# Patient Record
Sex: FEMALE | Race: BLACK OR AFRICAN AMERICAN | NOT HISPANIC OR LATINO | Employment: OTHER | URBAN - METROPOLITAN AREA
[De-identification: names, ages, dates, MRNs, and addresses within clinical notes are randomized per-mention and may not be internally consistent; named-entity substitution may affect disease eponyms.]

---

## 2017-07-11 LAB — HEP C VIRUS AB: 0.1

## 2018-02-13 ENCOUNTER — HOSPITAL ENCOUNTER (EMERGENCY)
Facility: OTHER | Age: 68
Discharge: HOME | End: 2018-02-13
Attending: EMERGENCY MEDICINE
Payer: MEDICARE

## 2018-02-13 VITALS
RESPIRATION RATE: 20 BRPM | HEIGHT: 64 IN | TEMPERATURE: 99 F | DIASTOLIC BLOOD PRESSURE: 76 MMHG | SYSTOLIC BLOOD PRESSURE: 123 MMHG | HEART RATE: 76 BPM | OXYGEN SATURATION: 99 % | BODY MASS INDEX: 38.41 KG/M2 | WEIGHT: 225 LBS

## 2018-02-13 DIAGNOSIS — M54.42 ACUTE LEFT-SIDED LOW BACK PAIN WITH LEFT-SIDED SCIATICA: Primary | ICD-10-CM

## 2018-02-13 PROCEDURE — 63600175 PHARM REV CODE 636 W HCPCS: Performed by: EMERGENCY MEDICINE

## 2018-02-13 PROCEDURE — 25000003 PHARM REV CODE 250: Performed by: EMERGENCY MEDICINE

## 2018-02-13 PROCEDURE — 96372 THER/PROPH/DIAG INJ SC/IM: CPT

## 2018-02-13 PROCEDURE — 99283 EMERGENCY DEPT VISIT LOW MDM: CPT | Mod: 25

## 2018-02-13 RX ORDER — DIAZEPAM 5 MG/1
5 TABLET ORAL EVERY 8 HOURS PRN
Qty: 15 TABLET | Refills: 0 | Status: SHIPPED | OUTPATIENT
Start: 2018-02-13 | End: 2018-09-16

## 2018-02-13 RX ORDER — METHYLPREDNISOLONE 4 MG/1
TABLET ORAL
Qty: 1 PACKAGE | Refills: 0 | Status: SHIPPED | OUTPATIENT
Start: 2018-02-13 | End: 2018-03-06

## 2018-02-13 RX ORDER — DIAZEPAM 2 MG/1
2 TABLET ORAL
Status: DISCONTINUED | OUTPATIENT
Start: 2018-02-13 | End: 2018-02-13

## 2018-02-13 RX ORDER — KETOROLAC TROMETHAMINE 30 MG/ML
30 INJECTION, SOLUTION INTRAMUSCULAR; INTRAVENOUS
Status: COMPLETED | OUTPATIENT
Start: 2018-02-13 | End: 2018-02-13

## 2018-02-13 RX ORDER — DIAZEPAM 10 MG/2ML
2.5 INJECTION INTRAMUSCULAR
Status: DISCONTINUED | OUTPATIENT
Start: 2018-02-13 | End: 2018-02-13

## 2018-02-13 RX ORDER — DIAZEPAM 5 MG/1
2.5 TABLET ORAL
Status: COMPLETED | OUTPATIENT
Start: 2018-02-13 | End: 2018-02-13

## 2018-02-13 RX ORDER — DEXAMETHASONE SODIUM PHOSPHATE 4 MG/ML
8 INJECTION, SOLUTION INTRA-ARTICULAR; INTRALESIONAL; INTRAMUSCULAR; INTRAVENOUS; SOFT TISSUE
Status: COMPLETED | OUTPATIENT
Start: 2018-02-13 | End: 2018-02-13

## 2018-02-13 RX ADMIN — DIAZEPAM 2.5 MG: 5 TABLET ORAL at 02:02

## 2018-02-13 RX ADMIN — DEXAMETHASONE SODIUM PHOSPHATE 8 MG: 4 INJECTION, SOLUTION INTRAMUSCULAR; INTRAVENOUS at 02:02

## 2018-02-13 RX ADMIN — KETOROLAC TROMETHAMINE 30 MG: 30 INJECTION, SOLUTION INTRAMUSCULAR at 02:02

## 2018-02-13 NOTE — ED PROVIDER NOTES
Encounter Date: 2/13/2018       History     Chief Complaint   Patient presents with    Hip Pain     reports hx of sciatica, pain radiating from buttocks (L) SIDE down leg.Denies swelling     Ms Holt reports flare of sciatica similar to prior flares, but states this is worse. Unknown trigger. Still able to perform normal adls but hurts constantly. No relief w home meds.       The history is provided by the patient.   Back Pain    This is a recurrent problem. The current episode started yesterday. The problem occurs constantly. The problem has been unchanged. The pain is associated with no known injury. The pain is present in the lumbar spine. The quality of the pain is described as stabbing, shooting and aching. The pain radiates to the left leg. The symptoms are aggravated by bending, twisting and certain positions. The pain is the same all the time. Pertinent negatives include no chest pain, no fever, no numbness, no abdominal pain, no bowel incontinence, no bladder incontinence, no dysuria, no pelvic pain and no leg pain.     Review of patient's allergies indicates:   Allergen Reactions    Codeine      Past Medical History:   Diagnosis Date    Arthralgia     Degenerative disc disease at L5-S1 level     High cholesterol     Hypertension     Sciatica     Spinal stenosis      Past Surgical History:   Procedure Laterality Date    TUBAL LIGATION       History reviewed. No pertinent family history.  Social History   Substance Use Topics    Smoking status: Current Every Day Smoker     Types: Cigarettes    Smokeless tobacco: Never Used    Alcohol use Yes     Review of Systems   Constitutional: Negative for fever.   Cardiovascular: Negative for chest pain.   Gastrointestinal: Negative for abdominal pain and bowel incontinence.   Genitourinary: Negative for bladder incontinence, dysuria and pelvic pain.   Musculoskeletal: Positive for back pain.   Neurological: Negative for numbness.   All other systems  reviewed and are negative.      Physical Exam     Initial Vitals [02/13/18 1215]   BP Pulse Resp Temp SpO2   120/74 77 20 98.5 °F (36.9 °C) 98 %      MAP       89.33         Physical Exam    Nursing note and vitals reviewed.  Constitutional: She appears well-developed and well-nourished. She is not diaphoretic. She appears distressed.   Uncomfortable     HENT:   Head: Normocephalic and atraumatic.   Eyes: Conjunctivae and EOM are normal.   Neck: Normal range of motion. Neck supple.   Cardiovascular: Normal rate, regular rhythm and normal heart sounds.   No murmur heard.  Pulmonary/Chest: Breath sounds normal. No respiratory distress. She has no wheezes.   Abdominal: Soft. She exhibits no distension. There is no tenderness. There is no rebound.   Musculoskeletal:   Diffuse pain in L lower back w positive straight leg raise. Able to move normally, subjective pain down lateral aspect of LLE   Neurological: She is alert and oriented to person, place, and time. No cranial nerve deficit or sensory deficit.   Skin: Skin is warm and dry. Capillary refill takes less than 2 seconds. No rash noted. No erythema.   Psychiatric: She has a normal mood and affect. Her behavior is normal. Thought content normal.         ED Course   Procedures  Labs Reviewed - No data to display          Medical Decision Making:   ED Management:  Ms Holt feels markedly better. Is stable for d/c and adamantly wants to go home.  We discussed home care and worrisome signs that should prompt need to return to the ER should they occur.  There is no indication for further emergent intervention or evaluation at this time.                   ED Course      Clinical Impression:   The encounter diagnosis was Acute left-sided low back pain with left-sided sciatica.                           Kyrie Thomas MD  02/21/18 0556       Kyrie Thomas MD  02/21/18 0556

## 2018-09-16 ENCOUNTER — HOSPITAL ENCOUNTER (EMERGENCY)
Facility: HOSPITAL | Age: 68
Discharge: HOME | End: 2018-09-16
Attending: EMERGENCY MEDICINE
Payer: MEDICARE

## 2018-09-16 VITALS
SYSTOLIC BLOOD PRESSURE: 161 MMHG | DIASTOLIC BLOOD PRESSURE: 98 MMHG | BODY MASS INDEX: 38.41 KG/M2 | HEIGHT: 64 IN | TEMPERATURE: 99 F | RESPIRATION RATE: 16 BRPM | WEIGHT: 225 LBS | HEART RATE: 88 BPM | OXYGEN SATURATION: 96 %

## 2018-09-16 DIAGNOSIS — G89.29 CHRONIC BILATERAL LOW BACK PAIN WITH BILATERAL SCIATICA: Primary | ICD-10-CM

## 2018-09-16 DIAGNOSIS — M54.41 CHRONIC BILATERAL LOW BACK PAIN WITH BILATERAL SCIATICA: Primary | ICD-10-CM

## 2018-09-16 DIAGNOSIS — M54.42 CHRONIC BILATERAL LOW BACK PAIN WITH BILATERAL SCIATICA: Primary | ICD-10-CM

## 2018-09-16 PROCEDURE — 99283 EMERGENCY DEPT VISIT LOW MDM: CPT | Mod: 25

## 2018-09-16 PROCEDURE — 96372 THER/PROPH/DIAG INJ SC/IM: CPT

## 2018-09-16 PROCEDURE — 63600175 PHARM REV CODE 636 W HCPCS: Performed by: NURSE PRACTITIONER

## 2018-09-16 PROCEDURE — 99283 EMERGENCY DEPT VISIT LOW MDM: CPT | Mod: ,,, | Performed by: NURSE PRACTITIONER

## 2018-09-16 RX ORDER — MELOXICAM 7.5 MG/1
7.5 TABLET ORAL DAILY
COMMUNITY
End: 2019-11-05

## 2018-09-16 RX ORDER — LOSARTAN POTASSIUM 100 MG/1
100 TABLET ORAL DAILY
COMMUNITY
End: 2020-03-13 | Stop reason: SDUPTHER

## 2018-09-16 RX ORDER — ORPHENADRINE CITRATE 30 MG/ML
60 INJECTION INTRAMUSCULAR; INTRAVENOUS
Status: COMPLETED | OUTPATIENT
Start: 2018-09-16 | End: 2018-09-16

## 2018-09-16 RX ORDER — HYDROCHLOROTHIAZIDE 25 MG/1
25 TABLET ORAL DAILY
COMMUNITY
End: 2020-03-13 | Stop reason: SDUPTHER

## 2018-09-16 RX ORDER — PRAVASTATIN SODIUM 40 MG/1
40 TABLET ORAL DAILY
COMMUNITY
End: 2021-03-03 | Stop reason: SDUPTHER

## 2018-09-16 RX ORDER — IBUPROFEN 800 MG/1
800 TABLET ORAL EVERY 6 HOURS PRN
COMMUNITY
End: 2019-11-05

## 2018-09-16 RX ORDER — GABAPENTIN 800 MG/1
800 TABLET ORAL 3 TIMES DAILY
COMMUNITY
End: 2020-07-20

## 2018-09-16 RX ORDER — POTASSIUM CHLORIDE 750 MG/1
10 CAPSULE, EXTENDED RELEASE ORAL ONCE
COMMUNITY
End: 2020-09-03

## 2018-09-16 RX ORDER — AMLODIPINE BESYLATE 10 MG/1
10 TABLET ORAL DAILY
COMMUNITY
End: 2020-03-13 | Stop reason: SDUPTHER

## 2018-09-16 RX ORDER — TIZANIDINE HYDROCHLORIDE 4 MG/1
4 CAPSULE, GELATIN COATED ORAL 4 TIMES DAILY PRN
COMMUNITY
End: 2020-03-13 | Stop reason: SDUPTHER

## 2018-09-16 RX ORDER — OMEPRAZOLE 40 MG/1
40 CAPSULE, DELAYED RELEASE ORAL DAILY
COMMUNITY
End: 2020-03-13 | Stop reason: SDUPTHER

## 2018-09-16 RX ADMIN — ORPHENADRINE CITRATE 60 MG: 30 INJECTION INTRAMUSCULAR; INTRAVENOUS at 03:09

## 2018-09-16 RX ADMIN — METHYLPREDNISOLONE SODIUM SUCCINATE 40 MG: 40 INJECTION, POWDER, FOR SOLUTION INTRAMUSCULAR; INTRAVENOUS at 03:09

## 2018-09-16 NOTE — ED TRIAGE NOTES
Presents to ER with complaint of back pain for 3 months.  States that the pain is radiating down his right hip and leg.  Patient's name and date of birth checked and is correct.    LOC: The patient is awake, alert, and oriented to place, time, situation. Affect is appropriate.  Speech is appropriate and clear.      APPEARANCE: Patient resting comfortably, reporting palpation, light headedness,  in no acute distress.  Patient is clean and well groomed.     SKIN: The skin is warm and dry; color consistent with ethnicity.  Patient has normal skin turgor and moist mucus membranes.  Skin intact; no breakdown or bruising noted.      MUSCULOSKELETAL: Patient moving upper without difficulty; limping RLE.  Denies weakness.      RESPIRATORY: Airway is open and patent. Respirations spontaneous, even, easy, and non-labored.  Patient has a normal effort and rate.  No accessory muscle use noted. Denies cough.  BS clear.     CARDIAC:  No peripheral edema noted. No complaints of chest pain.       ABDOMEN: Soft and non tender to palpation.  No distention noted.      NEUROLOGIC: Eyes open spontaneously.  Behavior appropriate to situation.  Follows commands; facial expression symmetrical.  Purposeful motor response noted; normal sensation in all extremities.

## 2018-09-16 NOTE — ED PROVIDER NOTES
Encounter Date: 9/16/2018       History     Chief Complaint   Patient presents with    Back Pain     Chronic back pain.      Patient is a 67-year-old female with medical history of hypertension, sciatica, spinal stenosis presenting to the ED for low back pain and bilateral lower extremity pain. Patient states pain has been present for the past 6 months.  Patient states she has been unable to follow up with her the surgeon to schedule surgery.  The patient denies any recent falls or trauma.  Patient denies any numbness, tingling or weakness.           Review of patient's allergies indicates:   Allergen Reactions    Codeine      Past Medical History:   Diagnosis Date    Arthralgia     Degenerative disc disease at L5-S1 level     High cholesterol     Hypertension     Sciatica     Spinal stenosis      Past Surgical History:   Procedure Laterality Date    TUBAL LIGATION       History reviewed. No pertinent family history.  Social History     Tobacco Use    Smoking status: Current Every Day Smoker     Types: Cigarettes    Smokeless tobacco: Never Used   Substance Use Topics    Alcohol use: Yes    Drug use: No     Review of Systems   Genitourinary: Negative for decreased urine volume, difficulty urinating, dysuria and urgency.   Musculoskeletal: Positive for arthralgias, back pain and myalgias.   Neurological: Negative for dizziness, syncope, weakness and numbness.       Physical Exam     Initial Vitals [09/16/18 1439]   BP Pulse Resp Temp SpO2   (!) 161/98 88 16 98.5 °F (36.9 °C) 96 %      MAP       --         Physical Exam    Constitutional: Vital signs are normal. She appears well-developed and well-nourished. She is cooperative. She does not have a sickly appearance. No distress.   Neck: Trachea normal, normal range of motion, full passive range of motion without pain and phonation normal. Neck supple. No JVD present.   Cardiovascular:   Pulses:       Radial pulses are 2+ on the right side, and 2+ on the  left side.        Popliteal pulses are 2+ on the right side, and 2+ on the left side.        Dorsalis pedis pulses are 2+ on the right side, and 2+ on the left side.   Musculoskeletal:        Lumbar back: She exhibits pain and spasm. She exhibits normal range of motion, no tenderness, no bony tenderness, no swelling, no deformity, no laceration and normal pulse.   Neurological: No cranial nerve deficit or sensory deficit. GCS eye subscore is 4. GCS verbal subscore is 5. GCS motor subscore is 6.   Skin: Skin is warm and dry. Capillary refill takes less than 2 seconds. No abrasion, no laceration and no rash noted. No cyanosis. Nails show no clubbing.         ED Course   Procedures  Labs Reviewed - No data to display       Imaging Results    None                APC / Resident Notes:   Emergent evaluation of a 66 yo female patient presenting to the ER with chief complaint of low back pain. Patient states pain radiates down bilateral legs into her thighs.  Patient states pain has been present for the past 6 months.  Patient does see a surgeon in home a that would like to do surgery.  Patient states she has not been able to follow up with him since that time.  Patient denies any recent falls or trauma. On exam, pt the A&O x3. Strength 5/5 in bilateral lower extremities.  Patient denies any numbness or weakness. Popliteal and DP pulses +2.  The patient denies any bowel or bladder incontinence.  I do not feel labs or imaging are pertinent to the care of this patient.  I will give 1 shot of for flex as well as steroid injection. Differential diagnoses include but are not limited to muscle spasm/strain, slipped disc w/ radicular pain including sciatica, slipped disc w/ cauda equina syndrome,vertebral fracture, vertebral tumor, epidural abscess / discitis, or pyelonephritis.  I discussed the care of this patient with my Supervising Physician.      I do not believe a fracture to be the source of this patient's pain as there was  no preceding trauma. No X-ray needed due to no preceding fall or trauma.  I do not believe the pain is caused by an epidural abscess as the patient denies hx of IV drug use and does not have fevers/chills and no recent procedures.  I do not believe this patient's pain is from an infiltrative vertebral tumor as the patient does not have weight loss or night sweats and no known history of cancer.  I do not believe this patient's pain represents a rupture AAA.  There is no palpable, pulsatile mass on exam and patient does no have any ABD pain.  The believe this is patient's chronic low back pain and sciatica.  Patient advised to follow up with surgeon.  Patient states she is awaiting an appointment with [facility] on September 25th.  Advised patient to follow up as needed.  Patient is hemodynamically stable, vital signs are normal. Discharge instructions given.  Return to ED precautions discussed. Follow up as directed. Pt verbalized understanding of this plan. Pt is stable for discharge.                    Clinical Impression:   The encounter diagnosis was Chronic bilateral low back pain with bilateral sciatica.      Disposition:   Disposition: Discharged  Condition: Stable                        Xenia West NP  09/18/18 1143

## 2018-12-13 ENCOUNTER — OCCUPATIONAL HEALTH (OUTPATIENT)
Dept: URGENT CARE | Facility: CLINIC | Age: 68
End: 2018-12-13

## 2018-12-13 DIAGNOSIS — Z02.1 PRE-EMPLOYMENT EXAMINATION: Primary | ICD-10-CM

## 2018-12-13 PROCEDURE — 99499 UNLISTED E&M SERVICE: CPT | Mod: S$GLB,,, | Performed by: NURSE PRACTITIONER

## 2019-10-08 LAB — HM MAMMOGRAM: NEGATIVE

## 2019-11-03 ENCOUNTER — HOSPITAL ENCOUNTER (EMERGENCY)
Facility: HOSPITAL | Age: 69
Discharge: HOME | End: 2019-11-03
Attending: EMERGENCY MEDICINE
Payer: MEDICARE

## 2019-11-03 VITALS
RESPIRATION RATE: 18 BRPM | TEMPERATURE: 98 F | OXYGEN SATURATION: 95 % | SYSTOLIC BLOOD PRESSURE: 121 MMHG | WEIGHT: 232 LBS | DIASTOLIC BLOOD PRESSURE: 65 MMHG | HEART RATE: 91 BPM | HEIGHT: 64 IN | BODY MASS INDEX: 39.61 KG/M2

## 2019-11-03 DIAGNOSIS — R31.9 HEMATURIA, UNSPECIFIED TYPE: Primary | ICD-10-CM

## 2019-11-03 DIAGNOSIS — M54.9 BACK PAIN, UNSPECIFIED BACK LOCATION, UNSPECIFIED BACK PAIN LATERALITY, UNSPECIFIED CHRONICITY: ICD-10-CM

## 2019-11-03 LAB
ALBUMIN SERPL BCP-MCNC: 3.2 G/DL (ref 3.5–5.2)
ALP SERPL-CCNC: 108 U/L (ref 55–135)
ALT SERPL W/O P-5'-P-CCNC: 18 U/L (ref 10–44)
ANION GAP SERPL CALC-SCNC: 8 MMOL/L (ref 8–16)
AST SERPL-CCNC: 15 U/L (ref 10–40)
BACTERIA #/AREA URNS HPF: ABNORMAL /HPF
BASOPHILS # BLD AUTO: 0.02 K/UL (ref 0–0.2)
BASOPHILS NFR BLD: 0.3 % (ref 0–1.9)
BILIRUB SERPL-MCNC: 0.4 MG/DL (ref 0.1–1)
BILIRUB UR QL STRIP: NEGATIVE
BUN SERPL-MCNC: 21 MG/DL (ref 8–23)
CALCIUM SERPL-MCNC: 9.5 MG/DL (ref 8.7–10.5)
CHLORIDE SERPL-SCNC: 106 MMOL/L (ref 95–110)
CLARITY UR: ABNORMAL
CO2 SERPL-SCNC: 26 MMOL/L (ref 23–29)
COLOR UR: ABNORMAL
CREAT SERPL-MCNC: 1.3 MG/DL (ref 0.5–1.4)
DIFFERENTIAL METHOD: ABNORMAL
EOSINOPHIL # BLD AUTO: 0.1 K/UL (ref 0–0.5)
EOSINOPHIL NFR BLD: 1.9 % (ref 0–8)
ERYTHROCYTE [DISTWIDTH] IN BLOOD BY AUTOMATED COUNT: 11.7 % (ref 11.5–14.5)
EST. GFR  (AFRICAN AMERICAN): 49 ML/MIN/1.73 M^2
EST. GFR  (NON AFRICAN AMERICAN): 42 ML/MIN/1.73 M^2
GLUCOSE SERPL-MCNC: 88 MG/DL (ref 70–110)
GLUCOSE UR QL STRIP: NEGATIVE
GRAN CASTS #/AREA URNS LPF: 15 /LPF
HCT VFR BLD AUTO: 39.7 % (ref 37–48.5)
HGB BLD-MCNC: 11.8 G/DL (ref 12–16)
HGB UR QL STRIP: ABNORMAL
HYALINE CASTS #/AREA URNS LPF: 0 /LPF
IMM GRANULOCYTES # BLD AUTO: 0.04 K/UL (ref 0–0.04)
IMM GRANULOCYTES NFR BLD AUTO: 0.5 % (ref 0–0.5)
INR PPP: 1 (ref 0.8–1.2)
KETONES UR QL STRIP: NEGATIVE
LEUKOCYTE ESTERASE UR QL STRIP: NEGATIVE
LYMPHOCYTES # BLD AUTO: 2.5 K/UL (ref 1–4.8)
LYMPHOCYTES NFR BLD: 32.9 % (ref 18–48)
MCH RBC QN AUTO: 29.3 PG (ref 27–31)
MCHC RBC AUTO-ENTMCNC: 29.7 G/DL (ref 32–36)
MCV RBC AUTO: 99 FL (ref 82–98)
MICROSCOPIC COMMENT: ABNORMAL
MONOCYTES # BLD AUTO: 0.7 K/UL (ref 0.3–1)
MONOCYTES NFR BLD: 9.5 % (ref 4–15)
NEUTROPHILS # BLD AUTO: 4.1 K/UL (ref 1.8–7.7)
NEUTROPHILS NFR BLD: 54.9 % (ref 38–73)
NITRITE UR QL STRIP: NEGATIVE
NRBC BLD-RTO: 0 /100 WBC
PH UR STRIP: 5 [PH] (ref 5–8)
PLATELET # BLD AUTO: 247 K/UL (ref 150–350)
PMV BLD AUTO: 9.7 FL (ref 9.2–12.9)
POTASSIUM SERPL-SCNC: 3.5 MMOL/L (ref 3.5–5.1)
PROT SERPL-MCNC: 7.5 G/DL (ref 6–8.4)
PROT UR QL STRIP: ABNORMAL
PROTHROMBIN TIME: 10 SEC (ref 9–12.5)
RBC # BLD AUTO: 4.03 M/UL (ref 4–5.4)
RBC #/AREA URNS HPF: >100 /HPF (ref 0–4)
SODIUM SERPL-SCNC: 140 MMOL/L (ref 136–145)
SP GR UR STRIP: 1.03 (ref 1–1.03)
SQUAMOUS #/AREA URNS HPF: 3 /HPF
URN SPEC COLLECT METH UR: ABNORMAL
UROBILINOGEN UR STRIP-ACNC: NEGATIVE EU/DL
WBC # BLD AUTO: 7.51 K/UL (ref 3.9–12.7)
WBC #/AREA URNS HPF: 0 /HPF (ref 0–5)

## 2019-11-03 PROCEDURE — 99284 EMERGENCY DEPT VISIT MOD MDM: CPT | Mod: 25

## 2019-11-03 PROCEDURE — 81000 URINALYSIS NONAUTO W/SCOPE: CPT

## 2019-11-03 PROCEDURE — 87086 URINE CULTURE/COLONY COUNT: CPT

## 2019-11-03 PROCEDURE — 85610 PROTHROMBIN TIME: CPT

## 2019-11-03 PROCEDURE — 63600175 PHARM REV CODE 636 W HCPCS: Performed by: EMERGENCY MEDICINE

## 2019-11-03 PROCEDURE — 96374 THER/PROPH/DIAG INJ IV PUSH: CPT

## 2019-11-03 PROCEDURE — 80053 COMPREHEN METABOLIC PANEL: CPT

## 2019-11-03 PROCEDURE — 85025 COMPLETE CBC W/AUTO DIFF WBC: CPT

## 2019-11-03 PROCEDURE — 25000003 PHARM REV CODE 250: Performed by: EMERGENCY MEDICINE

## 2019-11-03 RX ORDER — CEPHALEXIN 500 MG/1
500 CAPSULE ORAL EVERY 12 HOURS
Qty: 14 CAPSULE | Refills: 0 | Status: SHIPPED | OUTPATIENT
Start: 2019-11-03 | End: 2019-11-10

## 2019-11-03 RX ORDER — TRAMADOL HYDROCHLORIDE 50 MG/1
50 TABLET ORAL EVERY 6 HOURS PRN
Qty: 12 TABLET | Refills: 0 | Status: SHIPPED | OUTPATIENT
Start: 2019-11-03 | End: 2019-11-06

## 2019-11-03 RX ORDER — MORPHINE SULFATE 10 MG/ML
5 INJECTION INTRAMUSCULAR; INTRAVENOUS; SUBCUTANEOUS
Status: COMPLETED | OUTPATIENT
Start: 2019-11-03 | End: 2019-11-03

## 2019-11-03 RX ORDER — CEPHALEXIN 250 MG/1
500 CAPSULE ORAL
Status: COMPLETED | OUTPATIENT
Start: 2019-11-03 | End: 2019-11-03

## 2019-11-03 RX ADMIN — CEPHALEXIN 500 MG: 250 CAPSULE ORAL at 03:11

## 2019-11-03 RX ADMIN — MORPHINE SULFATE 5 MG: 10 INJECTION INTRAVENOUS at 01:11

## 2019-11-03 NOTE — ED TRIAGE NOTES
Pt arrives to ED with c/o of hematuria that began this morning. Pt denies dysuria. Pt also complains of left sided abdominal tenderness.

## 2019-11-03 NOTE — ED PROVIDER NOTES
Encounter Date: 11/3/2019    SCRIBE #1 NOTE: I, Emilia Jones, am scribing for, and in the presence of,  Jairon Lockhart MD. I have scribed the entire note.       History     Chief Complaint   Patient presents with    Hematuria     Pt reports having increasing amounts of blood in her urine this morning while at [employer], denies pain with urination but states she is having some minor abdominal cramping. repports having this happen one time before but it has been years.      CC: Hematuria    HPI:  This is a 69 y.o. female with a PMHx of Degenerative disc disease at L5-S1 level, Spinal stenosis, Hypertension, High cholesterol, Arthralgia, and Sciatica who presents to the Emergency Department with a cc of hematuria beginning this morning. Patient reports associated left lumbar pain, and LLQ abdominal pain. The patient rates her abdominal pain at 8/10. Denies dysuria, fever, nausea, or frequency. There are no alleviating factors to her symptoms. She has a surgical history of tubal ligation, and back surgery.     The history is provided by the patient.     Review of patient's allergies indicates:   Allergen Reactions    Codeine      Past Medical History:   Diagnosis Date    Arthralgia     Degenerative disc disease at L5-S1 level     High cholesterol     Hypertension     Sciatica     Spinal stenosis      Past Surgical History:   Procedure Laterality Date    BACK SURGERY      lumbar laminectomy    TUBAL LIGATION       History reviewed. No pertinent family history.  Social History     Tobacco Use    Smoking status: Current Some Day Smoker     Types: Cigarettes    Smokeless tobacco: Never Used    Tobacco comment: 2 cigarettes/week   Substance Use Topics    Alcohol use: Yes     Comment: ocassionally    Drug use: No     Review of Systems   Constitutional: Negative for fever.   HENT: Negative for sore throat.    Respiratory: Negative for shortness of breath.    Cardiovascular: Negative for chest pain.    Gastrointestinal: Positive for abdominal pain (LLQ). Negative for nausea.   Genitourinary: Positive for hematuria. Negative for dysuria.   Musculoskeletal: Positive for back pain (left-sided lumbar).   Skin: Negative for rash.   Neurological: Negative for weakness.   Hematological: Does not bruise/bleed easily.       Physical Exam     Initial Vitals [11/03/19 1205]   BP Pulse Resp Temp SpO2   (!) 136/93 89 18 98.9 °F (37.2 °C) 98 %      MAP       --         Physical Exam    Nursing note and vitals reviewed.  Constitutional: She appears well-developed and well-nourished. She is not diaphoretic. No distress.   HENT:   Head: Normocephalic and atraumatic.   Right Ear: External ear normal.   Left Ear: External ear normal.   Nose: Nose normal.   Eyes: Conjunctivae and EOM are normal. Pupils are equal, round, and reactive to light. Right eye exhibits no discharge. Left eye exhibits no discharge. No scleral icterus.   Neck: Normal range of motion.   Cardiovascular: Normal rate.   Peripheral perfusion appropriate   Pulmonary/Chest: Breath sounds normal. No stridor. No respiratory distress.   Abdominal: There is tenderness in the left lower quadrant. There is CVA tenderness (mild). There is no rebound and no guarding.   Genitourinary: Vagina normal.   Genitourinary Comments: No vaginal bleeding   Musculoskeletal: Normal range of motion.        Lumbar back: She exhibits tenderness (left-sided).   Gait normal   Neurological: She is alert. She has normal strength. No cranial nerve deficit.   Skin: Skin is warm and dry. No rash noted. No pallor.   Psychiatric: She has a normal mood and affect.         ED Course   Procedures  Labs Reviewed   URINALYSIS, REFLEX TO URINE CULTURE - Abnormal; Notable for the following components:       Result Value    Appearance, UA Hazy (*)     Protein, UA 2+ (*)     Occult Blood UA 3+ (*)     All other components within normal limits    Narrative:     Preferred Collection Type->Urine, Clean Catch    CBC W/ AUTO DIFFERENTIAL - Abnormal; Notable for the following components:    Hemoglobin 11.8 (*)     Mean Corpuscular Volume 99 (*)     Mean Corpuscular Hemoglobin Conc 29.7 (*)     All other components within normal limits   COMPREHENSIVE METABOLIC PANEL - Abnormal; Notable for the following components:    Albumin 3.2 (*)     eGFR if  49 (*)     eGFR if non  42 (*)     All other components within normal limits   URINALYSIS MICROSCOPIC - Abnormal; Notable for the following components:    RBC, UA >100 (*)     Granular Casts, UA 15 (*)     All other components within normal limits    Narrative:     Preferred Collection Type->Urine, Clean Catch   CULTURE, URINE   PROTIME-INR          Imaging Results          CT Renal Stone Study ABD Pelvis WO (Final result)  Result time 11/03/19 14:57:10    Final result by Epifanio Aceves MD (11/03/19 14:57:10)                 Impression:      No evidence for renal calculi or obstruction.      Electronically signed by: Epifanio Aceves MD  Date:    11/03/2019  Time:    14:57             Narrative:    EXAMINATION:  CT RENAL STONE STUDY ABD PELVIS WO    CLINICAL HISTORY:  Flank pain, stone disease suspected;Hematuria;    TECHNIQUE:  Low dose axial images, sagittal and coronal reformations were obtained from the lung bases to the pubic symphysis.  Contrast was not administered.    COMPARISON:  None    FINDINGS:  Heart: Normal in size. No pericardial effusion.    Lung Bases: Well aerated, without consolidation or pleural fluid.    Liver: Normal in size and attenuation, with no focal hepatic lesions.    Gallbladder: No calcified gallstones.    Bile Ducts: No evidence of dilated ducts.    Pancreas: No mass or peripancreatic fat stranding.    Spleen: Unremarkable.    Adrenals: Unremarkable.    Kidneys/ Ureters: Unremarkable.    Bladder: No evidence of wall thickening.    Reproductive organs: Unremarkable.    GI Tract/Mesentery: No evidence of bowel  obstruction or inflammation.  No evidence for diverticulitis or appendicitis. 12    Peritoneal Space: No ascites. No free air.    Retroperitoneum: No significant adenopathy.    Abdominal wall: Unremarkable.    Vasculature: No significant atherosclerosis or aneurysm.    Bones: No acute fracture.  Postoperative changes L4-L5 with vacuum phenomenon.  Fluid collection posterior subcutaneous soft tissues at surgical site may be related to seroma/chronic hematoma.  Laminectomy identified with posterior decompression L4-L5.                                 Medical Decision Making:   Initial Assessment:   This is a 69 y.o. female with a PMHx of Degenerative disc disease at L5-S1 level, Spinal stenosis, Hypertension, High cholesterol, Arthralgia, and Sciatica who presents to the Emergency Department with a cc of hematuria beginning this morning. Exam revealed mild left lower quadrant, left lumbar muscle and mild costovertebral angle tenderness. Plan to obtain CT, CBC auto differential, comprehensive metabolic panel, protime-INR, UA, and saline lock IV. Will treat with morphine injection 5 mg, and reassess.   Clinical Tests:   Lab Tests: Ordered and Reviewed  Radiological Study: Ordered and Reviewed            Scribe Attestation:   Scribe #1: I performed the above scribed service and the documentation accurately describes the services I performed. I attest to the accuracy of the note.    Scribe attestation: I, Jairon Jimenez, personally performed the services described in this documentation. All medical record entries made by the scribe were at my direction and in my presence. I have reviewed the chart and agree that the record reflects my personal performance and is accurate and complete               Clinical Impression:       ICD-10-CM ICD-9-CM   1. Hematuria, unspecified type R31.9 599.70   2. Back pain, unspecified back location, unspecified back pain laterality, unspecified chronicity M54.9 724.5                                 Jairon Lockhart MD  11/03/19 9530

## 2019-11-05 ENCOUNTER — OFFICE VISIT (OUTPATIENT)
Dept: UROLOGY | Facility: CLINIC | Age: 69
End: 2019-11-05
Payer: MEDICARE

## 2019-11-05 VITALS
HEIGHT: 64 IN | HEART RATE: 60 BPM | DIASTOLIC BLOOD PRESSURE: 80 MMHG | BODY MASS INDEX: 39.61 KG/M2 | RESPIRATION RATE: 16 BRPM | WEIGHT: 232 LBS | SYSTOLIC BLOOD PRESSURE: 122 MMHG

## 2019-11-05 DIAGNOSIS — N28.1 RENAL CYST: ICD-10-CM

## 2019-11-05 DIAGNOSIS — R31.0 HEMATURIA, GROSS: Primary | ICD-10-CM

## 2019-11-05 LAB — BACTERIA UR CULT: NORMAL

## 2019-11-05 PROCEDURE — 99999 PR PBB SHADOW E&M-EST. PATIENT-LVL III: CPT | Mod: PBBFAC,,, | Performed by: UROLOGY

## 2019-11-05 PROCEDURE — 99999 PR PBB SHADOW E&M-EST. PATIENT-LVL III: ICD-10-PCS | Mod: PBBFAC,,, | Performed by: UROLOGY

## 2019-11-05 PROCEDURE — 1101F PT FALLS ASSESS-DOCD LE1/YR: CPT | Mod: CPTII,S$GLB,, | Performed by: UROLOGY

## 2019-11-05 PROCEDURE — 99204 OFFICE O/P NEW MOD 45 MIN: CPT | Mod: S$GLB,,, | Performed by: UROLOGY

## 2019-11-05 PROCEDURE — 99204 PR OFFICE/OUTPT VISIT, NEW, LEVL IV, 45-59 MIN: ICD-10-PCS | Mod: S$GLB,,, | Performed by: UROLOGY

## 2019-11-05 PROCEDURE — 1101F PR PT FALLS ASSESS DOC 0-1 FALLS W/OUT INJ PAST YR: ICD-10-PCS | Mod: CPTII,S$GLB,, | Performed by: UROLOGY

## 2019-11-05 RX ORDER — AMITRIPTYLINE HYDROCHLORIDE 50 MG/1
TABLET, FILM COATED ORAL
COMMUNITY
End: 2020-03-13 | Stop reason: SDUPTHER

## 2019-11-05 NOTE — PROGRESS NOTES
"  Subjective:       Amanda Holt is a 69 y.o. female who is a new patient who was referred by ER for evaluation of hematuria.      She was seen in ER 2 days ago with gross hematuria x 1 day. Denies dysuria but reports some abdominal cramping. Constipation and straining for few days prior to that. No prior episodes of hematuria. Current light smoker. No h/o nephrolithiasis, trauma. Chronic L back pain that radiates to leg. No issues with UTIs. No family history of  malignancy.     UCx - negative  CT RSS - negative for stones, limited in hematuria evaluation      The following portions of the patient's history were reviewed and updated as appropriate: allergies, current medications, past family history, past medical history, past social history, past surgical history and problem list.    Review of Systems  Constitutional: no fever or chills  ENT: no nasal congestion or sore throat  Respiratory: no cough or shortness of breath  Cardiovascular: no chest pain or palpitations  Gastrointestinal: no nausea or vomiting, tolerating diet  Genitourinary: as per HPI  Hematologic/Lymphatic: no easy bruising or lymphadenopathy  Musculoskeletal: no arthralgias or myalgias  Skin: no rashes or lesions  Neurological: no seizures or tremors  Behavioral/Psych: no auditory or visual hallucinations        Objective:    Vitals: Ht 5' 4" (1.626 m)   Wt 105.2 kg (232 lb)   BMI 39.82 kg/m²     Physical Exam   General: well developed, well nourished in no acute distress  Head: normocephalic, atraumatic  Neck: supple, trachea midline, no obvious enlargement of thyroid  HEENT: EOMI, mucus membranes moist, sclera anicteric, no hearing impairment  Lungs: symmetric expansion, non-labored breathing  Cardiovascular: regular rate and rhythm, normal pulses  Abdomen: soft, non tender, non distended, no palpable masses, no hepatosplenomegaly, no hernias, no CVA tenderness  Musculoskeletal: no peripheral edema, normal ROM in bilateral upper and " lower extremities  Lymphatics: no cervical or inguinal lymphadenopathy  Skin: no rashes or lesions  Neuro: alert and oriented x 3, no gross deficits  Psych: normal judgment and insight, normal mood/affect and non-anxious  Genitourinary:   patient declined exam      Lab Review   Urine analysis today in clinic shows positive for red blood cells 5-10    Lab Results   Component Value Date    WBC 7.51 11/03/2019    HGB 11.8 (L) 11/03/2019    HCT 39.7 11/03/2019    MCV 99 (H) 11/03/2019     11/03/2019     Lab Results   Component Value Date    CREATININE 1.3 11/03/2019    BUN 21 11/03/2019        Imaging  Images and reports were personally reviewed by me and discussed with patient  CT RSS reviewed       Assessment/Plan:      1. Hematuria, gross    - Discussed etiology and workup of hematuria   - UCx - recently negative   - Cytology - not indicated   - CT urogram. CT RSS done, not adequate for hematuria workup.    - Office cystoscopy   - +smoker     2. Renal cyst   - Diagnosed from outside hospital   - CT RSS did not show this   - CT urogram   - Doubt etiology of pain in L flank      Follow up in 3 weeks for CT/cysto

## 2019-11-15 ENCOUNTER — TELEPHONE (OUTPATIENT)
Dept: UROLOGY | Facility: CLINIC | Age: 69
End: 2019-11-15

## 2019-11-15 ENCOUNTER — CLINICAL SUPPORT (OUTPATIENT)
Dept: AUDIOLOGY | Facility: CLINIC | Age: 69
End: 2019-11-15
Payer: MEDICARE

## 2019-11-15 DIAGNOSIS — H90.3 BILATERAL SENSORINEURAL HEARING LOSS: Primary | ICD-10-CM

## 2019-11-15 PROCEDURE — 92567 PR TYMPA2METRY: ICD-10-PCS | Mod: S$GLB,,, | Performed by: PHYSICIAN ASSISTANT

## 2019-11-15 PROCEDURE — 92557 COMPREHENSIVE HEARING TEST: CPT | Mod: S$GLB,,, | Performed by: PHYSICIAN ASSISTANT

## 2019-11-15 PROCEDURE — 92557 PR COMPREHENSIVE HEARING TEST: ICD-10-PCS | Mod: S$GLB,,, | Performed by: PHYSICIAN ASSISTANT

## 2019-11-15 PROCEDURE — 92567 TYMPANOMETRY: CPT | Mod: S$GLB,,, | Performed by: PHYSICIAN ASSISTANT

## 2019-11-15 NOTE — PROGRESS NOTES
Amanda Holt was seen today in the clinic for an audiologic evaluation.  Patients main complaint was decreased hearing and intermittent bilateral tinnitus.  Ms. Holt reported having difficulty hearing and aural fullness specifically on the left-side. Patient reported that her left ear will pop after performing a valsalva.    Tympanometry revealed Type A in the right ear and Type A in the left ear.  Audiogram results revealed a moderate sensorineural hearing loss (250-8000 Hz) in the right ear and a mild to moderate sensorineural hearing loss (250-8000 Hz) in the left ear.  Speech reception thresholds were noted at 35 dB in the right ear and 35 dB in the left ear.  Speech discrimination scores were 100% in the right ear and 100% in the left ear.    Recommendations:  1. Otologic evaluation  2. Annual audiogram  3. Noise protection when in noise  4. Hearing aid consultation

## 2019-11-15 NOTE — TELEPHONE ENCOUNTER
----- Message from Gema Mcfarland sent at 11/15/2019  9:05 AM CST -----  Contact: Self   Type: Patient Call Back    What is the request in detail: Pt returning call     Can the clinic reply by [facility]? No    Would the patient rather a call back or a response via [facility]? Call back     Best call back number:[phone]   Dry/Warm

## 2019-11-19 ENCOUNTER — HOSPITAL ENCOUNTER (OUTPATIENT)
Dept: RADIOLOGY | Facility: HOSPITAL | Age: 69
Discharge: HOME | End: 2019-11-19
Attending: UROLOGY
Payer: MEDICARE

## 2019-11-19 DIAGNOSIS — R31.0 HEMATURIA, GROSS: ICD-10-CM

## 2019-11-19 PROCEDURE — 74178 CT ABD&PLV WO CNTR FLWD CNTR: CPT | Mod: TC

## 2019-11-19 PROCEDURE — 74178 CT UROGRAM ABD PELVIS W WO: ICD-10-PCS | Mod: 26,,, | Performed by: RADIOLOGY

## 2019-11-19 PROCEDURE — 25500020 PHARM REV CODE 255: Performed by: UROLOGY

## 2019-11-19 PROCEDURE — 74178 CT ABD&PLV WO CNTR FLWD CNTR: CPT | Mod: 26,,, | Performed by: RADIOLOGY

## 2019-11-19 RX ADMIN — IOHEXOL 125 ML: 350 INJECTION, SOLUTION INTRAVENOUS at 12:11

## 2019-11-22 DIAGNOSIS — M81.0 SENILE OSTEOPOROSIS: Primary | ICD-10-CM

## 2019-11-25 ENCOUNTER — HOSPITAL ENCOUNTER (OUTPATIENT)
Dept: RADIOLOGY | Facility: CLINIC | Age: 69
Discharge: HOME | End: 2019-11-25
Attending: FAMILY MEDICINE
Payer: MEDICARE

## 2019-11-25 DIAGNOSIS — M81.0 SENILE OSTEOPOROSIS: ICD-10-CM

## 2019-11-25 PROCEDURE — 77080 DXA BONE DENSITY AXIAL: CPT | Mod: 26,,, | Performed by: INTERNAL MEDICINE

## 2019-11-25 PROCEDURE — 77080 DEXA BONE DENSITY SPINE HIP: ICD-10-PCS | Mod: 26,,, | Performed by: INTERNAL MEDICINE

## 2019-11-25 PROCEDURE — 77080 DXA BONE DENSITY AXIAL: CPT | Mod: TC,PO

## 2019-12-05 ENCOUNTER — PROCEDURE VISIT (OUTPATIENT)
Dept: UROLOGY | Facility: CLINIC | Age: 69
End: 2019-12-05
Payer: MEDICARE

## 2019-12-05 VITALS — BODY MASS INDEX: 39.91 KG/M2 | HEIGHT: 64 IN | WEIGHT: 233.81 LBS

## 2019-12-05 DIAGNOSIS — R31.0 HEMATURIA, GROSS: ICD-10-CM

## 2019-12-05 PROCEDURE — 52000 CYSTOSCOPY: ICD-10-PCS | Mod: S$GLB,,, | Performed by: UROLOGY

## 2019-12-05 PROCEDURE — 52000 CYSTOURETHROSCOPY: CPT | Mod: S$GLB,,, | Performed by: UROLOGY

## 2019-12-05 NOTE — PROCEDURES
"Cystoscopy  Date/Time: 12/5/2019 9:40 AM  Performed by: Na Garcia MD  Authorized by: Na Garcia MD     Consent Done?:  Yes (Written)  Time out: Immediately prior to procedure a "time out" was called to verify the correct patient, procedure, equipment, support staff and site/side marked as required.    Indications: hematuria    Position:  Dorsal lithotomy  Anesthesia:  Lidocaine jelly  Patient sedated?: No    Preparation: Patient was prepped and draped in usual sterile fashion      Scope type:  Flexible cystoscope  Stent inserted: No    Stent removed: No    External exam normal: Yes    Digital exam performed: No    Urethra normal: Yes  Bladder neck normal: Bladder neck normal   Bladder normal: Yes      Patient tolerance:  Patient tolerated the procedure well with no immediate complications     CT uro - normal . Abnormal appearance of liver - recommend f/u with PCP re: this. Report printed.   Cysto - normal      "

## 2020-01-08 ENCOUNTER — OFFICE VISIT (OUTPATIENT)
Dept: OPTOMETRY | Facility: CLINIC | Age: 70
End: 2020-01-08
Payer: MEDICARE

## 2020-01-08 DIAGNOSIS — H18.823 CONTACT LENS OVERWEAR OF BOTH EYES: ICD-10-CM

## 2020-01-08 DIAGNOSIS — H25.13 NUCLEAR SCLEROSIS OF BOTH EYES: Primary | ICD-10-CM

## 2020-01-08 DIAGNOSIS — Z46.0 ENCOUNTER FOR FITTING OR ADJUSTMENT OF SPECTACLES OR CONTACT LENSES: Primary | ICD-10-CM

## 2020-01-08 DIAGNOSIS — H52.7 REFRACTIVE ERROR: ICD-10-CM

## 2020-01-08 PROCEDURE — 99499 UNLISTED E&M SERVICE: CPT | Mod: S$GLB,,, | Performed by: OPTOMETRIST

## 2020-01-08 PROCEDURE — 92004 COMPRE OPH EXAM NEW PT 1/>: CPT | Mod: S$GLB,,, | Performed by: OPTOMETRIST

## 2020-01-08 PROCEDURE — 92310 PR CONTACT LENS FITTING (NO CHANGE): ICD-10-PCS | Mod: CSM,,, | Performed by: OPTOMETRIST

## 2020-01-08 PROCEDURE — 92015 DETERMINE REFRACTIVE STATE: CPT | Mod: S$GLB,,, | Performed by: OPTOMETRIST

## 2020-01-08 PROCEDURE — 99499 NO LOS: ICD-10-PCS | Mod: S$GLB,,, | Performed by: OPTOMETRIST

## 2020-01-08 PROCEDURE — 99999 PR PBB SHADOW E&M-EST. PATIENT-LVL II: CPT | Mod: PBBFAC,,, | Performed by: OPTOMETRIST

## 2020-01-08 PROCEDURE — 92015 PR REFRACTION: ICD-10-PCS | Mod: S$GLB,,, | Performed by: OPTOMETRIST

## 2020-01-08 PROCEDURE — 92004 PR EYE EXAM, NEW PATIENT,COMPREHESV: ICD-10-PCS | Mod: S$GLB,,, | Performed by: OPTOMETRIST

## 2020-01-08 PROCEDURE — 92310 CONTACT LENS FITTING OU: CPT | Mod: CSM,,, | Performed by: OPTOMETRIST

## 2020-01-08 PROCEDURE — 99999 PR PBB SHADOW E&M-EST. PATIENT-LVL II: ICD-10-PCS | Mod: PBBFAC,,, | Performed by: OPTOMETRIST

## 2020-01-08 NOTE — PROGRESS NOTES
Subjective:       Patient ID: Amanda Holt is a 69 y.o. female      Chief Complaint   Patient presents with    Cataract    Concerns About Ocular Health    Contact Lens Follow Up     History of Present Illness  Dls:  ? Yrs     68 y/o female presents today for cataract check.  Pt c/o problems seeing at night.   Pt wears scls and otc readers.     No tearing  + itching  No burning  No pain  No ha's   floaters  No flashes    Eye meds  otc visine ou prn     Air Optix colors. Current pair is one year. Pt sleeps in lenses.     Assessment/Plan:     1. Nuclear sclerosis of both eyes  Educated pt on presence of cataracts and effects on vision. No surgery at this time. Recheck in one year.    2. Contact lens overwear of both eyes  Pt wants to stay in colored lenses. Pt aware no cyl correction and vision will not be as sharp as glasses. DVO CL with readers pRN. Contact lens Rx released to pt. Daily wear only advised, replacement monthly with education to risks of extended wear, including blindness and increased risk of eye infection. Educated pt to not sleep in CLs. Discussed lens care, compliance and solutions. RTC yearly contact lens follow up.    Contact Lens Final Rx     Final Contact Lens Rx       Brand Base Curve Diameter Sphere Cylinder    Right Air Optix Colours 8.6 14.2 -3.00 Sphere    Left Air Optix Colours 8.6 14.2 -3.00 Sphere    Expiration Date:  1/8/2021    Replacement:  Monthly    Wearing Schedule:  Daily wear                3. Refractive error  Educated patient on refractive error and discussed lens options. Dispensed updated spectacle Rx. Educated about adaptation period to new specs.    Eyeglass Final Rx     Eyeglass Final Rx       Sphere Cylinder Axis Add    Right -3.00 -4.00 010 +2.50    Left -2.75 -2.50 180 +2.50    Expiration Date:  1/8/2021                    Follow up in about 1 year (around 1/8/2021) for Annual eye exam, Contact Lens.

## 2020-01-16 ENCOUNTER — TELEPHONE (OUTPATIENT)
Dept: UROLOGY | Facility: CLINIC | Age: 70
End: 2020-01-16

## 2020-01-16 NOTE — TELEPHONE ENCOUNTER
Pt asked if we could print avs,ct scan an bone dexa test an she will  at the office. This was done pt states will  next week.-pablo

## 2020-01-16 NOTE — TELEPHONE ENCOUNTER
----- Message from Eileen Velez sent at 1/16/2020  3:10 PM CST -----  Contact: Self  Type: Patient Call Back    Who called: Self    What is the request in detail: please contact the patient to discuss procedures she had last year.    Can the clinic reply by [facility]? No     Would the patient rather a call back or a response via [facility]? Call     Best call back number: [phone]

## 2020-01-23 ENCOUNTER — TELEPHONE (OUTPATIENT)
Dept: UROLOGY | Facility: CLINIC | Age: 70
End: 2020-01-23

## 2020-01-23 NOTE — TELEPHONE ENCOUNTER
----- Message from Cassius Mccollum sent at 1/23/2020  2:15 PM CST -----  Contact: Amanda [phone]  Type: RX Refill Request    Who Called: Amanda    Refill or New Rx:refill    RX Name and Strength:tramadol    Is this a 30 day or 90 day RX:30 day    Preferred Pharmacy with phone number:[facility] - [place] ([place] - [address] [facility]    Would the patient rather a call back or a response via [facility]? Call back    Best Call Back Number:[phone]

## 2020-03-09 ENCOUNTER — TELEPHONE (OUTPATIENT)
Dept: UROLOGY | Facility: CLINIC | Age: 70
End: 2020-03-09

## 2020-03-09 ENCOUNTER — TELEPHONE (OUTPATIENT)
Dept: NEUROSURGERY | Facility: CLINIC | Age: 70
End: 2020-03-09

## 2020-03-09 ENCOUNTER — TELEPHONE (OUTPATIENT)
Dept: NEPHROLOGY | Facility: CLINIC | Age: 70
End: 2020-03-09

## 2020-03-09 NOTE — TELEPHONE ENCOUNTER
I did an office cystoscopy on her in 12/19. There is no urologic need for Ultram rx at this time. I would defer this to her PCP, who normally gives her the prescription.

## 2020-03-09 NOTE — TELEPHONE ENCOUNTER
----- Message from Tram Rolon sent at 3/9/2020 11:00 AM CDT -----  Contact: CLARICE KLEIN  Name of Who is Calling: CLARICE KLEIN      What is the request in detail: Would like to speak to staff in regards to needing her results from avs ct scan and bone dexa test. States she would told in January that she can pick them up from there . Please advise.       Can the clinic reply by [facility]: Yes      What Number to [facility]: [phone]; [phone]

## 2020-03-09 NOTE — TELEPHONE ENCOUNTER
Advised pt all her paperwork she requested has been printed out an at the . She is also asking if  would refill her ultram. Please advise-pablo

## 2020-03-09 NOTE — TELEPHONE ENCOUNTER
----- Message from Hannah Art sent at 3/9/2020 12:41 PM CDT -----  Contact: pt  Type:  Sooner Apoointment Request    Caller is requesting a sooner appointment.  Caller declined first available appointment listed below.  Caller will not accept being placed on the waitlist and is requesting a message be sent to doctor.  Name of Caller Pt   When is the first available appointment?  Symptoms  Had back surgery 10/2018 just started having left side pain moved from [place] to [address] .   Would the patient rather a call back or a response via [facility]?   Best Call Back Number [phone] or [phone]  Additional Information:

## 2020-03-09 NOTE — TELEPHONE ENCOUNTER
L/m for pt about scheduling appt and to return call @ [phone].    ----- Message from Hannah Art sent at 3/9/2020 12:48 PM CDT -----  Contact: pt  Type:  Sooner Apoointment Request    Caller is requesting a sooner appointment.  Caller declined first available appointment listed below.  Caller will not accept being placed on the waitlist and is requesting a message be sent to doctor.  Name of Caller PT  When is the first available appointment?  Symptoms   Stage 3 kidney desease  Would the patient rather a call back or a response via [facility]?  Best Call Back Number:[phone] or [phone]  Additional Information:

## 2020-03-09 NOTE — TELEPHONE ENCOUNTER
Spoke to pt advised ultram would have to be prescribe from pcp as this is who has been filling it. Pt states she understands an will contact pcp.-pablo

## 2020-03-09 NOTE — TELEPHONE ENCOUNTER
"Patient has been called and informed that she needs a new MRI before she can be scheduled with a surgeon here in the office. Patient states" she will call her PCP for the date and call our office back."  "

## 2020-03-13 ENCOUNTER — OFFICE VISIT (OUTPATIENT)
Dept: FAMILY MEDICINE | Facility: CLINIC | Age: 70
End: 2020-03-13
Payer: MEDICARE

## 2020-03-13 ENCOUNTER — LAB VISIT (OUTPATIENT)
Dept: LAB | Facility: HOSPITAL | Age: 70
End: 2020-03-13
Attending: FAMILY MEDICINE
Payer: MEDICARE

## 2020-03-13 ENCOUNTER — TELEPHONE (OUTPATIENT)
Dept: FAMILY MEDICINE | Facility: CLINIC | Age: 70
End: 2020-03-13

## 2020-03-13 VITALS
HEIGHT: 64 IN | TEMPERATURE: 98 F | SYSTOLIC BLOOD PRESSURE: 122 MMHG | WEIGHT: 236.69 LBS | DIASTOLIC BLOOD PRESSURE: 82 MMHG | BODY MASS INDEX: 40.41 KG/M2

## 2020-03-13 DIAGNOSIS — I10 HYPERTENSION, UNSPECIFIED TYPE: ICD-10-CM

## 2020-03-13 DIAGNOSIS — K21.9 GASTROESOPHAGEAL REFLUX DISEASE, ESOPHAGITIS PRESENCE NOT SPECIFIED: ICD-10-CM

## 2020-03-13 DIAGNOSIS — Z13.6 ENCOUNTER FOR LIPID SCREENING FOR CARDIOVASCULAR DISEASE: ICD-10-CM

## 2020-03-13 DIAGNOSIS — F41.9 ANXIETY: ICD-10-CM

## 2020-03-13 DIAGNOSIS — R73.9 ELEVATED BLOOD SUGAR: ICD-10-CM

## 2020-03-13 DIAGNOSIS — K59.00 CONSTIPATION, UNSPECIFIED CONSTIPATION TYPE: ICD-10-CM

## 2020-03-13 DIAGNOSIS — N28.1 BILATERAL RENAL CYSTS: ICD-10-CM

## 2020-03-13 DIAGNOSIS — G47.33 OBSTRUCTIVE SLEEP APNEA SYNDROME: ICD-10-CM

## 2020-03-13 DIAGNOSIS — Z12.11 SCREENING FOR COLON CANCER: ICD-10-CM

## 2020-03-13 DIAGNOSIS — Z13.220 ENCOUNTER FOR LIPID SCREENING FOR CARDIOVASCULAR DISEASE: ICD-10-CM

## 2020-03-13 DIAGNOSIS — N18.30 CKD (CHRONIC KIDNEY DISEASE) STAGE 3, GFR 30-59 ML/MIN: ICD-10-CM

## 2020-03-13 DIAGNOSIS — M48.00 SPINAL STENOSIS, UNSPECIFIED SPINAL REGION: Primary | ICD-10-CM

## 2020-03-13 DIAGNOSIS — M51.36 DDD (DEGENERATIVE DISC DISEASE), LUMBAR: ICD-10-CM

## 2020-03-13 PROBLEM — H91.93 BILATERAL HEARING LOSS: Status: ACTIVE | Noted: 2017-07-17

## 2020-03-13 PROBLEM — M54.50 CHRONIC LOW BACK PAIN: Status: ACTIVE | Noted: 2018-02-23

## 2020-03-13 PROBLEM — G89.29 CHRONIC LOW BACK PAIN: Status: ACTIVE | Noted: 2018-02-23

## 2020-03-13 LAB
ALBUMIN SERPL BCP-MCNC: 3.5 G/DL (ref 3.5–5.2)
ALP SERPL-CCNC: 94 U/L (ref 55–135)
ALT SERPL W/O P-5'-P-CCNC: 13 U/L (ref 10–44)
ANION GAP SERPL CALC-SCNC: 9 MMOL/L (ref 8–16)
AST SERPL-CCNC: 15 U/L (ref 10–40)
BASOPHILS # BLD AUTO: 0.02 K/UL (ref 0–0.2)
BASOPHILS NFR BLD: 0.3 % (ref 0–1.9)
BILIRUB SERPL-MCNC: 0.5 MG/DL (ref 0.1–1)
BUN SERPL-MCNC: 26 MG/DL (ref 8–23)
CALCIUM SERPL-MCNC: 10.1 MG/DL (ref 8.7–10.5)
CHLORIDE SERPL-SCNC: 108 MMOL/L (ref 95–110)
CHOLEST SERPL-MCNC: 188 MG/DL (ref 120–199)
CHOLEST/HDLC SERPL: 3.8 {RATIO} (ref 2–5)
CO2 SERPL-SCNC: 25 MMOL/L (ref 23–29)
CREAT SERPL-MCNC: 1.4 MG/DL (ref 0.5–1.4)
DIFFERENTIAL METHOD: ABNORMAL
EOSINOPHIL # BLD AUTO: 0.1 K/UL (ref 0–0.5)
EOSINOPHIL NFR BLD: 1.5 % (ref 0–8)
ERYTHROCYTE [DISTWIDTH] IN BLOOD BY AUTOMATED COUNT: 12.5 % (ref 11.5–14.5)
EST. GFR  (AFRICAN AMERICAN): 44.5 ML/MIN/1.73 M^2
EST. GFR  (NON AFRICAN AMERICAN): 38.6 ML/MIN/1.73 M^2
ESTIMATED AVG GLUCOSE: 108 MG/DL (ref 68–131)
GLUCOSE SERPL-MCNC: 106 MG/DL (ref 70–110)
HBA1C MFR BLD HPLC: 5.4 % (ref 4–5.6)
HCT VFR BLD AUTO: 43.3 % (ref 37–48.5)
HDLC SERPL-MCNC: 50 MG/DL (ref 40–75)
HDLC SERPL: 26.6 % (ref 20–50)
HGB BLD-MCNC: 12.5 G/DL (ref 12–16)
IMM GRANULOCYTES # BLD AUTO: 0.01 K/UL (ref 0–0.04)
IMM GRANULOCYTES NFR BLD AUTO: 0.1 % (ref 0–0.5)
LDLC SERPL CALC-MCNC: 107 MG/DL (ref 63–159)
LYMPHOCYTES # BLD AUTO: 2.5 K/UL (ref 1–4.8)
LYMPHOCYTES NFR BLD: 36.1 % (ref 18–48)
MCH RBC QN AUTO: 29 PG (ref 27–31)
MCHC RBC AUTO-ENTMCNC: 28.9 G/DL (ref 32–36)
MCV RBC AUTO: 101 FL (ref 82–98)
MONOCYTES # BLD AUTO: 0.6 K/UL (ref 0.3–1)
MONOCYTES NFR BLD: 9.4 % (ref 4–15)
NEUTROPHILS # BLD AUTO: 3.6 K/UL (ref 1.8–7.7)
NEUTROPHILS NFR BLD: 52.6 % (ref 38–73)
NONHDLC SERPL-MCNC: 138 MG/DL
NRBC BLD-RTO: 0 /100 WBC
PLATELET # BLD AUTO: 305 K/UL (ref 150–350)
PMV BLD AUTO: 9.2 FL (ref 9.2–12.9)
POTASSIUM SERPL-SCNC: 5.2 MMOL/L (ref 3.5–5.1)
PROT SERPL-MCNC: 8.3 G/DL (ref 6–8.4)
RBC # BLD AUTO: 4.31 M/UL (ref 4–5.4)
SODIUM SERPL-SCNC: 142 MMOL/L (ref 136–145)
TRIGL SERPL-MCNC: 155 MG/DL (ref 30–150)
WBC # BLD AUTO: 6.81 K/UL (ref 3.9–12.7)

## 2020-03-13 PROCEDURE — 83036 HEMOGLOBIN GLYCOSYLATED A1C: CPT

## 2020-03-13 PROCEDURE — 3288F PR FALLS RISK ASSESSMENT DOCUMENTED: ICD-10-PCS | Mod: CPTII,S$GLB,, | Performed by: FAMILY MEDICINE

## 2020-03-13 PROCEDURE — 1125F AMNT PAIN NOTED PAIN PRSNT: CPT | Mod: S$GLB,,, | Performed by: FAMILY MEDICINE

## 2020-03-13 PROCEDURE — 3074F PR MOST RECENT SYSTOLIC BLOOD PRESSURE < 130 MM HG: ICD-10-PCS | Mod: CPTII,S$GLB,, | Performed by: FAMILY MEDICINE

## 2020-03-13 PROCEDURE — 85025 COMPLETE CBC W/AUTO DIFF WBC: CPT

## 2020-03-13 PROCEDURE — 99999 PR PBB SHADOW E&M-EST. PATIENT-LVL V: CPT | Mod: PBBFAC,,, | Performed by: FAMILY MEDICINE

## 2020-03-13 PROCEDURE — 1100F PR PT FALLS ASSESS DOC 2+ FALLS/FALL W/INJURY/YR: ICD-10-PCS | Mod: CPTII,S$GLB,, | Performed by: FAMILY MEDICINE

## 2020-03-13 PROCEDURE — 1159F MED LIST DOCD IN RCRD: CPT | Mod: S$GLB,,, | Performed by: FAMILY MEDICINE

## 2020-03-13 PROCEDURE — 99204 OFFICE O/P NEW MOD 45 MIN: CPT | Mod: S$GLB,,, | Performed by: FAMILY MEDICINE

## 2020-03-13 PROCEDURE — 1100F PTFALLS ASSESS-DOCD GE2>/YR: CPT | Mod: CPTII,S$GLB,, | Performed by: FAMILY MEDICINE

## 2020-03-13 PROCEDURE — 3074F SYST BP LT 130 MM HG: CPT | Mod: CPTII,S$GLB,, | Performed by: FAMILY MEDICINE

## 2020-03-13 PROCEDURE — 80053 COMPREHEN METABOLIC PANEL: CPT

## 2020-03-13 PROCEDURE — 3288F FALL RISK ASSESSMENT DOCD: CPT | Mod: CPTII,S$GLB,, | Performed by: FAMILY MEDICINE

## 2020-03-13 PROCEDURE — 1159F PR MEDICATION LIST DOCUMENTED IN MEDICAL RECORD: ICD-10-PCS | Mod: S$GLB,,, | Performed by: FAMILY MEDICINE

## 2020-03-13 PROCEDURE — 99999 PR PBB SHADOW E&M-EST. PATIENT-LVL V: ICD-10-PCS | Mod: PBBFAC,,, | Performed by: FAMILY MEDICINE

## 2020-03-13 PROCEDURE — 99499 RISK ADDL DX/OHS AUDIT: ICD-10-PCS | Mod: S$GLB,,, | Performed by: FAMILY MEDICINE

## 2020-03-13 PROCEDURE — 3079F DIAST BP 80-89 MM HG: CPT | Mod: CPTII,S$GLB,, | Performed by: FAMILY MEDICINE

## 2020-03-13 PROCEDURE — 3079F PR MOST RECENT DIASTOLIC BLOOD PRESSURE 80-89 MM HG: ICD-10-PCS | Mod: CPTII,S$GLB,, | Performed by: FAMILY MEDICINE

## 2020-03-13 PROCEDURE — 80061 LIPID PANEL: CPT

## 2020-03-13 PROCEDURE — 99204 PR OFFICE/OUTPT VISIT, NEW, LEVL IV, 45-59 MIN: ICD-10-PCS | Mod: S$GLB,,, | Performed by: FAMILY MEDICINE

## 2020-03-13 PROCEDURE — 99499 UNLISTED E&M SERVICE: CPT | Mod: S$GLB,,, | Performed by: FAMILY MEDICINE

## 2020-03-13 PROCEDURE — 1125F PR PAIN SEVERITY QUANTIFIED, PAIN PRESENT: ICD-10-PCS | Mod: S$GLB,,, | Performed by: FAMILY MEDICINE

## 2020-03-13 PROCEDURE — 36415 COLL VENOUS BLD VENIPUNCTURE: CPT | Mod: PO

## 2020-03-13 RX ORDER — TRAMADOL HYDROCHLORIDE 50 MG/1
50 TABLET ORAL EVERY 12 HOURS PRN
Qty: 60 TABLET | Refills: 2 | Status: SHIPPED | OUTPATIENT
Start: 2020-03-13 | End: 2020-04-12

## 2020-03-13 RX ORDER — DICLOFENAC SODIUM 10 MG/G
GEL TOPICAL
COMMUNITY
End: 2020-03-13 | Stop reason: SDUPTHER

## 2020-03-13 RX ORDER — ASPIRIN 81 MG/1
TABLET ORAL
COMMUNITY

## 2020-03-13 RX ORDER — POTASSIUM CHLORIDE 750 MG/1
10 CAPSULE, EXTENDED RELEASE ORAL ONCE
Qty: 1 CAPSULE | Refills: 0 | Status: CANCELLED | OUTPATIENT
Start: 2020-03-13 | End: 2020-03-13

## 2020-03-13 RX ORDER — HYDROCHLOROTHIAZIDE 25 MG/1
25 TABLET ORAL DAILY
Qty: 90 TABLET | Refills: 1 | Status: SHIPPED | OUTPATIENT
Start: 2020-03-13 | End: 2020-12-03

## 2020-03-13 RX ORDER — OMEPRAZOLE 40 MG/1
40 CAPSULE, DELAYED RELEASE ORAL DAILY
Qty: 90 CAPSULE | Refills: 1 | Status: SHIPPED | OUTPATIENT
Start: 2020-03-13 | End: 2021-05-18

## 2020-03-13 RX ORDER — TIZANIDINE HYDROCHLORIDE 4 MG/1
4 CAPSULE, GELATIN COATED ORAL 4 TIMES DAILY PRN
Qty: 90 CAPSULE | Refills: 1 | Status: SHIPPED | OUTPATIENT
Start: 2020-03-13 | End: 2020-06-15

## 2020-03-13 RX ORDER — TRAZODONE HYDROCHLORIDE 100 MG/1
100 TABLET ORAL NIGHTLY PRN
Qty: 90 TABLET | Refills: 1 | Status: SHIPPED | OUTPATIENT
Start: 2020-03-13 | End: 2020-09-03 | Stop reason: SDUPTHER

## 2020-03-13 RX ORDER — AMLODIPINE BESYLATE 10 MG/1
10 TABLET ORAL DAILY
Qty: 90 TABLET | Refills: 1 | Status: SHIPPED | OUTPATIENT
Start: 2020-03-13 | End: 2020-09-03 | Stop reason: SDUPTHER

## 2020-03-13 RX ORDER — TRAZODONE HYDROCHLORIDE 100 MG/1
TABLET ORAL
COMMUNITY
End: 2020-03-13 | Stop reason: SDUPTHER

## 2020-03-13 RX ORDER — DICLOFENAC SODIUM 10 MG/G
GEL TOPICAL DAILY
Qty: 100 G | Refills: 5 | Status: SHIPPED | OUTPATIENT
Start: 2020-03-13 | End: 2020-03-13 | Stop reason: SDUPTHER

## 2020-03-13 RX ORDER — TRAMADOL HYDROCHLORIDE 50 MG/1
50 TABLET ORAL EVERY 12 HOURS PRN
Qty: 60 TABLET | Refills: 2 | Status: SHIPPED | OUTPATIENT
Start: 2020-03-13 | End: 2020-03-13 | Stop reason: SDUPTHER

## 2020-03-13 RX ORDER — AMITRIPTYLINE HYDROCHLORIDE 50 MG/1
TABLET, FILM COATED ORAL
Qty: 90 TABLET | Refills: 1 | Status: SHIPPED | OUTPATIENT
Start: 2020-03-13 | End: 2021-02-14

## 2020-03-13 RX ORDER — PRAVASTATIN SODIUM 40 MG/1
40 TABLET ORAL DAILY
Status: CANCELLED | OUTPATIENT
Start: 2020-03-13

## 2020-03-13 RX ORDER — TRAMADOL HYDROCHLORIDE 50 MG/1
25 TABLET ORAL
COMMUNITY
Start: 2020-01-31 | End: 2020-03-13 | Stop reason: SDUPTHER

## 2020-03-13 RX ORDER — HYDROXYZINE HYDROCHLORIDE 25 MG/1
25 TABLET, FILM COATED ORAL 3 TIMES DAILY PRN
Qty: 45 TABLET | Refills: 1 | Status: SHIPPED | OUTPATIENT
Start: 2020-03-13 | End: 2020-08-17

## 2020-03-13 RX ORDER — DICLOFENAC SODIUM 10 MG/G
GEL TOPICAL DAILY
Qty: 100 G | Refills: 5 | Status: SHIPPED | OUTPATIENT
Start: 2020-03-13 | End: 2020-03-19 | Stop reason: SDUPTHER

## 2020-03-13 RX ORDER — HYDROXYZINE HYDROCHLORIDE 25 MG/1
TABLET, FILM COATED ORAL
COMMUNITY
End: 2020-03-13 | Stop reason: SDUPTHER

## 2020-03-13 RX ORDER — AMITRIPTYLINE HYDROCHLORIDE 50 MG/1
TABLET, FILM COATED ORAL
Status: CANCELLED | OUTPATIENT
Start: 2020-03-13

## 2020-03-13 RX ORDER — LOSARTAN POTASSIUM 100 MG/1
100 TABLET ORAL DAILY
Qty: 90 TABLET | Refills: 1 | Status: SHIPPED | OUTPATIENT
Start: 2020-03-13 | End: 2021-04-28

## 2020-03-13 NOTE — TELEPHONE ENCOUNTER
----- Message from Cassius Mccollum sent at 3/13/2020 11:51 AM CDT -----  Contact: Joanna from [facility] [phone]  Type:  Pharmacy Calling to Clarify an RX    Name of Caller: Joanna    Pharmacy Name: [facility]     Prescription Name: traMADoL (ULTRAM) 50 mg tablet    What do they need to clarify? Joanna from [facility] called to get more information on the patient's tramadol. She would like to know if the patient is receiving tramadol for acute or chronic pain and if she is taking something else for the pain as well    Can you be contacted via [facility]?no    Best Call Back Number: [phone]

## 2020-03-13 NOTE — PROGRESS NOTES
Patient, Amanda Holt (MRN #[id number]), presented with a recorded BMI of 40.62 kg/m^2 consistent with the definition of morbid obesity (ICD-10 E66.01). The patient's morbid obesity was monitored, evaluated, addressed and/or treated. This addendum to the medical record is made on 03/13/2020.

## 2020-03-13 NOTE — TELEPHONE ENCOUNTER
This nurse attempted to return call to [facility] pharmacy times 3. Phone continuously rings with no answer.

## 2020-03-13 NOTE — PROGRESS NOTES
Chief Complaint   Patient presents with    Establish Care       HPI  Amanda Holt is a 69 y.o. female with multiple medical diagnoses as listed in the medical history and problem list that presents for establishment of care . She has concerns about CKD stage 3 and would like to establish with kidney specialist, she is seeing neurosurgery for her lower back; she would also like to see cardiology    Lower back-  She has chronic low back pain and in her left side  Her previous PCP ordered an MRI to dx if her pain is due to her previous surgery in 2018  Has referral to neurosurgery  Has seen pain management before but this did improve symptoms    She was in the process of being referred to nephrology for renal cysts and CKD III  She is treated for anxiety as well  She would like to see cardiology as she has seen them for pre op clearance and was treated for HTN      Patient Active Problem List   Diagnosis    Contact lens overwear of both eyes    Refractive error    Nuclear sclerosis of both eyes    Spinal stenosis    Hypertension    Obstructive sleep apnea syndrome    Gastroesophageal reflux disease without esophagitis    CKD (chronic kidney disease) stage 3, GFR 30-59 ml/min    Chronic low back pain    Bilateral hearing loss    DDD (degenerative disc disease), lumbar    CTS (carpal tunnel syndrome)    Anxiety         ROS  Review of Systems   Constitutional: Positive for activity change. Negative for chills, diaphoresis, fatigue and fever.   HENT: Positive for hearing loss. Negative for rhinorrhea, sinus pressure, sore throat, tinnitus and trouble swallowing.    Eyes: Negative for photophobia, discharge and visual disturbance.   Respiratory: Negative for cough, chest tightness, shortness of breath and wheezing.    Cardiovascular: Negative for chest pain.   Gastrointestinal: Positive for constipation. Negative for abdominal pain, blood in stool, diarrhea and nausea.   Endocrine: Positive for polydipsia  "and polyuria.   Genitourinary: Positive for hematuria. Negative for difficulty urinating, dysuria, flank pain, frequency, menstrual problem and vaginal discharge.        This is being evaluated by urology   Musculoskeletal: Positive for joint swelling and neck pain. Negative for arthralgias.   Skin: Negative for rash.   Neurological: Positive for headaches. Negative for speech difficulty, weakness and light-headedness.   Psychiatric/Behavioral: Positive for dysphoric mood. Negative for behavioral problems.       Physical Exam  Vitals:    03/13/20 1003   BP: 122/82   BP Location: Right arm   Patient Position: Sitting   BP Method: Large (Manual)   Pulse: Comment: long nails   Temp: 98.1 °F (36.7 °C)   TempSrc: Oral   SpO2: Comment: long nails   Weight: 107.3 kg (236 lb 10.6 oz)   Height: 5' 4" (1.626 m)    Body mass index is 40.62 kg/m².  Weight: 107.3 kg (236 lb 10.6 oz)   Height: 5' 4" (162.6 cm)     Physical Exam   Constitutional: She is oriented to person, place, and time. She appears well-developed and well-nourished. No distress.   HENT:   Head: Normocephalic and atraumatic.   Eyes: EOM are normal.   Neck: Neck supple.   Cardiovascular: Normal rate and regular rhythm. Exam reveals no gallop and no friction rub.   No murmur heard.  Pulmonary/Chest: Effort normal and breath sounds normal. No respiratory distress. She has no wheezes. She has no rales.   Lymphadenopathy:     She has no cervical adenopathy.   Neurological: She is alert and oriented to person, place, and time.   Skin: Skin is warm and dry. No rash noted.   Psychiatric: She has a normal mood and affect. Her behavior is normal.   Nursing note and vitals reviewed.      ALLERGIES AND MEDICATIONS: updated and reviewed.  Review of patient's allergies indicates:   Allergen Reactions    Codeine      Medication List with Changes/Refills   Current Medications    ASPIRIN (ASPIRIN LOW DOSE) 81 MG EC TABLET        GABAPENTIN (NEURONTIN) 800 MG TABLET    Take 800 " mg by mouth 3 (three) times daily.    INV HYDROCHLOROTHIAZIDE 25MG TABLET        POTASSIUM CHLORIDE (MICRO-K) 10 MEQ CPSR    Take 10 mEq by mouth once.    PRAVASTATIN (PRAVACHOL) 40 MG TABLET    Take 40 mg by mouth once daily.   Changed and/or Refilled Medications    Modified Medication Previous Medication    AMITRIPTYLINE (ELAVIL) 50 MG TABLET amitriptyline (ELAVIL) 50 MG tablet       amitriptyline 50 mg tablet  TAKE 1 TABLET BY MOUTH AT BEDTIME    amitriptyline 50 mg tablet   TAKE 1 TABLET BY MOUTH AT BEDTIME    AMLODIPINE (NORVASC) 10 MG TABLET amLODIPine (NORVASC) 10 MG tablet       Take 1 tablet (10 mg total) by mouth once daily.    Take 10 mg by mouth once daily.    DICLOFENAC SODIUM (VOLTAREN) 1 % GEL diclofenac sodium (VOLTAREN) 1 % Gel       Apply topically once daily.        HYDROCHLOROTHIAZIDE (HYDRODIURIL) 25 MG TABLET hydroCHLOROthiazide (HYDRODIURIL) 25 MG tablet       Take 1 tablet (25 mg total) by mouth once daily.    Take 25 mg by mouth once daily.    HYDROXYZINE HCL (ATARAX) 25 MG TABLET hydroxyzine HCL (ATARAX) 25 MG tablet       Take 1 tablet (25 mg total) by mouth 3 (three) times daily as needed for Itching.        LOSARTAN (COZAAR) 100 MG TABLET losartan (COZAAR) 100 MG tablet       Take 1 tablet (100 mg total) by mouth once daily.    Take 100 mg by mouth once daily.    OMEPRAZOLE (PRILOSEC) 40 MG CAPSULE omeprazole (PRILOSEC) 40 MG capsule       Take 1 capsule (40 mg total) by mouth once daily.    Take 40 mg by mouth once daily.    TIZANIDINE (ZANAFLEX) 4 MG CAP tiZANidine (ZANAFLEX) 4 mg Cap       Take 4 mg by mouth 4 (four) times daily as needed.    Take 4 mg by mouth 4 (four) times daily as needed.    TRAMADOL (ULTRAM) 50 MG TABLET traMADoL (ULTRAM) 50 mg tablet       Take 1 tablet (50 mg total) by mouth every 12 (twelve) hours as needed for Pain.    Take 25 mg by mouth.    TRAZODONE (DESYREL) 100 MG TABLET traZODone (DESYREL) 100 MG tablet       Take 1 tablet (100 mg total) by mouth  nightly as needed for Insomnia.           Assessment & Plan  1. Spinal stenosis, unspecified spinal region    2. Hypertension, unspecified type    3. Screening for colon cancer    4. DDD (degenerative disc disease), lumbar    5. Bilateral renal cysts    6. CKD (chronic kidney disease) stage 3, GFR 30-59 ml/min    7. Obstructive sleep apnea syndrome    8. Encounter for lipid screening for cardiovascular disease    9. Constipation, unspecified constipation type    10. Elevated blood sugar    11. Anxiety    12. Gastroesophageal reflux disease, esophagitis presence not specified        Problem List Items Addressed This Visit        Neuro    DDD (degenerative disc disease), lumbar  --she has referral to neurosurgery, would like aquatic therapy  -consider pain management if surgery is not indicated    Relevant Orders    Ambulatory referral/consult to Physical Therapy    Spinal stenosis - Primary  -she has referral to neurosurgery, would like aquatic therapy  -consider pain management if surgery is not indicated    Relevant Medications    losartan (COZAAR) 100 MG tablet    tiZANidine (ZANAFLEX) 4 mg Cap    traMADoL (ULTRAM) 50 mg tablet    diclofenac sodium (VOLTAREN) 1 % Gel    amitriptyline (ELAVIL) 50 MG tablet       Psychiatric    Anxiety  -stable on current regimen    Relevant Medications    traZODone (DESYREL) 100 MG tablet    hydroxyzine HCL (ATARAX) 25 MG tablet       Cardiac/Vascular    Hypertension  -controlled    Relevant Medications    amLODIPine (NORVASC) 10 MG tablet    hydroCHLOROthiazide (HYDRODIURIL) 25 MG tablet    losartan (COZAAR) 100 MG tablet    Other Relevant Orders    Ambulatory referral/consult to Cardiology    CBC auto differential    Comprehensive metabolic panel       Renal/    CKD (chronic kidney disease) stage 3, GFR 30-59 ml/min  -refer for evaluation per pt request    Relevant Orders    Ambulatory referral/consult to Nephrology       Other    Obstructive sleep apnea syndrome  -on CPAP       Other Visit Diagnoses     Screening for colon cancer      -as ordered       Relevant Orders    Case request GI: COLONOSCOPY (Completed)    Bilateral renal cysts      -will refer to nephrology    Encounter for lipid screening for cardiovascular disease      -as ordered       Relevant Orders    Lipid panel    Constipation, unspecified constipation type      -discussed increased water intake and using miralax    Elevated blood sugar      -had elevated BG on last labwork    Relevant Orders    Hemoglobin A1c    Gastroesophageal reflux disease, esophagitis presence not specified      -continue current regimen    Relevant Medications    omeprazole (PRILOSEC) 40 MG capsule          Follow up in about 3 months (around 6/13/2020) for Follow up.    Other Orders Placed This Visit:  Orders Placed This Encounter   Procedures    CBC auto differential    Comprehensive metabolic panel    Lipid panel    Hemoglobin A1c    Ambulatory referral/consult to Cardiology    Ambulatory referral/consult to Nephrology    Ambulatory referral/consult to Physical Therapy

## 2020-03-16 ENCOUNTER — TELEPHONE (OUTPATIENT)
Dept: FAMILY MEDICINE | Facility: CLINIC | Age: 70
End: 2020-03-16

## 2020-03-16 NOTE — TELEPHONE ENCOUNTER
----- Message from Cassius Mccollum sent at 3/13/2020 11:51 AM CDT -----  Contact: Joanna from [facility] [phone]  Type:  Pharmacy Calling to Clarify an RX    Name of Caller: Joanna    Pharmacy Name: [facility]     Prescription Name: traMADoL (ULTRAM) 50 mg tablet    What do they need to clarify? Joanna from [facility] called to get more information on the patient's tramadol. She would like to know if the patient is receiving tramadol for acute or chronic pain and if she is taking something else for the pain as well    Can you be contacted via [facility]?no    Best Call Back Number: [phone]

## 2020-03-16 NOTE — TELEPHONE ENCOUNTER
Spoke with pt informed her Rx's were sent to pharmacy on 3/13. Pt states she will contact pharmacy to check if Rx is ready for .

## 2020-03-16 NOTE — TELEPHONE ENCOUNTER
----- Message from Gema Mcfarland sent at 3/16/2020  3:11 PM CDT -----  Contact: Self   Type: Patient Call Back     What is the request in detail: Pt calling to check status on meds.     Can the clinic reply by [facility]? No     Would the patient rather a call back or a response via [facility]? Call back     Best call back number: [phone]

## 2020-03-16 NOTE — TELEPHONE ENCOUNTER
See previous message , spoke with Halle the pharmacist at [facility]. Medication was clarified.

## 2020-03-16 NOTE — TELEPHONE ENCOUNTER
----- Message from Kristal Simon sent at 3/16/2020  8:26 AM CDT -----  Contact: Halle coronado/Dennis [phone]  Type:  Pharmacy Calling to Clarify an RX    Name of Caller: Halle    Pharmacy Name:  [facility]    Prescription Name:  traMADoL (ULTRAM) 50 mg tablet    What do they need to clarify? Follow up on a message that was put in on Fri., 03-13-20    Best Call Back Number: [phone]

## 2020-03-17 NOTE — PROGRESS NOTES
1. Your diabetes test is negative for diabetes or prediabetes.  2. Your metabolic panel shows elevated potassium, are you taking a potassium pill still, if so you can hold for three days then resume.  3. Your kidney function is mildly decreased. Make sure you are drinking plenty of water  4. Your cholesterol and blood count are in normal range    Simona Torres MD

## 2020-03-19 DIAGNOSIS — M48.00 SPINAL STENOSIS, UNSPECIFIED SPINAL REGION: ICD-10-CM

## 2020-03-19 NOTE — TELEPHONE ENCOUNTER
----- Message from Gema Mcfarland sent at 3/19/2020  8:33 AM CDT -----  Contact: [facility]   Type: Patient Call Back     What is the request in detail: Jessica calling to speak to a nurse regarding med below.        diclofenac sodium (VOLTAREN) 1 % Gel      Can the clinic reply by [facility]? No     Would the patient rather a call back or a response via [facility]? Call back     Best call back number:      [facility] [address] [place] [address] ([place] [address] [facility]  [address] [facility] ([place] [address]  Phone: [phone] Fax: [phone]

## 2020-03-20 DIAGNOSIS — Z11.59 NEED FOR HEPATITIS C SCREENING TEST: ICD-10-CM

## 2020-03-20 DIAGNOSIS — Z12.11 COLON CANCER SCREENING: ICD-10-CM

## 2020-03-20 RX ORDER — DICLOFENAC SODIUM 10 MG/G
GEL TOPICAL DAILY
Qty: 100 G | Refills: 5 | Status: SHIPPED | OUTPATIENT
Start: 2020-03-20 | End: 2021-03-03 | Stop reason: SDUPTHER

## 2020-03-20 NOTE — TELEPHONE ENCOUNTER
Spoke with pharmacist, states they need verification on howmuch the pt should apply, 2 or 4 grams for insurance purpose      Please advise

## 2020-03-24 ENCOUNTER — TELEPHONE (OUTPATIENT)
Dept: REHABILITATION | Facility: HOSPITAL | Age: 70
End: 2020-03-24

## 2020-03-24 NOTE — TELEPHONE ENCOUNTER
Patient: Amanda Holt  Date: 3/24/2020  Diagnosis: No diagnosis found.  MRN: [id number]    Spoke with patient due to therapy following updates regarding COVID-19 closely and taking every precaution to ensure the safety of our patients, staff and community.  In an abundance of caution and in an effort to help reduce risk and limit community spread, we have decided to temporarily postpone appointments for patients who may be at increased risk to attend in-person therapy or where therapy is not critically needed at this time. Plan to re-schedule initial eval. Pt will call back in 3-4 weeks. Patient verbalized understanding to all.    Neil Swanson, PT, DPT   3/24/2020

## 2020-05-22 ENCOUNTER — PATIENT MESSAGE (OUTPATIENT)
Dept: NEUROSURGERY | Facility: CLINIC | Age: 70
End: 2020-05-22

## 2020-05-26 ENCOUNTER — OFFICE VISIT (OUTPATIENT)
Dept: NEUROSURGERY | Facility: CLINIC | Age: 70
End: 2020-05-26
Payer: MEDICARE

## 2020-05-26 ENCOUNTER — OFFICE VISIT (OUTPATIENT)
Dept: CARDIOLOGY | Facility: CLINIC | Age: 70
End: 2020-05-26
Payer: MEDICARE

## 2020-05-26 DIAGNOSIS — I10 ESSENTIAL HYPERTENSION: Primary | ICD-10-CM

## 2020-05-26 DIAGNOSIS — N18.30 CKD (CHRONIC KIDNEY DISEASE) STAGE 3, GFR 30-59 ML/MIN: ICD-10-CM

## 2020-05-26 DIAGNOSIS — M54.9 BACK PAIN, UNSPECIFIED BACK LOCATION, UNSPECIFIED BACK PAIN LATERALITY, UNSPECIFIED CHRONICITY: Primary | ICD-10-CM

## 2020-05-26 PROCEDURE — 99214 PR OFFICE/OUTPT VISIT, EST, LEVL IV, 30-39 MIN: ICD-10-PCS | Mod: 95,,, | Performed by: NEUROLOGICAL SURGERY

## 2020-05-26 PROCEDURE — 1100F PTFALLS ASSESS-DOCD GE2>/YR: CPT | Mod: CPTII,95,, | Performed by: INTERNAL MEDICINE

## 2020-05-26 PROCEDURE — 99201 PR OFFICE/OUTPT VISIT,NEW,LEVL I: CPT | Mod: 95,,, | Performed by: INTERNAL MEDICINE

## 2020-05-26 PROCEDURE — 3288F FALL RISK ASSESSMENT DOCD: CPT | Mod: CPTII,95,, | Performed by: NEUROLOGICAL SURGERY

## 2020-05-26 PROCEDURE — 3288F PR FALLS RISK ASSESSMENT DOCUMENTED: ICD-10-PCS | Mod: CPTII,95,, | Performed by: INTERNAL MEDICINE

## 2020-05-26 PROCEDURE — 1159F PR MEDICATION LIST DOCUMENTED IN MEDICAL RECORD: ICD-10-PCS | Mod: 95,,, | Performed by: NEUROLOGICAL SURGERY

## 2020-05-26 PROCEDURE — 1100F PTFALLS ASSESS-DOCD GE2>/YR: CPT | Mod: CPTII,95,, | Performed by: NEUROLOGICAL SURGERY

## 2020-05-26 PROCEDURE — 1159F MED LIST DOCD IN RCRD: CPT | Mod: 95,,, | Performed by: INTERNAL MEDICINE

## 2020-05-26 PROCEDURE — 1159F PR MEDICATION LIST DOCUMENTED IN MEDICAL RECORD: ICD-10-PCS | Mod: 95,,, | Performed by: INTERNAL MEDICINE

## 2020-05-26 PROCEDURE — 3288F PR FALLS RISK ASSESSMENT DOCUMENTED: ICD-10-PCS | Mod: CPTII,95,, | Performed by: NEUROLOGICAL SURGERY

## 2020-05-26 PROCEDURE — 1100F PR PT FALLS ASSESS DOC 2+ FALLS/FALL W/INJURY/YR: ICD-10-PCS | Mod: CPTII,95,, | Performed by: NEUROLOGICAL SURGERY

## 2020-05-26 PROCEDURE — 1100F PR PT FALLS ASSESS DOC 2+ FALLS/FALL W/INJURY/YR: ICD-10-PCS | Mod: CPTII,95,, | Performed by: INTERNAL MEDICINE

## 2020-05-26 PROCEDURE — 3288F FALL RISK ASSESSMENT DOCD: CPT | Mod: CPTII,95,, | Performed by: INTERNAL MEDICINE

## 2020-05-26 PROCEDURE — 99214 OFFICE O/P EST MOD 30 MIN: CPT | Mod: 95,,, | Performed by: NEUROLOGICAL SURGERY

## 2020-05-26 PROCEDURE — 1159F MED LIST DOCD IN RCRD: CPT | Mod: 95,,, | Performed by: NEUROLOGICAL SURGERY

## 2020-05-26 PROCEDURE — 99201 PR OFFICE/OUTPT VISIT,NEW,LEVL I: ICD-10-PCS | Mod: 95,,, | Performed by: INTERNAL MEDICINE

## 2020-05-26 NOTE — PROGRESS NOTES
CARDIOLOGY CONSULTATION    REASON FOR CONSULT:   Amanda Holt is a 69 y.o. female who presents for establishment of care.      HISTORY OF PRESENT ILLNESS:     The patient location is: home  The chief complaint leading to consultation is: HTN    Visit type: audiovisual    Face to Face time with patient: 10 minutes  20  minutes of total time spent on the encounter, which includes face to face time and non-face to face time preparing to see the patient (eg, review of tests), Obtaining and/or reviewing separately obtained history, Documenting clinical information in the electronic or other health record, Independently interpreting results (not separately reported) and communicating results to the patient/family/caregiver, or Care coordination (not separately reported).         Each patient to whom he or she provides medical services by telemedicine is:  (1) informed of the relationship between the physician and patient and the respective role of any other health care provider with respect to management of the patient; and (2) notified that he or she may decline to receive medical services by telemedicine and may withdraw from such care at any time.    Notes:     Amanda Holt presents for establishment of care. Previously seen by Dr. Fink. No hx of CAD. Had a stress test in 2018. Had hypertensive response to exercise. Started on medications. BP at home according to patient wnl. Denies chest pain or sob.     CARDIOVASCULAR HISTORY:     None    PAST MEDICAL HISTORY:     Past Medical History:   Diagnosis Date    Arthralgia     Degenerative disc disease at L5-S1 level     High cholesterol     Hypertension     Nuclear sclerosis of both eyes 1/8/2020    Sciatica     Spinal stenosis        PAST SURGICAL HISTORY:     Past Surgical History:   Procedure Laterality Date    BACK SURGERY      lumbar laminectomy    TUBAL LIGATION         ALLERGIES AND MEDICATION:     Review of patient's allergies indicates:    Allergen Reactions    Codeine         Medication List           Accurate as of May 26, 2020  9:43 AM. If you have any questions, ask your nurse or doctor.               CONTINUE taking these medications    amitriptyline 50 MG tablet  Commonly known as:  ELAVIL  amitriptyline 50 mg tablet  TAKE 1 TABLET BY MOUTH AT BEDTIME     amLODIPine 10 MG tablet  Commonly known as:  NORVASC  Take 1 tablet (10 mg total) by mouth once daily.     ASPIRIN LOW DOSE 81 MG EC tablet  Generic drug:  aspirin     diclofenac sodium 1 % Gel  Commonly known as:  VOLTAREN  Apply topically once daily. Apply 2g     gabapentin 800 MG tablet  Commonly known as:  NEURONTIN     hydroCHLOROthiazide 25 MG tablet  Commonly known as:  HYDRODIURIL  Take 1 tablet (25 mg total) by mouth once daily.     hydrOXYzine HCL 25 MG tablet  Commonly known as:  ATARAX  Take 1 tablet (25 mg total) by mouth 3 (three) times daily as needed for Itching.     INV HYDROCHLOROTHIAZIDE 25MG TABLET     losartan 100 MG tablet  Commonly known as:  COZAAR  Take 1 tablet (100 mg total) by mouth once daily.     omeprazole 40 MG capsule  Commonly known as:  PriLOSEC  Take 1 capsule (40 mg total) by mouth once daily.     potassium chloride 10 MEQ Cpsr  Commonly known as:  MICRO-K     pravastatin 40 MG tablet  Commonly known as:  PRAVACHOL     tiZANidine 4 mg Cap  Commonly known as:  ZANAFLEX  Take 4 mg by mouth 4 (four) times daily as needed.     traZODone 100 MG tablet  Commonly known as:  DESYREL  Take 1 tablet (100 mg total) by mouth nightly as needed for Insomnia.            SOCIAL HISTORY:     Social History     Socioeconomic History    Marital status:      Spouse name: Not on file    Number of children: Not on file    Years of education: Not on file    Highest education level: Not on file   Occupational History    Not on file   Social Needs    Financial resource strain: Hard    Food insecurity:     Worry: Sometimes true     Inability: Sometimes true     Transportation needs:     Medical: Yes     Non-medical: Yes   Tobacco Use    Smoking status: Current Some Day Smoker     Types: Cigarettes    Smokeless tobacco: Never Used    Tobacco comment: 2 cigarettes/week   Substance and Sexual Activity    Alcohol use: Yes     Frequency: 2-4 times a month     Drinks per session: 1 or 2     Binge frequency: Never     Comment: ocassionally    Drug use: No    Sexual activity: Not Currently   Lifestyle    Physical activity:     Days per week: 3 days     Minutes per session: 30 min    Stress: Very much   Relationships    Social connections:     Talks on phone: More than three times a week     Gets together: More than three times a week     Attends [employer] service: Not on file     Active member of club or organization: No     Attends meetings of clubs or organizations: Never     Relationship status:    Other Topics Concern    Not on file   Social History Narrative    Not on file       FAMILY HISTORY:     Family History   Problem Relation Age of Onset    No Known Problems Mother     Cataracts Father     Glaucoma Father     Glaucoma Sister     No Known Problems Brother     No Known Problems Maternal Aunt     No Known Problems Maternal Uncle     No Known Problems Paternal Aunt     No Known Problems Paternal Uncle     No Known Problems Maternal Grandmother     No Known Problems Maternal Grandfather     No Known Problems Paternal Grandmother     No Known Problems Paternal Grandfather     Amblyopia Neg Hx     Blindness Neg Hx     Cancer Neg Hx     Hypertension Neg Hx     Macular degeneration Neg Hx     Retinal detachment Neg Hx     Strabismus Neg Hx     Stroke Neg Hx     Thyroid disease Neg Hx        REVIEW OF SYSTEMS:   Review of Systems   Respiratory: Negative for cough, sputum production, shortness of breath and wheezing.    Cardiovascular: Negative for chest pain, orthopnea, claudication, leg swelling and PND.   Neurological: Negative for  dizziness, sensory change, speech change, focal weakness, seizures, loss of consciousness, weakness and headaches.   All other systems reviewed and are negative.      PHYSICAL EXAM:   There were no vitals filed for this visit. There is no height or weight on file to calculate BMI.            Physical Exam    DATA:     Laboratory:  CBC:  Recent Labs   Lab 11/03/19  1319 03/13/20  1125   WBC 7.51 6.81   Hemoglobin 11.8 L 12.5   Hematocrit 39.7 43.3   Platelets 247 305       CHEMISTRIES:  Recent Labs   Lab 11/03/19  1319 03/13/20  1125   Glucose 88 106   Sodium 140 142   Potassium 3.5 5.2 H   BUN, Bld 21 26 H   Creatinine 1.3 1.4   eGFR if  49 A 44.5 A   eGFR if non  42 A 38.6 A   Calcium 9.5 10.1       CARDIAC BIOMARKERS:        COAGS:  Recent Labs   Lab 11/03/19  1319   INR 1.0       LIPIDS/LFTS:  Recent Labs   Lab 11/03/19  1319 03/13/20  1125   Cholesterol  --  188   Triglycerides  --  155 H   HDL  --  50   LDL Cholesterol  --  107.0   Non-HDL Cholesterol  --  138   AST 15 15   ALT 18 13       Cardiovascular Testing:  N/A    ASSESSMENT:     1. HTN  2. CKD  3. Obesity    PLAN:     1. BP log  2. 2d echo  3. Continue current regimen  4. RTC one month.      Epifanio Calhoun MD, MPH, FACC, [facility]

## 2020-05-26 NOTE — PROGRESS NOTES
Neurosurgery  History & Physical    SUBJECTIVE:     Chief Complaint:  Back pain    History of Present Illness:  61-year-old female with a history hypertension, coronary artery disease, hypokalemia and back pain for several years.  She is status post previous L4-5 laminectomy and fusion in 2018.  She notes now she has back pain with pain going down the left lateral aspect of the leg.  She notes the pain is 10/10 in severity, and both her back and her legs.  She notes she has had this pain since April of 2019.  She had her previous L4-5 laminectomy and fusion in October of 2018.  She notes that she had remained pain free after having back pain bilateral leg pain going down the posterior aspect of both legs prior to surgery.  And had improved initially after surgery.  She notes the pain is primarily in her left leg goes on lateral aspect of her leg.  She notes that she is able to walk about 10 or 15 ft and the pain is much more pronounced.  She notes that her pain is also worsened when sitting.  She notes that lying on her right side somewhat improves the pain.  She has previously had 1% lidocaine cream which reported her with some relief.  She denies any jhony weakness in the bilateral lower extremities.  She denies any weakness in the upper extremities.  She denies any bowel or bladder incontinence.  She denies any chest pain, shortness of breath, nausea, vomiting, or emesis.  She denies any perirectal anesthesia.  She denies any genital anesthesia.  History was garnered over a audio visual virtual visit.    Review of patient's allergies indicates:   Allergen Reactions    Codeine        Current Outpatient Medications   Medication Sig Dispense Refill    amitriptyline (ELAVIL) 50 MG tablet amitriptyline 50 mg tablet  TAKE 1 TABLET BY MOUTH AT BEDTIME 90 tablet 1    amLODIPine (NORVASC) 10 MG tablet Take 1 tablet (10 mg total) by mouth once daily. 90 tablet 1    aspirin (ASPIRIN LOW DOSE) 81 MG EC tablet        diclofenac sodium (VOLTAREN) 1 % Gel Apply topically once daily. Apply 2g 100 g 5    gabapentin (NEURONTIN) 800 MG tablet Take 800 mg by mouth 3 (three) times daily.      hydroCHLOROthiazide (HYDRODIURIL) 25 MG tablet Take 1 tablet (25 mg total) by mouth once daily. 90 tablet 1    hydroxyzine HCL (ATARAX) 25 MG tablet Take 1 tablet (25 mg total) by mouth 3 (three) times daily as needed for Itching. 45 tablet 1    INV HYDROCHLOROTHIAZIDE 25MG TABLET       losartan (COZAAR) 100 MG tablet Take 1 tablet (100 mg total) by mouth once daily. 90 tablet 1    omeprazole (PRILOSEC) 40 MG capsule Take 1 capsule (40 mg total) by mouth once daily. 90 capsule 1    potassium chloride (MICRO-K) 10 MEQ CpSR Take 10 mEq by mouth once.      pravastatin (PRAVACHOL) 40 MG tablet Take 40 mg by mouth once daily.      tiZANidine (ZANAFLEX) 4 mg Cap Take 4 mg by mouth 4 (four) times daily as needed. 90 capsule 1    traZODone (DESYREL) 100 MG tablet Take 1 tablet (100 mg total) by mouth nightly as needed for Insomnia. 90 tablet 1     No current facility-administered medications for this visit.        Past Medical History:   Diagnosis Date    Arthralgia     Degenerative disc disease at L5-S1 level     High cholesterol     Hypertension     Nuclear sclerosis of both eyes 1/8/2020    Sciatica     Spinal stenosis      Past Surgical History:   Procedure Laterality Date    BACK SURGERY      lumbar laminectomy    TUBAL LIGATION       Family History     Problem Relation (Age of Onset)    Cataracts Father    Glaucoma Father, Sister    No Known Problems Mother, Brother, Maternal Aunt, Maternal Uncle, Paternal Aunt, Paternal Uncle, Maternal Grandmother, Maternal Grandfather, Paternal Grandmother, Paternal Grandfather        Social History     Socioeconomic History    Marital status:      Spouse name: Not on file    Number of children: Not on file    Years of education: Not on file    Highest education level: Not on file    Occupational History    Not on file   Social Needs    Financial resource strain: Hard    Food insecurity:     Worry: Sometimes true     Inability: Sometimes true    Transportation needs:     Medical: Yes     Non-medical: Yes   Tobacco Use    Smoking status: Current Some Day Smoker     Types: Cigarettes    Smokeless tobacco: Never Used    Tobacco comment: 2 cigarettes/week   Substance and Sexual Activity    Alcohol use: Yes     Frequency: 2-4 times a month     Drinks per session: 1 or 2     Binge frequency: Never     Comment: ocassionally    Drug use: No    Sexual activity: Not Currently   Lifestyle    Physical activity:     Days per week: 3 days     Minutes per session: 30 min    Stress: Very much   Relationships    Social connections:     Talks on phone: More than three times a week     Gets together: More than three times a week     Attends [employer] service: Not on file     Active member of club or organization: No     Attends meetings of clubs or organizations: Never     Relationship status:    Other Topics Concern    Not on file   Social History Narrative    Not on file       Review of Systems    OBJECTIVE:     Vital Signs    unable to calculate vital signs on virtual visit  There is no height or weight on file to calculate BMI.      Neurosurgery Physical Exam  One virtual audio visual visit  Patient's head appears normocephalic atraumatic  Neck appears to be supple with full range of motion  Patient is able to move all visualized extremities    Patient speech is fluent goal directed without any noted dysarthria or paraphasic errors  On limited exam of cranial nerves  Extraocular muscles are intact  Face is symmetric  Hearing is intact to voice bilaterally  Tongue is midline  Up unable to adequately visualize palate  Unable to adequately assess muscle strength          Diagnostic Results:  There was no diagnostic studies to adequately review patient did had a previous CT of the abdomen  which was reviewed by me.    Inadequately incompletely visualized as a previous L4 lamina hemilaminectomy on the right with ipsilateral pedicle screws at L4 and L5 on the left.    ASSESSMENT/PLAN:     61-year-old female with a history of L4 hemilaminectomy, with ipsilateral left-sided L4 and L5 pedicle screws.  1.  Unable to adequately assess physical exam on virtual visit but patient complains of no focal neurologic deficits.  2.  No new imaging to visualize at this particular time.  3.  Recommend CT of the lumbar spine to assess for any associated fusion with previous hardware.  Recommend MRI of the lumbar spine to assess for neurologic compromise.  Recommend lateral flexion-extension of the lumbar spine to assess for any segmental instability.  Standing scoliosis films for global spinal alignment. Recommend EMG nerve conduction study the bilateral lower extremities to assess for any acute radiculopathy.  Also will recommend patient for physical therapy, as well as recommend consult for pain clinic.    Thank you for allowing me to participate in the care of this patient.  Please feel free to call with any questions, comments, concerns.    Azra Kurtz MD,MSc  Neurosurgery     The patient location is: Home / vehicle   The chief complaint leading to consultation is: back pain    Visit type: audiovisual  Total time spent with patient: 40 min     Each patient to whom he or she provides medical services by telemedicine is:  (1) informed of the relationship between the physician and patient and the respective role of any other health care provider with respect to management of the patient; and (2) notified that he or she may decline to receive medical services by telemedicine and may withdraw from such care at any time        Note dictated with voice recognition software, please excuse any grammatical errors.

## 2020-05-28 ENCOUNTER — TELEPHONE (OUTPATIENT)
Dept: NEUROSURGERY | Facility: CLINIC | Age: 70
End: 2020-05-28

## 2020-05-28 DIAGNOSIS — M54.16 ACUTE LUMBAR RADICULOPATHY: Primary | ICD-10-CM

## 2020-05-28 DIAGNOSIS — M54.16 ACUTE LUMBAR RADICULOPATHY: ICD-10-CM

## 2020-05-28 DIAGNOSIS — M54.9 BACK PAIN, UNSPECIFIED BACK LOCATION, UNSPECIFIED BACK PAIN LATERALITY, UNSPECIFIED CHRONICITY: Primary | ICD-10-CM

## 2020-05-28 NOTE — TELEPHONE ENCOUNTER
Spoke with patient she states she received a call from PT to schedule from a referral per Dr. Kurtz. Patient states she inform caller that she would like to receive her Physical Therapy in [place] and would to do Aqua Therapy instead of regular PT. Inform patient that I will send a message to Dr. Kurtz asking if orders can be changed and give patient a call. Patient verbalized understanding.

## 2020-05-28 NOTE — TELEPHONE ENCOUNTER
----- Message from Justino Dawson sent at 5/28/2020  2:55 PM CDT -----  Contact: SELF  Pt is requesting physical and aqua therapy at the [place] location Pt ask for a call    Contact info  [phone]

## 2020-06-01 ENCOUNTER — PATIENT OUTREACH (OUTPATIENT)
Dept: ADMINISTRATIVE | Facility: HOSPITAL | Age: 70
End: 2020-06-01

## 2020-06-01 DIAGNOSIS — Z12.11 COLON CANCER SCREENING: Primary | ICD-10-CM

## 2020-06-03 ENCOUNTER — TELEPHONE (OUTPATIENT)
Dept: FAMILY MEDICINE | Facility: CLINIC | Age: 70
End: 2020-06-03

## 2020-06-03 ENCOUNTER — TELEPHONE (OUTPATIENT)
Dept: LAB | Facility: HOSPITAL | Age: 70
End: 2020-06-03

## 2020-06-03 NOTE — TELEPHONE ENCOUNTER
Spoke with pt states she will keep appointment on the 15th and states if the rash gets worse she will go to urgent care

## 2020-06-03 NOTE — TELEPHONE ENCOUNTER
Spoke with pt states in the crease of her arms, she noticed bumps with puss heads on them right arm, pt states it kind of looks like a circular pattern. Pt states itching with the rash, and wants to know what should she do before her appointment. Pt states she has an appointment on 6/15

## 2020-06-03 NOTE — TELEPHONE ENCOUNTER
----- Message from Zulema Carreno sent at 6/2/2020  1:59 PM CDT -----  Type: Patient Call Back    Who called: Self     What is the request in detail:patient states she is having a problem and would like to speak with the doctor before her appt. She also states she is breaking out with a rash and not sure what it is. PleaseCall     Can the clinic reply by [facility]? No     Would the patient rather a call back or a response via [facility]?  Call     Best call back number: [phone]

## 2020-06-03 NOTE — TELEPHONE ENCOUNTER
----- Message from Kristen Aly sent at 6/3/2020  7:43 AM CDT -----  Contact: Self/  [phone]  Type: Patient Call Back    Who called:  Patient    What is the request in detail:   Patient returned staffs call and would like a call back.  Thank you    Would the patient rather a call back or a response via [facility]?   Call back    Best call back number:   [phone]

## 2020-06-03 NOTE — TELEPHONE ENCOUNTER
OK to move appt up if available or she can see another provider or urgent care    Simona Torres MD

## 2020-06-11 ENCOUNTER — OFFICE VISIT (OUTPATIENT)
Dept: UROLOGY | Facility: CLINIC | Age: 70
End: 2020-06-11
Payer: MEDICARE

## 2020-06-11 VITALS
WEIGHT: 245.69 LBS | HEIGHT: 64 IN | BODY MASS INDEX: 41.94 KG/M2 | SYSTOLIC BLOOD PRESSURE: 100 MMHG | DIASTOLIC BLOOD PRESSURE: 60 MMHG

## 2020-06-11 DIAGNOSIS — R31.0 GROSS HEMATURIA: Primary | ICD-10-CM

## 2020-06-11 PROCEDURE — 1100F PTFALLS ASSESS-DOCD GE2>/YR: CPT | Mod: CPTII,S$GLB,, | Performed by: NURSE PRACTITIONER

## 2020-06-11 PROCEDURE — 1125F PR PAIN SEVERITY QUANTIFIED, PAIN PRESENT: ICD-10-PCS | Mod: S$GLB,,, | Performed by: NURSE PRACTITIONER

## 2020-06-11 PROCEDURE — 1159F PR MEDICATION LIST DOCUMENTED IN MEDICAL RECORD: ICD-10-PCS | Mod: S$GLB,,, | Performed by: NURSE PRACTITIONER

## 2020-06-11 PROCEDURE — 99213 OFFICE O/P EST LOW 20 MIN: CPT | Mod: 25,S$GLB,, | Performed by: NURSE PRACTITIONER

## 2020-06-11 PROCEDURE — 1100F PR PT FALLS ASSESS DOC 2+ FALLS/FALL W/INJURY/YR: ICD-10-PCS | Mod: CPTII,S$GLB,, | Performed by: NURSE PRACTITIONER

## 2020-06-11 PROCEDURE — 3078F PR MOST RECENT DIASTOLIC BLOOD PRESSURE < 80 MM HG: ICD-10-PCS | Mod: CPTII,S$GLB,, | Performed by: NURSE PRACTITIONER

## 2020-06-11 PROCEDURE — 3074F PR MOST RECENT SYSTOLIC BLOOD PRESSURE < 130 MM HG: ICD-10-PCS | Mod: CPTII,S$GLB,, | Performed by: NURSE PRACTITIONER

## 2020-06-11 PROCEDURE — 99213 PR OFFICE/OUTPT VISIT, EST, LEVL III, 20-29 MIN: ICD-10-PCS | Mod: 25,S$GLB,, | Performed by: NURSE PRACTITIONER

## 2020-06-11 PROCEDURE — 81001 PR  URINALYSIS, AUTO, W/SCOPE: ICD-10-PCS | Mod: S$GLB,,, | Performed by: NURSE PRACTITIONER

## 2020-06-11 PROCEDURE — 3288F FALL RISK ASSESSMENT DOCD: CPT | Mod: CPTII,S$GLB,, | Performed by: NURSE PRACTITIONER

## 2020-06-11 PROCEDURE — 99999 PR PBB SHADOW E&M-EST. PATIENT-LVL IV: CPT | Mod: PBBFAC,,, | Performed by: NURSE PRACTITIONER

## 2020-06-11 PROCEDURE — 3074F SYST BP LT 130 MM HG: CPT | Mod: CPTII,S$GLB,, | Performed by: NURSE PRACTITIONER

## 2020-06-11 PROCEDURE — 3288F PR FALLS RISK ASSESSMENT DOCUMENTED: ICD-10-PCS | Mod: CPTII,S$GLB,, | Performed by: NURSE PRACTITIONER

## 2020-06-11 PROCEDURE — 1125F AMNT PAIN NOTED PAIN PRSNT: CPT | Mod: S$GLB,,, | Performed by: NURSE PRACTITIONER

## 2020-06-11 PROCEDURE — 3078F DIAST BP <80 MM HG: CPT | Mod: CPTII,S$GLB,, | Performed by: NURSE PRACTITIONER

## 2020-06-11 PROCEDURE — 1159F MED LIST DOCD IN RCRD: CPT | Mod: S$GLB,,, | Performed by: NURSE PRACTITIONER

## 2020-06-11 PROCEDURE — 81001 URINALYSIS AUTO W/SCOPE: CPT | Mod: S$GLB,,, | Performed by: NURSE PRACTITIONER

## 2020-06-11 PROCEDURE — 99999 PR PBB SHADOW E&M-EST. PATIENT-LVL IV: ICD-10-PCS | Mod: PBBFAC,,, | Performed by: NURSE PRACTITIONER

## 2020-06-11 NOTE — PROGRESS NOTES
Subjective:       Patient ID: Amanda Holt is a 69 y.o. female who is an established patient of Dr Garcia though new to me  was last seen in this office 12/5/19    Chief Complaint:   Chief Complaint   Patient presents with    Follow-up     Pt. is here for a follow up.. pt. states that everything is ok with her .. she hasn't had any problems like when she first came to see Dr. Garcia.. no new issues      She was initially seen in 11/19 for gross hematuria. No prior episodes of hematuria. Current light smoker. No h/o nephrolithiasis, trauma. Chronic L back pain that radiates to leg. No issues with UTIs. No family history of  malignancy.     UCx 11/19 - negative  CT RSS 11/19 - negative for stones, limited in hematuria evaluation    S/p hematuria workup (CT uro/cystoscopy) by Dr Garcia which was negative. She is here today for follow up. There have been no further occurrences of hematuria. Denies dysuria, flank pain or fever. Overall she feels well. No new complaints    ACTIVE MEDICAL ISSUES:  Patient Active Problem List   Diagnosis    Contact lens overwear of both eyes    Refractive error    Nuclear sclerosis of both eyes    Spinal stenosis    Hypertension    Obstructive sleep apnea syndrome    Gastroesophageal reflux disease without esophagitis    CKD (chronic kidney disease) stage 3, GFR 30-59 ml/min    Chronic low back pain    Bilateral hearing loss    DDD (degenerative disc disease), lumbar    CTS (carpal tunnel syndrome)    Anxiety       ALLERGIES AND MEDICATIONS: updated and reviewed.  Review of patient's allergies indicates:   Allergen Reactions    Codeine      Current Outpatient Medications   Medication Sig    amitriptyline (ELAVIL) 50 MG tablet amitriptyline 50 mg tablet  TAKE 1 TABLET BY MOUTH AT BEDTIME    amLODIPine (NORVASC) 10 MG tablet Take 1 tablet (10 mg total) by mouth once daily.    aspirin (ASPIRIN LOW DOSE) 81 MG EC tablet     diclofenac sodium (VOLTAREN) 1 % Gel Apply  "topically once daily. Apply 2g    gabapentin (NEURONTIN) 800 MG tablet Take 800 mg by mouth 3 (three) times daily.    hydroCHLOROthiazide (HYDRODIURIL) 25 MG tablet Take 1 tablet (25 mg total) by mouth once daily.    hydroxyzine HCL (ATARAX) 25 MG tablet Take 1 tablet (25 mg total) by mouth 3 (three) times daily as needed for Itching.    INV HYDROCHLOROTHIAZIDE 25MG TABLET     losartan (COZAAR) 100 MG tablet Take 1 tablet (100 mg total) by mouth once daily.    omeprazole (PRILOSEC) 40 MG capsule Take 1 capsule (40 mg total) by mouth once daily.    potassium chloride (MICRO-K) 10 MEQ CpSR Take 10 mEq by mouth once.    pravastatin (PRAVACHOL) 40 MG tablet Take 40 mg by mouth once daily.    tiZANidine (ZANAFLEX) 4 mg Cap Take 4 mg by mouth 4 (four) times daily as needed.    traZODone (DESYREL) 100 MG tablet Take 1 tablet (100 mg total) by mouth nightly as needed for Insomnia.     No current facility-administered medications for this visit.        Review of Systems   Constitutional: Negative for activity change, chills, fatigue, fever and unexpected weight change.   Eyes: Negative for discharge, redness and visual disturbance.   Respiratory: Negative for cough, shortness of breath and wheezing.    Cardiovascular: Negative for chest pain and leg swelling.   Gastrointestinal: Negative for abdominal distention, abdominal pain, constipation, diarrhea, nausea and vomiting.   Genitourinary: Negative for difficulty urinating, dysuria, flank pain, frequency, hematuria, pelvic pain and urgency.   Musculoskeletal: Negative for arthralgias, joint swelling and myalgias.   Skin: Negative for color change and rash.   Neurological: Negative for dizziness and light-headedness.   Psychiatric/Behavioral: Negative for behavioral problems and confusion. The patient is not nervous/anxious.        Objective:      Vitals:    06/11/20 1001   BP: 100/60   Weight: 111.4 kg (245 lb 11.2 oz)   Height: 5' 4" (1.626 m)     Physical Exam "   Constitutional: She is oriented to person, place, and time. She appears well-developed.   HENT:   Head: Normocephalic and atraumatic.   Nose: Nose normal.   Eyes: Conjunctivae are normal. Right eye exhibits no discharge. Left eye exhibits no discharge.   Neck: Normal range of motion. Neck supple. No tracheal deviation present. No thyromegaly present.   Cardiovascular: Normal rate and regular rhythm.    Pulmonary/Chest: Effort normal. No respiratory distress. She has no wheezes.   Abdominal: Soft. She exhibits no distension. There is no hepatosplenomegaly. There is no tenderness. There is no CVA tenderness. No hernia.   Genitourinary:   Genitourinary Comments: Patient declined exam   Musculoskeletal: Normal range of motion. She exhibits no edema.   Neurological: She is alert and oriented to person, place, and time.   Skin: Skin is warm and dry. No rash noted. No erythema.     Psychiatric: She has a normal mood and affect. Her behavior is normal. Judgment normal.       Urine dipstick shows 5-10 RBCs.      Assessment:       1. Gross hematuria          Plan:       1. Gross hematuria   - UCx--negative   - Cytology - not indicated   - CT urogram 11/19--normal    - Office cystoscopy 12/19--normal  - POCT urinalysis, dipstick or tablet reag            Follow up if symptoms worsen or fail to improve.

## 2020-06-15 ENCOUNTER — OFFICE VISIT (OUTPATIENT)
Dept: FAMILY MEDICINE | Facility: CLINIC | Age: 70
End: 2020-06-15
Payer: MEDICARE

## 2020-06-15 VITALS
HEART RATE: 94 BPM | DIASTOLIC BLOOD PRESSURE: 72 MMHG | OXYGEN SATURATION: 96 % | TEMPERATURE: 98 F | BODY MASS INDEX: 42.04 KG/M2 | HEIGHT: 64 IN | WEIGHT: 246.25 LBS | SYSTOLIC BLOOD PRESSURE: 112 MMHG

## 2020-06-15 DIAGNOSIS — K76.9 LIVER DISEASE, UNSPECIFIED: ICD-10-CM

## 2020-06-15 DIAGNOSIS — Z00.00 ROUTINE GENERAL MEDICAL EXAMINATION AT A HEALTH CARE FACILITY: Primary | ICD-10-CM

## 2020-06-15 DIAGNOSIS — M48.00 SPINAL STENOSIS, UNSPECIFIED SPINAL REGION: ICD-10-CM

## 2020-06-15 DIAGNOSIS — N18.30 CKD (CHRONIC KIDNEY DISEASE) STAGE 3, GFR 30-59 ML/MIN: ICD-10-CM

## 2020-06-15 DIAGNOSIS — R25.2 LEG CRAMPS: ICD-10-CM

## 2020-06-15 DIAGNOSIS — R31.21 ASYMPTOMATIC MICROSCOPIC HEMATURIA: ICD-10-CM

## 2020-06-15 DIAGNOSIS — E87.5 SERUM POTASSIUM ELEVATED: ICD-10-CM

## 2020-06-15 PROCEDURE — 99397 PR PREVENTIVE VISIT,EST,65 & OVER: ICD-10-PCS | Mod: S$GLB,,, | Performed by: FAMILY MEDICINE

## 2020-06-15 PROCEDURE — 99499 UNLISTED E&M SERVICE: CPT | Mod: S$GLB,,, | Performed by: FAMILY MEDICINE

## 2020-06-15 PROCEDURE — 99499 RISK ADDL DX/OHS AUDIT: ICD-10-PCS | Mod: S$GLB,,, | Performed by: FAMILY MEDICINE

## 2020-06-15 PROCEDURE — 3078F PR MOST RECENT DIASTOLIC BLOOD PRESSURE < 80 MM HG: ICD-10-PCS | Mod: CPTII,S$GLB,, | Performed by: FAMILY MEDICINE

## 2020-06-15 PROCEDURE — 99999 PR PBB SHADOW E&M-EST. PATIENT-LVL IV: ICD-10-PCS | Mod: PBBFAC,,, | Performed by: FAMILY MEDICINE

## 2020-06-15 PROCEDURE — 99397 PER PM REEVAL EST PAT 65+ YR: CPT | Mod: S$GLB,,, | Performed by: FAMILY MEDICINE

## 2020-06-15 PROCEDURE — 3074F PR MOST RECENT SYSTOLIC BLOOD PRESSURE < 130 MM HG: ICD-10-PCS | Mod: CPTII,S$GLB,, | Performed by: FAMILY MEDICINE

## 2020-06-15 PROCEDURE — 99999 PR PBB SHADOW E&M-EST. PATIENT-LVL IV: CPT | Mod: PBBFAC,,, | Performed by: FAMILY MEDICINE

## 2020-06-15 PROCEDURE — 3074F SYST BP LT 130 MM HG: CPT | Mod: CPTII,S$GLB,, | Performed by: FAMILY MEDICINE

## 2020-06-15 PROCEDURE — 3078F DIAST BP <80 MM HG: CPT | Mod: CPTII,S$GLB,, | Performed by: FAMILY MEDICINE

## 2020-06-15 RX ORDER — TRAMADOL HYDROCHLORIDE 50 MG/1
TABLET, COATED ORAL
COMMUNITY
Start: 2020-05-18 | End: 2020-06-15 | Stop reason: SDUPTHER

## 2020-06-15 RX ORDER — TIZANIDINE HYDROCHLORIDE 4 MG/1
4 CAPSULE, GELATIN COATED ORAL 4 TIMES DAILY PRN
Qty: 90 CAPSULE | Refills: 1 | Status: CANCELLED | OUTPATIENT
Start: 2020-06-15

## 2020-06-15 RX ORDER — TRAMADOL HYDROCHLORIDE 50 MG/1
50 TABLET, COATED ORAL EVERY 4 HOURS PRN
Qty: 60 TABLET | Refills: 2 | Status: SHIPPED | OUTPATIENT
Start: 2020-06-15 | End: 2020-06-17

## 2020-06-15 RX ORDER — TIZANIDINE 4 MG/1
4 TABLET ORAL EVERY 6 HOURS PRN
Qty: 90 TABLET | Refills: 1 | Status: SHIPPED | OUTPATIENT
Start: 2020-06-15 | End: 2022-02-16 | Stop reason: SDUPTHER

## 2020-06-15 NOTE — PROGRESS NOTES
"Chief Complaint   Patient presents with    Annual Exam       HPI  Amanda Holt is a 69 y.o. female with multiple medical diagnoses as listed in the medical history and problem list that presents for annual exam .    She is also following up for chronic pain, leg cramps and hx of hematuria    Chronic pain  She is being followed by Dr. Kurtz with Neurosurgery, and is pending a workup as she has had recurrence of pain s/p laminectomy and fusion  Currently taking tramadol and zanaflex for this    Leg cramps  She has had these intermittently though recent K levels were high    Hematuria  Has seen urology for intermittent hematuria with normal workup so far  No recent gross hematuria    HPI    Patient Active Problem List   Diagnosis    Contact lens overwear of both eyes    Refractive error    Nuclear sclerosis of both eyes    Spinal stenosis    Hypertension    Obstructive sleep apnea syndrome    Gastroesophageal reflux disease without esophagitis    CKD (chronic kidney disease) stage 3, GFR 30-59 ml/min    Chronic low back pain    Bilateral hearing loss    DDD (degenerative disc disease), lumbar    CTS (carpal tunnel syndrome)    Anxiety    Asymptomatic microscopic hematuria         ROS  Review of Systems   Constitutional: Negative for fever.   Cardiovascular: Negative for chest pain.   Gastrointestinal: Negative for abdominal pain.   Genitourinary: Positive for pelvic pain. Negative for dysuria and hematuria.        Starts in her lower back and radiates   Musculoskeletal: Positive for back pain.   Neurological: Positive for numbness. Negative for weakness and headaches.       Physical Exam  Vitals:    06/15/20 0956   BP: 112/72   BP Location: Right arm   Patient Position: Sitting   BP Method: Large (Manual)   Pulse: 94   Temp: 97.9 °F (36.6 °C)   TempSrc: Temporal   SpO2: 96%   Weight: 111.7 kg (246 lb 4.1 oz)   Height: 5' 4" (1.626 m)    Body mass index is 42.27 kg/m².  Weight: 111.7 kg (246 lb 4.1 " "oz)   Height: 5' 4" (162.6 cm)     Physical Exam    ALLERGIES AND MEDICATIONS: updated and reviewed.  Review of patient's allergies indicates:   Allergen Reactions    Codeine      Medication List with Changes/Refills   New Medications    TIZANIDINE (ZANAFLEX) 4 MG TABLET    Take 1 tablet (4 mg total) by mouth every 6 (six) hours as needed.   Current Medications    AMITRIPTYLINE (ELAVIL) 50 MG TABLET    amitriptyline 50 mg tablet  TAKE 1 TABLET BY MOUTH AT BEDTIME    AMLODIPINE (NORVASC) 10 MG TABLET    Take 1 tablet (10 mg total) by mouth once daily.    ASPIRIN (ASPIRIN LOW DOSE) 81 MG EC TABLET        DICLOFENAC SODIUM (VOLTAREN) 1 % GEL    Apply topically once daily. Apply 2g    GABAPENTIN (NEURONTIN) 800 MG TABLET    Take 800 mg by mouth 3 (three) times daily.    HYDROCHLOROTHIAZIDE (HYDRODIURIL) 25 MG TABLET    Take 1 tablet (25 mg total) by mouth once daily.    HYDROXYZINE HCL (ATARAX) 25 MG TABLET    Take 1 tablet (25 mg total) by mouth 3 (three) times daily as needed for Itching.    INV HYDROCHLOROTHIAZIDE 25MG TABLET        LOSARTAN (COZAAR) 100 MG TABLET    Take 1 tablet (100 mg total) by mouth once daily.    OMEPRAZOLE (PRILOSEC) 40 MG CAPSULE    Take 1 capsule (40 mg total) by mouth once daily.    POTASSIUM CHLORIDE (MICRO-K) 10 MEQ CPSR    Take 10 mEq by mouth once.    PRAVASTATIN (PRAVACHOL) 40 MG TABLET    Take 40 mg by mouth once daily.    TRAZODONE (DESYREL) 100 MG TABLET    Take 1 tablet (100 mg total) by mouth nightly as needed for Insomnia.   Changed and/or Refilled Medications    Modified Medication Previous Medication    ULTRAM 50 MG TABLET ULTRAM 50 mg tablet       Take 1 tablet (50 mg total) by mouth every 4 (four) hours as needed for Pain.       Discontinued Medications    TIZANIDINE (ZANAFLEX) 4 MG CAP    Take 4 mg by mouth 4 (four) times daily as needed.       Assessment & Plan  1. Routine general medical examination at a health care facility    2. Spinal stenosis, unspecified spinal " region    3. CKD (chronic kidney disease) stage 3, GFR 30-59 ml/min    4. Serum potassium elevated    5. Leg cramps    6. Asymptomatic microscopic hematuria    7. Liver disease, unspecified        Problem List Items Addressed This Visit        Neuro    Spinal stenosis  -continue follow up with neurosurgery    Relevant Medications    ULTRAM 50 mg tablet    tiZANidine (ZANAFLEX) 4 MG tablet       Renal/    Asymptomatic microscopic hematuria  -needs to quit smoking, negative workup per urology    CKD (chronic kidney disease) stage 3, GFR 30-59 ml/min  -continue to monitor      Other Visit Diagnoses     Routine general medical examination at a health care facility    -  Primary  --discussed healthy lifestyle modification with exercise and healthy diet, reviewed age appropriate screening and healthy maintenance      Serum potassium elevated      -she has intermittent leg cramps and is on hctz  -may change to aldactone if K is still elevated    Relevant Orders    Comprehensive metabolic panel    Leg cramps      -likely from hctz    Liver disease, unspecified      -has abnormal liver lobe on CT urogram    Relevant Orders    MRI Abdomen W WO Contrast          Follow up in about 3 months (around 9/15/2020) for management of chronic conditions.    Other Orders Placed This Visit:  Orders Placed This Encounter   Procedures    MRI Abdomen W WO Contrast    Comprehensive metabolic panel

## 2020-06-17 DIAGNOSIS — M48.00 SPINAL STENOSIS, UNSPECIFIED SPINAL REGION: ICD-10-CM

## 2020-06-17 RX ORDER — TRAMADOL HYDROCHLORIDE 50 MG/1
50 TABLET ORAL EVERY 12 HOURS PRN
Qty: 60 TABLET | Refills: 0 | Status: SHIPPED | OUTPATIENT
Start: 2020-06-17 | End: 2020-06-18 | Stop reason: SDUPTHER

## 2020-06-17 NOTE — TELEPHONE ENCOUNTER
Pt. Needs the medication called in as Tramadol, Ultram brand not covered. Pt. Was seen on 6/15/2020.

## 2020-06-17 NOTE — TELEPHONE ENCOUNTER
----- Message from Zulema Carreno sent at 6/17/2020  8:16 AM CDT -----  Type: Patient Call Back    Who called: Self     What is the request in detail:patient states her medication was changed from Tramadol to Ultram her insurance will not cover the Ultram she would like to know if the doctor will call in the Tramadol. Please call    Can the clinic reply by [facility]? No     Would the patient rather a call back or a response via [facility]?  Call     Best call back number: [phone]       [facility] - [place] ([place], [place] - [address] [place] [address] [phone] (Phone)  [phone] (Fax)

## 2020-06-18 ENCOUNTER — TELEPHONE (OUTPATIENT)
Dept: FAMILY MEDICINE | Facility: CLINIC | Age: 70
End: 2020-06-18

## 2020-06-18 DIAGNOSIS — F41.9 ANXIETY: ICD-10-CM

## 2020-06-18 DIAGNOSIS — M48.00 SPINAL STENOSIS, UNSPECIFIED SPINAL REGION: ICD-10-CM

## 2020-06-18 RX ORDER — TRAMADOL HYDROCHLORIDE 50 MG/1
50 TABLET ORAL EVERY 12 HOURS PRN
Qty: 60 TABLET | Refills: 0 | Status: SHIPPED | OUTPATIENT
Start: 2020-06-18 | End: 2020-06-18 | Stop reason: SDUPTHER

## 2020-06-18 RX ORDER — TRAMADOL HYDROCHLORIDE 50 MG/1
50 TABLET ORAL EVERY 12 HOURS PRN
Qty: 60 TABLET | Refills: 0 | Status: CANCELLED | OUTPATIENT
Start: 2020-06-18

## 2020-06-18 RX ORDER — TRAMADOL HYDROCHLORIDE 50 MG/1
50 TABLET ORAL EVERY 12 HOURS PRN
Qty: 60 TABLET | Refills: 0 | Status: SHIPPED | OUTPATIENT
Start: 2020-06-18 | End: 2020-07-20

## 2020-06-18 NOTE — TELEPHONE ENCOUNTER
----- Message from Kristal Simon sent at 6/18/2020  2:08 PM CDT -----  Contact: Patient [phone]  Type: RX Refill Request    Who Called:  Patient    Have you contacted your pharmacy: Yes. Was told to have the dr's office phone the Rx in, not fax or escribe. And it just need to be called in as Tramadol. Do not say Ultram because insurance won't cover it.     Refill or New Rx: Refill    RX Name and Strength: traMADoL (ULTRAM) 50 mg tablet    Is this a 30 day or 90 day RX: 90 day    Preferred Pharmacy with phone number: .  [facility] [address] [place] [address] ([place] [place] [facility] [address] [facility]  [address] [facility] ([place] [address]  Phone: [phone] Fax: [phone]    Local or Mail Order:Local    Would the patient rather a call back or a response via [facility]? Call back    Best Call Back Number: [phone]

## 2020-06-18 NOTE — TELEPHONE ENCOUNTER
----- Message from Cierra Malagon sent at 6/18/2020  3:23 PM CDT -----  Contact: CLARICE KLEIN [[id number]]  Name of Who is Calling: [facility]       What is the request in detail: [facility] would like prescription to be resent states her insurance only cover generic traMADoL (ULTRAM) 50 mg tablet. Please call       Can the clinic reply by [facility]: no      What Number to Call Back if not in [facility]: [phone]

## 2020-06-18 NOTE — TELEPHONE ENCOUNTER
----- Message from Chon Cotto MD sent at 6/18/2020  3:07 PM CDT -----  Contact: Patient [phone]  E prescribing error - cannot e prescribe the tramadol b/c of computer issue apparently - please call in the tramadol to pharmacy.  ----- Message -----  From: Latanya Mcnulty LPN  Sent: 6/18/2020   2:29 PM CDT  To: Chno Cotto MD      ----- Message -----  From: Kristal Simon  Sent: 6/18/2020   2:08 PM CDT  To: Yadiel Givens Staff    Type: RX Refill Request    Who Called:  Patient    Have you contacted your pharmacy: Yes. Was told to have the dr's office phone the Rx in, not fax or escribe. And it just need to be called in as Tramadol. Do not say Ultram because insurance won't cover it.     Refill or New Rx: Refill    RX Name and Strength: traMADoL (ULTRAM) 50 mg tablet    Is this a 30 day or 90 day RX: 90 day    Preferred Pharmacy with phone number: .  [facility] ([place], [place] - [address] [facility]  [address] [facility] ([place] [address]  Phone: [phone] Fax: [phone]    Local or Mail Order:Local    Would the patient rather a call back or a response via [facility]? Call back    Best Call Back Number: [phone]

## 2020-06-18 NOTE — TELEPHONE ENCOUNTER
OK to call in to pharmacy, if unable to b/c controlled substance patient can pick this script up    Simona Torres MD

## 2020-06-23 DIAGNOSIS — Z12.11 COLON CANCER SCREENING: Primary | ICD-10-CM

## 2020-06-23 DIAGNOSIS — Z12.11 COLON CANCER SCREENING: ICD-10-CM

## 2020-06-30 ENCOUNTER — HOSPITAL ENCOUNTER (EMERGENCY)
Facility: HOSPITAL | Age: 70
Discharge: HOME | End: 2020-06-30
Attending: EMERGENCY MEDICINE
Payer: MEDICARE

## 2020-06-30 VITALS
DIASTOLIC BLOOD PRESSURE: 79 MMHG | WEIGHT: 242 LBS | BODY MASS INDEX: 41.32 KG/M2 | SYSTOLIC BLOOD PRESSURE: 164 MMHG | TEMPERATURE: 98 F | HEART RATE: 78 BPM | RESPIRATION RATE: 16 BRPM | HEIGHT: 64 IN | OXYGEN SATURATION: 98 %

## 2020-06-30 DIAGNOSIS — B35.4 TINEA CORPORIS: ICD-10-CM

## 2020-06-30 DIAGNOSIS — R21 RASH: Primary | ICD-10-CM

## 2020-06-30 PROCEDURE — 99284 EMERGENCY DEPT VISIT MOD MDM: CPT | Mod: ER

## 2020-06-30 RX ORDER — KETOCONAZOLE 20 MG/G
CREAM TOPICAL DAILY
Qty: 15 G | Refills: 0 | Status: SHIPPED | OUTPATIENT
Start: 2020-06-30 | End: 2020-08-17 | Stop reason: SDUPTHER

## 2020-06-30 RX ORDER — DIPHENHYDRAMINE HCL 25 MG
25 CAPSULE ORAL EVERY 6 HOURS PRN
Qty: 20 CAPSULE | Refills: 0 | Status: SHIPPED | OUTPATIENT
Start: 2020-06-30 | End: 2021-07-21

## 2020-06-30 NOTE — PROVIDER PROGRESS NOTES - EMERGENCY DEPT.
Emergency Department TeleTRIAGE Encounter Note      CHIEF COMPLAINT    Chief Complaint   Patient presents with    Rash     pt c/o itchy rash to bilateral arms (antecubital area) x 3-4 days.         VITAL SIGNS   Initial Vitals [06/30/20 1621]   BP Pulse Resp Temp SpO2   (!) 169/76 103 20 98.9 °F (37.2 °C) 98 %      MAP       --            ALLERGIES    Review of patient's allergies indicates:   Allergen Reactions    Codeine        PROVIDER TRIAGE NOTE  Patient with past medical history hypertension presents to the ED for evaluation of rash.  Patient states she has had an erythematous scaly rash to bilateral antecubital fossa.  Patient states it is pruritic.  She had a similar episode about 1 month ago when she used hydrocortisone an antifungal cream and it resolved.  She denies using any new products.  She denies rash anywhere else on her body.  She denies fever or drainage from the sites.      ORDERS  Labs Reviewed - No data to display    ED Orders (720h ago, onward)    None            Virtual Visit Note: The provider triage portion of this emergency department evaluation and documentation was performed via [employer], a HIPAA-compliant telemedicine application, in concert with a tele-presenter in the room. A face to face patient evaluation with one of my colleagues will occur once the patient is placed in an emergency department room.      DISCLAIMER: This note was prepared with [employer] voice recognition transcription software. Garbled syntax, mangled pronouns, and other bizarre constructions may be attributed to that software system.

## 2020-06-30 NOTE — Clinical Note
Amanda Holt was seen and treated in our emergency department on 6/30/2020.  She may return to work on 07/02/2020.       If you have any questions or concerns, please don't hesitate to call.      Rivka Madsen, DO

## 2020-06-30 NOTE — ED PROVIDER NOTES
Encounter Date: 6/30/2020    SCRIBE #1 NOTE: I, Tresa Ramirez , am scribing for, and in the presence of,  Dr. Madsen . I have scribed the following portions of the note - Other sections scribed: HPI, ROS, PE .       History     Chief Complaint   Patient presents with    Rash     pt c/o itchy rash to bilateral arms (antecubital area) x 3-4 days.       Amanda Holt is a 69 y.o. female who presents to the ED complaining of a rash to her inner elbows bilateral. Pt states the rash had small heads that reminded her of ringworms. She used Lotrimin cream on the rash and it got better but has been getting worse recently. She has never had this problem before. She believes it is from moving her furniture out of storage. There are no other complaints at this time.     The history is provided by the patient.     Review of patient's allergies indicates:   Allergen Reactions    Codeine      Past Medical History:   Diagnosis Date    Arthralgia     Degenerative disc disease at L5-S1 level     High cholesterol     Hypertension     Nuclear sclerosis of both eyes 1/8/2020    Sciatica     Spinal stenosis      Past Surgical History:   Procedure Laterality Date    BACK SURGERY      lumbar laminectomy    TUBAL LIGATION       Family History   Problem Relation Age of Onset    No Known Problems Mother     Cataracts Father     Glaucoma Father     Glaucoma Sister     No Known Problems Brother     No Known Problems Maternal Aunt     No Known Problems Maternal Uncle     No Known Problems Paternal Aunt     No Known Problems Paternal Uncle     No Known Problems Maternal Grandmother     No Known Problems Maternal Grandfather     No Known Problems Paternal Grandmother     No Known Problems Paternal Grandfather     Amblyopia Neg Hx     Blindness Neg Hx     Cancer Neg Hx     Hypertension Neg Hx     Macular degeneration Neg Hx     Retinal detachment Neg Hx     Strabismus Neg Hx     Stroke Neg Hx     Thyroid disease Neg  Hx      Social History     Tobacco Use    Smoking status: Current Some Day Smoker     Types: Cigarettes    Smokeless tobacco: Never Used    Tobacco comment: 2 cigarettes/week   Substance Use Topics    Alcohol use: Yes     Frequency: 2-4 times a month     Drinks per session: 1 or 2     Binge frequency: Never     Comment: ocassionally    Drug use: No     Review of Systems   Constitutional: Negative for chills and fever.   HENT: Negative for congestion and rhinorrhea.    Respiratory: Negative for cough and shortness of breath.    Cardiovascular: Negative for chest pain.   Gastrointestinal: Negative for abdominal pain, diarrhea, nausea and vomiting.   Genitourinary: Negative for dysuria.   Skin: Positive for rash.   Neurological: Negative for headaches.   All other systems reviewed and are negative.      Physical Exam     Initial Vitals [06/30/20 1621]   BP Pulse Resp Temp SpO2   (!) 169/76 103 20 98.9 °F (37.2 °C) 98 %      MAP       --         Patient gave consent to have physical exam performed.    Physical Exam    Nursing note and vitals reviewed.  Constitutional: She appears well-developed and well-nourished.   HENT:   Head: Normocephalic and atraumatic.   Right Ear: External ear normal.   Left Ear: External ear normal.   Nose: Nose normal.   Mouth/Throat: Oropharynx is clear and moist.   Eyes: Conjunctivae and EOM are normal. Pupils are equal, round, and reactive to light.   Neck: Normal range of motion and phonation normal. Neck supple.   Cardiovascular: Normal rate, regular rhythm, normal heart sounds and intact distal pulses. Exam reveals no gallop and no friction rub.    No murmur heard.  Pulmonary/Chest: Effort normal and breath sounds normal. No stridor. No respiratory distress. She has no wheezes. She has no rhonchi. She has no rales. She exhibits no tenderness.   Abdominal: Soft. Bowel sounds are normal. She exhibits no distension. There is no abdominal tenderness. There is no rigidity, no rebound and  no guarding.   Musculoskeletal: Normal range of motion. No tenderness or edema.   Neurological: She is alert and oriented to person, place, and time. She has normal strength. No cranial nerve deficit or sensory deficit. GCS score is 15. GCS eye subscore is 4. GCS verbal subscore is 5. GCS motor subscore is 6.   Skin: Skin is warm and dry. Capillary refill takes less than 2 seconds. Lesion noted. No rash noted.   Circular lesions to the right AC with central clearing. Hyperpigmentation to the left AC with no specific lesion appreciated.    Psychiatric: She has a normal mood and affect. Her behavior is normal.         ED Course   Procedures  Labs Reviewed - No data to display       Imaging Results    None     Chief complaint: Rash  Differential diagnosis: Rash, Atopic Dermatitis, Tinea Corporis, Tinea Cruris.     Treatment in the ED: PE  Discussed treatment, out patient follow up with derm/pcp and prescriptionsDischarge home with Nizoral, Benadryl.   Fill and take prescriptions as directed.  Return to the ED if symptoms worsen or do not resolve.   Answered questions and discussed discharge plan.    Patient feels better and is ready for discharge.  Follow up with PCP/specialist in 1 day.                  Scribe Attestation:   Scribe #1: I performed the above scribed service and the documentation accurately describes the services I performed. I attest to the accuracy of the note.     I, Dr. Rivka Madsen, personally performed the services described in this documentation. This document was produced by a scribe under my direction and in my presence. All medical record entries made by the scribe were at my direction and in my presence.  I have reviewed the chart and agree that the record reflects my personal performance and is accurate and complete. Rivka Madsen, .     07/01/2020 12:08 PM                        Clinical Impression:     1. Rash    2. Tinea corporis                ED Disposition Condition     Discharge Stable        ED Prescriptions     Medication Sig Dispense Start Date End Date Auth. Provider    ketoconazole (NIZORAL) 2 % cream Apply topically once daily. Apply to rash for 14 days 15 g 6/30/2020 7/14/2020 Rivka Madsen DO    diphenhydrAMINE (BENADRYL) 25 mg capsule Take 1 capsule (25 mg total) by mouth every 6 (six) hours as needed for Itching. 20 capsule 6/30/2020  Rivka Madsen DO        Follow-up Information     Follow up With Specialties Details Why Contact Info    Simona Torres MD Internal Medicine Schedule an appointment as soon as possible for a visit in 1 day  [address] [facility] [address]  [phone]      Edith Graves MD Dermatology Schedule an appointment as soon as possible for a visit in 1 day  [address] [facility] [address]  [phone]      [facility] Emergency Medicine Go to  If symptoms worsen [address] [facility] [address] [phone]                                     Rivka Madsen DO  07/01/20 6065

## 2020-07-20 ENCOUNTER — OFFICE VISIT (OUTPATIENT)
Dept: PAIN MEDICINE | Facility: CLINIC | Age: 70
End: 2020-07-20
Payer: MEDICARE

## 2020-07-20 VITALS
OXYGEN SATURATION: 98 % | DIASTOLIC BLOOD PRESSURE: 81 MMHG | TEMPERATURE: 99 F | WEIGHT: 249.31 LBS | HEIGHT: 64 IN | SYSTOLIC BLOOD PRESSURE: 164 MMHG | HEART RATE: 99 BPM | BODY MASS INDEX: 42.56 KG/M2

## 2020-07-20 DIAGNOSIS — M51.36 DDD (DEGENERATIVE DISC DISEASE), LUMBAR: Primary | ICD-10-CM

## 2020-07-20 DIAGNOSIS — M54.16 LUMBAR RADICULOPATHY: ICD-10-CM

## 2020-07-20 DIAGNOSIS — M47.816 LUMBAR SPONDYLOSIS: ICD-10-CM

## 2020-07-20 DIAGNOSIS — M54.9 BACK PAIN, UNSPECIFIED BACK LOCATION, UNSPECIFIED BACK PAIN LATERALITY, UNSPECIFIED CHRONICITY: ICD-10-CM

## 2020-07-20 PROCEDURE — 1125F PR PAIN SEVERITY QUANTIFIED, PAIN PRESENT: ICD-10-PCS | Mod: S$GLB,,, | Performed by: PAIN MEDICINE

## 2020-07-20 PROCEDURE — 3079F DIAST BP 80-89 MM HG: CPT | Mod: CPTII,S$GLB,, | Performed by: PAIN MEDICINE

## 2020-07-20 PROCEDURE — 1125F AMNT PAIN NOTED PAIN PRSNT: CPT | Mod: S$GLB,,, | Performed by: PAIN MEDICINE

## 2020-07-20 PROCEDURE — 3008F BODY MASS INDEX DOCD: CPT | Mod: CPTII,S$GLB,, | Performed by: PAIN MEDICINE

## 2020-07-20 PROCEDURE — 99204 PR OFFICE/OUTPT VISIT, NEW, LEVL IV, 45-59 MIN: ICD-10-PCS | Mod: S$GLB,,, | Performed by: PAIN MEDICINE

## 2020-07-20 PROCEDURE — 3008F PR BODY MASS INDEX (BMI) DOCUMENTED: ICD-10-PCS | Mod: CPTII,S$GLB,, | Performed by: PAIN MEDICINE

## 2020-07-20 PROCEDURE — 99204 OFFICE O/P NEW MOD 45 MIN: CPT | Mod: S$GLB,,, | Performed by: PAIN MEDICINE

## 2020-07-20 PROCEDURE — 99999 PR PBB SHADOW E&M-EST. PATIENT-LVL V: CPT | Mod: PBBFAC,,, | Performed by: PAIN MEDICINE

## 2020-07-20 PROCEDURE — 3079F PR MOST RECENT DIASTOLIC BLOOD PRESSURE 80-89 MM HG: ICD-10-PCS | Mod: CPTII,S$GLB,, | Performed by: PAIN MEDICINE

## 2020-07-20 PROCEDURE — 1159F PR MEDICATION LIST DOCUMENTED IN MEDICAL RECORD: ICD-10-PCS | Mod: S$GLB,,, | Performed by: PAIN MEDICINE

## 2020-07-20 PROCEDURE — 3077F PR MOST RECENT SYSTOLIC BLOOD PRESSURE >= 140 MM HG: ICD-10-PCS | Mod: CPTII,S$GLB,, | Performed by: PAIN MEDICINE

## 2020-07-20 PROCEDURE — 99999 PR PBB SHADOW E&M-EST. PATIENT-LVL V: ICD-10-PCS | Mod: PBBFAC,,, | Performed by: PAIN MEDICINE

## 2020-07-20 PROCEDURE — 3077F SYST BP >= 140 MM HG: CPT | Mod: CPTII,S$GLB,, | Performed by: PAIN MEDICINE

## 2020-07-20 PROCEDURE — 1101F PT FALLS ASSESS-DOCD LE1/YR: CPT | Mod: CPTII,S$GLB,, | Performed by: PAIN MEDICINE

## 2020-07-20 PROCEDURE — 1159F MED LIST DOCD IN RCRD: CPT | Mod: S$GLB,,, | Performed by: PAIN MEDICINE

## 2020-07-20 PROCEDURE — 1101F PR PT FALLS ASSESS DOC 0-1 FALLS W/OUT INJ PAST YR: ICD-10-PCS | Mod: CPTII,S$GLB,, | Performed by: PAIN MEDICINE

## 2020-07-20 NOTE — PROGRESS NOTES
Subjective:     Patient ID: Amanda Holt is a 69 y.o. female    Chief Complaint: Low-back Pain (pt takes medication and has had PT . Had injections ), Hip Pain (left), Leg Pain (left ), and Foot Pain (left )      Referred by: Azra Kurtz MD      HPI:    Initial Encounter (7/20/20):  Amanda Holt is a 69 y.o. female who presents today with chronic left-sided low back and lower extremity pain.  Status post L4-5 laminectomy and fusion in 2018.  She states that she was doing well for a period of time following her surgery, but she began have this left-sided pain a little over 1 year ago.  The pain is located the left lumbosacral region radiates all the way to the left lateral thigh, lateral leg to the ankle.  She reports associated numbness and tingling this area.  She denies any focal weakness or bowel bladder dysfunction.  Pain is constant worse with activity.  Patient has been evaluated by Neurosurgery and imaging studies were ordered.  These have not been done but are scheduled for next month.  This pain is described in detail below.    Physical Therapy:  Yes.    Non-pharmacologic Treatment:  Rest helps         · TENS?  No    Pain Medications:         · Currently taking:  Tizanidine, tramadol, Voltaren gel, Amitriptyline    · Has tried in the past:  Gabapentin, NSAIDs, Tylenol    · Has not tried:  Opioids, SNRIs, topical creams    Blood thinners:  Aspirin 81 mg a day    Interventional Therapies:  Previous lumbar facet injections    Relevant Surgeries: L4-5 laminectomy and fusion in 2018    Affecting sleep?  Yes    Affecting daily activities? yes    Depressive symptoms? no          · SI/HI? No    Work status: Disabled    Pain Scores:    Best:       1/10  Worst:     10/10  Usually:   10/10  Today:    10/10    Review of Systems   Constitutional: Negative for activity change, appetite change, chills, fatigue, fever and unexpected weight change.   HENT: Negative for hearing loss.    Eyes: Negative for  visual disturbance.   Respiratory: Negative for chest tightness and shortness of breath.    Cardiovascular: Negative for chest pain.   Gastrointestinal: Negative for abdominal pain, constipation, diarrhea, nausea and vomiting.   Genitourinary: Negative for difficulty urinating.   Musculoskeletal: Positive for back pain, gait problem and myalgias. Negative for neck pain.   Skin: Negative for rash.   Neurological: Positive for numbness. Negative for dizziness, weakness, light-headedness and headaches.   Psychiatric/Behavioral: Positive for sleep disturbance. Negative for hallucinations and suicidal ideas. The patient is not nervous/anxious.        Past Medical History:   Diagnosis Date    Arthralgia     Degenerative disc disease at L5-S1 level     High cholesterol     Hypertension     Nuclear sclerosis of both eyes 1/8/2020    Sciatica     Spinal stenosis        Past Surgical History:   Procedure Laterality Date    BACK SURGERY      lumbar laminectomy    TUBAL LIGATION         Social History     Socioeconomic History    Marital status:      Spouse name: Not on file    Number of children: Not on file    Years of education: Not on file    Highest education level: Not on file   Occupational History    Not on file   Social Needs    Financial resource strain: Hard    Food insecurity     Worry: Sometimes true     Inability: Sometimes true    Transportation needs     Medical: Yes     Non-medical: Yes   Tobacco Use    Smoking status: Current Some Day Smoker     Types: Cigarettes    Smokeless tobacco: Never Used    Tobacco comment: 2 cigarettes/week   Substance and Sexual Activity    Alcohol use: Yes     Frequency: 2-4 times a month     Drinks per session: 1 or 2     Binge frequency: Never     Comment: ocassionally    Drug use: No    Sexual activity: Not Currently   Lifestyle    Physical activity     Days per week: 3 days     Minutes per session: 30 min    Stress: Very much   Relationships     Social connections     Talks on phone: More than three times a week     Gets together: More than three times a week     Attends [employer] service: Not on file     Active member of club or organization: No     Attends meetings of clubs or organizations: Never     Relationship status:    Other Topics Concern    Not on file   Social History Narrative    Not on file       Review of patient's allergies indicates:   Allergen Reactions    Codeine        Current Outpatient Medications on File Prior to Visit   Medication Sig Dispense Refill    amitriptyline (ELAVIL) 50 MG tablet amitriptyline 50 mg tablet  TAKE 1 TABLET BY MOUTH AT BEDTIME 90 tablet 1    aspirin (ASPIRIN LOW DOSE) 81 MG EC tablet       diphenhydrAMINE (BENADRYL) 25 mg capsule Take 1 capsule (25 mg total) by mouth every 6 (six) hours as needed for Itching. 20 capsule 0    hydroxyzine HCL (ATARAX) 25 MG tablet Take 1 tablet (25 mg total) by mouth 3 (three) times daily as needed for Itching. 45 tablet 1    INV HYDROCHLOROTHIAZIDE 25MG TABLET       losartan (COZAAR) 100 MG tablet Take 1 tablet (100 mg total) by mouth once daily. 90 tablet 1    [START ON 7/22/2020] polyethylene glycol (COLYTE) 240-22.72-6.72 -5.84 gram SolR Take 4,000 mLs (4 L total) by mouth once. for 1 dose 4000 mL 0    potassium chloride (MICRO-K) 10 MEQ CpSR Take 10 mEq by mouth once.      pravastatin (PRAVACHOL) 40 MG tablet Take 40 mg by mouth once daily.      traMADoL (ULTRAM) 50 mg tablet TAKE 1 TABLET BY MOUTH EVERY 12 HOURS AS NEEDED FOR PAIN 60 tablet 0    amLODIPine (NORVASC) 10 MG tablet Take 1 tablet (10 mg total) by mouth once daily. 90 tablet 1    diclofenac sodium (VOLTAREN) 1 % Gel Apply topically once daily. Apply 2g 100 g 5    gabapentin (NEURONTIN) 800 MG tablet Take 800 mg by mouth 3 (three) times daily.      hydroCHLOROthiazide (HYDRODIURIL) 25 MG tablet Take 1 tablet (25 mg total) by mouth once daily. 90 tablet 1    ketoconazole (NIZORAL) 2 %  "cream Apply topically once daily. Apply to rash for 14 days 15 g 0    omeprazole (PRILOSEC) 40 MG capsule Take 1 capsule (40 mg total) by mouth once daily. 90 capsule 1    traZODone (DESYREL) 100 MG tablet Take 1 tablet (100 mg total) by mouth nightly as needed for Insomnia. 90 tablet 1    [DISCONTINUED] traMADoL (ULTRAM) 50 mg tablet Take 1 tablet (50 mg total) by mouth every 12 (twelve) hours as needed for Pain. 60 tablet 0     No current facility-administered medications on file prior to visit.        Objective:      BP (!) 173/101 (BP Location: Left arm, Patient Position: Sitting, BP Method: Large (Automatic))   Pulse 99   Temp 98.6 °F (37 °C) (Oral)   Ht 5' 4" (1.626 m)   Wt 113.1 kg (249 lb 5.4 oz)   SpO2 98%   BMI 42.80 kg/m²     Exam:  GEN:  Well developed, well nourished.  No acute distress.  Normal pain behavior.  HEENT:  No trauma.  Mucous membranes moist.  Nares patent bilaterally.  PSYCH: Normal affect. Thought content appropriate.  CHEST:  Breathing symmetric.  No audible wheezing.  ABD: Soft, non-distended.  SKIN:  Warm, pink, dry.  No rash on exposed areas.    EXT:  No cyanosis, clubbing, or edema.  No color change or changes in nail or hair growth.  NEURO/MUSCULOSKELETAL:  Fully alert, oriented, and appropriate. Speech normal andriy. No cranial nerve deficits.   Gait:  Antalgic.  No trendelenburg sign bilaterally.   Motor Strength:  5/5 motor strength throughout lower extremities.   Sensory:  No sensory deficit in the lower extremities.   Reflexes:  Unable to elicit bilateral Achilles DTRs, 1 + and symmetric patellar DTRs.  Downgoing Babinski's bilaterally.  No clonus or spasticity.  L-Spine:  Full ROM with pain on extension.  Positive pain with axial/facet loading bilaterally.  Positive SLR on the left.    Positive TTP over left lumbar paraspinals          Imaging:  No pertinent imaging at this time    Assessment:       Encounter Diagnosis   Name Primary?    Back pain, unspecified back " location, unspecified back pain laterality, unspecified chronicity          Plan:       Amanda was seen today for low-back pain, hip pain, leg pain and foot pain.    Diagnoses and all orders for this visit:    Back pain, unspecified back location, unspecified back pain laterality, unspecified chronicity  -     Ambulatory referral/consult to Pain Clinic        Amanda Holt is a 69 y.o. female with chronic left-sided low back and lower extremity pain consistent with left lumbar radiculopathy.  Patient does have a history of previous lumbar fusion.  Also some indications of lumbar facet pain..    1.  X-rays, CT scan, lumbar MRI scheduled.  2.  Return to clinic after imaging studies complete to review results.  May consider lumbar epidural steroid injection.  If any hardware failure noted patient should follow up Neurosurgery as planned.

## 2020-07-20 NOTE — LETTER
July 20, 2020      Azra Kurtz MD  [address]  [facility] [address]           [facility]  [address]  [place] [address]  Phone: [phone]  Fax: [phone]          Patient: Amanda Holt   MR Number: [id number]   YOB: 1950   Date of Visit: 7/20/2020       Dear Dr. Azra Kurtz:    Thank you for referring Amanda Holt to me for evaluation. Attached you will find relevant portions of my assessment and plan of care.    If you have questions, please do not hesitate to call me. I look forward to following Amanda Holt along with you.    Sincerely,    Jun Leavitt Jr., MD    Enclosure  CC:  No Recipients    If you would like to receive this communication electronically, please contact [email or web address] or [phone] to request more information on [employer] Link access.    For providers and/or their staff who would like to refer a patient to [facility], please contact us through our one-stop-shop provider referral line, [facility], at [phone].    If you feel you have received this communication in error or would no longer like to receive these types of communications, please e-mail [email or web address]

## 2020-07-23 ENCOUNTER — HOSPITAL ENCOUNTER (OUTPATIENT)
Facility: HOSPITAL | Age: 70
Discharge: HOME | End: 2020-07-23
Attending: INTERNAL MEDICINE | Admitting: INTERNAL MEDICINE
Payer: MEDICARE

## 2020-07-23 ENCOUNTER — ANESTHESIA (OUTPATIENT)
Dept: ENDOSCOPY | Facility: HOSPITAL | Age: 70
End: 2020-07-23
Payer: MEDICARE

## 2020-07-23 ENCOUNTER — PATIENT MESSAGE (OUTPATIENT)
Dept: FAMILY MEDICINE | Facility: CLINIC | Age: 70
End: 2020-07-23

## 2020-07-23 ENCOUNTER — ANESTHESIA EVENT (OUTPATIENT)
Dept: ENDOSCOPY | Facility: HOSPITAL | Age: 70
End: 2020-07-23
Payer: MEDICARE

## 2020-07-23 VITALS
SYSTOLIC BLOOD PRESSURE: 122 MMHG | TEMPERATURE: 98 F | DIASTOLIC BLOOD PRESSURE: 60 MMHG | HEART RATE: 97 BPM | RESPIRATION RATE: 18 BRPM | OXYGEN SATURATION: 98 %

## 2020-07-23 DIAGNOSIS — Z12.11 COLON CANCER SCREENING: ICD-10-CM

## 2020-07-23 LAB — SARS-COV-2 RDRP RESP QL NAA+PROBE: NEGATIVE

## 2020-07-23 PROCEDURE — 37000008 HC ANESTHESIA 1ST 15 MINUTES: Performed by: INTERNAL MEDICINE

## 2020-07-23 PROCEDURE — D9220A PRA ANESTHESIA: ICD-10-PCS | Mod: PT,ANES,, | Performed by: ANESTHESIOLOGY

## 2020-07-23 PROCEDURE — 25000003 PHARM REV CODE 250: Performed by: INTERNAL MEDICINE

## 2020-07-23 PROCEDURE — D9220A PRA ANESTHESIA: Mod: PT,ANES,, | Performed by: ANESTHESIOLOGY

## 2020-07-23 PROCEDURE — U0002 COVID-19 LAB TEST NON-CDC: HCPCS

## 2020-07-23 PROCEDURE — 27201089 HC SNARE, DISP (ANY): Performed by: INTERNAL MEDICINE

## 2020-07-23 PROCEDURE — D9220A PRA ANESTHESIA: ICD-10-PCS | Mod: PT,CRNA,, | Performed by: NURSE ANESTHETIST, CERTIFIED REGISTERED

## 2020-07-23 PROCEDURE — 37000009 HC ANESTHESIA EA ADD 15 MINS: Performed by: INTERNAL MEDICINE

## 2020-07-23 PROCEDURE — 88305 TISSUE EXAM BY PATHOLOGIST: CPT | Mod: 59 | Performed by: PATHOLOGY

## 2020-07-23 PROCEDURE — 45385 COLONOSCOPY W/LESION REMOVAL: CPT | Mod: PT,,, | Performed by: INTERNAL MEDICINE

## 2020-07-23 PROCEDURE — 45385 PR COLONOSCOPY,REMV LESN,SNARE: ICD-10-PCS | Mod: PT,,, | Performed by: INTERNAL MEDICINE

## 2020-07-23 PROCEDURE — 88305 TISSUE EXAM BY PATHOLOGIST: CPT | Mod: 26,,, | Performed by: PATHOLOGY

## 2020-07-23 PROCEDURE — 88305 TISSUE EXAM BY PATHOLOGIST: ICD-10-PCS | Mod: 26,,, | Performed by: PATHOLOGY

## 2020-07-23 PROCEDURE — D9220A PRA ANESTHESIA: Mod: PT,CRNA,, | Performed by: NURSE ANESTHETIST, CERTIFIED REGISTERED

## 2020-07-23 PROCEDURE — 45385 COLONOSCOPY W/LESION REMOVAL: CPT | Performed by: INTERNAL MEDICINE

## 2020-07-23 PROCEDURE — 63600175 PHARM REV CODE 636 W HCPCS: Performed by: NURSE ANESTHETIST, CERTIFIED REGISTERED

## 2020-07-23 RX ORDER — PROPOFOL 10 MG/ML
VIAL (ML) INTRAVENOUS
Status: DISCONTINUED | OUTPATIENT
Start: 2020-07-23 | End: 2020-07-23

## 2020-07-23 RX ORDER — SODIUM CHLORIDE 9 MG/ML
INJECTION, SOLUTION INTRAVENOUS ONCE
Status: COMPLETED | OUTPATIENT
Start: 2020-07-23 | End: 2020-07-23

## 2020-07-23 RX ORDER — LIDOCAINE HYDROCHLORIDE 20 MG/ML
INJECTION, SOLUTION EPIDURAL; INFILTRATION; INTRACAUDAL; PERINEURAL
Status: DISCONTINUED
Start: 2020-07-23 | End: 2020-07-23 | Stop reason: HOSPADM

## 2020-07-23 RX ORDER — PROPOFOL 10 MG/ML
INJECTION, EMULSION INTRAVENOUS
Status: DISCONTINUED
Start: 2020-07-23 | End: 2020-07-23 | Stop reason: HOSPADM

## 2020-07-23 RX ORDER — LIDOCAINE HYDROCHLORIDE 20 MG/ML
INJECTION INTRAVENOUS
Status: DISCONTINUED | OUTPATIENT
Start: 2020-07-23 | End: 2020-07-23

## 2020-07-23 RX ADMIN — PROPOFOL 50 MG: 10 INJECTION, EMULSION INTRAVENOUS at 11:07

## 2020-07-23 RX ADMIN — PROPOFOL 100 MG: 10 INJECTION, EMULSION INTRAVENOUS at 11:07

## 2020-07-23 RX ADMIN — Medication 75 MG: at 11:07

## 2020-07-23 RX ADMIN — PROPOFOL 30 MG: 10 INJECTION, EMULSION INTRAVENOUS at 11:07

## 2020-07-23 RX ADMIN — SODIUM CHLORIDE: 0.9 INJECTION, SOLUTION INTRAVENOUS at 10:07

## 2020-07-23 NOTE — TRANSFER OF CARE
Anesthesia Transfer of Care Note    Patient: Amanda Holt    Procedure(s) Performed: Procedure(s) (LRB):  COLONOSCOPY (N/A)    Patient location: GI    Anesthesia Type: general    Transport from OR: Transported from OR on room air with adequate spontaneous ventilation    Post pain: adequate analgesia    Post assessment: no apparent anesthetic complications and tolerated procedure well    Post vital signs: stable    Level of consciousness: sedated and responds to stimulation    Nausea/Vomiting: no nausea/vomiting    Complications: none    Transfer of care protocol was followed      Last vitals:   Visit Vitals  BP (!) 96/48 (BP Location: Right arm, Patient Position: Lying)   Pulse 91   Temp 36.4 °C (97.5 °F) (Oral)   Resp 14   SpO2 (!) 94%   Breastfeeding No

## 2020-07-23 NOTE — [employer] PATIENT INSTRUCTIONS
Discharge Summary/Instructions after an Endoscopic Procedure  Patient Name: Amanda Holt  Patient MRN: [id number]  Patient YOB: 1950  Thursday, July 23, 2020  Donal Moore MD  RESTRICTIONS:  During your procedure today, you received medications for sedation.  These   medications may affect your judgment, balance and coordination.  Therefore,   for 24 hours, you have the following restrictions:   - DO NOT drive a car, operate machinery, make legal/financial decisions,   sign important papers or drink alcohol.    ACTIVITY:  Today: no heavy lifting, straining or running due to procedural   sedation/anesthesia.  The following day: return to full activity including work.  DIET:  Eat and drink normally unless instructed otherwise.     TREATMENT FOR COMMON SIDE EFFECTS:  - Mild abdominal pain, nausea, belching, bloating or excessive gas:  rest,   eat lightly and use a heating pad.  - Sore Throat: treat with throat lozenges and/or gargle with warm salt   water.  - Because air was used during the procedure, expelling large amounts of air   from your rectum or belching is normal.  - If a bowel prep was taken, you may not have a bowel movement for 1-3 days.    This is normal.  SYMPTOMS TO WATCH FOR AND REPORT TO YOUR PHYSICIAN:  1. Abdominal pain or bloating, other than gas cramps.  2. Chest pain.  3. Back pain.  4. Signs of infection such as: chills or fever occurring within 24 hours   after the procedure.  5. Rectal bleeding, which would show as bright red, maroon, or black stools.   (A tablespoon of blood from the rectum is not serious, especially if   hemorrhoids are present.)  6. Vomiting.  7. Weakness or dizziness.  GO DIRECTLY TO THE NEAREST EMERGENCY ROOM IF YOU HAVE ANY OF THE FOLLOWING:      Difficulty breathing              Chills and/or fever over 101 F   Persistent vomiting and/or vomiting blood   Severe abdominal pain   Severe chest pain   Black, tarry stools   Bleeding- more than one  tablespoon   Any other symptom or condition that you feel may need urgent attention  Your doctor recommends these additional instructions:  If any biopsies were taken, your doctors clinic will contact you in 1 to 2   weeks with any results.  - Discharge patient to home (with escort).   - Resume previous diet indefinitely.   - Await pathology results.   - Repeat colonoscopy in 3 years for surveillance.   - Return to primary care physician as previously scheduled.  For questions, problems or results please call your physician - Donal Moore MD at Work:  [phone].  [facility] can be reached at [phone]     IF A COMPLICATION OR EMERGENCY SITUATION ARISES AND YOU ARE UNABLE TO REACH   YOUR PHYSICIAN - GO DIRECTLY TO THE EMERGENCY ROOM.  Donal Moore MD  7/23/2020 11:28:40 AM  This report has been verified and signed electronically.  [employer]

## 2020-07-23 NOTE — DISCHARGE INSTRUCTIONS
Diverticulosis    Diverticulosis means that small pouches have formed in the wall of your large intestine (colon). Most often, this problem causes no symptoms and is common as people age. But the pouches in the colon are at risk of becoming infected. When this happens, the condition is called diverticulitis. Although most people with diverticulosis never develop diverticulitis, it is still not uncommon. Rectal bleeding can also occur and in less common situations, a type of colon inflammation called colitis.  While most people do not have symptoms, some people with diverticulosis may have:  · Abdominal cramps and pain  · Bloating  · Constipation  · Change in bowel habits  Causes  The exact cause of diverticulosis (and diverticulitis) has not been proved, but a few things are associated with the condition:  · Low-fiber diet  · Constipation  · Lack of exercise  Your healthcare provider will talk with you about how to manage your condition. Diet changes may be all that are needed to help control diverticulosis and prevent progression to diverticulitis. If you develop diverticulitis, you will likely need other treatments.  Home care  You may be told to take fiber supplements daily. Fiber adds bulk to the stool so that it passes through the colon more easily. Stool softeners may be recommended. You may also be given medications for pain relief. Be sure to take all medications as directed.  In the past, people were told to avoid corn, nuts, and seeds. This is no longer necessary.  Follow these guidelines when caring for yourself at home:  · Eat unprocessed foods that are high in fiber. Whole grains, fruits, and vegetables are good choices.  · Drink 6 to 8 glasses of water every day unless your healthcare provider has you limit how much fluid you should have.  · Watch for changes in your bowel movements. Tell your provider if you notice any changes.  · Begin an exercise program. Ask your provider how to get started.  Generally, walking is the best.  · Get plenty of rest and sleep.  Follow-up care  Follow up with your healthcare provider, or as advised. Regular visits may be needed to check on your health. Sometimes special procedures such as colonoscopy, are needed after an episode of diverticulitis or blooding. Be sure to keep all your appointments.  If a stool sample was taken, or cultures were done, you should be told if they are positive, or if your treatment needs to be changed. You can call as directed for the results.  If X-rays were done, a radiologist will look at them. You will be told if there is a change in your treatment.  If antibiotics were prescribed, be sure to finish them all.  When to seek medical advice  Call your healthcare provider right away if any of these occur:  · Fever of 100.4°F (38°C) or higher, or as directed by your healthcare provider  · Severe cramps in the lower left side of the abdomen or pain that is getting worse  · Tenderness in the lower left side of the abdomen or worsening pain throughout the abdomen  · Diarrhea or constipation that doesn't get better within 24 hours  · Nausea and vomiting  · Bleeding from the rectum  Call 911  Call emergency services if any of the following occur:  · Trouble breathing  · Confusion  · Very drowsy or trouble awakening  · Fainting or loss of consciousness  · Rapid heart rate  · Chest pain  Date Last Reviewed: 12/30/2015 © 2000-2017 [employer]. [address] [place] [address]. All rights reserved. This information is not intended as a substitute for professional medical care. Always follow your healthcare professional's instructions.        High-Fiber Diet  Fiber is in fruits, vegetables, cereals, and grains. Fiber passes through your body undigested. A high-fiber diet helps food move through your intestinal tract. The added bulk is helpful in preventing constipation. In people with diverticulosis, fiber helps clean out the pouches  along the colon wall. It also prevents new pouches from forming. A high-fiber diet reduces the risk of colon cancer. It also lowers blood cholesterol and prevents high blood sugar in people with diabetes.    The fiber-rich foods listed below should be part of your diet. If you are not used to high-fiber foods, start with 1 or 2 foods from this list. Every 3 to 4 days add a new one to your diet. Do this until you are eating 4 high-fiber foods per day. This should give you 20 to 35 grams of fiber a day. It is also important to drink a lot of water when you are on this diet. You should have 6 to 8 glasses of water a day. Water makes the fiber swell and increases the benefit.  Foods high in dietary fiber  The following foods are high in dietary fiber:  · Breads. Breads made with 100% whole-wheat flour; garcia, wheat, or rye crackers; whole-grain tortillas, bran muffins.  · Cereals. Whole-grain and bran cereals with bran (shredded wheat, wheat flakes, raisin bran, corn bran); oatmeal, rolled oats, granola, and brown rice.  · Fruits. Fresh fruits and their edible skins (pears, prunes, raisins, berries, apples, and apricots); bananas, citrus fruit, mangoes, pineapple; and prune juice.  · Nuts. Any nuts and seeds.  · Vegetables. Best served raw or lightly cooked. All types, especially: green peas, celery, eggplant, potatoes, spinach, broccoli, [place] sprouts, winter squash, carrots, cauliflower, soybeans, lentils, and fresh and dried beans of all kinds.  · Other. Popcorn, any spices.  Date Last Reviewed: 8/1/2016  © 3477-2307 [employer]. [address] [place] [address], [place] [address]. All rights reserved. This information is not intended as a substitute for professional medical care. Always follow your healthcare professional's instructions.

## 2020-07-23 NOTE — H&P
Pre-Procedure H&P:  Reason for Procedure: screen    HPI:  Pt is a 69 y.o. female screen    Past Medical History:   Diagnosis Date    Arthralgia     Degenerative disc disease at L5-S1 level     High cholesterol     Hypertension     Nuclear sclerosis of both eyes 1/8/2020    Sciatica     Spinal stenosis        Past Surgical History:   Procedure Laterality Date    BACK SURGERY      lumbar laminectomy    TUBAL LIGATION         Family History   Problem Relation Age of Onset    No Known Problems Mother     Cataracts Father     Glaucoma Father     Glaucoma Sister     No Known Problems Brother     No Known Problems Maternal Aunt     No Known Problems Maternal Uncle     No Known Problems Paternal Aunt     No Known Problems Paternal Uncle     No Known Problems Maternal Grandmother     No Known Problems Maternal Grandfather     No Known Problems Paternal Grandmother     No Known Problems Paternal Grandfather     Amblyopia Neg Hx     Blindness Neg Hx     Cancer Neg Hx     Hypertension Neg Hx     Macular degeneration Neg Hx     Retinal detachment Neg Hx     Strabismus Neg Hx     Stroke Neg Hx     Thyroid disease Neg Hx        Social History     Socioeconomic History    Marital status:      Spouse name: Not on file    Number of children: Not on file    Years of education: Not on file    Highest education level: Not on file   Occupational History    Not on file   Social Needs    Financial resource strain: Hard    Food insecurity     Worry: Sometimes true     Inability: Sometimes true    Transportation needs     Medical: Yes     Non-medical: Yes   Tobacco Use    Smoking status: Current Some Day Smoker     Types: Cigarettes    Smokeless tobacco: Never Used    Tobacco comment: 2 cigarettes/week   Substance and Sexual Activity    Alcohol use: Yes     Frequency: 2-4 times a month     Drinks per session: 1 or 2     Binge frequency: Never     Comment: ocassionally    Drug use: No     Sexual activity: Not Currently   Lifestyle    Physical activity     Days per week: 3 days     Minutes per session: 30 min    Stress: Very much   Relationships    Social connections     Talks on phone: More than three times a week     Gets together: More than three times a week     Attends Roman [employer] service: Not on file     Active member of club or organization: No     Attends meetings of clubs or organizations: Never     Relationship status:    Other Topics Concern    Not on file   Social History Narrative    Not on file       Endoscopic History:      Review of patient's allergies indicates:   Allergen Reactions    Codeine        No current facility-administered medications on file prior to encounter.      Current Outpatient Medications on File Prior to Encounter   Medication Sig Dispense Refill    hydroxyzine HCL (ATARAX) 25 MG tablet Take 1 tablet (25 mg total) by mouth 3 (three) times daily as needed for Itching. 45 tablet 1    INV HYDROCHLOROTHIAZIDE 25MG TABLET       losartan (COZAAR) 100 MG tablet Take 1 tablet (100 mg total) by mouth once daily. 90 tablet 1    pravastatin (PRAVACHOL) 40 MG tablet Take 40 mg by mouth once daily.      amitriptyline (ELAVIL) 50 MG tablet amitriptyline 50 mg tablet  TAKE 1 TABLET BY MOUTH AT BEDTIME 90 tablet 1    amLODIPine (NORVASC) 10 MG tablet Take 1 tablet (10 mg total) by mouth once daily. 90 tablet 1    aspirin (ASPIRIN LOW DOSE) 81 MG EC tablet       diclofenac sodium (VOLTAREN) 1 % Gel Apply topically once daily. Apply 2g 100 g 5    hydroCHLOROthiazide (HYDRODIURIL) 25 MG tablet Take 1 tablet (25 mg total) by mouth once daily. 90 tablet 1    omeprazole (PRILOSEC) 40 MG capsule Take 1 capsule (40 mg total) by mouth once daily. 90 capsule 1    potassium chloride (MICRO-K) 10 MEQ CpSR Take 10 mEq by mouth once.      traZODone (DESYREL) 100 MG tablet Take 1 tablet (100 mg total) by mouth nightly as needed for Insomnia. 90 tablet 1       Current  Facility-Administered Medications:     0.9%  NaCl infusion, , Intravenous, Once, Donal Moore MD    ROS: Negative x 10    Patient Vitals for the past 24 hrs:   BP Temp Temp src Pulse Resp SpO2   07/23/20 1035 (!) 144/81 98.5 °F (36.9 °C) Oral 104 20 98 %       Gen: Well developed, well nourished, no apparent distress  HEENT: Anicteric, PERRLA  CV: S1, S2, no murmers/rubs, non-displaced PMI  Lungs: CTA-B, normal excursion  Abd: Soft, NT, ND, normal BS's, no HSM  Ext: No c/c/e, 1+ DP pulses to BLE's  Neuro: CN II-XII grossly intact, no asterixis.  Skin: No rashes/lesions.  Psych: AA&O x 4    Assessment:  Pt. Is a 69 y.o. female with:  1. screen    Recommendations:  1. Colonoscopy  2. F/U with PCP      I would like to take this opportunity to thank you for this consult.  If you have any questions or concerns, please do not hesitate to contact me.

## 2020-07-24 LAB
FINAL PATHOLOGIC DIAGNOSIS: NORMAL
GROSS: NORMAL

## 2020-07-26 NOTE — ANESTHESIA PREPROCEDURE EVALUATION
07/26/2020  Amanda Holt is a 69 y.o., female.    Anesthesia Evaluation     I have reviewed the Nursing Notes.       Review of Systems  Anesthesia Hx:  No problems with previous Anesthesia   Social:  Smoker    Cardiovascular:   Denies Pacemaker. Hypertension  Denies Valvular problems/Murmurs.  Denies MI.  Denies CAD.    Denies CABG/stent.  Denies Dysrhythmias.   Denies Angina.             denies PVD hyperlipidemia    Pulmonary:   Denies Pneumonia Denies COPD.  Denies Asthma.  Denies Shortness of breath.  Denies Recent URI. Sleep Apnea    Renal/:   Chronic Renal Disease, CRI    Hepatic/GI:   Bowel Prep. Denies PUD. GERD Denies Liver Disease. Denies Hepatitis.    Musculoskeletal:   Arthritis     Neurological:   Denies CVA. Denies Seizures.    Endocrine:  Endocrine Normal        Physical Exam  General:  Morbid Obesity    Airway/Jaw/Neck:  AIRWAY FINDINGS: Normal      Chest/Lungs:  Chest/Lungs Clear    Heart/Vascular:  Heart Findings: Normal       Mental Status:  Mental Status Findings: Normal        Anesthesia Plan  Type of Anesthesia, risks & benefits discussed:  Anesthesia Type:  general  Patient's Preference:   Intra-op Monitoring Plan: standard ASA monitors  Intra-op Monitoring Plan Comments:   Post Op Pain Control Plan:   Post Op Pain Control Plan Comments:   Induction:   IV  Beta Blocker:  Patient is not currently on a Beta-Blocker (No further documentation required).       Informed Consent: Patient understands risks and agrees with Anesthesia plan.  Questions answered. Anesthesia consent signed with patient.  ASA Score: 2     Day of Surgery Review of History & Physical:    H&P update referred to the provider.         Ready For Surgery From Anesthesia Perspective.

## 2020-08-10 ENCOUNTER — HOSPITAL ENCOUNTER (OUTPATIENT)
Dept: RADIOLOGY | Facility: HOSPITAL | Age: 70
Discharge: HOME | End: 2020-08-10
Attending: NEUROLOGICAL SURGERY
Payer: MEDICARE

## 2020-08-10 DIAGNOSIS — M54.9 BACK PAIN, UNSPECIFIED BACK LOCATION, UNSPECIFIED BACK PAIN LATERALITY, UNSPECIFIED CHRONICITY: ICD-10-CM

## 2020-08-10 PROCEDURE — 72082 X-RAY EXAM ENTIRE SPI 2/3 VW: CPT | Mod: 26,,, | Performed by: RADIOLOGY

## 2020-08-10 PROCEDURE — 72148 MRI LUMBAR SPINE WITHOUT CONTRAST: ICD-10-PCS | Mod: 26,,, | Performed by: RADIOLOGY

## 2020-08-10 PROCEDURE — 72082 X-RAY EXAM ENTIRE SPI 2/3 VW: CPT | Mod: TC,FY

## 2020-08-10 PROCEDURE — 72120 XR LUMBAR SPINE FLEXION AND EXTENSION ONLY: ICD-10-PCS | Mod: 26,,, | Performed by: RADIOLOGY

## 2020-08-10 PROCEDURE — 72120 X-RAY BEND ONLY L-S SPINE: CPT | Mod: 26,,, | Performed by: RADIOLOGY

## 2020-08-10 PROCEDURE — 72120 X-RAY BEND ONLY L-S SPINE: CPT | Mod: TC,FY,59

## 2020-08-10 PROCEDURE — 72148 MRI LUMBAR SPINE W/O DYE: CPT | Mod: 26,,, | Performed by: RADIOLOGY

## 2020-08-10 PROCEDURE — 72082 XR SPINE SURVEY AP AND LATERAL: ICD-10-PCS | Mod: 26,,, | Performed by: RADIOLOGY

## 2020-08-10 PROCEDURE — 72148 MRI LUMBAR SPINE W/O DYE: CPT | Mod: TC

## 2020-08-17 RX ORDER — KETOCONAZOLE 20 MG/G
CREAM TOPICAL DAILY
Qty: 15 G | Refills: 0 | Status: SHIPPED | OUTPATIENT
Start: 2020-08-17 | End: 2020-09-03 | Stop reason: SDUPTHER

## 2020-08-17 NOTE — TELEPHONE ENCOUNTER
Can we find out what medication she is requesting so we can see if I can prescribe it?    Simona Torres MD

## 2020-08-17 NOTE — TELEPHONE ENCOUNTER
----- Message from Darci Iyer sent at 8/17/2020 12:16 PM CDT -----   Name of Who is Calling:     What is the request in detail:  patient request call back in reference to want to know if can call  medication into pharmacy for dry patches in crease of arms until patient can see dermatologist on 10/01/20 Please contact to further discuss and advise      Can the clinic reply by [facility]: no    What Number to Call Back if not in [facility]:  [phone]

## 2020-08-21 ENCOUNTER — HOSPITAL ENCOUNTER (OUTPATIENT)
Dept: RADIOLOGY | Facility: HOSPITAL | Age: 70
Discharge: HOME | End: 2020-08-21
Attending: NEUROLOGICAL SURGERY
Payer: MEDICARE

## 2020-08-21 DIAGNOSIS — M54.9 BACK PAIN, UNSPECIFIED BACK LOCATION, UNSPECIFIED BACK PAIN LATERALITY, UNSPECIFIED CHRONICITY: ICD-10-CM

## 2020-08-21 PROCEDURE — 72131 CT LUMBAR SPINE W/O DYE: CPT | Mod: TC

## 2020-08-21 PROCEDURE — 72131 CT LUMBAR SPINE WITHOUT CONTRAST: ICD-10-PCS | Mod: 26,,, | Performed by: RADIOLOGY

## 2020-08-21 PROCEDURE — 72131 CT LUMBAR SPINE W/O DYE: CPT | Mod: 26,,, | Performed by: RADIOLOGY

## 2020-08-24 ENCOUNTER — OFFICE VISIT (OUTPATIENT)
Dept: PAIN MEDICINE | Facility: CLINIC | Age: 70
End: 2020-08-24
Payer: MEDICARE

## 2020-08-24 ENCOUNTER — PATIENT OUTREACH (OUTPATIENT)
Dept: ADMINISTRATIVE | Facility: OTHER | Age: 70
End: 2020-08-24

## 2020-08-24 ENCOUNTER — TELEPHONE (OUTPATIENT)
Dept: PAIN MEDICINE | Facility: CLINIC | Age: 70
End: 2020-08-24

## 2020-08-24 VITALS
SYSTOLIC BLOOD PRESSURE: 127 MMHG | DIASTOLIC BLOOD PRESSURE: 80 MMHG | WEIGHT: 249.13 LBS | TEMPERATURE: 99 F | HEIGHT: 64 IN | OXYGEN SATURATION: 98 % | HEART RATE: 88 BPM | BODY MASS INDEX: 42.53 KG/M2

## 2020-08-24 DIAGNOSIS — M51.36 DDD (DEGENERATIVE DISC DISEASE), LUMBAR: ICD-10-CM

## 2020-08-24 DIAGNOSIS — M54.16 LUMBAR RADICULOPATHY: Primary | ICD-10-CM

## 2020-08-24 DIAGNOSIS — M48.00 SPINAL STENOSIS, UNSPECIFIED SPINAL REGION: ICD-10-CM

## 2020-08-24 PROCEDURE — 99499 RISK ADDL DX/OHS AUDIT: ICD-10-PCS | Mod: S$GLB,,, | Performed by: REGISTERED NURSE

## 2020-08-24 PROCEDURE — 99499 UNLISTED E&M SERVICE: CPT | Mod: S$GLB,,, | Performed by: REGISTERED NURSE

## 2020-08-24 PROCEDURE — 3008F BODY MASS INDEX DOCD: CPT | Mod: CPTII,S$GLB,, | Performed by: REGISTERED NURSE

## 2020-08-24 PROCEDURE — 1125F AMNT PAIN NOTED PAIN PRSNT: CPT | Mod: S$GLB,,, | Performed by: REGISTERED NURSE

## 2020-08-24 PROCEDURE — 99999 PR PBB SHADOW E&M-EST. PATIENT-LVL IV: ICD-10-PCS | Mod: PBBFAC,,, | Performed by: REGISTERED NURSE

## 2020-08-24 PROCEDURE — 99999 PR PBB SHADOW E&M-EST. PATIENT-LVL IV: CPT | Mod: PBBFAC,,, | Performed by: REGISTERED NURSE

## 2020-08-24 PROCEDURE — 1101F PR PT FALLS ASSESS DOC 0-1 FALLS W/OUT INJ PAST YR: ICD-10-PCS | Mod: CPTII,S$GLB,, | Performed by: REGISTERED NURSE

## 2020-08-24 PROCEDURE — 99213 OFFICE O/P EST LOW 20 MIN: CPT | Mod: S$GLB,,, | Performed by: REGISTERED NURSE

## 2020-08-24 PROCEDURE — 1101F PT FALLS ASSESS-DOCD LE1/YR: CPT | Mod: CPTII,S$GLB,, | Performed by: REGISTERED NURSE

## 2020-08-24 PROCEDURE — 3074F SYST BP LT 130 MM HG: CPT | Mod: CPTII,S$GLB,, | Performed by: REGISTERED NURSE

## 2020-08-24 PROCEDURE — 3079F DIAST BP 80-89 MM HG: CPT | Mod: CPTII,S$GLB,, | Performed by: REGISTERED NURSE

## 2020-08-24 PROCEDURE — 3008F PR BODY MASS INDEX (BMI) DOCUMENTED: ICD-10-PCS | Mod: CPTII,S$GLB,, | Performed by: REGISTERED NURSE

## 2020-08-24 PROCEDURE — 1159F PR MEDICATION LIST DOCUMENTED IN MEDICAL RECORD: ICD-10-PCS | Mod: S$GLB,,, | Performed by: REGISTERED NURSE

## 2020-08-24 PROCEDURE — 3074F PR MOST RECENT SYSTOLIC BLOOD PRESSURE < 130 MM HG: ICD-10-PCS | Mod: CPTII,S$GLB,, | Performed by: REGISTERED NURSE

## 2020-08-24 PROCEDURE — 1125F PR PAIN SEVERITY QUANTIFIED, PAIN PRESENT: ICD-10-PCS | Mod: S$GLB,,, | Performed by: REGISTERED NURSE

## 2020-08-24 PROCEDURE — 99213 PR OFFICE/OUTPT VISIT, EST, LEVL III, 20-29 MIN: ICD-10-PCS | Mod: S$GLB,,, | Performed by: REGISTERED NURSE

## 2020-08-24 PROCEDURE — 3079F PR MOST RECENT DIASTOLIC BLOOD PRESSURE 80-89 MM HG: ICD-10-PCS | Mod: CPTII,S$GLB,, | Performed by: REGISTERED NURSE

## 2020-08-24 PROCEDURE — 1159F MED LIST DOCD IN RCRD: CPT | Mod: S$GLB,,, | Performed by: REGISTERED NURSE

## 2020-08-24 RX ORDER — TIZANIDINE HYDROCHLORIDE 2 MG/1
CAPSULE, GELATIN COATED ORAL
COMMUNITY
End: 2020-12-03

## 2020-08-24 RX ORDER — TIZANIDINE 4 MG/1
4 TABLET ORAL EVERY 6 HOURS PRN
COMMUNITY
Start: 2020-08-17 | End: 2020-12-03 | Stop reason: SDUPTHER

## 2020-08-24 NOTE — PROGRESS NOTES
Subjective:     Patient ID: Amanda Holt is a 69 y.o. female    Chief Complaint: Results      Referred by: No ref. provider found      HPI:    Interval History NP (8/24/20):    Pt returns today for follow up and imaging review. She reports an increase in numbness and tingling to the left anterior lower leg and foot. No increased pain. Denies bowel or bladder dysfunction.      Initial Encounter (7/20/20):  Amanda Holt is a 69 y.o. female who presents today with chronic left-sided low back and lower extremity pain.  Status post L4-5 laminectomy and fusion in 2018.  She states that she was doing well for a period of time following her surgery, but she began have this left-sided pain a little over 1 year ago.  The pain is located the left lumbosacral region radiates all the way to the left lateral thigh, lateral leg to the ankle.  She reports associated numbness and tingling this area.  She denies any focal weakness or bowel bladder dysfunction.  Pain is constant worse with activity.  Patient has been evaluated by Neurosurgery and imaging studies were ordered.  These have not been done but are scheduled for next month.  This pain is described in detail below.    Physical Therapy:  Yes.    Non-pharmacologic Treatment:  Rest helps         · TENS?  No    Pain Medications:         · Currently taking:  Tizanidine, tramadol, Voltaren gel, Amitriptyline    · Has tried in the past:  Gabapentin, NSAIDs, Tylenol    · Has not tried:  Opioids, SNRIs, topical creams    Blood thinners:  Aspirin 81 mg a day    Interventional Therapies:  Previous lumbar facet injections    Relevant Surgeries: L4-5 laminectomy and fusion in 2018    Affecting sleep?  Yes    Affecting daily activities? yes    Depressive symptoms? no          · SI/HI? No    Work status: Disabled    Pain Scores:    Best:       1/10  Worst:     10/10  Usually:   10/10  Today:    10/10    Review of Systems   Constitutional: Negative for activity change, appetite  change, chills, fatigue, fever and unexpected weight change.   HENT: Negative for hearing loss.    Eyes: Negative for visual disturbance.   Respiratory: Negative for chest tightness and shortness of breath.    Cardiovascular: Negative for chest pain.   Gastrointestinal: Negative for abdominal pain, constipation, diarrhea, nausea and vomiting.   Genitourinary: Negative for difficulty urinating.   Musculoskeletal: Positive for back pain, gait problem and myalgias. Negative for neck pain.   Skin: Negative for rash.   Neurological: Positive for numbness. Negative for dizziness, weakness, light-headedness and headaches.   Psychiatric/Behavioral: Positive for sleep disturbance. Negative for hallucinations and suicidal ideas. The patient is not nervous/anxious.        Past Medical History:   Diagnosis Date    Arthralgia     Colon polyp     Degenerative disc disease at L5-S1 level     High cholesterol     Hypertension     Nuclear sclerosis of both eyes 1/8/2020    Sciatica     Spinal stenosis        Past Surgical History:   Procedure Laterality Date    BACK SURGERY      lumbar laminectomy    COLONOSCOPY N/A 7/23/2020    Procedure: COLONOSCOPY;  Surgeon: Donal Moore MD;  Location: [facility];  Service: Endoscopy;  Laterality: N/A;    TUBAL LIGATION         Social History     Socioeconomic History    Marital status:      Spouse name: Not on file    Number of children: Not on file    Years of education: Not on file    Highest education level: Not on file   Occupational History    Not on file   Social Needs    Financial resource strain: Hard    Food insecurity     Worry: Sometimes true     Inability: Sometimes true    Transportation needs     Medical: Yes     Non-medical: Yes   Tobacco Use    Smoking status: Current Some Day Smoker     Types: Cigarettes    Smokeless tobacco: Never Used    Tobacco comment: 2 cigarettes/week   Substance and Sexual Activity    Alcohol use: Yes     Frequency:  2-4 times a month     Drinks per session: 1 or 2     Binge frequency: Never     Comment: ocassionally    Drug use: No    Sexual activity: Not Currently   Lifestyle    Physical activity     Days per week: 3 days     Minutes per session: 30 min    Stress: Very much   Relationships    Social connections     Talks on phone: More than three times a week     Gets together: More than three times a week     Attends Buddhist service: Not on file     Active member of club or organization: No     Attends meetings of clubs or organizations: Never     Relationship status:    Other Topics Concern    Not on file   Social History Narrative    Not on file       Review of patient's allergies indicates:   Allergen Reactions    Codeine        Current Outpatient Medications on File Prior to Visit   Medication Sig Dispense Refill    amitriptyline (ELAVIL) 50 MG tablet amitriptyline 50 mg tablet  TAKE 1 TABLET BY MOUTH AT BEDTIME 90 tablet 1    amLODIPine (NORVASC) 10 MG tablet Take 1 tablet (10 mg total) by mouth once daily. 90 tablet 1    aspirin (ASPIRIN LOW DOSE) 81 MG EC tablet       diclofenac sodium (VOLTAREN) 1 % Gel Apply topically once daily. Apply 2g 100 g 5    diphenhydrAMINE (BENADRYL) 25 mg capsule Take 1 capsule (25 mg total) by mouth every 6 (six) hours as needed for Itching. 20 capsule 0    hydroCHLOROthiazide (HYDRODIURIL) 25 MG tablet Take 1 tablet (25 mg total) by mouth once daily. 90 tablet 1    hydrOXYzine HCL (ATARAX) 25 MG tablet TAKE 1 TABLET BY MOUTH THREE TIMES DAILY AS NEEDED FOR  ITCHING 45 tablet 0    INV HYDROCHLOROTHIAZIDE 25MG TABLET       ketoconazole (NIZORAL) 2 % cream Apply topically once daily. Apply to rash for 14 days 15 g 0    losartan (COZAAR) 100 MG tablet Take 1 tablet (100 mg total) by mouth once daily. 90 tablet 1    omeprazole (PRILOSEC) 40 MG capsule Take 1 capsule (40 mg total) by mouth once daily. 90 capsule 1    pravastatin (PRAVACHOL) 40 MG tablet Take 40 mg  by mouth once daily.      tiZANidine (ZANAFLEX) 4 MG tablet Take 4 mg by mouth every 6 (six) hours as needed.      tiZANidine 2 mg Cap       traMADoL (ULTRAM) 50 mg tablet TAKE 1 TABLET BY MOUTH EVERY 12 HOURS AS NEEDED FOR PAIN 60 tablet 0    traZODone (DESYREL) 100 MG tablet Take 1 tablet (100 mg total) by mouth nightly as needed for Insomnia. 90 tablet 1    potassium chloride (MICRO-K) 10 MEQ CpSR Take 10 mEq by mouth once.       No current facility-administered medications on file prior to visit.        Objective:      See flowsheet for vitals.     Exam:  GEN:  Well developed, well nourished.  No acute distress.  Normal pain behavior.  HEENT:  No trauma.  Mucous membranes moist.  Nares patent bilaterally.  PSYCH: Normal affect. Thought content appropriate.  CHEST:  Breathing symmetric.  No audible wheezing.  ABD: Soft, non-distended.  SKIN:  Warm, pink, dry.  No rash on exposed areas.    EXT:  No cyanosis, clubbing, or edema.  No color change or changes in nail or hair growth.  NEURO/MUSCULOSKELETAL:  Fully alert, oriented, and appropriate. Speech normal andriy. No cranial nerve deficits.   Gait:  Antalgic.          Imaging:    Narrative & Impression     EXAMINATION:  XR LUMBAR SPINE FLEXION AND EXTENSION ONLY     CLINICAL HISTORY:  back pain, unspecified back location;  Dorsalgia, unspecified     TECHNIQUE:  Lateral flexion standing and extension standing radiographs of the lumbar spine were obtained.     COMPARISON:  None     FINDINGS:  Patient is status post posterior spinal fusion at the L4-L5 level with metallic tracey and pedicle screws present.  Posterior vertebral alignment appears satisfactory.  No instability is elicited on the flexion and extension radiographs.  There is no evidence for acute fracture or bone destruction.  Atherosclerotic calcification is present within the abdominal aorta.     Impression:     Spinal fusion at the L4-L5 level.  No evidence for hardware failure on this  examination.     Posterior vertebral alignment is satisfactory with no instability elicited on the flexion and extension radiographs.  No appreciable movement noted at the fused L4-L5 level.        Electronically signed by: Galen Victoria MD  Date:                                            08/10/2020  Time:                                           09:46         Narrative & Impression     EXAMINATION:  MRI LUMBAR SPINE WITHOUT CONTRAST     CLINICAL HISTORY:  Dorsalgia, unspecifiedNeurologic Compromise;     TECHNIQUE:  Sagittal T1, sagittal T2, sagittal STIR, axial T1 and axial T2 weighted images of the lumbar spine obtained without contrast.     COMPARISON:  None     Limitations: In this patient with a history of previous lumbar spine surgery, postcontrast enhanced images are necessary to differentiate postsurgical epidural fibrosis from residual or recurrent disc herniation.     FINDINGS:  Lumbar spine alignment is within normal limits. The vertebral body heights are well maintained, with no fracture.  No marrow signal abnormality suspicious for an infiltrative process.     The conus is normal in appearance.  The adjacent soft tissue structures show no significant abnormalities.     L1-L2: There is no focal disc herniation. No significant central canal or neural foraminal narrowing.     L2-L3: There is no focal disc herniation. No significant central canal or neural foraminal narrowing.     L3-L4: There is metallic susceptibility artifact on left related to indwelling left-sided hardware posteriorly.  There is a subtle left paracentral and medial foraminal disc herniation suspected with some superimposed spondylitic spurring and disc bulging.  No central canal stenosis.  There is mild left-sided foraminal stenosis.     L4-L5: Patient is status post laminectomy and posterior fusion with unilateral left-sided hardware producing expected metallic susceptibility artifact.  There is some spondylitic spurring, disc  narrowing and degenerative endplate changes and diffuse disc bulging without focal herniation.  No significant central canal or neural foraminal narrowing.     L5-S1: There is near complete bony ankylosis with a rudimentary L5-S1 disc.  No significant central canal or neural foraminal narrowing.     Impression:     Left paracentral and medial foraminal disc herniation at L3-4.     Status post left L5 laminectomy and instrumentation without complicating features.     This report was flagged in Epic as abnormal.        Electronically signed by: Brian Damon Jr  Date:                                            08/10/2020  Time:                                           09:38         Assessment:       Encounter Diagnoses   Name Primary?    Lumbar radiculopathy Yes    DDD (degenerative disc disease), lumbar     Spinal stenosis, unspecified spinal region          Plan:       Amanda was seen today for results.    Diagnoses and all orders for this visit:    Lumbar radiculopathy  -     Case request GI: Injection, Steroid, Epidural Transforaminal    DDD (degenerative disc disease), lumbar  -     Case request GI: Injection, Steroid, Epidural Transforaminal    Spinal stenosis, unspecified spinal region        Amanda Holt is a 69 y.o. female with chronic left-sided low back and lower extremity pain consistent with left lumbar radiculopathy.  Patient does have a history of previous lumbar fusion.  Also some indications of lumbar facet pain.    1.  Imaging studied reviewed by me with pt. No indications of hardware failure. Left paracentral and medial foraminal disc herniation at L3-4 with additional disk bulge at L4-5. MRI results consistent with symptoms and pt would likely benefit from an epidural injection.  2.  Schedule for L TESI @ L5 and S1. Pt does take aspirin 81mg.   3. Follow up 2 weeks after procedure to discuss results.       KATY Schroeder, FNP-C  [facility]  Interventional  Pain Management

## 2020-08-24 NOTE — PROGRESS NOTES
Health Maintenance Due   Topic Date Due    Sign Pain Contract  11/03/1968    Complete Opioid Risk Tool  11/03/1968    Shingles Vaccine (1 of 2) 11/03/2001    Pneumococcal Vaccine (65+ Low/Medium Risk) (1 of 2 - PCV13) 11/03/2016     Updates were requested from care everywhere.  Chart was reviewed for overdue Proactive [facility] Encounters ([facility]) topics (CRS, Breast Cancer Screening, Eye exam)  Health Maintenance has been updated.  LINKS immunization registry triggered.  Immunizations were reconciled.

## 2020-08-24 NOTE — PROGRESS NOTES
Ms. Holt will be scheduled for Left L5 & S1 TF WADE on 09/09/2020.  Reviewed the pre-procedure instructions listed below with the patient- copy also provided.  Instructed patient to notify clinic if she begins feeling ill, runs a fever, is prescribed antibiotics, and/or has any outpatient procedures within the two weeks leading up to this procedure.  Instructed patient to report to [facility], 2nd Floor Endoscopy Registration Desk.  All questions answered- patient verbalized understanding.     Day of Procedure  - Ensure you have obtained an arrival time from the Pain Management Staff  o Procedure Area will call 1-3 days in advance with your arrival time.  Please check any voicemails received.  o If you arrive past your scheduled procedure time, you may be asked to reschedule your procedure.  - Ensure you have a  with you to remain present throughout your procedure for your safety.  o If you arrive without a responsible adult to stay with you and drive you home, you may be asked to reschedule your procedure.  - Take all of your prescribed medications (exceptions noted below) with a small amount of water  - This is NOT a fasting procedure, you may have a light meal before coming.  - Wear comfortable clothing (loose fitting pants).  - You may wear glasses, dentures, contact lenses, and/or hearing aids.   - Notify the nurse during the intake process if you are allergic to any medications, if you are diabetic, or if you are not feeling well  - Contact the Pain Management Clinic with the following:  o A fever greater than 100° (degrees)   o Feel ill, have any type of infection, or are taking antibiotics now or within the two (2) weeks leading up to this procedure  o Have any outpatient procedures within the two (2) weeks leading up to this procedure (colonoscopy, major dental work, etc.)    Diabetic Patients  - Please check your blood sugar prior to coming in for your procedure.  If the value is  greater than 200, contact the clinic [phone] as the procedure may need to be rescheduled.  The procedure may not be performed if your blood sugar is above 200 even after checking into the hospital.      IF you are taking blood thinners: Only upon receiving clearance and notification from the Pain Management Department  7 Days Prior to Your Procedure:  - Stop taking Plavix/Clopridogrel, Effient/Prasugel, ASA  5 Days Prior to Your Procedure:  - Stop taking Coumadin/Warfarin.  An INR may be drawn prior to your procedure.  If labs are required, you will need to arrive earlier than your scheduled arrival time.  - Stop taking Pradaxa/Dabigatra,  Brilinta/ Ticagrelor  3 Days Prior to Your Procedure:  - Stop taking Xarelto/Rivaroxaban, Eliquis/Apixaban, Aggrenox/Dipyridamole, Reopro/Abciximab

## 2020-08-24 NOTE — H&P (VIEW-ONLY)
Subjective:     Patient ID: Amanda Holt is a 69 y.o. female    Chief Complaint: Results      Referred by: No ref. provider found      HPI:    Interval History NP (8/24/20):    Pt returns today for follow up and imaging review. She reports an increase in numbness and tingling to the left anterior lower leg and foot. No increased pain. Denies bowel or bladder dysfunction.      Initial Encounter (7/20/20):  Amanda Holt is a 69 y.o. female who presents today with chronic left-sided low back and lower extremity pain.  Status post L4-5 laminectomy and fusion in 2018.  She states that she was doing well for a period of time following her surgery, but she began have this left-sided pain a little over 1 year ago.  The pain is located the left lumbosacral region radiates all the way to the left lateral thigh, lateral leg to the ankle.  She reports associated numbness and tingling this area.  She denies any focal weakness or bowel bladder dysfunction.  Pain is constant worse with activity.  Patient has been evaluated by Neurosurgery and imaging studies were ordered.  These have not been done but are scheduled for next month.  This pain is described in detail below.    Physical Therapy:  Yes.    Non-pharmacologic Treatment:  Rest helps         · TENS?  No    Pain Medications:         · Currently taking:  Tizanidine, tramadol, Voltaren gel, Amitriptyline    · Has tried in the past:  Gabapentin, NSAIDs, Tylenol    · Has not tried:  Opioids, SNRIs, topical creams    Blood thinners:  Aspirin 81 mg a day    Interventional Therapies:  Previous lumbar facet injections    Relevant Surgeries: L4-5 laminectomy and fusion in 2018    Affecting sleep?  Yes    Affecting daily activities? yes    Depressive symptoms? no          · SI/HI? No    Work status: Disabled    Pain Scores:    Best:       1/10  Worst:     10/10  Usually:   10/10  Today:    10/10    Review of Systems   Constitutional: Negative for activity change, appetite  change, chills, fatigue, fever and unexpected weight change.   HENT: Negative for hearing loss.    Eyes: Negative for visual disturbance.   Respiratory: Negative for chest tightness and shortness of breath.    Cardiovascular: Negative for chest pain.   Gastrointestinal: Negative for abdominal pain, constipation, diarrhea, nausea and vomiting.   Genitourinary: Negative for difficulty urinating.   Musculoskeletal: Positive for back pain, gait problem and myalgias. Negative for neck pain.   Skin: Negative for rash.   Neurological: Positive for numbness. Negative for dizziness, weakness, light-headedness and headaches.   Psychiatric/Behavioral: Positive for sleep disturbance. Negative for hallucinations and suicidal ideas. The patient is not nervous/anxious.        Past Medical History:   Diagnosis Date    Arthralgia     Colon polyp     Degenerative disc disease at L5-S1 level     High cholesterol     Hypertension     Nuclear sclerosis of both eyes 1/8/2020    Sciatica     Spinal stenosis        Past Surgical History:   Procedure Laterality Date    BACK SURGERY      lumbar laminectomy    COLONOSCOPY N/A 7/23/2020    Procedure: COLONOSCOPY;  Surgeon: Donal Moore MD;  Location: [facility];  Service: Endoscopy;  Laterality: N/A;    TUBAL LIGATION         Social History     Socioeconomic History    Marital status:      Spouse name: Not on file    Number of children: Not on file    Years of education: Not on file    Highest education level: Not on file   Occupational History    Not on file   Social Needs    Financial resource strain: Hard    Food insecurity     Worry: Sometimes true     Inability: Sometimes true    Transportation needs     Medical: Yes     Non-medical: Yes   Tobacco Use    Smoking status: Current Some Day Smoker     Types: Cigarettes    Smokeless tobacco: Never Used    Tobacco comment: 2 cigarettes/week   Substance and Sexual Activity    Alcohol use: Yes     Frequency:  2-4 times a month     Drinks per session: 1 or 2     Binge frequency: Never     Comment: ocassionally    Drug use: No    Sexual activity: Not Currently   Lifestyle    Physical activity     Days per week: 3 days     Minutes per session: 30 min    Stress: Very much   Relationships    Social connections     Talks on phone: More than three times a week     Gets together: More than three times a week     Attends Jain service: Not on file     Active member of club or organization: No     Attends meetings of clubs or organizations: Never     Relationship status:    Other Topics Concern    Not on file   Social History Narrative    Not on file       Review of patient's allergies indicates:   Allergen Reactions    Codeine        Current Outpatient Medications on File Prior to Visit   Medication Sig Dispense Refill    amitriptyline (ELAVIL) 50 MG tablet amitriptyline 50 mg tablet  TAKE 1 TABLET BY MOUTH AT BEDTIME 90 tablet 1    amLODIPine (NORVASC) 10 MG tablet Take 1 tablet (10 mg total) by mouth once daily. 90 tablet 1    aspirin (ASPIRIN LOW DOSE) 81 MG EC tablet       diclofenac sodium (VOLTAREN) 1 % Gel Apply topically once daily. Apply 2g 100 g 5    diphenhydrAMINE (BENADRYL) 25 mg capsule Take 1 capsule (25 mg total) by mouth every 6 (six) hours as needed for Itching. 20 capsule 0    hydroCHLOROthiazide (HYDRODIURIL) 25 MG tablet Take 1 tablet (25 mg total) by mouth once daily. 90 tablet 1    hydrOXYzine HCL (ATARAX) 25 MG tablet TAKE 1 TABLET BY MOUTH THREE TIMES DAILY AS NEEDED FOR  ITCHING 45 tablet 0    INV HYDROCHLOROTHIAZIDE 25MG TABLET       ketoconazole (NIZORAL) 2 % cream Apply topically once daily. Apply to rash for 14 days 15 g 0    losartan (COZAAR) 100 MG tablet Take 1 tablet (100 mg total) by mouth once daily. 90 tablet 1    omeprazole (PRILOSEC) 40 MG capsule Take 1 capsule (40 mg total) by mouth once daily. 90 capsule 1    pravastatin (PRAVACHOL) 40 MG tablet Take 40 mg  by mouth once daily.      tiZANidine (ZANAFLEX) 4 MG tablet Take 4 mg by mouth every 6 (six) hours as needed.      tiZANidine 2 mg Cap       traMADoL (ULTRAM) 50 mg tablet TAKE 1 TABLET BY MOUTH EVERY 12 HOURS AS NEEDED FOR PAIN 60 tablet 0    traZODone (DESYREL) 100 MG tablet Take 1 tablet (100 mg total) by mouth nightly as needed for Insomnia. 90 tablet 1    potassium chloride (MICRO-K) 10 MEQ CpSR Take 10 mEq by mouth once.       No current facility-administered medications on file prior to visit.        Objective:      See flowsheet for vitals.     Exam:  GEN:  Well developed, well nourished.  No acute distress.  Normal pain behavior.  HEENT:  No trauma.  Mucous membranes moist.  Nares patent bilaterally.  PSYCH: Normal affect. Thought content appropriate.  CHEST:  Breathing symmetric.  No audible wheezing.  ABD: Soft, non-distended.  SKIN:  Warm, pink, dry.  No rash on exposed areas.    EXT:  No cyanosis, clubbing, or edema.  No color change or changes in nail or hair growth.  NEURO/MUSCULOSKELETAL:  Fully alert, oriented, and appropriate. Speech normal andriy. No cranial nerve deficits.   Gait:  Antalgic.          Imaging:    Narrative & Impression     EXAMINATION:  XR LUMBAR SPINE FLEXION AND EXTENSION ONLY     CLINICAL HISTORY:  back pain, unspecified back location;  Dorsalgia, unspecified     TECHNIQUE:  Lateral flexion standing and extension standing radiographs of the lumbar spine were obtained.     COMPARISON:  None     FINDINGS:  Patient is status post posterior spinal fusion at the L4-L5 level with metallic tracey and pedicle screws present.  Posterior vertebral alignment appears satisfactory.  No instability is elicited on the flexion and extension radiographs.  There is no evidence for acute fracture or bone destruction.  Atherosclerotic calcification is present within the abdominal aorta.     Impression:     Spinal fusion at the L4-L5 level.  No evidence for hardware failure on this  examination.     Posterior vertebral alignment is satisfactory with no instability elicited on the flexion and extension radiographs.  No appreciable movement noted at the fused L4-L5 level.        Electronically signed by: Galen Victoria MD  Date:                                            08/10/2020  Time:                                           09:46         Narrative & Impression     EXAMINATION:  MRI LUMBAR SPINE WITHOUT CONTRAST     CLINICAL HISTORY:  Dorsalgia, unspecifiedNeurologic Compromise;     TECHNIQUE:  Sagittal T1, sagittal T2, sagittal STIR, axial T1 and axial T2 weighted images of the lumbar spine obtained without contrast.     COMPARISON:  None     Limitations: In this patient with a history of previous lumbar spine surgery, postcontrast enhanced images are necessary to differentiate postsurgical epidural fibrosis from residual or recurrent disc herniation.     FINDINGS:  Lumbar spine alignment is within normal limits. The vertebral body heights are well maintained, with no fracture.  No marrow signal abnormality suspicious for an infiltrative process.     The conus is normal in appearance.  The adjacent soft tissue structures show no significant abnormalities.     L1-L2: There is no focal disc herniation. No significant central canal or neural foraminal narrowing.     L2-L3: There is no focal disc herniation. No significant central canal or neural foraminal narrowing.     L3-L4: There is metallic susceptibility artifact on left related to indwelling left-sided hardware posteriorly.  There is a subtle left paracentral and medial foraminal disc herniation suspected with some superimposed spondylitic spurring and disc bulging.  No central canal stenosis.  There is mild left-sided foraminal stenosis.     L4-L5: Patient is status post laminectomy and posterior fusion with unilateral left-sided hardware producing expected metallic susceptibility artifact.  There is some spondylitic spurring, disc  narrowing and degenerative endplate changes and diffuse disc bulging without focal herniation.  No significant central canal or neural foraminal narrowing.     L5-S1: There is near complete bony ankylosis with a rudimentary L5-S1 disc.  No significant central canal or neural foraminal narrowing.     Impression:     Left paracentral and medial foraminal disc herniation at L3-4.     Status post left L5 laminectomy and instrumentation without complicating features.     This report was flagged in Epic as abnormal.        Electronically signed by: Brian Damon Jr  Date:                                            08/10/2020  Time:                                           09:38         Assessment:       Encounter Diagnoses   Name Primary?    Lumbar radiculopathy Yes    DDD (degenerative disc disease), lumbar     Spinal stenosis, unspecified spinal region          Plan:       Amanda was seen today for results.    Diagnoses and all orders for this visit:    Lumbar radiculopathy  -     Case request GI: Injection, Steroid, Epidural Transforaminal    DDD (degenerative disc disease), lumbar  -     Case request GI: Injection, Steroid, Epidural Transforaminal    Spinal stenosis, unspecified spinal region        Amanda Holt is a 69 y.o. female with chronic left-sided low back and lower extremity pain consistent with left lumbar radiculopathy.  Patient does have a history of previous lumbar fusion.  Also some indications of lumbar facet pain.    1.  Imaging studied reviewed by me with pt. No indications of hardware failure. Left paracentral and medial foraminal disc herniation at L3-4 with additional disk bulge at L4-5. MRI results consistent with symptoms and pt would likely benefit from an epidural injection.  2.  Schedule for L TESI @ L5 and S1. Pt does take aspirin 81mg.   3. Follow up 2 weeks after procedure to discuss results.       KATY Schroeder, FNP-C  [facility]  Interventional  Pain Management

## 2020-08-24 NOTE — TELEPHONE ENCOUNTER
----- Message from Epifanio Calhoun MD sent at 8/24/2020 11:09 AM CDT -----  Regarding: ASA  Patient can hold her aspirin for 7 days prior to procedure.  She is low risk.  Can proceed.

## 2020-08-25 ENCOUNTER — OFFICE VISIT (OUTPATIENT)
Dept: NEUROSURGERY | Facility: CLINIC | Age: 70
End: 2020-08-25
Payer: MEDICARE

## 2020-08-25 DIAGNOSIS — M54.9 BACK PAIN, UNSPECIFIED BACK LOCATION, UNSPECIFIED BACK PAIN LATERALITY, UNSPECIFIED CHRONICITY: Primary | ICD-10-CM

## 2020-08-25 PROCEDURE — 99214 OFFICE O/P EST MOD 30 MIN: CPT | Mod: 95,,, | Performed by: NEUROLOGICAL SURGERY

## 2020-08-25 PROCEDURE — 1101F PR PT FALLS ASSESS DOC 0-1 FALLS W/OUT INJ PAST YR: ICD-10-PCS | Mod: CPTII,95,, | Performed by: NEUROLOGICAL SURGERY

## 2020-08-25 PROCEDURE — 1101F PT FALLS ASSESS-DOCD LE1/YR: CPT | Mod: CPTII,95,, | Performed by: NEUROLOGICAL SURGERY

## 2020-08-25 PROCEDURE — 99214 PR OFFICE/OUTPT VISIT, EST, LEVL IV, 30-39 MIN: ICD-10-PCS | Mod: 95,,, | Performed by: NEUROLOGICAL SURGERY

## 2020-08-25 PROCEDURE — 1159F MED LIST DOCD IN RCRD: CPT | Mod: 95,,, | Performed by: NEUROLOGICAL SURGERY

## 2020-08-25 PROCEDURE — 1159F PR MEDICATION LIST DOCUMENTED IN MEDICAL RECORD: ICD-10-PCS | Mod: 95,,, | Performed by: NEUROLOGICAL SURGERY

## 2020-08-25 NOTE — PROGRESS NOTES
Neurosurgery  Established Patient    SUBJECTIVE:     History of Present Illness:  61-year-old female with a history hypertension, coronary artery disease, hypokalemia and back pain for several years.  She is status post previous L4-5 laminectomy and fusion in 2018.  She notes now she has back pain with pain going down the left lateral aspect of the leg.  She notes the pain is 10/10 in severity, and both her back and her legs.  She notes she has had this pain since April of 2019.  She had her previous L4-5 laminectomy and fusion in October of 2018.  She notes that she had remained pain free after having back pain bilateral leg pain going down the posterior aspect of both legs prior to surgery.  And had improved initially after surgery.  She notes the pain is primarily in her left leg goes on lateral aspect of her leg.  She notes that she is able to walk about 10 or 15 ft and the pain is much more pronounced.  She notes that her pain is also worsened when sitting.  She notes that lying on her right side somewhat improves the pain.  She has previously had 1% lidocaine cream which reported her with some relief.  She denies any jhony weakness in the bilateral lower extremities.  She denies any weakness in the upper extremities.  She denies any bowel or bladder incontinence.  She denies any chest pain, shortness of breath, nausea, vomiting, or emesis.  She denies any perirectal anesthesia.  She denies any genital anesthesia.  History was garnered over a audio visual virtual   visit.    Interval history change:  Patient notes that she continues to have pain which is primarily 10/10 in severity with motion.  She notes that her pain decreases to 6/10 when she is sitting down or lying recumbent leaning forward.  She does not have any significant alleviating factors other than decreasing motion.  She notes that when she is standing for extended periods of time, or walking for extended period of time that dramatically increases  her pain.  She denies any jhony weakness in lower extremities he denies any low bowel or bladder incontinence.  She notes that she has not been able to see the pain clinic on a regular basis, or physical therapy, secondary to quarantine.  She also has not undergone EMG nerve conduction study.  She has an appointment to see the pain physicians in the next coming weeks.    Review of patient's allergies indicates:   Allergen Reactions    Codeine        Current Outpatient Medications   Medication Sig Dispense Refill    amitriptyline (ELAVIL) 50 MG tablet amitriptyline 50 mg tablet  TAKE 1 TABLET BY MOUTH AT BEDTIME 90 tablet 1    amLODIPine (NORVASC) 10 MG tablet Take 1 tablet (10 mg total) by mouth once daily. 90 tablet 1    aspirin (ASPIRIN LOW DOSE) 81 MG EC tablet       diclofenac sodium (VOLTAREN) 1 % Gel Apply topically once daily. Apply 2g 100 g 5    diphenhydrAMINE (BENADRYL) 25 mg capsule Take 1 capsule (25 mg total) by mouth every 6 (six) hours as needed for Itching. 20 capsule 0    hydroCHLOROthiazide (HYDRODIURIL) 25 MG tablet Take 1 tablet (25 mg total) by mouth once daily. 90 tablet 1    hydrOXYzine HCL (ATARAX) 25 MG tablet TAKE 1 TABLET BY MOUTH THREE TIMES DAILY AS NEEDED FOR  ITCHING 45 tablet 0    INV HYDROCHLOROTHIAZIDE 25MG TABLET       ketoconazole (NIZORAL) 2 % cream Apply topically once daily. Apply to rash for 14 days 15 g 0    losartan (COZAAR) 100 MG tablet Take 1 tablet (100 mg total) by mouth once daily. 90 tablet 1    omeprazole (PRILOSEC) 40 MG capsule Take 1 capsule (40 mg total) by mouth once daily. 90 capsule 1    potassium chloride (MICRO-K) 10 MEQ CpSR Take 10 mEq by mouth once.      pravastatin (PRAVACHOL) 40 MG tablet Take 40 mg by mouth once daily.      tiZANidine (ZANAFLEX) 4 MG tablet Take 4 mg by mouth every 6 (six) hours as needed.      tiZANidine 2 mg Cap       traMADoL (ULTRAM) 50 mg tablet TAKE 1 TABLET BY MOUTH EVERY 12 HOURS AS NEEDED FOR PAIN 60 tablet 0     traZODone (DESYREL) 100 MG tablet Take 1 tablet (100 mg total) by mouth nightly as needed for Insomnia. 90 tablet 1     No current facility-administered medications for this visit.        Past Medical History:   Diagnosis Date    Arthralgia     Colon polyp     Degenerative disc disease at L5-S1 level     High cholesterol     Hypertension     Nuclear sclerosis of both eyes 1/8/2020    Sciatica     Spinal stenosis      Past Surgical History:   Procedure Laterality Date    BACK SURGERY      lumbar laminectomy    COLONOSCOPY N/A 7/23/2020    Procedure: COLONOSCOPY;  Surgeon: Donal Moore MD;  Location: [facility];  Service: Endoscopy;  Laterality: N/A;    TUBAL LIGATION       Family History     Problem Relation (Age of Onset)    Cataracts Father    Glaucoma Father, Sister    No Known Problems Mother, Brother, Maternal Aunt, Maternal Uncle, Paternal Aunt, Paternal Uncle, Maternal Grandmother, Maternal Grandfather, Paternal Grandmother, Paternal Grandfather        Social History     Socioeconomic History    Marital status:      Spouse name: Not on file    Number of children: Not on file    Years of education: Not on file    Highest education level: Not on file   Occupational History    Not on file   Social Needs    Financial resource strain: Hard    Food insecurity     Worry: Sometimes true     Inability: Sometimes true    Transportation needs     Medical: Yes     Non-medical: Yes   Tobacco Use    Smoking status: Current Some Day Smoker     Types: Cigarettes    Smokeless tobacco: Never Used    Tobacco comment: 2 cigarettes/week   Substance and Sexual Activity    Alcohol use: Yes     Frequency: 2-4 times a month     Drinks per session: 1 or 2     Binge frequency: Never     Comment: ocassionally    Drug use: No    Sexual activity: Not Currently   Lifestyle    Physical activity     Days per week: 3 days     Minutes per session: 30 min    Stress: Very much   Relationships    Social  connections     Talks on phone: More than three times a week     Gets together: More than three times a week     Attends [employer] service: Not on file     Active member of club or organization: No     Attends meetings of clubs or organizations: Never     Relationship status:    Other Topics Concern    Not on file   Social History Narrative    Not on file       Review of Systems   Constitutional: Positive for diaphoresis.   Musculoskeletal: Positive for back pain.       OBJECTIVE:     Vital Signs     There is no height or weight on file to calculate BMI.    Neurosurgery Physical Exam  On virtual audio visual visit  Head appears to be normocephalic and atraumatic  Neck is peers to be supple  No labored breathing can be ascertain  Abdomen appears to be soft  Patient was all extremities    Speech is fluent goal directed without any dysarthria  Face appears to be symmetric  Tongue appears to be midline  Extraocular muscles are grossly intact  Unable to assess pupillary exam  Unable to assess for palate left.  Motor exam patient exhibits normal bulk and tone in the upper lower extremities  Unable to assess for motor function      Diagnostic Results:  I personally reviewed patient's Diagnostic Imaging.  CT of the lumbar spine which shows previous L4-L5 pedicle screws unilaterally on the left.  There also appears to be gas at the L4-5 disc space, evidence of a previous laminectomy at the same level.  There also appears to be unilateral gas in the L4-5 facet there is hypertrophic bone growth at L4-L5 and S1 on the left.  On flexion to on flexion-extension films of the lumbar spine there is no gross malalignment there does appear to be some minor movement at the L4-5 vertebral bodies approximately 2 mm.  Standing scoliosis films does reveal a S VA of 7.8 cm.  Thoracic kyphosis of 23.9°, a lumbar lordosis of 41°, sacral slope of 34.9° and measured pelvic tilt of 23.5°, the measured pelvic incidence of 59.6°.  There  does appear to be some retro version of the pelvis with respect to lumbar lordosis and mildly increased pelvic tilt.  MRI lumbar spine with foraminal stenosis at L4-5, no significant canal stenosis there is some mild adjacent level disease at L3-4 which can be appreciated on the MRI.  And there is some stenosis in the lateral gutter at L3-4 and L4-5.    ASSESSMENT/PLAN:     69-year-old female with a history of L4-5 pedicle screw placement unilaterally, with persistent back and leg pain.    1.  Unable to assess patient's motor function on the virtual visit.  Patient denies any weakness with motor function.  She had primarily endorses pain.  2.  MRI lumbar spine without significant stenosis there is some evidence of neural foraminal stenosis at L3-4 and L4-5.  There is some global malalignment on the standing scoliosis films with an SVA of 7.8 cm.  Sacral slope of 34.9° and a pelvic tilt of 23.5°, and a measured pelvic incidence of 59.6°.  Thoracic kyphosis of 24°, and a lumbar lordosis a 41°.  There is also lucency around the unilateral screws on the left primarily at the L5 screw.  No gross instability on flexion-extension there is some mild movement at L4-5 approximately 2 mm.  3.  Had long discussion with patient regarding her imaging.  Would still recommend an EMG nerve conduction study.  Noted the patient that would recommend exhausting all conservative treatment particular the pain clinic given that she has not received any treatment from Pain Clinic, nor has she done physical therapy.  She is scheduled for treatment for epidural steroid injections in the next coming weeks.  Noted the patient that any surgical treatment would likely be extensive with interbody fusions at the index level L4-5 meeting osteotomies likely adjacent level fusion at L3-4 and extension into the pelvis.  Patient wished to consist continue with conservative treatment at this particular time and we will revisit on an as-needed  basis.    Thank you for allowing me to participate in the care of this patient.  Please feel free to call any questions, comments, concerns.    Azra Kurtz MD,MSc  Department of Neurosurgery   [facility]    [facility]   [employer] [facility] [employer] [facility] [employer] [facility] [employer]    The patient location is: Home  The chief complaint leading to consultation is:  back pain, leg pain   Visit type: audiovisual  Total time spent with patient:  45 min     Each patient to whom he or she provides medical services by telemedicine is:  (1) informed of the relationship between the physician and patient and the respective role of any other health care provider with respect to management of the patient; and (2) notified that he or she may decline to receive medical services by telemedicine and may withdraw from such care at any time.    Note dictated with voice recognition software, please excuse any grammatical errors.

## 2020-08-26 ENCOUNTER — TELEPHONE (OUTPATIENT)
Dept: FAMILY MEDICINE | Facility: CLINIC | Age: 70
End: 2020-08-26

## 2020-08-26 NOTE — TELEPHONE ENCOUNTER
We can offer her an appt if she does not want to wait until her visit in September    Simona Torres MD

## 2020-08-26 NOTE — TELEPHONE ENCOUNTER
----- Message from Mallory Patel sent at 8/25/2020  3:29 PM CDT -----  Type: Patient Call Back    Who called: Self     What is the request in detail: calling in regards to getting handicapped sticker papers signed by     Can the clinic reply by [facility]? Call back     Would the patient rather a call back or a response via [facility]? Call back     Best call back number: [phone]

## 2020-09-01 ENCOUNTER — TELEPHONE (OUTPATIENT)
Dept: PAIN MEDICINE | Facility: CLINIC | Age: 70
End: 2020-09-01

## 2020-09-01 DIAGNOSIS — Z01.818 PRE-OP TESTING: Primary | ICD-10-CM

## 2020-09-01 NOTE — TELEPHONE ENCOUNTER
Reviewed pre-procedure instructions with Ms. Patel, as well as provided arrival time of 1300p.  COVID test scheduled on 9/6/2020 at 1130a at the [facility] on [facility].  All questions answered- patient verbalized understanding.    Informed her that today would be the last dose of ASA until after the procedure.

## 2020-09-03 ENCOUNTER — OFFICE VISIT (OUTPATIENT)
Dept: FAMILY MEDICINE | Facility: CLINIC | Age: 70
End: 2020-09-03
Payer: MEDICARE

## 2020-09-03 VITALS
DIASTOLIC BLOOD PRESSURE: 78 MMHG | HEART RATE: 110 BPM | WEIGHT: 246.5 LBS | BODY MASS INDEX: 42.08 KG/M2 | TEMPERATURE: 99 F | SYSTOLIC BLOOD PRESSURE: 122 MMHG | HEIGHT: 64 IN | OXYGEN SATURATION: 96 %

## 2020-09-03 DIAGNOSIS — M48.00 SPINAL STENOSIS, UNSPECIFIED SPINAL REGION: ICD-10-CM

## 2020-09-03 DIAGNOSIS — M54.16 LUMBAR RADICULOPATHY: ICD-10-CM

## 2020-09-03 DIAGNOSIS — I10 HYPERTENSION, UNSPECIFIED TYPE: ICD-10-CM

## 2020-09-03 DIAGNOSIS — B35.9 TINEA: ICD-10-CM

## 2020-09-03 DIAGNOSIS — M51.36 DDD (DEGENERATIVE DISC DISEASE), LUMBAR: Primary | ICD-10-CM

## 2020-09-03 DIAGNOSIS — N18.30 CKD (CHRONIC KIDNEY DISEASE) STAGE 3, GFR 30-59 ML/MIN: ICD-10-CM

## 2020-09-03 DIAGNOSIS — F41.9 ANXIETY: ICD-10-CM

## 2020-09-03 DIAGNOSIS — E87.6 LOW BLOOD POTASSIUM: ICD-10-CM

## 2020-09-03 PROBLEM — M47.816 LUMBAR FACET ARTHROPATHY: Status: ACTIVE | Noted: 2017-10-31

## 2020-09-03 PROBLEM — N18.9 CKD (CHRONIC KIDNEY DISEASE): Status: ACTIVE | Noted: 2019-05-13

## 2020-09-03 PROCEDURE — 99499 RISK ADDL DX/OHS AUDIT: ICD-10-PCS | Mod: S$GLB,,, | Performed by: FAMILY MEDICINE

## 2020-09-03 PROCEDURE — 1159F MED LIST DOCD IN RCRD: CPT | Mod: S$GLB,,, | Performed by: FAMILY MEDICINE

## 2020-09-03 PROCEDURE — 99499 UNLISTED E&M SERVICE: CPT | Mod: S$GLB,,, | Performed by: FAMILY MEDICINE

## 2020-09-03 PROCEDURE — 3074F PR MOST RECENT SYSTOLIC BLOOD PRESSURE < 130 MM HG: ICD-10-PCS | Mod: CPTII,S$GLB,, | Performed by: FAMILY MEDICINE

## 2020-09-03 PROCEDURE — 3008F PR BODY MASS INDEX (BMI) DOCUMENTED: ICD-10-PCS | Mod: CPTII,S$GLB,, | Performed by: FAMILY MEDICINE

## 2020-09-03 PROCEDURE — 1101F PR PT FALLS ASSESS DOC 0-1 FALLS W/OUT INJ PAST YR: ICD-10-PCS | Mod: CPTII,S$GLB,, | Performed by: FAMILY MEDICINE

## 2020-09-03 PROCEDURE — 3078F DIAST BP <80 MM HG: CPT | Mod: CPTII,S$GLB,, | Performed by: FAMILY MEDICINE

## 2020-09-03 PROCEDURE — 1159F PR MEDICATION LIST DOCUMENTED IN MEDICAL RECORD: ICD-10-PCS | Mod: S$GLB,,, | Performed by: FAMILY MEDICINE

## 2020-09-03 PROCEDURE — 1125F AMNT PAIN NOTED PAIN PRSNT: CPT | Mod: S$GLB,,, | Performed by: FAMILY MEDICINE

## 2020-09-03 PROCEDURE — 99214 OFFICE O/P EST MOD 30 MIN: CPT | Mod: S$GLB,,, | Performed by: FAMILY MEDICINE

## 2020-09-03 PROCEDURE — 3078F PR MOST RECENT DIASTOLIC BLOOD PRESSURE < 80 MM HG: ICD-10-PCS | Mod: CPTII,S$GLB,, | Performed by: FAMILY MEDICINE

## 2020-09-03 PROCEDURE — 1101F PT FALLS ASSESS-DOCD LE1/YR: CPT | Mod: CPTII,S$GLB,, | Performed by: FAMILY MEDICINE

## 2020-09-03 PROCEDURE — 99999 PR PBB SHADOW E&M-EST. PATIENT-LVL IV: ICD-10-PCS | Mod: PBBFAC,,, | Performed by: FAMILY MEDICINE

## 2020-09-03 PROCEDURE — 1125F PR PAIN SEVERITY QUANTIFIED, PAIN PRESENT: ICD-10-PCS | Mod: S$GLB,,, | Performed by: FAMILY MEDICINE

## 2020-09-03 PROCEDURE — 3074F SYST BP LT 130 MM HG: CPT | Mod: CPTII,S$GLB,, | Performed by: FAMILY MEDICINE

## 2020-09-03 PROCEDURE — 3008F BODY MASS INDEX DOCD: CPT | Mod: CPTII,S$GLB,, | Performed by: FAMILY MEDICINE

## 2020-09-03 PROCEDURE — 99999 PR PBB SHADOW E&M-EST. PATIENT-LVL IV: CPT | Mod: PBBFAC,,, | Performed by: FAMILY MEDICINE

## 2020-09-03 PROCEDURE — 99214 PR OFFICE/OUTPT VISIT, EST, LEVL IV, 30-39 MIN: ICD-10-PCS | Mod: S$GLB,,, | Performed by: FAMILY MEDICINE

## 2020-09-03 RX ORDER — TRAZODONE HYDROCHLORIDE 100 MG/1
100 TABLET ORAL NIGHTLY PRN
Qty: 90 TABLET | Refills: 1 | Status: SHIPPED | OUTPATIENT
Start: 2020-09-03 | End: 2020-12-03 | Stop reason: SDUPTHER

## 2020-09-03 RX ORDER — KETOCONAZOLE 20 MG/G
CREAM TOPICAL DAILY
Qty: 15 G | Refills: 0 | Status: SHIPPED | OUTPATIENT
Start: 2020-09-03 | End: 2020-12-03

## 2020-09-03 RX ORDER — SPIRONOLACTONE 25 MG/1
25 TABLET ORAL DAILY
Qty: 90 TABLET | Refills: 1 | Status: SHIPPED | OUTPATIENT
Start: 2020-09-03 | End: 2021-10-21 | Stop reason: SDUPTHER

## 2020-09-03 RX ORDER — AMLODIPINE BESYLATE 10 MG/1
10 TABLET ORAL DAILY
Qty: 90 TABLET | Refills: 1 | Status: SHIPPED | OUTPATIENT
Start: 2020-09-03 | End: 2020-12-03 | Stop reason: SDUPTHER

## 2020-09-03 RX ORDER — TRAMADOL HYDROCHLORIDE 50 MG/1
TABLET ORAL
Qty: 60 TABLET | Refills: 2 | Status: SHIPPED | OUTPATIENT
Start: 2020-09-03 | End: 2020-12-03 | Stop reason: SDUPTHER

## 2020-09-03 RX ORDER — HYDROCHLOROTHIAZIDE 25 MG/1
25 TABLET ORAL DAILY
Qty: 90 TABLET | Refills: 1 | Status: CANCELLED | OUTPATIENT
Start: 2020-09-03 | End: 2020-12-02

## 2020-09-03 NOTE — PROGRESS NOTES
Chief Complaint   Patient presents with    Chronic Pain       HPI  Amanda Holt is a 69 y.o. female with multiple medical diagnoses as listed in the medical history and problem list that presents for follow-up for chronic pain    Chronic pain  She has been scheduled for pain management for WADE as she has seen neurosurgery virtually and has declined surgery at this time  Continue ultram and zanaflex. She is still having numbness in her legs radiating from her back along with cramps she has had her potassium checked and it was elevated so her kidney doctor stopped her potassium she would like a  Handicapped tag for her back pain    HPI    Patient Active Problem List   Diagnosis    Contact lens overwear of both eyes    Refractive error    Nuclear sclerosis of both eyes    Spinal stenosis    Hypertension    Obstructive sleep apnea syndrome    Gastroesophageal reflux disease without esophagitis    CKD (chronic kidney disease)    Chronic low back pain    Bilateral hearing loss    DDD (degenerative disc disease), lumbar    CTS (carpal tunnel syndrome)    Anxiety    Asymptomatic microscopic hematuria    Lumbar radiculopathy    Lumbar facet arthropathy    Colon cancer screening         ROS  Review of Systems   Constitutional: Negative for chills, diaphoresis, fatigue, fever and unexpected weight change.   HENT: Negative for rhinorrhea, sinus pressure, sore throat and tinnitus.    Eyes: Negative for photophobia and visual disturbance.   Respiratory: Negative for cough, shortness of breath and wheezing.    Cardiovascular: Negative for chest pain and palpitations.   Gastrointestinal: Negative for abdominal pain, blood in stool, constipation, diarrhea, nausea and vomiting.   Genitourinary: Negative for dysuria, flank pain, frequency and vaginal discharge.   Musculoskeletal: Positive for arthralgias and back pain. Negative for joint swelling.   Skin: Negative for rash.   Neurological: Negative for speech  "difficulty, weakness, light-headedness and headaches.   Psychiatric/Behavioral: Negative for behavioral problems and dysphoric mood.       Physical Exam  Vitals:    09/03/20 1536   BP: 122/78   BP Location: Left arm   Patient Position: Sitting   BP Method: Large (Manual)   Pulse: 110   Temp: 98.7 °F (37.1 °C)   TempSrc: Oral   SpO2: 96%   Weight: 111.8 kg (246 lb 7.6 oz)   Height: 5' 4" (1.626 m)    Body mass index is 42.31 kg/m².  Weight: 111.8 kg (246 lb 7.6 oz)   Height: 5' 4" (162.6 cm)     Physical Exam  Vitals signs and nursing note reviewed.   Constitutional:       Appearance: She is well-developed.   Skin:     General: Skin is warm and dry.      Findings: No erythema or rash.   Neurological:      Mental Status: She is alert and oriented to person, place, and time.   Psychiatric:         Behavior: Behavior normal.         ALLERGIES AND MEDICATIONS: updated and reviewed.  Review of patient's allergies indicates:   Allergen Reactions    Codeine      Medication List with Changes/Refills   New Medications    SPIRONOLACTONE (ALDACTONE) 25 MG TABLET    Take 1 tablet (25 mg total) by mouth once daily.   Current Medications    AMITRIPTYLINE (ELAVIL) 50 MG TABLET    amitriptyline 50 mg tablet  TAKE 1 TABLET BY MOUTH AT BEDTIME    ASPIRIN (ASPIRIN LOW DOSE) 81 MG EC TABLET        DICLOFENAC SODIUM (VOLTAREN) 1 % GEL    Apply topically once daily. Apply 2g    DIPHENHYDRAMINE (BENADRYL) 25 MG CAPSULE    Take 1 capsule (25 mg total) by mouth every 6 (six) hours as needed for Itching.    HYDROCHLOROTHIAZIDE (HYDRODIURIL) 25 MG TABLET    Take 1 tablet (25 mg total) by mouth once daily.    HYDROXYZINE HCL (ATARAX) 25 MG TABLET    TAKE 1 TABLET BY MOUTH THREE TIMES DAILY AS NEEDED FOR  ITCHING    INV HYDROCHLOROTHIAZIDE 25MG TABLET        LOSARTAN (COZAAR) 100 MG TABLET    Take 1 tablet (100 mg total) by mouth once daily.    OMEPRAZOLE (PRILOSEC) 40 MG CAPSULE    Take 1 capsule (40 mg total) by mouth once daily.    " PRAVASTATIN (PRAVACHOL) 40 MG TABLET    Take 40 mg by mouth once daily.    TIZANIDINE (ZANAFLEX) 4 MG TABLET    Take 4 mg by mouth every 6 (six) hours as needed.    TIZANIDINE 2 MG CAP       Changed and/or Refilled Medications    Modified Medication Previous Medication    AMLODIPINE (NORVASC) 10 MG TABLET amLODIPine (NORVASC) 10 MG tablet       Take 1 tablet (10 mg total) by mouth once daily.    Take 1 tablet (10 mg total) by mouth once daily.    KETOCONAZOLE (NIZORAL) 2 % CREAM ketoconazole (NIZORAL) 2 % cream       Apply topically once daily. Apply to rash for 14 days    Apply topically once daily. Apply to rash for 14 days    TRAMADOL (ULTRAM) 50 MG TABLET traMADoL (ULTRAM) 50 mg tablet       TAKE 1 TABLET BY MOUTH EVERY 12 HOURS AS NEEDED FOR PAIN    TAKE 1 TABLET BY MOUTH EVERY 12 HOURS AS NEEDED FOR PAIN    TRAZODONE (DESYREL) 100 MG TABLET traZODone (DESYREL) 100 MG tablet       Take 1 tablet (100 mg total) by mouth nightly as needed for Insomnia.    Take 1 tablet (100 mg total) by mouth nightly as needed for Insomnia.   Discontinued Medications    POTASSIUM CHLORIDE (MICRO-K) 10 MEQ CPSR    Take 10 mEq by mouth once.       Assessment & Plan  1. DDD (degenerative disc disease), lumbar    2. Anxiety    3. Spinal stenosis, unspecified spinal region    4. Hypertension, unspecified type    5. Lumbar radiculopathy    6. CKD (chronic kidney disease) stage 3, GFR 30-59 ml/min    7. Low blood potassium    8. Tinea        Problem List Items Addressed This Visit        Neuro    DDD (degenerative disc disease), lumbar - Primary  -follow up with pain management scheduled  Encourage activity    Lumbar radiculopathy  -encourage activity as tolerated, handicap tag given    Spinal stenosis  -pending WADE as she declines surgery  Consider PT    Relevant Medications    traMADoL (ULTRAM) 50 mg tablet       Psychiatric    Anxiety  -continue current regimen    Relevant Medications    traZODone (DESYREL) 100 MG tablet        Cardiac/Vascular    Hypertension  -continue current regimen    Relevant Medications    amLODIPine (NORVASC) 10 MG tablet       Renal/    CKD (chronic kidney disease)  -continue follow up with renal        Other Visit Diagnoses     Low blood potassium      -begin aldactone  Check K level in two weeks will d/c if elevated    Relevant Medications    spironolactone (ALDACTONE) 25 MG tablet    Other Relevant Orders    Basic metabolic panel    Tinea      -has f/u with derm as this is occurring in flexural areas    Relevant Medications    ketoconazole (NIZORAL) 2 % cream          Follow up in about 3 months (around 12/3/2020) for Follow up.    Other Orders Placed This Visit:  Orders Placed This Encounter   Procedures    Basic metabolic panel

## 2020-09-06 ENCOUNTER — LAB VISIT (OUTPATIENT)
Dept: URGENT CARE | Facility: CLINIC | Age: 70
End: 2020-09-06
Payer: MEDICARE

## 2020-09-06 DIAGNOSIS — Z01.818 PRE-OP TESTING: ICD-10-CM

## 2020-09-06 PROCEDURE — U0003 INFECTIOUS AGENT DETECTION BY NUCLEIC ACID (DNA OR RNA); SEVERE ACUTE RESPIRATORY SYNDROME CORONAVIRUS 2 (SARS-COV-2) (CORONAVIRUS DISEASE [COVID-19]), AMPLIFIED PROBE TECHNIQUE, MAKING USE OF HIGH THROUGHPUT TECHNOLOGIES AS DESCRIBED BY CMS-2020-01-R: HCPCS

## 2020-09-07 LAB — SARS-COV-2 RNA RESP QL NAA+PROBE: NOT DETECTED

## 2020-09-08 ENCOUNTER — TELEPHONE (OUTPATIENT)
Dept: PAIN MEDICINE | Facility: CLINIC | Age: 70
End: 2020-09-08

## 2020-09-08 NOTE — TELEPHONE ENCOUNTER
----- Message from Zac Guadarrama LPN sent at 9/8/2020 11:37 AM CDT -----  Regarding: FW: pt    ----- Message -----  From: Danisha Arredondo  Sent: 9/8/2020   8:43 AM CDT  To: Dagmar Barahona [facility]  Subject: pt                                               Type: Patient Call Back    Who calledpt    What is the request in detail:wants to discuss covid test and surgery. Call pt    Can the clinic reply by [facility]?    Would the patient rather a call back or a response via [facility]? call    Best call back number:[phone] (home) [phone]      Additional Information:

## 2020-09-09 ENCOUNTER — HOSPITAL ENCOUNTER (OUTPATIENT)
Facility: HOSPITAL | Age: 70
Discharge: HOME | End: 2020-09-09
Attending: PAIN MEDICINE | Admitting: PAIN MEDICINE
Payer: MEDICARE

## 2020-09-09 VITALS
HEART RATE: 63 BPM | DIASTOLIC BLOOD PRESSURE: 65 MMHG | TEMPERATURE: 99 F | SYSTOLIC BLOOD PRESSURE: 107 MMHG | OXYGEN SATURATION: 100 % | RESPIRATION RATE: 18 BRPM

## 2020-09-09 DIAGNOSIS — M51.36 DDD (DEGENERATIVE DISC DISEASE), LUMBAR: Primary | ICD-10-CM

## 2020-09-09 PROCEDURE — 64483 PR EPIDURAL INJ, ANES/STEROID, TRANSFORAMINAL, LUMB/SACR, SNGL LEVL: ICD-10-PCS | Mod: LT,,, | Performed by: PAIN MEDICINE

## 2020-09-09 PROCEDURE — 25500020 PHARM REV CODE 255: Performed by: PAIN MEDICINE

## 2020-09-09 PROCEDURE — 63600175 PHARM REV CODE 636 W HCPCS: Performed by: PAIN MEDICINE

## 2020-09-09 PROCEDURE — 64484 NJX AA&/STRD TFRM EPI L/S EA: CPT | Mod: LT,,, | Performed by: PAIN MEDICINE

## 2020-09-09 PROCEDURE — 25000003 PHARM REV CODE 250: Performed by: PAIN MEDICINE

## 2020-09-09 PROCEDURE — 64484 NJX AA&/STRD TFRM EPI L/S EA: CPT | Performed by: PAIN MEDICINE

## 2020-09-09 PROCEDURE — 64483 NJX AA&/STRD TFRM EPI L/S 1: CPT | Mod: LT,,, | Performed by: PAIN MEDICINE

## 2020-09-09 PROCEDURE — 64484 PRA INJECT ANES/STEROID FORAMEN LUMBAR/SACRAL W IMG GUIDE ,EA ADD LEVEL: ICD-10-PCS | Mod: LT,,, | Performed by: PAIN MEDICINE

## 2020-09-09 PROCEDURE — 64483 NJX AA&/STRD TFRM EPI L/S 1: CPT | Mod: LT | Performed by: PAIN MEDICINE

## 2020-09-09 RX ORDER — LIDOCAINE HYDROCHLORIDE 10 MG/ML
1 INJECTION, SOLUTION EPIDURAL; INFILTRATION; INTRACAUDAL; PERINEURAL ONCE
Status: COMPLETED | OUTPATIENT
Start: 2020-09-09 | End: 2020-09-09

## 2020-09-09 RX ORDER — LIDOCAINE HYDROCHLORIDE 20 MG/ML
10 INJECTION, SOLUTION INFILTRATION; PERINEURAL ONCE
Status: COMPLETED | OUTPATIENT
Start: 2020-09-09 | End: 2020-09-09

## 2020-09-09 RX ORDER — DEXAMETHASONE SODIUM PHOSPHATE 10 MG/ML
10 INJECTION INTRAMUSCULAR; INTRAVENOUS ONCE
Status: COMPLETED | OUTPATIENT
Start: 2020-09-09 | End: 2020-09-09

## 2020-09-09 RX ORDER — LIDOCAINE HYDROCHLORIDE 10 MG/ML
INJECTION, SOLUTION EPIDURAL; INFILTRATION; INTRACAUDAL; PERINEURAL
Status: DISCONTINUED
Start: 2020-09-09 | End: 2020-09-09 | Stop reason: HOSPADM

## 2020-09-09 RX ORDER — LIDOCAINE HYDROCHLORIDE 20 MG/ML
INJECTION, SOLUTION INFILTRATION; PERINEURAL
Status: DISCONTINUED
Start: 2020-09-09 | End: 2020-09-09 | Stop reason: HOSPADM

## 2020-09-09 RX ORDER — ALPRAZOLAM 0.5 MG/1
1 TABLET, ORALLY DISINTEGRATING ORAL ONCE AS NEEDED
Status: COMPLETED | OUTPATIENT
Start: 2020-09-09 | End: 2020-09-09

## 2020-09-09 RX ORDER — DEXAMETHASONE SODIUM PHOSPHATE 10 MG/ML
INJECTION INTRAMUSCULAR; INTRAVENOUS
Status: DISCONTINUED
Start: 2020-09-09 | End: 2020-09-09 | Stop reason: HOSPADM

## 2020-09-09 RX ORDER — ALPRAZOLAM 0.5 MG/1
TABLET, ORALLY DISINTEGRATING ORAL
Status: DISCONTINUED
Start: 2020-09-09 | End: 2020-09-09 | Stop reason: HOSPADM

## 2020-09-09 RX ADMIN — ALPRAZOLAM 1 MG: 0.5 TABLET, ORALLY DISINTEGRATING ORAL at 01:09

## 2020-09-09 NOTE — OR NURSING
Procedure and recovery completed. Discharge instructions given . Understanding verbalized. Patient ready for discharge

## 2020-09-09 NOTE — OP NOTE
Lumbar transforaminal WADE    Time-out taken to identify patient and procedure side prior to starting the procedure.   I attest that I have reviewed the patient's home medications prior to the procedure and no contraindication have been identified. I  re-evaluated the patient after the patient was positioned for the procedure in the procedure room immediately before the procedural time-out. The vital signs are current and represent the current state of the patient which has not significantly changed since the preprocedure assessment.                              Date of Service: 09/09/2020    PCP: Simona Torres MD    Referring Physician:                                     PROCEDURE: Left L5 and S1 transforaminal epidural steroid injection under fluoroscopy    REASON FOR PROCEDURE: Left Lumbar radiculopathy [M54.16]  DDD (degenerative disc disease), lumbar [M51.36]    PHYSICIAN: Jun Leavitt MD  ASSISTANTS:None    MEDICATIONS INJECTED:  Preservative-free dexamethasone 10mg, Xylocaine 1% MPF 3-5ml. 3ml per level. Preservative free, sterile normal saline is used to get larger volume as needed.  LOCAL ANESTHETIC INJECTED:  Xylocaine 2%  3ml per site.    SEDATION MEDICATIONS: None  ESTIMATED BLOOD LOSS:  None.    COMPLICATIONS:  None.    TECHNIQUE:   Laying in a prone position, the patient was prepped and draped in the usual sterile fashion using ChloraPrep and fenestrated drape.  The area to be injected was determined under fluoroscopic guidance.  Local anesthetic was given by raising a wheel and going down to the hub of a 27-gauge 1.25 inch needle.  The 4.75 inch 25-gauge spinal needle was introduced towards the transverse process of each above named nerve root level.  The needle was walked medially then hinged into the neural foramen.  Omnipaque was injected to confirm appropriate placement and that there was no vascular runoff.  The medication was then injected after applying negative pressure. The patient  tolerated the procedure well.    PAIN BEFORE THE PROCEDURE: 8/10.    PAIN AFTER THE PROCEDURE: 8/10.    The patient was monitored after the procedure.  Patient was given post procedure and discharge instructions to follow at home.  We will see the patient back in two weeks or the patient may call to inform of status. The patient was discharged in a stable condition.

## 2020-09-09 NOTE — DISCHARGE INSTRUCTIONS
Home Care Instructions Pain Management:    1. DIET:   You may resume your normal diet today.   2. BATHING:   You may shower with luke warm water.  3. DRESSING:   You may remove your bandage today.   4. ACTIVITY LEVEL:   You may resume your normal activities 24 hrs after your procedure.  5. MEDICATIONS:   You may resume your normal medications today.   6. SPECIAL INSTRUCTIONS:   No heat to the injection site for 24 hrs including, bath or shower, heating pad, moist heat, or hot tubs.    Use ice pack to injection site for any pain or discomfort.  Apply ice packs for 20 minute intervals as needed.   If you have received any sedatives by mouth today you may not drive for 12 hours.    If you have received any sedation through your IV, you may not drive for 24 hrs.     PLEASE CALL YOUR DOCTOR IF:  1. Redness or swelling around the injection site.  2. Fever of 101 degrees  3. Drainage (pus) from the injection site.  4. For any continuous bleeding (some dried blood over the incision is normal.)    FOR EMERGENCIES:   If any unusual problems or difficulties occur during clinic hours, call [phone] or [phone].   Fall Prevention  Millions of people fall every year and injure themselves. You may have had anesthesia or sedation which may increase your risk of falling. You may have health issues that put you at an increased risk of falling.     Here are ways to reduce your risk of falling.  ·   · Make your home safe by keeping walkways clear of objects you may trip over.  · Use non-slip pads under rugs. Do not use area rugs or small throw rugs.  · Use non-slip mats in bathtubs and showers.  · Install handrails and lights on staircases.  · Do not walk in poorly lit areas.  · Do not stand on chairs or wobbly ladders.  · Use caution when reaching overhead or looking upward. This position can cause a loss of balance.  · Be sure your shoes fit properly, have non-slip bottoms and are in good condition.   · Wear shoes both inside and  out. Avoid going barefoot or wearing slippers.  · Be cautious when going up and down stairs, curbs, and when walking on uneven sidewalks.  · If your balance is poor, consider using a cane or walker.  · If your fall was related to alcohol use, stop or limit alcohol intake.   · If your fall was related to use of sleeping medicines, talk to your doctor about this. You may need to reduce your dosage at bedtime if you awaken during the night to go to the bathroom.    · To reduce the need for nighttime bathroom trips:  ¨ Avoid drinking fluids for several hours before going to bed  ¨ Empty your bladder before going to bed  ¨ Men can keep a urinal at the bedside  · Stay as active as you can. Balance, flexibility, strength, and endurance all come from exercise. They all play a role in preventing falls. Ask your healthcare provider which types of activity are right for you.  · Get your vision checked on a regular basis.  · If you have pets, know where they are before you stand up or walk so you don't trip over them.  Use night lights.  Lumbar Epidural Injection: Recovery at Home    After a lumbar epidural injection, you dont need to stay in bed when you get home. In fact, its best to walk around if you feel up to it. Just be careful about being too active. Even if you feel better right away, avoid activities that may strain your back. And follow up on all treatment with your doctor.  What to know about pain relief  Keep in mind that some people feel more pain at first. It usually goes away within a few days. You may also have headaches or trouble sleeping, but if these symptoms are severe, you should call your doctor right away. These should also go away within a few days. In general:  An injection to reduce inflammation takes a few days to work, sometimes even up to a week. There may even be more pain at first.  An injection to help locate the source of pain may give only brief pain relief. Later, youll feel the same as  you did before the injection.  Tips for recovery  Whether you were injected for pain relief or diagnosis, these tips will help you recover:  Take walks when you feel up to it.  Rest if needed, but get up and move around after sitting for half an hour.  Dont exercise vigorously.  Dont drive the day of the procedure or until your doctor says its OK.  Return to work or other activities when your doctor says youre ready.  When to call your doctor  Call right away if you notice any of the following symptoms:  Severe pain or headache  Loss of bladder or bowel control  Fever or chills  Redness or swelling around the injection site       Date Last Reviewed: 11/1/2015  © 8628-6786 [employer]. [address] [place] [address], [place] [address]. All rights reserved. This information is not intended as a substitute for professional medical care. Always follow your healthcare professional's instructions.      ·

## 2020-09-29 ENCOUNTER — PATIENT MESSAGE (OUTPATIENT)
Dept: DERMATOLOGY | Facility: CLINIC | Age: 70
End: 2020-09-29

## 2020-10-05 ENCOUNTER — PATIENT OUTREACH (OUTPATIENT)
Dept: ADMINISTRATIVE | Facility: OTHER | Age: 70
End: 2020-10-05

## 2020-10-05 ENCOUNTER — PATIENT MESSAGE (OUTPATIENT)
Dept: DERMATOLOGY | Facility: CLINIC | Age: 70
End: 2020-10-05

## 2020-11-09 ENCOUNTER — PATIENT OUTREACH (OUTPATIENT)
Dept: ADMINISTRATIVE | Facility: OTHER | Age: 70
End: 2020-11-09

## 2020-11-10 ENCOUNTER — OFFICE VISIT (OUTPATIENT)
Dept: DERMATOLOGY | Facility: CLINIC | Age: 70
End: 2020-11-10
Payer: MEDICARE

## 2020-11-10 DIAGNOSIS — L91.8 SKIN TAG: ICD-10-CM

## 2020-11-10 DIAGNOSIS — L30.9 SUBACUTE DERMATITIS: Primary | ICD-10-CM

## 2020-11-10 PROCEDURE — 99999 PR PBB SHADOW E&M-EST. PATIENT-LVL IV: CPT | Mod: PBBFAC,,, | Performed by: DERMATOLOGY

## 2020-11-10 PROCEDURE — 88312 PR  SPECIAL STAINS,GROUP I: ICD-10-PCS | Mod: 26,,, | Performed by: PATHOLOGY

## 2020-11-10 PROCEDURE — 88312 SPECIAL STAINS GROUP 1: CPT | Performed by: PATHOLOGY

## 2020-11-10 PROCEDURE — 11104 PR PUNCH BIOPSY, SKIN, SINGLE LESION: ICD-10-PCS | Mod: S$GLB,,, | Performed by: DERMATOLOGY

## 2020-11-10 PROCEDURE — 99999 PR PBB SHADOW E&M-EST. PATIENT-LVL IV: ICD-10-PCS | Mod: PBBFAC,,, | Performed by: DERMATOLOGY

## 2020-11-10 PROCEDURE — 88312 SPECIAL STAINS GROUP 1: CPT | Mod: 26,,, | Performed by: PATHOLOGY

## 2020-11-10 PROCEDURE — 99202 PR OFFICE/OUTPT VISIT, NEW, LEVL II, 15-29 MIN: ICD-10-PCS | Mod: 25,S$GLB,, | Performed by: DERMATOLOGY

## 2020-11-10 PROCEDURE — 88305 TISSUE EXAM BY PATHOLOGIST: CPT | Mod: 26,,, | Performed by: PATHOLOGY

## 2020-11-10 PROCEDURE — 11104 PUNCH BX SKIN SINGLE LESION: CPT | Mod: S$GLB,,, | Performed by: DERMATOLOGY

## 2020-11-10 PROCEDURE — 88305 TISSUE EXAM BY PATHOLOGIST: ICD-10-PCS | Mod: 26,,, | Performed by: PATHOLOGY

## 2020-11-10 PROCEDURE — 88305 TISSUE EXAM BY PATHOLOGIST: CPT | Performed by: PATHOLOGY

## 2020-11-10 PROCEDURE — 99202 OFFICE O/P NEW SF 15 MIN: CPT | Mod: 25,S$GLB,, | Performed by: DERMATOLOGY

## 2020-11-10 RX ORDER — TRIAMCINOLONE ACETONIDE 1 MG/G
OINTMENT TOPICAL
Qty: 454 G | Refills: 3 | Status: SHIPPED | OUTPATIENT
Start: 2020-11-10 | End: 2021-01-14 | Stop reason: SDUPTHER

## 2020-11-10 NOTE — PROGRESS NOTES
Subjective:       Patient ID:  Amanda Holt is a 70 y.o. female who presents for   Chief Complaint   Patient presents with    Rash     both arms      Rash - Initial  Affected locations: right arm and left arm  Duration: 3 months  Signs / symptoms: itching  Severity: mild  Timing: constant  Aggravated by: nothing  Relieving factors/Treatments tried: nothing  Improvement on treatment: no relief    patient thought she had bug bites in sept went to ER gave ketoconzole cream but never went away  Patient started hctz to a new drug sprionlactone    Review of Systems   Constitutional: Negative for fever, chills and fatigue.   Skin: Positive for itching and rash. Negative for daily sunscreen use, recent sunburn and wears hat.   Hematologic/Lymphatic: Bruises/bleeds easily.        Objective:    Physical Exam   Constitutional: She appears well-developed and well-nourished.   Neurological: She is alert and oriented to person, place, and time.   Psychiatric: She has a normal mood and affect.   Skin:   Areas Examined (abnormalities noted in diagram):   Head / Face Inspection Performed  Neck Inspection Performed  Chest / Axilla Inspection Performed  Abdomen Inspection Performed  Back Inspection Performed  RUE Inspected  LUE Inspection Performed                  Diagram Legend     Erythematous scaling macule/papule c/w actinic keratosis       Vascular papule c/w angioma      Pigmented verrucoid papule/plaque c/w seborrheic keratosis      Yellow umbilicated papule c/w sebaceous hyperplasia      Irregularly shaped tan macule c/w lentigo     1-2 mm smooth white papules consistent with Milia      Movable subcutaneous cyst with punctum c/w epidermal inclusion cyst      Subcutaneous movable cyst c/w pilar cyst      Firm pink to brown papule c/w dermatofibroma      Pedunculated fleshy papule(s) c/w skin tag(s)      Evenly pigmented macule c/w junctional nevus     Mildly variegated pigmented, slightly irregular-bordered macule c/w  mildly atypical nevus      Flesh colored to evenly pigmented papule c/w intradermal nevus       Pink pearly papule/plaque c/w basal cell carcinoma      Erythematous hyperkeratotic cursted plaque c/w SCC      Surgical scar with no sign of skin cancer recurrence      Open and closed comedones      Inflammatory papules and pustules      Verrucoid papule consistent consistent with wart     Erythematous eczematous patches and plaques     Dystrophic onycholytic nail with subungual debris c/w onychomycosis     Umbilicated papule    Erythematous-base heme-crusted tan verrucoid plaque consistent with inflamed seborrheic keratosis     Erythematous Silvery Scaling Plaque c/w Psoriasis     See annotation              Assessment / Plan:      Pathology Orders:     Normal Orders This Visit    Specimen to Pathology, Dermatology     Questions:    Procedure Type: Dermatology and skin neoplasms    Number of Specimens: 1    ------------------------: -------------------------    Spec 1 Procedure: Biopsy    Spec 1 Clinical Impression: r/o dermal hypersens vs drug vs other    Spec 1 Source: right anticubital fossa        Subacute dermatitis  -     Specimen to Pathology, Dermatology  Punch biopsy procedure note:  Punch biopsy performed after verbal consent obtained. Area marked and prepped with alcohol. Approximately 1cc of 1% lidocaine with epinephrine injected. 4 mm disposable punch used to remove lesion. Hemostasis obtained and biopsy site closed with 1 - 2 Prolene sutures. Wound care instructions reviewed with patient and handout given.  - start TAC 0.1% ointment to affected areas, if drug rash will contact PCP, if not will see her in f/u  - patient has a tendency to keloid and area injected today to prevent scarring will reinject at 6 wk f/u  Intralesional Kenalog 40 mg/cc (0.1 cc total) injected into 1 lesions on the arm today after obtaining verbal consent including risk of surrounding hypopigmentation. Patient tolerated procedure  well.    Units: 0.1  NDC for Kenalog 10mg/cc:  4440-3457-05      Skin tag  Reassurance given to patient. No treatment is necessary.   Treatment of benign, asymptomatic lesions may be considered cosmetic.      F/u 6 wk for injection/ dermatitis f/u         No follow-ups on file.

## 2020-11-10 NOTE — PATIENT INSTRUCTIONS

## 2020-11-10 NOTE — LETTER
November 10, 2020      Simona Torres MD  [address]  [place] [address]           [facility] D[facility]at[facility] [address]  [address]  [facility] [address]  Phone: [phone]  Fax: [phone]          Patient: Amanda Holt   MR Number: [id number]   YOB: 1950   Date of Visit: 11/10/2020       Dear Dr. Simona Torres:    Thank you for referring Amanda Holt to me for evaluation. Attached you will find relevant portions of my assessment and plan of care.    If you have questions, please do not hesitate to call me. I look forward to following Amanda Holt along with you.    Sincerely,    Naz Walters MD    Enclosure  CC:  No Recipients    If you would like to receive this communication electronically, please contact [email or web address] or [phone] to request more information on [employer] Link access.    For providers and/or their staff who would like to refer a patient to [facility], please contact us through our one-stop-shop provider referral line, [facility], at [phone].    If you feel you have received this communication in error or would no longer like to receive these types of communications, please e-mail [email or web address]

## 2020-11-13 LAB
FINAL PATHOLOGIC DIAGNOSIS: NORMAL
GROSS: NORMAL
MICROSCOPIC EXAM: NORMAL

## 2020-11-18 ENCOUNTER — TELEPHONE (OUTPATIENT)
Dept: NEUROSURGERY | Facility: CLINIC | Age: 70
End: 2020-11-18

## 2020-11-18 NOTE — TELEPHONE ENCOUNTER
----- Message from Christiano Sanchez sent at 11/18/2020  9:48 AM CST -----  Contact: Pt. [phone]  The patient is returning a call from Martin. Please contact the patient to discuss further.

## 2020-11-18 NOTE — TELEPHONE ENCOUNTER
Spoke with patient regarding scheduling a follow up appointment patient was not sure if she need to come in to let  Know that injection seem to be working inform patient we are happy to hear her injections is working and inform patient to continue to see PM and to also keep EMG appointment and to call us once injections or done and not having no relief and is ready to move forward with surgery. Patient verbalized understanding.

## 2020-11-20 ENCOUNTER — PROCEDURE VISIT (OUTPATIENT)
Dept: NEUROLOGY | Facility: CLINIC | Age: 70
End: 2020-11-20
Payer: MEDICARE

## 2020-11-20 DIAGNOSIS — M54.16 ACUTE LUMBAR RADICULOPATHY: ICD-10-CM

## 2020-11-20 PROCEDURE — 95910 NRV CNDJ TEST 7-8 STUDIES: CPT | Mod: S$GLB,,, | Performed by: PSYCHIATRY & NEUROLOGY

## 2020-11-20 PROCEDURE — 95886 PR EMG COMPLETE, W/ NERVE CONDUCTION STUDIES, 5+ MUSCLES: ICD-10-PCS | Mod: S$GLB,,, | Performed by: PSYCHIATRY & NEUROLOGY

## 2020-11-20 PROCEDURE — 95910 PR NERVE CONDUCTION STUDY; 7-8 STUDIES: ICD-10-PCS | Mod: S$GLB,,, | Performed by: PSYCHIATRY & NEUROLOGY

## 2020-11-20 PROCEDURE — 95886 MUSC TEST DONE W/N TEST COMP: CPT | Mod: S$GLB,,, | Performed by: PSYCHIATRY & NEUROLOGY

## 2020-11-24 ENCOUNTER — CLINICAL SUPPORT (OUTPATIENT)
Dept: DERMATOLOGY | Facility: CLINIC | Age: 70
End: 2020-11-24
Payer: MEDICARE

## 2020-11-24 PROCEDURE — 99999 PR PBB SHADOW E&M-EST. PATIENT-LVL III: CPT | Mod: PBBFAC,,,

## 2020-11-24 PROCEDURE — 99024 POSTOP FOLLOW-UP VISIT: CPT | Mod: S$GLB,,, | Performed by: DERMATOLOGY

## 2020-11-24 PROCEDURE — 99999 PR PBB SHADOW E&M-EST. PATIENT-LVL III: ICD-10-PCS | Mod: PBBFAC,,,

## 2020-11-24 PROCEDURE — 99024 PR POST-OP FOLLOW-UP VISIT: ICD-10-PCS | Mod: S$GLB,,, | Performed by: DERMATOLOGY

## 2020-11-30 NOTE — PROGRESS NOTES
Suture Removal note:  CC: 70 y.o. female patient is here for suture removal.         HPI: Patient is s/p excision punch biopsy of dermal hypersensitivity from the right antecubital on 11/10/2020.  Patient reports no problems.    WOUND PE:  Sutures intact.  Wound healing well.  Good approximation of skin edges.  No signs or symptoms of infection.    IMPRESSION:  Skin right antecubital fossa punch biopsy   - MILD SUPERFICIAL DERMATITIS     PLAN:  Sutures removed today.  Continue wound care.    RTC: In 6 months.

## 2020-12-03 ENCOUNTER — OFFICE VISIT (OUTPATIENT)
Dept: FAMILY MEDICINE | Facility: CLINIC | Age: 70
End: 2020-12-03
Payer: MEDICARE

## 2020-12-03 VITALS
SYSTOLIC BLOOD PRESSURE: 134 MMHG | WEIGHT: 252.63 LBS | OXYGEN SATURATION: 97 % | DIASTOLIC BLOOD PRESSURE: 88 MMHG | HEIGHT: 64 IN | TEMPERATURE: 99 F | HEART RATE: 96 BPM | BODY MASS INDEX: 43.13 KG/M2

## 2020-12-03 DIAGNOSIS — M54.16 LUMBAR RADICULOPATHY: ICD-10-CM

## 2020-12-03 DIAGNOSIS — M48.00 SPINAL STENOSIS, UNSPECIFIED SPINAL REGION: ICD-10-CM

## 2020-12-03 DIAGNOSIS — I10 HYPERTENSION, UNSPECIFIED TYPE: ICD-10-CM

## 2020-12-03 DIAGNOSIS — F41.9 ANXIETY: ICD-10-CM

## 2020-12-03 DIAGNOSIS — M51.36 DDD (DEGENERATIVE DISC DISEASE), LUMBAR: Primary | ICD-10-CM

## 2020-12-03 DIAGNOSIS — M51.26 LUMBAR HERNIATED DISC: ICD-10-CM

## 2020-12-03 PROCEDURE — 99214 PR OFFICE/OUTPT VISIT, EST, LEVL IV, 30-39 MIN: ICD-10-PCS | Mod: S$GLB,,, | Performed by: FAMILY MEDICINE

## 2020-12-03 PROCEDURE — 3079F PR MOST RECENT DIASTOLIC BLOOD PRESSURE 80-89 MM HG: ICD-10-PCS | Mod: CPTII,S$GLB,, | Performed by: FAMILY MEDICINE

## 2020-12-03 PROCEDURE — 99499 RISK ADDL DX/OHS AUDIT: ICD-10-PCS | Mod: S$GLB,,, | Performed by: FAMILY MEDICINE

## 2020-12-03 PROCEDURE — 1125F AMNT PAIN NOTED PAIN PRSNT: CPT | Mod: S$GLB,,, | Performed by: FAMILY MEDICINE

## 2020-12-03 PROCEDURE — 3008F PR BODY MASS INDEX (BMI) DOCUMENTED: ICD-10-PCS | Mod: CPTII,S$GLB,, | Performed by: FAMILY MEDICINE

## 2020-12-03 PROCEDURE — 99999 PR PBB SHADOW E&M-EST. PATIENT-LVL IV: ICD-10-PCS | Mod: PBBFAC,,, | Performed by: FAMILY MEDICINE

## 2020-12-03 PROCEDURE — 3008F BODY MASS INDEX DOCD: CPT | Mod: CPTII,S$GLB,, | Performed by: FAMILY MEDICINE

## 2020-12-03 PROCEDURE — 3079F DIAST BP 80-89 MM HG: CPT | Mod: CPTII,S$GLB,, | Performed by: FAMILY MEDICINE

## 2020-12-03 PROCEDURE — 1159F MED LIST DOCD IN RCRD: CPT | Mod: S$GLB,,, | Performed by: FAMILY MEDICINE

## 2020-12-03 PROCEDURE — 3288F FALL RISK ASSESSMENT DOCD: CPT | Mod: CPTII,S$GLB,, | Performed by: FAMILY MEDICINE

## 2020-12-03 PROCEDURE — 99214 OFFICE O/P EST MOD 30 MIN: CPT | Mod: S$GLB,,, | Performed by: FAMILY MEDICINE

## 2020-12-03 PROCEDURE — 99999 PR PBB SHADOW E&M-EST. PATIENT-LVL IV: CPT | Mod: PBBFAC,,, | Performed by: FAMILY MEDICINE

## 2020-12-03 PROCEDURE — 3075F SYST BP GE 130 - 139MM HG: CPT | Mod: CPTII,S$GLB,, | Performed by: FAMILY MEDICINE

## 2020-12-03 PROCEDURE — 99499 UNLISTED E&M SERVICE: CPT | Mod: S$GLB,,, | Performed by: FAMILY MEDICINE

## 2020-12-03 PROCEDURE — 1101F PT FALLS ASSESS-DOCD LE1/YR: CPT | Mod: CPTII,S$GLB,, | Performed by: FAMILY MEDICINE

## 2020-12-03 PROCEDURE — 1159F PR MEDICATION LIST DOCUMENTED IN MEDICAL RECORD: ICD-10-PCS | Mod: S$GLB,,, | Performed by: FAMILY MEDICINE

## 2020-12-03 PROCEDURE — 1125F PR PAIN SEVERITY QUANTIFIED, PAIN PRESENT: ICD-10-PCS | Mod: S$GLB,,, | Performed by: FAMILY MEDICINE

## 2020-12-03 PROCEDURE — 1101F PR PT FALLS ASSESS DOC 0-1 FALLS W/OUT INJ PAST YR: ICD-10-PCS | Mod: CPTII,S$GLB,, | Performed by: FAMILY MEDICINE

## 2020-12-03 PROCEDURE — 3288F PR FALLS RISK ASSESSMENT DOCUMENTED: ICD-10-PCS | Mod: CPTII,S$GLB,, | Performed by: FAMILY MEDICINE

## 2020-12-03 PROCEDURE — 3075F PR MOST RECENT SYSTOLIC BLOOD PRESS GE 130-139MM HG: ICD-10-PCS | Mod: CPTII,S$GLB,, | Performed by: FAMILY MEDICINE

## 2020-12-03 RX ORDER — TRAMADOL HYDROCHLORIDE 50 MG/1
TABLET ORAL
Qty: 60 TABLET | Refills: 2 | Status: SHIPPED | OUTPATIENT
Start: 2020-12-03 | End: 2021-02-01 | Stop reason: SDUPTHER

## 2020-12-03 RX ORDER — TIZANIDINE 4 MG/1
4 TABLET ORAL DAILY PRN
Qty: 90 TABLET | Refills: 1 | Status: SHIPPED | OUTPATIENT
Start: 2020-12-03 | End: 2021-07-16 | Stop reason: ALTCHOICE

## 2020-12-03 RX ORDER — TRAZODONE HYDROCHLORIDE 100 MG/1
100 TABLET ORAL NIGHTLY PRN
Qty: 90 TABLET | Refills: 1 | Status: SHIPPED | OUTPATIENT
Start: 2020-12-03 | End: 2021-10-21 | Stop reason: SDUPTHER

## 2020-12-03 RX ORDER — AMLODIPINE BESYLATE 10 MG/1
10 TABLET ORAL DAILY
Qty: 90 TABLET | Refills: 1 | Status: SHIPPED | OUTPATIENT
Start: 2020-12-03 | End: 2021-10-21 | Stop reason: SDUPTHER

## 2020-12-03 RX ORDER — HYDROXYZINE HYDROCHLORIDE 25 MG/1
25 TABLET, FILM COATED ORAL 3 TIMES DAILY PRN
Qty: 45 TABLET | Refills: 0 | Status: SHIPPED | OUTPATIENT
Start: 2020-12-03 | End: 2021-04-28

## 2020-12-03 NOTE — PROGRESS NOTES
Chief Complaint   Patient presents with    Chronic Pain       HPI  Amanda Holt is a 70 y.o. female with multiple medical diagnoses as listed in the medical history and problem list that presents for follow-up for chronic pain    Chronic pain  Since our last visit she has had WADE in her lower spine and has had f/u for EMG  MRI shows bulging disc. She would like to proceed with pain management as she had some relief with this. She still has numbness radiating down her left leg. Cramps improved with aldactone    She is seeing derm for her skin and has had a biopsy for this but is pending f/u for results    HPI    Patient Active Problem List   Diagnosis    Contact lens overwear of both eyes    Refractive error    Nuclear sclerosis of both eyes    Spinal stenosis    Hypertension    Obstructive sleep apnea syndrome    Gastroesophageal reflux disease without esophagitis    CKD (chronic kidney disease)    Chronic low back pain    Bilateral hearing loss    DDD (degenerative disc disease), lumbar    CTS (carpal tunnel syndrome)    Anxiety    Asymptomatic microscopic hematuria    Lumbar radiculopathy    Lumbar facet arthropathy    Colon cancer screening    Lumbar herniated disc         ROS  Review of Systems   Constitutional: Negative for chills, diaphoresis, fatigue, fever and unexpected weight change.   HENT: Negative for rhinorrhea, sinus pressure, sore throat and tinnitus.    Eyes: Negative for photophobia and visual disturbance.   Respiratory: Negative for cough, shortness of breath and wheezing.    Cardiovascular: Negative for chest pain and palpitations.   Gastrointestinal: Negative for abdominal pain, blood in stool, constipation, diarrhea, nausea and vomiting.   Genitourinary: Negative for dysuria, flank pain, frequency and vaginal discharge.   Musculoskeletal: Positive for arthralgias and back pain. Negative for joint swelling.   Skin: Negative for rash.   Neurological: Positive for numbness.  "Negative for speech difficulty, weakness, light-headedness and headaches.   Psychiatric/Behavioral: Negative for behavioral problems and dysphoric mood.       Physical Exam  Vitals:    12/03/20 0949   BP: 134/88   BP Location: Right arm   Patient Position: Sitting   BP Method: Large (Manual)   Pulse: 96   Temp: 98.8 °F (37.1 °C)   TempSrc: Oral   SpO2: 97%   Weight: 114.6 kg (252 lb 10.4 oz)   Height: 5' 4" (1.626 m)    Body mass index is 43.37 kg/m².  Weight: 114.6 kg (252 lb 10.4 oz)   Height: 5' 4" (162.6 cm)     Physical Exam  Vitals signs and nursing note reviewed.   Constitutional:       Appearance: She is well-developed.   Skin:     General: Skin is warm and dry.      Findings: No erythema or rash.   Neurological:      Mental Status: She is alert and oriented to person, place, and time.   Psychiatric:         Behavior: Behavior normal.         ALLERGIES AND MEDICATIONS: updated and reviewed.  Review of patient's allergies indicates:   Allergen Reactions    Codeine      Medication List with Changes/Refills   Current Medications    AMITRIPTYLINE (ELAVIL) 50 MG TABLET    amitriptyline 50 mg tablet  TAKE 1 TABLET BY MOUTH AT BEDTIME    ASPIRIN (ASPIRIN LOW DOSE) 81 MG EC TABLET        DICLOFENAC SODIUM (VOLTAREN) 1 % GEL    Apply topically once daily. Apply 2g    DIPHENHYDRAMINE (BENADRYL) 25 MG CAPSULE    Take 1 capsule (25 mg total) by mouth every 6 (six) hours as needed for Itching.    INV HYDROCHLOROTHIAZIDE 25MG TABLET        LOSARTAN (COZAAR) 100 MG TABLET    Take 1 tablet (100 mg total) by mouth once daily.    OMEPRAZOLE (PRILOSEC) 40 MG CAPSULE    Take 1 capsule (40 mg total) by mouth once daily.    PRAVASTATIN (PRAVACHOL) 40 MG TABLET    Take 40 mg by mouth once daily.    SPIRONOLACTONE (ALDACTONE) 25 MG TABLET    Take 1 tablet (25 mg total) by mouth once daily.    TRIAMCINOLONE ACETONIDE 0.1% (KENALOG) 0.1 % OINTMENT    AAA bid   Changed and/or Refilled Medications    Modified Medication Previous " Medication    AMLODIPINE (NORVASC) 10 MG TABLET amLODIPine (NORVASC) 10 MG tablet       Take 1 tablet (10 mg total) by mouth once daily.    Take 1 tablet (10 mg total) by mouth once daily.    HYDROXYZINE HCL (ATARAX) 25 MG TABLET hydrOXYzine HCL (ATARAX) 25 MG tablet       Take 1 tablet (25 mg total) by mouth 3 (three) times daily as needed.    TAKE 1 TABLET BY MOUTH THREE TIMES DAILY AS NEEDED FOR  ITCHING    TIZANIDINE (ZANAFLEX) 4 MG TABLET tiZANidine (ZANAFLEX) 4 MG tablet       Take 1 tablet (4 mg total) by mouth daily as needed.    Take 4 mg by mouth every 6 (six) hours as needed.    TRAMADOL (ULTRAM) 50 MG TABLET traMADoL (ULTRAM) 50 mg tablet       TAKE 1 TABLET BY MOUTH EVERY 12 HOURS AS NEEDED FOR PAIN    TAKE 1 TABLET BY MOUTH EVERY 12 HOURS AS NEEDED FOR PAIN    TRAZODONE (DESYREL) 100 MG TABLET traZODone (DESYREL) 100 MG tablet       Take 1 tablet (100 mg total) by mouth nightly as needed for Insomnia.    Take 1 tablet (100 mg total) by mouth nightly as needed for Insomnia.   Discontinued Medications    HYDROCHLOROTHIAZIDE (HYDRODIURIL) 25 MG TABLET    Take 1 tablet (25 mg total) by mouth once daily.    KETOCONAZOLE (NIZORAL) 2 % CREAM    Apply topically once daily. Apply to rash for 14 days    TIZANIDINE 2 MG CAP           Assessment & Plan  1. DDD (degenerative disc disease), lumbar    2. Anxiety    3. Hypertension, unspecified type    4. Spinal stenosis, unspecified spinal region    5. Lumbar radiculopathy    6. Lumbar herniated disc        Problem List Items Addressed This Visit        Neuro    DDD (degenerative disc disease), lumbar - Primary  -continue tramadol, pain mgmt  Encourage activity    Lumbar herniated disc  -we discussed this is likely causing her radiculopathy sx    Lumbar radiculopathy  -did not improve w/ neurontin, continue zanaflex and ultram    Spinal stenosis  -[place] [facility] database queried/reviewed  Continue current regimen    Relevant Medications    traMADoL (ULTRAM) 50 mg tablet        Psychiatric    Anxiety  -stable on current regimen    Relevant Medications    hydrOXYzine HCL (ATARAX) 25 MG tablet    traZODone (DESYREL) 100 MG tablet       Cardiac/Vascular    Hypertension  -continue current regimen    Relevant Medications    amLODIPine (NORVASC) 10 MG tablet          Follow up in about 3 months (around 3/3/2021) for Follow up.    Other Orders Placed This Visit:  No orders of the defined types were placed in this encounter.

## 2020-12-18 ENCOUNTER — PATIENT OUTREACH (OUTPATIENT)
Dept: ADMINISTRATIVE | Facility: OTHER | Age: 70
End: 2020-12-18

## 2020-12-22 ENCOUNTER — OFFICE VISIT (OUTPATIENT)
Dept: DERMATOLOGY | Facility: CLINIC | Age: 70
End: 2020-12-22
Payer: MEDICARE

## 2020-12-22 DIAGNOSIS — H00.011 HORDEOLUM EXTERNUM OF RIGHT UPPER EYELID: ICD-10-CM

## 2020-12-22 DIAGNOSIS — L91.0 HYPERTROPHIC SCAR: Primary | ICD-10-CM

## 2020-12-22 DIAGNOSIS — L91.8 SKIN TAG: ICD-10-CM

## 2020-12-22 PROCEDURE — 99214 OFFICE O/P EST MOD 30 MIN: CPT | Mod: 95,,, | Performed by: DERMATOLOGY

## 2020-12-22 PROCEDURE — 99214 PR OFFICE/OUTPT VISIT, EST, LEVL IV, 30-39 MIN: ICD-10-PCS | Mod: 95,,, | Performed by: DERMATOLOGY

## 2020-12-22 RX ORDER — DOXYCYCLINE 100 MG/1
1 TABLET ORAL 2 TIMES DAILY
Qty: 20 TABLET | Refills: 0 | Status: SHIPPED | OUTPATIENT
Start: 2020-12-22 | End: 2021-01-01

## 2020-12-22 NOTE — PROGRESS NOTES
Subjective:       Patient ID:  Amanda Holt is a 70 y.o. female who presents for   Chief Complaint   Patient presents with    Dermatitis     New spot to right eye, tender, painful, tried warm compress with some relief, x 2 days, wears contacts    Rash - Initial  Affected locations: right arm  Duration: 6 months  Signs / symptoms: irritated and itching  Severity: mild  Timing: constant  Aggravated by: nothing  Relieving factors/Treatments tried: Rx topical steroids  Improvement on treatment: no relief        Review of Systems   Eyes: Positive for eye irritation and eyelid inflammation.   Skin: Positive for itching and rash.   Hematologic/Lymphatic: Does not bruise/bleed easily.        Objective:    Physical Exam   Constitutional: She appears well-developed and well-nourished. No distress.   Neurological: She is alert and oriented to person, place, and time. She is not disoriented.   Psychiatric: She has a normal mood and affect.   Skin:   Areas Examined (abnormalities noted in diagram):   Scalp / Hair Palpated and Inspected  Head / Face Inspection Performed  Neck Inspection Performed  Chest / Axilla Inspection Performed  Abdomen Inspection Performed  RUE Inspected  LUE Inspection Performed                   Diagram Legend     Erythematous scaling macule/papule c/w actinic keratosis       Vascular papule c/w angioma      Pigmented verrucoid papule/plaque c/w seborrheic keratosis      Yellow umbilicated papule c/w sebaceous hyperplasia      Irregularly shaped tan macule c/w lentigo     1-2 mm smooth white papules consistent with Milia      Movable subcutaneous cyst with punctum c/w epidermal inclusion cyst      Subcutaneous movable cyst c/w pilar cyst      Firm pink to brown papule c/w dermatofibroma      Pedunculated fleshy papule(s) c/w skin tag(s)      Evenly pigmented macule c/w junctional nevus     Mildly variegated pigmented, slightly irregular-bordered macule c/w mildly atypical nevus      Flesh colored to  evenly pigmented papule c/w intradermal nevus       Pink pearly papule/plaque c/w basal cell carcinoma      Erythematous hyperkeratotic cursted plaque c/w SCC      Surgical scar with no sign of skin cancer recurrence      Open and closed comedones      Inflammatory papules and pustules      Verrucoid papule consistent consistent with wart     Erythematous eczematous patches and plaques     Dystrophic onycholytic nail with subungual debris c/w onychomycosis     Umbilicated papule    Erythematous-base heme-crusted tan verrucoid plaque consistent with inflamed seborrheic keratosis     Erythematous Silvery Scaling Plaque c/w Psoriasis     See annotation              Assessment / Plan:        Hypertrophic scar  - previous biopsy site, will plan to inject ILK at next visit       Hordeolum externum of right upper eyelid  -     doxycycline monohydrate 100 mg Tab; Take 1 tablet (100 mg total) by mouth 2 (two) times daily. for 10 days  Dispense: 20 tablet; Refill: 0  Doxycycline may cause GI discomfort, esophageal irritation/ulceration, and increased sun sensitivity. Patient was counseled to take medicine with meals and at least 1 hour before lying down.              No follow-ups on file.

## 2021-01-07 ENCOUNTER — TELEPHONE (OUTPATIENT)
Dept: DERMATOLOGY | Facility: CLINIC | Age: 71
End: 2021-01-07

## 2021-01-26 ENCOUNTER — PATIENT MESSAGE (OUTPATIENT)
Dept: PAIN MEDICINE | Facility: CLINIC | Age: 71
End: 2021-01-26

## 2021-01-26 ENCOUNTER — PATIENT MESSAGE (OUTPATIENT)
Dept: OPTOMETRY | Facility: CLINIC | Age: 71
End: 2021-01-26

## 2021-01-27 ENCOUNTER — OFFICE VISIT (OUTPATIENT)
Dept: PAIN MEDICINE | Facility: CLINIC | Age: 71
End: 2021-01-27
Payer: MEDICARE

## 2021-01-27 VITALS
HEIGHT: 64 IN | DIASTOLIC BLOOD PRESSURE: 77 MMHG | SYSTOLIC BLOOD PRESSURE: 144 MMHG | WEIGHT: 249.81 LBS | OXYGEN SATURATION: 95 % | HEART RATE: 99 BPM | BODY MASS INDEX: 42.65 KG/M2

## 2021-01-27 DIAGNOSIS — M47.816 LUMBAR SPONDYLOSIS: ICD-10-CM

## 2021-01-27 DIAGNOSIS — M54.16 LUMBAR RADICULOPATHY: ICD-10-CM

## 2021-01-27 DIAGNOSIS — Z01.818 PRE-OP TESTING: ICD-10-CM

## 2021-01-27 DIAGNOSIS — M51.36 DDD (DEGENERATIVE DISC DISEASE), LUMBAR: Primary | ICD-10-CM

## 2021-01-27 PROCEDURE — 99499 RISK ADDL DX/OHS AUDIT: ICD-10-PCS | Mod: S$GLB,,, | Performed by: PAIN MEDICINE

## 2021-01-27 PROCEDURE — 99499 UNLISTED E&M SERVICE: CPT | Mod: S$GLB,,, | Performed by: PAIN MEDICINE

## 2021-01-27 PROCEDURE — 3008F BODY MASS INDEX DOCD: CPT | Mod: CPTII,S$GLB,, | Performed by: PAIN MEDICINE

## 2021-01-27 PROCEDURE — 1159F PR MEDICATION LIST DOCUMENTED IN MEDICAL RECORD: ICD-10-PCS | Mod: S$GLB,,, | Performed by: PAIN MEDICINE

## 2021-01-27 PROCEDURE — 3008F PR BODY MASS INDEX (BMI) DOCUMENTED: ICD-10-PCS | Mod: CPTII,S$GLB,, | Performed by: PAIN MEDICINE

## 2021-01-27 PROCEDURE — 99213 PR OFFICE/OUTPT VISIT, EST, LEVL III, 20-29 MIN: ICD-10-PCS | Mod: S$GLB,,, | Performed by: PAIN MEDICINE

## 2021-01-27 PROCEDURE — 1101F PR PT FALLS ASSESS DOC 0-1 FALLS W/OUT INJ PAST YR: ICD-10-PCS | Mod: CPTII,S$GLB,, | Performed by: PAIN MEDICINE

## 2021-01-27 PROCEDURE — 1125F PR PAIN SEVERITY QUANTIFIED, PAIN PRESENT: ICD-10-PCS | Mod: S$GLB,,, | Performed by: PAIN MEDICINE

## 2021-01-27 PROCEDURE — 1125F AMNT PAIN NOTED PAIN PRSNT: CPT | Mod: S$GLB,,, | Performed by: PAIN MEDICINE

## 2021-01-27 PROCEDURE — 3288F FALL RISK ASSESSMENT DOCD: CPT | Mod: CPTII,S$GLB,, | Performed by: PAIN MEDICINE

## 2021-01-27 PROCEDURE — 3077F PR MOST RECENT SYSTOLIC BLOOD PRESSURE >= 140 MM HG: ICD-10-PCS | Mod: CPTII,S$GLB,, | Performed by: PAIN MEDICINE

## 2021-01-27 PROCEDURE — 99999 PR PBB SHADOW E&M-EST. PATIENT-LVL IV: CPT | Mod: PBBFAC,,, | Performed by: PAIN MEDICINE

## 2021-01-27 PROCEDURE — 99213 OFFICE O/P EST LOW 20 MIN: CPT | Mod: S$GLB,,, | Performed by: PAIN MEDICINE

## 2021-01-27 PROCEDURE — 3077F SYST BP >= 140 MM HG: CPT | Mod: CPTII,S$GLB,, | Performed by: PAIN MEDICINE

## 2021-01-27 PROCEDURE — 1101F PT FALLS ASSESS-DOCD LE1/YR: CPT | Mod: CPTII,S$GLB,, | Performed by: PAIN MEDICINE

## 2021-01-27 PROCEDURE — 3078F DIAST BP <80 MM HG: CPT | Mod: CPTII,S$GLB,, | Performed by: PAIN MEDICINE

## 2021-01-27 PROCEDURE — 3078F PR MOST RECENT DIASTOLIC BLOOD PRESSURE < 80 MM HG: ICD-10-PCS | Mod: CPTII,S$GLB,, | Performed by: PAIN MEDICINE

## 2021-01-27 PROCEDURE — 3288F PR FALLS RISK ASSESSMENT DOCUMENTED: ICD-10-PCS | Mod: CPTII,S$GLB,, | Performed by: PAIN MEDICINE

## 2021-01-27 PROCEDURE — 1159F MED LIST DOCD IN RCRD: CPT | Mod: S$GLB,,, | Performed by: PAIN MEDICINE

## 2021-01-27 PROCEDURE — 99999 PR PBB SHADOW E&M-EST. PATIENT-LVL IV: ICD-10-PCS | Mod: PBBFAC,,, | Performed by: PAIN MEDICINE

## 2021-02-01 ENCOUNTER — TELEPHONE (OUTPATIENT)
Dept: FAMILY MEDICINE | Facility: CLINIC | Age: 71
End: 2021-02-01

## 2021-02-01 DIAGNOSIS — M48.00 SPINAL STENOSIS, UNSPECIFIED SPINAL REGION: ICD-10-CM

## 2021-02-01 RX ORDER — TRAMADOL HYDROCHLORIDE 50 MG/1
TABLET ORAL
Qty: 60 TABLET | Refills: 0 | Status: SHIPPED | OUTPATIENT
Start: 2021-02-01 | End: 2021-03-03 | Stop reason: SDUPTHER

## 2021-02-02 ENCOUNTER — TELEPHONE (OUTPATIENT)
Dept: FAMILY MEDICINE | Facility: CLINIC | Age: 71
End: 2021-02-02

## 2021-02-04 ENCOUNTER — TELEPHONE (OUTPATIENT)
Dept: PAIN MEDICINE | Facility: CLINIC | Age: 71
End: 2021-02-04

## 2021-02-09 ENCOUNTER — LAB VISIT (OUTPATIENT)
Dept: FAMILY MEDICINE | Facility: CLINIC | Age: 71
End: 2021-02-09
Payer: MEDICARE

## 2021-02-09 DIAGNOSIS — Z01.818 PRE-OP TESTING: ICD-10-CM

## 2021-02-09 PROCEDURE — U0003 INFECTIOUS AGENT DETECTION BY NUCLEIC ACID (DNA OR RNA); SEVERE ACUTE RESPIRATORY SYNDROME CORONAVIRUS 2 (SARS-COV-2) (CORONAVIRUS DISEASE [COVID-19]), AMPLIFIED PROBE TECHNIQUE, MAKING USE OF HIGH THROUGHPUT TECHNOLOGIES AS DESCRIBED BY CMS-2020-01-R: HCPCS

## 2021-02-09 PROCEDURE — U0005 INFEC AGEN DETEC AMPLI PROBE: HCPCS

## 2021-02-10 LAB — SARS-COV-2 RNA RESP QL NAA+PROBE: NOT DETECTED

## 2021-02-11 RX ORDER — ALPRAZOLAM 0.5 MG/1
1 TABLET, ORALLY DISINTEGRATING ORAL ONCE AS NEEDED
Status: CANCELLED | OUTPATIENT
Start: 2021-02-11 | End: 2032-07-10

## 2021-02-12 ENCOUNTER — TELEPHONE (OUTPATIENT)
Dept: FAMILY MEDICINE | Facility: CLINIC | Age: 71
End: 2021-02-12

## 2021-02-12 ENCOUNTER — HOSPITAL ENCOUNTER (OUTPATIENT)
Facility: HOSPITAL | Age: 71
Discharge: HOME | End: 2021-02-12
Attending: PAIN MEDICINE | Admitting: PAIN MEDICINE
Payer: MEDICARE

## 2021-02-12 VITALS
RESPIRATION RATE: 16 BRPM | HEART RATE: 89 BPM | TEMPERATURE: 99 F | DIASTOLIC BLOOD PRESSURE: 88 MMHG | OXYGEN SATURATION: 98 % | SYSTOLIC BLOOD PRESSURE: 175 MMHG

## 2021-02-12 DIAGNOSIS — M51.36 DDD (DEGENERATIVE DISC DISEASE), LUMBAR: Primary | ICD-10-CM

## 2021-02-12 PROCEDURE — 25000003 PHARM REV CODE 250: Performed by: PAIN MEDICINE

## 2021-02-12 PROCEDURE — 64484 NJX AA&/STRD TFRM EPI L/S EA: CPT | Mod: LT | Performed by: PAIN MEDICINE

## 2021-02-12 PROCEDURE — 64483 NJX AA&/STRD TFRM EPI L/S 1: CPT | Mod: LT,,, | Performed by: PAIN MEDICINE

## 2021-02-12 PROCEDURE — 64483 PR EPIDURAL INJ, ANES/STEROID, TRANSFORAMINAL, LUMB/SACR, SNGL LEVL: ICD-10-PCS | Mod: LT,,, | Performed by: PAIN MEDICINE

## 2021-02-12 PROCEDURE — 63600175 PHARM REV CODE 636 W HCPCS: Performed by: PAIN MEDICINE

## 2021-02-12 PROCEDURE — 25500020 PHARM REV CODE 255: Performed by: PAIN MEDICINE

## 2021-02-12 PROCEDURE — 64483 NJX AA&/STRD TFRM EPI L/S 1: CPT | Performed by: PAIN MEDICINE

## 2021-02-12 PROCEDURE — 64484 PRA INJECT ANES/STEROID FORAMEN LUMBAR/SACRAL W IMG GUIDE ,EA ADD LEVEL: ICD-10-PCS | Mod: LT,,, | Performed by: PAIN MEDICINE

## 2021-02-12 PROCEDURE — 64484 NJX AA&/STRD TFRM EPI L/S EA: CPT | Mod: LT,,, | Performed by: PAIN MEDICINE

## 2021-02-12 RX ORDER — LIDOCAINE HYDROCHLORIDE 10 MG/ML
INJECTION, SOLUTION EPIDURAL; INFILTRATION; INTRACAUDAL; PERINEURAL
Status: DISCONTINUED
Start: 2021-02-12 | End: 2021-02-12 | Stop reason: HOSPADM

## 2021-02-12 RX ORDER — DEXAMETHASONE SODIUM PHOSPHATE 10 MG/ML
10 INJECTION INTRAMUSCULAR; INTRAVENOUS ONCE
Status: COMPLETED | OUTPATIENT
Start: 2021-02-12 | End: 2021-02-12

## 2021-02-12 RX ORDER — FENTANYL CITRATE 50 UG/ML
INJECTION, SOLUTION INTRAMUSCULAR; INTRAVENOUS
Status: DISCONTINUED
Start: 2021-02-12 | End: 2021-02-12 | Stop reason: HOSPADM

## 2021-02-12 RX ORDER — LIDOCAINE HYDROCHLORIDE 10 MG/ML
1 INJECTION, SOLUTION EPIDURAL; INFILTRATION; INTRACAUDAL; PERINEURAL ONCE
Status: COMPLETED | OUTPATIENT
Start: 2021-02-12 | End: 2021-02-12

## 2021-02-12 RX ORDER — MIDAZOLAM HYDROCHLORIDE 1 MG/ML
5 INJECTION INTRAMUSCULAR; INTRAVENOUS
Status: DISCONTINUED | OUTPATIENT
Start: 2021-02-12 | End: 2021-02-12 | Stop reason: HOSPADM

## 2021-02-12 RX ORDER — DEXAMETHASONE SODIUM PHOSPHATE 10 MG/ML
INJECTION INTRAMUSCULAR; INTRAVENOUS
Status: DISCONTINUED
Start: 2021-02-12 | End: 2021-02-12 | Stop reason: HOSPADM

## 2021-02-12 RX ORDER — LIDOCAINE HYDROCHLORIDE 20 MG/ML
10 INJECTION, SOLUTION INFILTRATION; PERINEURAL ONCE
Status: COMPLETED | OUTPATIENT
Start: 2021-02-12 | End: 2021-02-12

## 2021-02-12 RX ORDER — MIDAZOLAM HYDROCHLORIDE 1 MG/ML
INJECTION INTRAMUSCULAR; INTRAVENOUS
Status: DISCONTINUED
Start: 2021-02-12 | End: 2021-02-12 | Stop reason: HOSPADM

## 2021-02-12 RX ORDER — SODIUM CHLORIDE 9 MG/ML
INJECTION, SOLUTION INTRAVENOUS CONTINUOUS
Status: DISCONTINUED | OUTPATIENT
Start: 2021-02-12 | End: 2021-02-12 | Stop reason: HOSPADM

## 2021-02-12 RX ORDER — LIDOCAINE HYDROCHLORIDE 20 MG/ML
INJECTION, SOLUTION INFILTRATION; PERINEURAL
Status: DISCONTINUED
Start: 2021-02-12 | End: 2021-02-12 | Stop reason: HOSPADM

## 2021-02-12 RX ORDER — FENTANYL CITRATE 50 UG/ML
100 INJECTION, SOLUTION INTRAMUSCULAR; INTRAVENOUS
Status: DISCONTINUED | OUTPATIENT
Start: 2021-02-12 | End: 2021-02-12 | Stop reason: HOSPADM

## 2021-03-02 ENCOUNTER — OFFICE VISIT (OUTPATIENT)
Dept: PAIN MEDICINE | Facility: CLINIC | Age: 71
End: 2021-03-02
Payer: MEDICARE

## 2021-03-02 VITALS
HEIGHT: 64 IN | HEART RATE: 97 BPM | WEIGHT: 244.5 LBS | SYSTOLIC BLOOD PRESSURE: 133 MMHG | DIASTOLIC BLOOD PRESSURE: 71 MMHG | OXYGEN SATURATION: 97 % | BODY MASS INDEX: 41.74 KG/M2

## 2021-03-02 DIAGNOSIS — G89.29 CHRONIC LEFT-SIDED LOW BACK PAIN WITH SCIATICA, SCIATICA LATERALITY UNSPECIFIED: ICD-10-CM

## 2021-03-02 DIAGNOSIS — M47.816 LUMBAR SPONDYLOSIS: ICD-10-CM

## 2021-03-02 DIAGNOSIS — M54.40 CHRONIC LEFT-SIDED LOW BACK PAIN WITH SCIATICA, SCIATICA LATERALITY UNSPECIFIED: ICD-10-CM

## 2021-03-02 DIAGNOSIS — M51.36 DDD (DEGENERATIVE DISC DISEASE), LUMBAR: ICD-10-CM

## 2021-03-02 DIAGNOSIS — M51.26 LUMBAR HERNIATED DISC: ICD-10-CM

## 2021-03-02 DIAGNOSIS — M54.16 LUMBAR RADICULOPATHY: ICD-10-CM

## 2021-03-02 DIAGNOSIS — M48.00 SPINAL STENOSIS, UNSPECIFIED SPINAL REGION: Primary | ICD-10-CM

## 2021-03-02 PROCEDURE — 3075F PR MOST RECENT SYSTOLIC BLOOD PRESS GE 130-139MM HG: ICD-10-PCS | Mod: CPTII,S$GLB,, | Performed by: REGISTERED NURSE

## 2021-03-02 PROCEDURE — 99213 PR OFFICE/OUTPT VISIT, EST, LEVL III, 20-29 MIN: ICD-10-PCS | Mod: S$GLB,,, | Performed by: REGISTERED NURSE

## 2021-03-02 PROCEDURE — 1125F AMNT PAIN NOTED PAIN PRSNT: CPT | Mod: S$GLB,,, | Performed by: REGISTERED NURSE

## 2021-03-02 PROCEDURE — 1159F MED LIST DOCD IN RCRD: CPT | Mod: S$GLB,,, | Performed by: REGISTERED NURSE

## 2021-03-02 PROCEDURE — 1159F PR MEDICATION LIST DOCUMENTED IN MEDICAL RECORD: ICD-10-PCS | Mod: S$GLB,,, | Performed by: REGISTERED NURSE

## 2021-03-02 PROCEDURE — 3008F PR BODY MASS INDEX (BMI) DOCUMENTED: ICD-10-PCS | Mod: CPTII,S$GLB,, | Performed by: REGISTERED NURSE

## 2021-03-02 PROCEDURE — 99999 PR PBB SHADOW E&M-EST. PATIENT-LVL III: CPT | Mod: PBBFAC,,, | Performed by: REGISTERED NURSE

## 2021-03-02 PROCEDURE — 3008F BODY MASS INDEX DOCD: CPT | Mod: CPTII,S$GLB,, | Performed by: REGISTERED NURSE

## 2021-03-02 PROCEDURE — 3078F DIAST BP <80 MM HG: CPT | Mod: CPTII,S$GLB,, | Performed by: REGISTERED NURSE

## 2021-03-02 PROCEDURE — 99999 PR PBB SHADOW E&M-EST. PATIENT-LVL III: ICD-10-PCS | Mod: PBBFAC,,, | Performed by: REGISTERED NURSE

## 2021-03-02 PROCEDURE — 3288F PR FALLS RISK ASSESSMENT DOCUMENTED: ICD-10-PCS | Mod: CPTII,S$GLB,, | Performed by: REGISTERED NURSE

## 2021-03-02 PROCEDURE — 3078F PR MOST RECENT DIASTOLIC BLOOD PRESSURE < 80 MM HG: ICD-10-PCS | Mod: CPTII,S$GLB,, | Performed by: REGISTERED NURSE

## 2021-03-02 PROCEDURE — 1125F PR PAIN SEVERITY QUANTIFIED, PAIN PRESENT: ICD-10-PCS | Mod: S$GLB,,, | Performed by: REGISTERED NURSE

## 2021-03-02 PROCEDURE — 1101F PR PT FALLS ASSESS DOC 0-1 FALLS W/OUT INJ PAST YR: ICD-10-PCS | Mod: CPTII,S$GLB,, | Performed by: REGISTERED NURSE

## 2021-03-02 PROCEDURE — 99213 OFFICE O/P EST LOW 20 MIN: CPT | Mod: S$GLB,,, | Performed by: REGISTERED NURSE

## 2021-03-02 PROCEDURE — 3288F FALL RISK ASSESSMENT DOCD: CPT | Mod: CPTII,S$GLB,, | Performed by: REGISTERED NURSE

## 2021-03-02 PROCEDURE — 3075F SYST BP GE 130 - 139MM HG: CPT | Mod: CPTII,S$GLB,, | Performed by: REGISTERED NURSE

## 2021-03-02 PROCEDURE — 1101F PT FALLS ASSESS-DOCD LE1/YR: CPT | Mod: CPTII,S$GLB,, | Performed by: REGISTERED NURSE

## 2021-03-02 RX ORDER — IBUPROFEN 800 MG/1
TABLET ORAL
COMMUNITY
End: 2021-03-02

## 2021-03-02 RX ORDER — DOXYCYCLINE 100 MG/1
TABLET ORAL
COMMUNITY
Start: 2021-01-19 | End: 2021-03-02

## 2021-03-03 ENCOUNTER — OFFICE VISIT (OUTPATIENT)
Dept: FAMILY MEDICINE | Facility: CLINIC | Age: 71
End: 2021-03-03
Payer: MEDICARE

## 2021-03-03 VITALS
DIASTOLIC BLOOD PRESSURE: 88 MMHG | OXYGEN SATURATION: 97 % | SYSTOLIC BLOOD PRESSURE: 132 MMHG | BODY MASS INDEX: 41.85 KG/M2 | HEART RATE: 91 BPM | TEMPERATURE: 99 F | HEIGHT: 64 IN | WEIGHT: 245.13 LBS

## 2021-03-03 DIAGNOSIS — I70.0 ATHEROSCLEROSIS OF AORTA: ICD-10-CM

## 2021-03-03 DIAGNOSIS — Z12.31 ENCOUNTER FOR SCREENING MAMMOGRAM FOR BREAST CANCER: ICD-10-CM

## 2021-03-03 DIAGNOSIS — M48.00 SPINAL STENOSIS, UNSPECIFIED SPINAL REGION: Primary | ICD-10-CM

## 2021-03-03 DIAGNOSIS — D69.2 OTHER NONTHROMBOCYTOPENIC PURPURA: ICD-10-CM

## 2021-03-03 DIAGNOSIS — N18.31 STAGE 3A CHRONIC KIDNEY DISEASE: ICD-10-CM

## 2021-03-03 DIAGNOSIS — I20.9 ANGINA PECTORIS: ICD-10-CM

## 2021-03-03 DIAGNOSIS — F33.0 MAJOR DEPRESSIVE DISORDER, RECURRENT, MILD: ICD-10-CM

## 2021-03-03 DIAGNOSIS — M54.16 LUMBAR RADICULOPATHY: ICD-10-CM

## 2021-03-03 PROCEDURE — 3079F PR MOST RECENT DIASTOLIC BLOOD PRESSURE 80-89 MM HG: ICD-10-PCS | Mod: CPTII,S$GLB,, | Performed by: FAMILY MEDICINE

## 2021-03-03 PROCEDURE — 99499 UNLISTED E&M SERVICE: CPT | Mod: S$GLB,,, | Performed by: FAMILY MEDICINE

## 2021-03-03 PROCEDURE — 3008F PR BODY MASS INDEX (BMI) DOCUMENTED: ICD-10-PCS | Mod: CPTII,S$GLB,, | Performed by: FAMILY MEDICINE

## 2021-03-03 PROCEDURE — 1159F MED LIST DOCD IN RCRD: CPT | Mod: S$GLB,,, | Performed by: FAMILY MEDICINE

## 2021-03-03 PROCEDURE — 1125F AMNT PAIN NOTED PAIN PRSNT: CPT | Mod: S$GLB,,, | Performed by: FAMILY MEDICINE

## 2021-03-03 PROCEDURE — 3288F PR FALLS RISK ASSESSMENT DOCUMENTED: ICD-10-PCS | Mod: CPTII,S$GLB,, | Performed by: FAMILY MEDICINE

## 2021-03-03 PROCEDURE — 99214 OFFICE O/P EST MOD 30 MIN: CPT | Mod: S$GLB,,, | Performed by: FAMILY MEDICINE

## 2021-03-03 PROCEDURE — 3008F BODY MASS INDEX DOCD: CPT | Mod: CPTII,S$GLB,, | Performed by: FAMILY MEDICINE

## 2021-03-03 PROCEDURE — 3075F SYST BP GE 130 - 139MM HG: CPT | Mod: CPTII,S$GLB,, | Performed by: FAMILY MEDICINE

## 2021-03-03 PROCEDURE — 1101F PT FALLS ASSESS-DOCD LE1/YR: CPT | Mod: CPTII,S$GLB,, | Performed by: FAMILY MEDICINE

## 2021-03-03 PROCEDURE — 99499 RISK ADDL DX/OHS AUDIT: ICD-10-PCS | Mod: S$GLB,,, | Performed by: FAMILY MEDICINE

## 2021-03-03 PROCEDURE — 1159F PR MEDICATION LIST DOCUMENTED IN MEDICAL RECORD: ICD-10-PCS | Mod: S$GLB,,, | Performed by: FAMILY MEDICINE

## 2021-03-03 PROCEDURE — 99214 PR OFFICE/OUTPT VISIT, EST, LEVL IV, 30-39 MIN: ICD-10-PCS | Mod: S$GLB,,, | Performed by: FAMILY MEDICINE

## 2021-03-03 PROCEDURE — 99999 PR PBB SHADOW E&M-EST. PATIENT-LVL IV: CPT | Mod: PBBFAC,,, | Performed by: FAMILY MEDICINE

## 2021-03-03 PROCEDURE — 3288F FALL RISK ASSESSMENT DOCD: CPT | Mod: CPTII,S$GLB,, | Performed by: FAMILY MEDICINE

## 2021-03-03 PROCEDURE — 1101F PR PT FALLS ASSESS DOC 0-1 FALLS W/OUT INJ PAST YR: ICD-10-PCS | Mod: CPTII,S$GLB,, | Performed by: FAMILY MEDICINE

## 2021-03-03 PROCEDURE — 3075F PR MOST RECENT SYSTOLIC BLOOD PRESS GE 130-139MM HG: ICD-10-PCS | Mod: CPTII,S$GLB,, | Performed by: FAMILY MEDICINE

## 2021-03-03 PROCEDURE — 1125F PR PAIN SEVERITY QUANTIFIED, PAIN PRESENT: ICD-10-PCS | Mod: S$GLB,,, | Performed by: FAMILY MEDICINE

## 2021-03-03 PROCEDURE — 99999 PR PBB SHADOW E&M-EST. PATIENT-LVL IV: ICD-10-PCS | Mod: PBBFAC,,, | Performed by: FAMILY MEDICINE

## 2021-03-03 PROCEDURE — 3079F DIAST BP 80-89 MM HG: CPT | Mod: CPTII,S$GLB,, | Performed by: FAMILY MEDICINE

## 2021-03-03 RX ORDER — PRAVASTATIN SODIUM 40 MG/1
40 TABLET ORAL DAILY
Qty: 90 TABLET | Refills: 3 | Status: SHIPPED | OUTPATIENT
Start: 2021-03-03 | End: 2021-10-21 | Stop reason: SDUPTHER

## 2021-03-03 RX ORDER — AMITRIPTYLINE HYDROCHLORIDE 50 MG/1
TABLET, FILM COATED ORAL
Qty: 90 TABLET | Refills: 1 | Status: SHIPPED | OUTPATIENT
Start: 2021-03-03 | End: 2021-10-21 | Stop reason: SDUPTHER

## 2021-03-03 RX ORDER — DICLOFENAC SODIUM 10 MG/G
GEL TOPICAL 4 TIMES DAILY
Qty: 100 G | Refills: 5 | Status: SHIPPED | OUTPATIENT
Start: 2021-03-03 | End: 2021-07-16 | Stop reason: ALTCHOICE

## 2021-03-03 RX ORDER — TRAMADOL HYDROCHLORIDE 50 MG/1
TABLET ORAL
Qty: 60 TABLET | Refills: 2 | Status: SHIPPED | OUTPATIENT
Start: 2021-03-03 | End: 2021-06-15

## 2021-03-05 ENCOUNTER — HOSPITAL ENCOUNTER (OUTPATIENT)
Dept: RADIOLOGY | Facility: HOSPITAL | Age: 71
Discharge: HOME | End: 2021-03-05
Attending: FAMILY MEDICINE
Payer: MEDICARE

## 2021-03-05 DIAGNOSIS — Z12.31 ENCOUNTER FOR SCREENING MAMMOGRAM FOR BREAST CANCER: ICD-10-CM

## 2021-03-05 PROBLEM — Z97.3 WEARS CONTACT LENSES: Status: ACTIVE | Noted: 2021-03-05

## 2021-03-05 PROCEDURE — 77063 MAMMO DIGITAL SCREENING BILAT WITH TOMO: ICD-10-PCS | Mod: 26,,, | Performed by: RADIOLOGY

## 2021-03-05 PROCEDURE — 77067 SCR MAMMO BI INCL CAD: CPT | Mod: 26,,, | Performed by: RADIOLOGY

## 2021-03-05 PROCEDURE — 77067 MAMMO DIGITAL SCREENING BILAT WITH TOMO: ICD-10-PCS | Mod: 26,,, | Performed by: RADIOLOGY

## 2021-03-05 PROCEDURE — 77067 SCR MAMMO BI INCL CAD: CPT | Mod: TC,PO

## 2021-03-05 PROCEDURE — 77063 BREAST TOMOSYNTHESIS BI: CPT | Mod: 26,,, | Performed by: RADIOLOGY

## 2021-03-08 ENCOUNTER — OFFICE VISIT (OUTPATIENT)
Dept: OPTOMETRY | Facility: CLINIC | Age: 71
End: 2021-03-08
Payer: MEDICARE

## 2021-03-08 DIAGNOSIS — H25.13 NUCLEAR SCLEROSIS OF BOTH EYES: Primary | ICD-10-CM

## 2021-03-08 DIAGNOSIS — H52.7 REFRACTIVE ERROR: ICD-10-CM

## 2021-03-08 DIAGNOSIS — Z97.3 WEARS CONTACT LENSES: ICD-10-CM

## 2021-03-08 DIAGNOSIS — H43.811 POSTERIOR VITREOUS DETACHMENT OF RIGHT EYE: ICD-10-CM

## 2021-03-08 DIAGNOSIS — Z46.0 ENCOUNTER FOR FITTING OR ADJUSTMENT OF SPECTACLES OR CONTACT LENSES: Primary | ICD-10-CM

## 2021-03-08 PROCEDURE — 3288F PR FALLS RISK ASSESSMENT DOCUMENTED: ICD-10-PCS | Mod: CPTII,S$GLB,, | Performed by: OPTOMETRIST

## 2021-03-08 PROCEDURE — 92310 CONTACT LENS FITTING OU: CPT | Mod: CSM,,, | Performed by: OPTOMETRIST

## 2021-03-08 PROCEDURE — 92310 PR CONTACT LENS FITTING (NO CHANGE): ICD-10-PCS | Mod: CSM,,, | Performed by: OPTOMETRIST

## 2021-03-08 PROCEDURE — 92015 PR REFRACTION: ICD-10-PCS | Mod: S$GLB,,, | Performed by: OPTOMETRIST

## 2021-03-08 PROCEDURE — 3288F FALL RISK ASSESSMENT DOCD: CPT | Mod: CPTII,S$GLB,, | Performed by: OPTOMETRIST

## 2021-03-08 PROCEDURE — 1101F PR PT FALLS ASSESS DOC 0-1 FALLS W/OUT INJ PAST YR: ICD-10-PCS | Mod: CPTII,S$GLB,, | Performed by: OPTOMETRIST

## 2021-03-08 PROCEDURE — 92015 DETERMINE REFRACTIVE STATE: CPT | Mod: S$GLB,,, | Performed by: OPTOMETRIST

## 2021-03-08 PROCEDURE — 1101F PT FALLS ASSESS-DOCD LE1/YR: CPT | Mod: CPTII,S$GLB,, | Performed by: OPTOMETRIST

## 2021-03-08 PROCEDURE — 99499 NO LOS: ICD-10-PCS | Mod: S$GLB,,, | Performed by: OPTOMETRIST

## 2021-03-08 PROCEDURE — 92014 COMPRE OPH EXAM EST PT 1/>: CPT | Mod: S$GLB,,, | Performed by: OPTOMETRIST

## 2021-03-08 PROCEDURE — 99499 UNLISTED E&M SERVICE: CPT | Mod: S$GLB,,, | Performed by: OPTOMETRIST

## 2021-03-08 PROCEDURE — 99999 PR PBB SHADOW E&M-EST. PATIENT-LVL III: ICD-10-PCS | Mod: PBBFAC,,, | Performed by: OPTOMETRIST

## 2021-03-08 PROCEDURE — 92014 PR EYE EXAM, EST PATIENT,COMPREHESV: ICD-10-PCS | Mod: S$GLB,,, | Performed by: OPTOMETRIST

## 2021-03-08 PROCEDURE — 99999 PR PBB SHADOW E&M-EST. PATIENT-LVL III: CPT | Mod: PBBFAC,,, | Performed by: OPTOMETRIST

## 2021-04-06 ENCOUNTER — DOCUMENTATION ONLY (OUTPATIENT)
Dept: REHABILITATION | Facility: HOSPITAL | Age: 71
End: 2021-04-06

## 2021-04-07 ENCOUNTER — HOSPITAL ENCOUNTER (OUTPATIENT)
Dept: RADIOLOGY | Facility: HOSPITAL | Age: 71
Discharge: HOME | End: 2021-04-07
Attending: FAMILY MEDICINE
Payer: MEDICARE

## 2021-04-07 DIAGNOSIS — R92.8 ABNORMAL MAMMOGRAM: ICD-10-CM

## 2021-04-07 PROCEDURE — 76642 ULTRASOUND BREAST LIMITED: CPT | Mod: TC,50

## 2021-04-07 PROCEDURE — 76642 US BREAST BILATERAL LIMITED: ICD-10-PCS | Mod: 26,50,, | Performed by: RADIOLOGY

## 2021-04-07 PROCEDURE — 77062 BREAST TOMOSYNTHESIS BI: CPT | Mod: 26,,, | Performed by: RADIOLOGY

## 2021-04-07 PROCEDURE — 77062 MAMMO DIGITAL DIAGNOSTIC BILAT WITH TOMO: ICD-10-PCS | Mod: 26,,, | Performed by: RADIOLOGY

## 2021-04-07 PROCEDURE — 77066 DX MAMMO INCL CAD BI: CPT | Mod: 26,,, | Performed by: RADIOLOGY

## 2021-04-07 PROCEDURE — 77066 MAMMO DIGITAL DIAGNOSTIC BILAT WITH TOMO: ICD-10-PCS | Mod: 26,,, | Performed by: RADIOLOGY

## 2021-04-07 PROCEDURE — 76642 ULTRASOUND BREAST LIMITED: CPT | Mod: 26,50,, | Performed by: RADIOLOGY

## 2021-04-07 PROCEDURE — 77066 DX MAMMO INCL CAD BI: CPT | Mod: TC

## 2021-04-28 DIAGNOSIS — I10 HYPERTENSION, UNSPECIFIED TYPE: ICD-10-CM

## 2021-04-28 DIAGNOSIS — M48.00 SPINAL STENOSIS, UNSPECIFIED SPINAL REGION: ICD-10-CM

## 2021-04-28 DIAGNOSIS — F41.9 ANXIETY: ICD-10-CM

## 2021-04-28 RX ORDER — LOSARTAN POTASSIUM 100 MG/1
TABLET ORAL
Qty: 90 TABLET | Refills: 1 | Status: SHIPPED | OUTPATIENT
Start: 2021-04-28 | End: 2021-11-15

## 2021-04-28 RX ORDER — HYDROXYZINE HYDROCHLORIDE 25 MG/1
TABLET, FILM COATED ORAL
Qty: 45 TABLET | Refills: 1 | Status: SHIPPED | OUTPATIENT
Start: 2021-04-28 | End: 2021-10-21 | Stop reason: SDUPTHER

## 2021-05-03 ENCOUNTER — CLINICAL SUPPORT (OUTPATIENT)
Dept: REHABILITATION | Facility: HOSPITAL | Age: 71
End: 2021-05-03
Attending: FAMILY MEDICINE
Payer: MEDICARE

## 2021-05-03 DIAGNOSIS — M54.40 CHRONIC LEFT-SIDED LOW BACK PAIN WITH SCIATICA, SCIATICA LATERALITY UNSPECIFIED: Primary | ICD-10-CM

## 2021-05-03 DIAGNOSIS — G89.29 CHRONIC LEFT-SIDED LOW BACK PAIN WITH SCIATICA, SCIATICA LATERALITY UNSPECIFIED: Primary | ICD-10-CM

## 2021-05-03 DIAGNOSIS — M48.00 SPINAL STENOSIS, UNSPECIFIED SPINAL REGION: ICD-10-CM

## 2021-05-03 PROCEDURE — 97110 THERAPEUTIC EXERCISES: CPT | Mod: PN

## 2021-05-03 PROCEDURE — 97161 PT EVAL LOW COMPLEX 20 MIN: CPT | Mod: PN

## 2021-05-18 DIAGNOSIS — K21.9 GASTROESOPHAGEAL REFLUX DISEASE: ICD-10-CM

## 2021-05-18 RX ORDER — OMEPRAZOLE 40 MG/1
CAPSULE, DELAYED RELEASE ORAL
Qty: 90 CAPSULE | Refills: 1 | Status: SHIPPED | OUTPATIENT
Start: 2021-05-18 | End: 2021-10-21 | Stop reason: SDUPTHER

## 2021-06-01 ENCOUNTER — CLINICAL SUPPORT (OUTPATIENT)
Dept: REHABILITATION | Facility: HOSPITAL | Age: 71
End: 2021-06-01
Attending: FAMILY MEDICINE
Payer: MEDICARE

## 2021-06-01 DIAGNOSIS — M47.816 LUMBAR SPONDYLOSIS: ICD-10-CM

## 2021-06-01 DIAGNOSIS — M48.00 SPINAL STENOSIS, UNSPECIFIED SPINAL REGION: ICD-10-CM

## 2021-06-01 DIAGNOSIS — M54.16 LUMBAR RADICULOPATHY: Primary | ICD-10-CM

## 2021-06-01 PROCEDURE — 97110 THERAPEUTIC EXERCISES: CPT | Mod: PN

## 2021-06-14 DIAGNOSIS — M48.00 SPINAL STENOSIS, UNSPECIFIED SPINAL REGION: ICD-10-CM

## 2021-06-15 RX ORDER — TRAMADOL HYDROCHLORIDE 50 MG/1
TABLET ORAL
Qty: 60 TABLET | Refills: 0 | Status: SHIPPED | OUTPATIENT
Start: 2021-06-15 | End: 2021-07-21 | Stop reason: SDUPTHER

## 2021-06-16 ENCOUNTER — IMMUNIZATION (OUTPATIENT)
Dept: OBSTETRICS AND GYNECOLOGY | Facility: CLINIC | Age: 71
End: 2021-06-16
Payer: MEDICARE

## 2021-06-16 DIAGNOSIS — Z23 NEED FOR VACCINATION: Primary | ICD-10-CM

## 2021-06-16 PROCEDURE — 91300 COVID-19, MRNA, LNP-S, PF, 30 MCG/0.3 ML DOSE VACCINE: CPT | Mod: PBBFAC | Performed by: FAMILY MEDICINE

## 2021-07-06 ENCOUNTER — IMMUNIZATION (OUTPATIENT)
Dept: OBSTETRICS AND GYNECOLOGY | Facility: CLINIC | Age: 71
End: 2021-07-06
Payer: MEDICARE

## 2021-07-06 DIAGNOSIS — Z23 NEED FOR VACCINATION: Primary | ICD-10-CM

## 2021-07-06 PROCEDURE — 0002A COVID-19, MRNA, LNP-S, PF, 30 MCG/0.3 ML DOSE VACCINE: CPT | Mod: PBBFAC | Performed by: FAMILY MEDICINE

## 2021-07-06 PROCEDURE — 91300 COVID-19, MRNA, LNP-S, PF, 30 MCG/0.3 ML DOSE VACCINE: CPT | Mod: PBBFAC | Performed by: FAMILY MEDICINE

## 2021-07-14 ENCOUNTER — TELEPHONE (OUTPATIENT)
Dept: FAMILY MEDICINE | Facility: CLINIC | Age: 71
End: 2021-07-14

## 2021-07-16 ENCOUNTER — HOSPITAL ENCOUNTER (EMERGENCY)
Facility: HOSPITAL | Age: 71
Discharge: HOME | End: 2021-07-16
Attending: EMERGENCY MEDICINE
Payer: MEDICARE

## 2021-07-16 ENCOUNTER — TELEPHONE (OUTPATIENT)
Dept: FAMILY MEDICINE | Facility: CLINIC | Age: 71
End: 2021-07-16

## 2021-07-16 VITALS
TEMPERATURE: 99 F | DIASTOLIC BLOOD PRESSURE: 91 MMHG | BODY MASS INDEX: 41.32 KG/M2 | RESPIRATION RATE: 18 BRPM | HEART RATE: 101 BPM | OXYGEN SATURATION: 99 % | SYSTOLIC BLOOD PRESSURE: 195 MMHG | WEIGHT: 242 LBS | HEIGHT: 64 IN
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
## 2021-07-16 DIAGNOSIS — M25.561 RIGHT KNEE PAIN: ICD-10-CM

## 2021-07-16 PROCEDURE — 99285 EMERGENCY DEPT VISIT HI MDM: CPT | Mod: ER

## 2021-07-16 PROCEDURE — 25000003 PHARM REV CODE 250: Mod: ER | Performed by: NURSE PRACTITIONER

## 2021-07-16 RX ORDER — HYDROCODONE BITARTRATE AND ACETAMINOPHEN 5; 325 MG/1; MG/1
1 TABLET ORAL
Status: COMPLETED | OUTPATIENT
Start: 2021-07-16 | End: 2021-07-16

## 2021-07-16 RX ORDER — SULINDAC 150 MG/1
150 TABLET ORAL 2 TIMES DAILY
Qty: 10 TABLET | Refills: 0 | Status: SHIPPED | OUTPATIENT
Start: 2021-07-16 | End: 2021-10-21

## 2021-07-16 RX ORDER — CYCLOBENZAPRINE HCL 10 MG
10 TABLET ORAL 3 TIMES DAILY PRN
Qty: 15 TABLET | Refills: 0 | Status: SHIPPED | OUTPATIENT
Start: 2021-07-16 | End: 2021-07-21

## 2021-07-16 RX ADMIN — HYDROCODONE BITARTRATE AND ACETAMINOPHEN 1 TABLET: 5; 325 TABLET ORAL at 03:07

## 2021-07-19 ENCOUNTER — PATIENT MESSAGE (OUTPATIENT)
Dept: ADMINISTRATIVE | Facility: OTHER | Age: 71
End: 2021-07-19

## 2021-07-21 ENCOUNTER — OFFICE VISIT (OUTPATIENT)
Dept: FAMILY MEDICINE | Facility: CLINIC | Age: 71
End: 2021-07-21
Payer: MEDICARE

## 2021-07-21 VITALS
DIASTOLIC BLOOD PRESSURE: 78 MMHG | OXYGEN SATURATION: 97 % | TEMPERATURE: 99 F | SYSTOLIC BLOOD PRESSURE: 132 MMHG | HEART RATE: 102 BPM | WEIGHT: 266.19 LBS | BODY MASS INDEX: 45.44 KG/M2 | HEIGHT: 64 IN

## 2021-07-21 DIAGNOSIS — M54.16 LUMBAR RADICULOPATHY: ICD-10-CM

## 2021-07-21 DIAGNOSIS — M48.00 SPINAL STENOSIS, UNSPECIFIED SPINAL REGION: ICD-10-CM
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
## 2021-07-21 DIAGNOSIS — M25.561 PAIN IN BOTH KNEES, UNSPECIFIED CHRONICITY: ICD-10-CM

## 2021-07-21 DIAGNOSIS — M25.562 PAIN IN BOTH KNEES, UNSPECIFIED CHRONICITY: ICD-10-CM

## 2021-07-21 PROCEDURE — 99214 OFFICE O/P EST MOD 30 MIN: CPT | Mod: S$GLB,,, | Performed by: FAMILY MEDICINE

## 2021-07-21 PROCEDURE — 3288F PR FALLS RISK ASSESSMENT DOCUMENTED: ICD-10-PCS | Mod: CPTII,S$GLB,, | Performed by: FAMILY MEDICINE

## 2021-07-21 PROCEDURE — 99999 PR PBB SHADOW E&M-EST. PATIENT-LVL V: CPT | Mod: PBBFAC,,, | Performed by: FAMILY MEDICINE

## 2021-07-21 PROCEDURE — 3075F PR MOST RECENT SYSTOLIC BLOOD PRESS GE 130-139MM HG: ICD-10-PCS | Mod: CPTII,S$GLB,, | Performed by: FAMILY MEDICINE

## 2021-07-21 PROCEDURE — 99999 PR PBB SHADOW E&M-EST. PATIENT-LVL V: ICD-10-PCS | Mod: PBBFAC,,, | Performed by: FAMILY MEDICINE

## 2021-07-21 PROCEDURE — 1159F MED LIST DOCD IN RCRD: CPT | Mod: CPTII,S$GLB,, | Performed by: FAMILY MEDICINE

## 2021-07-21 PROCEDURE — 3078F DIAST BP <80 MM HG: CPT | Mod: CPTII,S$GLB,, | Performed by: FAMILY MEDICINE

## 2021-07-21 PROCEDURE — 3078F PR MOST RECENT DIASTOLIC BLOOD PRESSURE < 80 MM HG: ICD-10-PCS | Mod: CPTII,S$GLB,, | Performed by: FAMILY MEDICINE

## 2021-07-21 PROCEDURE — 1125F PR PAIN SEVERITY QUANTIFIED, PAIN PRESENT: ICD-10-PCS | Mod: CPTII,S$GLB,, | Performed by: FAMILY MEDICINE

## 2021-07-21 PROCEDURE — 3008F PR BODY MASS INDEX (BMI) DOCUMENTED: ICD-10-PCS | Mod: CPTII,S$GLB,, | Performed by: FAMILY MEDICINE

## 2021-07-21 PROCEDURE — 99214 PR OFFICE/OUTPT VISIT, EST, LEVL IV, 30-39 MIN: ICD-10-PCS | Mod: S$GLB,,, | Performed by: FAMILY MEDICINE

## 2021-07-21 PROCEDURE — 3288F FALL RISK ASSESSMENT DOCD: CPT | Mod: CPTII,S$GLB,, | Performed by: FAMILY MEDICINE

## 2021-07-21 PROCEDURE — 1100F PTFALLS ASSESS-DOCD GE2>/YR: CPT | Mod: CPTII,S$GLB,, | Performed by: FAMILY MEDICINE

## 2021-07-21 PROCEDURE — 1125F AMNT PAIN NOTED PAIN PRSNT: CPT | Mod: CPTII,S$GLB,, | Performed by: FAMILY MEDICINE

## 2021-07-21 PROCEDURE — 1159F PR MEDICATION LIST DOCUMENTED IN MEDICAL RECORD: ICD-10-PCS | Mod: CPTII,S$GLB,, | Performed by: FAMILY MEDICINE

## 2021-07-21 PROCEDURE — 3008F BODY MASS INDEX DOCD: CPT | Mod: CPTII,S$GLB,, | Performed by: FAMILY MEDICINE

## 2021-07-21 PROCEDURE — 1100F PR PT FALLS ASSESS DOC 2+ FALLS/FALL W/INJURY/YR: ICD-10-PCS | Mod: CPTII,S$GLB,, | Performed by: FAMILY MEDICINE

## 2021-07-21 PROCEDURE — 3075F SYST BP GE 130 - 139MM HG: CPT | Mod: CPTII,S$GLB,, | Performed by: FAMILY MEDICINE

## 2021-07-21 RX ORDER — DICLOFENAC SODIUM 10 MG/G
2 GEL TOPICAL
COMMUNITY
Start: 2021-03-03 | End: 2022-04-19

## 2021-07-21 RX ORDER — ERGOCALCIFEROL 1.25 MG/1
50000 CAPSULE ORAL
COMMUNITY
Start: 2021-07-20 | End: 2021-09-18

## 2021-07-21 RX ORDER — TRAMADOL HYDROCHLORIDE 50 MG/1
50 TABLET ORAL EVERY 12 HOURS PRN
Qty: 60 TABLET | Refills: 2 | Status: SHIPPED | OUTPATIENT
Start: 2021-07-21 | End: 2021-07-21 | Stop reason: SDUPTHER

## 2021-07-21 RX ORDER — METHYLPREDNISOLONE 4 MG/1
TABLET ORAL
Qty: 1 PACKAGE | Refills: 0 | Status: SHIPPED | OUTPATIENT
Start: 2021-07-21 | End: 2021-07-21 | Stop reason: SDUPTHER

## 2021-07-22 RX ORDER — METHYLPREDNISOLONE 4 MG/1
TABLET ORAL
Qty: 1 PACKAGE | Refills: 0 | Status: SHIPPED | OUTPATIENT
Start: 2021-07-22 | End: 2021-10-21

## 2021-07-22 RX ORDER — TRAMADOL HYDROCHLORIDE 50 MG/1
50 TABLET ORAL EVERY 12 HOURS PRN
Qty: 60 TABLET | Refills: 2 | Status: SHIPPED | OUTPATIENT
Start: 2021-07-22 | End: 2021-10-21 | Stop reason: SDUPTHER

## 2021-08-10 ENCOUNTER — PATIENT MESSAGE (OUTPATIENT)
Dept: FAMILY MEDICINE | Facility: CLINIC | Age: 71
End: 2021-08-10

## 2021-08-11 ENCOUNTER — PATIENT MESSAGE (OUTPATIENT)
Dept: FAMILY MEDICINE | Facility: CLINIC | Age: 71
End: 2021-08-11

## 2021-08-11 ENCOUNTER — TELEPHONE (OUTPATIENT)
Dept: FAMILY MEDICINE | Facility: CLINIC | Age: 71
End: 2021-08-11

## 2021-08-11 DIAGNOSIS — M25.562 PAIN IN BOTH KNEES, UNSPECIFIED CHRONICITY: Primary | ICD-10-CM

## 2021-08-11 DIAGNOSIS — M25.561 PAIN IN BOTH KNEES, UNSPECIFIED CHRONICITY: Primary | ICD-10-CM

## 2021-10-21 ENCOUNTER — OFFICE VISIT (OUTPATIENT)
Dept: FAMILY MEDICINE | Facility: CLINIC | Age: 71
End: 2021-10-21
Payer: MEDICARE

## 2021-10-21 ENCOUNTER — LAB VISIT (OUTPATIENT)
Dept: LAB | Facility: HOSPITAL | Age: 71
End: 2021-10-21
Attending: FAMILY MEDICINE
Payer: MEDICARE

## 2021-10-21 VITALS
WEIGHT: 257.81 LBS | DIASTOLIC BLOOD PRESSURE: 78 MMHG | HEIGHT: 64 IN | BODY MASS INDEX: 44.01 KG/M2 | SYSTOLIC BLOOD PRESSURE: 132 MMHG | TEMPERATURE: 98 F

## 2021-10-21 DIAGNOSIS — F41.9 ANXIETY: ICD-10-CM

## 2021-10-21 DIAGNOSIS — M48.00 SPINAL STENOSIS, UNSPECIFIED SPINAL REGION: ICD-10-CM

## 2021-10-21 DIAGNOSIS — Z72.0 TOBACCO USE: ICD-10-CM

## 2021-10-21 DIAGNOSIS — E87.6 LOW BLOOD POTASSIUM: ICD-10-CM

## 2021-10-21 DIAGNOSIS — K21.9 GASTROESOPHAGEAL REFLUX DISEASE WITHOUT ESOPHAGITIS: ICD-10-CM

## 2021-10-21 DIAGNOSIS — Z13.6 ENCOUNTER FOR LIPID SCREENING FOR CARDIOVASCULAR DISEASE: ICD-10-CM

## 2021-10-21 DIAGNOSIS — I10 HYPERTENSION, UNSPECIFIED TYPE: ICD-10-CM

## 2021-10-21 DIAGNOSIS — I70.0 ATHEROSCLEROSIS OF AORTA: ICD-10-CM

## 2021-10-21 DIAGNOSIS — G47.33 OBSTRUCTIVE SLEEP APNEA SYNDROME: ICD-10-CM

## 2021-10-21 DIAGNOSIS — Z13.220 ENCOUNTER FOR LIPID SCREENING FOR CARDIOVASCULAR DISEASE: ICD-10-CM

## 2021-10-21 DIAGNOSIS — G89.29 CHRONIC LEFT-SIDED LOW BACK PAIN WITH SCIATICA, SCIATICA LATERALITY UNSPECIFIED: Primary | ICD-10-CM

## 2021-10-21 DIAGNOSIS — M54.40 CHRONIC LEFT-SIDED LOW BACK PAIN WITH SCIATICA, SCIATICA LATERALITY UNSPECIFIED: Primary | ICD-10-CM

## 2021-10-21 LAB
CHOLEST SERPL-MCNC: 151 MG/DL (ref 120–199)
CHOLEST/HDLC SERPL: 3.2 {RATIO} (ref 2–5)
HDLC SERPL-MCNC: 47 MG/DL (ref 40–75)
HDLC SERPL: 31.1 % (ref 20–50)
LDLC SERPL CALC-MCNC: 82.8 MG/DL (ref 63–159)
NONHDLC SERPL-MCNC: 104 MG/DL
TRIGL SERPL-MCNC: 106 MG/DL (ref 30–150)

## 2021-10-21 PROCEDURE — 3078F PR MOST RECENT DIASTOLIC BLOOD PRESSURE < 80 MM HG: ICD-10-PCS | Mod: CPTII,S$GLB,, | Performed by: FAMILY MEDICINE

## 2021-10-21 PROCEDURE — 3075F PR MOST RECENT SYSTOLIC BLOOD PRESS GE 130-139MM HG: ICD-10-PCS | Mod: CPTII,S$GLB,, | Performed by: FAMILY MEDICINE

## 2021-10-21 PROCEDURE — 3008F BODY MASS INDEX DOCD: CPT | Mod: CPTII,S$GLB,, | Performed by: FAMILY MEDICINE

## 2021-10-21 PROCEDURE — 36415 COLL VENOUS BLD VENIPUNCTURE: CPT | Mod: PO | Performed by: FAMILY MEDICINE

## 2021-10-21 PROCEDURE — 1125F AMNT PAIN NOTED PAIN PRSNT: CPT | Mod: CPTII,S$GLB,, | Performed by: FAMILY MEDICINE

## 2021-10-21 PROCEDURE — 99999 PR PBB SHADOW E&M-EST. PATIENT-LVL IV: ICD-10-PCS | Mod: PBBFAC,,, | Performed by: FAMILY MEDICINE

## 2021-10-21 PROCEDURE — 4010F ACE/ARB THERAPY RXD/TAKEN: CPT | Mod: CPTII,S$GLB,, | Performed by: FAMILY MEDICINE

## 2021-10-21 PROCEDURE — 80061 LIPID PANEL: CPT | Performed by: FAMILY MEDICINE

## 2021-10-21 PROCEDURE — 99499 RISK ADDL DX/OHS AUDIT: ICD-10-PCS | Mod: S$GLB,,, | Performed by: FAMILY MEDICINE

## 2021-10-21 PROCEDURE — 4010F PR ACE/ARB THEARPY RXD/TAKEN: ICD-10-PCS | Mod: CPTII,S$GLB,, | Performed by: FAMILY MEDICINE

## 2021-10-21 PROCEDURE — 3075F SYST BP GE 130 - 139MM HG: CPT | Mod: CPTII,S$GLB,, | Performed by: FAMILY MEDICINE

## 2021-10-21 PROCEDURE — 99499 UNLISTED E&M SERVICE: CPT | Mod: S$GLB,,, | Performed by: FAMILY MEDICINE

## 2021-10-21 PROCEDURE — 1159F PR MEDICATION LIST DOCUMENTED IN MEDICAL RECORD: ICD-10-PCS | Mod: CPTII,S$GLB,, | Performed by: FAMILY MEDICINE

## 2021-10-21 PROCEDURE — 99214 PR OFFICE/OUTPT VISIT, EST, LEVL IV, 30-39 MIN: ICD-10-PCS | Mod: S$GLB,,, | Performed by: FAMILY MEDICINE

## 2021-10-21 PROCEDURE — 1125F PR PAIN SEVERITY QUANTIFIED, PAIN PRESENT: ICD-10-PCS | Mod: CPTII,S$GLB,, | Performed by: FAMILY MEDICINE

## 2021-10-21 PROCEDURE — 1159F MED LIST DOCD IN RCRD: CPT | Mod: CPTII,S$GLB,, | Performed by: FAMILY MEDICINE

## 2021-10-21 PROCEDURE — 3008F PR BODY MASS INDEX (BMI) DOCUMENTED: ICD-10-PCS | Mod: CPTII,S$GLB,, | Performed by: FAMILY MEDICINE

## 2021-10-21 PROCEDURE — 99999 PR PBB SHADOW E&M-EST. PATIENT-LVL IV: CPT | Mod: PBBFAC,,, | Performed by: FAMILY MEDICINE

## 2021-10-21 PROCEDURE — 99214 OFFICE O/P EST MOD 30 MIN: CPT | Mod: S$GLB,,, | Performed by: FAMILY MEDICINE

## 2021-10-21 PROCEDURE — 3078F DIAST BP <80 MM HG: CPT | Mod: CPTII,S$GLB,, | Performed by: FAMILY MEDICINE

## 2021-10-21 RX ORDER — TRAMADOL HYDROCHLORIDE 50 MG/1
50 TABLET ORAL EVERY 12 HOURS PRN
Qty: 60 TABLET | Refills: 2 | Status: SHIPPED | OUTPATIENT
Start: 2021-10-21 | End: 2022-02-16 | Stop reason: SDUPTHER

## 2021-10-21 RX ORDER — SULINDAC 150 MG/1
150 TABLET ORAL 2 TIMES DAILY
Qty: 10 TABLET | Refills: 0 | Status: CANCELLED | OUTPATIENT
Start: 2021-10-21

## 2021-10-21 RX ORDER — HYDROXYZINE HYDROCHLORIDE 25 MG/1
25 TABLET, FILM COATED ORAL 3 TIMES DAILY PRN
Qty: 45 TABLET | Refills: 1 | Status: SHIPPED | OUTPATIENT
Start: 2021-10-21 | End: 2022-08-22

## 2021-10-21 RX ORDER — AMITRIPTYLINE HYDROCHLORIDE 50 MG/1
TABLET, FILM COATED ORAL
Qty: 90 TABLET | Refills: 1 | Status: SHIPPED | OUTPATIENT
Start: 2021-10-21 | End: 2022-08-22

## 2021-10-21 RX ORDER — OMEPRAZOLE 40 MG/1
40 CAPSULE, DELAYED RELEASE ORAL DAILY
Qty: 90 CAPSULE | Refills: 1 | Status: SHIPPED | OUTPATIENT
Start: 2021-10-21 | End: 2022-04-14

## 2021-10-21 RX ORDER — TRAZODONE HYDROCHLORIDE 100 MG/1
100 TABLET ORAL NIGHTLY PRN
Qty: 90 TABLET | Refills: 1 | Status: SHIPPED | OUTPATIENT
Start: 2021-10-21 | End: 2022-08-22

## 2021-10-21 RX ORDER — DICLOFENAC SODIUM 10 MG/G
2 GEL TOPICAL DAILY
Status: CANCELLED | OUTPATIENT
Start: 2021-10-21

## 2021-10-21 RX ORDER — SPIRONOLACTONE 25 MG/1
25 TABLET ORAL DAILY
Qty: 90 TABLET | Refills: 1 | Status: SHIPPED | OUTPATIENT
Start: 2021-10-21 | End: 2022-08-12 | Stop reason: SDUPTHER

## 2021-10-21 RX ORDER — PRAVASTATIN SODIUM 40 MG/1
40 TABLET ORAL DAILY
Qty: 90 TABLET | Refills: 3 | Status: SHIPPED | OUTPATIENT
Start: 2021-10-21 | End: 2022-09-27

## 2021-10-21 RX ORDER — AMLODIPINE BESYLATE 10 MG/1
10 TABLET ORAL DAILY
Qty: 90 TABLET | Refills: 1 | Status: SHIPPED | OUTPATIENT
Start: 2021-10-21 | End: 2022-05-16

## 2021-11-02 ENCOUNTER — PATIENT MESSAGE (OUTPATIENT)
Dept: FAMILY MEDICINE | Facility: CLINIC | Age: 71
End: 2021-11-02
Payer: MEDICARE

## 2021-11-02 DIAGNOSIS — M48.00 SPINAL STENOSIS, UNSPECIFIED SPINAL REGION: Primary | ICD-10-CM

## 2021-11-12 DIAGNOSIS — I10 HYPERTENSION, UNSPECIFIED TYPE: ICD-10-CM

## 2021-11-12 DIAGNOSIS — M48.00 SPINAL STENOSIS, UNSPECIFIED SPINAL REGION: ICD-10-CM

## 2021-11-15 RX ORDER — LOSARTAN POTASSIUM 100 MG/1
TABLET ORAL
Qty: 90 TABLET | Refills: 1 | Status: SHIPPED | OUTPATIENT
Start: 2021-11-15 | End: 2022-04-26 | Stop reason: SDUPTHER

## 2021-12-21 ENCOUNTER — PES CALL (OUTPATIENT)
Dept: ADMINISTRATIVE | Facility: CLINIC | Age: 71
End: 2021-12-21
Payer: MEDICARE

## 2021-12-28 ENCOUNTER — PATIENT MESSAGE (OUTPATIENT)
Dept: FAMILY MEDICINE | Facility: CLINIC | Age: 71
End: 2021-12-28
Payer: MEDICARE

## 2022-01-03 ENCOUNTER — OFFICE VISIT (OUTPATIENT)
Dept: FAMILY MEDICINE | Facility: CLINIC | Age: 72
End: 2022-01-03
Payer: MEDICARE

## 2022-01-03 VITALS
TEMPERATURE: 99 F | WEIGHT: 250.75 LBS | HEIGHT: 64 IN | DIASTOLIC BLOOD PRESSURE: 76 MMHG | SYSTOLIC BLOOD PRESSURE: 126 MMHG | BODY MASS INDEX: 42.81 KG/M2

## 2022-01-03 DIAGNOSIS — E66.01 CLASS 3 SEVERE OBESITY DUE TO EXCESS CALORIES WITH SERIOUS COMORBIDITY AND BODY MASS INDEX (BMI) OF 40.0 TO 44.9 IN ADULT: ICD-10-CM

## 2022-01-03 DIAGNOSIS — I70.0 ATHEROSCLEROSIS OF AORTA: ICD-10-CM

## 2022-01-03 DIAGNOSIS — D69.2 OTHER NONTHROMBOCYTOPENIC PURPURA: ICD-10-CM

## 2022-01-03 DIAGNOSIS — Z00.00 ENCOUNTER FOR PREVENTIVE HEALTH EXAMINATION: Primary | ICD-10-CM

## 2022-01-03 DIAGNOSIS — M51.36 DDD (DEGENERATIVE DISC DISEASE), LUMBAR: ICD-10-CM

## 2022-01-03 DIAGNOSIS — K21.9 GASTROESOPHAGEAL REFLUX DISEASE WITHOUT ESOPHAGITIS: ICD-10-CM

## 2022-01-03 DIAGNOSIS — I20.9 ANGINA PECTORIS: ICD-10-CM

## 2022-01-03 DIAGNOSIS — H91.93 BILATERAL HEARING LOSS, UNSPECIFIED HEARING LOSS TYPE: ICD-10-CM

## 2022-01-03 DIAGNOSIS — I10 PRIMARY HYPERTENSION: ICD-10-CM

## 2022-01-03 DIAGNOSIS — F33.0 MAJOR DEPRESSIVE DISORDER, RECURRENT, MILD: ICD-10-CM

## 2022-01-03 DIAGNOSIS — N18.31 STAGE 3A CHRONIC KIDNEY DISEASE: ICD-10-CM

## 2022-01-03 DIAGNOSIS — Z72.0 TOBACCO USE: ICD-10-CM

## 2022-01-03 PROBLEM — E66.813 CLASS 3 SEVERE OBESITY DUE TO EXCESS CALORIES WITH SERIOUS COMORBIDITY IN ADULT: Status: ACTIVE | Noted: 2022-01-03

## 2022-01-03 PROCEDURE — 3008F PR BODY MASS INDEX (BMI) DOCUMENTED: ICD-10-PCS | Mod: CPTII,S$GLB,, | Performed by: NURSE PRACTITIONER

## 2022-01-03 PROCEDURE — 3078F PR MOST RECENT DIASTOLIC BLOOD PRESSURE < 80 MM HG: ICD-10-PCS | Mod: CPTII,S$GLB,, | Performed by: NURSE PRACTITIONER

## 2022-01-03 PROCEDURE — 1170F PR FUNCTIONAL STATUS ASSESSED: ICD-10-PCS | Mod: CPTII,S$GLB,, | Performed by: NURSE PRACTITIONER

## 2022-01-03 PROCEDURE — 3008F BODY MASS INDEX DOCD: CPT | Mod: CPTII,S$GLB,, | Performed by: NURSE PRACTITIONER

## 2022-01-03 PROCEDURE — 1170F FXNL STATUS ASSESSED: CPT | Mod: CPTII,S$GLB,, | Performed by: NURSE PRACTITIONER

## 2022-01-03 PROCEDURE — 1159F PR MEDICATION LIST DOCUMENTED IN MEDICAL RECORD: ICD-10-PCS | Mod: CPTII,S$GLB,, | Performed by: NURSE PRACTITIONER

## 2022-01-03 PROCEDURE — 1159F MED LIST DOCD IN RCRD: CPT | Mod: CPTII,S$GLB,, | Performed by: NURSE PRACTITIONER

## 2022-01-03 PROCEDURE — 3078F DIAST BP <80 MM HG: CPT | Mod: CPTII,S$GLB,, | Performed by: NURSE PRACTITIONER

## 2022-01-03 PROCEDURE — 1100F PR PT FALLS ASSESS DOC 2+ FALLS/FALL W/INJURY/YR: ICD-10-PCS | Mod: CPTII,S$GLB,, | Performed by: NURSE PRACTITIONER

## 2022-01-03 PROCEDURE — 1100F PTFALLS ASSESS-DOCD GE2>/YR: CPT | Mod: CPTII,S$GLB,, | Performed by: NURSE PRACTITIONER

## 2022-01-03 PROCEDURE — 99499 RISK ADDL DX/OHS AUDIT: ICD-10-PCS | Mod: S$PBB,,, | Performed by: NURSE PRACTITIONER

## 2022-01-03 PROCEDURE — 3074F PR MOST RECENT SYSTOLIC BLOOD PRESSURE < 130 MM HG: ICD-10-PCS | Mod: CPTII,S$GLB,, | Performed by: NURSE PRACTITIONER

## 2022-01-03 PROCEDURE — 3074F SYST BP LT 130 MM HG: CPT | Mod: CPTII,S$GLB,, | Performed by: NURSE PRACTITIONER

## 2022-01-03 PROCEDURE — G0439 PR MEDICARE ANNUAL WELLNESS SUBSEQUENT VISIT: ICD-10-PCS | Mod: S$GLB,,, | Performed by: NURSE PRACTITIONER

## 2022-01-03 PROCEDURE — 1126F AMNT PAIN NOTED NONE PRSNT: CPT | Mod: CPTII,S$GLB,, | Performed by: NURSE PRACTITIONER

## 2022-01-03 PROCEDURE — 99499 UNLISTED E&M SERVICE: CPT | Mod: S$PBB,,, | Performed by: NURSE PRACTITIONER

## 2022-01-03 PROCEDURE — 1160F RVW MEDS BY RX/DR IN RCRD: CPT | Mod: CPTII,S$GLB,, | Performed by: NURSE PRACTITIONER

## 2022-01-03 PROCEDURE — 1160F PR REVIEW ALL MEDS BY PRESCRIBER/CLIN PHARMACIST DOCUMENTED: ICD-10-PCS | Mod: CPTII,S$GLB,, | Performed by: NURSE PRACTITIONER

## 2022-01-03 PROCEDURE — 3288F PR FALLS RISK ASSESSMENT DOCUMENTED: ICD-10-PCS | Mod: CPTII,S$GLB,, | Performed by: NURSE PRACTITIONER

## 2022-01-03 PROCEDURE — 3288F FALL RISK ASSESSMENT DOCD: CPT | Mod: CPTII,S$GLB,, | Performed by: NURSE PRACTITIONER

## 2022-01-03 PROCEDURE — 99999 PR PBB SHADOW E&M-EST. PATIENT-LVL IV: CPT | Mod: PBBFAC,,, | Performed by: NURSE PRACTITIONER

## 2022-01-03 PROCEDURE — 1126F PR PAIN SEVERITY QUANTIFIED, NO PAIN PRESENT: ICD-10-PCS | Mod: CPTII,S$GLB,, | Performed by: NURSE PRACTITIONER

## 2022-01-03 PROCEDURE — 99999 PR PBB SHADOW E&M-EST. PATIENT-LVL IV: ICD-10-PCS | Mod: PBBFAC,,, | Performed by: NURSE PRACTITIONER

## 2022-01-03 PROCEDURE — G0439 PPPS, SUBSEQ VISIT: HCPCS | Mod: S$GLB,,, | Performed by: NURSE PRACTITIONER

## 2022-01-03 NOTE — PROGRESS NOTES
"  Amanda Holt presented for a  Medicare AWV and comprehensive Health Risk Assessment today. The following components were reviewed and updated:    · Medical history  · Family History  · Social history  · Allergies and Current Medications  · Health Risk Assessment  · Health Maintenance  · Care Team       ** See Completed Assessments for Annual Wellness Visit within the encounter summary.**       The following assessments were completed:  · Living Situation  · CAGE  · Depression Screening  · Timed Get Up and Go  · Whisper Test  · Cognitive Function Screening  · Nutrition Screening  · ADL Screening  · PAQ Screening          Vitals:    01/03/22 1345   BP: 126/76   BP Location: Left arm   Patient Position: Sitting   Temp: 98.5 °F (36.9 °C)   TempSrc: Oral   Weight: 113.7 kg (250 lb 12.4 oz)   Height: 5' 4" (1.626 m)     Body mass index is 43.05 kg/m².  Physical Exam  Vitals and nursing note reviewed.   Constitutional:       Appearance: She is obese.   Cardiovascular:      Rate and Rhythm: Normal rate.      Pulses: Normal pulses.      Heart sounds: Normal heart sounds.   Pulmonary:      Effort: Pulmonary effort is normal.      Breath sounds: Normal breath sounds.   Abdominal:      General: Bowel sounds are normal. There is no distension.      Palpations: Abdomen is soft.      Tenderness: There is no abdominal tenderness.   Musculoskeletal:         General: Normal range of motion.      Right lower leg: No edema.      Left lower leg: No edema.   Skin:     General: Skin is warm and dry.   Neurological:      Mental Status: She is alert and oriented to person, place, and time.   Psychiatric:         Mood and Affect: Mood normal.         Behavior: Behavior normal.               Diagnoses and health risks identified today and associated recommendations/orders:    1. Encounter for preventive health examination  Pt was seen today for an Annual Wellness visit. Healthcare maintenance and screening recommendations were discussed " and updated as indicated. Return in one year for AWV.    Review current opioid prescriptions: yes  Screen for potential Substance Use Disorders: yes    2. Major depressive disorder, recurrent, mild  PHQ-2 score of 2 today. Current medical regimen effective, continue current treatment plan. Followed by PCP.    3. Atherosclerosis of aorta  Chronic. CT abd/pelvis 11/19/19. Current medical regimen effective, continue current treatment plan.     4. Angina pectoris  Stable. Current medical regimen effective, continue current treatment plan. Followed by Cardiology.    5. Stage 3a chronic kidney disease  Chronic. Discussed b/p control, diet, hydration, and avoiding NSAIDs. Current medical regimen effective, continue current treatment plan. Followed by Nephrology, PCP.    6. Other nonthrombocytopenic purpura  Current medical regimen effective, continue current treatment plan.     7. Class 3 severe obesity due to excess calories with serious comorbidity and body mass index (BMI) of 40.0 to 44.9 in adult  8. BMI 40.0-44.9, adult  The patient is asked to make an attempt to improve diet and exercise patterns to aid in medical management of this problem.    9. Primary hypertension  Stable. Current medical regimen effective, continue current treatment plan.    10. Gastroesophageal reflux disease without esophagitis  Stable. Current medical regimen effective, continue current treatment plan.     11. DDD (degenerative disc disease), lumbar  Chronic. Current medical regimen effective, continue current treatment plan. Followed by Neurosurgery, Pain Med, PCP.     12. Bilateral hearing loss, unspecified hearing loss type  Chronic. Current medical regimen effective, continue current treatment plan.     13. Tobacco use  I counseled the patient on smoking cessation, risks associated with smoking, having a quit date, nicotine replacement, medications that can aid in cessation, and having a plan for future cravings. The patient stated that  she is not ready to quit smoking at this time. Declined smoking cessation at this time.        Provided Amanda with a 5-10 year written screening schedule and personal prevention plan. Recommendations were developed using the USPSTF age appropriate recommendations. Education, counseling, and referrals were provided as needed. After Visit Summary printed and given to patient which includes a list of additional screenings\tests needed.    Follow up in about 18 days (around 1/21/2022) with provider.    CINDY Franklin  I offered to discuss advanced care planning, including how to pick a person who would make decisions for you if you were unable to make them for yourself, called a health care power of , and what kind of decisions you might make such as use of life sustaining treatments such as ventilators and tube feeding when faced with a life limiting illness recorded on a living will that they will need to know. (How you want to be cared for as you near the end of your natural life)     X Patient is interested in learning more about how to make advanced directives.  I provided them paperwork and offered to discuss this with them.

## 2022-01-03 NOTE — PATIENT INSTRUCTIONS
Counseling and Referral of Other Preventative  (Italic type indicates deductible and co-insurance are waived)    Patient Name: Amanda Holt  Today's Date: 1/3/2022    Health Maintenance       Date Due Completion Date    Pneumococcal Vaccines (Age 65+) (1 of 2 - PPSV23) Never done ---    Shingles Vaccine (1 of 2) Never done ---    COVID-19 Vaccine (3 - Booster for Pfizer series) 01/06/2022 7/6/2021    Influenza Vaccine (1) 06/30/2022 (Originally 9/1/2021) ---    Mammogram 04/07/2022 4/7/2021    TETANUS VACCINE 06/14/2022 6/14/2012 (Done)    Override on 6/14/2012: Done    High Dose Statin 10/21/2022 10/21/2021    DEXA SCAN 11/25/2022 11/25/2019    Colorectal Cancer Screening 07/23/2023 7/23/2020    Lipid Panel 10/21/2026 10/21/2021        No orders of the defined types were placed in this encounter.    The following information is provided to all patients.  This information is to help you find resources for any of the problems found today that may be affecting your health:                Living healthy guide: www.[facility].[place].gov      Understanding Diabetes: www.diabetes.org      Eating healthy: www.cdc.gov/healthyweight      CDC home safety checklist: www.cdc.gov/steadi/patient.html      Agency on Aging: www.goea.[place].[facility]      Alcoholics anonymous (AA): www.aa.org      Physical Activity: www.dhruv.nih.gov/dk3iwgy      Tobacco use: www.quitwithusla.org

## 2022-02-16 ENCOUNTER — TELEPHONE (OUTPATIENT)
Dept: DERMATOLOGY | Facility: CLINIC | Age: 72
End: 2022-02-16
Payer: MEDICARE

## 2022-02-16 ENCOUNTER — OFFICE VISIT (OUTPATIENT)
Dept: FAMILY MEDICINE | Facility: CLINIC | Age: 72
End: 2022-02-16
Payer: MEDICARE

## 2022-02-16 VITALS
BODY MASS INDEX: 42.73 KG/M2 | TEMPERATURE: 99 F | OXYGEN SATURATION: 95 % | WEIGHT: 250.31 LBS | HEART RATE: 108 BPM | RESPIRATION RATE: 16 BRPM | SYSTOLIC BLOOD PRESSURE: 130 MMHG | DIASTOLIC BLOOD PRESSURE: 80 MMHG | HEIGHT: 64 IN

## 2022-02-16 DIAGNOSIS — L72.3 INFLAMED EPIDERMOID CYST OF SKIN: Primary | ICD-10-CM

## 2022-02-16 DIAGNOSIS — Z12.31 ENCOUNTER FOR SCREENING MAMMOGRAM FOR BREAST CANCER: ICD-10-CM

## 2022-02-16 DIAGNOSIS — M48.00 SPINAL STENOSIS, UNSPECIFIED SPINAL REGION: ICD-10-CM

## 2022-02-16 DIAGNOSIS — L30.9 SUBACUTE DERMATITIS: ICD-10-CM

## 2022-02-16 PROCEDURE — 4010F ACE/ARB THERAPY RXD/TAKEN: CPT | Mod: CPTII,S$GLB,ICN, | Performed by: FAMILY MEDICINE

## 2022-02-16 PROCEDURE — 3288F PR FALLS RISK ASSESSMENT DOCUMENTED: ICD-10-PCS | Mod: CPTII,S$GLB,ICN, | Performed by: FAMILY MEDICINE

## 2022-02-16 PROCEDURE — 1101F PR PT FALLS ASSESS DOC 0-1 FALLS W/OUT INJ PAST YR: ICD-10-PCS | Mod: CPTII,S$GLB,ICN, | Performed by: FAMILY MEDICINE

## 2022-02-16 PROCEDURE — 1125F PR PAIN SEVERITY QUANTIFIED, PAIN PRESENT: ICD-10-PCS | Mod: CPTII,S$GLB,ICN, | Performed by: FAMILY MEDICINE

## 2022-02-16 PROCEDURE — 3008F BODY MASS INDEX DOCD: CPT | Mod: CPTII,S$GLB,ICN, | Performed by: FAMILY MEDICINE

## 2022-02-16 PROCEDURE — 3075F SYST BP GE 130 - 139MM HG: CPT | Mod: CPTII,S$GLB,ICN, | Performed by: FAMILY MEDICINE

## 2022-02-16 PROCEDURE — 3288F FALL RISK ASSESSMENT DOCD: CPT | Mod: CPTII,S$GLB,ICN, | Performed by: FAMILY MEDICINE

## 2022-02-16 PROCEDURE — 3075F PR MOST RECENT SYSTOLIC BLOOD PRESS GE 130-139MM HG: ICD-10-PCS | Mod: CPTII,S$GLB,ICN, | Performed by: FAMILY MEDICINE

## 2022-02-16 PROCEDURE — 3079F PR MOST RECENT DIASTOLIC BLOOD PRESSURE 80-89 MM HG: ICD-10-PCS | Mod: CPTII,S$GLB,ICN, | Performed by: FAMILY MEDICINE

## 2022-02-16 PROCEDURE — 1125F AMNT PAIN NOTED PAIN PRSNT: CPT | Mod: CPTII,S$GLB,ICN, | Performed by: FAMILY MEDICINE

## 2022-02-16 PROCEDURE — 1159F MED LIST DOCD IN RCRD: CPT | Mod: CPTII,S$GLB,ICN, | Performed by: FAMILY MEDICINE

## 2022-02-16 PROCEDURE — 1101F PT FALLS ASSESS-DOCD LE1/YR: CPT | Mod: CPTII,S$GLB,ICN, | Performed by: FAMILY MEDICINE

## 2022-02-16 PROCEDURE — 99999 PR PBB SHADOW E&M-EST. PATIENT-LVL V: CPT | Mod: PBBFAC,,, | Performed by: FAMILY MEDICINE

## 2022-02-16 PROCEDURE — 99214 PR OFFICE/OUTPT VISIT, EST, LEVL IV, 30-39 MIN: ICD-10-PCS | Mod: S$GLB,ICN,, | Performed by: FAMILY MEDICINE

## 2022-02-16 PROCEDURE — 99999 PR PBB SHADOW E&M-EST. PATIENT-LVL V: ICD-10-PCS | Mod: PBBFAC,,, | Performed by: FAMILY MEDICINE

## 2022-02-16 PROCEDURE — 3008F PR BODY MASS INDEX (BMI) DOCUMENTED: ICD-10-PCS | Mod: CPTII,S$GLB,ICN, | Performed by: FAMILY MEDICINE

## 2022-02-16 PROCEDURE — 3079F DIAST BP 80-89 MM HG: CPT | Mod: CPTII,S$GLB,ICN, | Performed by: FAMILY MEDICINE

## 2022-02-16 PROCEDURE — 99215 OFFICE O/P EST HI 40 MIN: CPT | Mod: PBBFAC,PO | Performed by: FAMILY MEDICINE

## 2022-02-16 PROCEDURE — 4010F PR ACE/ARB THEARPY RXD/TAKEN: ICD-10-PCS | Mod: CPTII,S$GLB,ICN, | Performed by: FAMILY MEDICINE

## 2022-02-16 PROCEDURE — 99214 OFFICE O/P EST MOD 30 MIN: CPT | Mod: S$GLB,ICN,, | Performed by: FAMILY MEDICINE

## 2022-02-16 PROCEDURE — 1159F PR MEDICATION LIST DOCUMENTED IN MEDICAL RECORD: ICD-10-PCS | Mod: CPTII,S$GLB,ICN, | Performed by: FAMILY MEDICINE

## 2022-02-16 RX ORDER — TRAMADOL HYDROCHLORIDE 50 MG/1
50 TABLET ORAL EVERY 12 HOURS PRN
Qty: 60 TABLET | Refills: 2 | Status: SHIPPED | OUTPATIENT
Start: 2022-02-16 | End: 2022-05-19 | Stop reason: SDUPTHER

## 2022-02-16 RX ORDER — TIZANIDINE 4 MG/1
4 TABLET ORAL EVERY 6 HOURS PRN
Qty: 90 TABLET | Refills: 1 | Status: SHIPPED | OUTPATIENT
Start: 2022-02-16 | End: 2022-02-26

## 2022-02-16 RX ORDER — MUPIROCIN 20 MG/G
OINTMENT TOPICAL 3 TIMES DAILY
Qty: 22 G | Refills: 0 | Status: SHIPPED | OUTPATIENT
Start: 2022-02-16 | End: 2022-12-20 | Stop reason: SDUPTHER

## 2022-02-16 NOTE — PROGRESS NOTES
Chief Complaint   Patient presents with    Back Pain     itching    Itching    Numbness     Left  leg mainly       HPI  Amanda Holt is a 71 y.o. female with a past medical history in the problem list  below     Patient Active Problem List   Diagnosis    Contact lens overwear of both eyes    Refractive error    Nuclear sclerosis of both eyes    Spinal stenosis    Hypertension    Obstructive sleep apnea syndrome    Gastroesophageal reflux disease without esophagitis    CKD (chronic kidney disease)    Chronic low back pain    Bilateral hearing loss    DDD (degenerative disc disease), lumbar    CTS (carpal tunnel syndrome)    Anxiety    Asymptomatic microscopic hematuria    Lumbar radiculopathy    Lumbar spondylosis    Colon cancer screening    Lumbar herniated disc    BMI 40.0-44.9, adult    Other nonthrombocytopenic purpura    Major depressive disorder, recurrent, mild    Angina pectoris    Atherosclerosis of aorta    Stage 3a chronic kidney disease    Wears contact lenses    Posterior vitreous detachment of right eye    Tobacco use    Class 3 severe obesity due to excess calories with serious comorbidity in adult       Presenting for follow-up for chronic pain and itching and burning of her shoulder blade  Started in December  She has not had a rash along with fever or chills  She has been taking atarax but this has not helped    She is still having       ROS  Review of Systems   Constitutional: Negative for chills, diaphoresis, fatigue, fever and unexpected weight change.   HENT: Negative for rhinorrhea, sinus pressure, sore throat and tinnitus.    Eyes: Negative for photophobia and visual disturbance.   Respiratory: Negative for cough, shortness of breath and wheezing.    Cardiovascular: Negative for chest pain and palpitations.   Gastrointestinal: Negative for abdominal pain, blood in stool, constipation, diarrhea, nausea and vomiting.   Genitourinary: Negative for dysuria, flank  "pain, frequency and vaginal discharge.   Musculoskeletal: Positive for arthralgias. Negative for joint swelling.   Skin: Positive for color change and wound. Negative for rash.   Neurological: Negative for speech difficulty, weakness, light-headedness and headaches.   Psychiatric/Behavioral: Negative for behavioral problems and dysphoric mood.       Physical Exam  Vitals:    02/16/22 1117   BP: 130/80   Pulse: 108   Resp: 16   Temp: 98.5 °F (36.9 °C)   TempSrc: Oral   SpO2: 95%   Weight: 113.6 kg (250 lb 5.3 oz)   Height: 5' 4" (1.626 m)    Body mass index is 42.97 kg/m².  Weight: 113.6 kg (250 lb 5.3 oz)   Height: 5' 4" (162.6 cm)     Physical Exam  Vitals and nursing note reviewed.   Constitutional:       Appearance: She is well-developed and well-nourished.   Eyes:      Extraocular Movements: EOM normal.   Skin:     General: Skin is warm and dry.      Findings: No erythema or rash.          Neurological:      Mental Status: She is alert and oriented to person, place, and time.   Psychiatric:         Mood and Affect: Mood and affect normal.         Behavior: Behavior normal.         ALLERGIES AND MEDICATIONS: updated and reviewed.  Review of patient's allergies indicates:   Allergen Reactions    Codeine      Medication List with Changes/Refills   New Medications    MUPIROCIN (BACTROBAN) 2 % OINTMENT    Apply topically 3 (three) times daily.   Current Medications    AMITRIPTYLINE (ELAVIL) 50 MG TABLET    TAKE 1 TABLET BY MOUTH AT BEDTIME    AMLODIPINE (NORVASC) 10 MG TABLET    Take 1 tablet (10 mg total) by mouth once daily.    ASPIRIN (ECOTRIN) 81 MG EC TABLET        DICLOFENAC SODIUM (VOLTAREN) 1 % GEL    Apply 2 g topically.    HYDROXYZINE HCL (ATARAX) 25 MG TABLET    Take 1 tablet (25 mg total) by mouth 3 (three) times daily as needed.    INV HYDROCHLOROTHIAZIDE 25MG TABLET        LOSARTAN (COZAAR) 100 MG TABLET    Take 1 tablet by mouth once daily    OMEPRAZOLE (PRILOSEC) 40 MG CAPSULE    Take 1 capsule (40 " mg total) by mouth once daily.    PRAVASTATIN (PRAVACHOL) 40 MG TABLET    Take 1 tablet (40 mg total) by mouth once daily.    SPIRONOLACTONE (ALDACTONE) 25 MG TABLET    Take 1 tablet (25 mg total) by mouth once daily.    TRAZODONE (DESYREL) 100 MG TABLET    Take 1 tablet (100 mg total) by mouth nightly as needed for Insomnia.    TRIAMCINOLONE ACETONIDE 0.1% (KENALOG) 0.1 % OINTMENT    AAA bid   Changed and/or Refilled Medications    Modified Medication Previous Medication    TIZANIDINE (ZANAFLEX) 4 MG TABLET tiZANidine (ZANAFLEX) 4 MG tablet       Take 1 tablet (4 mg total) by mouth every 6 (six) hours as needed.    Take 1 tablet (4 mg total) by mouth every 6 (six) hours as needed.    TRAMADOL (ULTRAM) 50 MG TABLET traMADoL (ULTRAM) 50 mg tablet       Take 1 tablet (50 mg total) by mouth every 12 (twelve) hours as needed for Pain.    Take 1 tablet (50 mg total) by mouth every 12 (twelve) hours as needed for Pain.       Assessment & Plan  1. Inflamed epidermoid cyst of skin    2. Encounter for screening mammogram for breast cancer    3. Subacute dermatitis    4. BMI 40.0-44.9, adult    5. Spinal stenosis, unspecified spinal region        Problem List Items Addressed This Visit        Neuro    Spinal stenosis  She would like to be referred to the [facility] to see Dr. Ayala    Relevant Medications    traMADoL (ULTRAM) 50 mg tablet    tiZANidine (ZANAFLEX) 4 MG tablet    Other Relevant Orders    Ambulatory referral/consult to Pain Clinic       Endocrine    BMI 40.0-44.9, adult  -The patient is asked to make an attempt to improve diet and exercise patterns to aid in medical management of this problem.   She has lost 7 lbs      Other Visit Diagnoses     Inflamed epidermoid cyst of skin    -  Primary  She has a dermatologist that she is established with and would like to follow up with them for removal of cyst if needed    Relevant Medications    mupirocin (BACTROBAN) 2 % ointment    Encounter for screening mammogram  for breast cancer      as ordered       Relevant Orders    Mammo Digital Screening Bilat w/ Ramesh    Subacute dermatitis      Unsure if this is due to inflammation of her cyst but she does not have any signs of shoulder injury or neuropathy in this area          Follow up in about 3 months (around 5/16/2022) for management of chronic conditions.    Other Orders Placed This Visit:  Orders Placed This Encounter   Procedures    Mammo Digital Screening Bilat w/ Ramesh    Ambulatory referral/consult to Pain Clinic

## 2022-02-16 NOTE — TELEPHONE ENCOUNTER
----- Message from Norma Arias sent at 2/16/2022 11:40 AM CST -----  Type:  Same Day Appointment Request    Caller is requesting a same day appointment.  Caller declined first available appointment listed below.      Name of Caller: Amanda     When is the first available appointment?02/25    Symptoms: pt has a cyst and she said it is itching and burning     Best Call Back Number:[phone]

## 2022-02-17 ENCOUNTER — IMMUNIZATION (OUTPATIENT)
Dept: OBSTETRICS AND GYNECOLOGY | Facility: CLINIC | Age: 72
End: 2022-02-17
Payer: MEDICARE

## 2022-02-17 DIAGNOSIS — Z23 NEED FOR VACCINATION: Primary | ICD-10-CM

## 2022-02-17 PROCEDURE — 91305 COVID-19, MRNA, LNP-S, PF, 30 MCG/0.3 ML DOSE VACCINE (PFIZER): ICD-10-PCS | Mod: S$GLB,,, | Performed by: FAMILY MEDICINE

## 2022-02-17 PROCEDURE — 0054A COVID-19, MRNA, LNP-S, PF, 30 MCG/0.3 ML DOSE VACCINE (PFIZER): ICD-10-PCS | Mod: S$GLB,,, | Performed by: FAMILY MEDICINE

## 2022-02-17 PROCEDURE — 91305 COVID-19, MRNA, LNP-S, PF, 30 MCG/0.3 ML DOSE VACCINE (PFIZER): CPT | Mod: S$GLB,,, | Performed by: FAMILY MEDICINE

## 2022-02-17 PROCEDURE — 0054A COVID-19, MRNA, LNP-S, PF, 30 MCG/0.3 ML DOSE VACCINE (PFIZER): CPT | Mod: S$GLB,,, | Performed by: FAMILY MEDICINE

## 2022-02-18 ENCOUNTER — TELEPHONE (OUTPATIENT)
Dept: FAMILY MEDICINE | Facility: CLINIC | Age: 72
End: 2022-02-18
Payer: MEDICARE

## 2022-02-22 ENCOUNTER — PATIENT OUTREACH (OUTPATIENT)
Dept: ADMINISTRATIVE | Facility: OTHER | Age: 72
End: 2022-02-22
Payer: MEDICARE

## 2022-02-22 NOTE — PROGRESS NOTES
Care Everywhere: updated  Immunization: updated  Health Maintenance: updated  Media Review:   Legacy Review:   DIS:  Order placed:   Upcoming appts:mammogram 4.18   EFAX:  Task Tickets:  Referrals:

## 2022-02-23 ENCOUNTER — OFFICE VISIT (OUTPATIENT)
Dept: PAIN MEDICINE | Facility: CLINIC | Age: 72
End: 2022-02-23
Attending: ANESTHESIOLOGY
Payer: MEDICARE

## 2022-02-23 ENCOUNTER — TELEPHONE (OUTPATIENT)
Dept: ADMINISTRATIVE | Facility: OTHER | Age: 72
End: 2022-02-23
Payer: MEDICARE

## 2022-02-23 ENCOUNTER — PATIENT MESSAGE (OUTPATIENT)
Dept: PAIN MEDICINE | Facility: OTHER | Age: 72
End: 2022-02-23
Payer: MEDICARE

## 2022-02-23 VITALS
WEIGHT: 250 LBS | HEART RATE: 73 BPM | TEMPERATURE: 98 F | BODY MASS INDEX: 42.68 KG/M2 | DIASTOLIC BLOOD PRESSURE: 48 MMHG | RESPIRATION RATE: 18 BRPM | SYSTOLIC BLOOD PRESSURE: 105 MMHG | HEIGHT: 64 IN

## 2022-02-23 DIAGNOSIS — M54.9 DORSALGIA, UNSPECIFIED: ICD-10-CM

## 2022-02-23 DIAGNOSIS — M48.00 SPINAL STENOSIS, UNSPECIFIED SPINAL REGION: ICD-10-CM

## 2022-02-23 DIAGNOSIS — M54.16 LUMBAR RADICULOPATHY: Primary | ICD-10-CM

## 2022-02-23 PROCEDURE — 99999 PR PBB SHADOW E&M-EST. PATIENT-LVL V: CPT | Mod: PBBFAC,,, | Performed by: NURSE PRACTITIONER

## 2022-02-23 PROCEDURE — 1160F PR REVIEW ALL MEDS BY PRESCRIBER/CLIN PHARMACIST DOCUMENTED: ICD-10-PCS | Mod: CPTII,S$GLB,, | Performed by: NURSE PRACTITIONER

## 2022-02-23 PROCEDURE — 1125F PR PAIN SEVERITY QUANTIFIED, PAIN PRESENT: ICD-10-PCS | Mod: CPTII,S$GLB,, | Performed by: NURSE PRACTITIONER

## 2022-02-23 PROCEDURE — 3288F PR FALLS RISK ASSESSMENT DOCUMENTED: ICD-10-PCS | Mod: CPTII,S$GLB,, | Performed by: NURSE PRACTITIONER

## 2022-02-23 PROCEDURE — 3288F FALL RISK ASSESSMENT DOCD: CPT | Mod: CPTII,S$GLB,, | Performed by: NURSE PRACTITIONER

## 2022-02-23 PROCEDURE — 3078F PR MOST RECENT DIASTOLIC BLOOD PRESSURE < 80 MM HG: ICD-10-PCS | Mod: CPTII,S$GLB,, | Performed by: NURSE PRACTITIONER

## 2022-02-23 PROCEDURE — 99213 OFFICE O/P EST LOW 20 MIN: CPT | Mod: S$GLB,,, | Performed by: NURSE PRACTITIONER

## 2022-02-23 PROCEDURE — 4010F PR ACE/ARB THEARPY RXD/TAKEN: ICD-10-PCS | Mod: CPTII,S$GLB,, | Performed by: NURSE PRACTITIONER

## 2022-02-23 PROCEDURE — 1160F RVW MEDS BY RX/DR IN RCRD: CPT | Mod: CPTII,S$GLB,, | Performed by: NURSE PRACTITIONER

## 2022-02-23 PROCEDURE — 1159F PR MEDICATION LIST DOCUMENTED IN MEDICAL RECORD: ICD-10-PCS | Mod: CPTII,S$GLB,, | Performed by: NURSE PRACTITIONER

## 2022-02-23 PROCEDURE — 3074F SYST BP LT 130 MM HG: CPT | Mod: CPTII,S$GLB,, | Performed by: NURSE PRACTITIONER

## 2022-02-23 PROCEDURE — 1101F PT FALLS ASSESS-DOCD LE1/YR: CPT | Mod: CPTII,S$GLB,, | Performed by: NURSE PRACTITIONER

## 2022-02-23 PROCEDURE — 3074F PR MOST RECENT SYSTOLIC BLOOD PRESSURE < 130 MM HG: ICD-10-PCS | Mod: CPTII,S$GLB,, | Performed by: NURSE PRACTITIONER

## 2022-02-23 PROCEDURE — 1101F PR PT FALLS ASSESS DOC 0-1 FALLS W/OUT INJ PAST YR: ICD-10-PCS | Mod: CPTII,S$GLB,, | Performed by: NURSE PRACTITIONER

## 2022-02-23 PROCEDURE — 99999 PR PBB SHADOW E&M-EST. PATIENT-LVL V: ICD-10-PCS | Mod: PBBFAC,,, | Performed by: NURSE PRACTITIONER

## 2022-02-23 PROCEDURE — 1159F MED LIST DOCD IN RCRD: CPT | Mod: CPTII,S$GLB,, | Performed by: NURSE PRACTITIONER

## 2022-02-23 PROCEDURE — 4010F ACE/ARB THERAPY RXD/TAKEN: CPT | Mod: CPTII,S$GLB,, | Performed by: NURSE PRACTITIONER

## 2022-02-23 PROCEDURE — 1125F AMNT PAIN NOTED PAIN PRSNT: CPT | Mod: CPTII,S$GLB,, | Performed by: NURSE PRACTITIONER

## 2022-02-23 PROCEDURE — 3078F DIAST BP <80 MM HG: CPT | Mod: CPTII,S$GLB,, | Performed by: NURSE PRACTITIONER

## 2022-02-23 PROCEDURE — 3008F BODY MASS INDEX DOCD: CPT | Mod: CPTII,S$GLB,, | Performed by: NURSE PRACTITIONER

## 2022-02-23 PROCEDURE — 3008F PR BODY MASS INDEX (BMI) DOCUMENTED: ICD-10-PCS | Mod: CPTII,S$GLB,, | Performed by: NURSE PRACTITIONER

## 2022-02-23 PROCEDURE — 99213 PR OFFICE/OUTPT VISIT, EST, LEVL III, 20-29 MIN: ICD-10-PCS | Mod: S$GLB,,, | Performed by: NURSE PRACTITIONER

## 2022-02-23 NOTE — PROGRESS NOTES
"  Subjective:     Patient ID: Amanda Holt is a 71 y.o. female    Chief Complaint: Back Pain (lower) and Leg Pain (left)      Referred by: Simona Torres MD      HPI:    Interval History 2/24/2022:  Mrs Holt presents for delayed follow-up for continued left-sided lower back pain and radiculopathy down the lateral and anterior and lateral lower leg. She has had left L5/S1&S1 TFESI and then L4/5&L5/S1 TFESI approx one year ago. She found there L4/5&L5/S1 TFESI more beneficial with >80% relief in hindsight but states procedure itself was too painful and feels she needed more sedation and had significant post op soreness after. She has no recent MRI. She wishes to establish care Dr Ayala as he provides care for a friend of hers. There is no complaint of loss of b/b or saddle paresthesias. She is currently prescribed Tramadol, Elavil,  and Zanaflex with benefit and denies SE of medications.     Interval History NP (3/2/21):    Pt returns today for follow up. She states that the left L4 and L5 TESI was helpful for the low back pain; reports 60% relief. She does state that the procedure was extremely painful and is still "recovering from it". She reports that Tramadol has helped and continues to help with her pain.     Interval History (1/27/21):  She returns today for follow up.  She reports that left L5 and S1 transforaminal epidural steroid injection has been helpful for the left-sided low back and lower extremity pain.  She reports 3 months of very good relief following this procedure.  She states that more recently she has developed recurrent left-sided pain.  This pain is similar to previous pain but now involves the left anterior leg.  Pain is mostly present when standing and walking for prolonged periods of time.  She denies any new numbness, tingling, weakness, bowel bladder dysfunction.      Interval History NP (8/24/20):  Pt returns today for follow up and imaging review. She reports an increase in " numbness and tingling to the left anterior lower leg and foot. No increased pain. Denies bowel or bladder dysfunction.      Initial Encounter (7/20/20):  Amanda Holt is a 71 y.o. female who presents today with chronic left-sided low back and lower extremity pain.  Status post L4-5 laminectomy and fusion in 2018.  She states that she was doing well for a period of time following her surgery, but she began have this left-sided pain a little over 1 year ago.  The pain is located the left lumbosacral region radiates all the way to the left lateral thigh, lateral leg to the ankle.  She reports associated numbness and tingling this area.  She denies any focal weakness or bowel bladder dysfunction.  Pain is constant worse with activity.  Patient has been evaluated by Neurosurgery and imaging studies were ordered.  These have not been done but are scheduled for next month.  This pain is described in detail below.    Physical Therapy:  Yes.    Non-pharmacologic Treatment:  Rest helps         · TENS?  No    Pain Medications:         · Currently taking:  Tizanidine, tramadol, Voltaren gel, Amitriptyline    · Has tried in the past:  Gabapentin, NSAIDs, Tylenol    · Has not tried:  Opioids, SNRIs, topical creams    Blood thinners:  Aspirin 81 mg a day    Interventional Therapies:      2/12/21 - left L4 and L% TESI - 60% relief  9/9/20 - left L5 and S1 transforaminal epidural steroid injection - 100% relief for roughly 3 months    Relevant Surgeries: L4-5 laminectomy and fusion in 2018    Affecting sleep?  Yes    Affecting daily activities? yes    Depressive symptoms? no          · SI/HI? No    Work status: Disabled    Pain Scores:    Best:       1/10  Worst:     10/10  Usually:   9/10  Today:   8/10    Review of Systems   Constitutional: Negative for activity change, appetite change, chills, fatigue, fever and unexpected weight change.   HENT: Negative for hearing loss.    Eyes: Negative for visual disturbance.    Respiratory: Negative for chest tightness and shortness of breath.    Cardiovascular: Negative for chest pain.   Gastrointestinal: Negative for abdominal pain, constipation, diarrhea, nausea and vomiting.   Genitourinary: Negative for difficulty urinating.   Musculoskeletal: Positive for back pain, gait problem and myalgias. Negative for neck pain.   Skin: Negative for rash.   Neurological: Negative for dizziness, weakness, light-headedness, numbness and headaches.   Psychiatric/Behavioral: Positive for sleep disturbance. Negative for hallucinations and suicidal ideas. The patient is not nervous/anxious.        Past Medical History:   Diagnosis Date    Arthralgia     Colon polyp     Degenerative disc disease at L5-S1 level     High cholesterol     Hypertension     Nuclear sclerosis of both eyes 1/8/2020    Sciatica     Spinal stenosis        Past Surgical History:   Procedure Laterality Date    BACK SURGERY      lumbar laminectomy    COLONOSCOPY N/A 7/23/2020    Procedure: COLONOSCOPY;  Surgeon: Donal Moore MD;  Location: [facility] ENDO;  Service: Endoscopy;  Laterality: N/A;    EPIDURAL STEROID INJECTION Left 9/9/2020    Procedure: Injection, Steroid, Epidural Transforaminal;  Surgeon: Jun Leavitt Jr., MD;  Location: [facility] ENDO;  Service: Pain Management;  Laterality: Left;  Left L5 + S1 TF WADE  Arrive @ 1300; ASA last 9/1; No DM    EPIDURAL STEROID INJECTION Left 2/12/2021    Procedure: Injection, Steroid, Epidural Transforaminal;  Surgeon: Jun Leavitt Jr., MD;  Location: [facility] ENDO;  Service: Pain Management;  Laterality: Left;  Left L4 + L5 TF WADE  Arrive @ 1230; IV Sedation; ASA last 2/4; No DM    TUBAL LIGATION         Social History     Socioeconomic History    Marital status:    Tobacco Use    Smoking status: Current Some Day Smoker     Years: 40.00     Types: Cigarettes    Smokeless tobacco: Never Used    Tobacco comment: 2 cigarettes/week   Substance and Sexual  Activity    Alcohol use: Yes     Comment: ocassionally    Drug use: Yes     Comment: Tramadol twice a day as needed    Sexual activity: Not Currently     Social Determinants of Health     Financial Resource Strain: Medium Risk    Difficulty of Paying Living Expenses: Somewhat hard   Food Insecurity: Food Insecurity Present    Worried About Running Out of Food in the Last Year: Sometimes true    Ran Out of Food in the Last Year: Sometimes true   Transportation Needs: No Transportation Needs    Lack of Transportation (Medical): No    Lack of Transportation (Non-Medical): No   Physical Activity: Insufficiently Active    Days of Exercise per Week: 3 days    Minutes of Exercise per Session: 10 min   Stress: Stress Concern Present    Feeling of Stress : Very much   Social Connections: Unknown    Frequency of Communication with Friends and Family: More than three times a week    Frequency of Social Gatherings with Friends and Family: Once a week    Active Member of Clubs or Organizations: No    Attends Club or Organization Meetings: Never    Marital Status:    Housing Stability: Low Risk     Unable to Pay for Housing in the Last Year: No    Number of Places Lived in the Last Year: 1    Unstable Housing in the Last Year: No       Review of patient's allergies indicates:   Allergen Reactions    Codeine        Current Outpatient Medications on File Prior to Visit   Medication Sig Dispense Refill    amitriptyline (ELAVIL) 50 MG tablet TAKE 1 TABLET BY MOUTH AT BEDTIME 90 tablet 1    aspirin (ECOTRIN) 81 MG EC tablet       diclofenac sodium (VOLTAREN) 1 % Gel Apply 2 g topically.      hydrOXYzine HCL (ATARAX) 25 MG tablet Take 1 tablet (25 mg total) by mouth 3 (three) times daily as needed. 45 tablet 1    INV HYDROCHLOROTHIAZIDE 25MG TABLET       losartan (COZAAR) 100 MG tablet Take 1 tablet by mouth once daily 90 tablet 1    mupirocin (BACTROBAN) 2 % ointment Apply topically 3 (three) times  "daily. 22 g 0    omeprazole (PRILOSEC) 40 MG capsule Take 1 capsule (40 mg total) by mouth once daily. 90 capsule 1    pravastatin (PRAVACHOL) 40 MG tablet Take 1 tablet (40 mg total) by mouth once daily. 90 tablet 3    spironolactone (ALDACTONE) 25 MG tablet Take 1 tablet (25 mg total) by mouth once daily. 90 tablet 1    tiZANidine (ZANAFLEX) 4 MG tablet Take 1 tablet (4 mg total) by mouth every 6 (six) hours as needed. 90 tablet 1    traMADoL (ULTRAM) 50 mg tablet Take 1 tablet (50 mg total) by mouth every 12 (twelve) hours as needed for Pain. 60 tablet 2    traZODone (DESYREL) 100 MG tablet Take 1 tablet (100 mg total) by mouth nightly as needed for Insomnia. 90 tablet 1    triamcinolone acetonide 0.1% (KENALOG) 0.1 % ointment AAA bid 454 g 3    amLODIPine (NORVASC) 10 MG tablet Take 1 tablet (10 mg total) by mouth once daily. 90 tablet 1     No current facility-administered medications on file prior to visit.       Objective:      BP (!) 105/48   Pulse 73   Temp 97.5 °F (36.4 °C)   Resp 18   Ht 5' 4" (1.626 m)   Wt 113.4 kg (250 lb)   BMI 42.91 kg/m²       Exam:  GEN:  Well developed, well nourished.  No acute distress.  Normal pain behavior.  HEENT:  No trauma.  Mucous membranes moist.  Nares patent bilaterally.  PSYCH: Normal affect. Thought content appropriate.  CHEST:  Breathing symmetric.  No audible wheezing.  SKIN:  Warm, pink, dry.  No rash on exposed areas.    EXT:  No cyanosis, clubbing, or edema.  No color change or changes in nail or hair growth.  NEURO/MUSCULOSKELETAL:  Fully alert, oriented, and appropriate. Speech normal andriy. No cranial nerve deficits.   Decreased Left EHL, decreased sensation to left lateral leg   Gait:  Antalgic. In Hospital w/c for transport.         Imaging:    Narrative & Impression     EXAMINATION:  XR LUMBAR SPINE FLEXION AND EXTENSION ONLY     CLINICAL HISTORY:  back pain, unspecified back location;  Dorsalgia, unspecified     TECHNIQUE:  Lateral flexion " standing and extension standing radiographs of the lumbar spine were obtained.     COMPARISON:  None     FINDINGS:  Patient is status post posterior spinal fusion at the L4-L5 level with metallic tracey and pedicle screws present.  Posterior vertebral alignment appears satisfactory.  No instability is elicited on the flexion and extension radiographs.  There is no evidence for acute fracture or bone destruction.  Atherosclerotic calcification is present within the abdominal aorta.     Impression:     Spinal fusion at the L4-L5 level.  No evidence for hardware failure on this examination.     Posterior vertebral alignment is satisfactory with no instability elicited on the flexion and extension radiographs.  No appreciable movement noted at the fused L4-L5 level.        Electronically signed by: Galen Victoria MD  Date:                                            08/10/2020  Time:                                           09:46         Narrative & Impression     EXAMINATION:  MRI LUMBAR SPINE WITHOUT CONTRAST     CLINICAL HISTORY:  Dorsalgia, unspecifiedNeurologic Compromise;     TECHNIQUE:  Sagittal T1, sagittal T2, sagittal STIR, axial T1 and axial T2 weighted images of the lumbar spine obtained without contrast.     COMPARISON:  None     Limitations: In this patient with a history of previous lumbar spine surgery, postcontrast enhanced images are necessary to differentiate postsurgical epidural fibrosis from residual or recurrent disc herniation.     FINDINGS:  Lumbar spine alignment is within normal limits. The vertebral body heights are well maintained, with no fracture.  No marrow signal abnormality suspicious for an infiltrative process.     The conus is normal in appearance.  The adjacent soft tissue structures show no significant abnormalities.     L1-L2: There is no focal disc herniation. No significant central canal or neural foraminal narrowing.     L2-L3: There is no focal disc herniation. No significant  central canal or neural foraminal narrowing.     L3-L4: There is metallic susceptibility artifact on left related to indwelling left-sided hardware posteriorly.  There is a subtle left paracentral and medial foraminal disc herniation suspected with some superimposed spondylitic spurring and disc bulging.  No central canal stenosis.  There is mild left-sided foraminal stenosis.     L4-L5: Patient is status post laminectomy and posterior fusion with unilateral left-sided hardware producing expected metallic susceptibility artifact.  There is some spondylitic spurring, disc narrowing and degenerative endplate changes and diffuse disc bulging without focal herniation.  No significant central canal or neural foraminal narrowing.     L5-S1: There is near complete bony ankylosis with a rudimentary L5-S1 disc.  No significant central canal or neural foraminal narrowing.     Impression:     Left paracentral and medial foraminal disc herniation at L3-4.     Status post left L5 laminectomy and instrumentation without complicating features.     This report was flagged in Epic as abnormal.        Electronically signed by: Brian Damon Jr  Date:                                            08/10/2020  Time:                                           09:38         Assessment:       Encounter Diagnoses   Name Primary?    Spinal stenosis, unspecified spinal region     Dorsalgia, unspecified     Lumbar radiculopathy Yes         Plan:       Amanda was seen today for back pain and leg pain.    Diagnoses and all orders for this visit:    Lumbar radiculopathy  -     Procedure Order to Pain Management; Future    Spinal stenosis, unspecified spinal region  -     Ambulatory referral/consult to Pain Clinic  -     Procedure Order to Pain Management; Future    Dorsalgia, unspecified  -     MRI Lumbar Spine Without Contrast; Future        Amanda Holt is a 71 y.o. female with chronic left-sided low back and lower extremity pain  consistent with left lumbar radiculopathy.  Good relief from previous left L4 and L5 transforaminal epidural steroid injection.  Current symptoms now more consistent with L5 and L4 radicular pain.  Patient does have a history of previous lumbar fusion.  Also some indications of lumbar facet pain.     - Prior records reviewed  - Here to establish care and wishes to seek care with Dr Ayala  - Reviewed prior treatment.   - Will s/f repeat Left L4/5&L5/S1 TFESI  - Will also update Imaging with new MRI of Lumbar  - Can continue medications prescribed by primary care doctor stay are beneficial without side effects.  - I have stressed the importance of physical activity and a home exercise plan to help with pain and improve health.  - Patient can continue with medications for now since they are providing benefits, using them appropriately, and without side effects.  - Counseled patient regarding the importance of activity modification.  - RTC 2 weeks after procedure.     The above plan and management options were discussed at length with patient. Patient is in agreement with the above and verbalized understanding.    CINDY Ruiz  2/24/2022

## 2022-02-23 NOTE — H&P (VIEW-ONLY)
"  Subjective:     Patient ID: Amanda Holt is a 71 y.o. female    Chief Complaint: Back Pain (lower) and Leg Pain (left)      Referred by: Simona Torres MD      HPI:    Interval History 2/24/2022:  Mrs Holt presents for delayed follow-up for continued left-sided lower back pain and radiculopathy down the lateral and anterior and lateral lower leg. She has had left L5/S1&S1 TFESI and then L4/5&L5/S1 TFESI approx one year ago. She found there L4/5&L5/S1 TFESI more beneficial with >80% relief in hindsight but states procedure itself was too painful and feels she needed more sedation and had significant post op soreness after. She has no recent MRI. She wishes to establish care Dr Ayala as he provides care for a friend of hers. There is no complaint of loss of b/b or saddle paresthesias. She is currently prescribed Tramadol, Elavil,  and Zanaflex with benefit and denies SE of medications.     Interval History NP (3/2/21):    Pt returns today for follow up. She states that the left L4 and L5 TESI was helpful for the low back pain; reports 60% relief. She does state that the procedure was extremely painful and is still "recovering from it". She reports that Tramadol has helped and continues to help with her pain.     Interval History (1/27/21):  She returns today for follow up.  She reports that left L5 and S1 transforaminal epidural steroid injection has been helpful for the left-sided low back and lower extremity pain.  She reports 3 months of very good relief following this procedure.  She states that more recently she has developed recurrent left-sided pain.  This pain is similar to previous pain but now involves the left anterior leg.  Pain is mostly present when standing and walking for prolonged periods of time.  She denies any new numbness, tingling, weakness, bowel bladder dysfunction.      Interval History NP (8/24/20):  Pt returns today for follow up and imaging review. She reports an increase in " numbness and tingling to the left anterior lower leg and foot. No increased pain. Denies bowel or bladder dysfunction.      Initial Encounter (7/20/20):  Amanda Holt is a 71 y.o. female who presents today with chronic left-sided low back and lower extremity pain.  Status post L4-5 laminectomy and fusion in 2018.  She states that she was doing well for a period of time following her surgery, but she began have this left-sided pain a little over 1 year ago.  The pain is located the left lumbosacral region radiates all the way to the left lateral thigh, lateral leg to the ankle.  She reports associated numbness and tingling this area.  She denies any focal weakness or bowel bladder dysfunction.  Pain is constant worse with activity.  Patient has been evaluated by Neurosurgery and imaging studies were ordered.  These have not been done but are scheduled for next month.  This pain is described in detail below.    Physical Therapy:  Yes.    Non-pharmacologic Treatment:  Rest helps         · TENS?  No    Pain Medications:         · Currently taking:  Tizanidine, tramadol, Voltaren gel, Amitriptyline    · Has tried in the past:  Gabapentin, NSAIDs, Tylenol    · Has not tried:  Opioids, SNRIs, topical creams    Blood thinners:  Aspirin 81 mg a day    Interventional Therapies:      2/12/21 - left L4 and L% TESI - 60% relief  9/9/20 - left L5 and S1 transforaminal epidural steroid injection - 100% relief for roughly 3 months    Relevant Surgeries: L4-5 laminectomy and fusion in 2018    Affecting sleep?  Yes    Affecting daily activities? yes    Depressive symptoms? no          · SI/HI? No    Work status: Disabled    Pain Scores:    Best:       1/10  Worst:     10/10  Usually:   9/10  Today:   8/10    Review of Systems   Constitutional: Negative for activity change, appetite change, chills, fatigue, fever and unexpected weight change.   HENT: Negative for hearing loss.    Eyes: Negative for visual disturbance.    Respiratory: Negative for chest tightness and shortness of breath.    Cardiovascular: Negative for chest pain.   Gastrointestinal: Negative for abdominal pain, constipation, diarrhea, nausea and vomiting.   Genitourinary: Negative for difficulty urinating.   Musculoskeletal: Positive for back pain, gait problem and myalgias. Negative for neck pain.   Skin: Negative for rash.   Neurological: Negative for dizziness, weakness, light-headedness, numbness and headaches.   Psychiatric/Behavioral: Positive for sleep disturbance. Negative for hallucinations and suicidal ideas. The patient is not nervous/anxious.        Past Medical History:   Diagnosis Date    Arthralgia     Colon polyp     Degenerative disc disease at L5-S1 level     High cholesterol     Hypertension     Nuclear sclerosis of both eyes 1/8/2020    Sciatica     Spinal stenosis        Past Surgical History:   Procedure Laterality Date    BACK SURGERY      lumbar laminectomy    COLONOSCOPY N/A 7/23/2020    Procedure: COLONOSCOPY;  Surgeon: Donal Moore MD;  Location: [facility] ENDO;  Service: Endoscopy;  Laterality: N/A;    EPIDURAL STEROID INJECTION Left 9/9/2020    Procedure: Injection, Steroid, Epidural Transforaminal;  Surgeon: Jun Leavitt Jr., MD;  Location: [facility] ENDO;  Service: Pain Management;  Laterality: Left;  Left L5 + S1 TF WADE  Arrive @ 1300; ASA last 9/1; No DM    EPIDURAL STEROID INJECTION Left 2/12/2021    Procedure: Injection, Steroid, Epidural Transforaminal;  Surgeon: Jun Leavitt Jr., MD;  Location: [facility] ENDO;  Service: Pain Management;  Laterality: Left;  Left L4 + L5 TF WADE  Arrive @ 1230; IV Sedation; ASA last 2/4; No DM    TUBAL LIGATION         Social History     Socioeconomic History    Marital status:    Tobacco Use    Smoking status: Current Some Day Smoker     Years: 40.00     Types: Cigarettes    Smokeless tobacco: Never Used    Tobacco comment: 2 cigarettes/week   Substance and Sexual  Activity    Alcohol use: Yes     Comment: ocassionally    Drug use: Yes     Comment: Tramadol twice a day as needed    Sexual activity: Not Currently     Social Determinants of Health     Financial Resource Strain: Medium Risk    Difficulty of Paying Living Expenses: Somewhat hard   Food Insecurity: Food Insecurity Present    Worried About Running Out of Food in the Last Year: Sometimes true    Ran Out of Food in the Last Year: Sometimes true   Transportation Needs: No Transportation Needs    Lack of Transportation (Medical): No    Lack of Transportation (Non-Medical): No   Physical Activity: Insufficiently Active    Days of Exercise per Week: 3 days    Minutes of Exercise per Session: 10 min   Stress: Stress Concern Present    Feeling of Stress : Very much   Social Connections: Unknown    Frequency of Communication with Friends and Family: More than three times a week    Frequency of Social Gatherings with Friends and Family: Once a week    Active Member of Clubs or Organizations: No    Attends Club or Organization Meetings: Never    Marital Status:    Housing Stability: Low Risk     Unable to Pay for Housing in the Last Year: No    Number of Places Lived in the Last Year: 1    Unstable Housing in the Last Year: No       Review of patient's allergies indicates:   Allergen Reactions    Codeine        Current Outpatient Medications on File Prior to Visit   Medication Sig Dispense Refill    amitriptyline (ELAVIL) 50 MG tablet TAKE 1 TABLET BY MOUTH AT BEDTIME 90 tablet 1    aspirin (ECOTRIN) 81 MG EC tablet       diclofenac sodium (VOLTAREN) 1 % Gel Apply 2 g topically.      hydrOXYzine HCL (ATARAX) 25 MG tablet Take 1 tablet (25 mg total) by mouth 3 (three) times daily as needed. 45 tablet 1    INV HYDROCHLOROTHIAZIDE 25MG TABLET       losartan (COZAAR) 100 MG tablet Take 1 tablet by mouth once daily 90 tablet 1    mupirocin (BACTROBAN) 2 % ointment Apply topically 3 (three) times  "daily. 22 g 0    omeprazole (PRILOSEC) 40 MG capsule Take 1 capsule (40 mg total) by mouth once daily. 90 capsule 1    pravastatin (PRAVACHOL) 40 MG tablet Take 1 tablet (40 mg total) by mouth once daily. 90 tablet 3    spironolactone (ALDACTONE) 25 MG tablet Take 1 tablet (25 mg total) by mouth once daily. 90 tablet 1    tiZANidine (ZANAFLEX) 4 MG tablet Take 1 tablet (4 mg total) by mouth every 6 (six) hours as needed. 90 tablet 1    traMADoL (ULTRAM) 50 mg tablet Take 1 tablet (50 mg total) by mouth every 12 (twelve) hours as needed for Pain. 60 tablet 2    traZODone (DESYREL) 100 MG tablet Take 1 tablet (100 mg total) by mouth nightly as needed for Insomnia. 90 tablet 1    triamcinolone acetonide 0.1% (KENALOG) 0.1 % ointment AAA bid 454 g 3    amLODIPine (NORVASC) 10 MG tablet Take 1 tablet (10 mg total) by mouth once daily. 90 tablet 1     No current facility-administered medications on file prior to visit.       Objective:      BP (!) 105/48   Pulse 73   Temp 97.5 °F (36.4 °C)   Resp 18   Ht 5' 4" (1.626 m)   Wt 113.4 kg (250 lb)   BMI 42.91 kg/m²       Exam:  GEN:  Well developed, well nourished.  No acute distress.  Normal pain behavior.  HEENT:  No trauma.  Mucous membranes moist.  Nares patent bilaterally.  PSYCH: Normal affect. Thought content appropriate.  CHEST:  Breathing symmetric.  No audible wheezing.  SKIN:  Warm, pink, dry.  No rash on exposed areas.    EXT:  No cyanosis, clubbing, or edema.  No color change or changes in nail or hair growth.  NEURO/MUSCULOSKELETAL:  Fully alert, oriented, and appropriate. Speech normal andriy. No cranial nerve deficits.   Decreased Left EHL, decreased sensation to left lateral leg   Gait:  Antalgic. In Hospital w/c for transport.         Imaging:    Narrative & Impression     EXAMINATION:  XR LUMBAR SPINE FLEXION AND EXTENSION ONLY     CLINICAL HISTORY:  back pain, unspecified back location;  Dorsalgia, unspecified     TECHNIQUE:  Lateral flexion " standing and extension standing radiographs of the lumbar spine were obtained.     COMPARISON:  None     FINDINGS:  Patient is status post posterior spinal fusion at the L4-L5 level with metallic tracey and pedicle screws present.  Posterior vertebral alignment appears satisfactory.  No instability is elicited on the flexion and extension radiographs.  There is no evidence for acute fracture or bone destruction.  Atherosclerotic calcification is present within the abdominal aorta.     Impression:     Spinal fusion at the L4-L5 level.  No evidence for hardware failure on this examination.     Posterior vertebral alignment is satisfactory with no instability elicited on the flexion and extension radiographs.  No appreciable movement noted at the fused L4-L5 level.        Electronically signed by: Galen Victoria MD  Date:                                            08/10/2020  Time:                                           09:46         Narrative & Impression     EXAMINATION:  MRI LUMBAR SPINE WITHOUT CONTRAST     CLINICAL HISTORY:  Dorsalgia, unspecifiedNeurologic Compromise;     TECHNIQUE:  Sagittal T1, sagittal T2, sagittal STIR, axial T1 and axial T2 weighted images of the lumbar spine obtained without contrast.     COMPARISON:  None     Limitations: In this patient with a history of previous lumbar spine surgery, postcontrast enhanced images are necessary to differentiate postsurgical epidural fibrosis from residual or recurrent disc herniation.     FINDINGS:  Lumbar spine alignment is within normal limits. The vertebral body heights are well maintained, with no fracture.  No marrow signal abnormality suspicious for an infiltrative process.     The conus is normal in appearance.  The adjacent soft tissue structures show no significant abnormalities.     L1-L2: There is no focal disc herniation. No significant central canal or neural foraminal narrowing.     L2-L3: There is no focal disc herniation. No significant  central canal or neural foraminal narrowing.     L3-L4: There is metallic susceptibility artifact on left related to indwelling left-sided hardware posteriorly.  There is a subtle left paracentral and medial foraminal disc herniation suspected with some superimposed spondylitic spurring and disc bulging.  No central canal stenosis.  There is mild left-sided foraminal stenosis.     L4-L5: Patient is status post laminectomy and posterior fusion with unilateral left-sided hardware producing expected metallic susceptibility artifact.  There is some spondylitic spurring, disc narrowing and degenerative endplate changes and diffuse disc bulging without focal herniation.  No significant central canal or neural foraminal narrowing.     L5-S1: There is near complete bony ankylosis with a rudimentary L5-S1 disc.  No significant central canal or neural foraminal narrowing.     Impression:     Left paracentral and medial foraminal disc herniation at L3-4.     Status post left L5 laminectomy and instrumentation without complicating features.     This report was flagged in Epic as abnormal.        Electronically signed by: Brian Damon Jr  Date:                                            08/10/2020  Time:                                           09:38         Assessment:       Encounter Diagnoses   Name Primary?    Spinal stenosis, unspecified spinal region     Dorsalgia, unspecified     Lumbar radiculopathy Yes         Plan:       Amanda was seen today for back pain and leg pain.    Diagnoses and all orders for this visit:    Lumbar radiculopathy  -     Procedure Order to Pain Management; Future    Spinal stenosis, unspecified spinal region  -     Ambulatory referral/consult to Pain Clinic  -     Procedure Order to Pain Management; Future    Dorsalgia, unspecified  -     MRI Lumbar Spine Without Contrast; Future        Amanda Holt is a 71 y.o. female with chronic left-sided low back and lower extremity pain  consistent with left lumbar radiculopathy.  Good relief from previous left L4 and L5 transforaminal epidural steroid injection.  Current symptoms now more consistent with L5 and L4 radicular pain.  Patient does have a history of previous lumbar fusion.  Also some indications of lumbar facet pain.     - Prior records reviewed  - Here to establish care and wishes to seek care with Dr Ayala  - Reviewed prior treatment.   - Will s/f repeat Left L4/5&L5/S1 TFESI  - Will also update Imaging with new MRI of Lumbar  - Can continue medications prescribed by primary care doctor stay are beneficial without side effects.  - I have stressed the importance of physical activity and a home exercise plan to help with pain and improve health.  - Patient can continue with medications for now since they are providing benefits, using them appropriately, and without side effects.  - Counseled patient regarding the importance of activity modification.  - RTC 2 weeks after procedure.     The above plan and management options were discussed at length with patient. Patient is in agreement with the above and verbalized understanding.    CINDY Ruiz  2/24/2022

## 2022-02-24 ENCOUNTER — OFFICE VISIT (OUTPATIENT)
Dept: DERMATOLOGY | Facility: CLINIC | Age: 72
End: 2022-02-24
Payer: MEDICARE

## 2022-02-24 DIAGNOSIS — D48.5 NEOPLASM OF UNCERTAIN BEHAVIOR OF SKIN: Primary | ICD-10-CM

## 2022-02-24 PROCEDURE — 1160F PR REVIEW ALL MEDS BY PRESCRIBER/CLIN PHARMACIST DOCUMENTED: ICD-10-PCS | Mod: CPTII,S$GLB,, | Performed by: DERMATOLOGY

## 2022-02-24 PROCEDURE — 1101F PR PT FALLS ASSESS DOC 0-1 FALLS W/OUT INJ PAST YR: ICD-10-PCS | Mod: CPTII,S$GLB,, | Performed by: DERMATOLOGY

## 2022-02-24 PROCEDURE — 11104 PR PUNCH BIOPSY, SKIN, SINGLE LESION: ICD-10-PCS | Mod: S$GLB,,, | Performed by: DERMATOLOGY

## 2022-02-24 PROCEDURE — 99999 PR PBB SHADOW E&M-EST. PATIENT-LVL III: ICD-10-PCS | Mod: PBBFAC,,, | Performed by: DERMATOLOGY

## 2022-02-24 PROCEDURE — 1159F MED LIST DOCD IN RCRD: CPT | Mod: CPTII,S$GLB,, | Performed by: DERMATOLOGY

## 2022-02-24 PROCEDURE — 3288F FALL RISK ASSESSMENT DOCD: CPT | Mod: CPTII,S$GLB,, | Performed by: DERMATOLOGY

## 2022-02-24 PROCEDURE — 99499 UNLISTED E&M SERVICE: CPT | Mod: S$GLB,,, | Performed by: DERMATOLOGY

## 2022-02-24 PROCEDURE — 4010F ACE/ARB THERAPY RXD/TAKEN: CPT | Mod: CPTII,S$GLB,, | Performed by: DERMATOLOGY

## 2022-02-24 PROCEDURE — 88305 TISSUE EXAM BY PATHOLOGIST: CPT | Mod: 26,,, | Performed by: PATHOLOGY

## 2022-02-24 PROCEDURE — 1101F PT FALLS ASSESS-DOCD LE1/YR: CPT | Mod: CPTII,S$GLB,, | Performed by: DERMATOLOGY

## 2022-02-24 PROCEDURE — 88305 TISSUE EXAM BY PATHOLOGIST: ICD-10-PCS | Mod: 26,,, | Performed by: PATHOLOGY

## 2022-02-24 PROCEDURE — 4010F PR ACE/ARB THEARPY RXD/TAKEN: ICD-10-PCS | Mod: CPTII,S$GLB,, | Performed by: DERMATOLOGY

## 2022-02-24 PROCEDURE — 99999 PR PBB SHADOW E&M-EST. PATIENT-LVL III: CPT | Mod: PBBFAC,,, | Performed by: DERMATOLOGY

## 2022-02-24 PROCEDURE — 88305 TISSUE EXAM BY PATHOLOGIST: CPT | Performed by: PATHOLOGY

## 2022-02-24 PROCEDURE — 1160F RVW MEDS BY RX/DR IN RCRD: CPT | Mod: CPTII,S$GLB,, | Performed by: DERMATOLOGY

## 2022-02-24 PROCEDURE — 1159F PR MEDICATION LIST DOCUMENTED IN MEDICAL RECORD: ICD-10-PCS | Mod: CPTII,S$GLB,, | Performed by: DERMATOLOGY

## 2022-02-24 PROCEDURE — 3288F PR FALLS RISK ASSESSMENT DOCUMENTED: ICD-10-PCS | Mod: CPTII,S$GLB,, | Performed by: DERMATOLOGY

## 2022-02-24 PROCEDURE — 99213 OFFICE O/P EST LOW 20 MIN: CPT | Mod: PBBFAC | Performed by: DERMATOLOGY

## 2022-02-24 PROCEDURE — 11104 PUNCH BX SKIN SINGLE LESION: CPT | Mod: PBBFAC | Performed by: DERMATOLOGY

## 2022-02-24 PROCEDURE — 11104 PUNCH BX SKIN SINGLE LESION: CPT | Mod: S$GLB,,, | Performed by: DERMATOLOGY

## 2022-02-24 PROCEDURE — 1125F PR PAIN SEVERITY QUANTIFIED, PAIN PRESENT: ICD-10-PCS | Mod: CPTII,S$GLB,, | Performed by: DERMATOLOGY

## 2022-02-24 PROCEDURE — 99499 NO LOS: ICD-10-PCS | Mod: S$GLB,,, | Performed by: DERMATOLOGY

## 2022-02-24 PROCEDURE — 1125F AMNT PAIN NOTED PAIN PRSNT: CPT | Mod: CPTII,S$GLB,, | Performed by: DERMATOLOGY

## 2022-02-24 NOTE — Clinical Note
Saw your pt today. Her PE is unimpressive (only a dilated pore) given her severity of symptoms (severe pain and previous swelling of area). Thinking her symptoms are originating from something deeper ? Mm ? Nerve. JAM

## 2022-02-24 NOTE — PROGRESS NOTES
Subjective:       Patient ID:  Amanda Holt is a 71 y.o. female who presents for   Chief Complaint   Patient presents with    Lesion     L shoulder     History of Present Illness: The patient presents for lesion to L shoulder  The patient was last seen on: 12/22/2020 by [facility] for dermatitis  Other skin complaints: lesion    Patient with new complaint of lesion(s)  Location: L shoulder  Duration: 2mos  Symptoms: itching, burning, painful  Relieving factors/Previous treatments: cortisone, mupirocin        Lesion        Review of Systems   Skin: Negative for daily sunscreen use, activity-related sunscreen use and recent sunburn.   Hematologic/Lymphatic: Bruises/bleeds easily (bruise).        Objective:    Physical Exam   Constitutional: She appears well-developed and well-nourished. She is obese.  No distress.   Neurological: She is alert and oriented to person, place, and time. She is not disoriented.   Psychiatric: She has a normal mood and affect.   Skin:   Areas Examined (abnormalities noted in diagram):   Back Inspection Performed              Diagram Legend     Erythematous scaling macule/papule c/w actinic keratosis       Vascular papule c/w angioma      Pigmented verrucoid papule/plaque c/w seborrheic keratosis      Yellow umbilicated papule c/w sebaceous hyperplasia      Irregularly shaped tan macule c/w lentigo     1-2 mm smooth white papules consistent with Milia      Movable subcutaneous cyst with punctum c/w epidermal inclusion cyst      Subcutaneous movable cyst c/w pilar cyst      Firm pink to brown papule c/w dermatofibroma      Pedunculated fleshy papule(s) c/w skin tag(s)      Evenly pigmented macule c/w junctional nevus     Mildly variegated pigmented, slightly irregular-bordered macule c/w mildly atypical nevus      Flesh colored to evenly pigmented papule c/w intradermal nevus       Pink pearly papule/plaque c/w basal cell carcinoma      Erythematous hyperkeratotic cursted plaque c/w SCC       Surgical scar with no sign of skin cancer recurrence      Open and closed comedones      Inflammatory papules and pustules      Verrucoid papule consistent consistent with wart     Erythematous eczematous patches and plaques     Dystrophic onycholytic nail with subungual debris c/w onychomycosis     Umbilicated papule    Erythematous-base heme-crusted tan verrucoid plaque consistent with inflamed seborrheic keratosis     Erythematous Silvery Scaling Plaque c/w Psoriasis     See annotation              Assessment / Plan:      Pathology Orders:     Normal Orders This Visit    Specimen to Pathology, Dermatology     Questions:    Procedure Type: Dermatology and skin neoplasms    Number of Specimens: 1    ------------------------: -------------------------    Spec 1 Procedure: Biopsy    Spec 1 Clinical Impression: r/o dilated pore of priti    Spec 1 Source: left upper back    Release to patient: Immediate        Neoplasm of uncertain behavior of skin  -     Specimen to Pathology, Dermatology  Punch biopsy procedure note:  Punch biopsy performed after verbal consent obtained. Area marked and prepped with alcohol. Approximately 1cc of 1% lidocaine with epinephrine injected. 4 mm disposable punch used to remove lesion. Hemostasis obtained and biopsy site closed with 1 - 2 Prolene sutures. Wound care instructions reviewed with patient and handout given.    Pt states she is having issues with her C spine recently. This is likely the cause of her symptomatology.                Follow up in about 2 weeks (around 3/10/2022).

## 2022-02-24 NOTE — PATIENT INSTRUCTIONS

## 2022-03-04 ENCOUNTER — TELEPHONE (OUTPATIENT)
Dept: DERMATOLOGY | Facility: CLINIC | Age: 72
End: 2022-03-04
Payer: MEDICARE

## 2022-03-04 NOTE — TELEPHONE ENCOUNTER
Spoke with patient.  She reports that the area of the biopsy site is very tender and painful at times.  Offered her to come in office today but she is unable to do so.  She denies any fever or drainage but says it is warm to touch.  Patient will send pic of area to review.

## 2022-03-04 NOTE — TELEPHONE ENCOUNTER
----- Message from Manuela Zavala sent at 3/4/2022  9:15 AM CST -----  Regarding: Missed call  Contact: [phone]  Calling in regards to missed call from nurse Parikh. Please call

## 2022-03-04 NOTE — TELEPHONE ENCOUNTER
----- Message from Robert He sent at 3/4/2022  8:54 AM CST -----  Contact: @[phone]  Pt would like the nurse to call her back regarding pain she is in since the procedure pt would also like to discuss pain medication options please call to discuss further

## 2022-03-07 ENCOUNTER — HOSPITAL ENCOUNTER (OUTPATIENT)
Dept: RADIOLOGY | Facility: OTHER | Age: 72
Discharge: HOME | End: 2022-03-07
Attending: NURSE PRACTITIONER
Payer: MEDICARE

## 2022-03-07 ENCOUNTER — TELEPHONE (OUTPATIENT)
Dept: FAMILY MEDICINE | Facility: CLINIC | Age: 72
End: 2022-03-07
Payer: MEDICARE

## 2022-03-07 DIAGNOSIS — M54.9 DORSALGIA, UNSPECIFIED: ICD-10-CM

## 2022-03-07 LAB
FINAL PATHOLOGIC DIAGNOSIS: NORMAL
GROSS: NORMAL
Lab: NORMAL
MICROSCOPIC EXAM: NORMAL

## 2022-03-07 PROCEDURE — 72148 MRI LUMBAR SPINE WITHOUT CONTRAST: ICD-10-PCS | Mod: 26,,, | Performed by: RADIOLOGY

## 2022-03-07 PROCEDURE — 72148 MRI LUMBAR SPINE W/O DYE: CPT | Mod: 26,,, | Performed by: RADIOLOGY

## 2022-03-07 PROCEDURE — 72148 MRI LUMBAR SPINE W/O DYE: CPT | Mod: TC

## 2022-03-07 NOTE — TELEPHONE ENCOUNTER
Rt patient call , explained that Dr. Torres has no notations of a need for sooner ov . Patient states after seeing dermatologist Imani Christie , she was to the pathology report still could detect the cause of her skin issues. Has completed today's MRI and would like Dr. Torres to review then advise which steps should be taken next . Please advise

## 2022-03-07 NOTE — TELEPHONE ENCOUNTER
----- Message from Chinyere Dubon sent at 3/7/2022  1:21 PM CST -----  Type: Patient Call Back    Who called: self    What is the request in detail: patient would like to know if she needs to be seen before May. Please call    Can the clinic reply by [facility]? no    Would the patient rather a call back or a response via [facility]?  call    Best call back number:[phone]

## 2022-03-10 ENCOUNTER — CLINICAL SUPPORT (OUTPATIENT)
Dept: DERMATOLOGY | Facility: CLINIC | Age: 72
End: 2022-03-10
Payer: MEDICARE

## 2022-03-10 ENCOUNTER — PATIENT MESSAGE (OUTPATIENT)
Dept: PAIN MEDICINE | Facility: OTHER | Age: 72
End: 2022-03-10
Payer: MEDICARE

## 2022-03-10 DIAGNOSIS — Z48.02 VISIT FOR SUTURE REMOVAL: Primary | ICD-10-CM

## 2022-03-10 PROCEDURE — 99024 POSTOP FOLLOW-UP VISIT: CPT | Mod: S$GLB,,, | Performed by: DERMATOLOGY

## 2022-03-10 PROCEDURE — 99024 PR POST-OP FOLLOW-UP VISIT: ICD-10-PCS | Mod: S$GLB,,, | Performed by: DERMATOLOGY

## 2022-03-10 NOTE — PROGRESS NOTES
Suture Removal note:  CC: 71 y.o. female patient is here for suture removal.         HPI: Patient is s/p excision of DILATED PORE (OLLIE) on L upper back on 02/24/22  Patient reports no problems.    WOUND PE:  Sutures intact.  Wound healing well.  Good approximation of skin edges.  No signs or symptoms of infection.    IMPRESSION:  Skin, left upper back, punch biopsy:  -DILATED PORE (OLLIE)  This lesion is benign.    PLAN:  Sutures removed today.  Continue wound care.    RTC: In 6 months.

## 2022-03-14 ENCOUNTER — HOSPITAL ENCOUNTER (OUTPATIENT)
Facility: OTHER | Age: 72
Discharge: HOME | End: 2022-03-14
Attending: ANESTHESIOLOGY | Admitting: ANESTHESIOLOGY
Payer: MEDICARE

## 2022-03-14 VITALS
DIASTOLIC BLOOD PRESSURE: 90 MMHG | TEMPERATURE: 99 F | WEIGHT: 240 LBS | OXYGEN SATURATION: 99 % | HEIGHT: 64 IN | BODY MASS INDEX: 40.97 KG/M2 | RESPIRATION RATE: 16 BRPM | HEART RATE: 89 BPM | SYSTOLIC BLOOD PRESSURE: 160 MMHG

## 2022-03-14 DIAGNOSIS — G89.29 CHRONIC PAIN: ICD-10-CM

## 2022-03-14 DIAGNOSIS — M51.36 DDD (DEGENERATIVE DISC DISEASE), LUMBAR: Primary | ICD-10-CM

## 2022-03-14 DIAGNOSIS — M54.16 LUMBAR RADICULOPATHY: ICD-10-CM

## 2022-03-14 PROCEDURE — 64484 PRA INJECT ANES/STEROID FORAMEN LUMBAR/SACRAL W IMG GUIDE ,EA ADD LEVEL: ICD-10-PCS | Mod: LT,,, | Performed by: ANESTHESIOLOGY

## 2022-03-14 PROCEDURE — 25500020 PHARM REV CODE 255: Performed by: ANESTHESIOLOGY

## 2022-03-14 PROCEDURE — 64483 NJX AA&/STRD TFRM EPI L/S 1: CPT | Mod: LT | Performed by: ANESTHESIOLOGY

## 2022-03-14 PROCEDURE — 64483 PR EPIDURAL INJ, ANES/STEROID, TRANSFORAMINAL, LUMB/SACR, SNGL LEVL: ICD-10-PCS | Mod: LT,,, | Performed by: ANESTHESIOLOGY

## 2022-03-14 PROCEDURE — 25000003 PHARM REV CODE 250: Performed by: STUDENT IN AN ORGANIZED HEALTH CARE EDUCATION/TRAINING PROGRAM

## 2022-03-14 PROCEDURE — 63600175 PHARM REV CODE 636 W HCPCS: Performed by: ANESTHESIOLOGY

## 2022-03-14 PROCEDURE — 64484 NJX AA&/STRD TFRM EPI L/S EA: CPT | Mod: LT,,, | Performed by: ANESTHESIOLOGY

## 2022-03-14 PROCEDURE — 64484 NJX AA&/STRD TFRM EPI L/S EA: CPT | Mod: LT | Performed by: ANESTHESIOLOGY

## 2022-03-14 PROCEDURE — 64483 NJX AA&/STRD TFRM EPI L/S 1: CPT | Mod: LT,,, | Performed by: ANESTHESIOLOGY

## 2022-03-14 PROCEDURE — 25000003 PHARM REV CODE 250: Performed by: ANESTHESIOLOGY

## 2022-03-14 RX ORDER — DEXAMETHASONE SODIUM PHOSPHATE 10 MG/ML
INJECTION INTRAMUSCULAR; INTRAVENOUS
Status: DISCONTINUED | OUTPATIENT
Start: 2022-03-14 | End: 2022-03-14 | Stop reason: HOSPADM

## 2022-03-14 RX ORDER — SODIUM CHLORIDE 9 MG/ML
500 INJECTION, SOLUTION INTRAVENOUS CONTINUOUS
Status: DISCONTINUED | OUTPATIENT
Start: 2022-03-14 | End: 2022-03-14 | Stop reason: HOSPADM

## 2022-03-14 RX ORDER — MIDAZOLAM HYDROCHLORIDE 1 MG/ML
INJECTION INTRAMUSCULAR; INTRAVENOUS
Status: DISCONTINUED | OUTPATIENT
Start: 2022-03-14 | End: 2022-03-14 | Stop reason: HOSPADM

## 2022-03-14 RX ORDER — LIDOCAINE HYDROCHLORIDE 10 MG/ML
INJECTION, SOLUTION EPIDURAL; INFILTRATION; INTRACAUDAL; PERINEURAL
Status: DISCONTINUED | OUTPATIENT
Start: 2022-03-14 | End: 2022-03-14 | Stop reason: HOSPADM

## 2022-03-14 RX ORDER — FENTANYL CITRATE 50 UG/ML
INJECTION, SOLUTION INTRAMUSCULAR; INTRAVENOUS
Status: DISCONTINUED | OUTPATIENT
Start: 2022-03-14 | End: 2022-03-14 | Stop reason: HOSPADM

## 2022-03-14 RX ORDER — LIDOCAINE HYDROCHLORIDE 20 MG/ML
INJECTION, SOLUTION INFILTRATION; PERINEURAL
Status: DISCONTINUED | OUTPATIENT
Start: 2022-03-14 | End: 2022-03-14 | Stop reason: HOSPADM

## 2022-03-14 RX ADMIN — SODIUM CHLORIDE 500 ML: 0.9 INJECTION, SOLUTION INTRAVENOUS at 10:03

## 2022-03-14 NOTE — OP NOTE
Lumbar Transforaminal Epidural Steroid Injection under Fluoroscopic Guidance    The procedure, risks, benefits, and options were discussed with the patient. There are no contraindications to the procedure. The patent expressed understanding and agreed to the procedure. Informed written consent was obtained prior to the start of the procedure and can be found in the patient's chart.    PATIENT NAME: Amanda Holt   MRN: [id number]     DATE OF PROCEDURE: 03/14/2022    PROCEDURE:  Left  L4/5 and L5/S1 Lumbar Transforaminal Epidural Steroid Injection under Fluoroscopic Guidance    PRE-OP DIAGNOSIS: Lumbar radiculopathy [M54.16]  Spinal stenosis, unspecified spinal region [M48.00]    POST-OP DIAGNOSIS: Same    PHYSICIAN: Darya Ayala MD    ASSISTANTS: Dr. Espinoza     MEDICATIONS INJECTED: Preservative-free Decadron 10mg with 5cc of Lidocaine 1% MPF     LOCAL ANESTHETIC INJECTED: Xylocaine 2%     SEDATION:   Versed 2mg and Fentanyl 75mcg                                                                                                                                                                                     Conscious sedation ordered by M.D. Patient re-evaluation prior to administration of conscious sedation. No changes noted in patient's status from initial evaluation. The patient's vital signs were monitored by RN and patient remained hemodynamically stable throughout the procedure.    Event Time In   Sedation Start 1038       ESTIMATED BLOOD LOSS: None    COMPLICATIONS: None    TECHNIQUE: Time-out was performed to identify the patient and procedure to be performed. With the patient laying in a prone position, the surgical area was prepped and draped in the usual sterile fashion using ChloraPrep and a fenestrated drape.The levels were determined under fluoroscopy guidance. Skin anesthesia was achieved by injecting Lidocaine 2% over the injection sites. The transforaminal spaces were then approached with a 22 gauge, 5  inch spinal quinke needle that was introduced under fluoroscopic guidance in the AP and Lateral views. Once the needle tip was in the area of the transforaminal space, and there was no blood, CSF or paraesthesias, contrast dye Omnipaque (300mg/ml) was injected to confirm placement and there was no vascular runoff. Fluoroscopic imaging in the AP and lateral views revealed a clear outline of the spinal nerve with proximal spread of agent through the neural foramen into the epidural space. 3 mL of the medication mixture listed above was injected slowly at each site. Displacement of the radio opaque contrast after injection of the medication confirmed that the medication went into the area of the transforaminal spaces. The needles were removed and bleeding was nil. A sterile dressing was applied. No specimens collected. The patient tolerated the procedure well.       The patient was monitored after the procedure in the recovery area. They were given post-procedure and discharge instructions to follow at home. The patient was discharged in a stable condition.    PAIN BEFORE THE PROCEDURE: 10/10    PAIN AFTER THE PROCEDURE: 8/10      Darya Ayala MD

## 2022-03-14 NOTE — PLAN OF CARE
Pt tolerated procedure well. Pt reports 7/10 pain after procedure. Assisted pt up for first time. Steady on feet. Family at bedside.  All discharge instructions given.

## 2022-03-14 NOTE — INTERVAL H&P NOTE
"The patient has been examined and the H&P has been reviewed:    I concur with the findings and no changes have occurred since H&P was written.    Pt is a 71 y.o. female w/hx Lumbar radiculopathy [M54.16]  Spinal stenosis, unspecified spinal region [M48.00] who presents for:    INJECTION, STEROID, EPIDURAL, TRANSFORAMINAL APPROACH, L4-L5 & L5-S1:     She reports that she is in her usual state of health.  Pain is "12 or 15"/10 today.  Denies recent infection or abx.  Denies skin change/rash/infection at/near site of procedure.  Not on anticoagulation.  All questions answered.  No new complaints or concerns.    Procedure risks, benefits and alternative options discussed and understood by patient/family.          There are no hospital problems to display for this patient.    "

## 2022-03-14 NOTE — BRIEF OP NOTE
Discharge Note  Short Stay      SUMMARY     Admit Date: 3/14/2022    Attending Physician: Darya Ayala      Discharge Physician: Darya Ayala      Discharge Date: 3/14/2022 10:54 AM    Procedure(s) (LRB):  INJECTION, STEROID, EPIDURAL, TRANSFORAMINAL APPROACH, L4-L5 & L5-S1 (Left)    Final Diagnosis: Lumbar radiculopathy [M54.16]  Spinal stenosis, unspecified spinal region [M48.00]    Disposition: Home or self care    Patient Instructions:   Current Discharge Medication List      CONTINUE these medications which have NOT CHANGED    Details   amitriptyline (ELAVIL) 50 MG tablet TAKE 1 TABLET BY MOUTH AT BEDTIME  Qty: 90 tablet, Refills: 1    Associated Diagnoses: Spinal stenosis, unspecified spinal region      amLODIPine (NORVASC) 10 MG tablet Take 1 tablet (10 mg total) by mouth once daily.  Qty: 90 tablet, Refills: 1    Comments: .  Associated Diagnoses: Hypertension, unspecified type      aspirin (ECOTRIN) 81 MG EC tablet       diclofenac sodium (VOLTAREN) 1 % Gel Apply 2 g topically.      hydrOXYzine HCL (ATARAX) 25 MG tablet Take 1 tablet (25 mg total) by mouth 3 (three) times daily as needed.  Qty: 45 tablet, Refills: 1    Associated Diagnoses: Anxiety      INV HYDROCHLOROTHIAZIDE 25MG TABLET       losartan (COZAAR) 100 MG tablet Take 1 tablet by mouth once daily  Qty: 90 tablet, Refills: 1    Associated Diagnoses: Spinal stenosis, unspecified spinal region; Hypertension, unspecified type      mupirocin (BACTROBAN) 2 % ointment Apply topically 3 (three) times daily.  Qty: 22 g, Refills: 0    Associated Diagnoses: Inflamed epidermoid cyst of skin      omeprazole (PRILOSEC) 40 MG capsule Take 1 capsule (40 mg total) by mouth once daily.  Qty: 90 capsule, Refills: 1    Associated Diagnoses: Gastroesophageal reflux disease without esophagitis      pravastatin (PRAVACHOL) 40 MG tablet Take 1 tablet (40 mg total) by mouth once daily.  Qty: 90 tablet, Refills: 3    Associated Diagnoses: Atherosclerosis of aorta       spironolactone (ALDACTONE) 25 MG tablet Take 1 tablet (25 mg total) by mouth once daily.  Qty: 90 tablet, Refills: 1    Comments: .  Associated Diagnoses: Low blood potassium      traMADoL (ULTRAM) 50 mg tablet Take 1 tablet (50 mg total) by mouth every 12 (twelve) hours as needed for Pain.  Qty: 60 tablet, Refills: 2    Comments: Quantity prescribed more than 7 day supply? Yes, quantity medically necessary  Associated Diagnoses: Spinal stenosis, unspecified spinal region      traZODone (DESYREL) 100 MG tablet Take 1 tablet (100 mg total) by mouth nightly as needed for Insomnia.  Qty: 90 tablet, Refills: 1    Associated Diagnoses: Anxiety      triamcinolone acetonide 0.1% (KENALOG) 0.1 % ointment AAA bid  Qty: 454 g, Refills: 3    Associated Diagnoses: Subacute dermatitis                 Discharge Diagnosis: Lumbar radiculopathy [M54.16]  Spinal stenosis, unspecified spinal region [M48.00]  Condition on Discharge: Stable with no complications to procedure   Diet on Discharge: Same as before.  Activity: as per instruction sheet.  Discharge to: Home with a responsible adult.  Follow up: 2-4 weeks       Please call my office or pager at [phone] if experienced any weakness or loss of sensation, fever > 101.5, pain uncontrolled with oral medications, persistent nausea/vomiting/or diarrhea, redness or drainage from the incisions, or any other worrisome concerns. If physician on call was not reached or could not communicate with our office for any reason please go to the nearest emergency department

## 2022-03-14 NOTE — DISCHARGE INSTRUCTIONS
Thank you for allowing us to care for you today. You may receive a survey about the care we provided. Your feedback is valuable and helps us provide excellent care throughout the community.     Home Care Instructions for Pain Management:    1. DIET:   You may resume your normal diet today.   2. BATHING:   You may shower with luke warm water. No tub baths or anything that will soak injection sites under water for the next 24 hours.  3. DRESSING:   You may remove your bandage today.   4. ACTIVITY LEVEL:   You may resume your normal activities 24 hrs after your procedure. Nothing strenuous today.  5. MEDICATIONS:   You may resume your normal medications today. To restart blood thinners, ask your doctor.  6. DRIVING    If you have received any sedatives by mouth today, you may not drive for 12 hours.    If you have received any sedation through your IV, you may not drive for 24 hrs.   7. SPECIAL INSTRUCTIONS:   No heat to the injection site for 24 hrs including, hot bath or shower, heating pad, moist heat, or hot tubs.    Use ice pack to injection site for any pain or discomfort.  Apply ice packs for 20 minute intervals as needed.    IF you have diabetes, be sure to monitor your blood sugar more closely. IF your injection contained steroids your blood sugar levels may become higher than normal.    If you are still having pain upon discharge:  Your pain may improve over the next 48 hours. The anesthetic (numbing medication) works immediately to 48 hours. IF your injection contained a steroid (anti-inflammatory medication), it takes approximately 3 days to start feeling relief and 7-10 days to see your greatest results from the medication. It is possible you may need subsequent injections. This would be discussed at your follow up appointment with pain management or your referring doctor.    Please call the PAIN MANAGEMENT office at [phone] or ON CALL pager at [phone] if you experienced any:   -Weakness or  loss of sensation  -Fever > 101.5  -Pain uncontrolled with oral medications   -Persistent nausea, vomiting, or diarrhea  -Redness or drainage from the injection sites, or any other worrisome concerns.     If physician on call was not reached or could not communicate with our office for any reason please go to the nearest emergency department.     Adult Procedural Sedation Instructions    Recovery After Procedural Sedation (Adult)  You have been given medicine by vein to make you sleep during your surgery. This may have included both a pain medicine and sleeping medicine. Most of the effects have worn off. But you may still have some drowsiness for the next 6 to 8 hours.  Home care  Follow these guidelines when you get home:  For the next 8 hours, you should be watched by a responsible adult. This person should make sure your condition is not getting worse.  Don't drink any alcohol for the next 24 hours.  Don't drive, operate dangerous machinery, or make important business or personal decisions during the next 24 hours.  Note: Your healthcare provider may tell you not to take any medicine by mouth for pain or sleep in the next 4 hours. These medicines may react with the medicines you were given in the hospital. This could cause a much stronger response than usual.  Follow-up care  Follow up with your healthcare provider if you are not alert and back to your usual level of activity within 12 hours.  When to seek medical advice  Call your healthcare provider right away if any of these occur:  Drowsiness gets worse  Weakness or dizziness gets worse  Repeated vomiting  You can't be awakened   Date Last Reviewed: 10/18/2016  © 5326-9952 [employer]. [address] [place] [address], [place] [address]. All rights reserved. This information is not intended as a substitute for professional medical care. Always follow your healthcare professional's instructions.

## 2022-03-28 ENCOUNTER — OFFICE VISIT (OUTPATIENT)
Dept: PAIN MEDICINE | Facility: CLINIC | Age: 72
End: 2022-03-28
Payer: MEDICARE

## 2022-03-28 ENCOUNTER — PATIENT OUTREACH (OUTPATIENT)
Dept: ADMINISTRATIVE | Facility: OTHER | Age: 72
End: 2022-03-28
Payer: MEDICARE

## 2022-03-28 VITALS
HEIGHT: 64 IN | WEIGHT: 250.25 LBS | BODY MASS INDEX: 42.72 KG/M2 | HEART RATE: 98 BPM | DIASTOLIC BLOOD PRESSURE: 95 MMHG | SYSTOLIC BLOOD PRESSURE: 150 MMHG | RESPIRATION RATE: 18 BRPM

## 2022-03-28 DIAGNOSIS — R20.2 LEFT LEG PARESTHESIAS: Primary | ICD-10-CM

## 2022-03-28 PROCEDURE — 3008F BODY MASS INDEX DOCD: CPT | Mod: CPTII,S$GLB,, | Performed by: NURSE PRACTITIONER

## 2022-03-28 PROCEDURE — 4010F ACE/ARB THERAPY RXD/TAKEN: CPT | Mod: CPTII,S$GLB,, | Performed by: NURSE PRACTITIONER

## 2022-03-28 PROCEDURE — 99213 OFFICE O/P EST LOW 20 MIN: CPT | Mod: S$GLB,,, | Performed by: NURSE PRACTITIONER

## 2022-03-28 PROCEDURE — 3077F SYST BP >= 140 MM HG: CPT | Mod: CPTII,S$GLB,, | Performed by: NURSE PRACTITIONER

## 2022-03-28 PROCEDURE — 1101F PT FALLS ASSESS-DOCD LE1/YR: CPT | Mod: CPTII,S$GLB,, | Performed by: NURSE PRACTITIONER

## 2022-03-28 PROCEDURE — 1101F PR PT FALLS ASSESS DOC 0-1 FALLS W/OUT INJ PAST YR: ICD-10-PCS | Mod: CPTII,S$GLB,, | Performed by: NURSE PRACTITIONER

## 2022-03-28 PROCEDURE — 3288F FALL RISK ASSESSMENT DOCD: CPT | Mod: CPTII,S$GLB,, | Performed by: NURSE PRACTITIONER

## 2022-03-28 PROCEDURE — 99213 OFFICE O/P EST LOW 20 MIN: CPT | Mod: PBBFAC | Performed by: NURSE PRACTITIONER

## 2022-03-28 PROCEDURE — 1160F RVW MEDS BY RX/DR IN RCRD: CPT | Mod: CPTII,S$GLB,, | Performed by: NURSE PRACTITIONER

## 2022-03-28 PROCEDURE — 99999 PR PBB SHADOW E&M-EST. PATIENT-LVL III: CPT | Mod: PBBFAC,,, | Performed by: NURSE PRACTITIONER

## 2022-03-28 PROCEDURE — 3008F PR BODY MASS INDEX (BMI) DOCUMENTED: ICD-10-PCS | Mod: CPTII,S$GLB,, | Performed by: NURSE PRACTITIONER

## 2022-03-28 PROCEDURE — 3288F PR FALLS RISK ASSESSMENT DOCUMENTED: ICD-10-PCS | Mod: CPTII,S$GLB,, | Performed by: NURSE PRACTITIONER

## 2022-03-28 PROCEDURE — 99499 RISK ADDL DX/OHS AUDIT: ICD-10-PCS | Mod: S$GLB,,, | Performed by: NURSE PRACTITIONER

## 2022-03-28 PROCEDURE — 99499 UNLISTED E&M SERVICE: CPT | Mod: S$GLB,,, | Performed by: NURSE PRACTITIONER

## 2022-03-28 PROCEDURE — 1160F PR REVIEW ALL MEDS BY PRESCRIBER/CLIN PHARMACIST DOCUMENTED: ICD-10-PCS | Mod: CPTII,S$GLB,, | Performed by: NURSE PRACTITIONER

## 2022-03-28 PROCEDURE — 1125F AMNT PAIN NOTED PAIN PRSNT: CPT | Mod: CPTII,S$GLB,, | Performed by: NURSE PRACTITIONER

## 2022-03-28 PROCEDURE — 4010F PR ACE/ARB THEARPY RXD/TAKEN: ICD-10-PCS | Mod: CPTII,S$GLB,, | Performed by: NURSE PRACTITIONER

## 2022-03-28 PROCEDURE — 3080F PR MOST RECENT DIASTOLIC BLOOD PRESSURE >= 90 MM HG: ICD-10-PCS | Mod: CPTII,S$GLB,, | Performed by: NURSE PRACTITIONER

## 2022-03-28 PROCEDURE — 99999 PR PBB SHADOW E&M-EST. PATIENT-LVL III: ICD-10-PCS | Mod: PBBFAC,,, | Performed by: NURSE PRACTITIONER

## 2022-03-28 PROCEDURE — 1159F MED LIST DOCD IN RCRD: CPT | Mod: CPTII,S$GLB,, | Performed by: NURSE PRACTITIONER

## 2022-03-28 PROCEDURE — 3080F DIAST BP >= 90 MM HG: CPT | Mod: CPTII,S$GLB,, | Performed by: NURSE PRACTITIONER

## 2022-03-28 PROCEDURE — 1125F PR PAIN SEVERITY QUANTIFIED, PAIN PRESENT: ICD-10-PCS | Mod: CPTII,S$GLB,, | Performed by: NURSE PRACTITIONER

## 2022-03-28 PROCEDURE — 1159F PR MEDICATION LIST DOCUMENTED IN MEDICAL RECORD: ICD-10-PCS | Mod: CPTII,S$GLB,, | Performed by: NURSE PRACTITIONER

## 2022-03-28 PROCEDURE — 99213 PR OFFICE/OUTPT VISIT, EST, LEVL III, 20-29 MIN: ICD-10-PCS | Mod: S$GLB,,, | Performed by: NURSE PRACTITIONER

## 2022-03-28 PROCEDURE — 3077F PR MOST RECENT SYSTOLIC BLOOD PRESSURE >= 140 MM HG: ICD-10-PCS | Mod: CPTII,S$GLB,, | Performed by: NURSE PRACTITIONER

## 2022-03-28 NOTE — PROGRESS NOTES
"  Subjective:     Patient ID: Amanda Holt is a 71 y.o. female    Chief Complaint: No chief complaint on file.      Referred by: No ref. provider found      HPI:    Interval History 3/28/2022:  Mrs Holt presents for follow up of lower back pain and left leg pain. She is s/p Left L4/5&L5S1 TFESI. Pain has decreased "some" and having difficulty quantifying. Pt states pain less severe and frequent. 50-60% improved. She has continued left leg numbness when standing for any length of time. There is no complaint of loss of b/b or saddle paresthesias.     Interval History 2/24/2022:  Mrs Holt presents for delayed follow-up for continued left-sided lower back pain and radiculopathy down the lateral and anterior and lateral lower leg. She has had left L5/S1&S1 TFESI and then L4/5&L5/S1 TFESI approx one year ago. She found there L4/5&L5/S1 TFESI more beneficial with >80% relief in hindsight but states procedure itself was too painful and feels she needed more sedation and had significant post op soreness after. She has no recent MRI. She wishes to establish care Dr Ayala as he provides care for a friend of hers. There is no complaint of loss of b/b or saddle paresthesias. She is currently prescribed Tramadol, Elavil,  and Zanaflex with benefit and denies SE of medications.     Interval History NP (3/2/21):    Pt returns today for follow up. She states that the left L4 and L5 TESI was helpful for the low back pain; reports 60% relief. She does state that the procedure was extremely painful and is still "recovering from it". She reports that Tramadol has helped and continues to help with her pain.     Interval History (1/27/21):  She returns today for follow up.  She reports that left L5 and S1 transforaminal epidural steroid injection has been helpful for the left-sided low back and lower extremity pain.  She reports 3 months of very good relief following this procedure.  She states that more recently she has " developed recurrent left-sided pain.  This pain is similar to previous pain but now involves the left anterior leg.  Pain is mostly present when standing and walking for prolonged periods of time.  She denies any new numbness, tingling, weakness, bowel bladder dysfunction.      Interval History NP (8/24/20):  Pt returns today for follow up and imaging review. She reports an increase in numbness and tingling to the left anterior lower leg and foot. No increased pain. Denies bowel or bladder dysfunction.      Initial Encounter (7/20/20):  Amanda Holt is a 71 y.o. female who presents today with chronic left-sided low back and lower extremity pain.  Status post L4-5 laminectomy and fusion in 2018.  She states that she was doing well for a period of time following her surgery, but she began have this left-sided pain a little over 1 year ago.  The pain is located the left lumbosacral region radiates all the way to the left lateral thigh, lateral leg to the ankle.  She reports associated numbness and tingling this area.  She denies any focal weakness or bowel bladder dysfunction.  Pain is constant worse with activity.  Patient has been evaluated by Neurosurgery and imaging studies were ordered.  These have not been done but are scheduled for next month.  This pain is described in detail below.    Physical Therapy:  Yes.    Non-pharmacologic Treatment:  Rest helps         · TENS?  No    Pain Medications:         · Currently taking:  Tizanidine, tramadol, Voltaren gel, Amitriptyline    · Has tried in the past:  Gabapentin, NSAIDs, Tylenol    · Has not tried:  Opioids, SNRIs, topical creams    Blood thinners:  Aspirin 81 mg a day    Interventional Therapies:    9/9/20 - left L5 and S1 transforaminal epidural steroid injection - 100% relief for roughly 3 months  2/12/21 - left L4 and L5 TESI - 60% relief  3/14/2022 Left L4/5&L5/S1 TFESI       Relevant Surgeries: L4-5 laminectomy and fusion in 2018    Affecting  sleep?  Yes    Affecting daily activities? yes    Depressive symptoms? no          · SI/HI? No    Work status: Disabled    Pain Scores:    Best:       1/10  Worst:     10/10  Usually:   9/10  Today:   8/10    Review of Systems   Constitutional: Negative for activity change, appetite change, chills, fatigue, fever and unexpected weight change.   HENT: Negative for hearing loss.    Eyes: Negative for visual disturbance.   Respiratory: Negative for chest tightness and shortness of breath.    Cardiovascular: Negative for chest pain.   Gastrointestinal: Negative for abdominal pain, constipation, diarrhea, nausea and vomiting.   Genitourinary: Negative for difficulty urinating.   Musculoskeletal: Positive for back pain, gait problem and myalgias. Negative for neck pain.   Skin: Negative for rash.   Neurological: Negative for dizziness, weakness, light-headedness, numbness and headaches.   Psychiatric/Behavioral: Positive for sleep disturbance. Negative for hallucinations and suicidal ideas. The patient is not nervous/anxious.        Past Medical History:   Diagnosis Date    Arthralgia     Colon polyp     Degenerative disc disease at L5-S1 level     High cholesterol     Hypertension     Nuclear sclerosis of both eyes 1/8/2020    Sciatica     Spinal stenosis        Past Surgical History:   Procedure Laterality Date    BACK SURGERY      lumbar laminectomy    COLONOSCOPY N/A 7/23/2020    Procedure: COLONOSCOPY;  Surgeon: Donal Moore MD;  Location: [facility] ENDO;  Service: Endoscopy;  Laterality: N/A;    EPIDURAL STEROID INJECTION Left 9/9/2020    Procedure: Injection, Steroid, Epidural Transforaminal;  Surgeon: Jun Leavitt Jr., MD;  Location: [facility] ENDO;  Service: Pain Management;  Laterality: Left;  Left L5 + S1 TF WADE  Arrive @ 1300; ASA last 9/1; No DM    EPIDURAL STEROID INJECTION Left 2/12/2021    Procedure: Injection, Steroid, Epidural Transforaminal;  Surgeon: Jun Leavitt Jr., MD;   Location: [facility] ENDO;  Service: Pain Management;  Laterality: Left;  Left L4 + L5 TF WADE  Arrive @ 1230; IV Sedation; ASA last 2/4; No DM    TRANSFORAMINAL EPIDURAL INJECTION OF STEROID Left 3/14/2022    Procedure: INJECTION, STEROID, EPIDURAL, TRANSFORAMINAL APPROACH, L4-L5 & L5-S1;  Surgeon: Darya Ayala MD;  Location: [facility] PAIN MGT;  Service: Pain Management;  Laterality: Left;    TUBAL LIGATION         Social History     Socioeconomic History    Marital status:    Tobacco Use    Smoking status: Current Some Day Smoker     Years: 40.00     Types: Cigarettes    Smokeless tobacco: Never Used    Tobacco comment: 2 cigarettes/week   Substance and Sexual Activity    Alcohol use: Yes     Comment: ocassionally    Drug use: Yes     Comment: Tramadol twice a day as needed    Sexual activity: Not Currently     Social Determinants of Health     Financial Resource Strain: Medium Risk    Difficulty of Paying Living Expenses: Somewhat hard   Food Insecurity: Food Insecurity Present    Worried About Running Out of Food in the Last Year: Sometimes true    Ran Out of Food in the Last Year: Sometimes true   Transportation Needs: No Transportation Needs    Lack of Transportation (Medical): No    Lack of Transportation (Non-Medical): No   Physical Activity: Insufficiently Active    Days of Exercise per Week: 3 days    Minutes of Exercise per Session: 10 min   Stress: Stress Concern Present    Feeling of Stress : Very much   Social Connections: Unknown    Frequency of Communication with Friends and Family: More than three times a week    Frequency of Social Gatherings with Friends and Family: Once a week    Active Member of Clubs or Organizations: No    Attends Club or Organization Meetings: Never    Marital Status:    Housing Stability: Low Risk     Unable to Pay for Housing in the Last Year: No    Number of Places Lived in the Last Year: 1    Unstable Housing in the Last Year: No       Review  "of patient's allergies indicates:   Allergen Reactions    Codeine        Current Outpatient Medications on File Prior to Visit   Medication Sig Dispense Refill    amitriptyline (ELAVIL) 50 MG tablet TAKE 1 TABLET BY MOUTH AT BEDTIME 90 tablet 1    aspirin (ECOTRIN) 81 MG EC tablet       diclofenac sodium (VOLTAREN) 1 % Gel Apply 2 g topically.      hydrOXYzine HCL (ATARAX) 25 MG tablet Take 1 tablet (25 mg total) by mouth 3 (three) times daily as needed. 45 tablet 1    INV HYDROCHLOROTHIAZIDE 25MG TABLET       losartan (COZAAR) 100 MG tablet Take 1 tablet by mouth once daily 90 tablet 1    mupirocin (BACTROBAN) 2 % ointment Apply topically 3 (three) times daily. 22 g 0    omeprazole (PRILOSEC) 40 MG capsule Take 1 capsule (40 mg total) by mouth once daily. 90 capsule 1    pravastatin (PRAVACHOL) 40 MG tablet Take 1 tablet (40 mg total) by mouth once daily. 90 tablet 3    triamcinolone acetonide 0.1% (KENALOG) 0.1 % ointment AAA bid 454 g 3    amLODIPine (NORVASC) 10 MG tablet Take 1 tablet (10 mg total) by mouth once daily. 90 tablet 1    spironolactone (ALDACTONE) 25 MG tablet Take 1 tablet (25 mg total) by mouth once daily. 90 tablet 1    traMADoL (ULTRAM) 50 mg tablet Take 1 tablet (50 mg total) by mouth every 12 (twelve) hours as needed for Pain. 60 tablet 2    traZODone (DESYREL) 100 MG tablet Take 1 tablet (100 mg total) by mouth nightly as needed for Insomnia. 90 tablet 1     No current facility-administered medications on file prior to visit.       Objective:      BP (!) 150/95 (BP Location: Right arm, Patient Position: Sitting, BP Method: Medium (Automatic))   Pulse 98   Resp 18   Ht 5' 4" (1.626 m)   Wt 113.5 kg (250 lb 3.6 oz)   BMI 42.95 kg/m²       Exam:  GEN:  Well developed, well nourished.  No acute distress.  Normal pain behavior.  HEENT:  No trauma.  Mucous membranes moist.  Nares patent bilaterally.  PSYCH: Normal affect. Thought content appropriate.  CHEST:  Breathing " symmetric.  No audible wheezing.  SKIN:  Warm, pink, dry.  No rash on exposed areas.    EXT:  No cyanosis, clubbing, or edema.  No color change or changes in nail or hair growth.  NEURO/MUSCULOSKELETAL:  Fully alert, oriented, and appropriate. Speech normal andriy. No cranial nerve deficits.   Decreased Left EHL, decreased sensation to left lateral leg   Gait:  Antalgic. In Hospital w/c for transport.         Imaging:      MRI LUMBAR SPINE WITHOUT CONTRAST     CLINICAL HISTORY:  Low back pain, symptoms persist with > 6wks conservative treatment; Dorsalgia, unspecified     TECHNIQUE:  Multiplanar, multisequence MR images were acquired from the thoracolumbar junction to the sacrum without the administration of contrast.     COMPARISON:  Radiograph 07/16/2021     FINDINGS:  Alignment: Relatively well maintained     Vertebrae: Normal marrow signal. No fracture.  Left-sided at L4-5.  There is transitional anatomy at what will be termed L5.  Rudimentary disc space at what will be termed L5-S1.     Discs: As below.  There is height loss which is most notable at L4-5.     Cord: Normal.  Conus terminates at L1.     Degenerative findings:     T12-L1: Unremarkable.     L1-L2: Unremarkable.     L2-L3: Mild facet hypertrophy.  No significant nerve root compression.     L3-L4: Circumferential disc bulge and facet hypertrophy.  Moderate canal stenosis and moderate bilateral neural foraminal narrowing.     L4-L5: Intervertebral disc height loss with circumferential disc bulge and facet hypertrophy.  Moderate bilateral neural foraminal narrowing.     L5-S1: Rudimentary disc space.  There is facet arthropathy.  Moderate right and mild left neural foraminal narrowing.     Paraspinal muscles & soft tissues: Multiple bilateral renal cyst, increased in number when compared to 2019 exam.     Impression:     Left-sided fusion L4-L5 without evidence of complication.  There is transitional anatomy at what will be termed L5 with rudimentary  disc space at L5-S1.     Degenerative change of the inferior lumbar spine as detailed above most notable at L3-4, L4-5, L5-S1.     Numerous bilateral renal cysts which are increased in number when compared to 2019 CT.  Further evaluation with a renal ultrasound may be obtained.       Narrative & Impression     EXAMINATION:  XR LUMBAR SPINE FLEXION AND EXTENSION ONLY     CLINICAL HISTORY:  back pain, unspecified back location;  Dorsalgia, unspecified     TECHNIQUE:  Lateral flexion standing and extension standing radiographs of the lumbar spine were obtained.     COMPARISON:  None     FINDINGS:  Patient is status post posterior spinal fusion at the L4-L5 level with metallic tracey and pedicle screws present.  Posterior vertebral alignment appears satisfactory.  No instability is elicited on the flexion and extension radiographs.  There is no evidence for acute fracture or bone destruction.  Atherosclerotic calcification is present within the abdominal aorta.     Impression:     Spinal fusion at the L4-L5 level.  No evidence for hardware failure on this examination.     Posterior vertebral alignment is satisfactory with no instability elicited on the flexion and extension radiographs.  No appreciable movement noted at the fused L4-L5 level.        Electronically signed by: Galen Victoria MD  Date:                                            08/10/2020  Time:                                           09:46         Narrative & Impression     EXAMINATION:  MRI LUMBAR SPINE WITHOUT CONTRAST     CLINICAL HISTORY:  Dorsalgia, unspecifiedNeurologic Compromise;     TECHNIQUE:  Sagittal T1, sagittal T2, sagittal STIR, axial T1 and axial T2 weighted images of the lumbar spine obtained without contrast.     COMPARISON:  None     Limitations: In this patient with a history of previous lumbar spine surgery, postcontrast enhanced images are necessary to differentiate postsurgical epidural fibrosis from residual or recurrent disc  herniation.     FINDINGS:  Lumbar spine alignment is within normal limits. The vertebral body heights are well maintained, with no fracture.  No marrow signal abnormality suspicious for an infiltrative process.     The conus is normal in appearance.  The adjacent soft tissue structures show no significant abnormalities.     L1-L2: There is no focal disc herniation. No significant central canal or neural foraminal narrowing.     L2-L3: There is no focal disc herniation. No significant central canal or neural foraminal narrowing.     L3-L4: There is metallic susceptibility artifact on left related to indwelling left-sided hardware posteriorly.  There is a subtle left paracentral and medial foraminal disc herniation suspected with some superimposed spondylitic spurring and disc bulging.  No central canal stenosis.  There is mild left-sided foraminal stenosis.     L4-L5: Patient is status post laminectomy and posterior fusion with unilateral left-sided hardware producing expected metallic susceptibility artifact.  There is some spondylitic spurring, disc narrowing and degenerative endplate changes and diffuse disc bulging without focal herniation.  No significant central canal or neural foraminal narrowing.     L5-S1: There is near complete bony ankylosis with a rudimentary L5-S1 disc.  No significant central canal or neural foraminal narrowing.     Impression:     Left paracentral and medial foraminal disc herniation at L3-4.     Status post left L5 laminectomy and instrumentation without complicating features.     This report was flagged in Epic as abnormal.        Electronically signed by: Brian Damon Jr  Date:                                            08/10/2020  Time:                                           09:38         Assessment:       No diagnosis found.      Plan:       There are no diagnoses linked to this encounter.    Amanda Holt is a 71 y.o. female with chronic left-sided low back and  lower extremity pain consistent with left lumbar radiculopathy.  Good relief from previous left L4 and L5 transforaminal epidural steroid injection.  Current symptoms now more consistent with L5 and L4 radicular pain.  Patient does have a history of previous lumbar fusion.  Also some indications of lumbar facet pain.     - Prior records reviewed  - Updated Imaging reviewed  - s/p Left L4/5&L5/S1 TFESI and >50% improved   - SARABJIT ordered to evaluate claudication of left leg   - Can continue medications prescribed by primary care doctor stay are beneficial without side effects.  - I have stressed the importance of physical activity and a home exercise plan to help with pain and improve health.  - Patient can continue with medications for now since they are providing benefits, using them appropriately, and without side effects.  - Counseled patient regarding the importance of activity modification.  - RTC 2 months     The above plan and management options were discussed at length with patient. Patient is in agreement with the above and verbalized understanding.    CINDY Riuz  2/24/2022

## 2022-04-07 ENCOUNTER — HOSPITAL ENCOUNTER (OUTPATIENT)
Dept: CARDIOLOGY | Facility: HOSPITAL | Age: 72
Discharge: HOME | End: 2022-04-07
Attending: NURSE PRACTITIONER
Payer: MEDICARE

## 2022-04-07 DIAGNOSIS — R20.2 LEFT LEG PARESTHESIAS: ICD-10-CM

## 2022-04-07 LAB
IMMEDIATE ARM BP: 195 MMHG
IMMEDIATE LEFT ABI: 1.12
IMMEDIATE LEFT TIBIAL: 219 MMHG
IMMEDIATE RIGHT ABI: 1.23
IMMEDIATE RIGHT TIBIAL: 239 MMHG
LEFT ABI: 1.05
LEFT ARM BP: 153 MMHG
LEFT DORSALIS PEDIS: 147 MMHG
LEFT POSTERIOR TIBIAL: 161 MMHG
RIGHT ABI: 1.14
RIGHT ARM BP: 150 MMHG
RIGHT DORSALIS PEDIS: 167 MMHG
RIGHT POSTERIOR TIBIAL: 175 MMHG
TREADMILL GRADE: 12 %
TREADMILL SPEED: 2 MPH
TREADMILL TIME: 4 MIN

## 2022-04-07 PROCEDURE — 93924 LWR XTR VASC STDY BILAT: CPT

## 2022-04-07 PROCEDURE — 93924 ANKLE BRACHIAL INDICES (ABI): ICD-10-PCS | Mod: 26,,, | Performed by: INTERNAL MEDICINE

## 2022-04-07 PROCEDURE — 93924 LWR XTR VASC STDY BILAT: CPT | Mod: 26,,, | Performed by: INTERNAL MEDICINE

## 2022-04-12 ENCOUNTER — TELEPHONE (OUTPATIENT)
Dept: PAIN MEDICINE | Facility: CLINIC | Age: 72
End: 2022-04-12
Payer: MEDICARE

## 2022-04-12 NOTE — TELEPHONE ENCOUNTER
"This message is for patient in regards to his/her appointment 04/13/22 at 10:20a       [facility] Policy: For the safety of all patients and staff members.     Patient Visitor policy: Due to social distancing and limited seating staff are requesting patient to arrive to their schedule appointments on time.     Upon arriving to facility; patients are required to wear a face mask, if patient do not have a face mask one will be provided. Upon arriving patient we ask patients to contact clinic at this number [phone] to notify staff that they have arrived or they may do so by utilizing the Mobile checked in [place](if patient have patient portal; clinical staff will send a message through there letting them know it's okay to proceed to their visit). Staff will give the patient the "okay" to come up or wait inside their vehicle until clinic is ready for patient to be seen by CINDY Ruiz. If you have any questions or concerns please contact [phone]    Patient verbalized and confirmed appt  "

## 2022-04-13 ENCOUNTER — OFFICE VISIT (OUTPATIENT)
Dept: PAIN MEDICINE | Facility: CLINIC | Age: 72
End: 2022-04-13
Payer: MEDICARE

## 2022-04-13 VITALS
WEIGHT: 253 LBS | TEMPERATURE: 98 F | RESPIRATION RATE: 18 BRPM | BODY MASS INDEX: 43.19 KG/M2 | HEIGHT: 64 IN | HEART RATE: 103 BPM | DIASTOLIC BLOOD PRESSURE: 83 MMHG | SYSTOLIC BLOOD PRESSURE: 151 MMHG

## 2022-04-13 DIAGNOSIS — G89.4 CHRONIC PAIN SYNDROME: Primary | ICD-10-CM

## 2022-04-13 DIAGNOSIS — R20.2 LEFT LEG PARESTHESIAS: ICD-10-CM

## 2022-04-13 DIAGNOSIS — K21.9 GASTROESOPHAGEAL REFLUX DISEASE WITHOUT ESOPHAGITIS: ICD-10-CM

## 2022-04-13 DIAGNOSIS — M54.16 LUMBAR RADICULOPATHY: ICD-10-CM

## 2022-04-13 PROCEDURE — 3079F DIAST BP 80-89 MM HG: CPT | Mod: CPTII,S$GLB,, | Performed by: NURSE PRACTITIONER

## 2022-04-13 PROCEDURE — 99213 PR OFFICE/OUTPT VISIT, EST, LEVL III, 20-29 MIN: ICD-10-PCS | Mod: S$GLB,,, | Performed by: NURSE PRACTITIONER

## 2022-04-13 PROCEDURE — 99999 PR PBB SHADOW E&M-EST. PATIENT-LVL IV: CPT | Mod: PBBFAC,,, | Performed by: NURSE PRACTITIONER

## 2022-04-13 PROCEDURE — 1101F PR PT FALLS ASSESS DOC 0-1 FALLS W/OUT INJ PAST YR: ICD-10-PCS | Mod: CPTII,S$GLB,, | Performed by: NURSE PRACTITIONER

## 2022-04-13 PROCEDURE — 99999 PR PBB SHADOW E&M-EST. PATIENT-LVL IV: ICD-10-PCS | Mod: PBBFAC,,, | Performed by: NURSE PRACTITIONER

## 2022-04-13 PROCEDURE — 4010F ACE/ARB THERAPY RXD/TAKEN: CPT | Mod: CPTII,S$GLB,, | Performed by: NURSE PRACTITIONER

## 2022-04-13 PROCEDURE — 3079F PR MOST RECENT DIASTOLIC BLOOD PRESSURE 80-89 MM HG: ICD-10-PCS | Mod: CPTII,S$GLB,, | Performed by: NURSE PRACTITIONER

## 2022-04-13 PROCEDURE — 4010F PR ACE/ARB THEARPY RXD/TAKEN: ICD-10-PCS | Mod: CPTII,S$GLB,, | Performed by: NURSE PRACTITIONER

## 2022-04-13 PROCEDURE — 1125F AMNT PAIN NOTED PAIN PRSNT: CPT | Mod: CPTII,S$GLB,, | Performed by: NURSE PRACTITIONER

## 2022-04-13 PROCEDURE — 3077F PR MOST RECENT SYSTOLIC BLOOD PRESSURE >= 140 MM HG: ICD-10-PCS | Mod: CPTII,S$GLB,, | Performed by: NURSE PRACTITIONER

## 2022-04-13 PROCEDURE — 3288F PR FALLS RISK ASSESSMENT DOCUMENTED: ICD-10-PCS | Mod: CPTII,S$GLB,, | Performed by: NURSE PRACTITIONER

## 2022-04-13 PROCEDURE — 3288F FALL RISK ASSESSMENT DOCD: CPT | Mod: CPTII,S$GLB,, | Performed by: NURSE PRACTITIONER

## 2022-04-13 PROCEDURE — 3008F BODY MASS INDEX DOCD: CPT | Mod: CPTII,S$GLB,, | Performed by: NURSE PRACTITIONER

## 2022-04-13 PROCEDURE — 1101F PT FALLS ASSESS-DOCD LE1/YR: CPT | Mod: CPTII,S$GLB,, | Performed by: NURSE PRACTITIONER

## 2022-04-13 PROCEDURE — 99213 OFFICE O/P EST LOW 20 MIN: CPT | Mod: S$GLB,,, | Performed by: NURSE PRACTITIONER

## 2022-04-13 PROCEDURE — 1125F PR PAIN SEVERITY QUANTIFIED, PAIN PRESENT: ICD-10-PCS | Mod: CPTII,S$GLB,, | Performed by: NURSE PRACTITIONER

## 2022-04-13 PROCEDURE — 1159F PR MEDICATION LIST DOCUMENTED IN MEDICAL RECORD: ICD-10-PCS | Mod: CPTII,S$GLB,, | Performed by: NURSE PRACTITIONER

## 2022-04-13 PROCEDURE — 1159F MED LIST DOCD IN RCRD: CPT | Mod: CPTII,S$GLB,, | Performed by: NURSE PRACTITIONER

## 2022-04-13 PROCEDURE — 3008F PR BODY MASS INDEX (BMI) DOCUMENTED: ICD-10-PCS | Mod: CPTII,S$GLB,, | Performed by: NURSE PRACTITIONER

## 2022-04-13 PROCEDURE — 3077F SYST BP >= 140 MM HG: CPT | Mod: CPTII,S$GLB,, | Performed by: NURSE PRACTITIONER

## 2022-04-13 NOTE — TELEPHONE ENCOUNTER
Care Due:                  Date            Visit Type   Department     Provider  --------------------------------------------------------------------------------                                EP [facility]                              PRIMARY      MED/ INTERNAL  Last Visit: 02-      CARE ([facility])   MED/ PEDS      Simona Torres                              [facility]                              PRIMARY      MEDICINE/  Next Visit: 05-      CARE ([facility])   INTERNAL MED   Simona Min  Test          Frequency    Reason                     Performed    Due Date  --------------------------------------------------------------------------------    CMP.........  12 months..  losartan, pravastatin....  07-   07-    Powered by [employer]. Reference number: [id number].   4/13/2022 4:03:44 PM CDT

## 2022-04-13 NOTE — PROGRESS NOTES
"  Subjective:     Patient ID: Amanda Holt is a 71 y.o. female    Chief Complaint: Back Pain      Referred by: No ref. provider found      HPI:    Interval History 4/13/12022:  Mrs Holt presents for follow up of lower back pain and left leg pain. Since TFESI leg pain has only occurred twice but is associated with standing and walking, eased with sitting. SARABJIT without abnormality. No new areas of pain or neurological changes. Denies any foccal loss of b/b or saddle paresthesias.     Interval History 3/28/2022:  Mrs Holt presents for follow up of lower back pain and left leg pain. She is s/p Left L4/5&L5S1 TFESI. Pain has decreased "some" and having difficulty quantifying. Pt states pain less severe and frequent. 50-60% improved. She has continued left leg numbness when standing for any length of time. There is no complaint of loss of b/b or saddle paresthesias.     Interval History 2/24/2022:  Mrs Holt presents for delayed follow-up for continued left-sided lower back pain and radiculopathy down the lateral and anterior and lateral lower leg. She has had left L5/S1&S1 TFESI and then L4/5&L5/S1 TFESI approx one year ago. She found there L4/5&L5/S1 TFESI more beneficial with >80% relief in hindsight but states procedure itself was too painful and feels she needed more sedation and had significant post op soreness after. She has no recent MRI. She wishes to establish care Dr Ayala as he provides care for a friend of hers. There is no complaint of loss of b/b or saddle paresthesias. She is currently prescribed Tramadol, Elavil,  and Zanaflex with benefit and denies SE of medications.     Interval History NP (3/2/21):    Pt returns today for follow up. She states that the left L4 and L5 TESI was helpful for the low back pain; reports 60% relief. She does state that the procedure was extremely painful and is still "recovering from it". She reports that Tramadol has helped and continues to help with her " pain.     Interval History (1/27/21):  She returns today for follow up.  She reports that left L5 and S1 transforaminal epidural steroid injection has been helpful for the left-sided low back and lower extremity pain.  She reports 3 months of very good relief following this procedure.  She states that more recently she has developed recurrent left-sided pain.  This pain is similar to previous pain but now involves the left anterior leg.  Pain is mostly present when standing and walking for prolonged periods of time.  She denies any new numbness, tingling, weakness, bowel bladder dysfunction.      Interval History NP (8/24/20):  Pt returns today for follow up and imaging review. She reports an increase in numbness and tingling to the left anterior lower leg and foot. No increased pain. Denies bowel or bladder dysfunction.      Initial Encounter (7/20/20):  Amanda Holt is a 71 y.o. female who presents today with chronic left-sided low back and lower extremity pain.  Status post L4-5 laminectomy and fusion in 2018.  She states that she was doing well for a period of time following her surgery, but she began have this left-sided pain a little over 1 year ago.  The pain is located the left lumbosacral region radiates all the way to the left lateral thigh, lateral leg to the ankle.  She reports associated numbness and tingling this area.  She denies any focal weakness or bowel bladder dysfunction.  Pain is constant worse with activity.  Patient has been evaluated by Neurosurgery and imaging studies were ordered.  These have not been done but are scheduled for next month.  This pain is described in detail below.    Physical Therapy:  Yes.    Non-pharmacologic Treatment:  Rest helps         · TENS?  No    Pain Medications:         · Currently taking:  Tizanidine, tramadol, Voltaren gel, Amitriptyline    · Has tried in the past:  Gabapentin, NSAIDs, Tylenol    · Has not tried:  Opioids, SNRIs, topical  creams    Blood thinners:  Aspirin 81 mg a day    Interventional Therapies:    9/9/20 - left L5 and S1 transforaminal epidural steroid injection - 100% relief for roughly 3 months  2/12/21 - left L4 and L5 TESI - 60% relief  3/14/2022 Left L4/5&L5/S1 TFESI       Relevant Surgeries: L4-5 laminectomy and fusion in 2018    Affecting sleep?  Yes    Affecting daily activities? yes    Depressive symptoms? no          · SI/HI? No    Work status: Disabled    Pain Scores:    Best:       1/10  Worst:     10/10  Usually:   9/10  Today:   8/10    Review of Systems   Constitutional: Negative for activity change, appetite change, chills, fatigue, fever and unexpected weight change.   HENT: Negative for hearing loss.    Eyes: Negative for visual disturbance.   Respiratory: Negative for chest tightness and shortness of breath.    Cardiovascular: Negative for chest pain.   Gastrointestinal: Negative for abdominal pain, constipation, diarrhea, nausea and vomiting.   Genitourinary: Negative for difficulty urinating.   Musculoskeletal: Positive for back pain, gait problem and myalgias. Negative for neck pain.   Skin: Negative for rash.   Neurological: Negative for dizziness, weakness, light-headedness, numbness and headaches.   Psychiatric/Behavioral: Positive for sleep disturbance. Negative for hallucinations and suicidal ideas. The patient is not nervous/anxious.        Past Medical History:   Diagnosis Date    Arthralgia     Colon polyp     Degenerative disc disease at L5-S1 level     High cholesterol     Hypertension     Nuclear sclerosis of both eyes 1/8/2020    Sciatica     Spinal stenosis        Past Surgical History:   Procedure Laterality Date    BACK SURGERY      lumbar laminectomy    COLONOSCOPY N/A 7/23/2020    Procedure: COLONOSCOPY;  Surgeon: Donal Moore MD;  Location: [facility];  Service: Endoscopy;  Laterality: N/A;    EPIDURAL STEROID INJECTION Left 9/9/2020    Procedure: Injection, Steroid, Epidural  Transforaminal;  Surgeon: Jun Laevitt Jr., MD;  Location: [facility] ENDO;  Service: Pain Management;  Laterality: Left;  Left L5 + S1 TF WADE  Arrive @ 1300; ASA last 9/1; No DM    EPIDURAL STEROID INJECTION Left 2/12/2021    Procedure: Injection, Steroid, Epidural Transforaminal;  Surgeon: Jun Leavitt Jr., MD;  Location: [facility] ENDO;  Service: Pain Management;  Laterality: Left;  Left L4 + L5 TF WADE  Arrive @ 1230; IV Sedation; ASA last 2/4; No DM    TRANSFORAMINAL EPIDURAL INJECTION OF STEROID Left 3/14/2022    Procedure: INJECTION, STEROID, EPIDURAL, TRANSFORAMINAL APPROACH, L4-L5 & L5-S1;  Surgeon: Darya Ayala MD;  Location: [facility];  Service: Pain Management;  Laterality: Left;    TUBAL LIGATION         Social History     Socioeconomic History    Marital status:    Tobacco Use    Smoking status: Current Some Day Smoker     Years: 40.00     Types: Cigarettes    Smokeless tobacco: Never Used    Tobacco comment: 2 cigarettes/week   Substance and Sexual Activity    Alcohol use: Yes     Comment: ocassionally    Drug use: Yes     Comment: Tramadol twice a day as needed    Sexual activity: Not Currently     Social Determinants of Health     Financial Resource Strain: Medium Risk    Difficulty of Paying Living Expenses: Somewhat hard   Food Insecurity: Food Insecurity Present    Worried About Running Out of Food in the Last Year: Sometimes true    Ran Out of Food in the Last Year: Sometimes true   Transportation Needs: No Transportation Needs    Lack of Transportation (Medical): No    Lack of Transportation (Non-Medical): No   Physical Activity: Insufficiently Active    Days of Exercise per Week: 3 days    Minutes of Exercise per Session: 10 min   Stress: Stress Concern Present    Feeling of Stress : Very much   Social Connections: Unknown    Frequency of Communication with Friends and Family: More than three times a week    Frequency of Social Gatherings with Friends and  Family: Once a week    Active Member of Clubs or Organizations: No    Attends Club or Organization Meetings: Never    Marital Status:    Housing Stability: Low Risk     Unable to Pay for Housing in the Last Year: No    Number of Places Lived in the Last Year: 1    Unstable Housing in the Last Year: No       Review of patient's allergies indicates:   Allergen Reactions    Codeine        Current Outpatient Medications on File Prior to Visit   Medication Sig Dispense Refill    amitriptyline (ELAVIL) 50 MG tablet TAKE 1 TABLET BY MOUTH AT BEDTIME 90 tablet 1    aspirin (ECOTRIN) 81 MG EC tablet       diclofenac sodium (VOLTAREN) 1 % Gel Apply 2 g topically.      hydrOXYzine HCL (ATARAX) 25 MG tablet Take 1 tablet (25 mg total) by mouth 3 (three) times daily as needed. 45 tablet 1    losartan (COZAAR) 100 MG tablet Take 1 tablet by mouth once daily 90 tablet 1    mupirocin (BACTROBAN) 2 % ointment Apply topically 3 (three) times daily. 22 g 0    omeprazole (PRILOSEC) 40 MG capsule Take 1 capsule (40 mg total) by mouth once daily. 90 capsule 1    pravastatin (PRAVACHOL) 40 MG tablet Take 1 tablet (40 mg total) by mouth once daily. 90 tablet 3    triamcinolone acetonide 0.1% (KENALOG) 0.1 % ointment AAA bid 454 g 3    amLODIPine (NORVASC) 10 MG tablet Take 1 tablet (10 mg total) by mouth once daily. 90 tablet 1    INV HYDROCHLOROTHIAZIDE 25MG TABLET       spironolactone (ALDACTONE) 25 MG tablet Take 1 tablet (25 mg total) by mouth once daily. 90 tablet 1    traMADoL (ULTRAM) 50 mg tablet Take 1 tablet (50 mg total) by mouth every 12 (twelve) hours as needed for Pain. 60 tablet 2    traZODone (DESYREL) 100 MG tablet Take 1 tablet (100 mg total) by mouth nightly as needed for Insomnia. 90 tablet 1     No current facility-administered medications on file prior to visit.       Objective:      BP (!) 151/83 (BP Location: Right arm, Patient Position: Sitting, BP Method: Medium (Automatic))   Pulse  "103   Temp 97.7 °F (36.5 °C) (Temporal)   Resp 18   Ht 5' 4" (1.626 m)   Wt 114.8 kg (253 lb)   BMI 43.43 kg/m²       Exam:  GEN:  Well developed, well nourished.  No acute distress.  Normal pain behavior.  HEENT:  No trauma.  Mucous membranes moist.  Nares patent bilaterally.  PSYCH: Normal affect. Thought content appropriate.  CHEST:  Breathing symmetric.  No audible wheezing.  SKIN:  Warm, pink, dry.  No rash on exposed areas.    EXT:  No cyanosis, clubbing, or edema.  No color change or changes in nail or hair growth.  NEURO/MUSCULOSKELETAL:  Fully alert, oriented, and appropriate. Speech normal andriy. No cranial nerve deficits.   Decreased Left EHL, decreased sensation to left lateral leg   Gait:  Antalgic. In Hospital w/c for transport.         Imaging:      MRI LUMBAR SPINE WITHOUT CONTRAST     CLINICAL HISTORY:  Low back pain, symptoms persist with > 6wks conservative treatment; Dorsalgia, unspecified     TECHNIQUE:  Multiplanar, multisequence MR images were acquired from the thoracolumbar junction to the sacrum without the administration of contrast.     COMPARISON:  Radiograph 07/16/2021     FINDINGS:  Alignment: Relatively well maintained     Vertebrae: Normal marrow signal. No fracture.  Left-sided at L4-5.  There is transitional anatomy at what will be termed L5.  Rudimentary disc space at what will be termed L5-S1.     Discs: As below.  There is height loss which is most notable at L4-5.     Cord: Normal.  Conus terminates at L1.     Degenerative findings:     T12-L1: Unremarkable.     L1-L2: Unremarkable.     L2-L3: Mild facet hypertrophy.  No significant nerve root compression.     L3-L4: Circumferential disc bulge and facet hypertrophy.  Moderate canal stenosis and moderate bilateral neural foraminal narrowing.     L4-L5: Intervertebral disc height loss with circumferential disc bulge and facet hypertrophy.  Moderate bilateral neural foraminal narrowing.     L5-S1: Rudimentary disc space.  " There is facet arthropathy.  Moderate right and mild left neural foraminal narrowing.     Paraspinal muscles & soft tissues: Multiple bilateral renal cyst, increased in number when compared to 2019 exam.     Impression:     Left-sided fusion L4-L5 without evidence of complication.  There is transitional anatomy at what will be termed L5 with rudimentary disc space at L5-S1.     Degenerative change of the inferior lumbar spine as detailed above most notable at L3-4, L4-5, L5-S1.     Numerous bilateral renal cysts which are increased in number when compared to 2019 CT.  Further evaluation with a renal ultrasound may be obtained.       Narrative & Impression     EXAMINATION:  XR LUMBAR SPINE FLEXION AND EXTENSION ONLY     CLINICAL HISTORY:  back pain, unspecified back location;  Dorsalgia, unspecified     TECHNIQUE:  Lateral flexion standing and extension standing radiographs of the lumbar spine were obtained.     COMPARISON:  None     FINDINGS:  Patient is status post posterior spinal fusion at the L4-L5 level with metallic tracey and pedicle screws present.  Posterior vertebral alignment appears satisfactory.  No instability is elicited on the flexion and extension radiographs.  There is no evidence for acute fracture or bone destruction.  Atherosclerotic calcification is present within the abdominal aorta.     Impression:     Spinal fusion at the L4-L5 level.  No evidence for hardware failure on this examination.     Posterior vertebral alignment is satisfactory with no instability elicited on the flexion and extension radiographs.  No appreciable movement noted at the fused L4-L5 level.        Electronically signed by: Galen Victoria MD  Date:                                            08/10/2020  Time:                                           09:46         Narrative & Impression     EXAMINATION:  MRI LUMBAR SPINE WITHOUT CONTRAST     CLINICAL HISTORY:  Dorsalgia, unspecifiedNeurologic  Compromise;     TECHNIQUE:  Sagittal T1, sagittal T2, sagittal STIR, axial T1 and axial T2 weighted images of the lumbar spine obtained without contrast.     COMPARISON:  None     Limitations: In this patient with a history of previous lumbar spine surgery, postcontrast enhanced images are necessary to differentiate postsurgical epidural fibrosis from residual or recurrent disc herniation.     FINDINGS:  Lumbar spine alignment is within normal limits. The vertebral body heights are well maintained, with no fracture.  No marrow signal abnormality suspicious for an infiltrative process.     The conus is normal in appearance.  The adjacent soft tissue structures show no significant abnormalities.     L1-L2: There is no focal disc herniation. No significant central canal or neural foraminal narrowing.     L2-L3: There is no focal disc herniation. No significant central canal or neural foraminal narrowing.     L3-L4: There is metallic susceptibility artifact on left related to indwelling left-sided hardware posteriorly.  There is a subtle left paracentral and medial foraminal disc herniation suspected with some superimposed spondylitic spurring and disc bulging.  No central canal stenosis.  There is mild left-sided foraminal stenosis.     L4-L5: Patient is status post laminectomy and posterior fusion with unilateral left-sided hardware producing expected metallic susceptibility artifact.  There is some spondylitic spurring, disc narrowing and degenerative endplate changes and diffuse disc bulging without focal herniation.  No significant central canal or neural foraminal narrowing.     L5-S1: There is near complete bony ankylosis with a rudimentary L5-S1 disc.  No significant central canal or neural foraminal narrowing.     Impression:     Left paracentral and medial foraminal disc herniation at L3-4.     Status post left L5 laminectomy and instrumentation without complicating features.     This report was flagged in  Epic as abnormal.        Electronically signed by: Brina Damon Jr  Date:                                            08/10/2020  Time:                                           09:38         Assessment:       No diagnosis found.      Plan:       There are no diagnoses linked to this encounter.    Amanda Holt is a 71 y.o. female with chronic left-sided low back and lower extremity pain consistent with left lumbar radiculopathy.  Good relief from previous left L4 and L5 transforaminal epidural steroid injection.  Current symptoms now more consistent with L5 and L4 radicular pain.  Patient does have a history of previous lumbar fusion.  Also some indications of lumbar facet pain.     - Prior records reviewed  - Updated Imaging reviewed  - s/p Left L4/5&L5/S1 TFESI and >50% improved and now daily leg pain 100% resolved.   - SARABJIT without findings   - Can continue medications prescribed by primary care doctor stay are beneficial without side effects.  - I have stressed the importance of physical activity and a home exercise plan to help with pain and improve health.  - Patient can continue with medications for now since they are providing benefits, using them appropriately, and without side effects.  - Counseled patient regarding the importance of activity modification.  - RTC 2 months or sooner if needed.     The above plan and management options were discussed at length with patient. Patient is in agreement with the above and verbalized understanding.    CINDY Ruiz  04/13/2022

## 2022-04-13 NOTE — TELEPHONE ENCOUNTER
Refill Routing Note   Medication(s) are not appropriate for processing by [facility] for the following reason(s):      - Patient has been seen in the ED/Hospital since the last PCP visit    [facility] action(s):  Route          Medication reconciliation completed: No     Appointments  past 12m or future 3m with PCP    Date Provider   Last Visit   2/16/2022 Simona Torres MD   Next Visit   5/19/2022 Simona Torres MD   ED visits in past 90 days: 0        Note composed:4:26 PM 04/13/2022

## 2022-04-14 RX ORDER — OMEPRAZOLE 40 MG/1
CAPSULE, DELAYED RELEASE ORAL
Qty: 90 CAPSULE | Refills: 1 | Status: SHIPPED | OUTPATIENT
Start: 2022-04-14 | End: 2022-05-16

## 2022-04-19 RX ORDER — DICLOFENAC SODIUM 10 MG/G
GEL TOPICAL
Qty: 100 G | Refills: 5 | Status: SHIPPED | OUTPATIENT
Start: 2022-04-19 | End: 2023-06-30

## 2022-04-19 NOTE — TELEPHONE ENCOUNTER
Refill Routing Note   Medication(s) are not appropriate for processing by [facility] for the following reason(s):      - Outside of protocol    [facility] action(s):  Route          Medication reconciliation completed: No     Appointments  past 12m or future 3m with PCP    Date Provider   Last Visit   2/16/2022 Simona Torres MD   Next Visit   5/19/2022 Simona Torres MD   ED visits in past 90 days: 0        Note composed:11:47 AM 04/19/2022

## 2022-04-20 ENCOUNTER — HOSPITAL ENCOUNTER (OUTPATIENT)
Dept: RADIOLOGY | Facility: HOSPITAL | Age: 72
Discharge: HOME | End: 2022-04-20
Attending: FAMILY MEDICINE
Payer: MEDICARE

## 2022-04-20 DIAGNOSIS — Z12.31 ENCOUNTER FOR SCREENING MAMMOGRAM FOR BREAST CANCER: ICD-10-CM

## 2022-04-20 PROCEDURE — 77063 BREAST TOMOSYNTHESIS BI: CPT | Mod: TC,PO

## 2022-04-20 PROCEDURE — 77063 MAMMO DIGITAL SCREENING BILAT WITH TOMO: ICD-10-PCS | Mod: 26,,, | Performed by: RADIOLOGY

## 2022-04-20 PROCEDURE — 77063 BREAST TOMOSYNTHESIS BI: CPT | Mod: 26,,, | Performed by: RADIOLOGY

## 2022-04-20 PROCEDURE — 77067 MAMMO DIGITAL SCREENING BILAT WITH TOMO: ICD-10-PCS | Mod: 26,,, | Performed by: RADIOLOGY

## 2022-04-20 PROCEDURE — 77067 SCR MAMMO BI INCL CAD: CPT | Mod: 26,,, | Performed by: RADIOLOGY

## 2022-04-26 DIAGNOSIS — I10 HYPERTENSION, UNSPECIFIED TYPE: ICD-10-CM

## 2022-04-26 DIAGNOSIS — M48.00 SPINAL STENOSIS, UNSPECIFIED SPINAL REGION: ICD-10-CM

## 2022-04-26 RX ORDER — LOSARTAN POTASSIUM 100 MG/1
100 TABLET ORAL DAILY
Qty: 90 TABLET | Refills: 1 | Status: SHIPPED | OUTPATIENT
Start: 2022-04-26 | End: 2022-08-22

## 2022-04-26 NOTE — TELEPHONE ENCOUNTER
No new care gaps identified.  Powered by [employer]. Reference number: [id number].   4/26/2022 1:51:41 PM CDT

## 2022-05-14 DIAGNOSIS — K21.9 GASTROESOPHAGEAL REFLUX DISEASE WITHOUT ESOPHAGITIS: ICD-10-CM

## 2022-05-14 DIAGNOSIS — I10 HYPERTENSION, UNSPECIFIED TYPE: ICD-10-CM

## 2022-05-14 NOTE — TELEPHONE ENCOUNTER
No new care gaps identified.  [facility] Embedded Care Gaps. Reference number: [id number]. 5/14/2022   11:13:58 AM TOSHAT

## 2022-05-14 NOTE — TELEPHONE ENCOUNTER
Refill Routing Note   Medication(s) are not appropriate for processing by [facility] for the following reason(s):      - Required vitals are abnormal  - Patient has been seen in the ED/Hospital since the last PCP visit    [facility] action(s):  Defer          Medication reconciliation completed: No     Appointments  past 12m or future 3m with PCP    Date Provider   Last Visit   2/16/2022 Simona Torres MD   Next Visit   5/19/2022 Simona Torres MD   ED visits in past 90 days: 0        Note composed:3:17 PM 05/14/2022

## 2022-05-16 RX ORDER — OMEPRAZOLE 40 MG/1
CAPSULE, DELAYED RELEASE ORAL
Qty: 90 CAPSULE | Refills: 0 | Status: SHIPPED | OUTPATIENT
Start: 2022-05-16 | End: 2022-12-20 | Stop reason: SDUPTHER

## 2022-05-16 RX ORDER — AMLODIPINE BESYLATE 10 MG/1
TABLET ORAL
Qty: 90 TABLET | Refills: 0 | Status: SHIPPED | OUTPATIENT
Start: 2022-05-16 | End: 2022-08-15 | Stop reason: SDUPTHER

## 2022-05-19 ENCOUNTER — OFFICE VISIT (OUTPATIENT)
Dept: FAMILY MEDICINE | Facility: CLINIC | Age: 72
End: 2022-05-19
Payer: MEDICARE

## 2022-05-19 VITALS
SYSTOLIC BLOOD PRESSURE: 134 MMHG | HEIGHT: 64 IN | RESPIRATION RATE: 16 BRPM | HEART RATE: 98 BPM | TEMPERATURE: 99 F | WEIGHT: 255.06 LBS | BODY MASS INDEX: 43.54 KG/M2 | DIASTOLIC BLOOD PRESSURE: 80 MMHG | OXYGEN SATURATION: 97 %

## 2022-05-19 DIAGNOSIS — M48.00 SPINAL STENOSIS, UNSPECIFIED SPINAL REGION: Primary | ICD-10-CM

## 2022-05-19 DIAGNOSIS — I10 PRIMARY HYPERTENSION: ICD-10-CM

## 2022-05-19 DIAGNOSIS — J34.89 NASAL DRAINAGE: ICD-10-CM

## 2022-05-19 PROCEDURE — 3079F DIAST BP 80-89 MM HG: CPT | Mod: CPTII,S$GLB,, | Performed by: FAMILY MEDICINE

## 2022-05-19 PROCEDURE — 3075F SYST BP GE 130 - 139MM HG: CPT | Mod: CPTII,S$GLB,, | Performed by: FAMILY MEDICINE

## 2022-05-19 PROCEDURE — 99214 OFFICE O/P EST MOD 30 MIN: CPT | Mod: S$GLB,,, | Performed by: FAMILY MEDICINE

## 2022-05-19 PROCEDURE — 3075F PR MOST RECENT SYSTOLIC BLOOD PRESS GE 130-139MM HG: ICD-10-PCS | Mod: CPTII,S$GLB,, | Performed by: FAMILY MEDICINE

## 2022-05-19 PROCEDURE — 99999 PR PBB SHADOW E&M-EST. PATIENT-LVL IV: ICD-10-PCS | Mod: PBBFAC,,, | Performed by: FAMILY MEDICINE

## 2022-05-19 PROCEDURE — 99999 PR PBB SHADOW E&M-EST. PATIENT-LVL IV: CPT | Mod: PBBFAC,,, | Performed by: FAMILY MEDICINE

## 2022-05-19 PROCEDURE — 4010F ACE/ARB THERAPY RXD/TAKEN: CPT | Mod: CPTII,S$GLB,, | Performed by: FAMILY MEDICINE

## 2022-05-19 PROCEDURE — 3079F PR MOST RECENT DIASTOLIC BLOOD PRESSURE 80-89 MM HG: ICD-10-PCS | Mod: CPTII,S$GLB,, | Performed by: FAMILY MEDICINE

## 2022-05-19 PROCEDURE — 3008F BODY MASS INDEX DOCD: CPT | Mod: CPTII,S$GLB,, | Performed by: FAMILY MEDICINE

## 2022-05-19 PROCEDURE — 4010F PR ACE/ARB THEARPY RXD/TAKEN: ICD-10-PCS | Mod: CPTII,S$GLB,, | Performed by: FAMILY MEDICINE

## 2022-05-19 PROCEDURE — 3008F PR BODY MASS INDEX (BMI) DOCUMENTED: ICD-10-PCS | Mod: CPTII,S$GLB,, | Performed by: FAMILY MEDICINE

## 2022-05-19 PROCEDURE — 99214 PR OFFICE/OUTPT VISIT, EST, LEVL IV, 30-39 MIN: ICD-10-PCS | Mod: S$GLB,,, | Performed by: FAMILY MEDICINE

## 2022-05-19 PROCEDURE — 1159F PR MEDICATION LIST DOCUMENTED IN MEDICAL RECORD: ICD-10-PCS | Mod: CPTII,S$GLB,, | Performed by: FAMILY MEDICINE

## 2022-05-19 PROCEDURE — 1159F MED LIST DOCD IN RCRD: CPT | Mod: CPTII,S$GLB,, | Performed by: FAMILY MEDICINE

## 2022-05-19 RX ORDER — FLUTICASONE PROPIONATE 50 MCG
1 SPRAY, SUSPENSION (ML) NASAL DAILY
Qty: 16 G | Refills: 5 | Status: SHIPPED | OUTPATIENT
Start: 2022-05-19 | End: 2023-06-29

## 2022-05-19 RX ORDER — TRAMADOL HYDROCHLORIDE 50 MG/1
50 TABLET ORAL EVERY 12 HOURS PRN
Qty: 60 TABLET | Refills: 2 | Status: SHIPPED | OUTPATIENT
Start: 2022-05-19 | End: 2022-08-22

## 2022-05-19 RX ORDER — TIZANIDINE 4 MG/1
4 TABLET ORAL EVERY 6 HOURS PRN
COMMUNITY
Start: 2022-04-13 | End: 2022-06-28

## 2022-05-19 NOTE — PROGRESS NOTES
Chief Complaint   Patient presents with    Hypertension    Chronic Pain       HPI  Amanda Holt is a 71 y.o. female with a past medical history in the problem list  below     Patient Active Problem List   Diagnosis    Contact lens overwear of both eyes    Refractive error    Nuclear sclerosis of both eyes    Spinal stenosis    Hypertension    Obstructive sleep apnea syndrome    Gastroesophageal reflux disease without esophagitis    CKD (chronic kidney disease)    Chronic low back pain    Bilateral hearing loss    DDD (degenerative disc disease), lumbar    CTS (carpal tunnel syndrome)    Anxiety    Asymptomatic microscopic hematuria    Lumbar radiculopathy    Lumbar spondylosis    Colon cancer screening    Lumbar herniated disc    BMI 40.0-44.9, adult    Other nonthrombocytopenic purpura    Major depressive disorder, recurrent, mild    Angina pectoris    Atherosclerosis of aorta    Stage 3a chronic kidney disease    Wears contact lenses    Posterior vitreous detachment of right eye    Tobacco use    Class 3 severe obesity due to excess calories with serious comorbidity in adult       Presenting for follow-up for chronic pain and numbness in her left leg  She has been seeing pain mgmt and had a negative workup for any vascular issue  She did have an epidural and got some relief but her numbness has returned    Was sent to derm for an inclusion cyst that was biopsied and removed    She has continued to have some hearing loss associated with ringing in her ears and is going to follow up with ENT    MRI L spine     Impression:     Left-sided fusion L4-L5 without evidence of complication.  There is transitional anatomy at what will be termed L5 with rudimentary disc space at L5-S1.     Degenerative change of the inferior lumbar spine as detailed above most notable at L3-4, L4-5, L5-S1.     Numerous bilateral renal cysts which are increased in number when compared to 2019 CT.   "Further evaluation with a renal ultrasound may be obtained.        Electronically signed by: Patrice Guerra MD  Date:                                            03/07/2022  Time:                                           11:11    ROS  Review of Systems   Constitutional: Negative for chills, diaphoresis, fatigue, fever and unexpected weight change.   HENT: Negative for rhinorrhea, sinus pressure, sore throat and tinnitus.    Eyes: Negative for photophobia and visual disturbance.   Respiratory: Negative for cough, shortness of breath and wheezing.    Cardiovascular: Negative for chest pain and palpitations.   Gastrointestinal: Negative for abdominal pain, blood in stool, constipation, diarrhea, nausea and vomiting.   Genitourinary: Negative for dysuria, flank pain, frequency and vaginal discharge.   Musculoskeletal: Positive for arthralgias and back pain. Negative for joint swelling.   Skin: Negative for rash.   Neurological: Negative for speech difficulty, weakness, light-headedness and headaches.   Psychiatric/Behavioral: Negative for behavioral problems and dysphoric mood.       Physical Exam  Vitals:    05/19/22 1413   BP: 134/80   Pulse: 98   Resp: 16   Temp: 99.2 °F (37.3 °C)   TempSrc: Oral   SpO2: 97%   Weight: 115.7 kg (255 lb 1.2 oz)   Height: 5' 4" (1.626 m)    Body mass index is 43.78 kg/m².  Weight: 115.7 kg (255 lb 1.2 oz)   Height: 5' 4" (162.6 cm)     Physical Exam  Vitals and nursing note reviewed.   Constitutional:       Appearance: She is well-developed.   Skin:     General: Skin is warm and dry.      Findings: No erythema or rash.   Neurological:      Mental Status: She is alert and oriented to person, place, and time.   Psychiatric:         Behavior: Behavior normal.         ALLERGIES AND MEDICATIONS: updated and reviewed.  Review of patient's allergies indicates:   Allergen Reactions    Codeine      Medication List with Changes/Refills   New Medications    FLUTICASONE PROPIONATE (FLONASE) 50 " MCG/ACTUATION NASAL SPRAY    1 spray (50 mcg total) by Each Nostril route once daily.   Current Medications    AMITRIPTYLINE (ELAVIL) 50 MG TABLET    TAKE 1 TABLET BY MOUTH AT BEDTIME    AMLODIPINE (NORVASC) 10 MG TABLET    Take 1 tablet by mouth once daily    ASPIRIN (ECOTRIN) 81 MG EC TABLET        DICLOFENAC SODIUM (VOLTAREN) 1 % GEL    APPLY 2 GRAMS TOPICALLY TO AFFECTED AREA 4 TIMES DAILY    HYDROXYZINE HCL (ATARAX) 25 MG TABLET    Take 1 tablet (25 mg total) by mouth 3 (three) times daily as needed.    INV HYDROCHLOROTHIAZIDE 25MG TABLET        LOSARTAN (COZAAR) 100 MG TABLET    Take 1 tablet (100 mg total) by mouth once daily.    MUPIROCIN (BACTROBAN) 2 % OINTMENT    Apply topically 3 (three) times daily.    OMEPRAZOLE (PRILOSEC) 40 MG CAPSULE    Take 1 capsule by mouth once daily    PRAVASTATIN (PRAVACHOL) 40 MG TABLET    Take 1 tablet (40 mg total) by mouth once daily.    SPIRONOLACTONE (ALDACTONE) 25 MG TABLET    Take 1 tablet (25 mg total) by mouth once daily.    TIZANIDINE (ZANAFLEX) 4 MG TABLET    Take 4 mg by mouth every 6 (six) hours as needed.    TRAZODONE (DESYREL) 100 MG TABLET    Take 1 tablet (100 mg total) by mouth nightly as needed for Insomnia.    TRIAMCINOLONE ACETONIDE 0.1% (KENALOG) 0.1 % OINTMENT    AAA bid   Changed and/or Refilled Medications    Modified Medication Previous Medication    TRAMADOL (ULTRAM) 50 MG TABLET traMADoL (ULTRAM) 50 mg tablet       Take 1 tablet (50 mg total) by mouth every 12 (twelve) hours as needed for Pain.    Take 1 tablet (50 mg total) by mouth every 12 (twelve) hours as needed for Pain.       Assessment & Plan  1. Primary hypertension    2. Spinal stenosis, unspecified spinal region    3. Nasal drainage        Problem List Items Addressed This Visit        Neuro    Spinal stenosis   [place]  database queried/reviewed  Continue current medication  Follow up with pain mgmt  Consider imaging of her neck if pain in shoulders continues    Relevant Medications     traMADoL (ULTRAM) 50 mg tablet       Cardiac/Vascular    Hypertension - Primary  This is a chronic medical condition that is stable under the current regimen. Continue to monitor and we will make medication adjustments as needed.         Other Visit Diagnoses     Nasal drainage      This may help with nasal drip and she feels improvement in her hearing with blowing her nose  Suspecting eustachian tube dysfunction    Relevant Medications    fluticasone propionate (FLONASE) 50 mcg/actuation nasal spray          Follow up in about 3 months (around 8/19/2022) for management of chronic conditions.    Other Orders Placed This Visit:  No orders of the defined types were placed in this encounter.

## 2022-05-23 ENCOUNTER — PATIENT MESSAGE (OUTPATIENT)
Dept: SMOKING CESSATION | Facility: CLINIC | Age: 72
End: 2022-05-23
Payer: MEDICARE

## 2022-06-10 ENCOUNTER — PATIENT MESSAGE (OUTPATIENT)
Dept: PAIN MEDICINE | Facility: CLINIC | Age: 72
End: 2022-06-10
Payer: MEDICARE

## 2022-06-13 ENCOUNTER — OFFICE VISIT (OUTPATIENT)
Dept: PAIN MEDICINE | Facility: CLINIC | Age: 72
End: 2022-06-13
Payer: MEDICARE

## 2022-06-13 ENCOUNTER — TELEPHONE (OUTPATIENT)
Dept: FAMILY MEDICINE | Facility: CLINIC | Age: 72
End: 2022-06-13
Payer: MEDICARE

## 2022-06-13 VITALS
TEMPERATURE: 98 F | RESPIRATION RATE: 18 BRPM | SYSTOLIC BLOOD PRESSURE: 170 MMHG | HEIGHT: 64 IN | WEIGHT: 255 LBS | BODY MASS INDEX: 43.54 KG/M2 | HEART RATE: 81 BPM | DIASTOLIC BLOOD PRESSURE: 92 MMHG

## 2022-06-13 DIAGNOSIS — R20.2 LEFT LEG PARESTHESIAS: ICD-10-CM

## 2022-06-13 DIAGNOSIS — M54.16 LUMBAR RADICULOPATHY: ICD-10-CM

## 2022-06-13 DIAGNOSIS — G89.4 CHRONIC PAIN SYNDROME: Primary | ICD-10-CM

## 2022-06-13 DIAGNOSIS — M51.36 DDD (DEGENERATIVE DISC DISEASE), LUMBAR: ICD-10-CM

## 2022-06-13 PROCEDURE — 1101F PR PT FALLS ASSESS DOC 0-1 FALLS W/OUT INJ PAST YR: ICD-10-PCS | Mod: CPTII,S$GLB,, | Performed by: NURSE PRACTITIONER

## 2022-06-13 PROCEDURE — 4010F PR ACE/ARB THEARPY RXD/TAKEN: ICD-10-PCS | Mod: CPTII,S$GLB,, | Performed by: NURSE PRACTITIONER

## 2022-06-13 PROCEDURE — 1125F AMNT PAIN NOTED PAIN PRSNT: CPT | Mod: CPTII,S$GLB,, | Performed by: NURSE PRACTITIONER

## 2022-06-13 PROCEDURE — 3008F PR BODY MASS INDEX (BMI) DOCUMENTED: ICD-10-PCS | Mod: CPTII,S$GLB,, | Performed by: NURSE PRACTITIONER

## 2022-06-13 PROCEDURE — 3080F PR MOST RECENT DIASTOLIC BLOOD PRESSURE >= 90 MM HG: ICD-10-PCS | Mod: CPTII,S$GLB,, | Performed by: NURSE PRACTITIONER

## 2022-06-13 PROCEDURE — 3080F DIAST BP >= 90 MM HG: CPT | Mod: CPTII,S$GLB,, | Performed by: NURSE PRACTITIONER

## 2022-06-13 PROCEDURE — 99999 PR PBB SHADOW E&M-EST. PATIENT-LVL IV: CPT | Mod: PBBFAC,,, | Performed by: NURSE PRACTITIONER

## 2022-06-13 PROCEDURE — 3288F PR FALLS RISK ASSESSMENT DOCUMENTED: ICD-10-PCS | Mod: CPTII,S$GLB,, | Performed by: NURSE PRACTITIONER

## 2022-06-13 PROCEDURE — 99214 OFFICE O/P EST MOD 30 MIN: CPT | Mod: S$GLB,,, | Performed by: NURSE PRACTITIONER

## 2022-06-13 PROCEDURE — 99214 PR OFFICE/OUTPT VISIT, EST, LEVL IV, 30-39 MIN: ICD-10-PCS | Mod: S$GLB,,, | Performed by: NURSE PRACTITIONER

## 2022-06-13 PROCEDURE — 1125F PR PAIN SEVERITY QUANTIFIED, PAIN PRESENT: ICD-10-PCS | Mod: CPTII,S$GLB,, | Performed by: NURSE PRACTITIONER

## 2022-06-13 PROCEDURE — 1160F RVW MEDS BY RX/DR IN RCRD: CPT | Mod: CPTII,S$GLB,, | Performed by: NURSE PRACTITIONER

## 2022-06-13 PROCEDURE — 3288F FALL RISK ASSESSMENT DOCD: CPT | Mod: CPTII,S$GLB,, | Performed by: NURSE PRACTITIONER

## 2022-06-13 PROCEDURE — 3077F SYST BP >= 140 MM HG: CPT | Mod: CPTII,S$GLB,, | Performed by: NURSE PRACTITIONER

## 2022-06-13 PROCEDURE — 1159F PR MEDICATION LIST DOCUMENTED IN MEDICAL RECORD: ICD-10-PCS | Mod: CPTII,S$GLB,, | Performed by: NURSE PRACTITIONER

## 2022-06-13 PROCEDURE — 4010F ACE/ARB THERAPY RXD/TAKEN: CPT | Mod: CPTII,S$GLB,, | Performed by: NURSE PRACTITIONER

## 2022-06-13 PROCEDURE — 3077F PR MOST RECENT SYSTOLIC BLOOD PRESSURE >= 140 MM HG: ICD-10-PCS | Mod: CPTII,S$GLB,, | Performed by: NURSE PRACTITIONER

## 2022-06-13 PROCEDURE — 1160F PR REVIEW ALL MEDS BY PRESCRIBER/CLIN PHARMACIST DOCUMENTED: ICD-10-PCS | Mod: CPTII,S$GLB,, | Performed by: NURSE PRACTITIONER

## 2022-06-13 PROCEDURE — 3008F BODY MASS INDEX DOCD: CPT | Mod: CPTII,S$GLB,, | Performed by: NURSE PRACTITIONER

## 2022-06-13 PROCEDURE — 1159F MED LIST DOCD IN RCRD: CPT | Mod: CPTII,S$GLB,, | Performed by: NURSE PRACTITIONER

## 2022-06-13 PROCEDURE — 1101F PT FALLS ASSESS-DOCD LE1/YR: CPT | Mod: CPTII,S$GLB,, | Performed by: NURSE PRACTITIONER

## 2022-06-13 PROCEDURE — 99999 PR PBB SHADOW E&M-EST. PATIENT-LVL IV: ICD-10-PCS | Mod: PBBFAC,,, | Performed by: NURSE PRACTITIONER

## 2022-06-13 NOTE — PROGRESS NOTES
"  Subjective:     Patient ID: Amanda Holt is a 71 y.o. female    Chief Complaint: Back Pain and Leg Pain      Referred by: No ref. provider found      HPI:    Interval History 6/13/2022:  Mrs Holt presents for follow up. Overall interval she has had increased walking and mental stress of son being in motorcycle accident. She is overall doing fair and radicular pain remains resolved. She continues to take zanaflex and tramadol with benefit by PCP and denies SE of medications.      Interval History 4/13/12022:  Mrs Holt presents for follow up of lower back pain and left leg pain. Since TFESI leg pain has only occurred twice but is associated with standing and walking, eased with sitting. SARABJIT without abnormality. No new areas of pain or neurological changes. Denies any foccal loss of b/b or saddle paresthesias.     Interval History 3/28/2022:  Mrs Holt presents for follow up of lower back pain and left leg pain. She is s/p Left L4/5&L5S1 TFESI. Pain has decreased "some" and having difficulty quantifying. Pt states pain less severe and frequent. 50-60% improved. She has continued left leg numbness when standing for any length of time. There is no complaint of loss of b/b or saddle paresthesias.     Interval History 2/24/2022:  Mrs Holt presents for delayed follow-up for continued left-sided lower back pain and radiculopathy down the lateral and anterior and lateral lower leg. She has had left L5/S1&S1 TFESI and then L4/5&L5/S1 TFESI approx one year ago. She found there L4/5&L5/S1 TFESI more beneficial with >80% relief in hindsight but states procedure itself was too painful and feels she needed more sedation and had significant post op soreness after. She has no recent MRI. She wishes to establish care Dr Ayala as he provides care for a friend of hers. There is no complaint of loss of b/b or saddle paresthesias. She is currently prescribed Tramadol, Elavil,  and Zanaflex with benefit and denies SE " "of medications.     Interval History NP (3/2/21):    Pt returns today for follow up. She states that the left L4 and L5 TESI was helpful for the low back pain; reports 60% relief. She does state that the procedure was extremely painful and is still "recovering from it". She reports that Tramadol has helped and continues to help with her pain.     Interval History (1/27/21):  She returns today for follow up.  She reports that left L5 and S1 transforaminal epidural steroid injection has been helpful for the left-sided low back and lower extremity pain.  She reports 3 months of very good relief following this procedure.  She states that more recently she has developed recurrent left-sided pain.  This pain is similar to previous pain but now involves the left anterior leg.  Pain is mostly present when standing and walking for prolonged periods of time.  She denies any new numbness, tingling, weakness, bowel bladder dysfunction.      Interval History NP (8/24/20):  Pt returns today for follow up and imaging review. She reports an increase in numbness and tingling to the left anterior lower leg and foot. No increased pain. Denies bowel or bladder dysfunction.      Initial Encounter (7/20/20):  Amanda Holt is a 71 y.o. female who presents today with chronic left-sided low back and lower extremity pain.  Status post L4-5 laminectomy and fusion in 2018.  She states that she was doing well for a period of time following her surgery, but she began have this left-sided pain a little over 1 year ago.  The pain is located the left lumbosacral region radiates all the way to the left lateral thigh, lateral leg to the ankle.  She reports associated numbness and tingling this area.  She denies any focal weakness or bowel bladder dysfunction.  Pain is constant worse with activity.  Patient has been evaluated by Neurosurgery and imaging studies were ordered.  These have not been done but are scheduled for next month.  This " pain is described in detail below.    Physical Therapy:  Yes.    Non-pharmacologic Treatment:  Rest helps         · TENS?  No    Pain Medications:         · Currently taking:  Tizanidine, tramadol, Voltaren gel, Amitriptyline    · Has tried in the past:  Gabapentin, NSAIDs, Tylenol    · Has not tried:  Opioids, SNRIs, topical creams    Blood thinners:  Aspirin 81 mg a day    Interventional Therapies:    9/9/20 - left L5 and S1 transforaminal epidural steroid injection - 100% relief for roughly 3 months  2/12/21 - left L4 and L5 TESI - 60% relief  3/14/2022 Left L4/5&L5/S1 TFESI       Relevant Surgeries: L4-5 laminectomy and fusion in 2018    Affecting sleep?  Yes    Affecting daily activities? yes    Depressive symptoms? no          · SI/HI? No    Work status: Disabled    Pain Scores:    Best:       1/10  Worst:     10/10  Usually:   9/10  Today:   8/10    Review of Systems   Constitutional: Negative for activity change, appetite change, chills, fatigue, fever and unexpected weight change.   HENT: Negative for hearing loss.    Eyes: Negative for visual disturbance.   Respiratory: Negative for chest tightness and shortness of breath.    Cardiovascular: Negative for chest pain.   Gastrointestinal: Negative for abdominal pain, constipation, diarrhea, nausea and vomiting.   Genitourinary: Negative for difficulty urinating.   Musculoskeletal: Positive for back pain, gait problem and myalgias. Negative for neck pain.   Skin: Negative for rash.   Neurological: Negative for dizziness, weakness, light-headedness, numbness and headaches.   Psychiatric/Behavioral: Positive for sleep disturbance. Negative for hallucinations and suicidal ideas. The patient is not nervous/anxious.        Past Medical History:   Diagnosis Date    Arthralgia     Colon polyp     Degenerative disc disease at L5-S1 level     High cholesterol     Hypertension     Nuclear sclerosis of both eyes 1/8/2020    Sciatica     Spinal stenosis         Past Surgical History:   Procedure Laterality Date    BACK SURGERY      lumbar laminectomy    COLONOSCOPY N/A 7/23/2020    Procedure: COLONOSCOPY;  Surgeon: Donal Moore MD;  Location: [facility] END[facility];  Service: Endoscopy;  Laterality: N/A;    EPIDURAL STEROID INJECTION Left 9/9/2020    Procedure: Injection, Steroid, Epidural Transforaminal;  Surgeon: Jun Leavitt Jr., MD;  Location: [facility] ENDO;  Service: Pain Management;  Laterality: Left;  Left L5 + S1 TF WADE  Arrive @ 1300; ASA last 9/1; No DM    EPIDURAL STEROID INJECTION Left 2/12/2021    Procedure: Injection, Steroid, Epidural Transforaminal;  Surgeon: Jun Leavitt Jr., MD;  Location: [facility] ENDO;  Service: Pain Management;  Laterality: Left;  Left L4 + L5 TF WADE  Arrive @ 1230; IV Sedation; ASA last 2/4; No DM    TRANSFORAMINAL EPIDURAL INJECTION OF STEROID Left 3/14/2022    Procedure: INJECTION, STEROID, EPIDURAL, TRANSFORAMINAL APPROACH, L4-L5 & L5-S1;  Surgeon: Darya Ayala MD;  [facility];  Service: Pain Management;  Laterality: Left;    TUBAL LIGATION         Social History     Socioeconomic History    Marital status:    Tobacco Use    Smoking status: Current Some Day Smoker     Years: 40.00     Types: Cigarettes    Smokeless tobacco: Never Used    Tobacco comment: 2 cigarettes/week   Substance and Sexual Activity    Alcohol use: Yes     Comment: ocassionally    Drug use: Yes     Comment: Tramadol twice a day as needed    Sexual activity: Not Currently     Social Determinants of Health     Financial Resource Strain: Medium Risk    Difficulty of Paying Living Expenses: Somewhat hard   Food Insecurity: Food Insecurity Present    Worried About Running Out of Food in the Last Year: Sometimes true    Ran Out of Food in the Last Year: Sometimes true   Transportation Needs: Unmet Transportation Needs    Lack of Transportation (Medical): Yes    Lack of Transportation (Non-Medical): Yes   Physical Activity:  Insufficiently Active    Days of Exercise per Week: 2 days    Minutes of Exercise per Session: 40 min   Stress: Stress Concern Present    Feeling of Stress : Very much   Social Connections: Unknown    Frequency of Communication with Friends and Family: More than three times a week    Frequency of Social Gatherings with Friends and Family: Once a week    Active Member of Clubs or Organizations: No    Attends Club or Organization Meetings: 1 to 4 times per year    Marital Status:    Housing Stability: Low Risk     Unable to Pay for Housing in the Last Year: No    Number of Places Lived in the Last Year: 1    Unstable Housing in the Last Year: No       Review of patient's allergies indicates:   Allergen Reactions    Codeine        Current Outpatient Medications on File Prior to Visit   Medication Sig Dispense Refill    amitriptyline (ELAVIL) 50 MG tablet TAKE 1 TABLET BY MOUTH AT BEDTIME 90 tablet 1    amLODIPine (NORVASC) 10 MG tablet Take 1 tablet by mouth once daily 90 tablet 0    aspirin (ECOTRIN) 81 MG EC tablet       diclofenac sodium (VOLTAREN) 1 % Gel APPLY 2 GRAMS TOPICALLY TO AFFECTED AREA 4 TIMES DAILY 100 g 5    fluticasone propionate (FLONASE) 50 mcg/actuation nasal spray 1 spray (50 mcg total) by Each Nostril route once daily. 16 g 5    hydrOXYzine HCL (ATARAX) 25 MG tablet Take 1 tablet (25 mg total) by mouth 3 (three) times daily as needed. 45 tablet 1    INV HYDROCHLOROTHIAZIDE 25MG TABLET       losartan (COZAAR) 100 MG tablet Take 1 tablet (100 mg total) by mouth once daily. 90 tablet 1    mupirocin (BACTROBAN) 2 % ointment Apply topically 3 (three) times daily. 22 g 0    omeprazole (PRILOSEC) 40 MG capsule Take 1 capsule by mouth once daily 90 capsule 0    pravastatin (PRAVACHOL) 40 MG tablet Take 1 tablet (40 mg total) by mouth once daily. 90 tablet 3    spironolactone (ALDACTONE) 25 MG tablet Take 1 tablet (25 mg total) by mouth once daily. 90 tablet 1    tiZANidine  "(ZANAFLEX) 4 MG tablet Take 4 mg by mouth every 6 (six) hours as needed.      traMADoL (ULTRAM) 50 mg tablet Take 1 tablet (50 mg total) by mouth every 12 (twelve) hours as needed for Pain. 60 tablet 2    traZODone (DESYREL) 100 MG tablet Take 1 tablet (100 mg total) by mouth nightly as needed for Insomnia. 90 tablet 1    triamcinolone acetonide 0.1% (KENALOG) 0.1 % ointment AAA bid 454 g 3     No current facility-administered medications on file prior to visit.       Objective:      BP (!) 170/92 (BP Location: Right arm, Patient Position: Sitting, BP Method: Large (Automatic))   Pulse 81   Temp 97.5 °F (36.4 °C) (Temporal)   Resp 18   Ht 5' 4" (1.626 m)   Wt 115.7 kg (255 lb)   BMI 43.77 kg/m²       Exam:  GEN:  Well developed, well nourished.  No acute distress.  Normal pain behavior.  HEENT:  No trauma.  Mucous membranes moist.  Nares patent bilaterally.  PSYCH: Normal affect. Thought content appropriate.  CHEST:  Breathing symmetric.  No audible wheezing.  SKIN:  Warm, pink, dry.  No rash on exposed areas.    EXT:  No cyanosis, clubbing, or edema.  No color change or changes in nail or hair growth.  NEURO/MUSCULOSKELETAL:  Fully alert, oriented, and appropriate. Speech normal andriy. No cranial nerve deficits.   Decreased Left EHL, decreased sensation to left lateral leg   Gait:  Antalgic.      Imaging:      MRI LUMBAR SPINE WITHOUT CONTRAST     CLINICAL HISTORY:  Low back pain, symptoms persist with > 6wks conservative treatment; Dorsalgia, unspecified     TECHNIQUE:  Multiplanar, multisequence MR images were acquired from the thoracolumbar junction to the sacrum without the administration of contrast.     COMPARISON:  Radiograph 07/16/2021     FINDINGS:  Alignment: Relatively well maintained     Vertebrae: Normal marrow signal. No fracture.  Left-sided at L4-5.  There is transitional anatomy at what will be termed L5.  Rudimentary disc space at what will be termed L5-S1.     Discs: As below.  There " is height loss which is most notable at L4-5.     Cord: Normal.  Conus terminates at L1.     Degenerative findings:     T12-L1: Unremarkable.     L1-L2: Unremarkable.     L2-L3: Mild facet hypertrophy.  No significant nerve root compression.     L3-L4: Circumferential disc bulge and facet hypertrophy.  Moderate canal stenosis and moderate bilateral neural foraminal narrowing.     L4-L5: Intervertebral disc height loss with circumferential disc bulge and facet hypertrophy.  Moderate bilateral neural foraminal narrowing.     L5-S1: Rudimentary disc space.  There is facet arthropathy.  Moderate right and mild left neural foraminal narrowing.     Paraspinal muscles & soft tissues: Multiple bilateral renal cyst, increased in number when compared to 2019 exam.     Impression:     Left-sided fusion L4-L5 without evidence of complication.  There is transitional anatomy at what will be termed L5 with rudimentary disc space at L5-S1.     Degenerative change of the inferior lumbar spine as detailed above most notable at L3-4, L4-5, L5-S1.     Numerous bilateral renal cysts which are increased in number when compared to 2019 CT.  Further evaluation with a renal ultrasound may be obtained.       Narrative & Impression     EXAMINATION:  XR LUMBAR SPINE FLEXION AND EXTENSION ONLY     CLINICAL HISTORY:  back pain, unspecified back location;  Dorsalgia, unspecified     TECHNIQUE:  Lateral flexion standing and extension standing radiographs of the lumbar spine were obtained.     COMPARISON:  None     FINDINGS:  Patient is status post posterior spinal fusion at the L4-L5 level with metallic tracey and pedicle screws present.  Posterior vertebral alignment appears satisfactory.  No instability is elicited on the flexion and extension radiographs.  There is no evidence for acute fracture or bone destruction.  Atherosclerotic calcification is present within the abdominal aorta.     Impression:     Spinal fusion at the L4-L5 level.  No  evidence for hardware failure on this examination.     Posterior vertebral alignment is satisfactory with no instability elicited on the flexion and extension radiographs.  No appreciable movement noted at the fused L4-L5 level.        Electronically signed by: Galen Victoria MD  Date:                                            08/10/2020  Time:                                           09:46         Narrative & Impression     EXAMINATION:  MRI LUMBAR SPINE WITHOUT CONTRAST     CLINICAL HISTORY:  Dorsalgia, unspecifiedNeurologic Compromise;     TECHNIQUE:  Sagittal T1, sagittal T2, sagittal STIR, axial T1 and axial T2 weighted images of the lumbar spine obtained without contrast.     COMPARISON:  None     Limitations: In this patient with a history of previous lumbar spine surgery, postcontrast enhanced images are necessary to differentiate postsurgical epidural fibrosis from residual or recurrent disc herniation.     FINDINGS:  Lumbar spine alignment is within normal limits. The vertebral body heights are well maintained, with no fracture.  No marrow signal abnormality suspicious for an infiltrative process.     The conus is normal in appearance.  The adjacent soft tissue structures show no significant abnormalities.     L1-L2: There is no focal disc herniation. No significant central canal or neural foraminal narrowing.     L2-L3: There is no focal disc herniation. No significant central canal or neural foraminal narrowing.     L3-L4: There is metallic susceptibility artifact on left related to indwelling left-sided hardware posteriorly.  There is a subtle left paracentral and medial foraminal disc herniation suspected with some superimposed spondylitic spurring and disc bulging.  No central canal stenosis.  There is mild left-sided foraminal stenosis.     L4-L5: Patient is status post laminectomy and posterior fusion with unilateral left-sided hardware producing expected metallic susceptibility artifact.  There is  some spondylitic spurring, disc narrowing and degenerative endplate changes and diffuse disc bulging without focal herniation.  No significant central canal or neural foraminal narrowing.     L5-S1: There is near complete bony ankylosis with a rudimentary L5-S1 disc.  No significant central canal or neural foraminal narrowing.     Impression:     Left paracentral and medial foraminal disc herniation at L3-4.     Status post left L5 laminectomy and instrumentation without complicating features.     This report was flagged in Epic as abnormal.        Electronically signed by: Brian Damon Jr  Date:                                            08/10/2020  Time:                                           09:38         Assessment:       Encounter Diagnoses   Name Primary?    Chronic pain syndrome Yes    Lumbar radiculopathy     Left leg paresthesias     DDD (degenerative disc disease), lumbar          Plan:       Amanda was seen today for back pain and leg pain.    Diagnoses and all orders for this visit:    Chronic pain syndrome    Lumbar radiculopathy    Left leg paresthesias    DDD (degenerative disc disease), lumbar        Amanda Maria Elena Holt is a 71 y.o. female with chronic left-sided low back and lower extremity pain consistent with left lumbar radiculopathy.  Good relief from previous left L4 and L5 transforaminal epidural steroid injection.  Current symptoms now more consistent with L5 and L4 radicular pain.  Patient does have a history of previous lumbar fusion.  Also some indications of lumbar facet pain.     - Prior records reviewed  - Updated Imaging reviewed  - s/p Left L4/5&L5/S1 TFESI and >50% improved and now daily leg pain 100% resolved.   - Can continue medications prescribed by primary care doctor stay are beneficial without side effects.  - I have stressed the importance of physical activity and a home exercise plan to help with pain and improve health.  - Patient can continue with  medications for now since they are providing benefits, using them appropriately, and without side effects.  - Counseled patient regarding the importance of activity modification.  - RTC 2 months or sooner if needed.     The above plan and management options were discussed at length with patient. Patient is in agreement with the above and verbalized understanding.    CINDY Ruiz  06/13/2022     I spent a total of 30 minutes on the day of the visit.  This includes face to face time and non-face to face time preparing to see the patient by reviewing previous labs/imaging, obtaining and/or reviewing separately obtained history, documenting clinical information in the electronic or other health record, independently interpreting results and communicating results to the patient/family/caregiver.

## 2022-06-13 NOTE — TELEPHONE ENCOUNTER
----- Message from Chinyere Jagdish sent at 6/13/2022  7:02 AM CDT -----  Type: Patient Call Back    Who called: self    What is the request in detail: patient stated that she has a few questions she would like to ask. Please call    Can the clinic reply by [facility]? no    Would the patient rather a call back or a response via [facility]? call    Best call back number: [phone]

## 2022-06-13 NOTE — TELEPHONE ENCOUNTER
Returned the call she wants to schedule an new pt appointment for her son with Dr. Torres, she was informed providers panel is closed to new pts.We can however establish care with another pcp. She said ok I will call her later today this afternoon to schedule per her request.

## 2022-06-28 ENCOUNTER — TELEPHONE (OUTPATIENT)
Dept: FAMILY MEDICINE | Facility: CLINIC | Age: 72
End: 2022-06-28
Payer: MEDICARE

## 2022-06-28 RX ORDER — TIZANIDINE 4 MG/1
TABLET ORAL
Qty: 90 TABLET | Refills: 0 | Status: SHIPPED | OUTPATIENT
Start: 2022-06-28 | End: 2022-07-26

## 2022-06-28 NOTE — TELEPHONE ENCOUNTER
Care Due:                  Date            Visit Type   Department     Provider  --------------------------------------------------------------------------------                                [facility]                              PRIMARY      MEDICINE/  Last Visit: 05-      CARE ([facility])   INTERNAL CHAPARRITA Torres                              [facility]                              PRIMARY      MEDICINE/  Next Visit: 08-      CARE ([facility])   MARIA G Min  Test          Frequency    Reason                     Performed    Due Date  --------------------------------------------------------------------------------    CMP.........  12 months..  losartan, pravastatin....  07-   07-    [facility] Embedded Care Gaps. Reference number: [id number]. 6/28/2022   11:06:33 AM CDT

## 2022-06-28 NOTE — TELEPHONE ENCOUNTER
----- Message from Yesica Lopez sent at 6/28/2022 10:57 AM CDT -----  Regarding: self  .Type:  Needs Medical Advice    Who Called:  self   Symptoms (please be specific): under breast pain/ itching   How long has patient had these symptoms:  summer issue     Pharmacy name and phone #: .      [facility] [id number] [address] - [address]  [address]  [place] [address]  Phone: [phone] Fax: [phone]      Would the patient rather a call back or a response via [facility]? Call   Best Call Back Number:  [phone]

## 2022-06-28 NOTE — TELEPHONE ENCOUNTER
Rt patient call , says she usually deals with breast pain and itching under breast . This year its gotten worse , has gotten sore and irriadiated . Their sweaty and inflamed . Hasn't used any otc medicine , just cotton pads . Has taken some  the hydrOXYzine to help but not much progress . Offered ov with LEVI Vargas tomorrow , preferred 9:30 am [place] .     Please disregard pend order , was pre-qued prior encounter

## 2022-06-29 ENCOUNTER — TELEPHONE (OUTPATIENT)
Dept: FAMILY MEDICINE | Facility: CLINIC | Age: 72
End: 2022-06-29
Payer: MEDICARE

## 2022-06-29 ENCOUNTER — OFFICE VISIT (OUTPATIENT)
Dept: FAMILY MEDICINE | Facility: CLINIC | Age: 72
End: 2022-06-29
Payer: MEDICARE

## 2022-06-29 VITALS
TEMPERATURE: 98 F | HEIGHT: 64 IN | DIASTOLIC BLOOD PRESSURE: 80 MMHG | HEART RATE: 90 BPM | BODY MASS INDEX: 43.76 KG/M2 | SYSTOLIC BLOOD PRESSURE: 140 MMHG | WEIGHT: 256.31 LBS | OXYGEN SATURATION: 97 %

## 2022-06-29 DIAGNOSIS — I10 PRIMARY HYPERTENSION: ICD-10-CM

## 2022-06-29 DIAGNOSIS — I70.0 ATHEROSCLEROSIS OF AORTA: ICD-10-CM

## 2022-06-29 DIAGNOSIS — D22.9 NUMEROUS MOLES: ICD-10-CM

## 2022-06-29 DIAGNOSIS — B37.2 CANDIDAL INTERTRIGO: Primary | ICD-10-CM

## 2022-06-29 PROCEDURE — 1160F PR REVIEW ALL MEDS BY PRESCRIBER/CLIN PHARMACIST DOCUMENTED: ICD-10-PCS | Mod: CPTII,S$GLB,, | Performed by: NURSE PRACTITIONER

## 2022-06-29 PROCEDURE — 1159F MED LIST DOCD IN RCRD: CPT | Mod: CPTII,S$GLB,, | Performed by: NURSE PRACTITIONER

## 2022-06-29 PROCEDURE — 3077F SYST BP >= 140 MM HG: CPT | Mod: CPTII,S$GLB,, | Performed by: NURSE PRACTITIONER

## 2022-06-29 PROCEDURE — 4010F PR ACE/ARB THEARPY RXD/TAKEN: ICD-10-PCS | Mod: CPTII,S$GLB,, | Performed by: NURSE PRACTITIONER

## 2022-06-29 PROCEDURE — 1125F AMNT PAIN NOTED PAIN PRSNT: CPT | Mod: CPTII,S$GLB,, | Performed by: NURSE PRACTITIONER

## 2022-06-29 PROCEDURE — 99215 OFFICE O/P EST HI 40 MIN: CPT | Mod: S$GLB,,, | Performed by: NURSE PRACTITIONER

## 2022-06-29 PROCEDURE — 1100F PR PT FALLS ASSESS DOC 2+ FALLS/FALL W/INJURY/YR: ICD-10-PCS | Mod: CPTII,S$GLB,, | Performed by: NURSE PRACTITIONER

## 2022-06-29 PROCEDURE — 1159F PR MEDICATION LIST DOCUMENTED IN MEDICAL RECORD: ICD-10-PCS | Mod: CPTII,S$GLB,, | Performed by: NURSE PRACTITIONER

## 2022-06-29 PROCEDURE — 1100F PTFALLS ASSESS-DOCD GE2>/YR: CPT | Mod: CPTII,S$GLB,, | Performed by: NURSE PRACTITIONER

## 2022-06-29 PROCEDURE — 3288F FALL RISK ASSESSMENT DOCD: CPT | Mod: CPTII,S$GLB,, | Performed by: NURSE PRACTITIONER

## 2022-06-29 PROCEDURE — 4010F ACE/ARB THERAPY RXD/TAKEN: CPT | Mod: CPTII,S$GLB,, | Performed by: NURSE PRACTITIONER

## 2022-06-29 PROCEDURE — 99999 PR PBB SHADOW E&M-EST. PATIENT-LVL V: ICD-10-PCS | Mod: PBBFAC,,, | Performed by: NURSE PRACTITIONER

## 2022-06-29 PROCEDURE — 1125F PR PAIN SEVERITY QUANTIFIED, PAIN PRESENT: ICD-10-PCS | Mod: CPTII,S$GLB,, | Performed by: NURSE PRACTITIONER

## 2022-06-29 PROCEDURE — 99999 PR PBB SHADOW E&M-EST. PATIENT-LVL V: CPT | Mod: PBBFAC,,, | Performed by: NURSE PRACTITIONER

## 2022-06-29 PROCEDURE — 3008F BODY MASS INDEX DOCD: CPT | Mod: CPTII,S$GLB,, | Performed by: NURSE PRACTITIONER

## 2022-06-29 PROCEDURE — 1160F RVW MEDS BY RX/DR IN RCRD: CPT | Mod: CPTII,S$GLB,, | Performed by: NURSE PRACTITIONER

## 2022-06-29 PROCEDURE — 3008F PR BODY MASS INDEX (BMI) DOCUMENTED: ICD-10-PCS | Mod: CPTII,S$GLB,, | Performed by: NURSE PRACTITIONER

## 2022-06-29 PROCEDURE — 99215 PR OFFICE/OUTPT VISIT, EST, LEVL V, 40-54 MIN: ICD-10-PCS | Mod: S$GLB,,, | Performed by: NURSE PRACTITIONER

## 2022-06-29 PROCEDURE — 3079F PR MOST RECENT DIASTOLIC BLOOD PRESSURE 80-89 MM HG: ICD-10-PCS | Mod: CPTII,S$GLB,, | Performed by: NURSE PRACTITIONER

## 2022-06-29 PROCEDURE — 3079F DIAST BP 80-89 MM HG: CPT | Mod: CPTII,S$GLB,, | Performed by: NURSE PRACTITIONER

## 2022-06-29 PROCEDURE — 3077F PR MOST RECENT SYSTOLIC BLOOD PRESSURE >= 140 MM HG: ICD-10-PCS | Mod: CPTII,S$GLB,, | Performed by: NURSE PRACTITIONER

## 2022-06-29 PROCEDURE — 3288F PR FALLS RISK ASSESSMENT DOCUMENTED: ICD-10-PCS | Mod: CPTII,S$GLB,, | Performed by: NURSE PRACTITIONER

## 2022-06-29 RX ORDER — CLOTRIMAZOLE AND BETAMETHASONE DIPROPIONATE 10; .64 MG/G; MG/G
CREAM TOPICAL 2 TIMES DAILY
Qty: 45 G | Refills: 1 | Status: SHIPPED | OUTPATIENT
Start: 2022-06-29 | End: 2022-10-06

## 2022-06-29 RX ORDER — NYSTATIN 100000 [USP'U]/G
POWDER TOPICAL 4 TIMES DAILY
Qty: 60 G | Refills: 1 | Status: SHIPPED | OUTPATIENT
Start: 2022-06-29 | End: 2022-11-08

## 2022-06-29 RX ORDER — NYSTATIN 100000 U/G
OINTMENT TOPICAL 2 TIMES DAILY
Qty: 30 G | Refills: 1 | Status: CANCELLED | OUTPATIENT
Start: 2022-06-29

## 2022-06-29 NOTE — PROGRESS NOTES
Chief Complaint  Chief Complaint   Patient presents with    Rash     Under Breast Right and Left       HPI  Amanda Holt is a 71 y.o. female with medical diagnoses as listed in the medical history and problem list that presents for Fungal Infection of skin.  This patient is new to me.      1. Fungal Infection of skin 4 weeks: Reports she experiences breast moisture every summer howevr, this summer if worse. She reports sore beneath bilateral breast. Reports rash can be painful and prutiticdue friction. She usually uses a powder base corn starch but that remedy is not working.       PAST MEDICAL HISTORY:  Past Medical History:   Diagnosis Date    Arthralgia     Colon polyp     Degenerative disc disease at L5-S1 level     High cholesterol     Hypertension     Nuclear sclerosis of both eyes 1/8/2020    Sciatica     Spinal stenosis        PAST SURGICAL HISTORY:  Past Surgical History:   Procedure Laterality Date    BACK SURGERY      lumbar laminectomy    COLONOSCOPY N/A 7/23/2020    Procedure: COLONOSCOPY;  Surgeon: Donal Moore MD;  Location: [facility];  Service: Endoscopy;  Laterality: N/A;    EPIDURAL STEROID INJECTION Left 9/9/2020    Procedure: Injection, Steroid, Epidural Transforaminal;  Surgeon: Jun Leavitt Jr., MD;  Location: [facility];  Service: Pain Management;  Laterality: Left;  Left L5 + S1 TF WADE  Arrive @ 1300; ASA last 9/1; No DM    EPIDURAL STEROID INJECTION Left 2/12/2021    Procedure: Injection, Steroid, Epidural Transforaminal;  Surgeon: Jun Leavitt Jr., MD;  Location: [facility] ENDO;  Service: Pain Management;  Laterality: Left;  Left L4 + L5 TF WADE  Arrive @ 1230; IV Sedation; ASA last 2/4; No DM    TRANSFORAMINAL EPIDURAL INJECTION OF STEROID Left 3/14/2022    Procedure: INJECTION, STEROID, EPIDURAL, TRANSFORAMINAL APPROACH, L4-L5 & L5-S1;  Surgeon: Darya Ayala MD;  Location: [facility] PAIN MGT;  Service: Pain Management;  Laterality: Left;    TUBAL  LIGATION         SOCIAL HISTORY:  Social History     Socioeconomic History    Marital status:    Tobacco Use    Smoking status: Current Some Day Smoker     Years: 40.00     Types: Cigarettes    Smokeless tobacco: Never Used    Tobacco comment: 2 cigarettes/week   Substance and Sexual Activity    Alcohol use: Yes     Comment: ocassionally    Drug use: Yes     Comment: Tramadol twice a day as needed    Sexual activity: Not Currently     Social Determinants of Health     Financial Resource Strain: Medium Risk    Difficulty of Paying Living Expenses: Somewhat hard   Food Insecurity: Food Insecurity Present    Worried About Running Out of Food in the Last Year: Sometimes true    Ran Out of Food in the Last Year: Sometimes true   Transportation Needs: No Transportation Needs    Lack of Transportation (Medical): No    Lack of Transportation (Non-Medical): No   Physical Activity: Insufficiently Active    Days of Exercise per Week: 3 days    Minutes of Exercise per Session: 40 min   Stress: Stress Concern Present    Feeling of Stress : To some extent   Social Connections: Unknown    Frequency of Communication with Friends and Family: More than three times a week    Frequency of Social Gatherings with Friends and Family: Once a week    Active Member of Clubs or Organizations: No    Attends Club or Organization Meetings: Never    Marital Status:    Housing Stability: Low Risk     Unable to Pay for Housing in the Last Year: No    Number of Places Lived in the Last Year: 1    Unstable Housing in the Last Year: No       FAMILY HISTORY:  Family History   Problem Relation Age of Onset    Breast cancer Mother     Hypertension Mother     Hypotension Mother     Cataracts Father     Glaucoma Father     Diabetes Father     Glaucoma Sister     No Known Problems Brother     No Known Problems Maternal Aunt     No Known Problems Maternal Uncle     No Known Problems Paternal Aunt     No  Known Problems Paternal Uncle     No Known Problems Maternal Grandmother     No Known Problems Maternal Grandfather     No Known Problems Paternal Grandmother     No Known Problems Paternal Grandfather     No Known Problems Brother     Amblyopia Neg Hx     Blindness Neg Hx     Cancer Neg Hx     Macular degeneration Neg Hx     Retinal detachment Neg Hx     Strabismus Neg Hx     Stroke Neg Hx     Thyroid disease Neg Hx        ALLERGIES AND MEDICATIONS: updated and reviewed.  Review of patient's allergies indicates:   Allergen Reactions    Codeine      Current Outpatient Medications   Medication Sig Dispense Refill    amitriptyline (ELAVIL) 50 MG tablet TAKE 1 TABLET BY MOUTH AT BEDTIME 90 tablet 1    amLODIPine (NORVASC) 10 MG tablet Take 1 tablet by mouth once daily 90 tablet 0    aspirin (ECOTRIN) 81 MG EC tablet       diclofenac sodium (VOLTAREN) 1 % Gel APPLY 2 GRAMS TOPICALLY TO AFFECTED AREA 4 TIMES DAILY 100 g 5    fluticasone propionate (FLONASE) 50 mcg/actuation nasal spray 1 spray (50 mcg total) by Each Nostril route once daily. 16 g 5    hydrOXYzine HCL (ATARAX) 25 MG tablet Take 1 tablet (25 mg total) by mouth 3 (three) times daily as needed. 45 tablet 1    losartan (COZAAR) 100 MG tablet Take 1 tablet (100 mg total) by mouth once daily. 90 tablet 1    mupirocin (BACTROBAN) 2 % ointment Apply topically 3 (three) times daily. 22 g 0    omeprazole (PRILOSEC) 40 MG capsule Take 1 capsule by mouth once daily 90 capsule 0    pravastatin (PRAVACHOL) 40 MG tablet Take 1 tablet (40 mg total) by mouth once daily. 90 tablet 3    spironolactone (ALDACTONE) 25 MG tablet Take 1 tablet (25 mg total) by mouth once daily. 90 tablet 1    tiZANidine (ZANAFLEX) 4 MG tablet TAKE 1 TABLET BY MOUTH EVERY 6 HOURS AS NEEDED 90 tablet 0    traMADoL (ULTRAM) 50 mg tablet Take 1 tablet (50 mg total) by mouth every 12 (twelve) hours as needed for Pain. 60 tablet 2    traZODone (DESYREL) 100 MG tablet Take  "1 tablet (100 mg total) by mouth nightly as needed for Insomnia. 90 tablet 1    clotrimazole-betamethasone 1-0.05% (LOTRISONE) cream Apply topically 2 (two) times daily. 45 g 1    INV HYDROCHLOROTHIAZIDE 25MG TABLET       nystatin (MYCOSTATIN) powder Apply topically 4 (four) times daily. 60 g 1    triamcinolone acetonide 0.1% (KENALOG) 0.1 % ointment AAA bid 454 g 3     No current facility-administered medications for this visit.         ROS  Review of Systems   Constitutional: Negative for appetite change, chills, fatigue and fever.   HENT: Negative for congestion, ear pain, postnasal drip, rhinorrhea, sinus pressure, sneezing and sore throat.    Respiratory: Negative for shortness of breath.    Cardiovascular: Negative for chest pain and palpitations.   Gastrointestinal: Negative for abdominal pain, constipation, diarrhea, nausea and vomiting.   Genitourinary: Negative for dysuria.   Musculoskeletal: Negative for arthralgias.   Skin: Positive for color change and rash.   Neurological: Negative for headaches.   Psychiatric/Behavioral: Negative for sleep disturbance.           Physical Exam  Vitals:    06/29/22 0933   BP: (!) 140/80   Pulse: 90   Temp: 98.3 °F (36.8 °C)   TempSrc: Oral   SpO2: 97%   Weight: 116.3 kg (256 lb 4.6 oz)   Height: 5' 4" (1.626 m)    Body mass index is 43.99 kg/m².  Weight: 116.3 kg (256 lb 4.6 oz)   Height: 5' 4" (162.6 cm)   Physical Exam  Constitutional:       General: She is not in acute distress.     Appearance: Normal appearance. She is obese.   HENT:      Head: Normocephalic and atraumatic.      Right Ear: External ear normal.      Left Ear: External ear normal.      Nose: Nose normal.   Eyes:      Pupils: Pupils are equal, round, and reactive to light.   Cardiovascular:      Rate and Rhythm: Normal rate and regular rhythm.      Pulses: Normal pulses.   Pulmonary:      Effort: Pulmonary effort is normal. No respiratory distress.      Breath sounds: Normal breath sounds. No " wheezing.   Abdominal:      General: Bowel sounds are normal.      Palpations: Abdomen is soft.   Musculoskeletal:      Cervical back: Neck supple.   Lymphadenopathy:      Cervical: No cervical adenopathy.   Skin:     General: Skin is warm and dry.      Capillary Refill: Capillary refill takes less than 2 seconds.      Findings: Rash present.   Neurological:      General: No focal deficit present.      Mental Status: She is alert and oriented to person, place, and time. Mental status is at baseline.   Psychiatric:         Mood and Affect: Mood normal.             Health Maintenance       Date Due Completion Date    Pneumococcal Vaccines (Age 65+) (1 - PCV) Never done ---    Shingles Vaccine (1 of 2) Never done ---    TETANUS VACCINE 06/14/2022 6/14/2012 (Done)    Override on 6/14/2012: Done    COVID-19 Vaccine (4 - Booster for Pfizer series) 06/17/2022 2/17/2022    Influenza Vaccine (Season Ended) 09/01/2022 ---    Mammogram 04/20/2023 4/20/2022    High Dose Statin 05/19/2023 5/19/2022    Aspirin/Antiplatelet Therapy 05/19/2023 5/19/2022    Colorectal Cancer Screening 07/23/2023 7/23/2020    Lipid Panel 10/21/2026 10/21/2021            Assessment & Plan  Problem List Items Addressed This Visit        Cardiac/Vascular    Hypertension  -The current medical regimen is effective;  continue present plan and medications.     Atherosclerosis of aorta  - Continue ASA       Endocrine    BMI 40.0-44.9, adult  -Diet and exercise discussed with patient.       Other Visit Diagnoses     Candidal intertrigo    -  Primary  -We discussed effective administration of Nystatin and Lotrisone, adverse effects and side effects with education given for reinforcement    Relevant Medications    clotrimazole-betamethasone 1-0.05% (LOTRISONE) cream, BID PRN for maintenance     nystatin (MYCOSTATIN) powder, QID     Numerous moles      - Per patient's request    Relevant Orders    Ambulatory referral/consult to Dermatology            [facility]  Maintenance reviewed: Deferred per patient     Follow-up: 7 weeks or sooner if needed

## 2022-06-30 ENCOUNTER — TELEPHONE (OUTPATIENT)
Dept: DERMATOLOGY | Facility: CLINIC | Age: 72
End: 2022-06-30
Payer: MEDICARE

## 2022-06-30 NOTE — TELEPHONE ENCOUNTER
----- Message from Kristen Agustin LPN sent at 6/29/2022  4:18 PM CDT -----  Regarding: FW: Referral    ----- Message -----  From: Cierra Juarez  Sent: 6/29/2022  10:22 AM CDT  To: [facility] Staff  Subject: Referral                                         Good morning,      Please assist patient with scheduling appointment. Patient is already established.         Thanks,

## 2022-07-01 ENCOUNTER — PATIENT MESSAGE (OUTPATIENT)
Dept: DERMATOLOGY | Facility: CLINIC | Age: 72
End: 2022-07-01
Payer: MEDICARE

## 2022-07-06 ENCOUNTER — TELEPHONE (OUTPATIENT)
Dept: DERMATOLOGY | Facility: CLINIC | Age: 72
End: 2022-07-06
Payer: MEDICARE

## 2022-07-06 NOTE — TELEPHONE ENCOUNTER
----- Message from Fransisca Alejandre LPN sent at 6/30/2022  2:25 PM CDT -----  Contact: pt.    ----- Message -----  From: Javier Gamino  Sent: 6/30/2022   2:16 PM CDT  To: [facility] Clinical Staff    .Type:  Needs Medical Advice    Who Called: pt  Would the patient rather a call back or a response via [facility]? Call back  Best Call Back Number: [phone]   Additional Information: Pt. Is returning a missed call to schedule her appt. With the dept.

## 2022-07-08 ENCOUNTER — OFFICE VISIT (OUTPATIENT)
Dept: DERMATOLOGY | Facility: CLINIC | Age: 72
End: 2022-07-08
Payer: MEDICARE

## 2022-07-08 DIAGNOSIS — L30.4 INTERTRIGO: Primary | ICD-10-CM

## 2022-07-08 DIAGNOSIS — L91.8 SKIN TAG: ICD-10-CM

## 2022-07-08 PROCEDURE — 4010F ACE/ARB THERAPY RXD/TAKEN: CPT | Mod: CPTII,S$GLB,, | Performed by: DERMATOLOGY

## 2022-07-08 PROCEDURE — 99214 OFFICE O/P EST MOD 30 MIN: CPT | Mod: S$GLB,,, | Performed by: DERMATOLOGY

## 2022-07-08 PROCEDURE — 99999 PR PBB SHADOW E&M-EST. PATIENT-LVL I: CPT | Mod: PBBFAC,,, | Performed by: DERMATOLOGY

## 2022-07-08 PROCEDURE — 99999 PR PBB SHADOW E&M-EST. PATIENT-LVL I: ICD-10-PCS | Mod: PBBFAC,,, | Performed by: DERMATOLOGY

## 2022-07-08 PROCEDURE — 99214 PR OFFICE/OUTPT VISIT, EST, LEVL IV, 30-39 MIN: ICD-10-PCS | Mod: S$GLB,,, | Performed by: DERMATOLOGY

## 2022-07-08 PROCEDURE — 4010F PR ACE/ARB THEARPY RXD/TAKEN: ICD-10-PCS | Mod: CPTII,S$GLB,, | Performed by: DERMATOLOGY

## 2022-07-08 NOTE — PROGRESS NOTES
Subjective:       Patient ID:  Amanda Holt is a 71 y.o. female who presents for   Chief Complaint   Patient presents with    Rash     Pt c/o painful rash under breast x 1-2 months. Using Nystatin powder with minimail improvement.      Review of Systems   Skin: Positive for rash. Negative for itching.        Objective:    Physical Exam   Skin:   Areas Examined (abnormalities noted in diagram):   Chest / Axilla Inspection Performed  Abdomen Inspection Performed              Diagram Legend     Erythematous scaling macule/papule c/w actinic keratosis       Vascular papule c/w angioma      Pigmented verrucoid papule/plaque c/w seborrheic keratosis      Yellow umbilicated papule c/w sebaceous hyperplasia      Irregularly shaped tan macule c/w lentigo     1-2 mm smooth white papules consistent with Milia      Movable subcutaneous cyst with punctum c/w epidermal inclusion cyst      Subcutaneous movable cyst c/w pilar cyst      Firm pink to brown papule c/w dermatofibroma      Pedunculated fleshy papule(s) c/w skin tag(s)      Evenly pigmented macule c/w junctional nevus     Mildly variegated pigmented, slightly irregular-bordered macule c/w mildly atypical nevus      Flesh colored to evenly pigmented papule c/w intradermal nevus       Pink pearly papule/plaque c/w basal cell carcinoma      Erythematous hyperkeratotic cursted plaque c/w SCC      Surgical scar with no sign of skin cancer recurrence      Open and closed comedones      Inflammatory papules and pustules      Verrucoid papule consistent consistent with wart     Erythematous eczematous patches and plaques     Dystrophic onycholytic nail with subungual debris c/w onychomycosis     Umbilicated papule    Erythematous-base heme-crusted tan verrucoid plaque consistent with inflamed seborrheic keratosis     Erythematous Silvery Scaling Plaque c/w Psoriasis     See annotation      Assessment / Plan:        Intertrigo  -     triamcinolone acetonide 0.1%  (KENALOG) 0.1 % cream; AAA bid after cool blow dry  Dispense: 30 g; Refill: 5  Flares:  Recommend white vinegar: water 1:1 compresses 2x/day followed by cool blow dry and then application of prescription medication.     Maintenance:  Cool blow dry after showering 1x/day then apply Zeasorb AF powder.    Skin tag  - Reassurance given to patient. No treatment is necessary.   Treatment of benign, asymptomatic lesions may be considered cosmetic.  -reduce sugar in diet and amount of friction to area       f/u 4 mo    No follow-ups on file.

## 2022-07-08 NOTE — PATIENT INSTRUCTIONS
Intertrigo  -     triamcinolone acetonide 0.1% (KENALOG) 0.1 % cream; AAA bid after cool blow dry  Dispense: 30 g; Refill: 5  Flares:  Recommend white vinegar: water 1:1 compresses 2x/day followed by cool blow dry and then application of prescription medication.     Maintenance:  Cool blow dry after showering 1x/day then apply Zeasorb AF powder.    Skin tag  - Reassurance given to patient. No treatment is necessary.   Treatment of benign, asymptomatic lesions may be considered cosmetic.  -reduce sugar in diet and amount of friction to area

## 2022-07-12 ENCOUNTER — PATIENT MESSAGE (OUTPATIENT)
Dept: DERMATOLOGY | Facility: CLINIC | Age: 72
End: 2022-07-12
Payer: MEDICARE

## 2022-07-12 RX ORDER — KETOCONAZOLE 20 MG/G
CREAM TOPICAL 2 TIMES DAILY
Status: CANCELLED | OUTPATIENT
Start: 2022-07-12

## 2022-07-12 NOTE — TELEPHONE ENCOUNTER
----- Message from Brijesh Oshea sent at 7/12/2022 12:27 PM CDT -----  Contact: @[phone]  Pt is calling in to see if she can get a call back pertaining to her prescription that was sent in. Due to it being a mixture her insurance wouldn't cover so she needs an alternate prescription. Please call to discuss further.

## 2022-07-23 NOTE — TELEPHONE ENCOUNTER
Care Due:                  Date            Visit Type   Department     Provider  --------------------------------------------------------------------------------                                [facility] FAM[facility]                              PRIMARY      MEDICINE/  Last Visit: 05-      CARE ([facility])   INTERNAL CHAPARRITA Torres                              [facility]                              PRIMARY      MEDICINE/  Next Visit: 08-      CARE ([facility])   MARIA G Min  Test          Frequency    Reason                     Performed    Due Date  --------------------------------------------------------------------------------    CMP.........  12 months..  losartan, pravastatin....  Not Found    Overdue    Lipid Panel.  12 months..  pravastatin..............  10-   10-    [facility] Embedded Care Gaps. Reference number: [id number]. 7/23/2022   11:45:36 AM CDT

## 2022-07-26 RX ORDER — TIZANIDINE 4 MG/1
TABLET ORAL
Qty: 90 TABLET | Refills: 1 | Status: SHIPPED | OUTPATIENT
Start: 2022-07-26 | End: 2022-12-20 | Stop reason: SDUPTHER

## 2022-08-03 ENCOUNTER — CLINICAL SUPPORT (OUTPATIENT)
Dept: REHABILITATION | Facility: HOSPITAL | Age: 72
End: 2022-08-03
Attending: FAMILY MEDICINE
Payer: MEDICARE

## 2022-08-03 DIAGNOSIS — M48.00 SPINAL STENOSIS, UNSPECIFIED SPINAL REGION: ICD-10-CM

## 2022-08-03 DIAGNOSIS — M25.561 PAIN IN BOTH KNEES, UNSPECIFIED CHRONICITY: ICD-10-CM

## 2022-08-03 DIAGNOSIS — M54.16 LUMBAR RADICULOPATHY: ICD-10-CM

## 2022-08-03 DIAGNOSIS — Z74.09 DECREASED FUNCTIONAL MOBILITY AND ENDURANCE: Primary | ICD-10-CM

## 2022-08-03 DIAGNOSIS — M25.562 PAIN IN BOTH KNEES, UNSPECIFIED CHRONICITY: ICD-10-CM

## 2022-08-03 PROCEDURE — 97161 PT EVAL LOW COMPLEX 20 MIN: CPT

## 2022-08-03 PROCEDURE — 97110 THERAPEUTIC EXERCISES: CPT

## 2022-08-03 NOTE — PLAN OF CARE
[facility]   Physical Therapy Initial Evaluation     Date: 8/3/2022   Name: Amanda Arredondo [facility] Number: [id number]    Therapy Diagnosis:   Encounter Diagnoses   Name Primary?    Pain in both knees, unspecified chronicity     Spinal stenosis, unspecified spinal region     Lumbar radiculopathy Yes     Physician: Simona Torres MD    Physician Orders: PT Eval and Treat  Medical Diagnosis from Referral:  M25.561,M25.562 (ICD-10-CM) - Pain in both knees, unspecified chronicity   M48.00 (ICD-10-CM) - Spinal stenosis, unspecified spinal region   Evaluation Date: 8/3/2022  Authorization Period Expiration: 8/31/2022 (eval)  Plan of Care Expiration: 10/7/2022  Progress Note Due: 9/3/2022  Visit # / Visits authorized: 1/1 (eval)    Precautions: Standard and Fall    Time In: 9:15 AM  Time Out: 10:00 AM  Total Appointment Time (timed & untimed codes): 45 minutes    SUBJECTIVE     Date of onset: chronic LBP    History of current condition - Amanda reports: she experiences chronic low back pain, with report of completing previous surgery in 2018. She reports she was relatively symptom free post-op for approximately one year, however symptoms slowly increased over time to current day. Patient reports she completed f/u with neurosurgeon, to which she received an abdominal brace; however reports minimal relief when donned. Patient also reports she completes f/u with pain management for her lumbar pain approximately ever 3-4 months. As for her bilateral knees, patient reports she has felt fine since her initial referral and is now mainly concerned about her low back symptoms. For mobility, she reports having difficulty walking > 10 minutes without LLE going numb, as well as experiencing the same symptoms when sitting/standing for too long. As for completing ADLs, she reports she can manage to complete independently with increased time. Patient reports she has attended aquatic therapy before  with positive experience. Patient states that walking in heels and dancing actually improve her symptoms in comparison to wearing flat shoes.      Falls: reports fall forward and skidding down the stairs recently; however no traumatic injury to report.    Prior Therapy: Reports previous PT history with treatment with aquatic and land therapy; reports positive experience with aquatic, mildly negative experience with land therapy.   Social History: Currently resides in a two story home with 0 steps to enter, lives alone. She reports she is able to traverse the stairs fine, however experiences mild SOB.  Occupation: Retired and on disability  Prior Level of Function: independent with ADLs  Current Level of Function: independent with ADLs, however requires increased time and rest breaks with LLE going numb intermittently. Reports she can complete ADLs from chair if needed. Reports have difficilty with moving in the morning. States she is only able to lay on R side; unable to lay on back and L side. Patient states standing/sitting/walking for > 10 minutes will cause increased LLE numbness    Pain:  Current 2/10, worst 10/10, best 2/10   Location: primary pain L low back, LLE; intermittently on the R low back  Description: Aching, Grabbing, Deep and Numb  Aggravating Factors: Refer above  Easing Factors: Flexion activities, rest    Patients goals: To decrease pain levels and return to prior level of function. Patient would also like to improve her overall functional status and increase ability to walk. Her personal goal includes improving mobility to be able to attend a cruise next year.      Medical History:   Past Medical History:   Diagnosis Date    Arthralgia     Colon polyp     Degenerative disc disease at L5-S1 level     High cholesterol     Hypertension     Nuclear sclerosis of both eyes 1/8/2020    Sciatica     Spinal stenosis        Surgical History:   Amanda Holt  has a past surgical history  that includes Tubal ligation; Back surgery; Colonoscopy (N/A, 2020); Epidural steroid injection (Left, 2020); Epidural steroid injection (Left, 2021); and Transforaminal epidural injection of steroid (Left, 3/14/2022).    Medications:   Amanda has a current medication list which includes the following prescription(s): amitriptyline, amlodipine, aspirin, clotrimazole-betamethasone 1-0.05%, diclofenac sodium, fluticasone propionate, hydroxyzine hcl, INV HYDROCHLOROTHIAZIDE 25MG TABLET, losartan, mupirocin, nystatin, omeprazole, pravastatin, spironolactone, tizanidine, tramadol, trazodone, triamcinolone acetonide 0.1%-ciclopirox-magnesium hydroxide 400 mg/5 ml, and triamcinolone acetonide 0.1%.    Allergies:   Review of patient's allergies indicates:   Allergen Reactions    Codeine       Pool contraindications, including but not limited to, incontinence, seizures, fever/GI issues were reviewed with the patient. Patient agrees that based on their knowledge and medical history, they are appropriate for Aquatic Therapy.     OBJECTIVE     TU sec using no AD    5 Times Sit to Stand: 18 sec using BUE support on thighs    Observation: pleasant and compliant patient    Posture: mild flatback t/o spine    Gait: limited hip extension bilaterally    Lumbar Range of Motion:    Degrees Pain   Flexion 75% Mild pulling        Extension 25%   Increased         Left Side Bending 50% Mild pulling        Right Side Bending 30% Mild pulling        Left rotation   50% Nothing to report        Right Rotation   50% Nothing to report           Lower Extremity Strength  Right LE  Left LE    Knee extension: 4/5 Knee extension: 3+/5   Knee flexion: 4/5 Knee flexion: 3+/5   Hip flexion: 4/5 Hip flexion: 4-/5   Ankle dorsiflexion: 4/5 Ankle dorsiflexion: 4-/5   Ankle plantarflexion: 4/5 Ankle plantarflexion: 4-/5     Sensation: intact to light touch    TREATMENT     Total Treatment time (time-based codes) separate from  "Evaluation: 8 minutes      Amanda received the treatments listed below:       THERAPEUTIC EXERCISES to develop strength, endurance, ROM, flexibility, posture and core stabilization for 8 minutes including the following exercises:   o LTRs, x20  o Standing hip abduction, 2x15  o Standing hip extension, 2x15  o Seated trunk flexion, 3x30" holds    PATIENT EDUCATION AND HOME EXERCISES     Education provided:   - Current medical status and being a candidate for skilled therapeutic intervention, HEP and importance of compliance, Role of PT, Relevant anatomy, goals of POC, general principles of well-being.    Written Home Exercises Provided: yes. Exercises were reviewed and Amanda was able to demonstrate them prior to the end of the session.  Amanda demonstrated good  understanding of the education provided. See EMR under Patient Instructions for exercises provided during therapy sessions.    ASSESSMENT     Amanda is a 71 y.o. female referred to outpatient Physical Therapy with a medical diagnosis of pain in both knees, unspecified chronicity and spinal stenosis, unspecified spinal region. Patient presents with ROM, strength and functional limitations that impact the patient's ability to perform ADLs and preferred activities at prior level of function.     Patient prognosis is Good.     Patient will benefit from skilled outpatient Physical Therapy to address the deficits stated above and in the chart below, provide patient /family education, and to maximize patientt's level of independence.     Plan of care discussed with patient: Yes    Patient's spiritual, cultural and educational needs considered and patient is agreeable to the plan of care and goals as stated below:     Anticipated Barriers for therapy: None    Medical Necessity is demonstrated by the following  History  Co-morbidities and personal factors that may impact the plan of care Co-morbidities:   See medical hx    Personal Factors:   no deficits     low "   Examination  Body Structures and Functions, activity limitations and participation restrictions that may impact the plan of care Body Regions:   back  lower extremities    Body Systems:    gross symmetry  ROM  strength  gross coordinated movement  balance  gait  transfers  transitions    Participation Restrictions:   Completing ADLs and recreational activities independently    Activity limitations:   Learning and applying knowledge  no deficits    General Tasks and Commands  no deficits    Communication  no deficits    Mobility  lifting and carrying objects  walking    Self care  no deficits    Domestic Life  shopping  cooking  doing house work (cleaning house, washing dishes, laundry)  assisting others    Interactions/Relationships  no deficits    Life Areas  no deficits    Community and Social Life  recreation and leisure         high   Clinical Presentation stable and uncomplicated low   Decision Making/ Complexity Score: low       Goals:  Short Term Goals: 4 weeks   1. Patient will be independent in HEP & progressions.  2. The patient will achieve 5 sit to stand score of < / = 18 seconds to demonstrate improved transfers and endurance.   3. Patient will subjectively report ability to stand and cook for > / = 10 minutes to demonstrate improved functional strength and endurance required to complete ADLs around the home independently.    Long Term Goals: 8 weeks   1. The patient will demonstrate independence with extensive HEP.  2. Patent is able to demonstrate MMT > / = 4/5 in BLE where limited and without pain reports during testing.   3. Patient will subjectively report ability to walk > / = 15 minutes with report of decreased LLE numbness to demonstrate improved functional BLE strength required to perform recreational activities and be able to navigate her environment safely.     PLAN   Plan of care Certification: 8/3/2022 to 10/7/2022.    Outpatient Physical Therapy 1-2 times weekly for 8 weeks to include  the following interventions: manual therapy, aquatic therapy, patient education, therapeutic exercise, therapeutic activities.    Patient may be seen by PTA as part of rehabilitation team.    Larissa Abebe, PT, DPT      I CERTIFY THE NEED FOR THESE SERVICES FURNISHED UNDER THIS PLAN OF TREATMENT AND WHILE UNDER MY CARE   Physician's comments:     Physician's Signature: ___________________________________________________

## 2022-08-03 NOTE — PROGRESS NOTES
See treatment note for today's evaluation and plan of care. Thank you.    Larissa Abebe, PT, DPT  8/3/2022

## 2022-08-08 PROBLEM — Z74.09 DECREASED FUNCTIONAL MOBILITY AND ENDURANCE: Status: ACTIVE | Noted: 2022-08-08

## 2022-08-11 ENCOUNTER — CLINICAL SUPPORT (OUTPATIENT)
Dept: REHABILITATION | Facility: HOSPITAL | Age: 72
End: 2022-08-11
Payer: MEDICARE

## 2022-08-11 DIAGNOSIS — Z74.09 DECREASED FUNCTIONAL MOBILITY AND ENDURANCE: Primary | ICD-10-CM

## 2022-08-11 PROCEDURE — 97113 AQUATIC THERAPY/EXERCISES: CPT

## 2022-08-11 NOTE — PROGRESS NOTES
[facility]   Physical Therapy Treatment Note     Name: Amanda Arredondo [facility] Number: [id number]    Therapy Diagnosis:   Encounter Diagnosis   Name Primary?    Decreased functional mobility and endurance Yes     Physician: Simona Torres MD    Visit Date: 8/11/2022    Physician Orders: PT Eval and Treat  Medical Diagnosis from Referral:  M25.561,M25.562 (ICD-10-CM) - Pain in both knees, unspecified chronicity   M48.00 (ICD-10-CM) - Spinal stenosis, unspecified spinal region   Evaluation Date: 8/3/2022  Authorization Period Expiration: 8/31/2022 (eval)  Plan of Care Expiration: 10/7/2022  Progress Note Due: 9/3/2022  Visit # / Visits authorized: 2/16 pending     Precautions: Standard and Fall    PTA Visit #: 0/5     Time In: 0920  Time Out: 1020  Total Billable Time: 30 minutes 1 on 1    SUBJECTIVE     Pt reports: increased LBP this morning. She says she had a bad night.   She was compliant with home exercise program.  Response to previous treatment: first pool visit  Functional change: initiated aquatic PT    Reviewed contraindications with pt to ensure aquatic therapy is appropriate. Per patient's review of contraindications and verbal report of no pertinent medical hx, patient is cleared for attending skilled aquatic physical therapy.     Pain: 10/10 with NAD observed. She states that her pain is not emergency severity.   Location: bilateral back      OBJECTIVE     Objective Measures updated at progress report unless specified.       Treatment     Amanda received aquatic therapeutic exercises to develop strength, endurance, ROM, flexibility, posture, and core stabilization for 55 minutes including:    FUNCTIONAL MOBILITY TRAINING x 2 laps each at beginning and 1 lap each at end of session  Walk forward/backward/lateral    STRETCHES 2 x 30sec  HS  Calf  Hip adductor    LE EX x 20  Squat  Heel raise with gluteal set  Hip abduction/adduction  Hip flex/ext  HS curl  LAQ  High knee  march with kick board  Step ups fwd x20    UE EX/CORE  x 20  Shoulder flex/extTA activation paddles open  Shoulder horizontal abd/add TA activation paddles open  Mini squat with push/pull red kickboard    ENDURANCE  Bicycle in // bars 3'  LTR 2'  DKTC 2'      Patient Education and Home Exercises     Home Exercises Provided and Patient Education Provided     Education provided:   Role of aquatic therapy  Hydration post therapy    Written Home Exercises Provided: Patient instructed to cont prior HEP. Exercises were reviewed and Amanda was able to demonstrate them prior to the end of the session.  Amanda demonstrated good  understanding of the education provided. See EMR under Patient Instructions for exercises provided during therapy sessions    ASSESSMENT     Patient required initial verbal cues for proper technique & core stabilization. Patient tolerated treatment very well with reduced pain complaints.     Amanda Is progressing well towards her goals.   Pt prognosis is Good.     Pt will continue to benefit from skilled outpatient physical therapy to address the deficits listed in the problem list box on initial evaluation, provide pt/family education and to maximize pt's level of independence in the home and community environment.     Pt's spiritual, cultural and educational needs considered and pt agreeable to plan of care and goals.     Anticipated barriers to physical therapy: None.     Goals:  Short Term Goals: 4 weeks   1. Patient will be independent in HEP & progressions.  2. The patient will achieve 5 sit to stand score of < / = 18 seconds to demonstrate improved transfers and endurance.   3. Patient will subjectively report ability to stand and cook for > / = 10 minutes to demonstrate improved functional strength and endurance required to complete ADLs around the home independently.     Long Term Goals: 8 weeks   1. The patient will demonstrate independence with extensive HEP.  2. Patent is able to  demonstrate MMT > / = 4/5 in BLE where limited and without pain reports during testing.   3. Patient will subjectively report ability to walk > / = 15 minutes with report of decreased LLE numbness to demonstrate improved functional BLE strength required to perform recreational activities and be able to navigate her environment safely.     PLAN     Continue POC.   Outpatient Physical Therapy 1-2 times weekly for 8 weeks to include the following interventions: manual therapy, aquatic therapy, patient education, therapeutic exercise, therapeutic activities.     Patient may be seen by PTA as part of rehabilitation team.    Jayden Winslow, PT

## 2022-08-12 ENCOUNTER — TELEPHONE (OUTPATIENT)
Dept: PAIN MEDICINE | Facility: CLINIC | Age: 72
End: 2022-08-12
Payer: MEDICARE

## 2022-08-12 ENCOUNTER — NURSE TRIAGE (OUTPATIENT)
Dept: ADMINISTRATIVE | Facility: CLINIC | Age: 72
End: 2022-08-12
Payer: MEDICARE

## 2022-08-12 DIAGNOSIS — E87.6 LOW BLOOD POTASSIUM: ICD-10-CM

## 2022-08-12 DIAGNOSIS — N18.30 STAGE 3 CHRONIC KIDNEY DISEASE, UNSPECIFIED WHETHER STAGE 3A OR 3B CKD: ICD-10-CM

## 2022-08-12 DIAGNOSIS — I10 HYPERTENSION, UNSPECIFIED TYPE: Primary | ICD-10-CM

## 2022-08-12 RX ORDER — SPIRONOLACTONE 25 MG/1
TABLET ORAL
Qty: 90 TABLET | Refills: 1 | Status: SHIPPED | OUTPATIENT
Start: 2022-08-12 | End: 2022-11-22

## 2022-08-12 RX ORDER — SPIRONOLACTONE 25 MG/1
25 TABLET ORAL DAILY
Qty: 30 TABLET | Refills: 0 | Status: SHIPPED | OUTPATIENT
Start: 2022-08-12 | End: 2022-08-22

## 2022-08-12 NOTE — TELEPHONE ENCOUNTER
Reason for Disposition   [1] Caller has URGENT medicine question about med that PCP or specialist prescribed AND [2] triager unable to answer question    Additional Information   Negative: [1] Intentional drug overdose AND [2] suicidal thoughts or ideas   Negative: Drug overdose and triager unable to answer question   Negative: Caller requesting information unrelated to medicine   Negative: Caller requesting information about COVID-19 Vaccine   Negative: Caller requesting information about Emergency Contraception   Negative: Caller requesting information about Combined Birth Control Pills   Negative: Caller requesting information about Progestin Birth Control Pills   Negative: Caller requesting information about Post-Op pain or medicines   Negative: Caller requesting a prescription antibiotic (such as Penicillin) for Strep throat and has a positive culture result   Negative: Caller requesting a prescription anti-viral med (such as Tamiflu) and has influenza (flu)  symptoms   Negative: Immunization reaction suspected   Negative: Rash while taking a medicine or within 3 days of stopping it   Negative: [1] Asthma and [2] having symptoms of asthma (cough, wheezing, etc.)   Negative: [1] Symptom of illness (e.g., headache, abdominal pain, earache, vomiting) AND [2] more than mild   Negative: Breastfeeding questions about mother's medicines and diet   Negative: MORE THAN A DOUBLE DOSE of a prescription or over-the-counter (OTC) drug   Negative: [1] DOUBLE DOSE (an extra dose or lesser amount) of prescription drug AND [2] any symptoms (e.g., dizziness, nausea, pain, sleepiness)   Negative: [1] DOUBLE DOSE (an extra dose or lesser amount) of over-the-counter (OTC) drug AND [2] any symptoms (e.g., dizziness, nausea, pain, sleepiness)   Negative: Took another person's prescription drug   Negative: [1] Pharmacy calling with prescription question AND [2] triager unable to answer question   Negative: [1]  Prescription not at pharmacy AND [2] was prescribed by PCP recently (Exception: triager has access to EMR and prescription is recorded there. Go to Home Care and confirm for pharmacy.)   Negative: [1] Prescription refill request for ESSENTIAL medicine (i.e., likelihood of harm to patient if not taken) AND [2] triager unable to refill per department policy   Negative: Diabetes drug error or overdose (e.g., took wrong type of insulin or took extra dose)   Negative: [1] DOUBLE DOSE (an extra dose or lesser amount) of prescription drug AND [2] NO symptoms (Exception: a double dose of antibiotics)    Protocols used: MEDICATION QUESTION CALL-A-  pt called re out of spironolactone. pharmacy didnt hear back when sending refill request. Spoke with dr motta. Ok for 30 day supply.LM on pharm VM at 658pm. Pt notified. call back with questions   [phone]

## 2022-08-12 NOTE — TELEPHONE ENCOUNTER
No new care gaps identified.  [facility] Embedded Care Gaps. Reference number: [id number]. 8/12/2022   12:42:54 PM CDT

## 2022-08-12 NOTE — TELEPHONE ENCOUNTER
Refill Routing Note   Medication(s) are not appropriate for processing by [facility] for the following reason(s):      - Required vitals are abnormal    [facility] action(s):  Defer       Medication Therapy Plan: Last BP 06/29/22 (!) 140/80, Last CMP 2020  Medication reconciliation completed: No     Appointments  past 12m or future 3m with PCP    Date Provider   Last Visit   5/19/2022 Simona Torres MD   Next Visit   8/22/2022 Simona Torres MD   ED visits in past 90 days: 0        Note composed:2:44 PM 08/12/2022

## 2022-08-15 ENCOUNTER — OFFICE VISIT (OUTPATIENT)
Dept: PAIN MEDICINE | Facility: CLINIC | Age: 72
End: 2022-08-15
Payer: MEDICARE

## 2022-08-15 DIAGNOSIS — M54.16 LUMBAR RADICULOPATHY: Primary | ICD-10-CM

## 2022-08-15 DIAGNOSIS — M51.26 LUMBAR HERNIATED DISC: ICD-10-CM

## 2022-08-15 PROCEDURE — 1160F RVW MEDS BY RX/DR IN RCRD: CPT | Mod: CPTII,95,, | Performed by: NURSE PRACTITIONER

## 2022-08-15 PROCEDURE — 4010F PR ACE/ARB THEARPY RXD/TAKEN: ICD-10-PCS | Mod: CPTII,95,, | Performed by: NURSE PRACTITIONER

## 2022-08-15 PROCEDURE — 1159F MED LIST DOCD IN RCRD: CPT | Mod: CPTII,95,, | Performed by: NURSE PRACTITIONER

## 2022-08-15 PROCEDURE — 99213 OFFICE O/P EST LOW 20 MIN: CPT | Mod: 95,,, | Performed by: NURSE PRACTITIONER

## 2022-08-15 PROCEDURE — 1160F PR REVIEW ALL MEDS BY PRESCRIBER/CLIN PHARMACIST DOCUMENTED: ICD-10-PCS | Mod: CPTII,95,, | Performed by: NURSE PRACTITIONER

## 2022-08-15 PROCEDURE — 1159F PR MEDICATION LIST DOCUMENTED IN MEDICAL RECORD: ICD-10-PCS | Mod: CPTII,95,, | Performed by: NURSE PRACTITIONER

## 2022-08-15 PROCEDURE — 99213 PR OFFICE/OUTPT VISIT, EST, LEVL III, 20-29 MIN: ICD-10-PCS | Mod: 95,,, | Performed by: NURSE PRACTITIONER

## 2022-08-15 PROCEDURE — 4010F ACE/ARB THERAPY RXD/TAKEN: CPT | Mod: CPTII,95,, | Performed by: NURSE PRACTITIONER
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
## 2022-08-16 ENCOUNTER — CLINICAL SUPPORT (OUTPATIENT)
Dept: REHABILITATION | Facility: HOSPITAL | Age: 72
End: 2022-08-16
Payer: MEDICARE

## 2022-08-16 DIAGNOSIS — Z74.09 DECREASED FUNCTIONAL MOBILITY AND ENDURANCE: Primary | ICD-10-CM

## 2022-08-16 DIAGNOSIS — M54.16 LUMBAR RADICULOPATHY: ICD-10-CM

## 2022-08-16 DIAGNOSIS — M51.36 DDD (DEGENERATIVE DISC DISEASE), LUMBAR: ICD-10-CM

## 2022-08-16 PROCEDURE — 97113 AQUATIC THERAPY/EXERCISES: CPT

## 2022-08-16 NOTE — PROGRESS NOTES
[facility]   Physical Therapy Treatment Note     Name: Amanda Arredondo [facility] Number: [id number]    Therapy Diagnosis:   Encounter Diagnoses   Name Primary?    Decreased functional mobility and endurance Yes    Lumbar radiculopathy     DDD (degenerative disc disease), lumbar      Physician: Simona Torres MD    Visit Date: 8/16/2022    Physician Orders: PT Eval and Treat  Medical Diagnosis from Referral:  M25.561,M25.562 (ICD-10-CM) - Pain in both knees, unspecified chronicity   M48.00 (ICD-10-CM) - Spinal stenosis, unspecified spinal region   Evaluation Date: 8/3/2022  Authorization Period Expiration: 8/31/2022 (eval)  Plan of Care Expiration: 10/7/2022  Progress Note Due: 9/3/2022  Visit # / Visits authorized: 3/16      Precautions: Standard and Fall    PTA Visit #: 0/5     Time In: 0915  Time Out: 1010  Total Billable Time: 45 minutes 1 on 1    SUBJECTIVE     Pt reports: reduced pain complaints since last treatment. She reports pain is still worse in the am.   She was compliant with home exercise program. She reports some increased cramping at L anterior ankle.   Response to previous treatment: Some temporary soreness.   Functional change:No change observed.     Reviewed contraindications with pt to ensure aquatic therapy is appropriate. Per patient's review of contraindications and verbal report of no pertinent medical hx, patient is cleared for attending skilled aquatic physical therapy.     Pain: 7/10 with NAD observed.   Location: L lumbar and LLE      OBJECTIVE     Objective Measures updated at progress report unless specified.       Treatment     Amanda received aquatic therapeutic exercises to develop strength, endurance, ROM, flexibility, posture, and core stabilization for 55 minutes including:    FUNCTIONAL MOBILITY TRAINING x 2 laps each at beginning and 1 lap each at end of session  Walk forward/backward/lateral    STRETCHES 2 x 30sec  HS  Calf  Hip  adductor    LE EX x 30  Squat  Heel raise with gluteal set  Hip abduction/adduction  Hip flex/ext  HS curl  LAQ  High knee march with kick board  Step ups fwd x20    UE EX/CORE  x 20  Shoulder flex/extTA activation paddles open  Shoulder horizontal abd/add TA activation paddles open  Mini squat with push/pull red kickboard    ENDURANCE  Bicycle in // bars 3'  LTR 2'  DKTC 2'      Patient Education and Home Exercises     Home Exercises Provided and Patient Education Provided     Education provided:   Role of aquatic therapy  Hydration post therapy    Written Home Exercises Provided: Patient instructed to cont prior HEP. Exercises were reviewed and Amanda was able to demonstrate them prior to the end of the session.  Amanda demonstrated good  understanding of the education provided. See EMR under Patient Instructions for exercises provided during therapy sessions    ASSESSMENT     Patient tolerated treatment very well with reduced pain complaints. Increased LE rep count to 30 ea.    Amanda Is progressing well towards her goals.   Pt prognosis is Good.     Pt will continue to benefit from skilled outpatient physical therapy to address the deficits listed in the problem list box on initial evaluation, provide pt/family education and to maximize pt's level of independence in the home and community environment.     Pt's spiritual, cultural and educational needs considered and pt agreeable to plan of care and goals.     Anticipated barriers to physical therapy: None.     Goals:  Short Term Goals: 4 weeks   1. Patient will be independent in HEP & progressions.  2. The patient will achieve 5 sit to stand score of < / = 18 seconds to demonstrate improved transfers and endurance.   3. Patient will subjectively report ability to stand and cook for > / = 10 minutes to demonstrate improved functional strength and endurance required to complete ADLs around the home independently.     Long Term Goals: 8 weeks   1. The patient  will demonstrate independence with extensive HEP.  2. Patent is able to demonstrate MMT > / = 4/5 in BLE where limited and without pain reports during testing.   3. Patient will subjectively report ability to walk > / = 15 minutes with report of decreased LLE numbness to demonstrate improved functional BLE strength required to perform recreational activities and be able to navigate her environment safely.     PLAN     Continue POC.  Outpatient Physical Therapy 1-2 times weekly for 8 weeks to include the following interventions: manual therapy, aquatic therapy, patient education, therapeutic exercise, therapeutic activities.     Patient may be seen by PTA as part of rehabilitation team.    Jayden Winslow, PT

## 2022-08-17 ENCOUNTER — PATIENT MESSAGE (OUTPATIENT)
Dept: PAIN MEDICINE | Facility: OTHER | Age: 72
End: 2022-08-17
Payer: MEDICARE

## 2022-08-17 DIAGNOSIS — M54.16 LUMBAR RADICULOPATHY: Primary | ICD-10-CM

## 2022-08-22 ENCOUNTER — OFFICE VISIT (OUTPATIENT)
Dept: FAMILY MEDICINE | Facility: CLINIC | Age: 72
End: 2022-08-22
Payer: MEDICARE

## 2022-08-22 VITALS
HEIGHT: 64 IN | OXYGEN SATURATION: 97 % | HEART RATE: 78 BPM | TEMPERATURE: 97 F | BODY MASS INDEX: 44.79 KG/M2 | DIASTOLIC BLOOD PRESSURE: 78 MMHG | WEIGHT: 262.38 LBS | SYSTOLIC BLOOD PRESSURE: 130 MMHG | RESPIRATION RATE: 16 BRPM

## 2022-08-22 DIAGNOSIS — M54.16 LUMBAR RADICULOPATHY: Primary | ICD-10-CM

## 2022-08-22 DIAGNOSIS — G47.33 OSA (OBSTRUCTIVE SLEEP APNEA): ICD-10-CM

## 2022-08-22 DIAGNOSIS — M48.00 SPINAL STENOSIS, UNSPECIFIED SPINAL REGION: ICD-10-CM

## 2022-08-22 DIAGNOSIS — I10 HYPERTENSION, UNSPECIFIED TYPE: ICD-10-CM

## 2022-08-22 PROCEDURE — 99214 OFFICE O/P EST MOD 30 MIN: CPT | Mod: S$GLB,,, | Performed by: FAMILY MEDICINE

## 2022-08-22 PROCEDURE — 3008F PR BODY MASS INDEX (BMI) DOCUMENTED: ICD-10-PCS | Mod: CPTII,S$GLB,, | Performed by: FAMILY MEDICINE

## 2022-08-22 PROCEDURE — 3288F PR FALLS RISK ASSESSMENT DOCUMENTED: ICD-10-PCS | Mod: CPTII,S$GLB,, | Performed by: FAMILY MEDICINE

## 2022-08-22 PROCEDURE — 3008F BODY MASS INDEX DOCD: CPT | Mod: CPTII,S$GLB,, | Performed by: FAMILY MEDICINE

## 2022-08-22 PROCEDURE — 4010F PR ACE/ARB THEARPY RXD/TAKEN: ICD-10-PCS | Mod: CPTII,S$GLB,, | Performed by: FAMILY MEDICINE

## 2022-08-22 PROCEDURE — 99999 PR PBB SHADOW E&M-EST. PATIENT-LVL V: ICD-10-PCS | Mod: PBBFAC,,, | Performed by: FAMILY MEDICINE

## 2022-08-22 PROCEDURE — 3078F DIAST BP <80 MM HG: CPT | Mod: CPTII,S$GLB,, | Performed by: FAMILY MEDICINE

## 2022-08-22 PROCEDURE — 99999 PR PBB SHADOW E&M-EST. PATIENT-LVL V: CPT | Mod: PBBFAC,,, | Performed by: FAMILY MEDICINE

## 2022-08-22 PROCEDURE — 3288F FALL RISK ASSESSMENT DOCD: CPT | Mod: CPTII,S$GLB,, | Performed by: FAMILY MEDICINE

## 2022-08-22 PROCEDURE — 3075F PR MOST RECENT SYSTOLIC BLOOD PRESS GE 130-139MM HG: ICD-10-PCS | Mod: CPTII,S$GLB,, | Performed by: FAMILY MEDICINE

## 2022-08-22 PROCEDURE — 1159F PR MEDICATION LIST DOCUMENTED IN MEDICAL RECORD: ICD-10-PCS | Mod: CPTII,S$GLB,, | Performed by: FAMILY MEDICINE

## 2022-08-22 PROCEDURE — 4010F ACE/ARB THERAPY RXD/TAKEN: CPT | Mod: CPTII,S$GLB,, | Performed by: FAMILY MEDICINE

## 2022-08-22 PROCEDURE — 3078F PR MOST RECENT DIASTOLIC BLOOD PRESSURE < 80 MM HG: ICD-10-PCS | Mod: CPTII,S$GLB,, | Performed by: FAMILY MEDICINE

## 2022-08-22 PROCEDURE — 1125F PR PAIN SEVERITY QUANTIFIED, PAIN PRESENT: ICD-10-PCS | Mod: CPTII,S$GLB,, | Performed by: FAMILY MEDICINE

## 2022-08-22 PROCEDURE — 99214 PR OFFICE/OUTPT VISIT, EST, LEVL IV, 30-39 MIN: ICD-10-PCS | Mod: S$GLB,,, | Performed by: FAMILY MEDICINE

## 2022-08-22 PROCEDURE — 1125F AMNT PAIN NOTED PAIN PRSNT: CPT | Mod: CPTII,S$GLB,, | Performed by: FAMILY MEDICINE

## 2022-08-22 PROCEDURE — 1101F PR PT FALLS ASSESS DOC 0-1 FALLS W/OUT INJ PAST YR: ICD-10-PCS | Mod: CPTII,S$GLB,, | Performed by: FAMILY MEDICINE

## 2022-08-22 PROCEDURE — 3075F SYST BP GE 130 - 139MM HG: CPT | Mod: CPTII,S$GLB,, | Performed by: FAMILY MEDICINE

## 2022-08-22 PROCEDURE — 1159F MED LIST DOCD IN RCRD: CPT | Mod: CPTII,S$GLB,, | Performed by: FAMILY MEDICINE

## 2022-08-22 PROCEDURE — 1101F PT FALLS ASSESS-DOCD LE1/YR: CPT | Mod: CPTII,S$GLB,, | Performed by: FAMILY MEDICINE

## 2022-08-22 RX ORDER — VALSARTAN 160 MG/1
160 TABLET ORAL DAILY
Qty: 90 TABLET | Refills: 3 | Status: SHIPPED | OUTPATIENT
Start: 2022-08-22 | End: 2023-02-15

## 2022-08-22 RX ORDER — AMITRIPTYLINE HYDROCHLORIDE 100 MG/1
100 TABLET ORAL NIGHTLY
Qty: 90 TABLET | Refills: 1 | Status: SHIPPED | OUTPATIENT
Start: 2022-08-22 | End: 2022-12-20 | Stop reason: SDUPTHER

## 2022-08-22 RX ORDER — TRAMADOL HYDROCHLORIDE 50 MG/1
50 TABLET ORAL EVERY 6 HOURS PRN
Qty: 120 TABLET | Refills: 2 | Status: SHIPPED | OUTPATIENT
Start: 2022-08-22 | End: 2022-09-21

## 2022-08-22 NOTE — PROGRESS NOTES
Chief Complaint   Patient presents with    Hypertension    Back Pain    Edema       HPI    Amanda Holt is a 71 y.o. female  presenting for follow-up for chronic back pain    Chronic back pain  She has been doing a lot more mopping and sweeping  Has had worse pain over the past few days  Pain is 9/10 and she is been laying in bed  She does take the ultram, diclofenac, zanaflex and elavil  She may take extra zanaflex over the past few days and it improves pain from 6/10  Pain is left sided and radiates, she is going to PT  She has been approved for aquatherapy  She is trying to lose weight      insomnia  She has ANTONIA and has not used her CPAP machine in 2 weeks  She finds it hard to tolerate CPAP  She is taking elavil along with zanaflex and her ultram, sometimes she takes extra zanaflex so she can sleep, she may take all of her medicines together but in 4 hours she is up in pain    ROS*  Review of Systems   Constitutional: Negative for chills, diaphoresis, fatigue, fever and unexpected weight change.   HENT: Negative for rhinorrhea, sinus pressure, sore throat and tinnitus.    Eyes: Negative for photophobia and visual disturbance.   Respiratory: Negative for cough, shortness of breath and wheezing.    Cardiovascular: Negative for chest pain and palpitations.   Gastrointestinal: Negative for abdominal pain, blood in stool, constipation, diarrhea, nausea and vomiting.   Genitourinary: Negative for dysuria, flank pain, frequency and vaginal discharge.   Musculoskeletal: Positive for arthralgias and back pain. Negative for joint swelling.   Skin: Negative for rash.   Neurological: Negative for speech difficulty, weakness, light-headedness and headaches.   Psychiatric/Behavioral: Positive for dysphoric mood and sleep disturbance. Negative for behavioral problems.         Physical Exam  Vitals:    08/22/22 1103   BP: 130/78   Pulse: 78   Resp: 16   Temp: 97 °F (36.1 °C)   TempSrc: Oral   SpO2: 97%   Weight:  "119 kg (262 lb 5.6 oz)   Height: 5' 4" (1.626 m)    Body mass index is 45.03 kg/m².  Weight: 119 kg (262 lb 5.6 oz)   Height: 5' 4" (162.6 cm)     Physical Exam  Vitals and nursing note reviewed.   Constitutional:       Appearance: She is well-developed.   Cardiovascular:      Comments: Bilateral ankle edema  Skin:     General: Skin is warm and dry.      Findings: No erythema or rash.   Neurological:      Mental Status: She is alert and oriented to person, place, and time.   Psychiatric:         Behavior: Behavior normal.         ALLERGIES AND MEDICATIONS: updated and reviewed.  Review of patient's allergies indicates:   Allergen Reactions    Codeine      Medication List with Changes/Refills   New Medications    AMITRIPTYLINE (ELAVIL) 100 MG TABLET    Take 1 tablet (100 mg total) by mouth every evening.    VALSARTAN (DIOVAN) 160 MG TABLET    Take 1 tablet (160 mg total) by mouth once daily.   Current Medications    ASPIRIN (ECOTRIN) 81 MG EC TABLET        CLOTRIMAZOLE-BETAMETHASONE 1-0.05% (LOTRISONE) CREAM    Apply topically 2 (two) times daily.    DICLOFENAC SODIUM (VOLTAREN) 1 % GEL    APPLY 2 GRAMS TOPICALLY TO AFFECTED AREA 4 TIMES DAILY    FLUTICASONE PROPIONATE (FLONASE) 50 MCG/ACTUATION NASAL SPRAY    1 spray (50 mcg total) by Each Nostril route once daily.    INV HYDROCHLOROTHIAZIDE 25MG TABLET        MUPIROCIN (BACTROBAN) 2 % OINTMENT    Apply topically 3 (three) times daily.    NYSTATIN (MYCOSTATIN) POWDER    Apply topically 4 (four) times daily.    OMEPRAZOLE (PRILOSEC) 40 MG CAPSULE    Take 1 capsule by mouth once daily    PRAVASTATIN (PRAVACHOL) 40 MG TABLET    Take 1 tablet (40 mg total) by mouth once daily.    SPIRONOLACTONE (ALDACTONE) 25 MG TABLET    Take 1 tablet by mouth once daily    TIZANIDINE (ZANAFLEX) 4 MG TABLET    TAKE 1 TABLET BY MOUTH EVERY 6 HOURS AS NEEDED    TRIAMCINOLONE ACETONIDE 0.1% (KENALOG) 0.1 % OINTMENT    AAA bid    TRIAMCINOLONE ACETONIDE 0.1%-CICLOPIROX-MAGNESIUM HYDROXIDE " 400 MG/5 ML    Apply to affected area twice a day after cool blow dry   Changed and/or Refilled Medications    Modified Medication Previous Medication    TRAMADOL (ULTRAM) 50 MG TABLET traMADoL (ULTRAM) 50 mg tablet       Take 1 tablet (50 mg total) by mouth every 6 (six) hours as needed for Pain.    Take 1 tablet (50 mg total) by mouth every 12 (twelve) hours as needed for Pain.   Discontinued Medications    AMITRIPTYLINE (ELAVIL) 50 MG TABLET    TAKE 1 TABLET BY MOUTH AT BEDTIME    AMLODIPINE (NORVASC) 10 MG TABLET    Take 1 tablet (10 mg total) by mouth once daily.    HYDROXYZINE HCL (ATARAX) 25 MG TABLET    Take 1 tablet (25 mg total) by mouth 3 (three) times daily as needed.    LOSARTAN (COZAAR) 100 MG TABLET    Take 1 tablet (100 mg total) by mouth once daily.    SPIRONOLACTONE (ALDACTONE) 25 MG TABLET    Take 1 tablet (25 mg total) by mouth once daily.    TRAZODONE (DESYREL) 100 MG TABLET    Take 1 tablet (100 mg total) by mouth nightly as needed for Insomnia.       Assessment & Plan  1. Lumbar radiculopathy    2. Spinal stenosis, unspecified spinal region    3. Hypertension, unspecified type    4. ANTONIA (obstructive sleep apnea)        Problem List Items Addressed This Visit        Neuro    Lumbar radiculopathy - Primary  I recommend we increase her elavil to 100mg to improve sleep quality and pain    Relevant Medications    amitriptyline (ELAVIL) 100 MG tablet    Other Relevant Orders    Ambulatory referral/consult to Sleep Disorders    Spinal stenosis  She will start aquatherapy  We are going to increase the quantity of tramadol so she can take it 4x daily as needed for pain      Relevant Medications    traMADoL (ULTRAM) 50 mg tablet       Cardiac/Vascular    Hypertension  She is having LE edema, d/c amlodipine and begin valsartan  D/c losartan    Relevant Medications    valsartan (DIOVAN) 160 MG tablet      Other Visit Diagnoses     ANTONIA (obstructive sleep apnea)      Encourage her to use CPAP, trial of 2  hours a night, hoping this will be longer with better pain control  Will refer to sleep medicine as her machine is older and may need to be replaced    Relevant Orders    Ambulatory referral/consult to Sleep Disorders          Follow up in about 3 months (around 11/22/2022).       Simona Torres MD

## 2022-08-23 ENCOUNTER — CLINICAL SUPPORT (OUTPATIENT)
Dept: REHABILITATION | Facility: HOSPITAL | Age: 72
End: 2022-08-23
Payer: MEDICARE

## 2022-08-23 DIAGNOSIS — M54.40 CHRONIC LEFT-SIDED LOW BACK PAIN WITH SCIATICA, SCIATICA LATERALITY UNSPECIFIED: ICD-10-CM

## 2022-08-23 DIAGNOSIS — Z74.09 DECREASED FUNCTIONAL MOBILITY AND ENDURANCE: Primary | ICD-10-CM

## 2022-08-23 DIAGNOSIS — M51.36 DDD (DEGENERATIVE DISC DISEASE), LUMBAR: ICD-10-CM

## 2022-08-23 DIAGNOSIS — G89.29 CHRONIC LEFT-SIDED LOW BACK PAIN WITH SCIATICA, SCIATICA LATERALITY UNSPECIFIED: ICD-10-CM

## 2022-08-23 PROCEDURE — 97113 AQUATIC THERAPY/EXERCISES: CPT

## 2022-08-23 NOTE — PROGRESS NOTES
[facility]   Physical Therapy Treatment Note     Name: Amanda Arredondo [facility] Number: [id number]    Therapy Diagnosis:   Encounter Diagnoses   Name Primary?    Decreased functional mobility and endurance Yes    DDD (degenerative disc disease), lumbar     Chronic left-sided low back pain with sciatica, sciatica laterality unspecified      Physician: Simona Torres MD    Visit Date: 8/23/2022    Physician Orders: PT Eval and Treat  Medical Diagnosis from Referral:  M25.561,M25.562 (ICD-10-CM) - Pain in both knees, unspecified chronicity   M48.00 (ICD-10-CM) - Spinal stenosis, unspecified spinal region   Evaluation Date: 8/3/2022  Authorization Period Expiration: 8/31/2022 (eval)  Plan of Care Expiration: 10/7/2022  Progress Note Due: 9/3/2022  Visit # / Visits authorized: 4/16      Precautions: Standard and Fall    PTA Visit #: 0/5     Time In: 0915  Time Out: 1025  Total Billable Time: 30    SUBJECTIVE     Pt reports: moderate L LBP and LLE pain this morning.   Response to previous treatment: Some temporary soreness.   Functional change:No change observed.     Reviewed contraindications with pt to ensure aquatic therapy is appropriate. Per patient's review of contraindications and verbal report of no pertinent medical hx, patient is cleared for attending skilled aquatic physical therapy.     Pain: 7/10 with NAD observed.   Location: L lumbar and LLE      OBJECTIVE     Objective Measures updated at progress report unless specified.       Treatment     Amanda received aquatic therapeutic exercises to develop strength, endurance, ROM, flexibility, posture, and core stabilization for 60 minutes including:    FUNCTIONAL MOBILITY TRAINING x 2 laps each at beginning and 1 lap each at end of session  Walk forward/backward/lateral    STRETCHES 2 x 30sec  HS  Calf  Hip adductor    LE EX x 30  Squat  Heel raise / toe raise  Hip abduction/adduction  Hip flex/ext  HS curl  LAQ  High knee  march with kick board 2'  Step ups fwd x20    UE EX/CORE  x 20  Shoulder flex/extTA activation paddles open  Shoulder horizontal abd/add TA activation paddles open  Mini squat with push/pull red kickboard    ENDURANCE  Bicycle in // bars 3'  LTR 2'  DKTC x10      Patient Education and Home Exercises     Home Exercises Provided and Patient Education Provided     Education provided:   Role of aquatic therapy  Hydration post therapy    Written Home Exercises Provided: Patient instructed to cont prior HEP. Exercises were reviewed and Amanda was able to demonstrate them prior to the end of the session.  Amanda demonstrated good  understanding of the education provided. See EMR under Patient Instructions for exercises provided during therapy sessions    ASSESSMENT     Patient tolerated treatment very well with reduced pain complaints. Cueing provided for exercise technique, core stabilization.     Amanda Is progressing well towards her goals.   Pt prognosis is Good.     Pt will continue to benefit from skilled outpatient physical therapy to address the deficits listed in the problem list box on initial evaluation, provide pt/family education and to maximize pt's level of independence in the home and community environment.     Pt's spiritual, cultural and educational needs considered and pt agreeable to plan of care and goals.     Anticipated barriers to physical therapy: None.     Goals:  Short Term Goals: 4 weeks   1. Patient will be independent in HEP & progressions.  2. The patient will achieve 5 sit to stand score of < / = 18 seconds to demonstrate improved transfers and endurance.   3. Patient will subjectively report ability to stand and cook for > / = 10 minutes to demonstrate improved functional strength and endurance required to complete ADLs around the home independently.     Long Term Goals: 8 weeks   1. The patient will demonstrate independence with extensive HEP.  2. Patent is able to demonstrate MMT > / =  4/5 in BLE where limited and without pain reports during testing.   3. Patient will subjectively report ability to walk > / = 15 minutes with report of decreased LLE numbness to demonstrate improved functional BLE strength required to perform recreational activities and be able to navigate her environment safely.     PLAN     Continue POC.  Outpatient Physical Therapy 1-2 times weekly for 8 weeks to include the following interventions: manual therapy, aquatic therapy, patient education, therapeutic exercise, therapeutic activities.     Patient may be seen by PTA as part of rehabilitation team.    Jayden Winslow, PT

## 2022-08-25 ENCOUNTER — CLINICAL SUPPORT (OUTPATIENT)
Dept: REHABILITATION | Facility: HOSPITAL | Age: 72
End: 2022-08-25
Payer: MEDICARE

## 2022-08-25 DIAGNOSIS — Z74.09 DECREASED FUNCTIONAL MOBILITY AND ENDURANCE: Primary | ICD-10-CM

## 2022-08-25 PROCEDURE — 97113 AQUATIC THERAPY/EXERCISES: CPT

## 2022-08-25 NOTE — PROGRESS NOTES
[facility]   Physical Therapy Treatment Note     Name: Amanda Arredondo [facility] Number: [id number]    Therapy Diagnosis:   Encounter Diagnosis   Name Primary?    Decreased functional mobility and endurance Yes     Physician: Simona Torres MD    Visit Date: 8/25/2022    Physician Orders: PT Eval and Treat  Medical Diagnosis from Referral:  M25.561,M25.562 (ICD-10-CM) - Pain in both knees, unspecified chronicity   M48.00 (ICD-10-CM) - Spinal stenosis, unspecified spinal region   Evaluation Date: 8/3/2022  Authorization Period Expiration: 8/31/2022 (eval)  Plan of Care Expiration: 10/7/2022  Progress Note Due: 9/3/2022  Visit # / Visits authorized: 5/16      Precautions: Standard and Fall    PTA Visit #: 0/5     Time In: 0905  Time Out: 1005  Total Billable Time: 45 minutes 1 on 1    SUBJECTIVE     Pt reports: moderate L LBP and LLE pain this morning.   Response to previous treatment: Some temporary soreness.   Functional change:No change observed.     Reviewed contraindications with pt to ensure aquatic therapy is appropriate. Per patient's review of contraindications and verbal report of no pertinent medical hx, patient is cleared for attending skilled aquatic physical therapy.     Pain: 6/10 with NAD observed. This has been reducing fairly consistently.   Location: L lumbar and LLE      OBJECTIVE     Objective Measures updated at progress report unless specified.       Treatment     Amanda received aquatic therapeutic exercises to develop strength, endurance, ROM, flexibility, posture, and core stabilization for 60 minutes including:    FUNCTIONAL MOBILITY TRAINING x 2 laps each at beginning and 1 lap each at end of session  Walk forward/backward/lateral    STRETCHES 2 x 30sec  HS  Calf  Hip adductor    LE EX x 30  Squat  Heel raise / toe raise  Hip abduction/adduction  Hip flex/ext  HS curl  LAQ  High knee march with kick board 2'  Step ups fwd x20    UE EX/CORE  x 20  Shoulder  flex/extTA activation paddles open  Shoulder horizontal abd/add TA activation paddles open  Mini squat with push/pull red kickboard    ENDURANCE  Bicycle in // bars 3'  LTR 2'  DKTC x10      Patient Education and Home Exercises     Home Exercises Provided and Patient Education Provided     Education provided:   Role of aquatic therapy  Hydration post therapy    Written Home Exercises Provided: Patient instructed to cont prior HEP. Exercises were reviewed and Amanda was able to demonstrate them prior to the end of the session.  Amanda demonstrated good  understanding of the education provided. See EMR under Patient Instructions for exercises provided during therapy sessions    ASSESSMENT     Patient tolerated treatment very well with reduced pain complaints. Cueing provided for exercise technique, core stabilization.     Amanda Is progressing well towards her goals.   Pt prognosis is Good.     Pt will continue to benefit from skilled outpatient physical therapy to address the deficits listed in the problem list box on initial evaluation, provide pt/family education and to maximize pt's level of independence in the home and community environment.     Pt's spiritual, cultural and educational needs considered and pt agreeable to plan of care and goals.     Anticipated barriers to physical therapy: None.     Goals:  Short Term Goals: 4 weeks   1. Patient will be independent in HEP & progressions.  2. The patient will achieve 5 sit to stand score of < / = 18 seconds to demonstrate improved transfers and endurance.   3. Patient will subjectively report ability to stand and cook for > / = 10 minutes to demonstrate improved functional strength and endurance required to complete ADLs around the home independently.     Long Term Goals: 8 weeks   1. The patient will demonstrate independence with extensive HEP.  2. Patent is able to demonstrate MMT > / = 4/5 in BLE where limited and without pain reports during testing.   3.  Patient will subjectively report ability to walk > / = 15 minutes with report of decreased LLE numbness to demonstrate improved functional BLE strength required to perform recreational activities and be able to navigate her environment safely.     PLAN     Continue POC.  Outpatient Physical Therapy 1-2 times weekly for 8 weeks to include the following interventions: manual therapy, aquatic therapy, patient education, therapeutic exercise, therapeutic activities.     Patient may be seen by PTA as part of rehabilitation team.    Jayden Winslow, PT

## 2022-08-29 ENCOUNTER — HOSPITAL ENCOUNTER (OUTPATIENT)
Facility: OTHER | Age: 72
Discharge: HOME | End: 2022-08-29
Attending: ANESTHESIOLOGY | Admitting: ANESTHESIOLOGY
Payer: MEDICARE

## 2022-08-29 VITALS
WEIGHT: 256 LBS | HEART RATE: 83 BPM | HEIGHT: 64 IN | BODY MASS INDEX: 43.71 KG/M2 | OXYGEN SATURATION: 98 % | TEMPERATURE: 98 F | SYSTOLIC BLOOD PRESSURE: 120 MMHG | DIASTOLIC BLOOD PRESSURE: 89 MMHG | RESPIRATION RATE: 16 BRPM

## 2022-08-29 DIAGNOSIS — M51.37 DDD (DEGENERATIVE DISC DISEASE), LUMBOSACRAL: Primary | ICD-10-CM

## 2022-08-29 DIAGNOSIS — M47.27 SPONDYLOSIS OF LUMBOSACRAL SPINE WITH RADICULOPATHY: ICD-10-CM

## 2022-08-29 DIAGNOSIS — G89.29 CHRONIC PAIN: ICD-10-CM

## 2022-08-29 PROCEDURE — 25500020 PHARM REV CODE 255: Performed by: ANESTHESIOLOGY

## 2022-08-29 PROCEDURE — 25000003 PHARM REV CODE 250: Performed by: STUDENT IN AN ORGANIZED HEALTH CARE EDUCATION/TRAINING PROGRAM

## 2022-08-29 PROCEDURE — 25000003 PHARM REV CODE 250: Performed by: ANESTHESIOLOGY

## 2022-08-29 PROCEDURE — 64483 NJX AA&/STRD TFRM EPI L/S 1: CPT | Mod: LT | Performed by: ANESTHESIOLOGY

## 2022-08-29 PROCEDURE — 64484 NJX AA&/STRD TFRM EPI L/S EA: CPT | Mod: LT | Performed by: ANESTHESIOLOGY

## 2022-08-29 PROCEDURE — 64484 NJX AA&/STRD TFRM EPI L/S EA: CPT | Mod: LT,,, | Performed by: ANESTHESIOLOGY

## 2022-08-29 PROCEDURE — 64483 PR EPIDURAL INJ, ANES/STEROID, TRANSFORAMINAL, LUMB/SACR, SNGL LEVL: ICD-10-PCS | Mod: LT,,, | Performed by: ANESTHESIOLOGY

## 2022-08-29 PROCEDURE — 64483 NJX AA&/STRD TFRM EPI L/S 1: CPT | Mod: LT,,, | Performed by: ANESTHESIOLOGY

## 2022-08-29 PROCEDURE — 63600175 PHARM REV CODE 636 W HCPCS: Performed by: ANESTHESIOLOGY

## 2022-08-29 PROCEDURE — 64484 PRA INJECT ANES/STEROID FORAMEN LUMBAR/SACRAL W IMG GUIDE ,EA ADD LEVEL: ICD-10-PCS | Mod: LT,,, | Performed by: ANESTHESIOLOGY

## 2022-08-29 RX ORDER — MIDAZOLAM HYDROCHLORIDE 1 MG/ML
INJECTION INTRAMUSCULAR; INTRAVENOUS
Status: DISCONTINUED | OUTPATIENT
Start: 2022-08-29 | End: 2022-08-29 | Stop reason: HOSPADM

## 2022-08-29 RX ORDER — LIDOCAINE HYDROCHLORIDE 10 MG/ML
INJECTION, SOLUTION EPIDURAL; INFILTRATION; INTRACAUDAL; PERINEURAL
Status: DISCONTINUED | OUTPATIENT
Start: 2022-08-29 | End: 2022-08-29 | Stop reason: HOSPADM

## 2022-08-29 RX ORDER — LIDOCAINE HYDROCHLORIDE 20 MG/ML
INJECTION, SOLUTION INFILTRATION; PERINEURAL
Status: DISCONTINUED | OUTPATIENT
Start: 2022-08-29 | End: 2022-08-29 | Stop reason: HOSPADM

## 2022-08-29 RX ORDER — FENTANYL CITRATE 50 UG/ML
INJECTION, SOLUTION INTRAMUSCULAR; INTRAVENOUS
Status: DISCONTINUED | OUTPATIENT
Start: 2022-08-29 | End: 2022-08-29 | Stop reason: HOSPADM

## 2022-08-29 RX ORDER — DEXAMETHASONE SODIUM PHOSPHATE 10 MG/ML
INJECTION INTRAMUSCULAR; INTRAVENOUS
Status: DISCONTINUED | OUTPATIENT
Start: 2022-08-29 | End: 2022-08-29 | Stop reason: HOSPADM

## 2022-08-29 RX ORDER — SODIUM CHLORIDE 9 MG/ML
500 INJECTION, SOLUTION INTRAVENOUS CONTINUOUS
Status: DISCONTINUED | OUTPATIENT
Start: 2022-08-29 | End: 2022-08-29 | Stop reason: HOSPADM

## 2022-08-29 NOTE — DISCHARGE INSTRUCTIONS

## 2022-08-29 NOTE — OP NOTE
Lumbar Transforaminal Epidural Steroid Injection under Fluoroscopic Guidance    The procedure, risks, benefits, and options were discussed with the patient. There are no contraindications to the procedure. The patent expressed understanding and agreed to the procedure. Informed written consent was obtained prior to the start of the procedure and can be found in the patient's chart.    PATIENT NAME: Amanda Holt   MRN: [id number]     DATE OF PROCEDURE: 08/29/2022    PROCEDURE:  Left  L4/5 and L5/S1 Lumbar Transforaminal Epidural Steroid Injection under Fluoroscopic Guidance    PRE-OP DIAGNOSIS: Lumbar radiculopathy [M54.16] Lumbar radiculopathy [M54.16]    POST-OP DIAGNOSIS: Same    PHYSICIAN: Darya Ayala MD    ASSISTANTS: Epifanio Franklin MD [employer] Pain Fellow      MEDICATIONS INJECTED: Preservative-free Decadron 10mg with 5cc of Lidocaine 1% MPF     LOCAL ANESTHETIC INJECTED: Xylocaine 2%     SEDATION: Versed 2mg and Fentanyl 50mcg                                                                                                                                                                                     Conscious sedation ordered by M.D. Patient re-evaluation prior to administration of conscious sedation. No changes noted in patient's status from initial evaluation. The patient's vital signs were monitored by RN and patient remained hemodynamically stable throughout the procedure.    Event Time In   Sedation Start 1436   Sedation End 1444       ESTIMATED BLOOD LOSS: None    COMPLICATIONS: None    TECHNIQUE: Time-out was performed to identify the patient and procedure to be performed. With the patient laying in a prone position, the surgical area was prepped and draped in the usual sterile fashion using ChloraPrep and a fenestrated drape.The levels were determined under fluoroscopy guidance. Skin anesthesia was achieved by injecting Lidocaine 2% over the injection sites. The transforaminal spaces were  then approached with a 22 gauge, 5 inch spinal quinke needle that was introduced under fluoroscopic guidance in the AP and Lateral views. Once the needle tip was in the area of the transforaminal space, and there was no blood, CSF or paraesthesias, contrast dye Omnipaque (240mg/mL) was injected to confirm placement and there was no vascular runoff. Fluoroscopic imaging in the AP and lateral views revealed a clear outline of the spinal nerve with proximal spread of agent through the neural foramen into the epidural space. 3 mL of the medication mixture listed above was injected slowly at each site. Displacement of the radio opaque contrast after injection of the medication confirmed that the medication went into the area of the transforaminal spaces. The needles were removed and bleeding was nil. A sterile dressing was applied. No specimens collected. The patient tolerated the procedure well.     PAIN BEFORE THE PROCEDURE: 10/10    PAIN AFTER THE PROCEDURE: 0/10     The patient was monitored after the procedure in the recovery area. They were given post-procedure and discharge instructions to follow at home. The patient was discharged in a stable condition.        Darya Ayala MD

## 2022-08-29 NOTE — DISCHARGE SUMMARY
Discharge Note  Short Stay      SUMMARY     Admit Date: 8/29/2022    Attending Physician: Darya Ayala      Discharge Physician: Darya Ayala      Discharge Date: 8/29/2022 2:46 PM    Procedure(s) (LRB):  LUMBAR TRANSFORAMINAL LEFT L4/5 AND L5/S1 CONTRAST (Left)    Final Diagnosis: Lumbar radiculopathy [M54.16]    Disposition: Home or self care    Patient Instructions:   Current Discharge Medication List        CONTINUE these medications which have NOT CHANGED    Details   amitriptyline (ELAVIL) 100 MG tablet Take 1 tablet (100 mg total) by mouth every evening.  Qty: 90 tablet, Refills: 1    Associated Diagnoses: Lumbar radiculopathy      aspirin (ECOTRIN) 81 MG EC tablet       clotrimazole-betamethasone 1-0.05% (LOTRISONE) cream Apply topically 2 (two) times daily.  Qty: 45 g, Refills: 1    Associated Diagnoses: Candidal intertrigo      diclofenac sodium (VOLTAREN) 1 % Gel APPLY 2 GRAMS TOPICALLY TO AFFECTED AREA 4 TIMES DAILY  Qty: 100 g, Refills: 5      fluticasone propionate (FLONASE) 50 mcg/actuation nasal spray 1 spray (50 mcg total) by Each Nostril route once daily.  Qty: 16 g, Refills: 5    Associated Diagnoses: Nasal drainage      INV HYDROCHLOROTHIAZIDE 25MG TABLET       mupirocin (BACTROBAN) 2 % ointment Apply topically 3 (three) times daily.  Qty: 22 g, Refills: 0    Associated Diagnoses: Inflamed epidermoid cyst of skin      nystatin (MYCOSTATIN) powder Apply topically 4 (four) times daily.  Qty: 60 g, Refills: 1    Associated Diagnoses: Candidal intertrigo      omeprazole (PRILOSEC) 40 MG capsule Take 1 capsule by mouth once daily  Qty: 90 capsule, Refills: 0    Associated Diagnoses: Gastroesophageal reflux disease without esophagitis      pravastatin (PRAVACHOL) 40 MG tablet Take 1 tablet (40 mg total) by mouth once daily.  Qty: 90 tablet, Refills: 3    Associated Diagnoses: Atherosclerosis of aorta      spironolactone (ALDACTONE) 25 MG tablet Take 1 tablet by mouth once daily  Qty: 90 tablet, Refills:  1    Associated Diagnoses: Low blood potassium      tiZANidine (ZANAFLEX) 4 MG tablet TAKE 1 TABLET BY MOUTH EVERY 6 HOURS AS NEEDED  Qty: 90 tablet, Refills: 1      traMADoL (ULTRAM) 50 mg tablet Take 1 tablet (50 mg total) by mouth every 6 (six) hours as needed for Pain.  Qty: 120 tablet, Refills: 2    Comments: Quantity prescribed more than 7 day supply? Yes, quantity medically necessary  Associated Diagnoses: Spinal stenosis, unspecified spinal region      triamcinolone acetonide 0.1% (KENALOG) 0.1 % ointment AAA bid  Qty: 454 g, Refills: 3    Associated Diagnoses: Subacute dermatitis      triamcinolone acetonide 0.1%-ciclopirox-magnesium hydroxide 400 mg/5 ml Apply to affected area twice a day after cool blow dry  Qty: 60 g, Refills: 5    Comments: Pharmacist: mix 30 grams of TAC 0.1% cream with 30 grams of Loprox cream in 120cc of Milk of Magnesia  Associated Diagnoses: Intertrigo      valsartan (DIOVAN) 160 MG tablet Take 1 tablet (160 mg total) by mouth once daily.  Qty: 90 tablet, Refills: 3    Comments: .  Associated Diagnoses: Hypertension, unspecified type                 Discharge Diagnosis: Lumbar radiculopathy [M54.16]  Condition on Discharge: Stable with no complications to procedure   Diet on Discharge: Same as before.  Activity: as per instruction sheet.  Discharge to: Home with a responsible adult.  Follow up: 2-4 weeks       Please call my office or pager at [phone] if experienced any weakness or loss of sensation, fever > 101.5, pain uncontrolled with oral medications, persistent nausea/vomiting/or diarrhea, redness or drainage from the incisions, or any other worrisome concerns. If physician on call was not reached or could not communicate with our office for any reason please go to the nearest emergency department

## 2022-08-30 ENCOUNTER — PATIENT MESSAGE (OUTPATIENT)
Dept: PAIN MEDICINE | Facility: OTHER | Age: 72
End: 2022-08-30
Payer: MEDICARE

## 2022-09-09 ENCOUNTER — TELEPHONE (OUTPATIENT)
Dept: PAIN MEDICINE | Facility: CLINIC | Age: 72
End: 2022-09-09
Payer: MEDICARE

## 2022-09-09 NOTE — TELEPHONE ENCOUNTER
Good Morning      This is an appointment reminder about your schedule Virtual Visit with Pain Management Provider CINDY Ruiz.   Your appointment is for 09 12, 2022 at 8:40am.     Please log into your [facility] Portal 15 min's prior to your appointment time to e-precheck, verify all corresponding pages, and troubleshoot any problems that may occur. Once your device has been verify as compatible, and you receive the green check,  you must hit/ select the start visit button, This will notify your provider that you are ready to start the Virtual Video Visit.     As [facility] is a [facility] [place], your visit may be started with a resident or a fellow that is rounding in clinic, then proceeded by your physician.    Once logged on, please allow the provider 20 -30 mins to check-in, in case running behind.       If you experience any technical issue please contact [phone]        Thank You for entrusting [facility] to handle your care        Pt verbalized understanding has confirmed appt

## 2022-09-12 ENCOUNTER — OFFICE VISIT (OUTPATIENT)
Dept: PAIN MEDICINE | Facility: CLINIC | Age: 72
End: 2022-09-12
Payer: MEDICARE

## 2022-09-12 ENCOUNTER — TELEPHONE (OUTPATIENT)
Dept: NEPHROLOGY | Facility: CLINIC | Age: 72
End: 2022-09-12
Payer: MEDICARE

## 2022-09-12 DIAGNOSIS — M54.16 LUMBAR RADICULOPATHY: Primary | ICD-10-CM

## 2022-09-12 DIAGNOSIS — M47.27 SPONDYLOSIS OF LUMBOSACRAL SPINE WITH RADICULOPATHY: ICD-10-CM

## 2022-09-12 DIAGNOSIS — M51.37 DDD (DEGENERATIVE DISC DISEASE), LUMBOSACRAL: ICD-10-CM

## 2022-09-12 PROCEDURE — 4010F ACE/ARB THERAPY RXD/TAKEN: CPT | Mod: CPTII,95,, | Performed by: NURSE PRACTITIONER

## 2022-09-12 PROCEDURE — 1160F RVW MEDS BY RX/DR IN RCRD: CPT | Mod: CPTII,95,, | Performed by: NURSE PRACTITIONER

## 2022-09-12 PROCEDURE — 1159F PR MEDICATION LIST DOCUMENTED IN MEDICAL RECORD: ICD-10-PCS | Mod: CPTII,95,, | Performed by: NURSE PRACTITIONER

## 2022-09-12 PROCEDURE — 1160F PR REVIEW ALL MEDS BY PRESCRIBER/CLIN PHARMACIST DOCUMENTED: ICD-10-PCS | Mod: CPTII,95,, | Performed by: NURSE PRACTITIONER

## 2022-09-12 PROCEDURE — 1159F MED LIST DOCD IN RCRD: CPT | Mod: CPTII,95,, | Performed by: NURSE PRACTITIONER

## 2022-09-12 PROCEDURE — 4010F PR ACE/ARB THEARPY RXD/TAKEN: ICD-10-PCS | Mod: CPTII,95,, | Performed by: NURSE PRACTITIONER

## 2022-09-12 PROCEDURE — 99213 OFFICE O/P EST LOW 20 MIN: CPT | Mod: 95,,, | Performed by: NURSE PRACTITIONER

## 2022-09-12 PROCEDURE — 99213 PR OFFICE/OUTPT VISIT, EST, LEVL III, 20-29 MIN: ICD-10-PCS | Mod: 95,,, | Performed by: NURSE PRACTITIONER

## 2022-09-12 NOTE — TELEPHONE ENCOUNTER
----- Message from Bhavna Dev sent at 9/12/2022  2:19 PM CDT -----  Name Of Caller: Amanda            Provider Name:Mihir Tran            Does patient feel the need to be seen today?NO            Relationship to the Pt?:PT            Contact Preference?:[phone]            What is the nature of the call?: Pt would like to schedule an appt with the provider. She would like to establish care.
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
## 2022-09-27 ENCOUNTER — CLINICAL SUPPORT (OUTPATIENT)
Dept: REHABILITATION | Facility: HOSPITAL | Age: 72
End: 2022-09-27
Payer: MEDICARE

## 2022-09-27 DIAGNOSIS — M54.16 LUMBAR RADICULOPATHY: ICD-10-CM

## 2022-09-27 DIAGNOSIS — Z74.09 DECREASED FUNCTIONAL MOBILITY AND ENDURANCE: Primary | ICD-10-CM

## 2022-09-27 DIAGNOSIS — M54.40 CHRONIC LEFT-SIDED LOW BACK PAIN WITH SCIATICA, SCIATICA LATERALITY UNSPECIFIED: ICD-10-CM

## 2022-09-27 DIAGNOSIS — G89.29 CHRONIC LEFT-SIDED LOW BACK PAIN WITH SCIATICA, SCIATICA LATERALITY UNSPECIFIED: ICD-10-CM

## 2022-09-27 PROCEDURE — 97113 AQUATIC THERAPY/EXERCISES: CPT

## 2022-09-27 NOTE — PROGRESS NOTES
"[facility]   Physical Therapy Treatment Note and Updated Plan of Care     Name: Amanda Arredondo [facility] Number: [id number]    Therapy Diagnosis:   Encounter Diagnoses   Name Primary?    Decreased functional mobility and endurance Yes    Lumbar radiculopathy     Chronic left-sided low back pain with sciatica, sciatica laterality unspecified      Physician: Simona Torres MD    Visit Date: 2022    Physician Orders: PT Eval and Treat  Medical Diagnosis from Referral:  M25.561,M25.562 (ICD-10-CM) - Pain in both knees, unspecified chronicity   M48.00 (ICD-10-CM) - Spinal stenosis, unspecified spinal region   Evaluation Date: 8/3/2022  Authorization Period Expiration: 2022 (eval)  Plan of Care Expiration: 10/7/2022; requesting extension to 10/27/2022  Progress Note Due: Last completed 2022. New due date 10/27/2022  Visit # / Visits authorized:       Precautions: Standard and Fall    PTA Visit #: 0/5     Time In: 0910  Time Out: 1015  Total Billable Time: 60 minutes.    SUBJECTIVE     Pt reports: Pt states that she is feeling "pretty good" this morning. Pt reports less LLE N/T since epidural injection on 2022. She is reporting improved standing tolerance since her injection.     Response to previous treatment: Some temporary soreness.   Functional change:Improving standing tolerance.     Reviewed contraindications with pt to ensure aquatic therapy is appropriate. Per patient's review of contraindications and verbal report of no pertinent medical hx, patient is cleared for attending skilled aquatic physical therapy.     Pain: 5/10 with NAD observed. This has been reducing fairly consistently.   Location: L lumbar and LLE      OBJECTIVE     TU sec using no AD     5 Times Sit to Stand: 14 sec using BUE support on thighs. Significant improvement.      Posture: mild flatback t/o spine     Gait: limited hip extension bilaterally     Lumbar Range of Motion: % " limitations    Degrees Pain   Flexion 25% Mild pulling         Extension 25%    Increased          Left Side Bending 25% Mild pulling         Right Side Bending 25% Mild pulling         Left rotation    25% Nothing to report         Right Rotation    25% Nothing to report            Lower Extremity Strength  Right LE   Left LE     Knee extension: 4/5 Knee extension: 4/5   Knee flexion: 4/5 Knee flexion: 4-/5   Hip flexion: 4/5 Hip flexion: 4-/5   Ankle dorsiflexion: 4/5 Ankle dorsiflexion: 4/5   Ankle plantarflexion: 4/5 Ankle plantarflexion: 4/5       Treatment     Amanda received aquatic therapeutic exercises to develop strength, endurance, ROM, flexibility, posture, and core stabilization for 60 minutes including:    FUNCTIONAL MOBILITY TRAINING x 2 laps each at beginning and 1 lap each at end of session  Walk forward/backward/lateral    STRETCHES 2 x 30sec  HS  Calf  Hip adductor    LE EX x 30  Squat  Heel raise / toe raise  Hip abduction/adduction  Hip flex/ext  HS curl  LAQ  High knee march with kick board 2'  Step ups fwd x20    UE EX/CORE  x 20  Shoulder flex/extTA activation paddles closed  Shoulder horizontal abd/add TA activation paddles closed  Mini squat with push/pull red kickboard    ENDURANCE  Bicycle in // bars 3'  LTR 2'  DKTC x10      Patient Education and Home Exercises     Home Exercises Provided and Patient Education Provided     Education provided:   Role of aquatic therapy  Hydration post therapy    Written Home Exercises Provided: Patient instructed to cont prior HEP. Exercises were reviewed and Amanda was able to demonstrate them prior to the end of the session.  Amanda demonstrated good  understanding of the education provided. See EMR under Patient Instructions for exercises provided during therapy sessions    ASSESSMENT     Patient tolerated treatment very well with reduced pain complaints. Cueing provided for exercise technique, core stabilization. Patient is reporting reducing pain  complaints and improving standing tolerance. Patient is demonstrating improved BLE/functional strength and trunk mobility. She continues to have pretty consistent LBP that is typically triggered by prolong standing/walking. Patient is progressing toward her functional goals.     Amanda Is progressing well towards her goals.   Pt prognosis is Good.     Pt will continue to benefit from skilled outpatient physical therapy to address the deficits listed in the problem list box on initial evaluation, provide pt/family education and to maximize pt's level of independence in the home and community environment.     Pt's spiritual, cultural and educational needs considered and pt agreeable to plan of care and goals.     Anticipated barriers to physical therapy: None.     Goals:  Short Term Goals: 4 weeks   1. Patient will be independent in HEP & progressions. Goal met.   2. The patient will achieve 5 sit to stand score of < / = 18 seconds to demonstrate improved transfers and endurance. Goal met.  3. Patient will subjectively report ability to stand and cook for > / = 10 minutes to demonstrate improved functional strength and endurance required to complete ADLs around the home independently. Goal met.     Long Term Goals: 8 weeks   1. The patient will demonstrate independence with extensive HEP. Progressing.  2. Patent is able to demonstrate MMT > / = 4/5 in BLE where limited and without pain reports during testing. Progressing.   3. Patient will subjectively report ability to walk > / = 15 minutes with report of decreased LLE numbness to demonstrate improved functional BLE strength required to perform recreational activities and be able to navigate her environment safely. Progressing.     PLAN     Requesting additional authorization to 10/27/2022.    Outpatient Physical Therapy 1-2 times weekly for additional 4 weeks to include the following interventions: manual therapy, aquatic therapy, patient education, therapeutic  exercise, therapeutic activities.     Patient may be seen by PTA as part of rehabilitation team.    Jayden Winslow, PT, DPT    I CERTIFY THE NEED FOR THESE SERVICES FURNISHED UNDER THIS PLAN OF TREATMENT AND WHILE UNDER MY CARE   Physician's comments:      Physician's Signature: ___________________________________________________

## 2022-09-27 NOTE — PLAN OF CARE
"[facility]   Physical Therapy Treatment Note and Updated Plan of Care     Name: Amanda Arredondo [facility] Number: [id number]    Therapy Diagnosis:   Encounter Diagnoses   Name Primary?    Decreased functional mobility and endurance Yes    Lumbar radiculopathy     Chronic left-sided low back pain with sciatica, sciatica laterality unspecified      Physician: Simona Torres MD    Visit Date: 2022    Physician Orders: PT Eval and Treat  Medical Diagnosis from Referral:  M25.561,M25.562 (ICD-10-CM) - Pain in both knees, unspecified chronicity   M48.00 (ICD-10-CM) - Spinal stenosis, unspecified spinal region   Evaluation Date: 8/3/2022  Authorization Period Expiration: 2022 (eval)  Plan of Care Expiration: 10/7/2022; requesting extension to 10/27/2022  Progress Note Due: Last completed 2022. New due date 10/27/2022  Visit # / Visits authorized:       Precautions: Standard and Fall    PTA Visit #: 0/5     Time In: 0910  Time Out: 1015  Total Billable Time: 60 minutes.    SUBJECTIVE     Pt reports: Pt states that she is feeling "pretty good" this morning. Pt reports less LLE N/T since epidural injection on 2022. She is reporting improved standing tolerance since her injection.     Response to previous treatment: Some temporary soreness.   Functional change:Improving standing tolerance.     Reviewed contraindications with pt to ensure aquatic therapy is appropriate. Per patient's review of contraindications and verbal report of no pertinent medical hx, patient is cleared for attending skilled aquatic physical therapy.     Pain: 5/10 with NAD observed. This has been reducing fairly consistently.   Location: L lumbar and LLE      OBJECTIVE     TU sec using no AD     5 Times Sit to Stand: 14 sec using BUE support on thighs. Significant improvement.      Posture: mild flatback t/o spine     Gait: limited hip extension bilaterally     Lumbar Range of Motion: % " limitations    Degrees Pain   Flexion 25% Mild pulling         Extension 25%    Increased          Left Side Bending 25% Mild pulling         Right Side Bending 25% Mild pulling         Left rotation    25% Nothing to report         Right Rotation    25% Nothing to report            Lower Extremity Strength  Right LE   Left LE     Knee extension: 4/5 Knee extension: 4/5   Knee flexion: 4/5 Knee flexion: 4-/5   Hip flexion: 4/5 Hip flexion: 4-/5   Ankle dorsiflexion: 4/5 Ankle dorsiflexion: 4/5   Ankle plantarflexion: 4/5 Ankle plantarflexion: 4/5       Treatment     Amanda received aquatic therapeutic exercises to develop strength, endurance, ROM, flexibility, posture, and core stabilization for 60 minutes including:    FUNCTIONAL MOBILITY TRAINING x 2 laps each at beginning and 1 lap each at end of session  Walk forward/backward/lateral    STRETCHES 2 x 30sec  HS  Calf  Hip adductor    LE EX x 30  Squat  Heel raise / toe raise  Hip abduction/adduction  Hip flex/ext  HS curl  LAQ  High knee march with kick board 2'  Step ups fwd x20    UE EX/CORE  x 20  Shoulder flex/extTA activation paddles closed  Shoulder horizontal abd/add TA activation paddles closed  Mini squat with push/pull red kickboard    ENDURANCE  Bicycle in // bars 3'  LTR 2'  DKTC x10      Patient Education and Home Exercises     Home Exercises Provided and Patient Education Provided     Education provided:   Role of aquatic therapy  Hydration post therapy    Written Home Exercises Provided: Patient instructed to cont prior HEP. Exercises were reviewed and Amanda was able to demonstrate them prior to the end of the session.  Amanda demonstrated good  understanding of the education provided. See EMR under Patient Instructions for exercises provided during therapy sessions    ASSESSMENT     Patient tolerated treatment very well with reduced pain complaints. Cueing provided for exercise technique, core stabilization. Patient is reporting reducing pain  complaints and improving standing tolerance. Patient is demonstrating improved BLE/functional strength and trunk mobility. She continues to have pretty consistent LBP that is typically triggered by prolong standing/walking. Patient is progressing toward her functional goals.     Amanda Is progressing well towards her goals.   Pt prognosis is Good.     Pt will continue to benefit from skilled outpatient physical therapy to address the deficits listed in the problem list box on initial evaluation, provide pt/family education and to maximize pt's level of independence in the home and community environment.     Pt's spiritual, cultural and educational needs considered and pt agreeable to plan of care and goals.     Anticipated barriers to physical therapy: None.     Goals:  Short Term Goals: 4 weeks   1. Patient will be independent in HEP & progressions. Goal met.   2. The patient will achieve 5 sit to stand score of < / = 18 seconds to demonstrate improved transfers and endurance. Goal met.  3. Patient will subjectively report ability to stand and cook for > / = 10 minutes to demonstrate improved functional strength and endurance required to complete ADLs around the home independently. Goal met.     Long Term Goals: 8 weeks   1. The patient will demonstrate independence with extensive HEP. Progressing.  2. Patent is able to demonstrate MMT > / = 4/5 in BLE where limited and without pain reports during testing. Progressing.   3. Patient will subjectively report ability to walk > / = 15 minutes with report of decreased LLE numbness to demonstrate improved functional BLE strength required to perform recreational activities and be able to navigate her environment safely. Progressing.     PLAN     Requesting additional authorization to 10/27/2022.    Outpatient Physical Therapy 1-2 times weekly for additional 4 weeks to include the following interventions: manual therapy, aquatic therapy, patient education, therapeutic  exercise, therapeutic activities.     Patient may be seen by PTA as part of rehabilitation team.    Jayden Winslow, PT, DPT    I CERTIFY THE NEED FOR THESE SERVICES FURNISHED UNDER THIS PLAN OF TREATMENT AND WHILE UNDER MY CARE   Physician's comments:      Physician's Signature: ___________________________________________________

## 2022-09-29 ENCOUNTER — CLINICAL SUPPORT (OUTPATIENT)
Dept: REHABILITATION | Facility: HOSPITAL | Age: 72
End: 2022-09-29
Payer: MEDICARE

## 2022-09-29 DIAGNOSIS — Z74.09 DECREASED FUNCTIONAL MOBILITY AND ENDURANCE: Primary | ICD-10-CM

## 2022-09-29 PROCEDURE — 97113 AQUATIC THERAPY/EXERCISES: CPT

## 2022-09-29 NOTE — PROGRESS NOTES
[facility]   Physical Therapy Treatment Note and Updated Plan of Care     Name: Amanda Arredondo [facility] Number: [id number]    Therapy Diagnosis:   Encounter Diagnosis   Name Primary?    Decreased functional mobility and endurance Yes     Physician: Simona Torres MD    Visit Date: 9/29/2022    Physician Orders: PT Eval and Treat  Medical Diagnosis from Referral:  M25.561,M25.562 (ICD-10-CM) - Pain in both knees, unspecified chronicity   M48.00 (ICD-10-CM) - Spinal stenosis, unspecified spinal region   Evaluation Date: 8/3/2022  Authorization Period Expiration: 8/31/2022 (eval)  Plan of Care Expiration: 10/27/2022 (requested)  Progress Note Due 10/27/2022  Visit # / Visits authorized: 7/16      Precautions: Standard and Fall    PTA Visit #: 0/5     Time In: 0920  Time Out: 1015  Total Billable Time: 30 minutes with 1 on 1 supervision    SUBJECTIVE     Pt reports: Pt reports increased LBP this morning that she attributes to sleep position. She continues to report reduction in N/T since her last injection.      Response to previous treatment: Some temporary soreness.   Functional change:Improving standing tolerance.     Pain: 7/10 with NAD observed.   Location: L lumbar      OBJECTIVE     Updated 9/27/2022.    Treatment     Amanda received aquatic therapeutic exercises to develop strength, endurance, ROM, flexibility, posture, and core stabilization for 55 minutes including:    FUNCTIONAL MOBILITY TRAINING x 2 laps each at beginning and 1 lap each at end of session  Walk forward/backward/lateral    STRETCHES 2 x 30sec  HS  Calf  Hip adductor    LE EX x 30  Squat  Heel raise / toe raise  Hip abduction/adduction  Hip flex/ext  HS curl -NP  LAQ  High knee march with kick board 2'  Step ups fwd x20    UE EX/CORE  x 20  Shoulder flex/extTA activation paddles closed  Shoulder horizontal abd/add TA activation paddles closed  Mini squat with push/pull red kickboard    ENDURANCE  Bicycle in //  bars 3'  LTR 2'  DKTC x10 -cued to slow movement      Patient Education and Home Exercises     Home Exercises Provided and Patient Education Provided     Education provided:   Role of aquatic therapy  Hydration post therapy    Written Home Exercises Provided: Patient instructed to cont prior HEP. Exercises were reviewed and Amanda was able to demonstrate them prior to the end of the session.  Amanda demonstrated good  understanding of the education provided. See EMR under Patient Instructions for exercises provided during therapy sessions    ASSESSMENT     Patient tolerated treatment very well with reduced pain complaints. Cueing provided for exercise technique, core stabilization. Patient is reporting reducing pain complaints and improving standing tolerance.     Amanda Is progressing well towards her goals.   Pt prognosis is Good.     Pt will continue to benefit from skilled outpatient physical therapy to address the deficits listed in the problem list box on initial evaluation, provide pt/family education and to maximize pt's level of independence in the home and community environment.     Pt's spiritual, cultural and educational needs considered and pt agreeable to plan of care and goals.     Anticipated barriers to physical therapy: None.     Goals:  Short Term Goals: 4 weeks   1. Patient will be independent in HEP & progressions. Goal met.   2. The patient will achieve 5 sit to stand score of < / = 18 seconds to demonstrate improved transfers and endurance. Goal met.  3. Patient will subjectively report ability to stand and cook for > / = 10 minutes to demonstrate improved functional strength and endurance required to complete ADLs around the home independently. Goal met.     Long Term Goals: 8 weeks   1. The patient will demonstrate independence with extensive HEP. Progressing.  2. Patent is able to demonstrate MMT > / = 4/5 in BLE where limited and without pain reports during testing. Progressing.   3.  Patient will subjectively report ability to walk > / = 15 minutes with report of decreased LLE numbness to demonstrate improved functional BLE strength required to perform recreational activities and be able to navigate her environment safely. Progressing.     PLAN     Updated POC 9/27/2022 to 10/27/2022.    Outpatient Physical Therapy 1-2 times weekly for additional 4 weeks to include the following interventions: manual therapy, aquatic therapy, patient education, therapeutic exercise, therapeutic activities.     Patient may be seen by PTA as part of rehabilitation team.    Jayden Winslow, PT, DPT

## 2022-10-12 ENCOUNTER — TELEPHONE (OUTPATIENT)
Dept: FAMILY MEDICINE | Facility: CLINIC | Age: 72
End: 2022-10-12
Payer: MEDICARE

## 2022-10-12 DIAGNOSIS — E55.9 VITAMIN D DEFICIENCY: Primary | ICD-10-CM

## 2022-10-12 RX ORDER — ERGOCALCIFEROL 1.25 MG/1
50000 CAPSULE ORAL
Qty: 12 CAPSULE | Refills: 0 | Status: SHIPPED | OUTPATIENT
Start: 2022-10-12 | End: 2022-11-11

## 2022-10-12 NOTE — TELEPHONE ENCOUNTER
----- Message from Farooq Hannah MA sent at 10/12/2022  1:53 PM CDT -----  Spoke with pt she was still on the losartan that was d/c she did not remember that it was d/c. at the last office visit.she saw the nephrologist and was told she should not be on both. The nephrologist said her vitamin d is low she get a rx sent over to the pharmacy.  ----- Message -----  From: Leopoldo Jimenez  Sent: 10/12/2022  12:23 PM CDT  To: Brian Jarvis Staff    Type: Patient Call Back    Who called: Self    What is the request in detail: Pt is requesting a call back from Dr. Torres's nurse.    Can the clinic reply by [facility]? no    Would the patient rather a call back or a response via [facility]? Call back    Best call back number: [phone] , [phone] (home)       Additional Information:

## 2022-11-15 ENCOUNTER — OFFICE VISIT (OUTPATIENT)
Dept: PULMONOLOGY | Facility: CLINIC | Age: 72
End: 2022-11-15
Payer: MEDICARE

## 2022-11-15 VITALS
WEIGHT: 273.56 LBS | BODY MASS INDEX: 46.7 KG/M2 | HEART RATE: 98 BPM | DIASTOLIC BLOOD PRESSURE: 104 MMHG | OXYGEN SATURATION: 98 % | SYSTOLIC BLOOD PRESSURE: 154 MMHG | HEIGHT: 64 IN

## 2022-11-15 DIAGNOSIS — G47.33 OSA (OBSTRUCTIVE SLEEP APNEA): ICD-10-CM

## 2022-11-15 DIAGNOSIS — E66.01 MORBID OBESITY WITH BMI OF 40.0-44.9, ADULT: ICD-10-CM

## 2022-11-15 DIAGNOSIS — G47.30 SLEEP-RELATED BREATHING DISORDER: Primary | ICD-10-CM

## 2022-11-15 DIAGNOSIS — M54.16 LUMBAR RADICULOPATHY: ICD-10-CM

## 2022-11-15 DIAGNOSIS — R06.09 DOE (DYSPNEA ON EXERTION): ICD-10-CM

## 2022-11-15 PROCEDURE — 3080F PR MOST RECENT DIASTOLIC BLOOD PRESSURE >= 90 MM HG: ICD-10-PCS | Mod: CPTII,S$GLB,, | Performed by: NURSE PRACTITIONER

## 2022-11-15 PROCEDURE — 4010F PR ACE/ARB THEARPY RXD/TAKEN: ICD-10-PCS | Mod: CPTII,S$GLB,, | Performed by: NURSE PRACTITIONER

## 2022-11-15 PROCEDURE — 3288F PR FALLS RISK ASSESSMENT DOCUMENTED: ICD-10-PCS | Mod: CPTII,S$GLB,, | Performed by: NURSE PRACTITIONER

## 2022-11-15 PROCEDURE — 99999 PR PBB SHADOW E&M-EST. PATIENT-LVL V: CPT | Mod: PBBFAC,,, | Performed by: NURSE PRACTITIONER

## 2022-11-15 PROCEDURE — 1101F PT FALLS ASSESS-DOCD LE1/YR: CPT | Mod: CPTII,S$GLB,, | Performed by: NURSE PRACTITIONER

## 2022-11-15 PROCEDURE — 99203 OFFICE O/P NEW LOW 30 MIN: CPT | Mod: S$GLB,,, | Performed by: NURSE PRACTITIONER

## 2022-11-15 PROCEDURE — 3080F DIAST BP >= 90 MM HG: CPT | Mod: CPTII,S$GLB,, | Performed by: NURSE PRACTITIONER

## 2022-11-15 PROCEDURE — 1125F PR PAIN SEVERITY QUANTIFIED, PAIN PRESENT: ICD-10-PCS | Mod: CPTII,S$GLB,, | Performed by: NURSE PRACTITIONER

## 2022-11-15 PROCEDURE — 3077F PR MOST RECENT SYSTOLIC BLOOD PRESSURE >= 140 MM HG: ICD-10-PCS | Mod: CPTII,S$GLB,, | Performed by: NURSE PRACTITIONER

## 2022-11-15 PROCEDURE — 3077F SYST BP >= 140 MM HG: CPT | Mod: CPTII,S$GLB,, | Performed by: NURSE PRACTITIONER

## 2022-11-15 PROCEDURE — 1101F PR PT FALLS ASSESS DOC 0-1 FALLS W/OUT INJ PAST YR: ICD-10-PCS | Mod: CPTII,S$GLB,, | Performed by: NURSE PRACTITIONER

## 2022-11-15 PROCEDURE — 3288F FALL RISK ASSESSMENT DOCD: CPT | Mod: CPTII,S$GLB,, | Performed by: NURSE PRACTITIONER

## 2022-11-15 PROCEDURE — 3008F PR BODY MASS INDEX (BMI) DOCUMENTED: ICD-10-PCS | Mod: CPTII,S$GLB,, | Performed by: NURSE PRACTITIONER

## 2022-11-15 PROCEDURE — 3008F BODY MASS INDEX DOCD: CPT | Mod: CPTII,S$GLB,, | Performed by: NURSE PRACTITIONER

## 2022-11-15 PROCEDURE — 99999 PR PBB SHADOW E&M-EST. PATIENT-LVL V: ICD-10-PCS | Mod: PBBFAC,,, | Performed by: NURSE PRACTITIONER

## 2022-11-15 PROCEDURE — 1125F AMNT PAIN NOTED PAIN PRSNT: CPT | Mod: CPTII,S$GLB,, | Performed by: NURSE PRACTITIONER

## 2022-11-15 PROCEDURE — 4010F ACE/ARB THERAPY RXD/TAKEN: CPT | Mod: CPTII,S$GLB,, | Performed by: NURSE PRACTITIONER

## 2022-11-15 PROCEDURE — 99203 PR OFFICE/OUTPT VISIT, NEW, LEVL III, 30-44 MIN: ICD-10-PCS | Mod: S$GLB,,, | Performed by: NURSE PRACTITIONER

## 2022-11-15 RX ORDER — ERGOCALCIFEROL 1.25 MG/1
1 CAPSULE ORAL
COMMUNITY
Start: 2022-10-12

## 2022-11-15 NOTE — PROGRESS NOTES
Amanda Holt  was seen as a new patient at the request of Simona Jesus MD for the evaluation of  possible sleep apnea.    CHIEF COMPLAINT:    Chief Complaint   Patient presents with    Apnea       HISTORY OF PRESENT ILLNESS: Amanda Holt is a 72 y.o. female current smoker with  has a past medical history of Arthralgia, Colon polyp, Degenerative disc disease at L5-S1 level, High cholesterol, Hypertension, Nuclear sclerosis of both eyes (1/8/2020), Sciatica, and Spinal stenosis. is here for sleep evaluation. Patient with symptoms of  snoring, witnessed apnea, daytime fatigue, excessive daytime sleepiness with difficulty falling and staying asleep. ESS 17.  Patient reports sleep paralysis 1 or more weak, vivid unpleasant dreams, sciatica and RLS, tinnitus, weakness when she gets out of breath.    Patient reports she does not have any set bedtime.  Usually stays up until 1-2 a.m..  Takes tizanidine which helps.  Waking up about 4 times during the night to go to the bathroom with hard time falling back to sleep.  Waking up snoring or with back pain.  Sleeps on her right side.  Out of bed 6:30 a.m. no intentional naps but dozes off watching TV but reports she is usually busy during the day.    Sleep study:NA but reports a history of ANTONIA and trial CPAP. Reports difficulty tolerating. Stop following recall cpap.    PFT: NA    CXR: NA    ECHO: NA        REVIEW OF SYSTEMS:   Review of Systems   Constitutional:  Positive for fatigue. Negative for fever, chills, weight loss, weight gain, activity change, appetite change and night sweats.   HENT:  Positive for congestion and hearing loss.         Sees Lisa with ENT   Eyes: Negative.    Respiratory:  Positive for apnea, snoring, dyspnea on extertion and somnolence. Negative for cough, sputum production, chest tightness, wheezing, orthopnea, previous hospitialization due to pulmonary problems and use of rescue inhaler.    Cardiovascular:  Positive for leg  "swelling (off amlodipine). Negative for chest pain and palpitations.   Genitourinary: Negative.    Endocrine: endocrine negative    Musculoskeletal:  Positive for back pain. Negative for gait problem.   Skin: Negative.    Gastrointestinal:  Positive for acid reflux.   Neurological:  Positive for headaches.   Psychiatric/Behavioral:  Positive for sleep disturbance.        PHYSICAL EXAM:  Vitals:    11/15/22 0949   BP: (!) 154/104   Pulse: 98   SpO2: 98%   Weight: 124.1 kg (273 lb 9.5 oz)   Height: 5' 4" (1.626 m)   PainSc:   4   PainLoc: Back     Body mass index is 46.96 kg/m².   Physical Exam   Constitutional: She is oriented to person, place, and time. She appears well-developed. No distress. She is obese.   HENT:   Head: Normocephalic.   Cardiovascular: Normal rate, regular rhythm and normal heart sounds.   Pulmonary/Chest: Normal expansion, effort normal and breath sounds normal.   Musculoskeletal:         General: Edema present.      Cervical back: Neck supple.   Neurological: She is alert and oriented to person, place, and time. Gait normal.   Skin: Skin is warm. She is not diaphoretic.   Psychiatric: She has a normal mood and affect. Her behavior is normal. Judgment and thought content normal.     ALLERGIES:    Review of patient's allergies indicates:   Allergen Reactions    Codeine        CURRENT MEDICATIONS:    Current Outpatient Medications   Medication Sig Dispense Refill    amitriptyline (ELAVIL) 100 MG tablet Take 1 tablet (100 mg total) by mouth every evening. 90 tablet 1    aspirin (ECOTRIN) 81 MG EC tablet       clotrimazole-betamethasone 1-0.05% (LOTRISONE) cream APPLY  CREAM TOPICALLY TO AFFECTED AREA TWICE DAILY 45 g 0    diclofenac sodium (VOLTAREN) 1 % Gel APPLY 2 GRAMS TOPICALLY TO AFFECTED AREA 4 TIMES DAILY 100 g 5    ergocalciferol (ERGOCALCIFEROL) 50,000 unit Cap Take 1 capsule by mouth every 7 days.      fluticasone propionate (FLONASE) 50 mcg/actuation nasal spray 1 spray (50 mcg total) by " Each Nostril route once daily. 16 g 5    hydrOXYzine HCL (ATARAX) 25 MG tablet Take 1 tablet by mouth three times daily as needed 45 tablet 0    mupirocin (BACTROBAN) 2 % ointment Apply topically 3 (three) times daily. 22 g 0    NYSTOP powder APPLY TOPICALLY 4 TIMES A DAY 60 g 5    omeprazole (PRILOSEC) 40 MG capsule Take 1 capsule by mouth once daily 90 capsule 0    pravastatin (PRAVACHOL) 40 MG tablet Take 1 tablet by mouth once daily 90 tablet 1    tiZANidine (ZANAFLEX) 4 MG tablet TAKE 1 TABLET BY MOUTH EVERY 6 HOURS AS NEEDED 90 tablet 1    triamcinolone acetonide 0.1% (KENALOG) 0.1 % ointment AAA bid 454 g 3    triamcinolone acetonide 0.1%-ciclopirox-magnesium hydroxide 400 mg/5 ml Apply to affected area twice a day after cool blow dry 60 g 5    valsartan (DIOVAN) 160 MG tablet Take 1 tablet (160 mg total) by mouth once daily. 90 tablet 3    spironolactone (ALDACTONE) 25 MG tablet Take 1 tablet by mouth once daily 90 tablet 0    traMADoL (ULTRAM) 50 mg tablet TAKE 1 TABLET BY MOUTH EVERY 6 HOURS AS NEEDED FOR PAIN 120 tablet 0     No current facility-administered medications for this visit.                  PAST MEDICAL HISTORY:    Active Ambulatory Problems     Diagnosis Date Noted    Contact lens overwear of both eyes 01/08/2020    Refractive error 01/08/2020    Nuclear sclerosis of both eyes 01/08/2020    Spinal stenosis 03/13/2020    Hypertension 06/19/2015    ANTONIA (obstructive sleep apnea) 03/27/2019    Gastroesophageal reflux disease without esophagitis 07/17/2017    CKD (chronic kidney disease) 05/13/2019    Chronic low back pain 02/23/2018    Bilateral hearing loss 07/17/2017    DDD (degenerative disc disease), lumbar 06/19/2015    CTS (carpal tunnel syndrome) 06/19/2015    Anxiety 03/13/2020    Asymptomatic microscopic hematuria 06/15/2020    Lumbar radiculopathy 07/20/2020    Lumbar spondylosis 10/31/2017    Lumbar herniated disc 12/03/2020    Morbid obesity with BMI of 40.0-44.9, adult 03/03/2021     Other nonthrombocytopenic purpura 03/03/2021    Major depressive disorder, recurrent, mild 03/03/2021    Angina pectoris 03/03/2021    Atherosclerosis of aorta 03/03/2021    Stage 3a chronic kidney disease 03/03/2021    Wears contact lenses 03/05/2021    Posterior vitreous detachment of right eye 03/08/2021    Tobacco use 10/21/2021    Class 3 severe obesity due to excess calories with serious comorbidity in adult 01/03/2022    Decreased functional mobility and endurance 08/08/2022    Sleep-related breathing disorder 11/15/2022    AKBAR (dyspnea on exertion) 11/15/2022     Resolved Ambulatory Problems     Diagnosis Date Noted    Colon cancer screening 07/23/2020     Past Medical History:   Diagnosis Date    Arthralgia     Colon polyp     Degenerative disc disease at L5-S1 level     High cholesterol     Sciatica                 PAST SURGICAL HISTORY:    Past Surgical History:   Procedure Laterality Date    BACK SURGERY      lumbar laminectomy    COLONOSCOPY N/A 7/23/2020    Procedure: COLONOSCOPY;  Surgeon: Donal Moore MD;  Location: [facility];  Service: Endoscopy;  Laterality: N/A;    EPIDURAL STEROID INJECTION Left 9/9/2020    Procedure: Injection, Steroid, Epidural Transforaminal;  Surgeon: Jun Leavitt Jr., MD;  Location: [facility] ENDO;  Service: Pain Management;  Laterality: Left;  Left L5 + S1 TF WADE  Arrive @ 1300; ASA last 9/1; No DM    EPIDURAL STEROID INJECTION Left 2/12/2021    Procedure: Injection, Steroid, Epidural Transforaminal;  Surgeon: Jun Leavitt Jr., MD;  Location: [facility] ENDO;  Service: Pain Management;  Laterality: Left;  Left L4 + L5 TF WADE  Arrive @ 1230; IV Sedation; ASA last 2/4; No DM    TRANSFORAMINAL EPIDURAL INJECTION OF STEROID Left 3/14/2022    Procedure: INJECTION, STEROID, EPIDURAL, TRANSFORAMINAL APPROACH, L4-L5 & L5-S1;  Surgeon: Darya Ayala MD;  [facility];  Service: Pain Management;  Laterality: Left;    TRANSFORAMINAL EPIDURAL INJECTION OF STEROID  Left 2022    Procedure: LUMBAR TRANSFORAMINAL LEFT L4/5 AND L5/S1 CONTRAST;  Surgeon: Darya Ayala MD;  Location: [facility];  Service: Pain Management;  Laterality: Left;    TUBAL LIGATION           FAMILY HISTORY:                Family History   Problem Relation Age of Onset    Breast cancer Mother     Hypertension Mother     Hypotension Mother     Cataracts Father     Glaucoma Father     Diabetes Father     Glaucoma Sister     No Known Problems Brother     No Known Problems Maternal Aunt     No Known Problems Maternal Uncle     No Known Problems Paternal Aunt     No Known Problems Paternal Uncle     No Known Problems Maternal Grandmother     No Known Problems Maternal Grandfather     No Known Problems Paternal Grandmother     No Known Problems Paternal Grandfather     No Known Problems Brother     Amblyopia Neg Hx     Blindness Neg Hx     Cancer Neg Hx     Macular degeneration Neg Hx     Retinal detachment Neg Hx     Strabismus Neg Hx     Stroke Neg Hx     Thyroid disease Neg Hx        SOCIAL HISTORY:          Tobacco:   Social History     Tobacco Use   Smoking Status Some Days    Packs/day: 0.25    Years: 40.00    Pack years: 10.00    Types: Cigarettes, Vaping with nicotine    Last attempt to quit: 3/15/2022    Years since quittin.7   Smokeless Tobacco Current   Tobacco Comments    Started smoking age 31 with drinking, 2 cigarettes/week, quit 3/2022    Currently vaping with nicotine        alcohol use:    Social History     Substance and Sexual Activity   Alcohol Use Yes    Comment: ocassionally                   LABS:   TSH:  No results found for: TSH  CBC:  Lab Results   Component Value Date    WBC 6.81 2020    HGB 12.5 2020    HCT 43.3 2020     (H) 2020     2020     BMP:  Lab Results   Component Value Date     2020    K 4.3 2020     2020    CO2 24 2020    BUN 15 2020    CREATININE 1.2 2020    CALCIUM 9.1  07/20/2020    ANIONGAP 8 07/20/2020    ESTGFRAFRICA 54 (A) 07/20/2020    EGFRNONAA 47 (A) 07/20/2020     HgbA1C:  Lab Results   Component Value Date    HGBA1C 5.4 03/13/2020          ASSESSMENT    ICD-10-CM ICD-9-CM    1. Sleep-related breathing disorder  G47.30 780.59 Polysomnogram (CPAP will be added if patient meets diagnostic criteria.)      2. AKBAR (dyspnea on exertion)  R06.09 786.09 Complete PFT with bronchodilator      Echo      3. Morbid obesity with BMI of 40.0-44.9, adult  E66.01 278.01     Z68.41 V85.41       4. ANTONIA (obstructive sleep apnea)  G47.33 327.23 Ambulatory referral/consult to Sleep Disorders      5. Lumbar radiculopathy  M54.16 724.4 Ambulatory referral/consult to Sleep Disorders          PLAN:    Problem List Items Addressed This Visit          Unprioritized    AKBAR (dyspnea on exertion)    Overview     Likely secondary to pain, obesity, deconditioning         Relevant Orders    Complete PFT with bronchodilator    Echo    Lumbar radiculopathy    Overview     F/u per pain management         Morbid obesity with BMI of 40.0-44.9, adult    Overview     Patient is aware of need for weight loss  and is making an effort to improve diet (eating more fruits and vegetables and reducing simple sugars and fried foods) and exercise regularly         ANTONIA (obstructive sleep apnea)    Sleep-related breathing disorder - Primary    Overview     Patient with history obstructive sleep apnea with symptoms or snoring, fatigue, EDS, nocturia. Pt opiod dependent. Recommend in lab sleep study         Relevant Orders    Polysomnogram (CPAP will be added if patient meets diagnostic criteria.)          Thank you for allowing me the opportunity to participate in the care of your patient.    Patient will Follow up for contact for return if test abnormal.     Please cc note to  Simona Jesus MD.

## 2022-11-21 ENCOUNTER — TELEPHONE (OUTPATIENT)
Dept: FAMILY MEDICINE | Facility: CLINIC | Age: 72
End: 2022-11-21
Payer: MEDICARE

## 2022-11-21 NOTE — TELEPHONE ENCOUNTER
----- Message from Carola Ly sent at 11/21/2022  8:17 AM CST -----  .Type: Patient Call Back    Who called:self    What is the request in detail: pt would like someone in the office to give her a call regarding a personal matter. Pt did not leave any details. Please call    Can the clinic reply by [facility]?    Would the patient rather a call back or a response via [facility]? call    Best call back number:.[phone] (home)

## 2022-11-21 NOTE — TELEPHONE ENCOUNTER
----- Message from Kristal Simon sent at 6/18/2020  2:08 PM CDT -----  Contact: Patient [phone]  Type: RX Refill Request    Who Called:  Patient    Have you contacted your pharmacy: Yes. Was told to have the dr's office phone the Rx in, not fax or escribe. And it just need to be called in as Tramadol. Do not say Ultram because insurance won't cover it.     Refill or New Rx: Refill    RX Name and Strength: traMADoL (ULTRAM) 50 mg tablet    Is this a 30 day or 90 day RX: 90 day    Preferred Pharmacy with phone number: .  [facility] [address] [place] [address] ([place] [place] [facility] [address] [facility]  [address] [facility] ([place] [address]  Phone: [phone] Fax: [phone]    Local or Mail Order:Local    Would the patient rather a call back or a response via [facility]? Call back    Best Call Back Number: [phone]        
----- Message from Myrna Jessica sent at 6/18/2020  1:59 PM CDT -----  Regarding: self  [phone]  .Type: Patient Call Back    Who called: self     What is the request in detail: Pt is requesting for Rx of  traMADoL (ULTRAM) 50 mg tablet to be resent to .  [facility] [address] [place] ([place] [place] [facility] [address] [facility]  [address] [facility] ([place] [address]  Phone: [phone] Fax: [phone]    Can the clinic reply by [facility]? Call back     Would the patient rather a call back or a response via [facility]?  Call back     Best call back number: [phone]          
Medication has been sent to pharmacy    Simona Torres MD  
Spoke with pharmacy they need a new prescription sent to the pharmacy for Tramadol 50 mg for the patient Please DO NOT put Ultram on it patient's insurance will not pay if it says Ultram . Please address  
other (specify)

## 2022-11-21 NOTE — TELEPHONE ENCOUNTER
Patient was able to get everything resolved. She was able to get one of her appointments rescheduled. No further questions asked.

## 2022-11-22 ENCOUNTER — OFFICE VISIT (OUTPATIENT)
Dept: FAMILY MEDICINE | Facility: CLINIC | Age: 72
End: 2022-11-22
Payer: MEDICARE

## 2022-11-22 VITALS
RESPIRATION RATE: 16 BRPM | WEIGHT: 274.5 LBS | HEIGHT: 64 IN | OXYGEN SATURATION: 97 % | SYSTOLIC BLOOD PRESSURE: 136 MMHG | DIASTOLIC BLOOD PRESSURE: 94 MMHG | BODY MASS INDEX: 46.86 KG/M2 | HEART RATE: 88 BPM | TEMPERATURE: 98 F

## 2022-11-22 DIAGNOSIS — R42 VERTIGO: ICD-10-CM

## 2022-11-22 DIAGNOSIS — I10 PRIMARY HYPERTENSION: ICD-10-CM

## 2022-11-22 DIAGNOSIS — L72.0 EPIDERMOID CYST OF FACE: Primary | ICD-10-CM

## 2022-11-22 PROBLEM — Z12.11 COLON CANCER SCREENING: Status: RESOLVED | Noted: 2020-07-23 | Resolved: 2022-11-22

## 2022-11-22 PROCEDURE — 99214 PR OFFICE/OUTPT VISIT, EST, LEVL IV, 30-39 MIN: ICD-10-PCS | Mod: S$GLB,,, | Performed by: NURSE PRACTITIONER

## 2022-11-22 PROCEDURE — 1101F PR PT FALLS ASSESS DOC 0-1 FALLS W/OUT INJ PAST YR: ICD-10-PCS | Mod: CPTII,S$GLB,, | Performed by: NURSE PRACTITIONER

## 2022-11-22 PROCEDURE — 1159F PR MEDICATION LIST DOCUMENTED IN MEDICAL RECORD: ICD-10-PCS | Mod: CPTII,S$GLB,, | Performed by: NURSE PRACTITIONER

## 2022-11-22 PROCEDURE — 3288F PR FALLS RISK ASSESSMENT DOCUMENTED: ICD-10-PCS | Mod: CPTII,S$GLB,, | Performed by: NURSE PRACTITIONER

## 2022-11-22 PROCEDURE — 99214 OFFICE O/P EST MOD 30 MIN: CPT | Mod: S$GLB,,, | Performed by: NURSE PRACTITIONER

## 2022-11-22 PROCEDURE — 1160F PR REVIEW ALL MEDS BY PRESCRIBER/CLIN PHARMACIST DOCUMENTED: ICD-10-PCS | Mod: CPTII,S$GLB,, | Performed by: NURSE PRACTITIONER

## 2022-11-22 PROCEDURE — 1160F RVW MEDS BY RX/DR IN RCRD: CPT | Mod: CPTII,S$GLB,, | Performed by: NURSE PRACTITIONER

## 2022-11-22 PROCEDURE — 3008F PR BODY MASS INDEX (BMI) DOCUMENTED: ICD-10-PCS | Mod: CPTII,S$GLB,, | Performed by: NURSE PRACTITIONER

## 2022-11-22 PROCEDURE — 3288F FALL RISK ASSESSMENT DOCD: CPT | Mod: CPTII,S$GLB,, | Performed by: NURSE PRACTITIONER

## 2022-11-22 PROCEDURE — 99999 PR PBB SHADOW E&M-EST. PATIENT-LVL V: ICD-10-PCS | Mod: PBBFAC,,, | Performed by: NURSE PRACTITIONER

## 2022-11-22 PROCEDURE — 3080F PR MOST RECENT DIASTOLIC BLOOD PRESSURE >= 90 MM HG: ICD-10-PCS | Mod: CPTII,S$GLB,, | Performed by: NURSE PRACTITIONER

## 2022-11-22 PROCEDURE — 3075F SYST BP GE 130 - 139MM HG: CPT | Mod: CPTII,S$GLB,, | Performed by: NURSE PRACTITIONER

## 2022-11-22 PROCEDURE — 1126F AMNT PAIN NOTED NONE PRSNT: CPT | Mod: CPTII,S$GLB,, | Performed by: NURSE PRACTITIONER

## 2022-11-22 PROCEDURE — 1159F MED LIST DOCD IN RCRD: CPT | Mod: CPTII,S$GLB,, | Performed by: NURSE PRACTITIONER

## 2022-11-22 PROCEDURE — 1101F PT FALLS ASSESS-DOCD LE1/YR: CPT | Mod: CPTII,S$GLB,, | Performed by: NURSE PRACTITIONER

## 2022-11-22 PROCEDURE — 3080F DIAST BP >= 90 MM HG: CPT | Mod: CPTII,S$GLB,, | Performed by: NURSE PRACTITIONER

## 2022-11-22 PROCEDURE — 3075F PR MOST RECENT SYSTOLIC BLOOD PRESS GE 130-139MM HG: ICD-10-PCS | Mod: CPTII,S$GLB,, | Performed by: NURSE PRACTITIONER

## 2022-11-22 PROCEDURE — 4010F PR ACE/ARB THEARPY RXD/TAKEN: ICD-10-PCS | Mod: CPTII,S$GLB,, | Performed by: NURSE PRACTITIONER

## 2022-11-22 PROCEDURE — 99999 PR PBB SHADOW E&M-EST. PATIENT-LVL V: CPT | Mod: PBBFAC,,, | Performed by: NURSE PRACTITIONER

## 2022-11-22 PROCEDURE — 1126F PR PAIN SEVERITY QUANTIFIED, NO PAIN PRESENT: ICD-10-PCS | Mod: CPTII,S$GLB,, | Performed by: NURSE PRACTITIONER

## 2022-11-22 PROCEDURE — 3008F BODY MASS INDEX DOCD: CPT | Mod: CPTII,S$GLB,, | Performed by: NURSE PRACTITIONER

## 2022-11-22 PROCEDURE — 4010F ACE/ARB THERAPY RXD/TAKEN: CPT | Mod: CPTII,S$GLB,, | Performed by: NURSE PRACTITIONER

## 2022-11-22 NOTE — PROGRESS NOTES
Routine Office Visit    Patient Name: Amanda Holt    : 1950  MRN: [id number]    Chief Complaint:  Vertigo, skin bump    Subjective:  Amanda is a 72 y.o. female who presents today for:    1. Vertigo, skin bump - patient who is new to me with a history of hypertension, CKD reports today for evaluation.  In the last month she has had 3 bouts of vertigo while cooking while turning her head from side to side.  She describes the vertigo as room spinning sensation which lasts about 2 minutes.  She endorses chronic hearing loss of left ear as well as chronic tinnitus but denies any new ENT concerns.  She is asking for referral to ENT.  She has also noticed a small area of swelling on the left side of her lower lip.  The area is nontender and she does not report any drainage to this area.  History of hypertension states that she just took her valsartan on the way to this appointment.    Past Medical History  Past Medical History:   Diagnosis Date    Arthralgia     Colon polyp     Degenerative disc disease at L5-S1 level     High cholesterol     Hypertension     Nuclear sclerosis of both eyes 2020    Sciatica     Spinal stenosis        Past Surgical History  Past Surgical History:   Procedure Laterality Date    BACK SURGERY      lumbar laminectomy    COLONOSCOPY N/A 2020    Procedure: COLONOSCOPY;  Surgeon: Donal Moore MD;  Location: [facility] ENDO;  Service: Endoscopy;  Laterality: N/A;    EPIDURAL STEROID INJECTION Left 2020    Procedure: Injection, Steroid, Epidural Transforaminal;  Surgeon: Jun Leavitt Jr., MD;  Location: [facility] ENDO;  Service: Pain Management;  Laterality: Left;  Left L5 + S1 TF WADE  Arrive @ 1300; ASA last ; No DM    EPIDURAL STEROID INJECTION Left 2021    Procedure: Injection, Steroid, Epidural Transforaminal;  Surgeon: Jun Leavitt Jr., MD;  Location: [facility] ENDO;  Service: Pain Management;  Laterality: Left;  Left L4 + L5 TF WADE  Arrive @ 1230; IV  Sedation; ASA last 2/4; No DM    TRANSFORAMINAL EPIDURAL INJECTION OF STEROID Left 3/14/2022    Procedure: INJECTION, STEROID, EPIDURAL, TRANSFORAMINAL APPROACH, L4-L5 & L5-S1;  Surgeon: Darya Ayala MD;  Location: [facility] PAIN MGT;  Service: Pain Management;  Laterality: Left;    TRANSFORAMINAL EPIDURAL INJECTION OF STEROID Left 2022    Procedure: LUMBAR TRANSFORAMINAL LEFT L4/5 AND L5/S1 CONTRAST;  Surgeon: Darya Ayala MD;  Location: [facility] PAIN MGT;  Service: Pain Management;  Laterality: Left;    TUBAL LIGATION         Family History  Family History   Problem Relation Age of Onset    Breast cancer Mother     Hypertension Mother     Hypotension Mother     Cataracts Father     Glaucoma Father     Diabetes Father     Glaucoma Sister     No Known Problems Brother     No Known Problems Maternal Aunt     No Known Problems Maternal Uncle     No Known Problems Paternal Aunt     No Known Problems Paternal Uncle     No Known Problems Maternal Grandmother     No Known Problems Maternal Grandfather     No Known Problems Paternal Grandmother     No Known Problems Paternal Grandfather     No Known Problems Brother     Amblyopia Neg Hx     Blindness Neg Hx     Cancer Neg Hx     Macular degeneration Neg Hx     Retinal detachment Neg Hx     Strabismus Neg Hx     Stroke Neg Hx     Thyroid disease Neg Hx        Social History  Social History     Socioeconomic History    Marital status:    Tobacco Use    Smoking status: Some Days     Packs/day: 0.25     Years: 40.00     Pack years: 10.00     Types: Cigarettes, Vaping with nicotine     Last attempt to quit: 3/15/2022     Years since quittin.6    Smokeless tobacco: Current    Tobacco comments:     Started smoking age 31 with drinking, 2 cigarettes/week, quit 3/2022     Currently vaping with nicotine    Substance and Sexual Activity    Alcohol use: Yes     Comment: ocassionally    Drug use: Yes     Comment: Tramadol twice a day as needed    Sexual activity: Not Currently      Social Determinants of Health     Financial Resource Strain: High Risk    Difficulty of Paying Living Expenses: Very hard   Food Insecurity: Food Insecurity Present    Worried About Running Out of Food in the Last Year: Sometimes true    Ran Out of Food in the Last Year: Never true   Transportation Needs: Unmet Transportation Needs    Lack of Transportation (Medical): Yes    Lack of Transportation (Non-Medical): Yes   Physical Activity: Insufficiently Active    Days of Exercise per Week: 2 days    Minutes of Exercise per Session: 60 min   Stress: Stress Concern Present    Feeling of Stress : Very much   Social Connections: Unknown    Frequency of Communication with Friends and Family: More than three times a week    Frequency of Social Gatherings with Friends and Family: Once a week    Active Member of Clubs or Organizations: No    Attends Club or Organization Meetings: Patient refused    Marital Status:    Housing Stability: Low Risk     Unable to Pay for Housing in the Last Year: No    Number of Places Lived in the Last Year: 1    Unstable Housing in the Last Year: No       Current Medications  Current Outpatient Medications on File Prior to Visit   Medication Sig Dispense Refill    amitriptyline (ELAVIL) 100 MG tablet Take 1 tablet (100 mg total) by mouth every evening. 90 tablet 1    aspirin (ECOTRIN) 81 MG EC tablet       clotrimazole-betamethasone 1-0.05% (LOTRISONE) cream APPLY  CREAM TOPICALLY TO AFFECTED AREA TWICE DAILY 45 g 0    diclofenac sodium (VOLTAREN) 1 % Gel APPLY 2 GRAMS TOPICALLY TO AFFECTED AREA 4 TIMES DAILY 100 g 5    ergocalciferol (ERGOCALCIFEROL) 50,000 unit Cap Take 1 capsule by mouth every 7 days.      fluticasone propionate (FLONASE) 50 mcg/actuation nasal spray 1 spray (50 mcg total) by Each Nostril route once daily. 16 g 5    hydrOXYzine HCL (ATARAX) 25 MG tablet Take 1 tablet by mouth three times daily as needed 45 tablet 0    mupirocin (BACTROBAN) 2 % ointment Apply  topically 3 (three) times daily. 22 g 0    NYSTOP powder APPLY TOPICALLY 4 TIMES A DAY 60 g 5    omeprazole (PRILOSEC) 40 MG capsule Take 1 capsule by mouth once daily 90 capsule 0    pravastatin (PRAVACHOL) 40 MG tablet Take 1 tablet by mouth once daily 90 tablet 1    spironolactone (ALDACTONE) 25 MG tablet Take 1 tablet by mouth once daily 90 tablet 1    tiZANidine (ZANAFLEX) 4 MG tablet TAKE 1 TABLET BY MOUTH EVERY 6 HOURS AS NEEDED 90 tablet 1    traMADoL (ULTRAM) 50 mg tablet TAKE 1 TABLET BY MOUTH EVERY 12 HOURS AS NEEDED FOR PAIN 60 tablet 0    triamcinolone acetonide 0.1% (KENALOG) 0.1 % ointment AAA bid 454 g 3    triamcinolone acetonide 0.1%-ciclopirox-magnesium hydroxide 400 mg/5 ml Apply to affected area twice a day after cool blow dry 60 g 5    valsartan (DIOVAN) 160 MG tablet Take 1 tablet (160 mg total) by mouth once daily. 90 tablet 3    [DISCONTINUED] INV HYDROCHLOROTHIAZIDE 25MG TABLET        No current facility-administered medications on file prior to visit.       Allergies   Review of patient's allergies indicates:   Allergen Reactions    Codeine        Review of Systems (Pertinent positives)  Review of Systems   Constitutional:  Negative for chills and fever.   HENT:  Positive for hearing loss and tinnitus. Negative for ear discharge, ear pain and nosebleeds.    Eyes: Negative.    Respiratory:  Negative for cough, hemoptysis, sputum production, shortness of breath and wheezing.    Cardiovascular:  Negative for chest pain, palpitations, orthopnea, claudication, leg swelling and PND.   Gastrointestinal: Negative.    Genitourinary: Negative.    Musculoskeletal: Negative.    Skin: Negative.    Neurological:  Positive for dizziness. Negative for tingling, tremors, sensory change, speech change, focal weakness, seizures, loss of consciousness, weakness and headaches.   Endo/Heme/Allergies: Negative.    Psychiatric/Behavioral: Negative.       BP (!) 136/94 (BP Location: Right arm, Patient Position:  "Sitting, BP Method: Large (Manual))   Pulse 88   Temp 98 °F (36.7 °C) (Oral)   Resp 16   Ht 5' 4" (1.626 m)   Wt 124.5 kg (274 lb 7.6 oz)   SpO2 97%   BMI 47.11 kg/m²     Physical Exam  Vitals reviewed.   Constitutional:       General: She is not in acute distress.     Appearance: Normal appearance. She is not ill-appearing, toxic-appearing or diaphoretic.   HENT:      Head: Normocephalic and atraumatic.        Right Ear: Tympanic membrane, ear canal and external ear normal.      Left Ear: Tympanic membrane, ear canal and external ear normal.      Nose: Nose normal. No congestion or rhinorrhea.   Cardiovascular:      Rate and Rhythm: Normal rate and regular rhythm.      Pulses: Normal pulses.      Heart sounds: Normal heart sounds.   Pulmonary:      Effort: Pulmonary effort is normal. No respiratory distress.      Breath sounds: Normal breath sounds. No wheezing.   Abdominal:      General: Bowel sounds are normal. There is no distension.      Palpations: Abdomen is soft.      Tenderness: There is no abdominal tenderness.   Musculoskeletal:         General: No swelling, tenderness or deformity. Normal range of motion.   Skin:     General: Skin is warm and dry.      Capillary Refill: Capillary refill takes less than 2 seconds.   Neurological:      General: No focal deficit present.      Mental Status: She is alert and oriented to person, place, and time.   Psychiatric:         Mood and Affect: Mood normal.         Behavior: Behavior normal.        Assessment/Plan:  Amanda Holt is a 72 y.o. female who presents today for :    Amanda was seen today for dizziness.    Diagnoses and all orders for this visit:    Epidermoid cyst of face    She does see a dermatologist already.  She will follow up with them.  No need for antibiotics for this.    Vertigo  -     Ambulatory referral/consult to ENT; Future    Likely peripheral cause.  Will refer to ENT.  No alarm signs or symptoms.    Primary " hypertension    Patient did come to the appointment greater than 10 minutes late.  She was rushing to get here and took her BP medication on the way here.  Will recheck with nurse visit in 2 weeks.  Previously was on amlodipine as well.        This office note has been dictated.  This dictation has been generated using M-Modal Fluency Direct dictation; some phonetic errors may occur.   My collaborating physician is Dr. Kunal Rashid.

## 2022-11-23 ENCOUNTER — TELEPHONE (OUTPATIENT)
Dept: FAMILY MEDICINE | Facility: CLINIC | Age: 72
End: 2022-11-23
Payer: MEDICARE

## 2022-11-23 NOTE — TELEPHONE ENCOUNTER
Was able to reclarify tramadol order with pharmacy. Left voicemail to patient to call back office.

## 2022-11-23 NOTE — TELEPHONE ENCOUNTER
----- Message from Leopoldo Jimenez sent at 11/23/2022 10:36 AM CST -----  Type: Patient Call Back    Who called:Self    What is the request in detail: Pt is requesting a call back regarding her tramodol. States that pharmacy informed her the directions are missing.    Can the clinic reply by [facility]? no    Would the patient rather a call back or a response via [facility]? Call back    Best call back number:[phone] (home)       Additional Information:

## 2022-12-08 ENCOUNTER — CLINICAL SUPPORT (OUTPATIENT)
Dept: AUDIOLOGY | Facility: CLINIC | Age: 72
End: 2022-12-08
Payer: MEDICARE

## 2022-12-08 ENCOUNTER — OFFICE VISIT (OUTPATIENT)
Dept: OTOLARYNGOLOGY | Facility: CLINIC | Age: 72
End: 2022-12-08
Payer: MEDICARE

## 2022-12-08 VITALS — DIASTOLIC BLOOD PRESSURE: 106 MMHG | SYSTOLIC BLOOD PRESSURE: 192 MMHG | HEART RATE: 91 BPM

## 2022-12-08 DIAGNOSIS — J34.9 SINUS DISORDER: ICD-10-CM

## 2022-12-08 DIAGNOSIS — H93.8X2 EAR PRESSURE, LEFT: ICD-10-CM

## 2022-12-08 DIAGNOSIS — R42 VERTIGO: ICD-10-CM

## 2022-12-08 DIAGNOSIS — H93.13 TINNITUS, BILATERAL: ICD-10-CM

## 2022-12-08 DIAGNOSIS — H90.3 SENSORINEURAL HEARING LOSS, BILATERAL: Primary | ICD-10-CM

## 2022-12-08 DIAGNOSIS — G47.33 OSA (OBSTRUCTIVE SLEEP APNEA): ICD-10-CM

## 2022-12-08 PROCEDURE — 92567 PR TYMPA2METRY: ICD-10-PCS | Mod: S$GLB,,,

## 2022-12-08 PROCEDURE — 3288F FALL RISK ASSESSMENT DOCD: CPT | Mod: CPTII,S$GLB,, | Performed by: OTOLARYNGOLOGY

## 2022-12-08 PROCEDURE — 99999 PR PBB SHADOW E&M-EST. PATIENT-LVL III: CPT | Mod: PBBFAC,,, | Performed by: OTOLARYNGOLOGY

## 2022-12-08 PROCEDURE — 1126F AMNT PAIN NOTED NONE PRSNT: CPT | Mod: CPTII,S$GLB,, | Performed by: OTOLARYNGOLOGY

## 2022-12-08 PROCEDURE — 99999 PR PBB SHADOW E&M-EST. PATIENT-LVL I: CPT | Mod: PBBFAC,,,

## 2022-12-08 PROCEDURE — 92557 COMPREHENSIVE HEARING TEST: CPT | Mod: S$GLB,,,

## 2022-12-08 PROCEDURE — 3080F DIAST BP >= 90 MM HG: CPT | Mod: CPTII,S$GLB,, | Performed by: OTOLARYNGOLOGY

## 2022-12-08 PROCEDURE — 99203 OFFICE O/P NEW LOW 30 MIN: CPT | Mod: S$GLB,,, | Performed by: OTOLARYNGOLOGY

## 2022-12-08 PROCEDURE — 1126F PR PAIN SEVERITY QUANTIFIED, NO PAIN PRESENT: ICD-10-PCS | Mod: CPTII,S$GLB,, | Performed by: OTOLARYNGOLOGY

## 2022-12-08 PROCEDURE — 92567 TYMPANOMETRY: CPT | Mod: S$GLB,,,

## 2022-12-08 PROCEDURE — 99999 PR PBB SHADOW E&M-EST. PATIENT-LVL I: ICD-10-PCS | Mod: PBBFAC,,,

## 2022-12-08 PROCEDURE — 99203 PR OFFICE/OUTPT VISIT, NEW, LEVL III, 30-44 MIN: ICD-10-PCS | Mod: S$GLB,,, | Performed by: OTOLARYNGOLOGY

## 2022-12-08 PROCEDURE — 1101F PT FALLS ASSESS-DOCD LE1/YR: CPT | Mod: CPTII,S$GLB,, | Performed by: OTOLARYNGOLOGY

## 2022-12-08 PROCEDURE — 3288F PR FALLS RISK ASSESSMENT DOCUMENTED: ICD-10-PCS | Mod: CPTII,S$GLB,, | Performed by: OTOLARYNGOLOGY

## 2022-12-08 PROCEDURE — 3077F PR MOST RECENT SYSTOLIC BLOOD PRESSURE >= 140 MM HG: ICD-10-PCS | Mod: CPTII,S$GLB,, | Performed by: OTOLARYNGOLOGY

## 2022-12-08 PROCEDURE — 1159F MED LIST DOCD IN RCRD: CPT | Mod: CPTII,S$GLB,, | Performed by: OTOLARYNGOLOGY

## 2022-12-08 PROCEDURE — 3077F SYST BP >= 140 MM HG: CPT | Mod: CPTII,S$GLB,, | Performed by: OTOLARYNGOLOGY

## 2022-12-08 PROCEDURE — 1101F PR PT FALLS ASSESS DOC 0-1 FALLS W/OUT INJ PAST YR: ICD-10-PCS | Mod: CPTII,S$GLB,, | Performed by: OTOLARYNGOLOGY

## 2022-12-08 PROCEDURE — 4010F PR ACE/ARB THEARPY RXD/TAKEN: ICD-10-PCS | Mod: CPTII,S$GLB,, | Performed by: OTOLARYNGOLOGY

## 2022-12-08 PROCEDURE — 3080F PR MOST RECENT DIASTOLIC BLOOD PRESSURE >= 90 MM HG: ICD-10-PCS | Mod: CPTII,S$GLB,, | Performed by: OTOLARYNGOLOGY

## 2022-12-08 PROCEDURE — 1159F PR MEDICATION LIST DOCUMENTED IN MEDICAL RECORD: ICD-10-PCS | Mod: CPTII,S$GLB,, | Performed by: OTOLARYNGOLOGY

## 2022-12-08 PROCEDURE — 4010F ACE/ARB THERAPY RXD/TAKEN: CPT | Mod: CPTII,S$GLB,, | Performed by: OTOLARYNGOLOGY

## 2022-12-08 PROCEDURE — 92557 PR COMPREHENSIVE HEARING TEST: ICD-10-PCS | Mod: S$GLB,,,

## 2022-12-08 PROCEDURE — 99999 PR PBB SHADOW E&M-EST. PATIENT-LVL III: ICD-10-PCS | Mod: PBBFAC,,, | Performed by: OTOLARYNGOLOGY

## 2022-12-08 NOTE — PATIENT INSTRUCTIONS
Audiometry reviewed; pt. is a candidate for amplification for one or both eras; copy of audiogram/MIRTA Arredondo's card provided  Monitor hearing yearly  Consider VNG testing for ongoing vertigo sx; call to schedule ( [phone]) ; vertigo work-up literature provided  Low sodium diet may be helpful  Eustachian tube literature provided; gentle middle ear insufflation only prn  Weight loss and use of CPAP encouraged ( to have repeat sleep study/new mask/device set up in near future)   Consider sinus sx evaluation with Dr. SENTHIL Hernandez pending course  Monitor blood pressure

## 2022-12-08 NOTE — PROGRESS NOTES
Subjective:       Patient ID: Amanda Holt is a 72 y.o. female.    Chief Complaint: Dizziness    HPI: Ms. Holt is a 72-year-old  female who presents today for evaluation of several episodes of vertigo.  She describes a room spinning sensation trying to cook 2 weeks before Thanksgiving.    She has a history of low back pain status post bilateral laminectomy procedures performed in 2018.  She has to sit for prolonged periods of time at this point.    When turning her head from side-to-side while sitting nd  cooking she experienced a 2 minute episode of vertigo with tachycardia.  She place ice on her chest for relief.  This occurred about noon time.  She had not eaten lunch or breakfast beforehand.  She would a brief episode of vertigo about 1 month later but not as bad.  She had 1 slight episode afterward.  Her sister suffers with tinnitus ( crickets sound) and vertigo.  Ms. Segovia describes her tinnitus as a low bilateral humming ( as if the TV just went off) .  She is being evaluated for ANTONIA and a  new CPAP device in the very near future as her device was recalled.   She indicates the possibility of neck arthralgia.  She indicates a hx of weight gain.  She, parenthetically, indicates a problem with her sinuses.  She indicates a pressure sensation in her left ear chronically; she admits to blowing her nose a lot and aerating her middle ears.  She wears an upper denture only as but she can not wear a lower denture due to a low alveolar ridge.    She takes losartan for blood pressure as well as a diuretic.    Past Medical History:   Diagnosis Date    Arthralgia     Colon polyp     Degenerative disc disease at L5-S1 level     High cholesterol     Hypertension     Nuclear sclerosis of both eyes 1/8/2020    Sciatica     Spinal stenosis      Past Surgical History:   Procedure Laterality Date    BACK SURGERY      lumbar laminectomy    COLONOSCOPY N/A 7/23/2020    Procedure: COLONOSCOPY;   Surgeon: Donal Moore MD;  Location: [facility] ENDO;  Service: Endoscopy;  Laterality: N/A;    EPIDURAL STEROID INJECTION Left 9/9/2020    Procedure: Injection, Steroid, Epidural Transforaminal;  Surgeon: Jun Leavitt Jr., MD;  Location: [facility] ENDO;  Service: Pain Management;  Laterality: Left;  Left L5 + S1 TF WADE  Arrive @ 1300; ASA last 9/1; No DM    EPIDURAL STEROID INJECTION Left 2/12/2021    Procedure: Injection, Steroid, Epidural Transforaminal;  Surgeon: Jun Leavitt Jr., MD;  Location: [facility] ENDO;  Service: Pain Management;  Laterality: Left;  Left L4 + L5 TF WADE  Arrive @ 1230; IV Sedation; ASA last 2/4; No DM    TRANSFORAMINAL EPIDURAL INJECTION OF STEROID Left 3/14/2022    Procedure: INJECTION, STEROID, EPIDURAL, TRANSFORAMINAL APPROACH, L4-L5 & L5-S1;  Surgeon: Darya Ayala MD;  Location: [facility] [place] PAIN MGT;  Service: Pain Management;  Laterality: Left;    TRANSFORAMINAL EPIDURAL INJECTION OF STEROID Left 8/29/2022    Procedure: LUMBAR TRANSFORAMINAL LEFT L4/5 AND L5/S1 CONTRAST;  Surgeon: Darya Ayala MD;  Location: [facility] [place] PAIN MGT;  Service: Pain Management;  Laterality: Left;    TUBAL LIGATION       Current Outpatient Medications on File Prior to Visit   Medication Sig Dispense Refill    amitriptyline (ELAVIL) 100 MG tablet Take 1 tablet (100 mg total) by mouth every evening. 90 tablet 1    aspirin (ECOTRIN) 81 MG EC tablet       clotrimazole-betamethasone 1-0.05% (LOTRISONE) cream APPLY  CREAM TOPICALLY TO AFFECTED AREA TWICE DAILY 45 g 0    diclofenac sodium (VOLTAREN) 1 % Gel APPLY 2 GRAMS TOPICALLY TO AFFECTED AREA 4 TIMES DAILY 100 g 5    ergocalciferol (ERGOCALCIFEROL) 50,000 unit Cap Take 1 capsule by mouth every 7 days.      fluticasone propionate (FLONASE) 50 mcg/actuation nasal spray 1 spray (50 mcg total) by Each Nostril route once daily. 16 g 5    hydrOXYzine HCL (ATARAX) 25 MG tablet Take 1 tablet by mouth three times daily as needed 45 tablet 0    mupirocin (BACTROBAN) 2 %  ointment Apply topically 3 (three) times daily. 22 g 0    NYSTOP powder APPLY TOPICALLY 4 TIMES A DAY 60 g 5    omeprazole (PRILOSEC) 40 MG capsule Take 1 capsule by mouth once daily 90 capsule 0    pravastatin (PRAVACHOL) 40 MG tablet Take 1 tablet by mouth once daily 90 tablet 1    spironolactone (ALDACTONE) 25 MG tablet Take 1 tablet by mouth once daily 90 tablet 0    tiZANidine (ZANAFLEX) 4 MG tablet TAKE 1 TABLET BY MOUTH EVERY 6 HOURS AS NEEDED 90 tablet 1    traMADoL (ULTRAM) 50 mg tablet TAKE 1 TABLET BY MOUTH EVERY 6 HOURS AS NEEDED FOR PAIN 120 tablet 0    triamcinolone acetonide 0.1% (KENALOG) 0.1 % ointment AAA bid 454 g 3    triamcinolone acetonide 0.1%-ciclopirox-magnesium hydroxide 400 mg/5 ml Apply to affected area twice a day after cool blow dry 60 g 5    valsartan (DIOVAN) 160 MG tablet Take 1 tablet (160 mg total) by mouth once daily. 90 tablet 3     No current facility-administered medications on file prior to visit.     Review of Systems      The patient completed an audiogram today performed by the [facility] audiology service.  The study is duplicated below and the results are reviewed with her.  Objective:          Physical Exam  HENT:      Ears:        Nose:        Mouth/Throat:         Gen.:  Alert and oriented slightly proptotic AAF in no acute distress  Blood pressure 192/106 pulse 91 ht 5 ft 4 in weight 274 lb  Both ears were examined under the microscope.    Assessment:       1. Sensorineural hearing loss, bilateral    2. Vertigo    3. ANTONIA (obstructive sleep apnea)    4. Sinus disorder ; bilateral inferior turbinate hypertrophy   5. Ear pressure, left    6. Tinnitus, bilateral        7.     S/p back surgery  8.      Possible cervical arthralgia;  possible transient low blood sugar  9.      Elevated blood pressure reading  Plan:     Audiometry reviewed; pt. is a candidate for amplification for one or both eras; copy of audiogram/MIRTA Arredondo's card provided  Monitor hearing yearly  Consider VNG  testing for ongoing vertigo sx; call to schedule ( [phone]) ; vertigo work-up literature provided  Low sodium diet may be helpful  Eustachian tube literature provided; gentle middle ear insufflation only prn  Weight loss and use of CPAP encouraged ( to have repeat sleep study/new mask/device set up in near future)   Consider sinus sx evaluation with Dr. SENTHIL Hernandez pending course  Monitor blood pressure

## 2022-12-08 NOTE — PROGRESS NOTES
Amanda Holt was seen today in the clinic for an audiologic evaluation.  Patient's main complaint was dizziness and left side aural pressure.  Ms. Holt reported longstanding aural fullness primarily in her left ear. She noted two episodes of true spinning vertigo that occurred when she was cleaning and turning her head from side to side. They lasted seconds to minutes. Ms. Holt also reported a history of hearing loss and intermittent tinnitus bilaterally.    Tympanometry revealed Type A in the right ear and Type A in the left ear.     Audiogram results revealed mild sloping to moderately severe sensorineural hearing loss bilaterally.      Speech reception thresholds were noted at 30 dB in the right ear and 35 dB in the left ear.    Speech discrimination scores were 96% in the right ear and 88% in the left ear.    Recommendations:  Otologic evaluation  Annual audiogram  Hearing protection when in noise  Hearing aid consultation

## 2022-12-10 ENCOUNTER — HOSPITAL ENCOUNTER (OUTPATIENT)
Dept: SLEEP MEDICINE | Facility: HOSPITAL | Age: 72
Discharge: HOME | End: 2022-12-10
Attending: NURSE PRACTITIONER
Payer: MEDICARE

## 2022-12-10 DIAGNOSIS — G47.30 SLEEP-RELATED BREATHING DISORDER: ICD-10-CM

## 2022-12-10 PROCEDURE — 95810 POLYSOM 6/> YRS 4/> PARAM: CPT

## 2022-12-11 NOTE — PROGRESS NOTES
End of the night summary    Type of study performed on (Amanda Holt-)  PSG  ?  Patient education/cpap information prior to study/setup    Pt was informed, of emergency cord in bathroom and given spectra link phone#    EKG Appears to be-  NSR    Low spo2 -  92%    Any difficulties recording: pt unable to sleep supine      Pt reaction to CPAP:  pt reports she used to have cpap willing to try it for the study      Tech summary Comments:    pt did not meet criteria for split on cpap, moderate to loud snoring observed, pts had a few events observed,  pt slept mainly on her due to back trouble, pt reports she has bad sleep hygiene at home staying up late till 1am , pt slept okay no reports of discomfort

## 2022-12-11 NOTE — PATIENT INSTRUCTIONS
Your sleep study will be scored and interpreted by one of our physicians who are board certified in sleep medicine.  Within two weeks the results will be sent to the physician who referred you. Your physician should then contact you to go over the results, along with any recommendations. If you do not hear from your physician within two weeks, please call them.

## 2022-12-12 ENCOUNTER — TELEPHONE (OUTPATIENT)
Dept: PULMONOLOGY | Facility: CLINIC | Age: 72
End: 2022-12-12
Payer: MEDICARE

## 2022-12-12 NOTE — TELEPHONE ENCOUNTER
Told patient to upload her sleep study through her portal.                    ----- Message from Myrna Lopez sent at 12/12/2022  2:36 PM CST -----  Regarding: self  .[phone]  .Type: Patient Call Back    Who called: self     What is the request in detail: Pt is requesting to discuss sleep study results     Can the clinic reply by [facility]? Call back     Would the patient rather a call back or a response via [facility]?  Call back     Best call back number: .[phone]

## 2022-12-20 ENCOUNTER — PATIENT MESSAGE (OUTPATIENT)
Dept: FAMILY MEDICINE | Facility: CLINIC | Age: 72
End: 2022-12-20
Payer: MEDICARE

## 2022-12-20 DIAGNOSIS — L72.3 INFLAMED EPIDERMOID CYST OF SKIN: ICD-10-CM

## 2022-12-20 DIAGNOSIS — M54.16 LUMBAR RADICULOPATHY: ICD-10-CM

## 2022-12-20 DIAGNOSIS — M48.00 SPINAL STENOSIS, UNSPECIFIED SPINAL REGION: ICD-10-CM

## 2022-12-20 DIAGNOSIS — B37.2 CANDIDAL INTERTRIGO: ICD-10-CM

## 2022-12-20 DIAGNOSIS — K21.9 GASTROESOPHAGEAL REFLUX DISEASE WITHOUT ESOPHAGITIS: ICD-10-CM

## 2022-12-20 DIAGNOSIS — E87.6 LOW BLOOD POTASSIUM: ICD-10-CM

## 2022-12-20 NOTE — TELEPHONE ENCOUNTER
----- Message from Lula Armstrong sent at 12/20/2022 11:38 AM CST -----  Regarding: self [phone]  Type: Patient Call Back    Who called: self     What is the request in detail:Would like the nurse to call her back about her Rx refills, due to the holidays.She had an issue last month with her Tramadol.     1. traMADoL (ULTRAM) 50 mg tablet  2. clotrimazole-betamethasone 1-0.05% (LOTRISONE) cream  3. amitriptyline (ELAVIL) 100 MG tablet  4. omeprazole (PRILOSEC) 40 MG capsule    [address] [facility]A [facility]IM[facility] - [address] [facility]  [address] [facility]  [place] [address]  Phone: [phone] Fax: [phone]    Can the clinic reply by [facility]? no    Would the patient rather a call back or a response via [facility]? Call back     Best call back number: [phone]

## 2022-12-20 NOTE — TELEPHONE ENCOUNTER
Care Due:                  Date            Visit Type   Department     Provider  --------------------------------------------------------------------------------                                [facility]                              PRIMARY      MEDICINE/  Last Visit: 08-      CARE ([facility])   INTERNAL MED   Simona Torres  Next Visit: None Scheduled  None         None Found                                                            Last  Test          Frequency    Reason                     Performed    Due Date  --------------------------------------------------------------------------------    CMP.........  12 months..  pravastatin,               Not Found    Overdue                             spironolactone, valsartan    Lipid Panel.  12 months..  pravastatin..............  10-   10-    [facility] Embedded Care Gaps. Reference number: [id number]. 12/20/2022   1:42:29 PM CST

## 2022-12-21 PROCEDURE — 95810 POLYSOM 6/> YRS 4/> PARAM: CPT | Mod: 26,,, | Performed by: INTERNAL MEDICINE

## 2022-12-21 PROCEDURE — 95810 PR POLYSOMNOGRAPHY, 4 OR MORE: ICD-10-PCS | Mod: 26,,, | Performed by: INTERNAL MEDICINE

## 2022-12-21 RX ORDER — AMITRIPTYLINE HYDROCHLORIDE 100 MG/1
100 TABLET ORAL NIGHTLY
Qty: 90 TABLET | Refills: 1 | Status: SHIPPED | OUTPATIENT
Start: 2022-12-21 | End: 2022-12-21 | Stop reason: SDUPTHER

## 2022-12-21 RX ORDER — OMEPRAZOLE 40 MG/1
40 CAPSULE, DELAYED RELEASE ORAL DAILY
Qty: 90 CAPSULE | Refills: 2 | Status: SHIPPED | OUTPATIENT
Start: 2022-12-21 | End: 2022-12-21 | Stop reason: SDUPTHER

## 2022-12-21 RX ORDER — SPIRONOLACTONE 25 MG/1
25 TABLET ORAL DAILY
Qty: 90 TABLET | Refills: 0 | Status: SHIPPED | OUTPATIENT
Start: 2022-12-21 | End: 2023-01-30

## 2022-12-21 RX ORDER — NYSTATIN 100000 [USP'U]/G
POWDER TOPICAL
Qty: 30 G | Refills: 1 | Status: SHIPPED | OUTPATIENT
Start: 2022-12-21 | End: 2023-06-30

## 2022-12-21 RX ORDER — TIZANIDINE 4 MG/1
TABLET ORAL
Qty: 90 TABLET | Refills: 1 | Status: SHIPPED | OUTPATIENT
Start: 2022-12-21 | End: 2023-08-23

## 2022-12-21 RX ORDER — OMEPRAZOLE 40 MG/1
40 CAPSULE, DELAYED RELEASE ORAL DAILY
Qty: 90 CAPSULE | Refills: 2 | Status: SHIPPED | OUTPATIENT
Start: 2022-12-21 | End: 2023-11-13 | Stop reason: SDUPTHER

## 2022-12-21 RX ORDER — TRAMADOL HYDROCHLORIDE 50 MG/1
TABLET ORAL
Qty: 120 TABLET | Refills: 0 | OUTPATIENT
Start: 2022-12-21

## 2022-12-21 RX ORDER — CLOTRIMAZOLE AND BETAMETHASONE DIPROPIONATE 10; .64 MG/G; MG/G
CREAM TOPICAL
Qty: 45 G | Refills: 0 | Status: SHIPPED | OUTPATIENT
Start: 2022-12-21

## 2022-12-21 RX ORDER — TRAMADOL HYDROCHLORIDE 50 MG/1
TABLET ORAL
Qty: 120 TABLET | Refills: 0 | Status: SHIPPED | OUTPATIENT
Start: 2022-12-21 | End: 2023-01-12 | Stop reason: SDUPTHER

## 2022-12-21 RX ORDER — MUPIROCIN 20 MG/G
OINTMENT TOPICAL 3 TIMES DAILY
Qty: 22 G | Refills: 0 | Status: SHIPPED | OUTPATIENT
Start: 2022-12-21

## 2022-12-21 RX ORDER — CLOTRIMAZOLE AND BETAMETHASONE DIPROPIONATE 10; .64 MG/G; MG/G
CREAM TOPICAL
Qty: 45 G | Refills: 0 | Status: SHIPPED | OUTPATIENT
Start: 2022-12-21 | End: 2022-12-21 | Stop reason: SDUPTHER

## 2022-12-21 RX ORDER — AMITRIPTYLINE HYDROCHLORIDE 100 MG/1
100 TABLET ORAL NIGHTLY
Qty: 90 TABLET | Refills: 1 | Status: SHIPPED | OUTPATIENT
Start: 2022-12-21 | End: 2023-11-13 | Stop reason: SDUPTHER

## 2022-12-21 NOTE — TELEPHONE ENCOUNTER
Refill Routing Note   Medication(s) are not appropriate for processing by [facility] for the following reason(s):      - Outside of protocol  - Required laboratory values are outdated  - Required vitals are abnormal  - Drug-Drug Interaction (tramadol and amitriptyline)    [facility] action(s):  Defer  Route  Approve Medication-related problems identified:   Drug-drug interaction  Requires labs     Medication Therapy Plan: Drug-Drug: traMADoL and amitriptyline  Medication reconciliation completed: No     Appointments  past 12m or future 3m with PCP    Date Provider   Last Visit   8/22/2022 Simona Torres MD   Next Visit   Visit date not found Simona Torres MD   ED visits in past 90 days: 0        Note composed:8:49 AM 12/21/2022

## 2022-12-21 NOTE — PROCEDURES
"Dear Provider,     You have ordered sleep LAB services to perform the sleep study for Amanda Holt.  The sleep study that you ordered is complete.      Please find Sleep Study result in "Chart Review" under the "Media tab."      As the ordering provider, you are responsible for reviewing the results and implementing a treatment plan with your patient.    If you need a Sleep Medicine provider to explain the sleep study findings and arrange treatment for the patient, please refer patient for consultation to our Sleep Clinic via [facility] with Ambulatory Consult Sleep.    To do that please place an order for an  "Ambulatory Consult Sleep" - it will go to our clinic work queue for our Medical Assistant to contact the patient for an appointment.     For any questions, please contact our clinic staff at [phone] to talk to clinical staff.   "

## 2022-12-27 ENCOUNTER — TELEPHONE (OUTPATIENT)
Dept: PULMONOLOGY | Facility: CLINIC | Age: 72
End: 2022-12-27
Payer: MEDICARE

## 2022-12-27 NOTE — TELEPHONE ENCOUNTER
Scheduled an appointment to see provider, mailed appt slip.    ESTEPHANIA Mahoney                 ----- Message from Maida Alan NP sent at 12/27/2022 12:28 PM CST -----  Please call the patient  and let them know their test results are back. Please call the patient to schedule a follow up appointment to discuss results.

## 2023-01-12 ENCOUNTER — OFFICE VISIT (OUTPATIENT)
Dept: FAMILY MEDICINE | Facility: CLINIC | Age: 73
End: 2023-01-12
Payer: MEDICARE

## 2023-01-12 DIAGNOSIS — E66.01 MORBID OBESITY WITH BMI OF 40.0-44.9, ADULT: ICD-10-CM

## 2023-01-12 DIAGNOSIS — M48.00 SPINAL STENOSIS, UNSPECIFIED SPINAL REGION: Primary | ICD-10-CM

## 2023-01-12 DIAGNOSIS — F11.20 OPIOID DEPENDENCE, UNCOMPLICATED: ICD-10-CM

## 2023-01-12 DIAGNOSIS — I10 PRIMARY HYPERTENSION: ICD-10-CM

## 2023-01-12 PROCEDURE — 99214 PR OFFICE/OUTPT VISIT, EST, LEVL IV, 30-39 MIN: ICD-10-PCS | Mod: 95,,, | Performed by: FAMILY MEDICINE

## 2023-01-12 PROCEDURE — 99214 OFFICE O/P EST MOD 30 MIN: CPT | Mod: 95,,, | Performed by: FAMILY MEDICINE

## 2023-01-12 RX ORDER — TRAMADOL HYDROCHLORIDE 50 MG/1
TABLET ORAL
Qty: 120 TABLET | Refills: 0 | Status: SHIPPED | OUTPATIENT
Start: 2023-01-12 | End: 2023-02-22 | Stop reason: SDUPTHER

## 2023-01-12 NOTE — PROGRESS NOTES
Chief Complaint   Patient presents with    Back Pain       The patient location is:  Patient Home   The chief complaint leading to consultation is: chronic pain  Visit type: Virtual visit with synchronous audio and video  Total time spent with patient: 20 min2  Each patient to whom he or she provides medical services by telemedicine is:  (1) informed of the relationship between the physician and patient and the respective role of any other health care provider with respect to management of the patient; and (2) notified that he or she may decline to receive medical services by telemedicine and may withdraw from such care at any time.      DEBBIE Holt is a 72 y.o. female with multiple medical diagnoses as listed in the medical history and problem list that presents for follow-up for chronic pain    She is having some shortness of breath with exertion, she has seen pulmonology and has PFTs ordered and an echo; she is pending f/u with pulm for sleep apnea    She has had lower back pain from spinal stenosis, she is having pain with the rainy weather, she was doing pool therapy but missed some sessions due to illness; she does see pain mgmt for epidurals, nerve block has been recommended but she declines at this point    She is pending sign up for digital medicine for HTN    PAST MEDICAL HISTORY:  Past Medical History:   Diagnosis Date    Arthralgia     Colon polyp     Degenerative disc disease at L5-S1 level     High cholesterol     Hypertension     Nuclear sclerosis of both eyes 1/8/2020    Sciatica     Spinal stenosis        PAST SURGICAL HISTORY:  Past Surgical History:   Procedure Laterality Date    BACK SURGERY      lumbar laminectomy    COLONOSCOPY N/A 7/23/2020    Procedure: COLONOSCOPY;  Surgeon: Donal Moore MD;  Location: [facility];  Service: Endoscopy;  Laterality: N/A;    EPIDURAL STEROID INJECTION Left 9/9/2020    Procedure: Injection, Steroid, Epidural Transforaminal;  Surgeon: Jun  ALVERTO Leavitt Jr., MD;  Location: [facility] ENDO;  Service: Pain Management;  Laterality: Left;  Left L5 + S1 TF WADE  Arrive @ 1300; ASA last ; No DM    EPIDURAL STEROID INJECTION Left 2021    Procedure: Injection, Steroid, Epidural Transforaminal;  Surgeon: Jun Leavitt Jr., MD;  Location: [facility] ENDO;  Service: Pain Management;  Laterality: Left;  Left L4 + L5 TF WADE  Arrive @ 1230; IV Sedation; ASA last ; No DM    TRANSFORAMINAL EPIDURAL INJECTION OF STEROID Left 3/14/2022    Procedure: INJECTION, STEROID, EPIDURAL, TRANSFORAMINAL APPROACH, L4-L5 & L5-S1;  Surgeon: Darya Ayala MD;  Location: [facility] PAIN MGT;  Service: Pain Management;  Laterality: Left;    TRANSFORAMINAL EPIDURAL INJECTION OF STEROID Left 2022    Procedure: LUMBAR TRANSFORAMINAL LEFT L4/5 AND L5/S1 CONTRAST;  Surgeon: Darya Ayala MD;  Location: [facility] PAIN MGT;  Service: Pain Management;  Laterality: Left;    TUBAL LIGATION         SOCIAL HISTORY:  Social History     Socioeconomic History    Marital status:    Tobacco Use    Smoking status: Some Days     Packs/day: 0.25     Years: 40.00     Pack years: 10.00     Types: Cigarettes, Vaping with nicotine     Last attempt to quit: 3/15/2022     Years since quittin.8    Smokeless tobacco: Current    Tobacco comments:     Started smoking age 31 with drinking, 2 cigarettes/week, quit 3/2022     Currently vaping with nicotine    Substance and Sexual Activity    Alcohol use: Yes     Comment: ocassionally    Drug use: Yes     Comment: Tramadol twice a day as needed    Sexual activity: Not Currently     Social Determinants of Health     Financial Resource Strain: Medium Risk    Difficulty of Paying Living Expenses: Somewhat hard   Food Insecurity: Food Insecurity Present    Worried About Running Out of Food in the Last Year: Sometimes true    Ran Out of Food in the Last Year: Sometimes true   Transportation Needs: No Transportation Needs    Lack of Transportation (Medical): No     Lack of Transportation (Non-Medical): No   Physical Activity: Insufficiently Active    Days of Exercise per Week: 3 days    Minutes of Exercise per Session: 30 min   Stress: Stress Concern Present    Feeling of Stress : Very much   Social Connections: Unknown    Frequency of Communication with Friends and Family: More than three times a week    Frequency of Social Gatherings with Friends and Family: Once a week    Active Member of Clubs or Organizations: No    Attends Club or Organization Meetings: Never    Marital Status:    Housing Stability: Low Risk     Unable to Pay for Housing in the Last Year: No    Number of Places Lived in the Last Year: 1    Unstable Housing in the Last Year: No       FAMILY HISTORY:  Family History   Problem Relation Age of Onset    Breast cancer Mother     Hypertension Mother     Hypotension Mother     Cataracts Father     Glaucoma Father     Diabetes Father     Glaucoma Sister     No Known Problems Brother     No Known Problems Maternal Aunt     No Known Problems Maternal Uncle     No Known Problems Paternal Aunt     No Known Problems Paternal Uncle     No Known Problems Maternal Grandmother     No Known Problems Maternal Grandfather     No Known Problems Paternal Grandmother     No Known Problems Paternal Grandfather     No Known Problems Brother     Amblyopia Neg Hx     Blindness Neg Hx     Cancer Neg Hx     Macular degeneration Neg Hx     Retinal detachment Neg Hx     Strabismus Neg Hx     Stroke Neg Hx     Thyroid disease Neg Hx        ALLERGIES AND MEDICATIONS: updated and reviewed.  Review of patient's allergies indicates:   Allergen Reactions    Codeine      Medication List with Changes/Refills   Current Medications    AMITRIPTYLINE (ELAVIL) 100 MG TABLET    Take 1 tablet (100 mg total) by mouth every evening.    ASPIRIN (ECOTRIN) 81 MG EC TABLET        CLOTRIMAZOLE-BETAMETHASONE 1-0.05% (LOTRISONE) CREAM    APPLY  CREAM TOPICALLY TO AFFECTED AREA TWICE DAILY     DICLOFENAC SODIUM (VOLTAREN) 1 % GEL    APPLY 2 GRAMS TOPICALLY TO AFFECTED AREA 4 TIMES DAILY    ERGOCALCIFEROL (ERGOCALCIFEROL) 50,000 UNIT CAP    Take 1 capsule by mouth every 7 days.    FLUTICASONE PROPIONATE (FLONASE) 50 MCG/ACTUATION NASAL SPRAY    1 spray (50 mcg total) by Each Nostril route once daily.    HYDROXYZINE HCL (ATARAX) 25 MG TABLET    Take 1 tablet by mouth three times daily as needed    MUPIROCIN (BACTROBAN) 2 % OINTMENT    Apply topically 3 (three) times daily.    NYSTATIN (NYSTOP) POWDER    APPLY TOPICALLY 4 TIMES A DAY    OMEPRAZOLE (PRILOSEC) 40 MG CAPSULE    Take 1 capsule (40 mg total) by mouth once daily.    PRAVASTATIN (PRAVACHOL) 40 MG TABLET    Take 1 tablet by mouth once daily    SPIRONOLACTONE (ALDACTONE) 25 MG TABLET    Take 1 tablet (25 mg total) by mouth once daily.    TIZANIDINE (ZANAFLEX) 4 MG TABLET    TAKE 1 TABLET BY MOUTH EVERY 6 HOURS AS NEEDED    TRIAMCINOLONE ACETONIDE 0.1% (KENALOG) 0.1 % OINTMENT    AAA bid    TRIAMCINOLONE ACETONIDE 0.1%-CICLOPIROX-MAGNESIUM HYDROXIDE 400 MG/5 ML    Apply to affected area twice a day after cool blow dry    VALSARTAN (DIOVAN) 160 MG TABLET    Take 1 tablet (160 mg total) by mouth once daily.   Changed and/or Refilled Medications    Modified Medication Previous Medication    TRAMADOL (ULTRAM) 50 MG TABLET traMADoL (ULTRAM) 50 mg tablet       TAKE 1 TABLET BY MOUTH EVERY 6 HOURS AS NEEDED FOR PAIN    TAKE 1 TABLET BY MOUTH EVERY 6 HOURS AS NEEDED FOR PAIN       ROS  Review of Systems   Constitutional:  Negative for fever.   HENT:  Positive for ear pain. Negative for rhinorrhea and sore throat.    Respiratory:  Positive for shortness of breath and wheezing.    Cardiovascular:  Positive for leg swelling. Negative for chest pain.   Gastrointestinal:  Negative for abdominal pain and vomiting.   Musculoskeletal:  Positive for neck pain.   Skin:  Negative for rash.   Neurological:  Positive for headaches.     Physical Exam  There were no vitals  filed for this visit. There is no height or weight on file to calculate BMI.            Physical Exam  Vitals and nursing note reviewed.   Constitutional:       Appearance: She is well-developed.   Skin:     General: Skin is warm and dry.      Findings: No erythema or rash.   Neurological:      Mental Status: She is alert. Mental status is at baseline.   Psychiatric:         Behavior: Behavior normal.       Health Maintenance         Date Due Completion Date    Pneumococcal Vaccines (Age 65+) (1 - PCV) Never done ---    Sign Pain Contract Never done ---    Urine Drug Screen Never done ---    Shingles Vaccine (1 of 2) Never done ---    COVID-19 Vaccine (5 - Booster) 04/14/2022 2/17/2022    TETANUS VACCINE 06/14/2022 6/14/2012 (Done)    Override on 6/14/2012: Done    Influenza Vaccine (1) Never done ---    Mammogram 04/20/2023 4/20/2022    Colorectal Cancer Screening 07/23/2023 7/23/2020    High Dose Statin 12/08/2023 12/8/2022    Aspirin/Antiplatelet Therapy 12/08/2023 12/8/2022    Lipid Panel 10/21/2026 10/21/2021            Health maintenance reviewed and addressed as ordered      ASSESSMENT     1. Spinal stenosis, unspecified spinal region    2. Primary hypertension    3. Opioid dependence, uncomplicated    4. Morbid obesity with BMI of 40.0-44.9, adult        PLAN:     Problem List Items Addressed This Visit          Neuro    Spinal stenosis - Primary   [place]  database queried/reviewed  F/u with pain mgmt for epidural  Refer to PT    Relevant Medications    traMADoL (ULTRAM) 50 mg tablet    Other Relevant Orders    Ambulatory referral/consult to Physical/Occupational Therapy       Psychiatric    Opioid dependence, uncomplicated  Stable on current regimen       Cardiac/Vascular    Hypertension  Due for BP check  She is onboarding with digital medicine       Endocrine    Morbid obesity with BMI of 40.0-44.9, adult  She would like to try medications for weight loss if possible      Overview     Patient is aware of  need for weight loss  and is making an effort to improve diet (eating more fruits and vegetables and reducing simple sugars and fried foods) and exercise regularly         Relevant Orders    Ambulatory referral/consult to Bariatric Medicine         Simona Torres MD  01/12/2023 2:37 PM        Follow up in about 3 months (around 4/12/2023).    Orders Placed This Encounter   Procedures    Ambulatory referral/consult to Bariatric Medicine    Ambulatory referral/consult to Physical/Occupational Therapy

## 2023-01-13 ENCOUNTER — TELEPHONE (OUTPATIENT)
Dept: FAMILY MEDICINE | Facility: CLINIC | Age: 73
End: 2023-01-13
Payer: MEDICARE

## 2023-01-13 NOTE — TELEPHONE ENCOUNTER
----- Message from Simona Torres MD sent at 1/12/2023  2:53 PM CST -----  Schedule for 3 mos OV for chronic pain and for blood pressure check in the next two weeks

## 2023-01-27 ENCOUNTER — CLINICAL SUPPORT (OUTPATIENT)
Dept: FAMILY MEDICINE | Facility: CLINIC | Age: 73
End: 2023-01-27
Payer: MEDICARE

## 2023-01-27 VITALS — DIASTOLIC BLOOD PRESSURE: 100 MMHG | OXYGEN SATURATION: 98 % | SYSTOLIC BLOOD PRESSURE: 160 MMHG | HEART RATE: 70 BPM

## 2023-01-27 DIAGNOSIS — Z01.30 BLOOD PRESSURE CHECK: Primary | ICD-10-CM

## 2023-01-27 PROCEDURE — 99499 UNLISTED E&M SERVICE: CPT | Mod: S$GLB,,, | Performed by: FAMILY MEDICINE

## 2023-01-27 PROCEDURE — 99499 NO LOS: ICD-10-PCS | Mod: S$GLB,,, | Performed by: FAMILY MEDICINE

## 2023-01-27 PROCEDURE — 99999 PR PBB SHADOW E&M-EST. PATIENT-LVL III: CPT | Mod: PBBFAC,,,

## 2023-01-27 PROCEDURE — 99999 PR PBB SHADOW E&M-EST. PATIENT-LVL III: ICD-10-PCS | Mod: PBBFAC,,,

## 2023-01-27 NOTE — PROGRESS NOTES
Amanda Cuencains 72 y.o. female is here today for Blood Pressure check.   History of HTN yes.    Review of patient's allergies indicates:   Allergen Reactions    Codeine      Creatinine   Date Value Ref Range Status   07/20/2020 1.2 0.5 - 1.4 mg/dL Final     Sodium   Date Value Ref Range Status   07/20/2020 142 136 - 145 mmol/L Final     Potassium   Date Value Ref Range Status   07/20/2020 4.3 3.5 - 5.1 mmol/L Final   ]  Patient verifies taking blood pressure medications on a regular basis at the same time of the day.     Current Outpatient Medications:     amitriptyline (ELAVIL) 100 MG tablet, Take 1 tablet (100 mg total) by mouth every evening., Disp: 90 tablet, Rfl: 1    aspirin (ECOTRIN) 81 MG EC tablet, , Disp: , Rfl:     clotrimazole-betamethasone 1-0.05% (LOTRISONE) cream, APPLY  CREAM TOPICALLY TO AFFECTED AREA TWICE DAILY, Disp: 45 g, Rfl: 0    diclofenac sodium (VOLTAREN) 1 % Gel, APPLY 2 GRAMS TOPICALLY TO AFFECTED AREA 4 TIMES DAILY, Disp: 100 g, Rfl: 5    ergocalciferol (ERGOCALCIFEROL) 50,000 unit Cap, Take 1 capsule by mouth every 7 days., Disp: , Rfl:     fluticasone propionate (FLONASE) 50 mcg/actuation nasal spray, 1 spray (50 mcg total) by Each Nostril route once daily., Disp: 16 g, Rfl: 5    hydrOXYzine HCL (ATARAX) 25 MG tablet, Take 1 tablet by mouth three times daily as needed, Disp: 45 tablet, Rfl: 0    mupirocin (BACTROBAN) 2 % ointment, Apply topically 3 (three) times daily., Disp: 22 g, Rfl: 0    nystatin (NYSTOP) powder, APPLY TOPICALLY 4 TIMES A DAY, Disp: 30 g, Rfl: 1    omeprazole (PRILOSEC) 40 MG capsule, Take 1 capsule (40 mg total) by mouth once daily., Disp: 90 capsule, Rfl: 2    pravastatin (PRAVACHOL) 40 MG tablet, Take 1 tablet by mouth once daily, Disp: 90 tablet, Rfl: 1    spironolactone (ALDACTONE) 25 MG tablet, Take 1 tablet (25 mg total) by mouth once daily., Disp: 90 tablet, Rfl: 0    tiZANidine (ZANAFLEX) 4 MG tablet, TAKE 1 TABLET BY MOUTH EVERY 6 HOURS AS NEEDED,  Disp: 90 tablet, Rfl: 1    traMADoL (ULTRAM) 50 mg tablet, TAKE 1 TABLET BY MOUTH EVERY 6 HOURS AS NEEDED FOR PAIN, Disp: 120 tablet, Rfl: 0    triamcinolone acetonide 0.1% (KENALOG) 0.1 % ointment, AAA bid, Disp: 454 g, Rfl: 3    triamcinolone acetonide 0.1%-ciclopirox-magnesium hydroxide 400 mg/5 ml, Apply to affected area twice a day after cool blow dry, Disp: 60 g, Rfl: 5    valsartan (DIOVAN) 160 MG tablet, Take 1 tablet (160 mg total) by mouth once daily., Disp: 90 tablet, Rfl: 3  Does patient have record of home blood pressure readings no. Digital Medic[employer] machine not set up yet.  Last dose of blood pressure medication was taken at 1/27/23.  Patient is asymptomatic.   Complains of none.    BP: (!) 160/100 , Pulse: 68 .    Blood pressure reading after 15 minutes was 160/100, Pulse 70.  Dr. Torres will be notified.   Patient stated taking medication in the car right before Nurse visit.

## 2023-01-30 ENCOUNTER — TELEPHONE (OUTPATIENT)
Dept: FAMILY MEDICINE | Facility: CLINIC | Age: 73
End: 2023-01-30
Payer: MEDICARE

## 2023-01-30 RX ORDER — SPIRONOLACTONE 50 MG/1
50 TABLET, FILM COATED ORAL DAILY
Qty: 90 TABLET | Refills: 1 | Status: SHIPPED | OUTPATIENT
Start: 2023-01-30 | End: 2023-08-20

## 2023-01-30 NOTE — TELEPHONE ENCOUNTER
Dr. Torres's message:  Please have her increase her spironolactone to 50mg and f/u with BP check in two weeks    Assisted in setting an appointment and notified patient that her dosage was increased - she verbalized understanding

## 2023-01-31 ENCOUNTER — OFFICE VISIT (OUTPATIENT)
Dept: PULMONOLOGY | Facility: CLINIC | Age: 73
End: 2023-01-31
Payer: MEDICARE

## 2023-01-31 VITALS
DIASTOLIC BLOOD PRESSURE: 89 MMHG | WEIGHT: 278.69 LBS | BODY MASS INDEX: 47.58 KG/M2 | SYSTOLIC BLOOD PRESSURE: 165 MMHG | HEIGHT: 64 IN | OXYGEN SATURATION: 97 % | HEART RATE: 88 BPM

## 2023-01-31 DIAGNOSIS — G47.33 OSA (OBSTRUCTIVE SLEEP APNEA): Primary | ICD-10-CM

## 2023-01-31 DIAGNOSIS — R60.0 PERIPHERAL EDEMA: ICD-10-CM

## 2023-01-31 DIAGNOSIS — R06.09 DOE (DYSPNEA ON EXERTION): ICD-10-CM

## 2023-01-31 PROCEDURE — 3008F PR BODY MASS INDEX (BMI) DOCUMENTED: ICD-10-PCS | Mod: CPTII,S$GLB,, | Performed by: NURSE PRACTITIONER

## 2023-01-31 PROCEDURE — 3288F FALL RISK ASSESSMENT DOCD: CPT | Mod: CPTII,S$GLB,, | Performed by: NURSE PRACTITIONER

## 2023-01-31 PROCEDURE — 3288F PR FALLS RISK ASSESSMENT DOCUMENTED: ICD-10-PCS | Mod: CPTII,S$GLB,, | Performed by: NURSE PRACTITIONER

## 2023-01-31 PROCEDURE — 1125F PR PAIN SEVERITY QUANTIFIED, PAIN PRESENT: ICD-10-PCS | Mod: CPTII,S$GLB,, | Performed by: NURSE PRACTITIONER

## 2023-01-31 PROCEDURE — 1101F PT FALLS ASSESS-DOCD LE1/YR: CPT | Mod: CPTII,S$GLB,, | Performed by: NURSE PRACTITIONER

## 2023-01-31 PROCEDURE — 3079F PR MOST RECENT DIASTOLIC BLOOD PRESSURE 80-89 MM HG: ICD-10-PCS | Mod: CPTII,S$GLB,, | Performed by: NURSE PRACTITIONER

## 2023-01-31 PROCEDURE — 99999 PR PBB SHADOW E&M-EST. PATIENT-LVL IV: ICD-10-PCS | Mod: PBBFAC,,, | Performed by: NURSE PRACTITIONER

## 2023-01-31 PROCEDURE — 3008F BODY MASS INDEX DOCD: CPT | Mod: CPTII,S$GLB,, | Performed by: NURSE PRACTITIONER

## 2023-01-31 PROCEDURE — 99213 OFFICE O/P EST LOW 20 MIN: CPT | Mod: S$GLB,,, | Performed by: NURSE PRACTITIONER

## 2023-01-31 PROCEDURE — 3077F PR MOST RECENT SYSTOLIC BLOOD PRESSURE >= 140 MM HG: ICD-10-PCS | Mod: CPTII,S$GLB,, | Performed by: NURSE PRACTITIONER

## 2023-01-31 PROCEDURE — 3079F DIAST BP 80-89 MM HG: CPT | Mod: CPTII,S$GLB,, | Performed by: NURSE PRACTITIONER

## 2023-01-31 PROCEDURE — 99213 PR OFFICE/OUTPT VISIT, EST, LEVL III, 20-29 MIN: ICD-10-PCS | Mod: S$GLB,,, | Performed by: NURSE PRACTITIONER

## 2023-01-31 PROCEDURE — 1101F PR PT FALLS ASSESS DOC 0-1 FALLS W/OUT INJ PAST YR: ICD-10-PCS | Mod: CPTII,S$GLB,, | Performed by: NURSE PRACTITIONER

## 2023-01-31 PROCEDURE — 99999 PR PBB SHADOW E&M-EST. PATIENT-LVL IV: CPT | Mod: PBBFAC,,, | Performed by: NURSE PRACTITIONER

## 2023-01-31 PROCEDURE — 3077F SYST BP >= 140 MM HG: CPT | Mod: CPTII,S$GLB,, | Performed by: NURSE PRACTITIONER

## 2023-01-31 PROCEDURE — 1125F AMNT PAIN NOTED PAIN PRSNT: CPT | Mod: CPTII,S$GLB,, | Performed by: NURSE PRACTITIONER

## 2023-01-31 NOTE — PROGRESS NOTES
CHIEF COMPLAINT:    Chief Complaint   Patient presents with    Results       HISTORY OF PRESENT ILLNESS: Amanda Holt is a 72 y.o. female someday  smoker stopped vaping couple weeks (cutting down) smoke cig 2/2022 smoke when drinkingwith  has a past medical history of Arthralgia, Colon polyp, Degenerative disc disease at L5-S1 level, High cholesterol, Hypertension, Nuclear sclerosis of both eyes (1/8/2020), Sciatica, and Spinal stenosis. is here for sleep study follow up.   history of ANTONIA and trial CPAP. Reports difficulty tolerating. Stop following recall cpap.  Patient with symptoms of  snoring, witnessed apnea, daytime fatigue, excessive daytime sleepiness with difficulty falling and staying asleep. ESS 17.  Patient reports sleep paralysis 1 or more weak, vivid unpleasant dreams, sciatica and RLS, tinnitus, weakness when she gets out of breath.    Patient reports she does not have any set bedtime.  Usually stays up until 1-2 a.m..  Takes tizanidine which helps.  Waking up about 4 times during the night to go to the bathroom with hard time falling back to sleep.  Waking up snoring or with back pain.  Sleeps on her right side.  Out of bed 6:30 a.m. no intentional naps but dozes off watching TV but reports she is usually busy during the day.    PSG 12/10/2022:  The overall AHI was 6.1 with an oxygen allyssa of 83.0%. The AHI in REM sleep was 43.2. The central apnea index was 0.2     PFT 2/8/2023: Ratio 75 FEV1 1.46 (79) FVC 1.96 (82) TLC 3.09 (63) DLCO 77.9  Physician interpretation:  Spirometry is normal. Spirometry remains unimproved following bronchodilator. Lung volumes show moderate restriction is present. DLCO is normal    CXR: NA    ECHO 2/8/2023:    The left ventricle is normal in size with concentric hypertrophy and normal systolic function.  The estimated ejection fraction is 65%.  Normal left ventricular diastolic function.  Normal right ventricular size with normal right ventricular systolic  "function.  Mild left atrial enlargement.  Intermediate central venous pressure (8 mmHg).         REVIEW OF SYSTEMS:   Review of Systems   Constitutional:  Positive for fatigue. Negative for fever, chills, weight loss, weight gain, activity change, appetite change and night sweats.   HENT:  Positive for congestion and hearing loss.         Sees Lisa with ENT   Eyes: Negative.    Respiratory:  Positive for apnea, snoring, dyspnea on extertion and somnolence. Negative for cough, sputum production, chest tightness, wheezing, orthopnea, previous hospitialization due to pulmonary problems and use of rescue inhaler.    Cardiovascular:  Positive for leg swelling (off amlodipine). Negative for chest pain and palpitations.   Genitourinary: Negative.    Endocrine: endocrine negative    Musculoskeletal:  Positive for back pain. Negative for gait problem.   Skin: Negative.    Gastrointestinal:  Positive for acid reflux.   Neurological:  Positive for headaches.   Psychiatric/Behavioral:  Positive for sleep disturbance.        PHYSICAL EXAM:  Vitals:    01/31/23 1455   BP: (!) 165/89   Pulse: 88   SpO2: 97%   Weight: 126.4 kg (278 lb 10.6 oz)   Height: 5' 4" (1.626 m)   PainSc:   4   PainLoc: Back     Body mass index is 47.83 kg/m².   Physical Exam   Constitutional: She is oriented to person, place, and time. She appears well-developed. No distress. She is obese.   HENT:   Head: Normocephalic.   Cardiovascular: Normal rate, regular rhythm and normal heart sounds.   Pulmonary/Chest: Normal expansion, effort normal and breath sounds normal.   Musculoskeletal:         General: Edema present.      Cervical back: Neck supple.   Neurological: She is alert and oriented to person, place, and time. Gait normal.   Skin: Skin is warm. She is not diaphoretic.   Psychiatric: She has a normal mood and affect. Her behavior is normal. Judgment and thought content normal.     ALLERGIES:    Review of patient's allergies indicates:   Allergen " Reactions    Codeine        CURRENT MEDICATIONS:    Current Outpatient Medications   Medication Sig Dispense Refill    amitriptyline (ELAVIL) 100 MG tablet Take 1 tablet (100 mg total) by mouth every evening. 90 tablet 1    aspirin (ECOTRIN) 81 MG EC tablet       clotrimazole-betamethasone 1-0.05% (LOTRISONE) cream APPLY  CREAM TOPICALLY TO AFFECTED AREA TWICE DAILY 45 g 0    diclofenac sodium (VOLTAREN) 1 % Gel APPLY 2 GRAMS TOPICALLY TO AFFECTED AREA 4 TIMES DAILY 100 g 5    ergocalciferol (ERGOCALCIFEROL) 50,000 unit Cap Take 1 capsule by mouth every 7 days.      fluticasone propionate (FLONASE) 50 mcg/actuation nasal spray 1 spray (50 mcg total) by Each Nostril route once daily. 16 g 5    hydrOXYzine HCL (ATARAX) 25 MG tablet Take 1 tablet by mouth three times daily as needed 45 tablet 0    mupirocin (BACTROBAN) 2 % ointment Apply topically 3 (three) times daily. 22 g 0    nystatin (NYSTOP) powder APPLY TOPICALLY 4 TIMES A DAY 30 g 1    omeprazole (PRILOSEC) 40 MG capsule Take 1 capsule (40 mg total) by mouth once daily. 90 capsule 2    pravastatin (PRAVACHOL) 40 MG tablet Take 1 tablet by mouth once daily 90 tablet 1    spironolactone (ALDACTONE) 50 MG tablet Take 1 tablet (50 mg total) by mouth once daily. 90 tablet 1    tiZANidine (ZANAFLEX) 4 MG tablet TAKE 1 TABLET BY MOUTH EVERY 6 HOURS AS NEEDED 90 tablet 1    traMADoL (ULTRAM) 50 mg tablet TAKE 1 TABLET BY MOUTH EVERY 6 HOURS AS NEEDED FOR PAIN 120 tablet 0    triamcinolone acetonide 0.1% (KENALOG) 0.1 % ointment AAA bid 454 g 3    triamcinolone acetonide 0.1%-ciclopirox-magnesium hydroxide 400 mg/5 ml Apply to affected area twice a day after cool blow dry 60 g 5    valsartan (DIOVAN) 160 MG tablet Take 1 tablet (160 mg total) by mouth once daily. 90 tablet 3     No current facility-administered medications for this visit.                  PAST MEDICAL HISTORY:    Active Ambulatory Problems     Diagnosis Date Noted    Contact lens overwear of both eyes  01/08/2020    Refractive error 01/08/2020    Nuclear sclerosis of both eyes 01/08/2020    Spinal stenosis 03/13/2020    Hypertension 06/19/2015    ANTONIA (obstructive sleep apnea) 03/27/2019    Gastroesophageal reflux disease without esophagitis 07/17/2017    CKD (chronic kidney disease) 05/13/2019    Chronic low back pain 02/23/2018    Bilateral hearing loss 07/17/2017    DDD (degenerative disc disease), lumbar 06/19/2015    CTS (carpal tunnel syndrome) 06/19/2015    Anxiety 03/13/2020    Asymptomatic microscopic hematuria 06/15/2020    Lumbar radiculopathy 07/20/2020    Lumbar spondylosis 10/31/2017    Lumbar herniated disc 12/03/2020    Morbid obesity with BMI of 40.0-44.9, adult 03/03/2021    Other nonthrombocytopenic purpura 03/03/2021    Major depressive disorder, recurrent, mild 03/03/2021    Angina pectoris 03/03/2021    Atherosclerosis of aorta 03/03/2021    Stage 3a chronic kidney disease 03/03/2021    Wears contact lenses 03/05/2021    Posterior vitreous detachment of right eye 03/08/2021    Tobacco use 10/21/2021    Class 3 severe obesity due to excess calories with serious comorbidity in adult 01/03/2022    Decreased functional mobility and endurance 08/08/2022    Sleep-related breathing disorder 11/15/2022    AKBAR (dyspnea on exertion) 11/15/2022    Opioid dependence, uncomplicated 01/12/2023    Peripheral edema 01/31/2023     Resolved Ambulatory Problems     Diagnosis Date Noted    Colon cancer screening 07/23/2020     Past Medical History:   Diagnosis Date    Arthralgia     Colon polyp     Degenerative disc disease at L5-S1 level     High cholesterol     Sciatica                 PAST SURGICAL HISTORY:    Past Surgical History:   Procedure Laterality Date    BACK SURGERY      lumbar laminectomy    COLONOSCOPY N/A 7/23/2020    Procedure: COLONOSCOPY;  Surgeon: Donal Moore MD;  Location: [facility];  Service: Endoscopy;  Laterality: N/A;    EPIDURAL STEROID INJECTION Left 9/9/2020    Procedure:  Injection, Steroid, Epidural Transforaminal;  Surgeon: Jun Leavitt Jr., MD;  Location: [facility] ENDO;  Service: Pain Management;  Laterality: Left;  Left L5 + S1 TF WADE  Arrive @ 1300; ASA last 9/1; No DM    EPIDURAL STEROID INJECTION Left 2/12/2021    Procedure: Injection, Steroid, Epidural Transforaminal;  Surgeon: Jun Leavitt Jr., MD;  Location: [facility] ENDO;  Service: Pain Management;  Laterality: Left;  Left L4 + L5 TF WADE  Arrive @ 1230; IV Sedation; ASA last 2/4; No DM    TRANSFORAMINAL EPIDURAL INJECTION OF STEROID Left 3/14/2022    Procedure: INJECTION, STEROID, EPIDURAL, TRANSFORAMINAL APPROACH, L4-L5 & L5-S1;  Surgeon: Darya Ayala MD;  Location: [facility]IN [facility]GT;  Service: Pain Management;  Laterality: Left;    TRANSFORAMINAL EPIDURAL INJECTION OF STEROID Left 8/29/2022    Procedure: LUMBAR TRANSFORAMINAL LEFT L4/5 AND L5/S1 CONTRAST;  Surgeon: Darya Ayala MD;  Location: [facility] PAIN [facility];  Service: Pain Management;  Laterality: Left;    TUBAL LIGATION           FAMILY HISTORY:                Family History   Problem Relation Age of Onset    Breast cancer Mother     Hypertension Mother     Hypotension Mother     Cataracts Father     Glaucoma Father     Diabetes Father     Glaucoma Sister     No Known Problems Brother     No Known Problems Maternal Aunt     No Known Problems Maternal Uncle     No Known Problems Paternal Aunt     No Known Problems Paternal Uncle     No Known Problems Maternal Grandmother     No Known Problems Maternal Grandfather     No Known Problems Paternal Grandmother     No Known Problems Paternal Grandfather     No Known Problems Brother     Amblyopia Neg Hx     Blindness Neg Hx     Cancer Neg Hx     Macular degeneration Neg Hx     Retinal detachment Neg Hx     Strabismus Neg Hx     Stroke Neg Hx     Thyroid disease Neg Hx        SOCIAL HISTORY:          Tobacco:   Social History     Tobacco Use   Smoking Status Some Days    Packs/day: 0.25    Years: 40.00    Pack years: 10.00     Types: Cigarettes, Vaping with nicotine    Last attempt to quit: 3/15/2022    Years since quittin.9   Smokeless Tobacco Current   Tobacco Comments    Started smoking age 31 with drinking, 2 cigarettes/week, quit 3/2022    Currently vaping with nicotine        alcohol use:    Social History     Substance and Sexual Activity   Alcohol Use Yes    Comment: ocassionally                   LABS:   TSH:  No results found for: TSH  CBC:  Lab Results   Component Value Date    WBC 6.81 2020    HGB 12.5 2020    HCT 43.3 2020     (H) 2020     2020     BMP:  Lab Results   Component Value Date     2023    K 4.1 2023     (H) 2023    CO2 22 (L) 2023    BUN 29 (H) 2023    CREATININE 1.3 2023    CALCIUM 9.9 2023    ANIONGAP 11 2023    ESTGFRAFRICA 54 (A) 2020    EGFRNONAA 47 (A) 2020     HgbA1C:  Lab Results   Component Value Date    HGBA1C 5.4 2020          ASSESSMENT    ICD-10-CM ICD-9-CM    1. ANTONIA (obstructive sleep apnea)  G47.33 327.23 CPAP FOR HOME USE      2. AKBAR (dyspnea on exertion)  R06.09 786.09       3. Peripheral edema  R60.9 782.3 Urinalysis            PLAN:    Problem List Items Addressed This Visit          Unprioritized    AKBAR (dyspnea on exertion)    Overview     Likely secondary to pain, obesity, deconditioning. will f/u with ct chest on restriction since patient smokes and vapes for many years in addition to secondhand smoke exposure         ANTONIA (obstructive sleep apnea) - Primary    Overview     PSG 12/10/2022:  The overall AHI was 6.1 with an oxygen allyssa of 83.0%. The AHI in REM sleep was 43.2. The central apnea index was 0.2     After discussing all options, patient agree to cpap.         Relevant Orders    CPAP FOR HOME USE    Peripheral edema    Overview     Likely dependent, pt likely benefit support stocking, recommend UA         Relevant Orders    Urinalysis          Patient will  Follow up for 5-6 weeks following cpap usage, please bring cpap.

## 2023-01-31 NOTE — PATIENT INSTRUCTIONS
Joey Holt     Your home sleep study confirmed sleep apnea  AHI 6 6 meaning you are having 6 apneas per hour.    I recommend continuous positive air pressure to treat this. I will place the order. Please note and comply with requirements in this email.     Please schedule a follow up appointment with myself  5-6 week following cpap usage to assess treatment effectiveness and make adjustments if needed. Please bring your machine.     Someone from the medical equipment department will be contacting you to set you up with the CPAP.  If not please,  contact medical equipment department to check on the status of your order at [phone] option 1 then option 2    Plan:   1. Start auto CPAP therapy(the order will go to medical equipment and they will contact you after it gets processed through insurance which takes approximately 2-8 weeks)   2. There is a compliance requirement on machine. Your machine is preapproved for your diagnosis but there is no guaranteed of payment until after 90 days of compliance. The requirement  is minimum 4 hours or more 70% nights (22/30 days) x 1 year or until machine paid off. You must make a commitment and use it over 4 hours a day. If you not compliant, you risk losing your machine because medical equipment may not be able to get reimbursed by your insurance. Then you will need to either return the machine or pay it yourself.   3. Pick a comfortable mask to help meet compliance. If you have problems with the mask, [facility] (medical equipment) has a 30 day mask exchangepolicy so exchange any mask within 30 days if the mask is uncomfortable. After 30 days, you will need to self- purchase another mask if you desire to switch it. You will need to contact medical equipment to switch your mask. DME [id number] option 1 then option 2  4. We want to ensure that the therapy is helpful however we cannot actively monitor you.  The cpap has a recording device but no one is monitoring  this. Please schedule a follow up appointment WHEN you  your cpap for a follow up with myself for 5-6 weeks after using your cpap to determine treatment effectiveness and address problems. If you do not schedule in advance, we cannot guarantee that you will be seen in a timely manner and this is most often an insurance requirement. Please bring your machine to your visit because we may not be able to access your data remotely. Also, we may need to go over specific problems and make adjustments on your cpap. If everything is well, then we recommend annual follow up after the initial cpap follow up visit. (I do not deny supply refills. This is also most often an insurance requirement to continue receiving supplies from medical equipment.)    The potential ramifications of untreated sleep apnea  could include hypoxia, daytime sleepiness, dementia, cognitive impairment, hypertension, heart disease and/or stroke. Potential treatment options, which could include weight loss, body positioning, oral appliances (OA), continuous positive airway pressure (CPAP), or referral for surgical consideration. CPAP is the most effective and least invasive treatment and I would recommend this as a first line treatment.    Behavior modification which includes losing weight, exercising, changing the sleep position, abstaining from alcohol, and avoiding certain medications    Please  abstain from driving should you feel sleepy or drowsy      Please contact us if you have questions, persistent problems or concerns.    Thank you,    Maida BURNS, [facility]  [phone]

## 2023-02-02 ENCOUNTER — LAB VISIT (OUTPATIENT)
Dept: LAB | Facility: HOSPITAL | Age: 73
End: 2023-02-02
Attending: STUDENT IN AN ORGANIZED HEALTH CARE EDUCATION/TRAINING PROGRAM
Payer: MEDICARE

## 2023-02-02 ENCOUNTER — OFFICE VISIT (OUTPATIENT)
Dept: INTERNAL MEDICINE | Facility: CLINIC | Age: 73
End: 2023-02-02
Payer: MEDICARE

## 2023-02-02 ENCOUNTER — NURSE TRIAGE (OUTPATIENT)
Dept: ADMINISTRATIVE | Facility: CLINIC | Age: 73
End: 2023-02-02
Payer: MEDICARE

## 2023-02-02 VITALS
HEIGHT: 64 IN | SYSTOLIC BLOOD PRESSURE: 138 MMHG | HEART RATE: 100 BPM | BODY MASS INDEX: 47.54 KG/M2 | OXYGEN SATURATION: 98 % | DIASTOLIC BLOOD PRESSURE: 78 MMHG | WEIGHT: 278.44 LBS

## 2023-02-02 DIAGNOSIS — I10 PRIMARY HYPERTENSION: ICD-10-CM

## 2023-02-02 DIAGNOSIS — I10 PRIMARY HYPERTENSION: Primary | ICD-10-CM

## 2023-02-02 DIAGNOSIS — R60.0 PERIPHERAL EDEMA: ICD-10-CM

## 2023-02-02 LAB
ANION GAP SERPL CALC-SCNC: 11 MMOL/L (ref 8–16)
BUN SERPL-MCNC: 29 MG/DL (ref 8–23)
CALCIUM SERPL-MCNC: 9.9 MG/DL (ref 8.7–10.5)
CHLORIDE SERPL-SCNC: 111 MMOL/L (ref 95–110)
CO2 SERPL-SCNC: 22 MMOL/L (ref 23–29)
CREAT SERPL-MCNC: 1.3 MG/DL (ref 0.5–1.4)
EST. GFR  (NO RACE VARIABLE): 44 ML/MIN/1.73 M^2
GLUCOSE SERPL-MCNC: 96 MG/DL (ref 70–110)
POTASSIUM SERPL-SCNC: 4.1 MMOL/L (ref 3.5–5.1)
SODIUM SERPL-SCNC: 144 MMOL/L (ref 136–145)

## 2023-02-02 PROCEDURE — 3075F SYST BP GE 130 - 139MM HG: CPT | Mod: CPTII,S$GLB,, | Performed by: STUDENT IN AN ORGANIZED HEALTH CARE EDUCATION/TRAINING PROGRAM

## 2023-02-02 PROCEDURE — 3008F BODY MASS INDEX DOCD: CPT | Mod: CPTII,S$GLB,, | Performed by: STUDENT IN AN ORGANIZED HEALTH CARE EDUCATION/TRAINING PROGRAM

## 2023-02-02 PROCEDURE — 1159F PR MEDICATION LIST DOCUMENTED IN MEDICAL RECORD: ICD-10-PCS | Mod: CPTII,S$GLB,, | Performed by: STUDENT IN AN ORGANIZED HEALTH CARE EDUCATION/TRAINING PROGRAM

## 2023-02-02 PROCEDURE — 80048 BASIC METABOLIC PNL TOTAL CA: CPT | Performed by: STUDENT IN AN ORGANIZED HEALTH CARE EDUCATION/TRAINING PROGRAM

## 2023-02-02 PROCEDURE — 99999 PR PBB SHADOW E&M-EST. PATIENT-LVL IV: CPT | Mod: PBBFAC,,, | Performed by: STUDENT IN AN ORGANIZED HEALTH CARE EDUCATION/TRAINING PROGRAM

## 2023-02-02 PROCEDURE — 99213 PR OFFICE/OUTPT VISIT, EST, LEVL III, 20-29 MIN: ICD-10-PCS | Mod: S$GLB,,, | Performed by: STUDENT IN AN ORGANIZED HEALTH CARE EDUCATION/TRAINING PROGRAM

## 2023-02-02 PROCEDURE — 3078F PR MOST RECENT DIASTOLIC BLOOD PRESSURE < 80 MM HG: ICD-10-PCS | Mod: CPTII,S$GLB,, | Performed by: STUDENT IN AN ORGANIZED HEALTH CARE EDUCATION/TRAINING PROGRAM

## 2023-02-02 PROCEDURE — 1101F PR PT FALLS ASSESS DOC 0-1 FALLS W/OUT INJ PAST YR: ICD-10-PCS | Mod: CPTII,S$GLB,, | Performed by: STUDENT IN AN ORGANIZED HEALTH CARE EDUCATION/TRAINING PROGRAM

## 2023-02-02 PROCEDURE — 1159F MED LIST DOCD IN RCRD: CPT | Mod: CPTII,S$GLB,, | Performed by: STUDENT IN AN ORGANIZED HEALTH CARE EDUCATION/TRAINING PROGRAM

## 2023-02-02 PROCEDURE — 1125F PR PAIN SEVERITY QUANTIFIED, PAIN PRESENT: ICD-10-PCS | Mod: CPTII,S$GLB,, | Performed by: STUDENT IN AN ORGANIZED HEALTH CARE EDUCATION/TRAINING PROGRAM

## 2023-02-02 PROCEDURE — 1125F AMNT PAIN NOTED PAIN PRSNT: CPT | Mod: CPTII,S$GLB,, | Performed by: STUDENT IN AN ORGANIZED HEALTH CARE EDUCATION/TRAINING PROGRAM

## 2023-02-02 PROCEDURE — 3288F FALL RISK ASSESSMENT DOCD: CPT | Mod: CPTII,S$GLB,, | Performed by: STUDENT IN AN ORGANIZED HEALTH CARE EDUCATION/TRAINING PROGRAM

## 2023-02-02 PROCEDURE — 3078F DIAST BP <80 MM HG: CPT | Mod: CPTII,S$GLB,, | Performed by: STUDENT IN AN ORGANIZED HEALTH CARE EDUCATION/TRAINING PROGRAM

## 2023-02-02 PROCEDURE — 3075F PR MOST RECENT SYSTOLIC BLOOD PRESS GE 130-139MM HG: ICD-10-PCS | Mod: CPTII,S$GLB,, | Performed by: STUDENT IN AN ORGANIZED HEALTH CARE EDUCATION/TRAINING PROGRAM

## 2023-02-02 PROCEDURE — 1101F PT FALLS ASSESS-DOCD LE1/YR: CPT | Mod: CPTII,S$GLB,, | Performed by: STUDENT IN AN ORGANIZED HEALTH CARE EDUCATION/TRAINING PROGRAM

## 2023-02-02 PROCEDURE — 99999 PR PBB SHADOW E&M-EST. PATIENT-LVL IV: ICD-10-PCS | Mod: PBBFAC,,, | Performed by: STUDENT IN AN ORGANIZED HEALTH CARE EDUCATION/TRAINING PROGRAM

## 2023-02-02 PROCEDURE — 3288F PR FALLS RISK ASSESSMENT DOCUMENTED: ICD-10-PCS | Mod: CPTII,S$GLB,, | Performed by: STUDENT IN AN ORGANIZED HEALTH CARE EDUCATION/TRAINING PROGRAM

## 2023-02-02 PROCEDURE — 99213 OFFICE O/P EST LOW 20 MIN: CPT | Mod: S$GLB,,, | Performed by: STUDENT IN AN ORGANIZED HEALTH CARE EDUCATION/TRAINING PROGRAM

## 2023-02-02 PROCEDURE — 3008F PR BODY MASS INDEX (BMI) DOCUMENTED: ICD-10-PCS | Mod: CPTII,S$GLB,, | Performed by: STUDENT IN AN ORGANIZED HEALTH CARE EDUCATION/TRAINING PROGRAM

## 2023-02-02 PROCEDURE — 36415 COLL VENOUS BLD VENIPUNCTURE: CPT | Performed by: STUDENT IN AN ORGANIZED HEALTH CARE EDUCATION/TRAINING PROGRAM

## 2023-02-02 NOTE — ASSESSMENT & PLAN NOTE
Dependent edema. Started happening 2 weeks ago which is when she started watching tv while sitting on the couch with legs hanging down. Swelling better in am. She only noticed swelling after she stopped watching tv in bed. Asked her to elevate legs whenever she is sitting. Avoid salty food.

## 2023-02-02 NOTE — PROGRESS NOTES
Subjective:       Patient ID: Amanda Holt is a 72 y.o. female.    Chief Complaint: Hypertension and Joint Swelling (Bilateral leg edema)    Hypertension  Associated symptoms include shortness of breath (on exertion, chronic). Pertinent negatives include no chest pain or headaches.   Patient is a 71 yo female here to get evaluated for her high blood pressure. She has this going on for the last couple of weeks. Her pcp, dr. Torres increased spironolactone to 50 mg daily a couple of days ago. Her pcp stopped amlodipine sometime in November due to the LE edema and switched her from losartan to valsartan. He LE edema improved then but now she is having LE edema again. She does keep her legs down while watching tv the last 2 weeks. She used to watch tv in bed before that. Being worked up for sleep apnea now.    Review of Systems   Constitutional:  Negative for chills and fever.   HENT:  Negative for nasal congestion and rhinorrhea.    Respiratory:  Positive for shortness of breath (on exertion, chronic). Negative for cough and chest tightness.    Cardiovascular:  Negative for chest pain.   Gastrointestinal:  Positive for constipation. Negative for diarrhea.   Genitourinary:  Negative for dysuria and hematuria.   Neurological:  Negative for dizziness, light-headedness and headaches.       Objective:      Physical Exam  Eyes:      Conjunctiva/sclera: Conjunctivae normal.   Cardiovascular:      Rate and Rhythm: Normal rate.      Pulses: Normal pulses.   Pulmonary:      Effort: Pulmonary effort is normal. No respiratory distress.   Musculoskeletal:      Right lower leg: Edema present.      Left lower leg: Edema present.   Skin:     General: Skin is warm.   Neurological:      Mental Status: She is oriented to person, place, and time.   Psychiatric:         Mood and Affect: Mood normal.         Behavior: Behavior normal.             Assessment and Plan:       1. Primary hypertension  Assessment & Plan:  BP in clinic  is not bad. Says it was really high at [facility] today. The increased dose of Spironolactone might be helping now. She is joining digital medicine which would make it easy to monitor and manage. Checking renal function today since it has been 6 months anyways.    Orders:  -     Basic Metabolic Panel; Future; Expected date: 02/02/2023    2. Peripheral edema  Assessment & Plan:  Dependent edema. Started happening 2 weeks ago which is when she started watching tv while sitting on the couch with legs hanging down. Swelling better in am. She only noticed swelling after she stopped watching tv in bed. Asked her to elevate legs whenever she is sitting. Avoid salty food.            I spent a total of 20 minutes on the day of the visit.  This includes face to face time and non-face to face time preparing to see the patient (eg, review of tests), obtaining and/or reviewing separately obtained history, documenting clinical information in the electronic or other health record, independently interpreting results and communicating results to the patient/family/caregiver, or care coordinator.

## 2023-02-02 NOTE — ASSESSMENT & PLAN NOTE
BP in clinic is not bad. Says it was really high at [facility] today. The increased dose of Spironolactone might be helping now. She is joining digital medicine which would make it easy to monitor and manage. Checking renal function today since it has been 6 months anyways.

## 2023-02-02 NOTE — TELEPHONE ENCOUNTER
Reason for Disposition   Systolic BP >= 180 OR Diastolic >= 110    Additional Information   Negative: Sounds like a life-threatening emergency to the triager   Negative: Pregnant > 20 weeks or postpartum (< 6 weeks after delivery) and new hand or face swelling   Negative: Pregnant > 20 weeks and BP > 140/90   Negative: Systolic BP >= 160 OR Diastolic >= 100, and any cardiac or neurologic symptoms (e.g., chest pain, difficulty breathing, unsteady gait, blurred vision)   Negative: Patient sounds very sick or weak to the triager   Negative: BP Systolic BP >= 140 OR Diastolic >= 90 and postpartum (from 0 to 6 weeks after delivery)   Negative: Systolic BP >= 180 OR Diastolic >= 110, and missed most recent dose of blood pressure medication    Protocols used: High Blood Pressure-A-OH  Pt states her blood pressure has been elevated. States her PCP took her off amlodipine because she started swelling. States she was switched to Losartan and spirolactone.     Pt states she had dental work today. States while she is at the pharmacy she checked her BP and it is 198/100. States she still has some swelling and feels short winded when she walks.     Pt advised to see a Provider in the office today. Same day appointment set for 3:20 pm. Pt also requested a follow up with her PCP after today's appointment. PCP f/u set for 2/15/23.    Instructed to call OOC back if symptoms worsen. Pt verbalized understanding.

## 2023-02-03 ENCOUNTER — TELEPHONE (OUTPATIENT)
Dept: INTERNAL MEDICINE | Facility: CLINIC | Age: 73
End: 2023-02-03
Payer: MEDICARE

## 2023-02-03 NOTE — TELEPHONE ENCOUNTER
----- Message from Franklin Vee MD sent at 2/3/2023  9:22 AM CST -----  Pls let patient know that her kidney function is good but she needs to drink a little more water  ----- Message -----  From: [facility]  Sent: 2/2/2023   8:08 PM CST  To: Franklin Vee MD

## 2023-02-08 ENCOUNTER — HOSPITAL ENCOUNTER (OUTPATIENT)
Dept: RESPIRATORY THERAPY | Facility: HOSPITAL | Age: 73
Discharge: HOME | End: 2023-02-08
Attending: NURSE PRACTITIONER
Payer: MEDICARE

## 2023-02-08 ENCOUNTER — HOSPITAL ENCOUNTER (OUTPATIENT)
Dept: CARDIOLOGY | Facility: HOSPITAL | Age: 73
Discharge: HOME | End: 2023-02-08
Attending: NURSE PRACTITIONER
Payer: MEDICARE

## 2023-02-08 VITALS — HEART RATE: 75 BPM | RESPIRATION RATE: 18 BRPM

## 2023-02-08 DIAGNOSIS — R06.09 DOE (DYSPNEA ON EXERTION): ICD-10-CM

## 2023-02-08 LAB
AV INDEX (PROSTH): 0.69
AV MEAN GRADIENT: 4 MMHG
AV PEAK GRADIENT: 7 MMHG
AV VALVE AREA: 1.96 CM2
AV VELOCITY RATIO: 0.78
BRPFT: NORMAL
CV ECHO LV RWT: 0.51 CM
DLCO ADJ PRE: 16.36 ML/(MIN*MMHG)
DLCO SINGLE BREATH LLN: 15.27
DLCO SINGLE BREATH PRE REF: 77.9 %
DLCO SINGLE BREATH REF: 21
DLCOC SBVA LLN: 2.83
DLCOC SBVA PRE REF: 115.5 %
DLCOC SBVA REF: 4.28
DLCOC SINGLE BREATH LLN: 15.27
DLCOC SINGLE BREATH PRE REF: 77.9 %
DLCOC SINGLE BREATH REF: 21
DLCOVA LLN: 2.83
DLCOVA PRE REF: 115.5 %
DLCOVA PRE: 4.95 ML/(MIN*MMHG*L)
DLCOVA REF: 4.28
DLVAADJ PRE: 4.95 ML/(MIN*MMHG*L)
DOP CALC AO PEAK VEL: 1.28 M/S
DOP CALC AO VTI: 23.3 CM
DOP CALC LVOT AREA: 2.8 CM2
DOP CALC LVOT DIAMETER: 1.9 CM
DOP CALC LVOT PEAK VEL: 1 M/S
DOP CALC LVOT STROKE VOLUME: 45.62 CM3
DOP CALCLVOT PEAK VEL VTI: 16.1 CM
E WAVE DECELERATION TIME: 187.04 MSEC
E/A RATIO: 0.86
E/E' RATIO: 12 M/S
ECHO LV POSTERIOR WALL: 1.19 CM (ref 0.6–1.1)
EJECTION FRACTION: 65 %
ERVN2 LLN: -16449.37
ERVN2 PRE REF: 14.2 %
ERVN2 PRE: 0.09 L
ERVN2 REF: 0.63
FEF 25 75 CHG: 32 %
FEF 25 75 LLN: 0.6
FEF 25 75 POST REF: 97.3 %
FEF 25 75 PRE REF: 73.7 %
FEF 25 75 REF: 1.61
FET100 CHG: -7.8 %
FEV1 CHG: 2 %
FEV1 FVC CHG: 6.6 %
FEV1 FVC LLN: 65
FEV1 FVC POST REF: 102.6 %
FEV1 FVC PRE REF: 96.2 %
FEV1 FVC REF: 78
FEV1 LLN: 1.29
FEV1 POST REF: 81.1 %
FEV1 PRE REF: 79.5 %
FEV1 REF: 1.86
FRACTIONAL SHORTENING: 34 % (ref 28–44)
FRCN2 LLN: 1.88
FRCN2 PRE REF: 53 %
FRCN2 REF: 2.7
FVC CHG: -4.4 %
FVC LLN: 1.68
FVC POST REF: 78.5 %
FVC PRE REF: 82.1 %
FVC REF: 2.39
INTERVENTRICULAR SEPTUM: 1.09 CM (ref 0.6–1.1)
IVC DIAMETER: 21 CM
IVC PRE: 2.02 L
IVC SINGLE BREATH LLN: 1.68
IVC SINGLE BREATH PRE REF: 84.6 %
IVC SINGLE BREATH REF: 2.39
LA MAJOR: 6 CM
LA MINOR: 5.05 CM
LA WIDTH: 5.1 CM
LEFT ATRIUM SIZE: 5.19 CM
LEFT ATRIUM VOLUME: 123.39 CM3
LEFT INTERNAL DIMENSION IN SYSTOLE: 3.05 CM (ref 2.1–4)
LEFT VENTRICLE DIASTOLIC VOLUME: 99.27 ML
LEFT VENTRICLE SYSTOLIC VOLUME: 36.33 ML
LEFT VENTRICULAR INTERNAL DIMENSION IN DIASTOLE: 4.64 CM (ref 3.5–6)
LEFT VENTRICULAR MASS: 193.19 G
LV LATERAL E/E' RATIO: 9.43 M/S
LV SEPTAL E/E' RATIO: 16.5 M/S
LVOT MG: 2.47 MMHG
LVOT MV: 0.74 CM/S
MV PEAK A VEL: 0.77 M/S
MV PEAK E VEL: 0.66 M/S
MV STENOSIS PRESSURE HALF TIME: 54.24 MS
MV VALVE AREA P 1/2 METHOD: 4.06 CM2
PEF CHG: -4.4 %
PEF LLN: 2.74
PEF POST REF: 143.8 %
PEF PRE REF: 150.4 %
PEF REF: 4.73
POST FEF 25 75: 1.56 L/S
POST FET 100: 7.28 SEC
POST FEV1 FVC: 80.5 %
POST FEV1: 1.51 L
POST FVC: 1.87 L
POST PEF: 6.8 L/S
PRE DLCO: 16.36 ML/(MIN*MMHG)
PRE FEF 25 75: 1.18 L/S
PRE FET 100: 7.9 SEC
PRE FEV1 FVC: 75.49 %
PRE FEV1: 1.48 L
PRE FRC N2: 1.43 L
PRE FVC: 1.96 L
PRE PEF: 7.11 L/S
PULM VEIN S/D RATIO: 1.5
PV PEAK D VEL: 0.3 M/S
PV PEAK S VEL: 0.45 M/S
PV PEAK VELOCITY: 0.93 CM/S
RA MAJOR: 5.7 CM
RA PRESSURE: 8 MMHG
RA WIDTH: 4.26 CM
RIGHT VENTRICULAR END-DIASTOLIC DIMENSION: 3.28 CM
RV TISSUE DOPPLER FREE WALL SYSTOLIC VELOCITY 1 (APICAL 4 CHAMBER VIEW): 13 CM/S
RVN2 LLN: 1.49
RVN2 PRE REF: 64.8 %
RVN2 PRE: 1.34 L
RVN2 REF: 2.07
RVN2TLCN2 LLN: 33.51
RVN2TLCN2 PRE REF: 100.6 %
RVN2TLCN2 PRE: 43.37 %
RVN2TLCN2 REF: 43.1
SINUS: 3.39 CM
STJ: 2.67 CM
TDI LATERAL: 0.07 M/S
TDI SEPTAL: 0.04 M/S
TDI: 0.06 M/S
TLCN2 LLN: 3.91
TLCN2 PRE REF: 63 %
TLCN2 PRE: 3.09 L
TLCN2 REF: 4.9
TRICUSPID ANNULAR PLANE SYSTOLIC EXCURSION: 2.5 CM
VA PRE: 3.31 L
VA SINGLE BREATH LLN: 4.75
VA SINGLE BREATH PRE REF: 69.7 %
VA SINGLE BREATH REF: 4.75
VCMAXN2 LLN: 1.68
VCMAXN2 PRE REF: 73.4 %
VCMAXN2 PRE: 1.75 L
VCMAXN2 REF: 2.39

## 2023-02-08 PROCEDURE — 93306 TTE W/DOPPLER COMPLETE: CPT

## 2023-02-08 PROCEDURE — 94727 GAS DIL/WSHOT DETER LNG VOL: CPT | Mod: 26,,, | Performed by: INTERNAL MEDICINE

## 2023-02-08 PROCEDURE — 94060 PR EVAL OF BRONCHOSPASM: ICD-10-PCS | Mod: 26,,, | Performed by: INTERNAL MEDICINE

## 2023-02-08 PROCEDURE — 94060 EVALUATION OF WHEEZING: CPT | Mod: 26,,, | Performed by: INTERNAL MEDICINE

## 2023-02-08 PROCEDURE — 93306 ECHO (CUPID ONLY): ICD-10-PCS | Mod: 26,,, | Performed by: INTERNAL MEDICINE

## 2023-02-08 PROCEDURE — 94729 DIFFUSING CAPACITY: CPT

## 2023-02-08 PROCEDURE — 94727 PR PULM FUNCTION TEST BY GAS: ICD-10-PCS | Mod: 26,,, | Performed by: INTERNAL MEDICINE

## 2023-02-08 PROCEDURE — 93306 TTE W/DOPPLER COMPLETE: CPT | Mod: 26,,, | Performed by: INTERNAL MEDICINE

## 2023-02-08 PROCEDURE — 94727 GAS DIL/WSHOT DETER LNG VOL: CPT

## 2023-02-08 PROCEDURE — 25000242 PHARM REV CODE 250 ALT 637 W/ HCPCS: Performed by: NURSE PRACTITIONER

## 2023-02-08 PROCEDURE — 94729 PR C02/MEMBANE DIFFUSE CAPACITY: ICD-10-PCS | Mod: 26,,, | Performed by: INTERNAL MEDICINE

## 2023-02-08 PROCEDURE — 94729 DIFFUSING CAPACITY: CPT | Mod: 26,,, | Performed by: INTERNAL MEDICINE

## 2023-02-08 PROCEDURE — 94060 EVALUATION OF WHEEZING: CPT

## 2023-02-08 RX ORDER — ALBUTEROL SULFATE 2.5 MG/.5ML
2.5 SOLUTION RESPIRATORY (INHALATION) ONCE
Status: COMPLETED | OUTPATIENT
Start: 2023-02-08 | End: 2023-02-08

## 2023-02-08 RX ADMIN — ALBUTEROL SULFATE 2.5 MG: 2.5 SOLUTION RESPIRATORY (INHALATION) at 02:02

## 2023-02-11 ENCOUNTER — PATIENT MESSAGE (OUTPATIENT)
Dept: PULMONOLOGY | Facility: CLINIC | Age: 73
End: 2023-02-11
Payer: MEDICARE

## 2023-02-11 DIAGNOSIS — Z87.891 PERSONAL HISTORY OF NICOTINE DEPENDENCE: Primary | ICD-10-CM

## 2023-02-15 ENCOUNTER — OFFICE VISIT (OUTPATIENT)
Dept: FAMILY MEDICINE | Facility: CLINIC | Age: 73
End: 2023-02-15
Payer: MEDICARE

## 2023-02-15 ENCOUNTER — HOSPITAL ENCOUNTER (OUTPATIENT)
Dept: RADIOLOGY | Facility: HOSPITAL | Age: 73
Discharge: HOME | End: 2023-02-15
Attending: NURSE PRACTITIONER
Payer: MEDICARE

## 2023-02-15 VITALS
SYSTOLIC BLOOD PRESSURE: 148 MMHG | HEART RATE: 96 BPM | TEMPERATURE: 98 F | RESPIRATION RATE: 16 BRPM | OXYGEN SATURATION: 98 % | BODY MASS INDEX: 46.4 KG/M2 | DIASTOLIC BLOOD PRESSURE: 100 MMHG | HEIGHT: 64 IN | WEIGHT: 271.81 LBS

## 2023-02-15 DIAGNOSIS — I10 HYPERTENSION, UNCONTROLLED: Primary | ICD-10-CM

## 2023-02-15 DIAGNOSIS — G47.33 OSA (OBSTRUCTIVE SLEEP APNEA): ICD-10-CM

## 2023-02-15 DIAGNOSIS — Z87.891 PERSONAL HISTORY OF NICOTINE DEPENDENCE: ICD-10-CM

## 2023-02-15 PROBLEM — E66.01 MORBID OBESITY WITH BMI OF 40.0-44.9, ADULT: Status: RESOLVED | Noted: 2021-03-03 | Resolved: 2023-02-15

## 2023-02-15 PROCEDURE — 1126F AMNT PAIN NOTED NONE PRSNT: CPT | Mod: CPTII,S$GLB,, | Performed by: FAMILY MEDICINE

## 2023-02-15 PROCEDURE — 3008F PR BODY MASS INDEX (BMI) DOCUMENTED: ICD-10-PCS | Mod: CPTII,S$GLB,, | Performed by: FAMILY MEDICINE

## 2023-02-15 PROCEDURE — 3080F PR MOST RECENT DIASTOLIC BLOOD PRESSURE >= 90 MM HG: ICD-10-PCS | Mod: CPTII,S$GLB,, | Performed by: FAMILY MEDICINE

## 2023-02-15 PROCEDURE — 1101F PR PT FALLS ASSESS DOC 0-1 FALLS W/OUT INJ PAST YR: ICD-10-PCS | Mod: CPTII,S$GLB,, | Performed by: FAMILY MEDICINE

## 2023-02-15 PROCEDURE — 3008F BODY MASS INDEX DOCD: CPT | Mod: CPTII,S$GLB,, | Performed by: FAMILY MEDICINE

## 2023-02-15 PROCEDURE — 1159F PR MEDICATION LIST DOCUMENTED IN MEDICAL RECORD: ICD-10-PCS | Mod: CPTII,S$GLB,, | Performed by: FAMILY MEDICINE

## 2023-02-15 PROCEDURE — 1126F PR PAIN SEVERITY QUANTIFIED, NO PAIN PRESENT: ICD-10-PCS | Mod: CPTII,S$GLB,, | Performed by: FAMILY MEDICINE

## 2023-02-15 PROCEDURE — 3080F DIAST BP >= 90 MM HG: CPT | Mod: CPTII,S$GLB,, | Performed by: FAMILY MEDICINE

## 2023-02-15 PROCEDURE — 71271 CT THORAX LUNG CANCER SCR C-: CPT | Mod: TC

## 2023-02-15 PROCEDURE — 4010F PR ACE/ARB THEARPY RXD/TAKEN: ICD-10-PCS | Mod: CPTII,S$GLB,, | Performed by: FAMILY MEDICINE

## 2023-02-15 PROCEDURE — 3077F PR MOST RECENT SYSTOLIC BLOOD PRESSURE >= 140 MM HG: ICD-10-PCS | Mod: CPTII,S$GLB,, | Performed by: FAMILY MEDICINE

## 2023-02-15 PROCEDURE — 99999 PR PBB SHADOW E&M-EST. PATIENT-LVL V: CPT | Mod: PBBFAC,,, | Performed by: FAMILY MEDICINE

## 2023-02-15 PROCEDURE — 3077F SYST BP >= 140 MM HG: CPT | Mod: CPTII,S$GLB,, | Performed by: FAMILY MEDICINE

## 2023-02-15 PROCEDURE — 71271 CT CHEST LUNG SCREENING LOW DOSE: ICD-10-PCS | Mod: 26,,, | Performed by: RADIOLOGY

## 2023-02-15 PROCEDURE — 3288F FALL RISK ASSESSMENT DOCD: CPT | Mod: CPTII,S$GLB,, | Performed by: FAMILY MEDICINE

## 2023-02-15 PROCEDURE — 1159F MED LIST DOCD IN RCRD: CPT | Mod: CPTII,S$GLB,, | Performed by: FAMILY MEDICINE

## 2023-02-15 PROCEDURE — 99214 PR OFFICE/OUTPT VISIT, EST, LEVL IV, 30-39 MIN: ICD-10-PCS | Mod: S$GLB,,, | Performed by: FAMILY MEDICINE

## 2023-02-15 PROCEDURE — 71271 CT THORAX LUNG CANCER SCR C-: CPT | Mod: 26,,, | Performed by: RADIOLOGY

## 2023-02-15 PROCEDURE — 4010F ACE/ARB THERAPY RXD/TAKEN: CPT | Mod: CPTII,S$GLB,, | Performed by: FAMILY MEDICINE

## 2023-02-15 PROCEDURE — 99214 OFFICE O/P EST MOD 30 MIN: CPT | Mod: S$GLB,,, | Performed by: FAMILY MEDICINE

## 2023-02-15 PROCEDURE — 3288F PR FALLS RISK ASSESSMENT DOCUMENTED: ICD-10-PCS | Mod: CPTII,S$GLB,, | Performed by: FAMILY MEDICINE

## 2023-02-15 PROCEDURE — 99999 PR PBB SHADOW E&M-EST. PATIENT-LVL V: ICD-10-PCS | Mod: PBBFAC,,, | Performed by: FAMILY MEDICINE

## 2023-02-15 PROCEDURE — 1101F PT FALLS ASSESS-DOCD LE1/YR: CPT | Mod: CPTII,S$GLB,, | Performed by: FAMILY MEDICINE

## 2023-02-15 RX ORDER — VALSARTAN 320 MG/1
320 TABLET ORAL DAILY
Qty: 90 TABLET | Refills: 3 | Status: SHIPPED | OUTPATIENT
Start: 2023-02-15 | End: 2024-03-27

## 2023-02-15 NOTE — PROGRESS NOTES
Chief Complaint   Patient presents with    Hypertension       HPI  Amanda Holt is a 72 y.o. female with multiple medical diagnoses as listed in the medical history and problem list that presents for follow-up for HTN    HTN- taking valsartan 160mg, aldactone 50mg, still having elevated readings, on boarding with digital medicine; she is in process of getting a CPAP machine; she is having trouble losing weight, does not eat much and often does not feel hungry      ALLERGIES AND MEDICATIONS: updated and reviewed.  Review of patient's allergies indicates:   Allergen Reactions    Codeine      Medication List with Changes/Refills   New Medications    VALSARTAN (DIOVAN) 320 MG TABLET    Take 1 tablet (320 mg total) by mouth once daily.   Current Medications    AMITRIPTYLINE (ELAVIL) 100 MG TABLET    Take 1 tablet (100 mg total) by mouth every evening.    ASPIRIN (ECOTRIN) 81 MG EC TABLET        CLOTRIMAZOLE-BETAMETHASONE 1-0.05% (LOTRISONE) CREAM    APPLY  CREAM TOPICALLY TO AFFECTED AREA TWICE DAILY    DICLOFENAC SODIUM (VOLTAREN) 1 % GEL    APPLY 2 GRAMS TOPICALLY TO AFFECTED AREA 4 TIMES DAILY    ERGOCALCIFEROL (ERGOCALCIFEROL) 50,000 UNIT CAP    Take 1 capsule by mouth every 7 days.    FLUTICASONE PROPIONATE (FLONASE) 50 MCG/ACTUATION NASAL SPRAY    1 spray (50 mcg total) by Each Nostril route once daily.    HYDROXYZINE HCL (ATARAX) 25 MG TABLET    Take 1 tablet by mouth three times daily as needed    MUPIROCIN (BACTROBAN) 2 % OINTMENT    Apply topically 3 (three) times daily.    NYSTATIN (NYSTOP) POWDER    APPLY TOPICALLY 4 TIMES A DAY    OMEPRAZOLE (PRILOSEC) 40 MG CAPSULE    Take 1 capsule (40 mg total) by mouth once daily.    PRAVASTATIN (PRAVACHOL) 40 MG TABLET    Take 1 tablet by mouth once daily    SPIRONOLACTONE (ALDACTONE) 50 MG TABLET    Take 1 tablet (50 mg total) by mouth once daily.    TIZANIDINE (ZANAFLEX) 4 MG TABLET    TAKE 1 TABLET BY MOUTH EVERY 6 HOURS AS NEEDED    TRAMADOL (ULTRAM) 50 MG  "TABLET    TAKE 1 TABLET BY MOUTH EVERY 6 HOURS AS NEEDED FOR PAIN    TRIAMCINOLONE ACETONIDE 0.1% (KENALOG) 0.1 % OINTMENT    AAA bid    TRIAMCINOLONE ACETONIDE 0.1%-CICLOPIROX-MAGNESIUM HYDROXIDE 400 MG/5 ML    Apply to affected area twice a day after cool blow dry   Discontinued Medications    VALSARTAN (DIOVAN) 160 MG TABLET    Take 1 tablet (160 mg total) by mouth once daily.       ROS  Review of Systems   Constitutional:  Negative for chills, diaphoresis, fatigue, fever and unexpected weight change.   HENT:  Negative for rhinorrhea, sinus pressure, sore throat and tinnitus.    Eyes:  Negative for photophobia and visual disturbance.   Respiratory:  Negative for cough, shortness of breath and wheezing.    Cardiovascular:  Negative for chest pain and palpitations.   Gastrointestinal:  Negative for abdominal pain, blood in stool, constipation, diarrhea, nausea and vomiting.   Genitourinary:  Negative for dysuria, flank pain, frequency and vaginal discharge.   Musculoskeletal:  Negative for arthralgias and joint swelling.   Skin:  Negative for rash.   Neurological:  Negative for speech difficulty, weakness, light-headedness and headaches.   Psychiatric/Behavioral:  Negative for behavioral problems and dysphoric mood.      Physical Exam  Vitals:    02/15/23 0929 02/15/23 0950   BP: (!) 148/96 (!) 148/100   BP Location: Left arm    Patient Position: Sitting    BP Method: Large (Manual)    Pulse: 96    Resp: 16    Temp: 98 °F (36.7 °C)    TempSrc: Oral    SpO2: 98%    Weight: 123.3 kg (271 lb 13.2 oz)    Height: 5' 4" (1.626 m)     Body mass index is 46.66 kg/m².  Weight: 123.3 kg (271 lb 13.2 oz)   Height: 5' 4" (162.6 cm)     Physical Exam  Vitals and nursing note reviewed.   Constitutional:       Appearance: She is well-developed.   Skin:     General: Skin is warm and dry.      Findings: No erythema or rash.   Neurological:      Mental Status: She is alert. Mental status is at baseline.   Psychiatric:         " Behavior: Behavior normal.       Health Maintenance         Date Due Completion Date    Pneumococcal Vaccines (Age 65+) (1 - PCV) Never done ---    Sign Pain Contract Never done ---    Urine Drug Screen Never done ---    Shingles Vaccine (1 of 2) Never done ---    COVID-19 Vaccine (5 - Booster) 04/14/2022 2/17/2022    TETANUS VACCINE 06/14/2022 6/14/2012 (Done)    Override on 6/14/2012: Done    Influenza Vaccine (1) Never done ---    Mammogram 04/20/2023 4/20/2022    Colorectal Cancer Screening 07/23/2023 7/23/2020    High Dose Statin 02/02/2024 2/2/2023    Aspirin/Antiplatelet Therapy 02/02/2024 2/2/2023    Lipid Panel 10/21/2026 10/21/2021            Health maintenance reviewed and addressed as ordered      ASSESSMENT/PLAN       1. ANTONIA (obstructive sleep apnea)  This will cause improvement in BP    2. Hypertension, uncontrolled  Increase valsartan 320mg  BP recheck  - valsartan (DIOVAN) 320 MG tablet; Take 1 tablet (320 mg total) by mouth once daily.  Dispense: 90 tablet; Refill: 3    3. BMI 45.0-49.9, adult  Has appt with bariatrics, recommend she consider [facility]        Simona Torres MD  02/15/2023 9:34 AM        Follow up if symptoms worsen or fail to improve.    No orders of the defined types were placed in this encounter.

## 2023-02-15 NOTE — PATIENT INSTRUCTIONS
For more information about the Nutrition Program, contact [facility] at [phone] or via email at [email or web address].

## 2023-02-16 ENCOUNTER — TELEPHONE (OUTPATIENT)
Dept: SLEEP MEDICINE | Facility: CLINIC | Age: 73
End: 2023-02-16
Payer: MEDICARE

## 2023-02-16 NOTE — TELEPHONE ENCOUNTER
PAP Order  Received: Yesterday  MD Dr. Umm Verdin,       The patients insurance denied the request for auth for a new pap unit states pt received a new unit through [employer] in 2021. I just wanted to make you aware.       Thank you,     Korin

## 2023-02-22 DIAGNOSIS — M48.00 SPINAL STENOSIS, UNSPECIFIED SPINAL REGION: ICD-10-CM

## 2023-02-22 NOTE — TELEPHONE ENCOUNTER
No new care gaps identified.  [facility] Embedded Care Gaps. Reference number: [id number]. 2/22/2023   4:48:47 PM CST

## 2023-02-22 NOTE — TELEPHONE ENCOUNTER
----- Message from Yesica Jessica sent at 2/22/2023  4:43 PM CST -----  Regarding: Requesting Refill  .Type: RX Refill Request    Who Called:  self     Have you contacted your pharmacy:    Refill or New Rx: Refill     RX Name and Strength:traMADoL (ULTRAM) 50 mg tablet    Preferred Pharmacy with phone number: .  [place] [id number]  [facility] - [address]  [address]  [place] [address]  Phone: [phone] Fax: [phone]        Local or Mail Order: local     Ordering Provider: Brian     Would the patient rather a call back or a response via My [facility]?     Best Call Back Number: .[phone]

## 2023-02-23 ENCOUNTER — TELEPHONE (OUTPATIENT)
Dept: PULMONOLOGY | Facility: CLINIC | Age: 73
End: 2023-02-23
Payer: MEDICARE

## 2023-02-23 NOTE — TELEPHONE ENCOUNTER
Left message to call office back.    ESTEPHANIA Mahoney  Pulm/Sleep [facility] [place]  [phone]                 ----- Message from Maru Parker MA sent at 2/23/2023 12:45 PM CST -----  Georgina Mcclelland V Staff  Caller: Unspecified (Today, 12:41 PM)  Type: Patient Call Back         Who called: self         What is the request in detail: She would like to speak to a nurse in regards to getting another C-pap machine ...         Can the clinic reply by [facility]? No         Would the patient rather a call back or a response via My [facility]? Yes         Best call back number: [phone] (home)

## 2023-02-23 NOTE — TELEPHONE ENCOUNTER
----- Message from Yesica [place] sent at 2/23/2023  4:44 PM CST -----  Regarding: Requesting Refill  .Type: RX Refill Request    Who Called: self     Have you contacted your pharmacy: yes   Refill or New Rx: Refill     RX Name and Strength: traMADoL (ULTRAM) 50 mg tablet    Preferred Pharmacy with phone number: .  [place] [id number]  [facility] - [address]  [address]  [place] [address]  Phone: [phone] Fax: [phone]    Local or Mail Order: local     Ordering Provider: Brian     Would the patient rather a call back or a response via [facility]?     Best Call Back Number:    Additional Information:  has called previously

## 2023-02-23 NOTE — TELEPHONE ENCOUNTER
----- Message from Georgina Carreno sent at 2/23/2023 12:40 PM CST -----  Type: Patient Call Back        Who called: self         What is the request in detail: She states she would like for a nurse to give a call in regards to her medication ...         Can the clinic reply by [facility]? No         Would the patient rather a call back or a response via My [facility]? Yes         Best call back number: [phone] (home)                     Thank You

## 2023-02-24 RX ORDER — TRAMADOL HYDROCHLORIDE 50 MG/1
TABLET ORAL
Qty: 120 TABLET | Refills: 2 | Status: SHIPPED | OUTPATIENT
Start: 2023-02-24 | End: 2023-04-17 | Stop reason: SDUPTHER

## 2023-02-28 ENCOUNTER — OFFICE VISIT (OUTPATIENT)
Dept: OPTOMETRY | Facility: CLINIC | Age: 73
End: 2023-02-28
Payer: MEDICARE

## 2023-02-28 DIAGNOSIS — Z97.3 WEARS CONTACT LENSES: ICD-10-CM

## 2023-02-28 DIAGNOSIS — H25.13 NUCLEAR SCLEROSIS OF BOTH EYES: Primary | ICD-10-CM

## 2023-02-28 DIAGNOSIS — H52.7 REFRACTIVE ERROR: ICD-10-CM

## 2023-02-28 DIAGNOSIS — Z46.0 ENCOUNTER FOR FITTING OR ADJUSTMENT OF SPECTACLES OR CONTACT LENSES: Primary | ICD-10-CM

## 2023-02-28 PROCEDURE — 1101F PT FALLS ASSESS-DOCD LE1/YR: CPT | Mod: CPTII,S$GLB,, | Performed by: OPTOMETRIST

## 2023-02-28 PROCEDURE — 3288F PR FALLS RISK ASSESSMENT DOCUMENTED: ICD-10-PCS | Mod: CPTII,S$GLB,, | Performed by: OPTOMETRIST

## 2023-02-28 PROCEDURE — 92015 PR REFRACTION: ICD-10-PCS | Mod: S$GLB,,, | Performed by: OPTOMETRIST

## 2023-02-28 PROCEDURE — 99499 NO LOS: ICD-10-PCS | Mod: S$GLB,,, | Performed by: OPTOMETRIST

## 2023-02-28 PROCEDURE — 99999 PR PBB SHADOW E&M-EST. PATIENT-LVL III: ICD-10-PCS | Mod: PBBFAC,,, | Performed by: OPTOMETRIST

## 2023-02-28 PROCEDURE — 1159F PR MEDICATION LIST DOCUMENTED IN MEDICAL RECORD: ICD-10-PCS | Mod: CPTII,S$GLB,, | Performed by: OPTOMETRIST

## 2023-02-28 PROCEDURE — 1159F MED LIST DOCD IN RCRD: CPT | Mod: CPTII,S$GLB,, | Performed by: OPTOMETRIST

## 2023-02-28 PROCEDURE — 1126F PR PAIN SEVERITY QUANTIFIED, NO PAIN PRESENT: ICD-10-PCS | Mod: CPTII,S$GLB,, | Performed by: OPTOMETRIST

## 2023-02-28 PROCEDURE — 92310 CONTACT LENS FITTING OU: CPT | Mod: CSM,,, | Performed by: OPTOMETRIST

## 2023-02-28 PROCEDURE — 99499 UNLISTED E&M SERVICE: CPT | Mod: S$GLB,,, | Performed by: OPTOMETRIST

## 2023-02-28 PROCEDURE — 3288F FALL RISK ASSESSMENT DOCD: CPT | Mod: CPTII,S$GLB,, | Performed by: OPTOMETRIST

## 2023-02-28 PROCEDURE — 1126F AMNT PAIN NOTED NONE PRSNT: CPT | Mod: CPTII,S$GLB,, | Performed by: OPTOMETRIST

## 2023-02-28 PROCEDURE — 1101F PR PT FALLS ASSESS DOC 0-1 FALLS W/OUT INJ PAST YR: ICD-10-PCS | Mod: CPTII,S$GLB,, | Performed by: OPTOMETRIST

## 2023-02-28 PROCEDURE — 4010F PR ACE/ARB THEARPY RXD/TAKEN: ICD-10-PCS | Mod: CPTII,S$GLB,, | Performed by: OPTOMETRIST

## 2023-02-28 PROCEDURE — 1160F RVW MEDS BY RX/DR IN RCRD: CPT | Mod: CPTII,S$GLB,, | Performed by: OPTOMETRIST

## 2023-02-28 PROCEDURE — 1160F PR REVIEW ALL MEDS BY PRESCRIBER/CLIN PHARMACIST DOCUMENTED: ICD-10-PCS | Mod: CPTII,S$GLB,, | Performed by: OPTOMETRIST

## 2023-02-28 PROCEDURE — 92310 PR CONTACT LENS FITTING (NO CHANGE): ICD-10-PCS | Mod: CSM,,, | Performed by: OPTOMETRIST

## 2023-02-28 PROCEDURE — 92014 PR EYE EXAM, EST PATIENT,COMPREHESV: ICD-10-PCS | Mod: S$GLB,,, | Performed by: OPTOMETRIST

## 2023-02-28 PROCEDURE — 92014 COMPRE OPH EXAM EST PT 1/>: CPT | Mod: S$GLB,,, | Performed by: OPTOMETRIST

## 2023-02-28 PROCEDURE — 99999 PR PBB SHADOW E&M-EST. PATIENT-LVL III: CPT | Mod: PBBFAC,,, | Performed by: OPTOMETRIST

## 2023-02-28 PROCEDURE — 4010F ACE/ARB THERAPY RXD/TAKEN: CPT | Mod: CPTII,S$GLB,, | Performed by: OPTOMETRIST

## 2023-02-28 PROCEDURE — 92015 DETERMINE REFRACTIVE STATE: CPT | Mod: S$GLB,,, | Performed by: OPTOMETRIST

## 2023-02-28 NOTE — PROGRESS NOTES
Subjective:       Patient ID: Amanda Holt is a 72 y.o. female      Chief Complaint   Patient presents with    Concerns About Ocular Health     History of Present Illness  Dls: 3/8/21 Dr. Schmitz     73 y/o female presents today for ocular health check.  Pt wears scls and pal's.     No tearing  No itching  No burning  No pain  +  ha's  + ou  floaters  No flashes    Eye meds  Visine ou prn       Dailies color - replacing daily. Does not sleep in lenses.  Air optix  - replacing after use        Assessment/Plan:     1. Nuclear sclerosis of both eyes  Educated pt on presence of cataracts and effects on vision. No surgery at this time. Recheck in one year, sooner PRN.    2. Refractive error  Educated patient on refractive error and discussed lens options. Dispensed updated spectacle Rx. Educated about adaptation period to new specs.    Eyeglass Final Rx       Eyeglass Final Rx         Sphere Cylinder Axis Add    Right -5.75 +3.50 100 +2.50    Left -4.75 +3.00 085 +2.50      Expiration Date: 2/28/2024                      3. Wears contact lenses  Pt advised clear lenses or glasses should be worn for driving. Colored contacts for social occasions only. Pt verbalized understanding.     Contact lens Rx released to pt. Daily wear only advised, replacement every 2 weeks (oasys) // daily (dailies colors) with education to risks of extended wear. Okay to order if happy with comfort and [facility]. Discussed lens care, compliance and solutions. RTC yearly contact lens health check.     Contact Lens Final Rx       Final Contact Lens Rx         Brand Base Curve Diameter Sphere Cylinder Axis    Right Acuvue Oasys for Astigmatism 8.6 14.5 -3.00 -2.25 010    Left Acuvue Oasys for Astigmatism 8.6 14.5 -2.00 -2.25 180      Expiration Date: 2/28/2024    Replacement: Every 2 weeks    Solutions: OptiFree PureMoist    Wearing Schedule: Daily Wear              Final Contact Lens Rx #2         Brand Base Curve Diameter Sphere Cylinder Axis     Right Dailies Colors 8.6 13.8 -3.00 Sphere     Left Dailies Colors 8.6 13.8 -3.00 Sphere       Expiration Date: 2/28/2024    Replacement: Daily    Wearing Schedule: Daily Wear                      Follow up in about 1 year (around 2/28/2024) for Cataract check, Contact Lens.

## 2023-03-01 ENCOUNTER — CLINICAL SUPPORT (OUTPATIENT)
Dept: FAMILY MEDICINE | Facility: CLINIC | Age: 73
End: 2023-03-01
Payer: MEDICARE

## 2023-03-01 VITALS
HEART RATE: 84 BPM | SYSTOLIC BLOOD PRESSURE: 148 MMHG | DIASTOLIC BLOOD PRESSURE: 92 MMHG | WEIGHT: 272.94 LBS | BODY MASS INDEX: 46.85 KG/M2

## 2023-03-01 DIAGNOSIS — I10 HYPERTENSION, UNCONTROLLED: Primary | ICD-10-CM

## 2023-03-01 PROCEDURE — 99499 NO LOS: ICD-10-PCS | Mod: S$GLB,,, | Performed by: FAMILY MEDICINE

## 2023-03-01 PROCEDURE — 99999 PR PBB SHADOW E&M-EST. PATIENT-LVL II: ICD-10-PCS | Mod: PBBFAC,,,

## 2023-03-01 PROCEDURE — 99999 PR PBB SHADOW E&M-EST. PATIENT-LVL II: CPT | Mod: PBBFAC,,,

## 2023-03-01 PROCEDURE — 99499 UNLISTED E&M SERVICE: CPT | Mod: S$GLB,,, | Performed by: FAMILY MEDICINE

## 2023-03-01 NOTE — PROGRESS NOTES
Amanda Cuencains 72 y.o. female is here today for Blood Pressure check.   History of HTN yes.    Review of patient's allergies indicates:   Allergen Reactions    Codeine      Creatinine   Date Value Ref Range Status   02/02/2023 1.3 0.5 - 1.4 mg/dL Final     Sodium   Date Value Ref Range Status   02/02/2023 144 136 - 145 mmol/L Final     Potassium   Date Value Ref Range Status   02/02/2023 4.1 3.5 - 5.1 mmol/L Final   ]  Patient verifies taking blood pressure medications on a regular basis at the same time of the day.     Current Outpatient Medications:     amitriptyline (ELAVIL) 100 MG tablet, Take 1 tablet (100 mg total) by mouth every evening., Disp: 90 tablet, Rfl: 1    aspirin (ECOTRIN) 81 MG EC tablet, , Disp: , Rfl:     clotrimazole-betamethasone 1-0.05% (LOTRISONE) cream, APPLY  CREAM TOPICALLY TO AFFECTED AREA TWICE DAILY, Disp: 45 g, Rfl: 0    diclofenac sodium (VOLTAREN) 1 % Gel, APPLY 2 GRAMS TOPICALLY TO AFFECTED AREA 4 TIMES DAILY, Disp: 100 g, Rfl: 5    ergocalciferol (ERGOCALCIFEROL) 50,000 unit Cap, Take 1 capsule by mouth every 7 days., Disp: , Rfl:     fluticasone propionate (FLONASE) 50 mcg/actuation nasal spray, 1 spray (50 mcg total) by Each Nostril route once daily., Disp: 16 g, Rfl: 5    hydrOXYzine HCL (ATARAX) 25 MG tablet, Take 1 tablet by mouth three times daily as needed, Disp: 45 tablet, Rfl: 0    mupirocin (BACTROBAN) 2 % ointment, Apply topically 3 (three) times daily., Disp: 22 g, Rfl: 0    nystatin (NYSTOP) powder, APPLY TOPICALLY 4 TIMES A DAY, Disp: 30 g, Rfl: 1    omeprazole (PRILOSEC) 40 MG capsule, Take 1 capsule (40 mg total) by mouth once daily., Disp: 90 capsule, Rfl: 2    pravastatin (PRAVACHOL) 40 MG tablet, Take 1 tablet by mouth once daily, Disp: 90 tablet, Rfl: 1    spironolactone (ALDACTONE) 50 MG tablet, Take 1 tablet (50 mg total) by mouth once daily., Disp: 90 tablet, Rfl: 1    tiZANidine (ZANAFLEX) 4 MG tablet, TAKE 1 TABLET BY MOUTH EVERY 6 HOURS AS NEEDED,  Disp: 90 tablet, Rfl: 1    traMADoL (ULTRAM) 50 mg tablet, TAKE 1 TABLET BY MOUTH EVERY 6 HOURS AS NEEDED FOR PAIN, Disp: 120 tablet, Rfl: 2    triamcinolone acetonide 0.1% (KENALOG) 0.1 % ointment, AAA bid, Disp: 454 g, Rfl: 3    triamcinolone acetonide 0.1%-ciclopirox-magnesium hydroxide 400 mg/5 ml, Apply to affected area twice a day after cool blow dry, Disp: 60 g, Rfl: 5    valsartan (DIOVAN) 320 MG tablet, Take 1 tablet (320 mg total) by mouth once daily., Disp: 90 tablet, Rfl: 3  Does patient have record of home blood pressure readings yes. Readings have been averaging 140s-160s systolic/ 80s-90s diastolic.   Last dose of blood pressure medication was taken at 3/1/2023 @ 0800.  Patient is asymptomatic.   Complains of stress at home and mild, intermittent headaches that are relieved by BC Powder and her prescribed pain medication.  She also reports that her leg swelling has improved since her last office visit and medication modification.    BP: (!) 150/98 , Pulse: 82 .    Blood pressure reading after 15 minutes was 148/92, Pulse 84.  Dr. Torres notified.

## 2023-03-02 ENCOUNTER — OFFICE VISIT (OUTPATIENT)
Dept: CARDIOLOGY | Facility: CLINIC | Age: 73
End: 2023-03-02
Payer: MEDICARE

## 2023-03-02 VITALS
DIASTOLIC BLOOD PRESSURE: 108 MMHG | RESPIRATION RATE: 20 BRPM | WEIGHT: 271.06 LBS | HEART RATE: 88 BPM | SYSTOLIC BLOOD PRESSURE: 178 MMHG | OXYGEN SATURATION: 98 % | BODY MASS INDEX: 46.53 KG/M2

## 2023-03-02 DIAGNOSIS — I70.0 ATHEROSCLEROSIS OF AORTA: ICD-10-CM

## 2023-03-02 DIAGNOSIS — R00.2 PALPITATIONS: ICD-10-CM

## 2023-03-02 DIAGNOSIS — G47.33 OSA (OBSTRUCTIVE SLEEP APNEA): ICD-10-CM

## 2023-03-02 DIAGNOSIS — E66.01 CLASS 3 SEVERE OBESITY DUE TO EXCESS CALORIES WITH SERIOUS COMORBIDITY AND BODY MASS INDEX (BMI) OF 45.0 TO 49.9 IN ADULT: ICD-10-CM

## 2023-03-02 DIAGNOSIS — R63.5 WEIGHT GAIN: ICD-10-CM

## 2023-03-02 DIAGNOSIS — I10 PRIMARY HYPERTENSION: Primary | ICD-10-CM

## 2023-03-02 PROCEDURE — 99999 PR PBB SHADOW E&M-EST. PATIENT-LVL IV: ICD-10-PCS | Mod: PBBFAC,,, | Performed by: INTERNAL MEDICINE

## 2023-03-02 PROCEDURE — 93000 ELECTROCARDIOGRAM COMPLETE: CPT | Mod: S$GLB,,, | Performed by: INTERNAL MEDICINE

## 2023-03-02 PROCEDURE — 3077F SYST BP >= 140 MM HG: CPT | Mod: CPTII,S$GLB,, | Performed by: INTERNAL MEDICINE

## 2023-03-02 PROCEDURE — 4010F PR ACE/ARB THEARPY RXD/TAKEN: ICD-10-PCS | Mod: CPTII,S$GLB,, | Performed by: INTERNAL MEDICINE

## 2023-03-02 PROCEDURE — 3080F PR MOST RECENT DIASTOLIC BLOOD PRESSURE >= 90 MM HG: ICD-10-PCS | Mod: CPTII,S$GLB,, | Performed by: INTERNAL MEDICINE

## 2023-03-02 PROCEDURE — 1126F PR PAIN SEVERITY QUANTIFIED, NO PAIN PRESENT: ICD-10-PCS | Mod: CPTII,S$GLB,, | Performed by: INTERNAL MEDICINE

## 2023-03-02 PROCEDURE — 3080F DIAST BP >= 90 MM HG: CPT | Mod: CPTII,S$GLB,, | Performed by: INTERNAL MEDICINE

## 2023-03-02 PROCEDURE — 93000 EKG 12-LEAD: ICD-10-PCS | Mod: S$GLB,,, | Performed by: INTERNAL MEDICINE

## 2023-03-02 PROCEDURE — 3288F FALL RISK ASSESSMENT DOCD: CPT | Mod: CPTII,S$GLB,, | Performed by: INTERNAL MEDICINE

## 2023-03-02 PROCEDURE — 3077F PR MOST RECENT SYSTOLIC BLOOD PRESSURE >= 140 MM HG: ICD-10-PCS | Mod: CPTII,S$GLB,, | Performed by: INTERNAL MEDICINE

## 2023-03-02 PROCEDURE — 3008F PR BODY MASS INDEX (BMI) DOCUMENTED: ICD-10-PCS | Mod: CPTII,S$GLB,, | Performed by: INTERNAL MEDICINE

## 2023-03-02 PROCEDURE — 1101F PR PT FALLS ASSESS DOC 0-1 FALLS W/OUT INJ PAST YR: ICD-10-PCS | Mod: CPTII,S$GLB,, | Performed by: INTERNAL MEDICINE

## 2023-03-02 PROCEDURE — 3008F BODY MASS INDEX DOCD: CPT | Mod: CPTII,S$GLB,, | Performed by: INTERNAL MEDICINE

## 2023-03-02 PROCEDURE — 99999 PR PBB SHADOW E&M-EST. PATIENT-LVL IV: CPT | Mod: PBBFAC,,, | Performed by: INTERNAL MEDICINE

## 2023-03-02 PROCEDURE — 1159F MED LIST DOCD IN RCRD: CPT | Mod: CPTII,S$GLB,, | Performed by: INTERNAL MEDICINE

## 2023-03-02 PROCEDURE — 1159F PR MEDICATION LIST DOCUMENTED IN MEDICAL RECORD: ICD-10-PCS | Mod: CPTII,S$GLB,, | Performed by: INTERNAL MEDICINE

## 2023-03-02 PROCEDURE — 99214 OFFICE O/P EST MOD 30 MIN: CPT | Mod: 25,S$GLB,, | Performed by: INTERNAL MEDICINE

## 2023-03-02 PROCEDURE — 1101F PT FALLS ASSESS-DOCD LE1/YR: CPT | Mod: CPTII,S$GLB,, | Performed by: INTERNAL MEDICINE

## 2023-03-02 PROCEDURE — 3288F PR FALLS RISK ASSESSMENT DOCUMENTED: ICD-10-PCS | Mod: CPTII,S$GLB,, | Performed by: INTERNAL MEDICINE

## 2023-03-02 PROCEDURE — 1126F AMNT PAIN NOTED NONE PRSNT: CPT | Mod: CPTII,S$GLB,, | Performed by: INTERNAL MEDICINE

## 2023-03-02 PROCEDURE — 4010F ACE/ARB THERAPY RXD/TAKEN: CPT | Mod: CPTII,S$GLB,, | Performed by: INTERNAL MEDICINE

## 2023-03-02 PROCEDURE — 99214 PR OFFICE/OUTPT VISIT, EST, LEVL IV, 30-39 MIN: ICD-10-PCS | Mod: 25,S$GLB,, | Performed by: INTERNAL MEDICINE

## 2023-03-02 RX ORDER — HYDRALAZINE HYDROCHLORIDE 50 MG/1
50 TABLET, FILM COATED ORAL EVERY 12 HOURS
Qty: 60 TABLET | Refills: 11 | Status: SHIPPED | OUTPATIENT
Start: 2023-03-02 | End: 2023-03-21

## 2023-03-02 NOTE — PROGRESS NOTES
CARDIOLOGY CLINIC VISIT        HISTORY OF PRESENT ILLNESS:     05/26/2020: Amanda Holt presents for establishment of care. Previously seen by Dr. Fink. No hx of CAD. Had a stress test in 2018. Had hypertensive response to exercise. Started on medications. BP at home according to patient wnl. Denies chest pain or sob.     03/02/2023:  Recent echocardiogram showed normal left ventricular systolic function with estimated ejection fraction of 65%.  Over the past year she is had roughly a 50 lb weight gain.  Blood pressure has been difficult to control.  Was changed from amlodipine and losartan to valsartan and Aldactone.  Blood pressure continues to be elevated.  She is not been using her CPAP since it has been recalled.  She also notes daily palpitations.  Last a few seconds.  No aggravating or alleviating factors.    CARDIOVASCULAR HISTORY:     None    PAST MEDICAL HISTORY:     Past Medical History:   Diagnosis Date    Arthralgia     Colon polyp     Degenerative disc disease at L5-S1 level     High cholesterol     Hypertension     Nuclear sclerosis of both eyes 1/8/2020    Sciatica     Spinal stenosis        PAST SURGICAL HISTORY:     Past Surgical History:   Procedure Laterality Date    BACK SURGERY      lumbar laminectomy    COLONOSCOPY N/A 7/23/2020    Procedure: COLONOSCOPY;  Surgeon: Donal Moore MD;  Location: [facility] ENDO;  Service: Endoscopy;  Laterality: N/A;    EPIDURAL STEROID INJECTION Left 9/9/2020    Procedure: Injection, Steroid, Epidural Transforaminal;  Surgeon: Jun Leavitt Jr., MD;  Location: [facility] ENDO;  Service: Pain Management;  Laterality: Left;  Left L5 + S1 TF WADE  Arrive @ 1300; ASA last 9/1; No DM    EPIDURAL STEROID INJECTION Left 2/12/2021    Procedure: Injection, Steroid, Epidural Transforaminal;  Surgeon: Jun Leavitt Jr., MD;  Location: [facility] ENDO;  Service: Pain Management;  Laterality: Left;  Left L4 + L5 TF WADE  Arrive @ 1230; IV Sedation; ASA last 2/4; No DM     TRANSFORAMINAL EPIDURAL INJECTION OF STEROID Left 3/14/2022    Procedure: INJECTION, STEROID, EPIDURAL, TRANSFORAMINAL APPROACH, L4-L5 & L5-S1;  Surgeon: Darya Ayala MD;  Location: [facility] PAIN MGT;  Service: Pain Management;  Laterality: Left;    TRANSFORAMINAL EPIDURAL INJECTION OF STEROID Left 8/29/2022    Procedure: LUMBAR TRANSFORAMINAL LEFT L4/5 AND L5/S1 CONTRAST;  Surgeon: Darya Ayala MD;  Location: [facility] PAIN MGT;  Service: Pain Management;  Laterality: Left;    TUBAL LIGATION         ALLERGIES AND MEDICATION:     Review of patient's allergies indicates:   Allergen Reactions    Codeine         Medication List            Accurate as of March 2, 2023  2:34 PM. If you have any questions, ask your nurse or doctor.                START taking these medications      hydrALAZINE 50 MG tablet  Commonly known as: APRESOLINE  Take 1 tablet (50 mg total) by mouth every 12 (twelve) hours.  Started by: Epifanio Calhoun MD            CONTINUE taking these medications      amitriptyline 100 MG tablet  Commonly known as: ELAVIL  Take 1 tablet (100 mg total) by mouth every evening.     aspirin 81 MG EC tablet  Commonly known as: ECOTRIN     clotrimazole-betamethasone 1-0.05% cream  Commonly known as: LOTRISONE  APPLY  CREAM TOPICALLY TO AFFECTED AREA TWICE DAILY     diclofenac sodium 1 % Gel  Commonly known as: VOLTAREN  APPLY 2 GRAMS TOPICALLY TO AFFECTED AREA 4 TIMES DAILY     ergocalciferol 50,000 unit Cap  Commonly known as: ERGOCALCIFEROL     fluticasone propionate 50 mcg/actuation nasal spray  Commonly known as: FLONASE  1 spray (50 mcg total) by Each Nostril route once daily.     hydrOXYzine HCL 25 MG tablet  Commonly known as: ATARAX  Take 1 tablet by mouth three times daily as needed     mupirocin 2 % ointment  Commonly known as: BACTROBAN  Apply topically 3 (three) times daily.     nystatin powder  Commonly known as: NYSTOP  APPLY TOPICALLY 4 TIMES A DAY     omeprazole 40 MG capsule  Commonly known as:  PRILOSEC  Take 1 capsule (40 mg total) by mouth once daily.     pravastatin 40 MG tablet  Commonly known as: PRAVACHOL  Take 1 tablet by mouth once daily     spironolactone 50 MG tablet  Commonly known as: ALDACTONE  Take 1 tablet (50 mg total) by mouth once daily.     tiZANidine 4 MG tablet  Commonly known as: ZANAFLEX  TAKE 1 TABLET BY MOUTH EVERY 6 HOURS AS NEEDED     traMADoL 50 mg tablet  Commonly known as: ULTRAM  TAKE 1 TABLET BY MOUTH EVERY 6 HOURS AS NEEDED FOR PAIN     triamcinolone acetonide 0.1% 0.1 % ointment  Commonly known as: KENALOG  AAA bid     triamcinolone acetonide 0.1%-ciclopirox-magnesium hydroxide 400 mg/5 ml  Apply to affected area twice a day after cool blow dry     valsartan 320 MG tablet  Commonly known as: DIOVAN  Take 1 tablet (320 mg total) by mouth once daily.               Where to Get Your Medications        These medications were sent to [facility] [address]  [address] [address] [facility] [address]  [address] [address]      Phone: [phone]   hydrALAZINE 50 MG tablet         SOCIAL HISTORY:     Social History     Socioeconomic History    Marital status:    Tobacco Use    Smoking status: Some Days     Packs/day: 0.25     Years: 40.00     Pack years: 10.00     Types: Cigarettes, Vaping with nicotine     Last attempt to quit: 3/15/2022     Years since quittin.9    Smokeless tobacco: Current    Tobacco comments:     Started smoking age 31 with drinking, 2 cigarettes/week, quit 3/2022     Currently vaping with nicotine    Substance and Sexual Activity    Alcohol use: Yes     Comment: ocassionally    Drug use: Yes     Comment: Tramadol twice a day as needed    Sexual activity: Not Currently     Social Determinants of Health     Financial Resource Strain: Medium Risk    Difficulty of Paying Living Expenses: Somewhat hard   Food Insecurity: Food Insecurity Present    Worried About Running Out of Food in the Last Year: Sometimes true    Ran Out of Food  in the Last Year: Sometimes true   Transportation Needs: Unmet Transportation Needs    Lack of Transportation (Medical): No    Lack of Transportation (Non-Medical): Yes   Physical Activity: Insufficiently Active    Days of Exercise per Week: 3 days    Minutes of Exercise per Session: 20 min   Stress: Stress Concern Present    Feeling of Stress : Very much   Social Connections: Unknown    Frequency of Communication with Friends and Family: More than three times a week    Frequency of Social Gatherings with Friends and Family: Once a week    Active Member of Clubs or Organizations: No    Attends Club or Organization Meetings: 1 to 4 times per year    Marital Status:    Housing Stability: Low Risk     Unable to Pay for Housing in the Last Year: No    Number of Places Lived in the Last Year: 1    Unstable Housing in the Last Year: No       FAMILY HISTORY:     Family History   Problem Relation Age of Onset    Breast cancer Mother     Hypertension Mother     Hypotension Mother     Cataracts Father     Glaucoma Father     Diabetes Father     Glaucoma Sister     No Known Problems Brother     No Known Problems Brother     No Known Problems Brother     No Known Problems Maternal Aunt     No Known Problems Maternal Uncle     No Known Problems Paternal Aunt     No Known Problems Paternal Uncle     No Known Problems Maternal Grandmother     No Known Problems Maternal Grandfather     No Known Problems Paternal Grandmother     No Known Problems Paternal Grandfather     No Known Problems Son     No Known Problems Other     Amblyopia Neg Hx     Blindness Neg Hx     Cancer Neg Hx     Macular degeneration Neg Hx     Retinal detachment Neg Hx     Strabismus Neg Hx     Stroke Neg Hx     Thyroid disease Neg Hx        REVIEW OF SYSTEMS:   Review of Systems   Constitutional:  Negative for chills, diaphoresis, fever, malaise/fatigue and weight loss.   HENT:  Negative for congestion, hearing loss, sinus pain, sore throat and  tinnitus.    Eyes:  Negative for blurred vision, double vision, photophobia and pain.   Respiratory:  Negative for cough, hemoptysis, sputum production, shortness of breath, wheezing and stridor.    Cardiovascular:  Positive for palpitations. Negative for chest pain, orthopnea, claudication, leg swelling and PND.   Gastrointestinal:  Negative for abdominal pain, blood in stool, heartburn, melena, nausea and vomiting.   Musculoskeletal:  Negative for back pain, falls, joint pain, myalgias and neck pain.   Neurological:  Negative for dizziness, tingling, tremors, sensory change, speech change, focal weakness, seizures, loss of consciousness, weakness and headaches.   Endo/Heme/Allergies:  Does not bruise/bleed easily.   Psychiatric/Behavioral:  Negative for depression, memory loss and substance abuse. The patient is nervous/anxious.      PHYSICAL EXAM:     Vitals:    03/02/23 1403   BP: (!) 178/108   Pulse: 88   Resp: 20    Body mass index is 46.53 kg/m².  Weight: 123 kg (271 lb 0.9 oz)         Physical Exam  Constitutional:       General: She is not in acute distress.     Appearance: She is well-developed. She is obese. She is not diaphoretic.   HENT:      Head: Normocephalic.   Neck:      Vascular: No carotid bruit or JVD.   Cardiovascular:      Rate and Rhythm: Normal rate and regular rhythm.      Pulses: Normal pulses.      Heart sounds: Murmur heard.   Systolic murmur is present with a grade of 2/6.   Pulmonary:      Effort: Pulmonary effort is normal.      Breath sounds: Normal breath sounds.   Abdominal:      General: Bowel sounds are normal.      Palpations: Abdomen is soft.      Tenderness: There is no abdominal tenderness.   Skin:     General: Skin is warm and dry.   Neurological:      Mental Status: She is alert and oriented to person, place, and time.   Psychiatric:         Speech: Speech normal.         Behavior: Behavior normal.         Thought Content: Thought content normal.       DATA:   EKG:  (personally reviewed tracing)  03/02/2023-sinus rhythm with PACs.  Laboratory:  CBC:  Recent Labs   Lab 03/13/20  1125   WBC 6.81   Hemoglobin 12.5   Hematocrit 43.3   Platelets 305       CHEMISTRIES:  Recent Labs   Lab 03/13/20  1125 07/20/20  1030 02/02/23  1535   Glucose 106 117 H 96   Sodium 142 142 144   Potassium 5.2 H 4.3 4.1   BUN 26 H 15 29 H   Creatinine 1.4 1.2 1.3   eGFR if  44.5 A 54 A  --    eGFR if non  38.6 A 47 A  --    Calcium 10.1 9.1 9.9       CARDIAC BIOMARKERS:        COAGS:        LIPIDS/LFTS:  Recent Labs   Lab 03/13/20  1125 07/20/20  1030 10/21/21  1156   Cholesterol 188  --  151   Triglycerides 155 H  --  106   HDL 50  --  47   LDL Cholesterol 107.0  --  82.8   Non-HDL Cholesterol 138  --  104   AST 15 14  --    ALT 13 11  --        Cardiovascular Testing:    Echocardiogram 02/08/2023:    The left ventricle is normal in size with concentric hypertrophy and normal systolic function.  The estimated ejection fraction is 65%.  Normal left ventricular diastolic function.  Normal right ventricular size with normal right ventricular systolic function.  Mild left atrial enlargement.  Intermediate central venous pressure (8 mmHg).    ASSESSMENT:     Hypertension  Hyperlipidemia: last LDL 82  Palpitations  Obstructive sleep apnea  Weight gain  Obesity    PLAN:     Hypertension: Add hydralazine.  Titrate as tolerated.  Diet/exercise.  Weight loss.  Hyperlipidemia:  Continue current management.  Palpitations:  Holter.  Return to clinic 1 month.           Epifanio Calhoun MD, MPH, FACC, [facility]

## 2023-03-06 ENCOUNTER — TELEPHONE (OUTPATIENT)
Dept: FAMILY MEDICINE | Facility: CLINIC | Age: 73
End: 2023-03-06
Payer: MEDICARE

## 2023-03-06 ENCOUNTER — PATIENT MESSAGE (OUTPATIENT)
Dept: OPTOMETRY | Facility: CLINIC | Age: 73
End: 2023-03-06
Payer: MEDICARE

## 2023-03-06 NOTE — TELEPHONE ENCOUNTER
----- Message from Nathaniel Perez MA sent at 3/6/2023 11:08 AM CST -----  Type: Patient Call Back    Who called:Self    What is the request in detail:pt. Is asking for a nurse to give her a call she is having a series of elevated blood pressures not going down for her ..190/108. for the past few days     Can the clinic reply by [facility]?No    Would the patient rather a call back or a response via [facility]? yes    Best call back number: [phone]

## 2023-03-06 NOTE — TELEPHONE ENCOUNTER
Patient denies any symptoms at this time, she's just concerned of her readings. Assisted in setting an appointment to be seen tomorrow and instructed to bring her BP machine.

## 2023-03-07 ENCOUNTER — OFFICE VISIT (OUTPATIENT)
Dept: FAMILY MEDICINE | Facility: CLINIC | Age: 73
End: 2023-03-07
Payer: MEDICARE

## 2023-03-07 ENCOUNTER — LAB VISIT (OUTPATIENT)
Dept: LAB | Facility: HOSPITAL | Age: 73
End: 2023-03-07
Attending: NURSE PRACTITIONER
Payer: MEDICARE

## 2023-03-07 ENCOUNTER — TELEPHONE (OUTPATIENT)
Dept: FAMILY MEDICINE | Facility: CLINIC | Age: 73
End: 2023-03-07

## 2023-03-07 VITALS
SYSTOLIC BLOOD PRESSURE: 155 MMHG | WEIGHT: 273.56 LBS | HEIGHT: 64 IN | RESPIRATION RATE: 16 BRPM | TEMPERATURE: 98 F | OXYGEN SATURATION: 98 % | HEART RATE: 72 BPM | DIASTOLIC BLOOD PRESSURE: 93 MMHG | BODY MASS INDEX: 46.7 KG/M2

## 2023-03-07 DIAGNOSIS — E66.01 CLASS 3 SEVERE OBESITY DUE TO EXCESS CALORIES WITH SERIOUS COMORBIDITY AND BODY MASS INDEX (BMI) OF 45.0 TO 49.9 IN ADULT: Primary | ICD-10-CM

## 2023-03-07 DIAGNOSIS — R79.9 ABNORMAL FINDING OF BLOOD CHEMISTRY, UNSPECIFIED: ICD-10-CM

## 2023-03-07 DIAGNOSIS — E66.01 CLASS 3 SEVERE OBESITY DUE TO EXCESS CALORIES WITH SERIOUS COMORBIDITY AND BODY MASS INDEX (BMI) OF 45.0 TO 49.9 IN ADULT: ICD-10-CM

## 2023-03-07 DIAGNOSIS — I10 PRIMARY HYPERTENSION: ICD-10-CM

## 2023-03-07 DIAGNOSIS — G47.33 OSA (OBSTRUCTIVE SLEEP APNEA): ICD-10-CM

## 2023-03-07 LAB
ALBUMIN SERPL BCP-MCNC: 3.3 G/DL (ref 3.5–5.2)
ALP SERPL-CCNC: 93 U/L (ref 55–135)
ALT SERPL W/O P-5'-P-CCNC: 14 U/L (ref 10–44)
ANION GAP SERPL CALC-SCNC: 7 MMOL/L (ref 8–16)
AST SERPL-CCNC: 15 U/L (ref 10–40)
BASOPHILS # BLD AUTO: 0.01 K/UL (ref 0–0.2)
BASOPHILS NFR BLD: 0.2 % (ref 0–1.9)
BILIRUB SERPL-MCNC: 0.6 MG/DL (ref 0.1–1)
BUN SERPL-MCNC: 18 MG/DL (ref 8–23)
CALCIUM SERPL-MCNC: 9.8 MG/DL (ref 8.7–10.5)
CHLORIDE SERPL-SCNC: 112 MMOL/L (ref 95–110)
CHOLEST SERPL-MCNC: 160 MG/DL (ref 120–199)
CHOLEST/HDLC SERPL: 4 {RATIO} (ref 2–5)
CO2 SERPL-SCNC: 25 MMOL/L (ref 23–29)
CREAT SERPL-MCNC: 1.3 MG/DL (ref 0.5–1.4)
DIFFERENTIAL METHOD: ABNORMAL
EOSINOPHIL # BLD AUTO: 0.1 K/UL (ref 0–0.5)
EOSINOPHIL NFR BLD: 2.5 % (ref 0–8)
ERYTHROCYTE [DISTWIDTH] IN BLOOD BY AUTOMATED COUNT: 11.9 % (ref 11.5–14.5)
EST. GFR  (NO RACE VARIABLE): 44 ML/MIN/1.73 M^2
ESTIMATED AVG GLUCOSE: 108 MG/DL (ref 68–131)
GLUCOSE SERPL-MCNC: 115 MG/DL (ref 70–110)
HBA1C MFR BLD: 5.4 % (ref 4–5.6)
HCT VFR BLD AUTO: 40.6 % (ref 37–48.5)
HDLC SERPL-MCNC: 40 MG/DL (ref 40–75)
HDLC SERPL: 25 % (ref 20–50)
HGB BLD-MCNC: 12.7 G/DL (ref 12–16)
IMM GRANULOCYTES # BLD AUTO: 0.01 K/UL (ref 0–0.04)
IMM GRANULOCYTES NFR BLD AUTO: 0.2 % (ref 0–0.5)
LDLC SERPL CALC-MCNC: 101.2 MG/DL (ref 63–159)
LYMPHOCYTES # BLD AUTO: 1.6 K/UL (ref 1–4.8)
LYMPHOCYTES NFR BLD: 32.1 % (ref 18–48)
MCH RBC QN AUTO: 30 PG (ref 27–31)
MCHC RBC AUTO-ENTMCNC: 31.3 G/DL (ref 32–36)
MCV RBC AUTO: 96 FL (ref 82–98)
MONOCYTES # BLD AUTO: 0.5 K/UL (ref 0.3–1)
MONOCYTES NFR BLD: 10.3 % (ref 4–15)
NEUTROPHILS # BLD AUTO: 2.7 K/UL (ref 1.8–7.7)
NEUTROPHILS NFR BLD: 54.7 % (ref 38–73)
NONHDLC SERPL-MCNC: 120 MG/DL
NRBC BLD-RTO: 0 /100 WBC
PLATELET # BLD AUTO: 264 K/UL (ref 150–450)
PMV BLD AUTO: 9.8 FL (ref 9.2–12.9)
POTASSIUM SERPL-SCNC: 4.5 MMOL/L (ref 3.5–5.1)
PROT SERPL-MCNC: 7.6 G/DL (ref 6–8.4)
RBC # BLD AUTO: 4.23 M/UL (ref 4–5.4)
SODIUM SERPL-SCNC: 144 MMOL/L (ref 136–145)
TRIGL SERPL-MCNC: 94 MG/DL (ref 30–150)
TSH SERPL DL<=0.005 MIU/L-ACNC: 1.63 UIU/ML (ref 0.4–4)
WBC # BLD AUTO: 4.86 K/UL (ref 3.9–12.7)

## 2023-03-07 PROCEDURE — 1160F RVW MEDS BY RX/DR IN RCRD: CPT | Mod: CPTII,S$GLB,, | Performed by: NURSE PRACTITIONER

## 2023-03-07 PROCEDURE — 1125F AMNT PAIN NOTED PAIN PRSNT: CPT | Mod: CPTII,S$GLB,, | Performed by: NURSE PRACTITIONER

## 2023-03-07 PROCEDURE — 3008F BODY MASS INDEX DOCD: CPT | Mod: CPTII,S$GLB,, | Performed by: NURSE PRACTITIONER

## 2023-03-07 PROCEDURE — 80061 LIPID PANEL: CPT | Performed by: NURSE PRACTITIONER

## 2023-03-07 PROCEDURE — 3080F DIAST BP >= 90 MM HG: CPT | Mod: CPTII,S$GLB,, | Performed by: NURSE PRACTITIONER

## 2023-03-07 PROCEDURE — 3288F PR FALLS RISK ASSESSMENT DOCUMENTED: ICD-10-PCS | Mod: CPTII,S$GLB,, | Performed by: NURSE PRACTITIONER

## 2023-03-07 PROCEDURE — 4010F ACE/ARB THERAPY RXD/TAKEN: CPT | Mod: CPTII,S$GLB,, | Performed by: NURSE PRACTITIONER

## 2023-03-07 PROCEDURE — 99999 PR PBB SHADOW E&M-EST. PATIENT-LVL V: CPT | Mod: PBBFAC,,, | Performed by: NURSE PRACTITIONER

## 2023-03-07 PROCEDURE — 99999 PR PBB SHADOW E&M-EST. PATIENT-LVL V: ICD-10-PCS | Mod: PBBFAC,,, | Performed by: NURSE PRACTITIONER

## 2023-03-07 PROCEDURE — 3077F SYST BP >= 140 MM HG: CPT | Mod: CPTII,S$GLB,, | Performed by: NURSE PRACTITIONER

## 2023-03-07 PROCEDURE — 1160F PR REVIEW ALL MEDS BY PRESCRIBER/CLIN PHARMACIST DOCUMENTED: ICD-10-PCS | Mod: CPTII,S$GLB,, | Performed by: NURSE PRACTITIONER

## 2023-03-07 PROCEDURE — 1101F PR PT FALLS ASSESS DOC 0-1 FALLS W/OUT INJ PAST YR: ICD-10-PCS | Mod: CPTII,S$GLB,, | Performed by: NURSE PRACTITIONER

## 2023-03-07 PROCEDURE — 83036 HEMOGLOBIN GLYCOSYLATED A1C: CPT | Performed by: NURSE PRACTITIONER

## 2023-03-07 PROCEDURE — 3077F PR MOST RECENT SYSTOLIC BLOOD PRESSURE >= 140 MM HG: ICD-10-PCS | Mod: CPTII,S$GLB,, | Performed by: NURSE PRACTITIONER

## 2023-03-07 PROCEDURE — 1125F PR PAIN SEVERITY QUANTIFIED, PAIN PRESENT: ICD-10-PCS | Mod: CPTII,S$GLB,, | Performed by: NURSE PRACTITIONER

## 2023-03-07 PROCEDURE — 3288F FALL RISK ASSESSMENT DOCD: CPT | Mod: CPTII,S$GLB,, | Performed by: NURSE PRACTITIONER

## 2023-03-07 PROCEDURE — 84443 ASSAY THYROID STIM HORMONE: CPT | Performed by: NURSE PRACTITIONER

## 2023-03-07 PROCEDURE — 1159F MED LIST DOCD IN RCRD: CPT | Mod: CPTII,S$GLB,, | Performed by: NURSE PRACTITIONER

## 2023-03-07 PROCEDURE — 99214 OFFICE O/P EST MOD 30 MIN: CPT | Mod: S$GLB,,, | Performed by: NURSE PRACTITIONER

## 2023-03-07 PROCEDURE — 3080F PR MOST RECENT DIASTOLIC BLOOD PRESSURE >= 90 MM HG: ICD-10-PCS | Mod: CPTII,S$GLB,, | Performed by: NURSE PRACTITIONER

## 2023-03-07 PROCEDURE — 4010F PR ACE/ARB THEARPY RXD/TAKEN: ICD-10-PCS | Mod: CPTII,S$GLB,, | Performed by: NURSE PRACTITIONER

## 2023-03-07 PROCEDURE — 1101F PT FALLS ASSESS-DOCD LE1/YR: CPT | Mod: CPTII,S$GLB,, | Performed by: NURSE PRACTITIONER

## 2023-03-07 PROCEDURE — 1159F PR MEDICATION LIST DOCUMENTED IN MEDICAL RECORD: ICD-10-PCS | Mod: CPTII,S$GLB,, | Performed by: NURSE PRACTITIONER

## 2023-03-07 PROCEDURE — 3008F PR BODY MASS INDEX (BMI) DOCUMENTED: ICD-10-PCS | Mod: CPTII,S$GLB,, | Performed by: NURSE PRACTITIONER

## 2023-03-07 PROCEDURE — 36415 COLL VENOUS BLD VENIPUNCTURE: CPT | Mod: PN | Performed by: NURSE PRACTITIONER

## 2023-03-07 PROCEDURE — 99214 PR OFFICE/OUTPT VISIT, EST, LEVL IV, 30-39 MIN: ICD-10-PCS | Mod: S$GLB,,, | Performed by: NURSE PRACTITIONER

## 2023-03-07 PROCEDURE — 85025 COMPLETE CBC W/AUTO DIFF WBC: CPT | Performed by: NURSE PRACTITIONER

## 2023-03-07 PROCEDURE — 80053 COMPREHEN METABOLIC PANEL: CPT | Performed by: NURSE PRACTITIONER

## 2023-03-07 NOTE — PROGRESS NOTES
Patient, Amanda Holt (MRN #[id number]), presented with a recent Estimated Glumerular Filtration Rate (EGFR) between 30 and 45 consistent with the definition of chronic kidney disease stage 3 - moderate (ICD10 - N18.3).      The patient's chronic kidney disease stage 3 was monitored, evaluated, addressed and/or treated. This addendum to the medical record is made on 03/07/2023.

## 2023-03-07 NOTE — TELEPHONE ENCOUNTER
----- Message from Nico Kohler sent at 3/7/2023 11:21 AM CST -----  Regarding: Self  [phone]  Type: Patient Call Back    Who called: Self     What is the request in detail: pt. Called stating she would like to discuss results of a test that she took during her appt today on 03/07/2023    Can the clinic reply by [facility]? No     Would the patient rather a call back or a response via [facility]? Call back     Best call back number: [phone]     Additional Information:    Thank you.

## 2023-03-07 NOTE — PROGRESS NOTES
Routine Office Visit    Patient Name: Amanda Holt    : 1950  MRN: [id number]    Chief Complaint:  Hypertension follow-up    Subjective:  Amanda is a 72 y.o. female who presents today for:    1. Hypertension follow-up - patient who is known to me with history of hypertension, obesity, CKD followed by Nephrology reports today for evaluation.  She is registered to the [employer] [facility] program for hypertension.  She is compliant with her medications and recently started hydralazine within the last week.  She states she feels well today and reports that her usual palpitations have resolved but she still plans on picking up the Holter monitor as prescribed by Cardiology.  Yesterday she was having some stress and noted that her BP at home was in the 190s systolic.  Today she has brought her BP cuff and [employer] machine in with her for review.  She denies any shortness of breath, chest pain, or leg swelling today.  Overall she feels healthy and well.  She has been decreasing her intake of salt and has cut out red meat from her diet. She does not smoke.    Past Medical History  Past Medical History:   Diagnosis Date    Arthralgia     Colon polyp     Degenerative disc disease at L5-S1 level     High cholesterol     Hypertension     Nuclear sclerosis of both eyes 2020    Sciatica     Spinal stenosis        Past Surgical History  Past Surgical History:   Procedure Laterality Date    BACK SURGERY      lumbar laminectomy    COLONOSCOPY N/A 2020    Procedure: COLONOSCOPY;  Surgeon: Donal Moore MD;  Location: [facility] ENDO;  Service: Endoscopy;  Laterality: N/A;    EPIDURAL STEROID INJECTION Left 2020    Procedure: Injection, Steroid, Epidural Transforaminal;  Surgeon: Jun Leavitt Jr., MD;  Location: [facility] ENDO;  Service: Pain Management;  Laterality: Left;  Left L5 + S1 TF WADE  Arrive @ 1300; ASA last ; No DM    EPIDURAL STEROID INJECTION Left 2021    Procedure: Injection, Steroid,  Epidural Transforaminal;  Surgeon: Jun Leavitt Jr., MD;  Location: [facility] ENDO;  Service: Pain Management;  Laterality: Left;  Left L4 + L5 TF WADE  Arrive @ 1230; IV Sedation; ASA last 2/4; No DM    TRANSFORAMINAL EPIDURAL INJECTION OF STEROID Left 3/14/2022    Procedure: INJECTION, STEROID, EPIDURAL, TRANSFORAMINAL APPROACH, L4-L5 & L5-S1;  Surgeon: Darya Ayala MD;  Location: [facility] PAIN MGT;  Service: Pain Management;  Laterality: Left;    TRANSFORAMINAL EPIDURAL INJECTION OF STEROID Left 2022    Procedure: LUMBAR TRANSFORAMINAL LEFT L4/5 AND L5/S1 CONTRAST;  Surgeon: Darya Ayala MD;  Location: [facility] PAIN MGT;  Service: Pain Management;  Laterality: Left;    TUBAL LIGATION         Family History  Family History   Problem Relation Age of Onset    Breast cancer Mother     Hypertension Mother     Hypotension Mother     Cataracts Father     Glaucoma Father     Diabetes Father     Glaucoma Sister     No Known Problems Brother     No Known Problems Brother     No Known Problems Brother     No Known Problems Maternal Aunt     No Known Problems Maternal Uncle     No Known Problems Paternal Aunt     No Known Problems Paternal Uncle     No Known Problems Maternal Grandmother     No Known Problems Maternal Grandfather     No Known Problems Paternal Grandmother     No Known Problems Paternal Grandfather     No Known Problems Son     No Known Problems Other     Amblyopia Neg Hx     Blindness Neg Hx     Cancer Neg Hx     Macular degeneration Neg Hx     Retinal detachment Neg Hx     Strabismus Neg Hx     Stroke Neg Hx     Thyroid disease Neg Hx        Social History  Social History     Socioeconomic History    Marital status:    Tobacco Use    Smoking status: Some Days     Packs/day: 0.25     Years: 40.00     Pack years: 10.00     Types: Cigarettes, Vaping with nicotine     Last attempt to quit: 3/15/2022     Years since quittin.9    Smokeless tobacco: Current    Tobacco comments:     Started smoking age 31  with drinking, 2 cigarettes/week, quit 3/2022     Currently vaping with nicotine    Substance and Sexual Activity    Alcohol use: Yes     Comment: ocassionally    Drug use: Yes     Comment: Tramadol twice a day as needed    Sexual activity: Not Currently     Social Determinants of Health     Financial Resource Strain: Medium Risk    Difficulty of Paying Living Expenses: Somewhat hard   Food Insecurity: Food Insecurity Present    Worried About Running Out of Food in the Last Year: Sometimes true    Ran Out of Food in the Last Year: Sometimes true   Transportation Needs: Unmet Transportation Needs    Lack of Transportation (Medical): No    Lack of Transportation (Non-Medical): Yes   Physical Activity: Insufficiently Active    Days of Exercise per Week: 3 days    Minutes of Exercise per Session: 20 min   Stress: Stress Concern Present    Feeling of Stress : Very much   Social Connections: Unknown    Frequency of Communication with Friends and Family: More than three times a week    Frequency of Social Gatherings with Friends and Family: Once a week    Active Member of Clubs or Organizations: No    Attends Club or Organization Meetings: 1 to 4 times per year    Marital Status:    Housing Stability: Low Risk     Unable to Pay for Housing in the Last Year: No    Number of Places Lived in the Last Year: 1    Unstable Housing in the Last Year: No       Current Medications  Current Outpatient Medications on File Prior to Visit   Medication Sig Dispense Refill    amitriptyline (ELAVIL) 100 MG tablet Take 1 tablet (100 mg total) by mouth every evening. 90 tablet 1    aspirin (ECOTRIN) 81 MG EC tablet       clotrimazole-betamethasone 1-0.05% (LOTRISONE) cream APPLY  CREAM TOPICALLY TO AFFECTED AREA TWICE DAILY 45 g 0    diclofenac sodium (VOLTAREN) 1 % Gel APPLY 2 GRAMS TOPICALLY TO AFFECTED AREA 4 TIMES DAILY 100 g 5    ergocalciferol (ERGOCALCIFEROL) 50,000 unit Cap Take 1 capsule by mouth every 7 days.       fluticasone propionate (FLONASE) 50 mcg/actuation nasal spray 1 spray (50 mcg total) by Each Nostril route once daily. 16 g 5    hydrALAZINE (APRESOLINE) 50 MG tablet Take 1 tablet (50 mg total) by mouth every 12 (twelve) hours. 60 tablet 11    hydrOXYzine HCL (ATARAX) 25 MG tablet Take 1 tablet by mouth three times daily as needed 45 tablet 0    mupirocin (BACTROBAN) 2 % ointment Apply topically 3 (three) times daily. 22 g 0    nystatin (NYSTOP) powder APPLY TOPICALLY 4 TIMES A DAY 30 g 1    omeprazole (PRILOSEC) 40 MG capsule Take 1 capsule (40 mg total) by mouth once daily. 90 capsule 2    pravastatin (PRAVACHOL) 40 MG tablet Take 1 tablet by mouth once daily 90 tablet 1    tiZANidine (ZANAFLEX) 4 MG tablet TAKE 1 TABLET BY MOUTH EVERY 6 HOURS AS NEEDED 90 tablet 1    traMADoL (ULTRAM) 50 mg tablet TAKE 1 TABLET BY MOUTH EVERY 6 HOURS AS NEEDED FOR PAIN 120 tablet 2    triamcinolone acetonide 0.1% (KENALOG) 0.1 % ointment AAA bid 454 g 3    triamcinolone acetonide 0.1%-ciclopirox-magnesium hydroxide 400 mg/5 ml Apply to affected area twice a day after cool blow dry 60 g 5    valsartan (DIOVAN) 320 MG tablet Take 1 tablet (320 mg total) by mouth once daily. 90 tablet 3    spironolactone (ALDACTONE) 50 MG tablet Take 1 tablet (50 mg total) by mouth once daily. 90 tablet 1     No current facility-administered medications on file prior to visit.       Allergies   Review of patient's allergies indicates:   Allergen Reactions    Codeine        Review of Systems (Pertinent positives)  Review of Systems   Constitutional: Negative.  Negative for chills and fever.   HENT: Negative.  Negative for congestion, sinus pain and sore throat.    Eyes: Negative.    Respiratory:  Negative for cough, shortness of breath and wheezing.    Cardiovascular:  Negative for chest pain, palpitations, orthopnea and claudication.   Gastrointestinal: Negative.  Negative for abdominal pain, diarrhea, nausea and vomiting.   Genitourinary:  "Negative.  Negative for dysuria, frequency and urgency.   Musculoskeletal: Negative.  Negative for back pain, joint pain and neck pain.   Skin: Negative.    Neurological: Negative.  Negative for dizziness, tingling, loss of consciousness and headaches.   Endo/Heme/Allergies: Negative.    Psychiatric/Behavioral: Negative.       BP (!) 155/93 (BP Location: Left arm, Patient Position: Sitting, BP Method: Large (Manual))   Pulse 72   Temp 98.1 °F (36.7 °C) (Oral)   Resp 16   Ht 5' 4" (1.626 m)   Wt 124.1 kg (273 lb 9.5 oz)   SpO2 98%   BMI 46.96 kg/m²     Physical Exam  Vitals reviewed.   Constitutional:       General: She is not in acute distress.     Appearance: Normal appearance. She is not ill-appearing, toxic-appearing or diaphoretic.   HENT:      Head: Normocephalic and atraumatic.   Cardiovascular:      Rate and Rhythm: Normal rate and regular rhythm.      Pulses: Normal pulses.      Heart sounds: Normal heart sounds.   Pulmonary:      Effort: Pulmonary effort is normal. No respiratory distress.      Breath sounds: Normal breath sounds. No wheezing.   Abdominal:      General: Bowel sounds are normal. There is no distension.      Palpations: Abdomen is soft.      Tenderness: There is no abdominal tenderness.   Musculoskeletal:         General: No swelling, tenderness or deformity. Normal range of motion.   Skin:     General: Skin is warm and dry.      Capillary Refill: Capillary refill takes less than 2 seconds.   Neurological:      General: No focal deficit present.      Mental Status: She is alert and oriented to person, place, and time.   Psychiatric:         Mood and Affect: Mood normal.         Behavior: Behavior normal.      R Upper Arm circumference 46.5 cm  Pt is using x-large cuff for i[employer] machine    Assessment/Plan:  Amanda Holt is a 72 y.o. female who presents today for :    Amanda was seen today for hypertension.    Diagnoses and all orders for this visit:    Class 3 severe " obesity due to excess calories with serious comorbidity and body mass index (BMI) of 45.0 to 49.9 in adult  -     CBC W/ AUTO DIFFERENTIAL; Future  -     COMPREHENSIVE METABOLIC PANEL; Future  -     Lipid Panel; Future  -     TSH; Future  -     HEMOGLOBIN A1C; Future    Primary hypertension  -     CBC W/ AUTO DIFFERENTIAL; Future  -     COMPREHENSIVE METABOLIC PANEL; Future  -     Lipid Panel; Future  -     TSH; Future  -     HEMOGLOBIN A1C; Future    ANTONIA (obstructive sleep apnea)    Abnormal finding of blood chemistry, unspecified  -     HEMOGLOBIN A1C; Future    BP is improving.  It appears that her i[employer] machine is calibrated.  Recommended patient to continue current medications as she just started her hydralazine.  Continue monitoring per digital medicine at home.  Reinforced proper method of taking blood pressure at home with patient including sitting for at least 5 minutes before checking, keeping feet flat on the ground, not talking during BP measurement.  She does have a follow-up coming up with her cardiologist and primary care provider to recheck her blood pressure.  Will check labs today to evaluate for metabolic disorders associated with obesity and hypertension.  She has not worn her CPAP machine in months now because of the recall.  She is currently working to get another machine and will be following up with sleep medicine soon.  Discussed with patient that uncontrolled sleep apnea can worsen hypertension.  Discussed with patient that weight loss can help with BP control as well.  All questions answered.  Will follow-up with lab work.        This office note has been dictated.  This dictation has been generated using M-Modal Fluency Direct dictation; some phonetic errors may occur.   My collaborating physician is Dr. Kunal Rashid.

## 2023-03-09 ENCOUNTER — OFFICE VISIT (OUTPATIENT)
Dept: FAMILY MEDICINE | Facility: CLINIC | Age: 73
End: 2023-03-09
Payer: MEDICARE

## 2023-03-09 DIAGNOSIS — E66.01 CLASS 3 SEVERE OBESITY DUE TO EXCESS CALORIES WITH SERIOUS COMORBIDITY AND BODY MASS INDEX (BMI) OF 45.0 TO 49.9 IN ADULT: ICD-10-CM

## 2023-03-09 DIAGNOSIS — M51.36 DDD (DEGENERATIVE DISC DISEASE), LUMBAR: Primary | ICD-10-CM

## 2023-03-09 DIAGNOSIS — G47.33 OSA (OBSTRUCTIVE SLEEP APNEA): ICD-10-CM

## 2023-03-09 DIAGNOSIS — I10 PRIMARY HYPERTENSION: ICD-10-CM

## 2023-03-09 PROBLEM — Z72.0 TOBACCO USE: Status: RESOLVED | Noted: 2021-10-21 | Resolved: 2023-03-09

## 2023-03-09 PROCEDURE — 99214 OFFICE O/P EST MOD 30 MIN: CPT | Mod: 95,,, | Performed by: FAMILY MEDICINE

## 2023-03-09 PROCEDURE — 4010F PR ACE/ARB THEARPY RXD/TAKEN: ICD-10-PCS | Mod: CPTII,95,, | Performed by: FAMILY MEDICINE

## 2023-03-09 PROCEDURE — 4010F ACE/ARB THERAPY RXD/TAKEN: CPT | Mod: CPTII,95,, | Performed by: FAMILY MEDICINE

## 2023-03-09 PROCEDURE — 3044F PR MOST RECENT HEMOGLOBIN A1C LEVEL <7.0%: ICD-10-PCS | Mod: CPTII,95,, | Performed by: FAMILY MEDICINE

## 2023-03-09 PROCEDURE — 99214 PR OFFICE/OUTPT VISIT, EST, LEVL IV, 30-39 MIN: ICD-10-PCS | Mod: 95,,, | Performed by: FAMILY MEDICINE

## 2023-03-09 PROCEDURE — 3044F HG A1C LEVEL LT 7.0%: CPT | Mod: CPTII,95,, | Performed by: FAMILY MEDICINE

## 2023-03-09 NOTE — PROGRESS NOTES
Chief Complaint   Patient presents with    Results       The patient location is:  Patient Home   The chief complaint leading to consultation is: HTN  Visit type: Virtual visit with synchronous audio and video  Total time spent with patient: 15 min  Each patient to whom he or she provides medical services by telemedicine is:  (1) informed of the relationship between the physician and patient and the respective role of any other health care provider with respect to management of the patient; and (2) notified that he or she may decline to receive medical services by telemedicine and may withdraw from such care at any time.      DEBBIE Holt is a 72 y.o. female with multiple medical diagnoses as listed in the medical history and problem list that presents for follow-up for HTN and weight loss    Weight loss- she eats one meal a day, does not track calories; she is trying to eat more healthy  HTN- exercise is limited by pain; she is having her medicine adjusted by cardiology and her hydralazine was increased to 50mg BID, she is under stress, also not using CPAP machine    PAST MEDICAL HISTORY:  Past Medical History:   Diagnosis Date    Arthralgia     Colon polyp     Degenerative disc disease at L5-S1 level     High cholesterol     Hypertension     Nuclear sclerosis of both eyes 1/8/2020    Sciatica     Spinal stenosis     Tobacco use 10/21/2021       PAST SURGICAL HISTORY:  Past Surgical History:   Procedure Laterality Date    BACK SURGERY      lumbar laminectomy    COLONOSCOPY N/A 7/23/2020    Procedure: COLONOSCOPY;  Surgeon: Donal Moore MD;  Location: [facility];  Service: Endoscopy;  Laterality: N/A;    EPIDURAL STEROID INJECTION Left 9/9/2020    Procedure: Injection, Steroid, Epidural Transforaminal;  Surgeon: Jun Leavitt Jr., MD;  Location: [facility] ENDO;  Service: Pain Management;  Laterality: Left;  Left L5 + S1 TF WADE  Arrive @ 1300; ASA last 9/1; No DM    EPIDURAL STEROID INJECTION  Left 2021    Procedure: Injection, Steroid, Epidural Transforaminal;  Surgeon: Jun Leavitt Jr., MD;  Location: [facility] ENDO;  Service: Pain Management;  Laterality: Left;  Left L4 + L5 TF WADE  Arrive @ 1230; IV Sedation; ASA last 2/4; No DM    TRANSFORAMINAL EPIDURAL INJECTION OF STEROID Left 3/14/2022    Procedure: INJECTION, STEROID, EPIDURAL, TRANSFORAMINAL APPROACH, L4-L5 & L5-S1;  Surgeon: Darya Ayala MD;  Location: [facility] PAIN MGT;  Service: Pain Management;  Laterality: Left;    TRANSFORAMINAL EPIDURAL INJECTION OF STEROID Left 2022    Procedure: LUMBAR TRANSFORAMINAL LEFT L4/5 AND L5/S1 CONTRAST;  Surgeon: Darya Ayala MD;  Location: [facility] PAIN MGT;  Service: Pain Management;  Laterality: Left;    TUBAL LIGATION         SOCIAL HISTORY:  Social History     Socioeconomic History    Marital status:    Tobacco Use    Smoking status: Some Days     Packs/day: 0.25     Years: 35.00     Pack years: 8.75     Types: Cigarettes     Last attempt to quit: 3/15/2022     Years since quittin.9    Smokeless tobacco: Current    Tobacco comments:     Occasional spoker some times 1-2 cigarettes/day sometimes none for weeks   Substance and Sexual Activity    Alcohol use: Yes     Alcohol/week: 3.0 standard drinks     Types: 3 Glasses of wine per week     Comment: Occasional wine    Drug use: Yes     Comment: Tramadol twice a day as needed    Sexual activity: Not Currently     Partners: Male     Birth control/protection: Post-menopausal     Comment: Tubal 30+ years ago     Social Determinants of Health     Financial Resource Strain: Medium Risk    Difficulty of Paying Living Expenses: Somewhat hard   Food Insecurity: Food Insecurity Present    Worried About Running Out of Food in the Last Year: Sometimes true    Ran Out of Food in the Last Year: Sometimes true   Transportation Needs: No Transportation Needs    Lack of Transportation (Medical): No    Lack of Transportation (Non-Medical): No   Physical  Activity: Insufficiently Active    Days of Exercise per Week: 3 days    Minutes of Exercise per Session: 20 min   Stress: Stress Concern Present    Feeling of Stress : Very much   Social Connections: Unknown    Frequency of Communication with Friends and Family: More than three times a week    Frequency of Social Gatherings with Friends and Family: Once a week    Active Member of Clubs or Organizations: No    Attends Club or Organization Meetings: Never    Marital Status:    Housing Stability: Low Risk     Unable to Pay for Housing in the Last Year: No    Number of Places Lived in the Last Year: 1    Unstable Housing in the Last Year: No       FAMILY HISTORY:  Family History   Problem Relation Age of Onset    Breast cancer Mother     Hypertension Mother             Hypotension Mother     Cancer Mother          due to breast cancer    Miscarriages / Stillbirths Mother         3 stillborn children    Cataracts Father     Glaucoma Father     Diabetes Father             Arthritis Father     Vision loss Father         Glaucoma -     Glaucoma Sister     Arthritis Sister     Diabetes Sister     Hypertension Sister     Vision loss Sister         Glasses    Drug abuse Brother     Learning disabilities Brother     No Known Problems Brother     No Known Problems Brother     No Known Problems Maternal Aunt     No Known Problems Maternal Uncle     No Known Problems Paternal Aunt     No Known Problems Paternal Uncle     No Known Problems Maternal Grandmother     No Known Problems Maternal Grandfather     No Known Problems Paternal Grandmother     No Known Problems Paternal Grandfather     No Known Problems Son     No Known Problems Other     Arthritis Sister     Depression Sister     Diabetes Sister     Hearing loss Sister         Trouble hearing    Hypertension Sister     Stroke Sister         Browne Vargas Disease, Coma for a month due to diabetes,Some body weakness    Vision loss Sister          Lasix surgery    Arthritis Sister     COPD Sister     Diabetes Sister     Hearing loss Sister     Heart disease Sister     Hypertension Sister     Arthritis Brother     Hypertension Brother     Early death Sister         Still born    Early death Sister         Still born twin to Debby Benítez    Early death Brother     Hypertension Sister     Hypertension Brother     Learning disabilities Son     Learning disabilities Son         Great grandson    Learning disabilities Daughter         Granddaughter    Mental illness Sister         Her son Matthias Benítez    Vision loss Sister         Glasses/contacts    Vision loss Sister         Glasses    Amblyopia Neg Hx     Blindness Neg Hx     Macular degeneration Neg Hx     Retinal detachment Neg Hx     Strabismus Neg Hx     Thyroid disease Neg Hx        ALLERGIES AND MEDICATIONS: updated and reviewed.  Review of patient's allergies indicates:   Allergen Reactions    Codeine      Medication List with Changes/Refills   Current Medications    AMITRIPTYLINE (ELAVIL) 100 MG TABLET    Take 1 tablet (100 mg total) by mouth every evening.    ASPIRIN (ECOTRIN) 81 MG EC TABLET        CLOTRIMAZOLE-BETAMETHASONE 1-0.05% (LOTRISONE) CREAM    APPLY  CREAM TOPICALLY TO AFFECTED AREA TWICE DAILY    DICLOFENAC SODIUM (VOLTAREN) 1 % GEL    APPLY 2 GRAMS TOPICALLY TO AFFECTED AREA 4 TIMES DAILY    ERGOCALCIFEROL (ERGOCALCIFEROL) 50,000 UNIT CAP    Take 1 capsule by mouth every 7 days.    FLUTICASONE PROPIONATE (FLONASE) 50 MCG/ACTUATION NASAL SPRAY    1 spray (50 mcg total) by Each Nostril route once daily.    HYDRALAZINE (APRESOLINE) 50 MG TABLET    Take 1 tablet (50 mg total) by mouth every 12 (twelve) hours.    HYDROXYZINE HCL (ATARAX) 25 MG TABLET    Take 1 tablet by mouth three times daily as needed    MUPIROCIN (BACTROBAN) 2 % OINTMENT    Apply topically 3 (three) times daily.    NYSTATIN (NYSTOP) POWDER    APPLY TOPICALLY 4 TIMES A DAY    OMEPRAZOLE (PRILOSEC) 40 MG CAPSULE     Take 1 capsule (40 mg total) by mouth once daily.    PRAVASTATIN (PRAVACHOL) 40 MG TABLET    Take 1 tablet by mouth once daily    SPIRONOLACTONE (ALDACTONE) 50 MG TABLET    Take 1 tablet (50 mg total) by mouth once daily.    TIZANIDINE (ZANAFLEX) 4 MG TABLET    TAKE 1 TABLET BY MOUTH EVERY 6 HOURS AS NEEDED    TRAMADOL (ULTRAM) 50 MG TABLET    TAKE 1 TABLET BY MOUTH EVERY 6 HOURS AS NEEDED FOR PAIN    TRIAMCINOLONE ACETONIDE 0.1% (KENALOG) 0.1 % OINTMENT    AAA bid    TRIAMCINOLONE ACETONIDE 0.1%-CICLOPIROX-MAGNESIUM HYDROXIDE 400 MG/5 ML    Apply to affected area twice a day after cool blow dry    VALSARTAN (DIOVAN) 320 MG TABLET    Take 1 tablet (320 mg total) by mouth once daily.       ROS  Review of Systems   Constitutional:  Negative for chills, diaphoresis, fatigue, fever and unexpected weight change.   HENT:  Negative for rhinorrhea, sinus pressure, sore throat and tinnitus.    Eyes:  Negative for photophobia and visual disturbance.   Respiratory:  Negative for cough, shortness of breath and wheezing.    Cardiovascular:  Negative for chest pain and palpitations.   Gastrointestinal:  Negative for abdominal pain, blood in stool, constipation, diarrhea, nausea and vomiting.   Genitourinary:  Negative for dysuria, flank pain, frequency and vaginal discharge.   Musculoskeletal:  Positive for back pain. Negative for arthralgias and joint swelling.   Skin:  Negative for rash.   Neurological:  Negative for speech difficulty, weakness, light-headedness and headaches.   Psychiatric/Behavioral:  Positive for sleep disturbance. Negative for behavioral problems and dysphoric mood.      Physical Exam  There were no vitals filed for this visit. There is no height or weight on file to calculate BMI.            Physical Exam  Vitals and nursing note reviewed.   Constitutional:       Appearance: She is well-developed.   Skin:     General: Skin is warm and dry.      Findings: No erythema or rash.   Neurological:      Mental  Status: She is alert. Mental status is at baseline.   Psychiatric:         Behavior: Behavior normal.       Health Maintenance         Date Due Completion Date    Pneumococcal Vaccines (Age 65+) (1 - PCV) Never done ---    Sign Pain Contract Never done ---    Urine Drug Screen Never done ---    Shingles Vaccine (1 of 2) Never done ---    COVID-19 Vaccine (5 - Booster) 04/14/2022 2/17/2022    TETANUS VACCINE 06/14/2022 6/14/2012 (Done)    Override on 6/14/2012: Done    Influenza Vaccine (1) Never done ---    Mammogram 04/20/2023 4/20/2022    Colorectal Cancer Screening 07/23/2023 7/23/2020    High Dose Statin 03/07/2024 3/7/2023    Lipid Panel 03/07/2028 3/7/2023            Health maintenance reviewed and addressed as ordered      ASSESSMENT     1. DDD (degenerative disc disease), lumbar    2. ANTONIA (obstructive sleep apnea)    3. Primary hypertension        PLAN:     Problem List Items Addressed This Visit          Neuro    DDD (degenerative disc disease), lumbar - Primary  F/u with pain mgmt as exercise is being limited by pain       Cardiac/Vascular    Hypertension  On hydralazine 50mg BID  Recommend she send me 3 BP readings  Continue with digital medicine       Other    ANTONIA (obstructive sleep apnea)  She is working with "[employer] to get her machine    Overview     PSG 12/10/2022:  The overall AHI was 6.1 with an oxygen allyssa of 83.0%. The AHI in REM sleep was 43.2. The central apnea index was 0.2     After discussing all options, patient agree to cpap.        Class 3 severe obesity duet o excess calories with serious comorbidity in adult    Recommend she track calories for 3 days and send in mychart, suspect she is not eating enough, also not treating ANTONIA      Simona Torres MD  03/09/2023 1:46 PM        No follow-ups on file.    No orders of the defined types were placed in this encounter.

## 2023-03-13 ENCOUNTER — HOSPITAL ENCOUNTER (OUTPATIENT)
Dept: CARDIOLOGY | Facility: HOSPITAL | Age: 73
Discharge: HOME | End: 2023-03-13
Attending: INTERNAL MEDICINE
Payer: MEDICARE

## 2023-03-13 ENCOUNTER — HOSPITAL ENCOUNTER (EMERGENCY)
Facility: HOSPITAL | Age: 73
Discharge: HOME | End: 2023-03-14
Attending: EMERGENCY MEDICINE
Payer: MEDICARE

## 2023-03-13 DIAGNOSIS — R00.2 PALPITATIONS: ICD-10-CM

## 2023-03-13 DIAGNOSIS — R03.0 ELEVATED BLOOD PRESSURE READING: ICD-10-CM

## 2023-03-13 DIAGNOSIS — I10 ASYMPTOMATIC HYPERTENSION: Primary | ICD-10-CM

## 2023-03-13 PROCEDURE — 25000003 PHARM REV CODE 250: Performed by: PHYSICIAN ASSISTANT

## 2023-03-13 PROCEDURE — 93227 XTRNL ECG REC<48 HR R&I: CPT | Mod: ,,, | Performed by: INTERNAL MEDICINE

## 2023-03-13 PROCEDURE — 93227 HOLTER MONITOR - 24 HOUR (CUPID ONLY): ICD-10-PCS | Mod: ,,, | Performed by: INTERNAL MEDICINE

## 2023-03-13 PROCEDURE — 99283 EMERGENCY DEPT VISIT LOW MDM: CPT

## 2023-03-13 PROCEDURE — 93005 ELECTROCARDIOGRAM TRACING: CPT | Mod: 59

## 2023-03-13 PROCEDURE — 93010 EKG 12-LEAD: ICD-10-PCS | Mod: ,,, | Performed by: INTERNAL MEDICINE

## 2023-03-13 PROCEDURE — 93226 XTRNL ECG REC<48 HR SCAN A/R: CPT

## 2023-03-13 PROCEDURE — 93010 ELECTROCARDIOGRAM REPORT: CPT | Mod: ,,, | Performed by: INTERNAL MEDICINE

## 2023-03-13 RX ORDER — HYDRALAZINE HYDROCHLORIDE 25 MG/1
50 TABLET, FILM COATED ORAL
Status: COMPLETED | OUTPATIENT
Start: 2023-03-13 | End: 2023-03-13

## 2023-03-13 RX ADMIN — HYDRALAZINE HYDROCHLORIDE 50 MG: 25 TABLET, FILM COATED ORAL at 11:03

## 2023-03-14 VITALS
TEMPERATURE: 98 F | HEIGHT: 64 IN | OXYGEN SATURATION: 96 % | HEART RATE: 85 BPM | SYSTOLIC BLOOD PRESSURE: 169 MMHG | RESPIRATION RATE: 17 BRPM | DIASTOLIC BLOOD PRESSURE: 77 MMHG | BODY MASS INDEX: 44.39 KG/M2 | WEIGHT: 260 LBS

## 2023-03-14 NOTE — ED PROVIDER NOTES
Encounter Date: 3/13/2023       History     Chief Complaint   Patient presents with    Hypertension     Pt presents to the ED with elevated BP. States at home it was 241/108. Pt denies any symptoms. Reports compliance with BP meds.     72-year-old female presents to the ER for evaluation of asymptomatic hypertension.  Reports medication compliance.  Has been following with her primary care doctor and cardiologist.  Multiple medication regimen changes over the past 2 weeks.  Blood pressure large reveal elevated blood pressure for the past week and a half, consistent numbers greater than 200 systolic.  Presented to the ER tonight because her blood pressure at home was 240/130.    Blood pressure on arrival here 196/107.  Patient is asymptomatic.  Remainder of vital signs normal, no distress noted on exam.    Review of patient's allergies indicates:   Allergen Reactions    Codeine      Past Medical History:   Diagnosis Date    Arthralgia     Colon polyp     Degenerative disc disease at L5-S1 level     High cholesterol     Hypertension     Nuclear sclerosis of both eyes 1/8/2020    Sciatica     Spinal stenosis     Tobacco use 10/21/2021     Past Surgical History:   Procedure Laterality Date    BACK SURGERY      lumbar laminectomy    COLONOSCOPY N/A 7/23/2020    Procedure: COLONOSCOPY;  Surgeon: Donal Moore MD;  Location: [facility] ENDO;  Service: Endoscopy;  Laterality: N/A;    EPIDURAL STEROID INJECTION Left 9/9/2020    Procedure: Injection, Steroid, Epidural Transforaminal;  Surgeon: Jun Leavitt Jr., MD;  Location: [facility] ENDO;  Service: Pain Management;  Laterality: Left;  Left L5 + S1 TF WADE  Arrive @ 1300; ASA last 9/1; No DM    EPIDURAL STEROID INJECTION Left 2/12/2021    Procedure: Injection, Steroid, Epidural Transforaminal;  Surgeon: Jun Leavitt Jr., MD;  Location: [facility] ENDO;  Service: Pain Management;  Laterality: Left;  Left L4 + L5 TF WADE  Arrive @ 1230; IV Sedation; ASA last 2/4; No DM     TRANSFORAMINAL EPIDURAL INJECTION OF STEROID Left 3/14/2022    Procedure: INJECTION, STEROID, EPIDURAL, TRANSFORAMINAL APPROACH, L4-L5 & L5-S1;  Surgeon: Darya Ayala MD;  Location: [facility] PAIN MGT;  Service: Pain Management;  Laterality: Left;    TRANSFORAMINAL EPIDURAL INJECTION OF STEROID Left 2022    Procedure: LUMBAR TRANSFORAMINAL LEFT L4/5 AND L5/S1 CONTRAST;  Surgeon: Darya Ayala MD;  Location: [facility]IN [facility]GT;  Service: Pain Management;  Laterality: Left;    TUBAL LIGATION       Family History   Problem Relation Age of Onset    Breast cancer Mother     Hypertension Mother             Hypotension Mother     Cancer Mother          due to breast cancer    Miscarriages / Stillbirths Mother         3 stillborn children    Cataracts Father     Glaucoma Father     Diabetes Father             Arthritis Father     Vision loss Father         Glaucoma -     Glaucoma Sister     Arthritis Sister     Diabetes Sister     Hypertension Sister     Vision loss Sister         Glasses    Drug abuse Brother     Learning disabilities Brother     No Known Problems Brother     No Known Problems Brother     No Known Problems Maternal Aunt     No Known Problems Maternal Uncle     No Known Problems Paternal Aunt     No Known Problems Paternal Uncle     No Known Problems Maternal Grandmother     No Known Problems Maternal Grandfather     No Known Problems Paternal Grandmother     No Known Problems Paternal Grandfather     No Known Problems Son     No Known Problems Other     Arthritis Sister     Depression Sister     Diabetes Sister     Hearing loss Sister         Trouble hearing    Hypertension Sister     Stroke Sister         Browne Vargas Disease, Coma for a month due to diabetes,Some body weakness    Vision loss Sister         Lasix surgery    Arthritis Sister     COPD Sister     Diabetes Sister     Hearing loss Sister     Heart disease Sister     Hypertension Sister     Arthritis Brother      Hypertension Brother     Early death Sister         Still born    Early death Sister         Still born twin to Debby Benítez    Early death Brother     Hypertension Sister     Hypertension Brother     Learning disabilities Son     Learning disabilities Son         Great grandson    Learning disabilities Daughter         Granddaughter    Mental illness Sister         Her son Matthias Benítez    Vision loss Sister         Glasses/contacts    Vision loss Sister         Glasses    Amblyopia Neg Hx     Blindness Neg Hx     Macular degeneration Neg Hx     Retinal detachment Neg Hx     Strabismus Neg Hx     Thyroid disease Neg Hx      Social History     Tobacco Use    Smoking status: Some Days     Packs/day: 0.25     Years: 35.00     Pack years: 8.75     Types: Cigarettes     Last attempt to quit: 3/15/2022     Years since quittin.9    Smokeless tobacco: Current    Tobacco comments:     Occasional spoker some times 1-2 cigarettes/day sometimes none for weeks   Substance Use Topics    Alcohol use: Yes     Alcohol/week: 3.0 standard drinks     Types: 3 Glasses of wine per week     Comment: Occasional wine    Drug use: Yes     Comment: Tramadol twice a day as needed     Review of Systems   Constitutional:  Negative for fever.   HENT:  Negative for sore throat.    Respiratory:  Negative for shortness of breath.    Cardiovascular:  Negative for chest pain.   Gastrointestinal:  Negative for nausea.   Genitourinary:  Negative for dysuria.   Musculoskeletal:  Negative for back pain.   Skin:  Negative for rash.   Neurological:  Negative for weakness.   Hematological:  Does not bruise/bleed easily.     Physical Exam     Initial Vitals [23 2323]   BP Pulse Resp Temp SpO2   (!) 196/107 103 20 98.2 °F (36.8 °C) 98 %      MAP       --         Physical Exam    Constitutional: Vital signs are normal. She appears well-developed and well-nourished.   HENT:   Head: Normocephalic and atraumatic.   Right Ear: Hearing normal.   Left  Ear: Hearing normal.   Eyes: Conjunctivae are normal.   Cardiovascular:  Normal rate and regular rhythm.           Abdominal: Abdomen is soft. Bowel sounds are normal.   Musculoskeletal:         General: Normal range of motion.     Neurological: She is alert and oriented to person, place, and time.   Skin: Skin is warm and intact.   Psychiatric: She has a normal mood and affect. Her speech is normal and behavior is normal. Cognition and memory are normal.       ED Course   Procedures  Labs Reviewed - No data to display       Imaging Results    None          Medications   hydrALAZINE tablet 50 mg (50 mg Oral Given 3/13/23 3359)     Medical Decision Making:   History:   Old Medical Records: I decided to obtain old medical records.  Old Records Summarized: records from clinic visits.  Initial Assessment:   72-year-old female presenting with elevated blood pressure  Differential Diagnosis:   Hypertensive urgency, hypertensive emergency, medication noncompliance, high blood pressure,  Independently Interpreted Test(s):   I have ordered and independently interpreted X-rays - see summary below.  Clinical Tests:   Medical Tests: Ordered and Reviewed  ED Management:  Plan:   Patient with asymptomatic hypertension, elevated blood pressure at home, still elevated here but much improved.  Took all of her evening medications as directed.  Denies any chest pain or shortness of breath.  No headaches nausea or vomiting.  No dizziness or lightheadedness.    EKG - normal sinus rhythm, no acute ischemic findings.  Pressure continues to improve, most recent check 183/81    Patient is stable for discharge, no further workup needed for asymptomatic hypertension with improving blood pressure.                        Clinical Impression:   Final diagnoses:  [R03.0] Elevated blood pressure reading  [I10] Asymptomatic hypertension (Primary)        ED Disposition Condition    Discharge Stable          ED Prescriptions    None       Follow-up  Information       Follow up With Specialties Details Why Contact Info    Simona Torres MD Family Medicine   [address] [place] [address]  [phone]               Jorge Hastings PA-C  03/14/23 0005

## 2023-03-14 NOTE — DISCHARGE INSTRUCTIONS
Continue with all of your prescribed medications, follow-up as scheduled with primary care and your cardiologist  Return to the emergency department as needed

## 2023-03-15 ENCOUNTER — PATIENT MESSAGE (OUTPATIENT)
Dept: CARDIOLOGY | Facility: CLINIC | Age: 73
End: 2023-03-15
Payer: MEDICARE

## 2023-03-15 LAB
OHS CV EVENT MONITOR DAY: 0
OHS CV HOLTER LENGTH DECIMAL HOURS: 23.98
OHS CV HOLTER LENGTH HOURS: 23
OHS CV HOLTER LENGTH MINUTES: 59
OHS CV HOLTER SINUS AVERAGE HR: 90
OHS CV HOLTER SINUS MAX HR: 141
OHS CV HOLTER SINUS MIN HR: 57

## 2023-03-21 ENCOUNTER — OFFICE VISIT (OUTPATIENT)
Dept: CARDIOLOGY | Facility: CLINIC | Age: 73
End: 2023-03-21
Payer: MEDICARE

## 2023-03-21 DIAGNOSIS — I25.10 CORONARY ARTERY CALCIFICATION SEEN ON CAT SCAN: ICD-10-CM

## 2023-03-21 DIAGNOSIS — I70.0 ATHEROSCLEROSIS OF AORTA: ICD-10-CM

## 2023-03-21 DIAGNOSIS — I10 PRIMARY HYPERTENSION: ICD-10-CM

## 2023-03-21 DIAGNOSIS — I1A.0 RESISTANT HYPERTENSION: ICD-10-CM

## 2023-03-21 DIAGNOSIS — G47.33 OSA (OBSTRUCTIVE SLEEP APNEA): ICD-10-CM

## 2023-03-21 DIAGNOSIS — E66.01 CLASS 3 SEVERE OBESITY DUE TO EXCESS CALORIES WITH SERIOUS COMORBIDITY AND BODY MASS INDEX (BMI) OF 45.0 TO 49.9 IN ADULT: ICD-10-CM

## 2023-03-21 DIAGNOSIS — I47.19 ATRIAL TACHYCARDIA: ICD-10-CM

## 2023-03-21 DIAGNOSIS — R00.2 PALPITATIONS: Primary | ICD-10-CM

## 2023-03-21 PROCEDURE — 3044F HG A1C LEVEL LT 7.0%: CPT | Mod: CPTII,95,, | Performed by: INTERNAL MEDICINE

## 2023-03-21 PROCEDURE — 99213 PR OFFICE/OUTPT VISIT, EST, LEVL III, 20-29 MIN: ICD-10-PCS | Mod: 95,,, | Performed by: INTERNAL MEDICINE

## 2023-03-21 PROCEDURE — 99213 OFFICE O/P EST LOW 20 MIN: CPT | Mod: 95,,, | Performed by: INTERNAL MEDICINE

## 2023-03-21 PROCEDURE — 4010F ACE/ARB THERAPY RXD/TAKEN: CPT | Mod: CPTII,95,, | Performed by: INTERNAL MEDICINE

## 2023-03-21 PROCEDURE — 3044F PR MOST RECENT HEMOGLOBIN A1C LEVEL <7.0%: ICD-10-PCS | Mod: CPTII,95,, | Performed by: INTERNAL MEDICINE

## 2023-03-21 PROCEDURE — 4010F PR ACE/ARB THEARPY RXD/TAKEN: ICD-10-PCS | Mod: CPTII,95,, | Performed by: INTERNAL MEDICINE

## 2023-03-21 RX ORDER — METOPROLOL SUCCINATE 25 MG/1
25 TABLET, EXTENDED RELEASE ORAL DAILY
Qty: 30 TABLET | Refills: 11 | Status: SHIPPED | OUTPATIENT
Start: 2023-03-21 | End: 2024-02-29

## 2023-03-21 RX ORDER — HYDRALAZINE HYDROCHLORIDE 100 MG/1
100 TABLET, FILM COATED ORAL EVERY 12 HOURS
Qty: 180 TABLET | Refills: 3 | Status: SHIPPED | OUTPATIENT
Start: 2023-03-21 | End: 2023-08-23 | Stop reason: SDUPTHER

## 2023-03-21 NOTE — PROGRESS NOTES
CARDIOLOGY CLINIC VISIT        HISTORY OF PRESENT ILLNESS:     05/26/2020: Amanda Holt presents for establishment of care. Previously seen by Dr. Fink. No hx of CAD. Had a stress test in 2018. Had hypertensive response to exercise. Started on medications. BP at home according to patient wnl. Denies chest pain or sob.     03/02/2023:  Recent echocardiogram showed normal left ventricular systolic function with estimated ejection fraction of 65%.  Over the past year she is had roughly a 50 lb weight gain.  Blood pressure has been difficult to control.  Was changed from amlodipine and losartan to valsartan and Aldactone.  Blood pressure continues to be elevated.  She is not been using her CPAP since it has been recalled.  She also notes daily palpitations.  Last a few seconds.  No aggravating or alleviating factors.    03/21/2023:  The patient location is: home   The chief complaint leading to consultation is: follow up    Visit type: audiovisual    Face to Face time with patient: 15  30 minutes of total time spent on the encounter, which includes face to face time and non-face to face time preparing to see the patient (eg, review of tests), Obtaining and/or reviewing separately obtained history, Documenting clinical information in the electronic or other health record, Independently interpreting results (not separately reported) and communicating results to the patient/family/caregiver, or Care coordination (not separately reported).     Each patient to whom he or she provides medical services by telemedicine is:  (1) informed of the relationship between the physician and patient and the respective role of any other health care provider with respect to management of the patient; and (2) notified that he or she may decline to receive medical services by telemedicine and may withdraw from such care at any time.    Notes:   Recent emergency room visit secondary to elevated blood pressure.  240/130.  Patient given  additional hydralazine.  EKG showed normal sinus rhythm.  Left ventricular hypertrophy. Her holter showed average heart rate 90 beats per minute.  Rare PVCs.  Rare PACs.  One hundred twelve beat episode of atrial tachycardia.    CARDIOVASCULAR HISTORY:     Atrial tachycardia  Coronary calcification seen on CAT scan  Aortic atherosclerosis    PAST MEDICAL HISTORY:     Past Medical History:   Diagnosis Date    Arthralgia     Colon polyp     Degenerative disc disease at L5-S1 level     High cholesterol     Hypertension     Nuclear sclerosis of both eyes 1/8/2020    Sciatica     Spinal stenosis     Tobacco use 10/21/2021       PAST SURGICAL HISTORY:     Past Surgical History:   Procedure Laterality Date    BACK SURGERY      lumbar laminectomy    COLONOSCOPY N/A 7/23/2020    Procedure: COLONOSCOPY;  Surgeon: Donal Moore MD;  Location: [facility] ENDO;  Service: Endoscopy;  Laterality: N/A;    EPIDURAL STEROID INJECTION Left 9/9/2020    Procedure: Injection, Steroid, Epidural Transforaminal;  Surgeon: Jun Leavitt Jr., MD;  Location: [facility] ENDO;  Service: Pain Management;  Laterality: Left;  Left L5 + S1 TF WADE  Arrive @ 1300; ASA last 9/1; No DM    EPIDURAL STEROID INJECTION Left 2/12/2021    Procedure: Injection, Steroid, Epidural Transforaminal;  Surgeon: Jun Leavitt Jr., MD;  Location: [facility] ENDO;  Service: Pain Management;  Laterality: Left;  Left L4 + L5 TF WADE  Arrive @ 1230; IV Sedation; ASA last 2/4; No DM    TRANSFORAMINAL EPIDURAL INJECTION OF STEROID Left 3/14/2022    Procedure: INJECTION, STEROID, EPIDURAL, TRANSFORAMINAL APPROACH, L4-L5 & L5-S1;  Surgeon: Darya Ayala MD;  Location: [facility];  Service: Pain Management;  Laterality: Left;    TRANSFORAMINAL EPIDURAL INJECTION OF STEROID Left 8/29/2022    Procedure: LUMBAR TRANSFORAMINAL LEFT L4/5 AND L5/S1 CONTRAST;  Surgeon: Darya Ayala MD;  Location: [facility]GT;  Service: Pain Management;  Laterality: Left;    TUBAL LIGATION          ALLERGIES AND MEDICATION:     Review of patient's allergies indicates:   Allergen Reactions    Codeine         Medication List            Accurate as of March 21, 2023  7:59 AM. If you have any questions, ask your nurse or doctor.                CONTINUE taking these medications      amitriptyline 100 MG tablet  Commonly known as: ELAVIL  Take 1 tablet (100 mg total) by mouth every evening.     aspirin 81 MG EC tablet  Commonly known as: ECOTRIN     clotrimazole-betamethasone 1-0.05% cream  Commonly known as: LOTRISONE  APPLY  CREAM TOPICALLY TO AFFECTED AREA TWICE DAILY     diclofenac sodium 1 % Gel  Commonly known as: VOLTAREN  APPLY 2 GRAMS TOPICALLY TO AFFECTED AREA 4 TIMES DAILY     ergocalciferol 50,000 unit Cap  Commonly known as: ERGOCALCIFEROL     fluticasone propionate 50 mcg/actuation nasal spray  Commonly known as: FLONASE  1 spray (50 mcg total) by Each Nostril route once daily.     hydrALAZINE 50 MG tablet  Commonly known as: APRESOLINE  Take 1 tablet (50 mg total) by mouth every 12 (twelve) hours.     hydrOXYzine HCL 25 MG tablet  Commonly known as: ATARAX  Take 1 tablet by mouth three times daily as needed     mupirocin 2 % ointment  Commonly known as: BACTROBAN  Apply topically 3 (three) times daily.     nystatin powder  Commonly known as: NYSTOP  APPLY TOPICALLY 4 TIMES A DAY     omeprazole 40 MG capsule  Commonly known as: PRILOSEC  Take 1 capsule (40 mg total) by mouth once daily.     pravastatin 40 MG tablet  Commonly known as: PRAVACHOL  Take 1 tablet by mouth once daily     spironolactone 50 MG tablet  Commonly known as: ALDACTONE  Take 1 tablet (50 mg total) by mouth once daily.     tiZANidine 4 MG tablet  Commonly known as: ZANAFLEX  TAKE 1 TABLET BY MOUTH EVERY 6 HOURS AS NEEDED     traMADoL 50 mg tablet  Commonly known as: ULTRAM  TAKE 1 TABLET BY MOUTH EVERY 6 HOURS AS NEEDED FOR PAIN     triamcinolone acetonide 0.1% 0.1 % ointment  Commonly known as: KENALOG  AAA bid      triamcinolone acetonide 0.1%-ciclopirox-magnesium hydroxide 400 mg/5 ml  Apply to affected area twice a day after cool blow dry     valsartan 320 MG tablet  Commonly known as: DIOVAN  Take 1 tablet (320 mg total) by mouth once daily.              SOCIAL HISTORY:     Social History     Socioeconomic History    Marital status:    Tobacco Use    Smoking status: Some Days     Packs/day: 0.25     Years: 35.00     Pack years: 8.75     Types: Cigarettes     Last attempt to quit: 3/15/2022     Years since quittin.0    Smokeless tobacco: Current    Tobacco comments:     Occasional spoker some times 1-2 cigarettes/day sometimes none for weeks   Substance and Sexual Activity    Alcohol use: Yes     Alcohol/week: 3.0 standard drinks     Types: 3 Glasses of wine per week     Comment: Occasional wine    Drug use: Yes     Comment: Tramadol twice a day as needed    Sexual activity: Not Currently     Partners: Male     Birth control/protection: Post-menopausal     Comment: Tubal 30+ years ago     Social Determinants of Health     Financial Resource Strain: Medium Risk    Difficulty of Paying Living Expenses: Somewhat hard   Food Insecurity: Food Insecurity Present    Worried About Running Out of Food in the Last Year: Sometimes true    Ran Out of Food in the Last Year: Sometimes true   Transportation Needs: No Transportation Needs    Lack of Transportation (Medical): No    Lack of Transportation (Non-Medical): No   Physical Activity: Insufficiently Active    Days of Exercise per Week: 3 days    Minutes of Exercise per Session: 30 min   Stress: Stress Concern Present    Feeling of Stress : Very much   Social Connections: Unknown    Frequency of Communication with Friends and Family: More than three times a week    Frequency of Social Gatherings with Friends and Family: Once a week    Active Member of Clubs or Organizations: No    Attends Club or Organization Meetings: Never    Marital Status:    Housing  Stability: Low Risk     Unable to Pay for Housing in the Last Year: No    Number of Places Lived in the Last Year: 1    Unstable Housing in the Last Year: No       FAMILY HISTORY:     Family History   Problem Relation Age of Onset    Breast cancer Mother     Hypertension Mother             Hypotension Mother     Cancer Mother          due to breast cancer    Miscarriages / Stillbirths Mother         3 stillborn children    Cataracts Father     Glaucoma Father     Diabetes Father             Arthritis Father     Vision loss Father         Glaucoma -     Glaucoma Sister     Arthritis Sister     Diabetes Sister     Hypertension Sister     Vision loss Sister         Glasses    Drug abuse Brother     Learning disabilities Brother     No Known Problems Brother     No Known Problems Brother     No Known Problems Maternal Aunt     No Known Problems Maternal Uncle     No Known Problems Paternal Aunt     No Known Problems Paternal Uncle     No Known Problems Maternal Grandmother     No Known Problems Maternal Grandfather     No Known Problems Paternal Grandmother     No Known Problems Paternal Grandfather     No Known Problems Son     No Known Problems Other     Arthritis Sister     Depression Sister     Diabetes Sister     Hearing loss Sister         Trouble hearing    Hypertension Sister     Stroke Sister         Browne Vargas Disease, Coma for a month due to diabetes,Some body weakness    Vision loss Sister         Lasix surgery    Arthritis Sister     COPD Sister     Diabetes Sister     Hearing loss Sister     Heart disease Sister     Hypertension Sister     Arthritis Brother     Hypertension Brother     Early death Sister         Still born    Early death Sister         Still born twin to Debby Benítez    Early death Brother     Hypertension Sister     Hypertension Brother     Learning disabilities Son     Learning disabilities Son         Great grandson    Learning disabilities Daughter          Granddaughter    Mental illness Sister         Her son Matthias Benítez    Vision loss Sister         Glasses/contacts    Vision loss Sister         Glasses    Amblyopia Neg Hx     Blindness Neg Hx     Macular degeneration Neg Hx     Retinal detachment Neg Hx     Strabismus Neg Hx     Thyroid disease Neg Hx        REVIEW OF SYSTEMS:   Review of Systems   Constitutional:  Negative for chills, diaphoresis, fever, malaise/fatigue and weight loss.   HENT:  Negative for congestion, hearing loss, sinus pain, sore throat and tinnitus.    Eyes:  Negative for blurred vision, double vision, photophobia and pain.   Respiratory:  Negative for cough, hemoptysis, sputum production, shortness of breath, wheezing and stridor.    Cardiovascular:  Negative for chest pain, palpitations, orthopnea, claudication, leg swelling and PND.   Gastrointestinal:  Negative for abdominal pain, blood in stool, heartburn, melena, nausea and vomiting.   Musculoskeletal:  Negative for back pain, falls, joint pain, myalgias and neck pain.   Neurological:  Negative for dizziness, tingling, tremors, sensory change, speech change, focal weakness, seizures, loss of consciousness, weakness and headaches.   Endo/Heme/Allergies:  Does not bruise/bleed easily.   Psychiatric/Behavioral:  Negative for depression, memory loss and substance abuse. The patient is not nervous/anxious.      PHYSICAL EXAM:     There were no vitals filed for this visit.   There is no height or weight on file to calculate BMI.            Physical Exam  Constitutional:       General: She is not in acute distress.     Appearance: She is not ill-appearing.   Neurological:      Mental Status: She is alert and oriented to person, place, and time.   Psychiatric:         Speech: Speech normal.         Behavior: Behavior normal.         Thought Content: Thought content normal.       DATA:   EKG: (personally reviewed tracing)  03/02/2023-sinus rhythm with PACs.  Laboratory:  CBC:  Recent Labs    Lab 03/07/23  0917   WBC 4.86   Hemoglobin 12.7   Hematocrit 40.6   Platelets 264         CHEMISTRIES:  Recent Labs   Lab 07/20/20  1030 02/02/23  1535 03/07/23  0917   Glucose 117 H 96 115 H   Sodium 142 144 144   Potassium 4.3 4.1 4.5   BUN 15 29 H 18   Creatinine 1.2 1.3 1.3   eGFR if  54 A  --   --    eGFR if non  47 A  --   --    Calcium 9.1 9.9 9.8         CARDIAC BIOMARKERS:        COAGS:        LIPIDS/LFTS:  Recent Labs   Lab 07/20/20  1030 10/21/21  1156 03/07/23  0917   Cholesterol  --  151 160   Triglycerides  --  106 94   HDL  --  47 40   LDL Cholesterol  --  82.8 101.2   Non-HDL Cholesterol  --  104 120   AST 14  --  15   ALT 11  --  14         Cardiovascular Testing:    Holter 03/13/2023:    The diary was not returned  Sinus rhythm with heart rates varying between 57 and 141 BPM with an average of 90 BPM.  There were rare PVCs. There were 1 triplets.  There were rare PACs. Atrial tachycardia. Longest 12 beats.    Echocardiogram 02/08/2023:    The left ventricle is normal in size with concentric hypertrophy and normal systolic function.  The estimated ejection fraction is 65%.  Normal left ventricular diastolic function.  Normal right ventricular size with normal right ventricular systolic function.  Mild left atrial enlargement.  Intermediate central venous pressure (8 mmHg).    ASSESSMENT:     Hypertension  Hyperlipidemia: last LDL 82  Palpitations/atrial tachycardia  Obstructive sleep apnea  Obesity  Coronary calcifications seen on CT scan  Aortic atherosclerosis  Tobacco use    PLAN:     Hypertension: Increase hydralazine to 100 bid. Add Toprol 25qd.  Renal artery ultrasound.  Hyperlipidemia:  Continue current management.  Palpitations:  Holter showed episode of atrial tachycardia. Toprol added.  Return to clinic 1 month.           Epifanio Calhoun MD, MPH, FACC, [facility]

## 2023-03-22 ENCOUNTER — PATIENT MESSAGE (OUTPATIENT)
Dept: ADMINISTRATIVE | Facility: OTHER | Age: 73
End: 2023-03-22
Payer: MEDICARE

## 2023-03-23 ENCOUNTER — PATIENT MESSAGE (OUTPATIENT)
Dept: ADMINISTRATIVE | Facility: HOSPITAL | Age: 73
End: 2023-03-23
Payer: MEDICARE

## 2023-03-24 ENCOUNTER — PATIENT OUTREACH (OUTPATIENT)
Dept: ADMINISTRATIVE | Facility: HOSPITAL | Age: 73
End: 2023-03-24
Payer: MEDICARE

## 2023-03-24 DIAGNOSIS — Z12.31 BREAST CANCER SCREENING BY MAMMOGRAM: Primary | ICD-10-CM

## 2023-03-30 ENCOUNTER — TELEPHONE (OUTPATIENT)
Dept: FAMILY MEDICINE | Facility: CLINIC | Age: 73
End: 2023-03-30
Payer: MEDICARE

## 2023-03-30 NOTE — TELEPHONE ENCOUNTER
Called patient back and she stated that unfortunately, she's still been going back and forth with the  company and her insurance company. The insurance company refuses to pay for a new one and the  company stated that they have 2 reports already placed and a representative  has been reaching out to her. She states she plans to call them again today - I told her to reach back out to us if there's any way for us to assist in the process

## 2023-03-30 NOTE — TELEPHONE ENCOUNTER
FYI - patient had a blood pressure check visit scheduled for today but cancelled it since yesterday 3/29/2023, she had the BP taken. It was 162/90 initially by manual BP check by a wellness nurse who comes in to check up on her routinely. She then retook it, which was 189/117 with her BP machine from [facility]. She reports slight headache last week but has relieved since then and she denies numbness, tingling, vision changes.

## 2023-03-30 NOTE — TELEPHONE ENCOUNTER
Can we find out where we are on her CPAP machine, it is going to be very hard to lower her blood pressure if this is not being addressed    Simona Torres MD

## 2023-03-30 NOTE — TELEPHONE ENCOUNTER
----- Message from Nathaniel Perez MA sent at 3/30/2023  9:00 AM CDT -----  Type: Patient Call Back    Who called:Self    What is the request in detail: pt. Is asking for the nurse to give her a call ..     Can the clinic reply by [facility]?No    Would the patient rather a call back or a response via [facility]? yes    Best call back number: [phone] (home)

## 2023-04-03 ENCOUNTER — PATIENT MESSAGE (OUTPATIENT)
Dept: ADMINISTRATIVE | Facility: OTHER | Age: 73
End: 2023-04-03
Payer: MEDICARE

## 2023-04-04 ENCOUNTER — TELEPHONE (OUTPATIENT)
Dept: BARIATRICS | Facility: CLINIC | Age: 73
End: 2023-04-04
Payer: MEDICARE

## 2023-04-04 NOTE — TELEPHONE ENCOUNTER
Notified patient of the date & time of financial phone call appt.  Pt aware appt is a telephone call.    Dashboard updated  Appt. 04/05/2023

## 2023-04-13 ENCOUNTER — OFFICE VISIT (OUTPATIENT)
Dept: PULMONOLOGY | Facility: CLINIC | Age: 73
End: 2023-04-13
Payer: MEDICARE

## 2023-04-13 ENCOUNTER — PATIENT OUTREACH (OUTPATIENT)
Dept: ADMINISTRATIVE | Facility: HOSPITAL | Age: 73
End: 2023-04-13
Payer: MEDICARE

## 2023-04-13 VITALS
HEIGHT: 64 IN | SYSTOLIC BLOOD PRESSURE: 169 MMHG | HEART RATE: 101 BPM | OXYGEN SATURATION: 93 % | DIASTOLIC BLOOD PRESSURE: 95 MMHG | BODY MASS INDEX: 45.37 KG/M2 | WEIGHT: 265.75 LBS

## 2023-04-13 DIAGNOSIS — G47.33 OSA (OBSTRUCTIVE SLEEP APNEA): ICD-10-CM

## 2023-04-13 DIAGNOSIS — R06.09 DOE (DYSPNEA ON EXERTION): ICD-10-CM

## 2023-04-13 PROBLEM — G47.00 INSOMNIA: Status: ACTIVE | Noted: 2023-04-13

## 2023-04-13 PROCEDURE — 99215 PR OFFICE/OUTPT VISIT, EST, LEVL V, 40-54 MIN: ICD-10-PCS | Mod: S$GLB,,, | Performed by: INTERNAL MEDICINE

## 2023-04-13 PROCEDURE — 1125F PR PAIN SEVERITY QUANTIFIED, PAIN PRESENT: ICD-10-PCS | Mod: CPTII,S$GLB,, | Performed by: INTERNAL MEDICINE

## 2023-04-13 PROCEDURE — 3288F FALL RISK ASSESSMENT DOCD: CPT | Mod: CPTII,S$GLB,, | Performed by: INTERNAL MEDICINE

## 2023-04-13 PROCEDURE — 1101F PR PT FALLS ASSESS DOC 0-1 FALLS W/OUT INJ PAST YR: ICD-10-PCS | Mod: CPTII,S$GLB,, | Performed by: INTERNAL MEDICINE

## 2023-04-13 PROCEDURE — 3288F PR FALLS RISK ASSESSMENT DOCUMENTED: ICD-10-PCS | Mod: CPTII,S$GLB,, | Performed by: INTERNAL MEDICINE

## 2023-04-13 PROCEDURE — 3080F DIAST BP >= 90 MM HG: CPT | Mod: CPTII,S$GLB,, | Performed by: INTERNAL MEDICINE

## 2023-04-13 PROCEDURE — 4010F PR ACE/ARB THEARPY RXD/TAKEN: ICD-10-PCS | Mod: CPTII,S$GLB,, | Performed by: INTERNAL MEDICINE

## 2023-04-13 PROCEDURE — 3044F HG A1C LEVEL LT 7.0%: CPT | Mod: CPTII,S$GLB,, | Performed by: INTERNAL MEDICINE

## 2023-04-13 PROCEDURE — 1159F PR MEDICATION LIST DOCUMENTED IN MEDICAL RECORD: ICD-10-PCS | Mod: CPTII,S$GLB,, | Performed by: INTERNAL MEDICINE

## 2023-04-13 PROCEDURE — 3077F PR MOST RECENT SYSTOLIC BLOOD PRESSURE >= 140 MM HG: ICD-10-PCS | Mod: CPTII,S$GLB,, | Performed by: INTERNAL MEDICINE

## 2023-04-13 PROCEDURE — 99215 OFFICE O/P EST HI 40 MIN: CPT | Mod: S$GLB,,, | Performed by: INTERNAL MEDICINE

## 2023-04-13 PROCEDURE — 4010F ACE/ARB THERAPY RXD/TAKEN: CPT | Mod: CPTII,S$GLB,, | Performed by: INTERNAL MEDICINE

## 2023-04-13 PROCEDURE — 99999 PR PBB SHADOW E&M-EST. PATIENT-LVL IV: ICD-10-PCS | Mod: PBBFAC,,, | Performed by: INTERNAL MEDICINE

## 2023-04-13 PROCEDURE — 1125F AMNT PAIN NOTED PAIN PRSNT: CPT | Mod: CPTII,S$GLB,, | Performed by: INTERNAL MEDICINE

## 2023-04-13 PROCEDURE — 99999 PR PBB SHADOW E&M-EST. PATIENT-LVL IV: CPT | Mod: PBBFAC,,, | Performed by: INTERNAL MEDICINE

## 2023-04-13 PROCEDURE — 3008F PR BODY MASS INDEX (BMI) DOCUMENTED: ICD-10-PCS | Mod: CPTII,S$GLB,, | Performed by: INTERNAL MEDICINE

## 2023-04-13 PROCEDURE — 3080F PR MOST RECENT DIASTOLIC BLOOD PRESSURE >= 90 MM HG: ICD-10-PCS | Mod: CPTII,S$GLB,, | Performed by: INTERNAL MEDICINE

## 2023-04-13 PROCEDURE — 3008F BODY MASS INDEX DOCD: CPT | Mod: CPTII,S$GLB,, | Performed by: INTERNAL MEDICINE

## 2023-04-13 PROCEDURE — 1159F MED LIST DOCD IN RCRD: CPT | Mod: CPTII,S$GLB,, | Performed by: INTERNAL MEDICINE

## 2023-04-13 PROCEDURE — 1101F PT FALLS ASSESS-DOCD LE1/YR: CPT | Mod: CPTII,S$GLB,, | Performed by: INTERNAL MEDICINE

## 2023-04-13 PROCEDURE — 3077F SYST BP >= 140 MM HG: CPT | Mod: CPTII,S$GLB,, | Performed by: INTERNAL MEDICINE

## 2023-04-13 PROCEDURE — 3044F PR MOST RECENT HEMOGLOBIN A1C LEVEL <7.0%: ICD-10-PCS | Mod: CPTII,S$GLB,, | Performed by: INTERNAL MEDICINE

## 2023-04-13 NOTE — PROGRESS NOTES
Amanda Holt  was seen as a new patient to me for the evaluation of  erna.    CHIEF COMPLAINT:    Chief Complaint   Patient presents with    Results    Apnea       HISTORY OF PRESENT ILLNESS: mAanda Holt is a 72 y.o. female is here for sleep evaluation.   Patient was seen by LEVI Alan for erna evaluation.  Patient was initially diagnosed with erna in [place], [place].  Per patient, study was done at [facility].  Patient was prescribed apap 5-20 with Respironics.  Patient was doing well until recall notification.  Stopped using apap since 2022.  Patient s/p psg with LEVI Alan.  Patient already her apap and was told that new machine is coming.      Without apap, patient with snoring.  Worsening of htn.  No parasomnia.  No cataplexy.      Madison Sleepiness Scale score during initial sleep evaluation was 3.    SLEEP ROUTINE:  Activity the hour prior to sleep: watch tv     Bed partner:  alone  Time to bed:  2-3 am   Lights off:  off   Sleep onset latency:  45 mininutes        Disruptions or awakenings:    3-4 times (can have difficulty going back to sleep)    Wakeup time:      6 am   Perceived sleep quality:  tire       Daytime naps:      none  Weekend sleep routine:      same   Caffeine use: none  exercise habit:   none      PAST MEDICAL HISTORY:    Active Ambulatory Problems     Diagnosis Date Noted    Contact lens overwear of both eyes 01/08/2020    Refractive error 01/08/2020    Nuclear sclerosis of both eyes 01/08/2020    Spinal stenosis 03/13/2020    Hypertension 06/19/2015    ERNA (obstructive sleep apnea) 03/27/2019    Gastroesophageal reflux disease without esophagitis 07/17/2017    CKD (chronic kidney disease) 05/13/2019    Chronic low back pain 02/23/2018    Bilateral hearing loss 07/17/2017    DDD (degenerative disc disease), lumbar 06/19/2015    CTS (carpal tunnel syndrome) 06/19/2015    Anxiety 03/13/2020    Asymptomatic microscopic hematuria 06/15/2020    Lumbar radiculopathy 07/20/2020    Lumbar  spondylosis 10/31/2017    Lumbar herniated disc 12/03/2020    Other nonthrombocytopenic purpura 03/03/2021    Major depressive disorder, recurrent, mild 03/03/2021    Angina pectoris 03/03/2021    Atherosclerosis of aorta 03/03/2021    Stage 3a chronic kidney disease 03/03/2021    Wears contact lenses 03/05/2021    Posterior vitreous detachment of right eye 03/08/2021    Class 3 severe obesity due to excess calories with serious comorbidity in adult 01/03/2022    Decreased functional mobility and endurance 08/08/2022    Sleep-related breathing disorder 11/15/2022    AKBAR (dyspnea on exertion) 11/15/2022    Opioid dependence, uncomplicated 01/12/2023    Peripheral edema 01/31/2023    Insomnia 04/13/2023     Resolved Ambulatory Problems     Diagnosis Date Noted    Colon cancer screening 07/23/2020    Morbid obesity with BMI of 40.0-44.9, adult 03/03/2021    Tobacco use 10/21/2021     Past Medical History:   Diagnosis Date    Arthralgia     Colon polyp     Degenerative disc disease at L5-S1 level     High cholesterol     Sciatica                 PAST SURGICAL HISTORY:    Past Surgical History:   Procedure Laterality Date    BACK SURGERY      lumbar laminectomy    COLONOSCOPY N/A 7/23/2020    Procedure: COLONOSCOPY;  Surgeon: Donal Moore MD;  Location: [facility] ENDO;  Service: Endoscopy;  Laterality: N/A;    EPIDURAL STEROID INJECTION Left 9/9/2020    Procedure: Injection, Steroid, Epidural Transforaminal;  Surgeon: Jun Leavitt Jr., MD;  Location: [facility] ENDO;  Service: Pain Management;  Laterality: Left;  Left L5 + S1 TF WADE  Arrive @ 1300; ASA last 9/1; No DM    EPIDURAL STEROID INJECTION Left 2/12/2021    Procedure: Injection, Steroid, Epidural Transforaminal;  Surgeon: Jun Leavitt Jr., MD;  Location: [facility] ENDO;  Service: Pain Management;  Laterality: Left;  Left L4 + L5 TF WADE  Arrive @ 1230; IV Sedation; ASA last 2/4; No DM    TRANSFORAMINAL EPIDURAL INJECTION OF STEROID Left 3/14/2022    Procedure:  INJECTION, STEROID, EPIDURAL, TRANSFORAMINAL APPROACH, L4-L5 & L5-S1;  Surgeon: Darya Ayala MD;  Location: [facility] PAIN MGT;  Service: Pain Management;  Laterality: Left;    TRANSFORAMINAL EPIDURAL INJECTION OF STEROID Left 2022    Procedure: LUMBAR TRANSFORAMINAL LEFT L4/5 AND L5/S1 CONTRAST;  Surgeon: Darya Ayala MD;  Location: [facility] PAIN MGT;  Service: Pain Management;  Laterality: Left;    TUBAL LIGATION           FAMILY HISTORY:                Family History   Problem Relation Age of Onset    Breast cancer Mother     Hypertension Mother             Hypotension Mother     Cancer Mother          due to breast cancer    Miscarriages / Stillbirths Mother         3 stillborn children    Cataracts Father     Glaucoma Father     Diabetes Father             Arthritis Father     Vision loss Father         Glaucoma -     Glaucoma Sister     Arthritis Sister     Diabetes Sister     Hypertension Sister     Vision loss Sister         Glasses    Drug abuse Brother     Learning disabilities Brother     No Known Problems Brother     No Known Problems Brother     No Known Problems Maternal Aunt     No Known Problems Maternal Uncle     No Known Problems Paternal Aunt     No Known Problems Paternal Uncle     No Known Problems Maternal Grandmother     No Known Problems Maternal Grandfather     No Known Problems Paternal Grandmother     No Known Problems Paternal Grandfather     No Known Problems Son     No Known Problems Other     Arthritis Sister     Depression Sister     Diabetes Sister     Hearing loss Sister         Trouble hearing    Hypertension Sister     Stroke Sister         Browne Vargas Disease, Coma for a month due to diabetes,Some body weakness    Vision loss Sister         Lasix surgery    Arthritis Sister     COPD Sister     Diabetes Sister     Hearing loss Sister     Heart disease Sister     Hypertension Sister     Arthritis Brother     Hypertension Brother     Early death Sister          Still born    Early death Sister         Still born twin to Debby Benítez    Early death Brother     Hypertension Sister     Hypertension Brother     Learning disabilities Son     Learning disabilities Son         Great grandson    Learning disabilities Daughter         Granddaughter    Mental illness Sister         Her son Matthias Benítez    Vision loss Sister         Glasses/contacts    Vision loss Sister         Glasses    Amblyopia Neg Hx     Blindness Neg Hx     Macular degeneration Neg Hx     Retinal detachment Neg Hx     Strabismus Neg Hx     Thyroid disease Neg Hx        SOCIAL HISTORY:          Tobacco:   Social History     Tobacco Use   Smoking Status Some Days    Packs/day: 0.25    Years: 35.00    Pack years: 8.75    Types: Cigarettes    Last attempt to quit: 3/15/2022    Years since quittin.0   Smokeless Tobacco Current   Tobacco Comments    Occasional spoker some times 1-2 cigarettes/day sometimes none for weeks       alcohol use:    Social History     Substance and Sexual Activity   Alcohol Use Yes    Alcohol/week: 3.0 standard drinks    Types: 3 Glasses of wine per week    Comment: Occasional wine                 Occupation: disable due back pain    ALLERGIES:    Review of patient's allergies indicates:   Allergen Reactions    Codeine        CURRENT MEDICATIONS:    Current Outpatient Medications   Medication Sig Dispense Refill    amitriptyline (ELAVIL) 100 MG tablet Take 1 tablet (100 mg total) by mouth every evening. 90 tablet 1    aspirin (ECOTRIN) 81 MG EC tablet       clotrimazole-betamethasone 1-0.05% (LOTRISONE) cream APPLY  CREAM TOPICALLY TO AFFECTED AREA TWICE DAILY 45 g 0    diclofenac sodium (VOLTAREN) 1 % Gel APPLY 2 GRAMS TOPICALLY TO AFFECTED AREA 4 TIMES DAILY 100 g 5    ergocalciferol (ERGOCALCIFEROL) 50,000 unit Cap Take 1 capsule by mouth every 7 days.      fluticasone propionate (FLONASE) 50 mcg/actuation nasal spray 1 spray (50 mcg total) by Each Nostril route once  daily. 16 g 5    hydrALAZINE (APRESOLINE) 100 MG tablet Take 1 tablet (100 mg total) by mouth every 12 (twelve) hours. 180 tablet 3    hydrOXYzine HCL (ATARAX) 25 MG tablet Take 1 tablet by mouth three times daily as needed 45 tablet 0    metoprolol succinate (TOPROL-XL) 25 MG 24 hr tablet Take 1 tablet (25 mg total) by mouth once daily. 30 tablet 11    mupirocin (BACTROBAN) 2 % ointment Apply topically 3 (three) times daily. 22 g 0    NIFEdipine (PROCARDIA-XL) 30 MG (OSM) 24 hr tablet Take 1 tablet (30 mg total) by mouth once daily. 30 tablet 2    nystatin (NYSTOP) powder APPLY TOPICALLY 4 TIMES A DAY 30 g 1    omeprazole (PRILOSEC) 40 MG capsule Take 1 capsule (40 mg total) by mouth once daily. 90 capsule 2    pravastatin (PRAVACHOL) 40 MG tablet Take 1 tablet by mouth once daily 90 tablet 1    tiZANidine (ZANAFLEX) 4 MG tablet TAKE 1 TABLET BY MOUTH EVERY 6 HOURS AS NEEDED 90 tablet 1    traMADoL (ULTRAM) 50 mg tablet TAKE 1 TABLET BY MOUTH EVERY 6 HOURS AS NEEDED FOR PAIN 120 tablet 2    triamcinolone acetonide 0.1% (KENALOG) 0.1 % ointment AAA bid 454 g 3    triamcinolone acetonide 0.1%-ciclopirox-magnesium hydroxide 400 mg/5 ml Apply to affected area twice a day after cool blow dry 60 g 5    valsartan (DIOVAN) 320 MG tablet Take 1 tablet (320 mg total) by mouth once daily. 90 tablet 3    spironolactone (ALDACTONE) 50 MG tablet Take 1 tablet (50 mg total) by mouth once daily. 90 tablet 1     No current facility-administered medications for this visit.                  REVIEW OF SYSTEMS:     Sleep related symptoms as per HPI.  CONST:Denies weight gain    HEENT: Denies sinus congestion  PULM: Denies dyspnea  CARD:  Denies palpitations   GI:  Denies acid reflux  : Denies polyuria  NEURO: Denies headaches  PSYCH: anxious  HEME: Denies anemia   Otherwise, a balance of systems reviewed is negative.          PHYSICAL EXAM:  Vitals:    04/13/23 1130   BP: (!) 169/95   Pulse: 101   SpO2: (!) 93%   Weight: 120.5 kg  "(265 lb 12.2 oz)   Height: 5' 4" (1.626 m)   PainSc:   4   PainLoc: Back     Body mass index is 45.62 kg/m².     GENERAL: Normal development, well groomed  HEENT:  Conjunctivae are non-erythematous; Pupils equal, round, and reactive to light; Nose is symmetrical; Nasal mucosa is pink and moist; Septum is midline; Inferior turbinates are normal; Nasal airflow is normal; Posterior pharynx is pink; Modified Mallampati: 3; Posterior palate is normal; Tonsils +1; Uvula is normal and pink;Tongue is normal; Dentition is fair; No TMJ tenderness; Jaw opening and protrusion without click and without discomfort.  NECK: Supple.  No thyromegaly. No palpable nodes.     SKIN: On face and neck: No abrasions, no rashes, no lesions.  No subcutaneous nodules are palpable.  RESPIRATORY: Chest is clear to auscultation.  Normal chest expansion and non-labored breathing at rest.  CARDIOVASCULAR: Normal S1, S2.  No murmurs, gallops or rubs. No carotid bruits bilaterally.  EXTREMITIES: + edema. No clubbing. No cyanosis. Station normal. Gait normal.        NEURO/PSYCH: Oriented to time, place and person. Normal attention span and concentration. Affect is full. Mood is normal.                                              DATA     PFT 2/8/2023: Ratio 75 FEV1 1.46 (79) FVC 1.96 (82) TLC 3.09 (63) DLCO 77.9  PSG 12/10/2022:  The overall AHI was 6.1 with an oxygen allyssa of 83.0%. The AHI in REM sleep was 43.2. The central apnea index was 0.2     CT 2/15/23 no septal reticulation.  No honeycombing.  No bronchiectasis.      Echo 11/15/22  The left ventricle is normal in size with concentric hypertrophy and normal systolic function.  The estimated ejection fraction is 65%.  Normal left ventricular diastolic function.  Normal right ventricular size with normal right ventricular systolic function.  Mild left atrial enlargement.  Intermediate central venous pressure (8 mmHg).    Lab Results   Component Value Date    TSH 1.632 03/07/2023 "       ASSESSMENT/PLAN    Problem List Items Addressed This Visit       AKBAR (dyspnea on exertion)    Overview     -pft with restrictive physiology and presreved dlco c/w habits  -ct without overt parenchymal lung disease.    -echo wnl  -most likely due to habitus and decondition.             ANTONIA (obstructive sleep apnea)    Overview     -PSG 12/10/2022:  The overall AHI was 6.1 with an oxygen allyssa of 83.0%. The AHI in REM sleep was 43.2. The central apnea index was 0.2   -current cpap is 2 years.  D/w [facility], current unit was dispensed 2021.  Await replacement apap.    -we discussed at length about Respironics recall.  Patient already registered her apap.  Risk and benefits discussed.  Per patient, sleep quality improves with apap.  Still waiting for replacement apap.  Current apap is less than 5 years.                Education: During our discussion today, we talked about the etiology of obstructive sleep apnea as well as the potential ramifications of untreated sleep apnea, which could include daytime sleepiness, hypertension, heart disease and/or stroke.     Precautions: The patient was advised to abstain from driving should they feel sleepy or drowsy.       Thank you for allowing me the opportunity to participate in the care of your patient.    Patient will No follow-ups on file.    35 minutes of total time spent on the encounter, which includes face to face time and non-face to face time preparing to see the patient (eg, review of tests), Obtaining and/or reviewing separately obtained history, documenting clinical information in the electronic or other health record, independently interpreting results (not separately reported) and communicating results to the patient/family/caregiver, or Care coordination (not separately reported).

## 2023-04-14 ENCOUNTER — TELEPHONE (OUTPATIENT)
Dept: BARIATRICS | Facility: CLINIC | Age: 73
End: 2023-04-14
Payer: MEDICARE

## 2023-04-17 ENCOUNTER — OFFICE VISIT (OUTPATIENT)
Dept: FAMILY MEDICINE | Facility: CLINIC | Age: 73
End: 2023-04-17
Payer: MEDICARE

## 2023-04-17 VITALS
RESPIRATION RATE: 16 BRPM | DIASTOLIC BLOOD PRESSURE: 82 MMHG | WEIGHT: 265.44 LBS | TEMPERATURE: 98 F | BODY MASS INDEX: 45.32 KG/M2 | OXYGEN SATURATION: 97 % | HEIGHT: 64 IN | HEART RATE: 94 BPM | SYSTOLIC BLOOD PRESSURE: 150 MMHG

## 2023-04-17 DIAGNOSIS — H10.13 ALLERGIC CONJUNCTIVITIS OF BOTH EYES: ICD-10-CM

## 2023-04-17 DIAGNOSIS — M48.00 SPINAL STENOSIS, UNSPECIFIED SPINAL REGION: Primary | ICD-10-CM

## 2023-04-17 DIAGNOSIS — Z87.891 FORMER SMOKER: ICD-10-CM

## 2023-04-17 PROCEDURE — 3077F SYST BP >= 140 MM HG: CPT | Mod: CPTII,S$GLB,, | Performed by: FAMILY MEDICINE

## 2023-04-17 PROCEDURE — 99999 PR PBB SHADOW E&M-EST. PATIENT-LVL V: ICD-10-PCS | Mod: PBBFAC,,, | Performed by: FAMILY MEDICINE

## 2023-04-17 PROCEDURE — 1159F PR MEDICATION LIST DOCUMENTED IN MEDICAL RECORD: ICD-10-PCS | Mod: CPTII,S$GLB,, | Performed by: FAMILY MEDICINE

## 2023-04-17 PROCEDURE — 1101F PR PT FALLS ASSESS DOC 0-1 FALLS W/OUT INJ PAST YR: ICD-10-PCS | Mod: CPTII,S$GLB,, | Performed by: FAMILY MEDICINE

## 2023-04-17 PROCEDURE — 3044F PR MOST RECENT HEMOGLOBIN A1C LEVEL <7.0%: ICD-10-PCS | Mod: CPTII,S$GLB,, | Performed by: FAMILY MEDICINE

## 2023-04-17 PROCEDURE — 3079F DIAST BP 80-89 MM HG: CPT | Mod: CPTII,S$GLB,, | Performed by: FAMILY MEDICINE

## 2023-04-17 PROCEDURE — 99999 PR PBB SHADOW E&M-EST. PATIENT-LVL V: CPT | Mod: PBBFAC,,, | Performed by: FAMILY MEDICINE

## 2023-04-17 PROCEDURE — 3077F PR MOST RECENT SYSTOLIC BLOOD PRESSURE >= 140 MM HG: ICD-10-PCS | Mod: CPTII,S$GLB,, | Performed by: FAMILY MEDICINE

## 2023-04-17 PROCEDURE — 4010F PR ACE/ARB THEARPY RXD/TAKEN: ICD-10-PCS | Mod: CPTII,S$GLB,, | Performed by: FAMILY MEDICINE

## 2023-04-17 PROCEDURE — 3288F FALL RISK ASSESSMENT DOCD: CPT | Mod: CPTII,S$GLB,, | Performed by: FAMILY MEDICINE

## 2023-04-17 PROCEDURE — 99214 PR OFFICE/OUTPT VISIT, EST, LEVL IV, 30-39 MIN: ICD-10-PCS | Mod: S$GLB,,, | Performed by: FAMILY MEDICINE

## 2023-04-17 PROCEDURE — 3008F PR BODY MASS INDEX (BMI) DOCUMENTED: ICD-10-PCS | Mod: CPTII,S$GLB,, | Performed by: FAMILY MEDICINE

## 2023-04-17 PROCEDURE — 4010F ACE/ARB THERAPY RXD/TAKEN: CPT | Mod: CPTII,S$GLB,, | Performed by: FAMILY MEDICINE

## 2023-04-17 PROCEDURE — 3288F PR FALLS RISK ASSESSMENT DOCUMENTED: ICD-10-PCS | Mod: CPTII,S$GLB,, | Performed by: FAMILY MEDICINE

## 2023-04-17 PROCEDURE — 99214 OFFICE O/P EST MOD 30 MIN: CPT | Mod: S$GLB,,, | Performed by: FAMILY MEDICINE

## 2023-04-17 PROCEDURE — 3079F PR MOST RECENT DIASTOLIC BLOOD PRESSURE 80-89 MM HG: ICD-10-PCS | Mod: CPTII,S$GLB,, | Performed by: FAMILY MEDICINE

## 2023-04-17 PROCEDURE — 1159F MED LIST DOCD IN RCRD: CPT | Mod: CPTII,S$GLB,, | Performed by: FAMILY MEDICINE

## 2023-04-17 PROCEDURE — 1126F PR PAIN SEVERITY QUANTIFIED, NO PAIN PRESENT: ICD-10-PCS | Mod: CPTII,S$GLB,, | Performed by: FAMILY MEDICINE

## 2023-04-17 PROCEDURE — 3044F HG A1C LEVEL LT 7.0%: CPT | Mod: CPTII,S$GLB,, | Performed by: FAMILY MEDICINE

## 2023-04-17 PROCEDURE — 1101F PT FALLS ASSESS-DOCD LE1/YR: CPT | Mod: CPTII,S$GLB,, | Performed by: FAMILY MEDICINE

## 2023-04-17 PROCEDURE — 3008F BODY MASS INDEX DOCD: CPT | Mod: CPTII,S$GLB,, | Performed by: FAMILY MEDICINE

## 2023-04-17 PROCEDURE — 1126F AMNT PAIN NOTED NONE PRSNT: CPT | Mod: CPTII,S$GLB,, | Performed by: FAMILY MEDICINE

## 2023-04-17 RX ORDER — TRAMADOL HYDROCHLORIDE 50 MG/1
TABLET ORAL
Qty: 120 TABLET | Refills: 2 | Status: SHIPPED | OUTPATIENT
Start: 2023-04-17 | End: 2023-08-23

## 2023-04-17 RX ORDER — OLOPATADINE HYDROCHLORIDE 1 MG/ML
1 SOLUTION/ DROPS OPHTHALMIC 2 TIMES DAILY
Qty: 5 ML | Refills: 3 | Status: SHIPPED | OUTPATIENT
Start: 2023-04-17 | End: 2023-10-23

## 2023-04-17 NOTE — PROGRESS NOTES
Chief Complaint   Patient presents with    Hypertension       HPI  Amanda Holt is a 72 y.o. female with multiple medical diagnoses as listed in the medical history and problem list that presents for follow-up for HTN and chronic pain    HTN- she is having improvement with medication adjustment with digital medicine  ANTONIA- she is waiting on her CPAP machine  Weight loss- she is walking however having some limitations with her back pain, she has been tracking her calories and some days she only eats 280, has gotten it up to 700 calories, has lost 6lbs  Heart palpitations- improved since BB started, she was using a lot of flonase  Allergic conjunctivitis- she has been having watery itchy eyes      ALLERGIES AND MEDICATIONS: updated and reviewed.  Review of patient's allergies indicates:   Allergen Reactions    Codeine      Medication List with Changes/Refills   New Medications    OLOPATADINE (PATANOL) 0.1 % OPHTHALMIC SOLUTION    Place 1 drop into both eyes 2 (two) times daily.   Current Medications    AMITRIPTYLINE (ELAVIL) 100 MG TABLET    Take 1 tablet (100 mg total) by mouth every evening.    ASPIRIN (ECOTRIN) 81 MG EC TABLET        CLOTRIMAZOLE-BETAMETHASONE 1-0.05% (LOTRISONE) CREAM    APPLY  CREAM TOPICALLY TO AFFECTED AREA TWICE DAILY    DICLOFENAC SODIUM (VOLTAREN) 1 % GEL    APPLY 2 GRAMS TOPICALLY TO AFFECTED AREA 4 TIMES DAILY    ERGOCALCIFEROL (ERGOCALCIFEROL) 50,000 UNIT CAP    Take 1 capsule by mouth every 7 days.    FLUTICASONE PROPIONATE (FLONASE) 50 MCG/ACTUATION NASAL SPRAY    1 spray (50 mcg total) by Each Nostril route once daily.    HYDRALAZINE (APRESOLINE) 100 MG TABLET    Take 1 tablet (100 mg total) by mouth every 12 (twelve) hours.    HYDROXYZINE HCL (ATARAX) 25 MG TABLET    Take 1 tablet by mouth three times daily as needed    METOPROLOL SUCCINATE (TOPROL-XL) 25 MG 24 HR TABLET    Take 1 tablet (25 mg total) by mouth once daily.    MUPIROCIN (BACTROBAN) 2 % OINTMENT    Apply  topically 3 (three) times daily.    NIFEDIPINE (PROCARDIA-XL) 30 MG (OSM) 24 HR TABLET    Take 1 tablet (30 mg total) by mouth once daily.    NYSTATIN (NYSTOP) POWDER    APPLY TOPICALLY 4 TIMES A DAY    OMEPRAZOLE (PRILOSEC) 40 MG CAPSULE    Take 1 capsule (40 mg total) by mouth once daily.    PRAVASTATIN (PRAVACHOL) 40 MG TABLET    Take 1 tablet by mouth once daily    SPIRONOLACTONE (ALDACTONE) 50 MG TABLET    Take 1 tablet (50 mg total) by mouth once daily.    TIZANIDINE (ZANAFLEX) 4 MG TABLET    TAKE 1 TABLET BY MOUTH EVERY 6 HOURS AS NEEDED    TRIAMCINOLONE ACETONIDE 0.1% (KENALOG) 0.1 % OINTMENT    AAA bid    TRIAMCINOLONE ACETONIDE 0.1%-CICLOPIROX-MAGNESIUM HYDROXIDE 400 MG/5 ML    Apply to affected area twice a day after cool blow dry    VALSARTAN (DIOVAN) 320 MG TABLET    Take 1 tablet (320 mg total) by mouth once daily.   Changed and/or Refilled Medications    Modified Medication Previous Medication    TRAMADOL (ULTRAM) 50 MG TABLET traMADoL (ULTRAM) 50 mg tablet       TAKE 1 TABLET BY MOUTH EVERY 6 HOURS AS NEEDED FOR PAIN    TAKE 1 TABLET BY MOUTH EVERY 6 HOURS AS NEEDED FOR PAIN       ROS  Review of Systems   Constitutional:  Negative for chills, diaphoresis, fatigue, fever and unexpected weight change.   HENT:  Negative for rhinorrhea, sinus pressure, sore throat and tinnitus.    Eyes:  Negative for photophobia and visual disturbance.   Respiratory:  Negative for cough, shortness of breath and wheezing.    Cardiovascular:  Negative for chest pain and palpitations.   Gastrointestinal:  Negative for abdominal pain, blood in stool, constipation, diarrhea, nausea and vomiting.   Genitourinary:  Negative for dysuria, flank pain, frequency and vaginal discharge.   Musculoskeletal:  Positive for arthralgias and back pain. Negative for joint swelling.   Skin:  Negative for rash.   Neurological:  Negative for speech difficulty, weakness, light-headedness and headaches.   Psychiatric/Behavioral:  Negative for  "behavioral problems and dysphoric mood.      Physical Exam  Vitals:    04/17/23 0853   BP: (!) 150/82   BP Location: Left arm   Patient Position: Sitting   BP Method: X-Large (Manual)   Pulse: 94   Resp: 16   Temp: 98 °F (36.7 °C)   TempSrc: Oral   SpO2: 97%   Weight: 120.4 kg (265 lb 6.9 oz)   Height: 5' 4" (1.626 m)    Body mass index is 45.56 kg/m².  Weight: 120.4 kg (265 lb 6.9 oz)   Height: 5' 4" (162.6 cm)     Physical Exam  Vitals and nursing note reviewed.   Constitutional:       Appearance: She is well-developed.   Skin:     General: Skin is warm and dry.      Findings: No erythema or rash.   Neurological:      Mental Status: She is alert. Mental status is at baseline.   Psychiatric:         Behavior: Behavior normal.       Health Maintenance         Date Due Completion Date    Pneumococcal Vaccines (Age 65+) (1 - PCV) Never done ---    Shingles Vaccine (1 of 2) Never done ---    COVID-19 Vaccine (5 - Booster) 04/14/2022 2/17/2022    TETANUS VACCINE 06/14/2022 6/14/2012 (Done)    Override on 6/14/2012: Done    Mammogram 04/20/2023 4/20/2022    Colorectal Cancer Screening 07/23/2023 7/23/2020    High Dose Statin 04/13/2024 4/13/2023    Aspirin/Antiplatelet Therapy 04/13/2024 4/13/2023    Lipid Panel 03/07/2028 3/7/2023            Health maintenance reviewed and addressed as ordered      ASSESSMENT/PLAN       1. Spinal stenosis, unspecified spinal region   [place]  database queried/reviewed  Continue current regimen  Ambulate as much as possible  - traMADoL (ULTRAM) 50 mg tablet; TAKE 1 TABLET BY MOUTH EVERY 6 HOURS AS NEEDED FOR PAIN  Dispense: 120 tablet; Refill: 2    2. Allergic conjunctivitis of both eyes  For prn use  - olopatadine (PATANOL) 0.1 % ophthalmic solution; Place 1 drop into both eyes 2 (two) times daily.  Dispense: 5 mL; Refill: 3    3. BMI 45.0-49.9, adult  She has lost 6lbs, has been eating from 280-700 calories, I recommend trying to get up to 1000 as her BMR is 1700  Has f/u with " bariatrics, I think that weight loss will improve with ANTONIA machine    4. Former smoker  Updated in chart        Simona Torres MD  04/17/2023 9:01 AM        Follow up in about 3 months (around 7/17/2023) for management of chronic conditions.    No orders of the defined types were placed in this encounter.

## 2023-04-20 ENCOUNTER — TELEPHONE (OUTPATIENT)
Dept: BARIATRICS | Facility: CLINIC | Age: 73
End: 2023-04-20
Payer: MEDICARE

## 2023-04-20 NOTE — TELEPHONE ENCOUNTER
Notified patient of the date & time of financial phone call appt.  Pt aware appt is a telephone call.    Dashboard updated  Appt. 06/28/2023

## 2023-04-25 ENCOUNTER — TELEPHONE (OUTPATIENT)
Dept: CARDIOLOGY | Facility: CLINIC | Age: 73
End: 2023-04-25
Payer: MEDICARE

## 2023-04-25 NOTE — TELEPHONE ENCOUNTER
----- Message from Kristen Aly sent at 4/25/2023  9:10 AM CDT -----  Regarding: Self/  [phone]  Type: Patient Call Back    Who called:  Patient    What is the request in detail:  Patient returned staffs call and would darrick a call back.  Thank you    Would the patient rather a call back or a response via [facility]?  Call back    Best call back number:  [phone]          Thank you

## 2023-04-25 NOTE — TELEPHONE ENCOUNTER
Pt call was returned. Advised pt that she needed testing prior to today's appointment. Provided pt with the scheduling number and new follow up appointment. Pt verbalized understanding.

## 2023-04-28 ENCOUNTER — HOSPITAL ENCOUNTER (OUTPATIENT)
Dept: CARDIOLOGY | Facility: HOSPITAL | Age: 73
Discharge: HOME | End: 2023-04-28
Attending: INTERNAL MEDICINE
Payer: MEDICARE

## 2023-04-28 DIAGNOSIS — I10 PRIMARY HYPERTENSION: ICD-10-CM

## 2023-04-28 DIAGNOSIS — I1A.0 RESISTANT HYPERTENSION: ICD-10-CM

## 2023-04-28 PROCEDURE — 93975 CV US RENAL ARTERY STENOSIS HYPERTENSION LIMITED (CUPID ONLY): ICD-10-PCS | Mod: 26,,, | Performed by: INTERNAL MEDICINE

## 2023-04-28 PROCEDURE — 93975 VASCULAR STUDY: CPT | Mod: 26,,, | Performed by: INTERNAL MEDICINE

## 2023-04-28 PROCEDURE — 93976 VASCULAR STUDY: CPT

## 2023-05-01 ENCOUNTER — PATIENT OUTREACH (OUTPATIENT)
Dept: ADMINISTRATIVE | Facility: HOSPITAL | Age: 73
End: 2023-05-01
Payer: MEDICARE

## 2023-05-01 ENCOUNTER — CLINICAL SUPPORT (OUTPATIENT)
Dept: FAMILY MEDICINE | Facility: CLINIC | Age: 73
End: 2023-05-01
Payer: MEDICARE

## 2023-05-01 VITALS — SYSTOLIC BLOOD PRESSURE: 112 MMHG | DIASTOLIC BLOOD PRESSURE: 74 MMHG | HEART RATE: 90 BPM

## 2023-05-01 DIAGNOSIS — I10 PRIMARY HYPERTENSION: Primary | ICD-10-CM

## 2023-05-01 DIAGNOSIS — Z12.11 COLON CANCER SCREENING: Primary | ICD-10-CM

## 2023-05-01 DIAGNOSIS — Z12.31 BREAST CANCER SCREENING BY MAMMOGRAM: ICD-10-CM

## 2023-05-01 PROCEDURE — 99499 NO LOS: ICD-10-PCS | Mod: S$GLB,,, | Performed by: FAMILY MEDICINE

## 2023-05-01 PROCEDURE — 99999 PR PBB SHADOW E&M-EST. PATIENT-LVL I: CPT | Mod: PBBFAC,,,

## 2023-05-01 PROCEDURE — 99999 PR PBB SHADOW E&M-EST. PATIENT-LVL I: ICD-10-PCS | Mod: PBBFAC,,,

## 2023-05-01 PROCEDURE — 99499 UNLISTED E&M SERVICE: CPT | Mod: S$GLB,,, | Performed by: FAMILY MEDICINE

## 2023-05-01 NOTE — PROGRESS NOTES
Amanda Cuencains 72 y.o. female is here today for Blood Pressure check.   History of HTN yes.    Review of patient's allergies indicates:   Allergen Reactions    Codeine      Creatinine   Date Value Ref Range Status   03/07/2023 1.3 0.5 - 1.4 mg/dL Final     Sodium   Date Value Ref Range Status   03/07/2023 144 136 - 145 mmol/L Final     Potassium   Date Value Ref Range Status   03/07/2023 4.5 3.5 - 5.1 mmol/L Final   ]  Patient verifies taking blood pressure medications on a regular basis at the same time of the day.     Current Outpatient Medications:     amitriptyline (ELAVIL) 100 MG tablet, Take 1 tablet (100 mg total) by mouth every evening., Disp: 90 tablet, Rfl: 1    aspirin (ECOTRIN) 81 MG EC tablet, , Disp: , Rfl:     clotrimazole-betamethasone 1-0.05% (LOTRISONE) cream, APPLY  CREAM TOPICALLY TO AFFECTED AREA TWICE DAILY, Disp: 45 g, Rfl: 0    diclofenac sodium (VOLTAREN) 1 % Gel, APPLY 2 GRAMS TOPICALLY TO AFFECTED AREA 4 TIMES DAILY, Disp: 100 g, Rfl: 5    ergocalciferol (ERGOCALCIFEROL) 50,000 unit Cap, Take 1 capsule by mouth every 7 days., Disp: , Rfl:     fluticasone propionate (FLONASE) 50 mcg/actuation nasal spray, 1 spray (50 mcg total) by Each Nostril route once daily., Disp: 16 g, Rfl: 5    hydrALAZINE (APRESOLINE) 100 MG tablet, Take 1 tablet (100 mg total) by mouth every 12 (twelve) hours., Disp: 180 tablet, Rfl: 3    hydrOXYzine HCL (ATARAX) 25 MG tablet, Take 1 tablet by mouth three times daily as needed, Disp: 45 tablet, Rfl: 0    metoprolol succinate (TOPROL-XL) 25 MG 24 hr tablet, Take 1 tablet (25 mg total) by mouth once daily., Disp: 30 tablet, Rfl: 11    mupirocin (BACTROBAN) 2 % ointment, Apply topically 3 (three) times daily., Disp: 22 g, Rfl: 0    NIFEdipine (PROCARDIA-XL) 30 MG (OSM) 24 hr tablet, Take 1 tablet (30 mg total) by mouth once daily., Disp: 30 tablet, Rfl: 2    nystatin (NYSTOP) powder, APPLY TOPICALLY 4 TIMES A DAY, Disp: 30 g, Rfl: 1    olopatadine (PATANOL) 0.1  % ophthalmic solution, Place 1 drop into both eyes 2 (two) times daily., Disp: 5 mL, Rfl: 3    omeprazole (PRILOSEC) 40 MG capsule, Take 1 capsule (40 mg total) by mouth once daily., Disp: 90 capsule, Rfl: 2    pravastatin (PRAVACHOL) 40 MG tablet, Take 1 tablet by mouth once daily, Disp: 90 tablet, Rfl: 1    spironolactone (ALDACTONE) 50 MG tablet, Take 1 tablet (50 mg total) by mouth once daily., Disp: 90 tablet, Rfl: 1    tiZANidine (ZANAFLEX) 4 MG tablet, TAKE 1 TABLET BY MOUTH EVERY 6 HOURS AS NEEDED, Disp: 90 tablet, Rfl: 1    traMADoL (ULTRAM) 50 mg tablet, TAKE 1 TABLET BY MOUTH EVERY 6 HOURS AS NEEDED FOR PAIN, Disp: 120 tablet, Rfl: 2    triamcinolone acetonide 0.1% (KENALOG) 0.1 % ointment, AAA bid, Disp: 454 g, Rfl: 3    triamcinolone acetonide 0.1%-ciclopirox-magnesium hydroxide 400 mg/5 ml, Apply to affected area twice a day after cool blow dry, Disp: 60 g, Rfl: 5    valsartan (DIOVAN) 320 MG tablet, Take 1 tablet (320 mg total) by mouth once daily., Disp: 90 tablet, Rfl: 3  Does patient have record of home blood pressure readings no. Readings have been averaging n/a.   Last dose of blood pressure medication was taken at 5/1/2023 morning around 9329-4218.  Patient is asymptomatic.   Complains of n/a.    BP: 112/74 , Pulse: 90 .    Dr. Torres notified.

## 2023-05-01 NOTE — PROGRESS NOTES
Pt returned call, stated that she  her pravastatin last week from the Pharmacy. Mammogram scheduled for 05/01/23 and PAT scheduled for 08/11/23. Immunization's updated.

## 2023-05-01 NOTE — PROGRESS NOTES
Attempted to contact pt in regards to overdue HM and statin unavailable left voicemail. Immunization's updated.

## 2023-05-03 ENCOUNTER — OFFICE VISIT (OUTPATIENT)
Dept: CARDIOLOGY | Facility: CLINIC | Age: 73
End: 2023-05-03
Payer: MEDICARE

## 2023-05-03 VITALS
RESPIRATION RATE: 15 BRPM | HEIGHT: 64 IN | HEART RATE: 71 BPM | BODY MASS INDEX: 44.87 KG/M2 | WEIGHT: 262.81 LBS | OXYGEN SATURATION: 99 % | DIASTOLIC BLOOD PRESSURE: 62 MMHG | SYSTOLIC BLOOD PRESSURE: 124 MMHG

## 2023-05-03 DIAGNOSIS — I1A.0 RESISTANT HYPERTENSION: ICD-10-CM

## 2023-05-03 DIAGNOSIS — E66.01 CLASS 3 SEVERE OBESITY DUE TO EXCESS CALORIES WITH SERIOUS COMORBIDITY AND BODY MASS INDEX (BMI) OF 45.0 TO 49.9 IN ADULT: ICD-10-CM

## 2023-05-03 DIAGNOSIS — I10 PRIMARY HYPERTENSION: Primary | ICD-10-CM

## 2023-05-03 DIAGNOSIS — I70.0 ATHEROSCLEROSIS OF AORTA: ICD-10-CM

## 2023-05-03 DIAGNOSIS — I25.10 CORONARY ARTERY CALCIFICATION SEEN ON CAT SCAN: ICD-10-CM

## 2023-05-03 DIAGNOSIS — E78.2 MIXED HYPERLIPIDEMIA: ICD-10-CM

## 2023-05-03 LAB
ABDOMINAL AORTA MID EDV: 0 CM/S
ABDOMINAL AORTA MID PSV: 70 CM/S
LEFT RENAL DIST DIAS: 18 CM/S
LEFT RENAL DIST SYS: 59 CM/S
LEFT RENAL MID DIAS: 31 CM/S
LEFT RENAL MID RAR: 1.51
LEFT RENAL MID SYS: 106 CM/S
LEFT RENAL PROX DIAS: 71 CM/S
LEFT RENAL PROX SYS: 250 CM/S
LEFT RENAL ULTRASOUND ACCELERATION TIME MEASUREMENT 1: 126 MS
LEFT RENAL ULTRASOUND ACCELERATION TIME MEASUREMENT 2: 78 MS
LEFT RENAL ULTRASOUND ACCELERATION TIME MEASUREMENT 3: 108 MS
LEFT RENAL ULTRASOUND ACCELERATION TIME MEASUREMENT AVERAGE: 126 MS
LEFT RENAL ULTRASOUND KIDNEY SIZE MEASUREMENT 1: 11.2 CM
LEFT RENAL ULTRASOUND KIDNEY SIZE MEASUREMENT 2: 4.8 CM
LEFT RENAL ULTRASOUND KIDNEY SIZE MEASUREMENT 3: 4.6 CM
LEFT RENAL ULTRASOUND KIDNEY SIZE MEASUREMENT AVERAGE: 11.2 CM
LEFT RENAL ULTRASOUND RESISTIVE INDEX MEASUREMENT 1: 0.63
LEFT RENAL ULTRASOUND RESISTIVE INDEX MEASUREMENT 2: 0.66
LEFT RENAL ULTRASOUND RESISTIVE INDEX MEASUREMENT 3: 0.66
LEFT RENAL ULTRASOUND RESISTIVE INDEX MEASUREMENT AVERAGE: 0.66
OHS CV LEFT RENAL RAR: 3.57
OHS CV US LEFT RENAL HIGHEST EDV: 71
OHS CV US LEFT RENAL HIGHEST PSV: 250
RIGHT RENAL ULTRASOUND ACCELERATION TIME MEASUREMENT 1: 78 MS
RIGHT RENAL ULTRASOUND ACCELERATION TIME MEASUREMENT 2: 120 MS
RIGHT RENAL ULTRASOUND ACCELERATION TIME MEASUREMENT 3: 72 MS
RIGHT RENAL ULTRASOUND ACCELERATION TIME MEASUREMENT AVERAGE: 120 MS
RIGHT RENAL ULTRASOUND KIDNEY SIZE MEASUREMENT 1: 9 CM
RIGHT RENAL ULTRASOUND KIDNEY SIZE MEASUREMENT 2: 4.9 CM
RIGHT RENAL ULTRASOUND KIDNEY SIZE MEASUREMENT 3: 4.2 CM
RIGHT RENAL ULTRASOUND KIDNEY SIZE MEASUREMENT AVERAGE: 9 CM
RIGHT RENAL ULTRASOUND RESISTIVE INDEX MEASUREMENT 1: 0.65
RIGHT RENAL ULTRASOUND RESISTIVE INDEX MEASUREMENT 2: 0.63
RIGHT RENAL ULTRASOUND RESISTIVE INDEX MEASUREMENT 3: 0.62
RIGHT RENAL ULTRASOUND RESISTIVE INDEX MEASUREMENT AVERAGE: 0.65

## 2023-05-03 PROCEDURE — 3074F SYST BP LT 130 MM HG: CPT | Mod: CPTII,S$GLB,, | Performed by: INTERNAL MEDICINE

## 2023-05-03 PROCEDURE — 3288F PR FALLS RISK ASSESSMENT DOCUMENTED: ICD-10-PCS | Mod: CPTII,S$GLB,, | Performed by: INTERNAL MEDICINE

## 2023-05-03 PROCEDURE — 3078F PR MOST RECENT DIASTOLIC BLOOD PRESSURE < 80 MM HG: ICD-10-PCS | Mod: CPTII,S$GLB,, | Performed by: INTERNAL MEDICINE

## 2023-05-03 PROCEDURE — 99214 OFFICE O/P EST MOD 30 MIN: CPT | Mod: S$GLB,,, | Performed by: INTERNAL MEDICINE

## 2023-05-03 PROCEDURE — 3078F DIAST BP <80 MM HG: CPT | Mod: CPTII,S$GLB,, | Performed by: INTERNAL MEDICINE

## 2023-05-03 PROCEDURE — 1159F PR MEDICATION LIST DOCUMENTED IN MEDICAL RECORD: ICD-10-PCS | Mod: CPTII,S$GLB,, | Performed by: INTERNAL MEDICINE

## 2023-05-03 PROCEDURE — 1159F MED LIST DOCD IN RCRD: CPT | Mod: CPTII,S$GLB,, | Performed by: INTERNAL MEDICINE

## 2023-05-03 PROCEDURE — 3074F PR MOST RECENT SYSTOLIC BLOOD PRESSURE < 130 MM HG: ICD-10-PCS | Mod: CPTII,S$GLB,, | Performed by: INTERNAL MEDICINE

## 2023-05-03 PROCEDURE — 1101F PT FALLS ASSESS-DOCD LE1/YR: CPT | Mod: CPTII,S$GLB,, | Performed by: INTERNAL MEDICINE

## 2023-05-03 PROCEDURE — 3008F PR BODY MASS INDEX (BMI) DOCUMENTED: ICD-10-PCS | Mod: CPTII,S$GLB,, | Performed by: INTERNAL MEDICINE

## 2023-05-03 PROCEDURE — 3044F HG A1C LEVEL LT 7.0%: CPT | Mod: CPTII,S$GLB,, | Performed by: INTERNAL MEDICINE

## 2023-05-03 PROCEDURE — 3288F FALL RISK ASSESSMENT DOCD: CPT | Mod: CPTII,S$GLB,, | Performed by: INTERNAL MEDICINE

## 2023-05-03 PROCEDURE — 4010F ACE/ARB THERAPY RXD/TAKEN: CPT | Mod: CPTII,S$GLB,, | Performed by: INTERNAL MEDICINE

## 2023-05-03 PROCEDURE — 99999 PR PBB SHADOW E&M-EST. PATIENT-LVL IV: CPT | Mod: PBBFAC,,, | Performed by: INTERNAL MEDICINE

## 2023-05-03 PROCEDURE — 4010F PR ACE/ARB THEARPY RXD/TAKEN: ICD-10-PCS | Mod: CPTII,S$GLB,, | Performed by: INTERNAL MEDICINE

## 2023-05-03 PROCEDURE — 99214 PR OFFICE/OUTPT VISIT, EST, LEVL IV, 30-39 MIN: ICD-10-PCS | Mod: S$GLB,,, | Performed by: INTERNAL MEDICINE

## 2023-05-03 PROCEDURE — 1125F AMNT PAIN NOTED PAIN PRSNT: CPT | Mod: CPTII,S$GLB,, | Performed by: INTERNAL MEDICINE

## 2023-05-03 PROCEDURE — 1125F PR PAIN SEVERITY QUANTIFIED, PAIN PRESENT: ICD-10-PCS | Mod: CPTII,S$GLB,, | Performed by: INTERNAL MEDICINE

## 2023-05-03 PROCEDURE — 3044F PR MOST RECENT HEMOGLOBIN A1C LEVEL <7.0%: ICD-10-PCS | Mod: CPTII,S$GLB,, | Performed by: INTERNAL MEDICINE

## 2023-05-03 PROCEDURE — 3008F BODY MASS INDEX DOCD: CPT | Mod: CPTII,S$GLB,, | Performed by: INTERNAL MEDICINE

## 2023-05-03 PROCEDURE — 1101F PR PT FALLS ASSESS DOC 0-1 FALLS W/OUT INJ PAST YR: ICD-10-PCS | Mod: CPTII,S$GLB,, | Performed by: INTERNAL MEDICINE

## 2023-05-03 PROCEDURE — 99999 PR PBB SHADOW E&M-EST. PATIENT-LVL IV: ICD-10-PCS | Mod: PBBFAC,,, | Performed by: INTERNAL MEDICINE

## 2023-05-03 NOTE — PROGRESS NOTES
CARDIOLOGY CLINIC VISIT        HISTORY OF PRESENT ILLNESS:     05/26/2020: Amanda Holt presents for establishment of care. Previously seen by Dr. Fink. No hx of CAD. Had a stress test in 2018. Had hypertensive response to exercise. Started on medications. BP at home according to patient wnl. Denies chest pain or sob.     03/02/2023:  Recent echocardiogram showed normal left ventricular systolic function with estimated ejection fraction of 65%.  Over the past year she is had roughly a 50 lb weight gain.  Blood pressure has been difficult to control.  Was changed from amlodipine and losartan to valsartan and Aldactone.  Blood pressure continues to be elevated.  She is not been using her CPAP since it has been recalled.  She also notes daily palpitations.  Last a few seconds.  No aggravating or alleviating factors.    03/21/2023:  The patient location is: home   The chief complaint leading to consultation is: follow up    Visit type: audiovisual    Face to Face time with patient: 15  30 minutes of total time spent on the encounter, which includes face to face time and non-face to face time preparing to see the patient (eg, review of tests), Obtaining and/or reviewing separately obtained history, Documenting clinical information in the electronic or other health record, Independently interpreting results (not separately reported) and communicating results to the patient/family/caregiver, or Care coordination (not separately reported).     Each patient to whom he or she provides medical services by telemedicine is:  (1) informed of the relationship between the physician and patient and the respective role of any other health care provider with respect to management of the patient; and (2) notified that he or she may decline to receive medical services by telemedicine and may withdraw from such care at any time.    Notes:   Recent emergency room visit secondary to elevated blood pressure.  240/130.  Patient given  additional hydralazine.  EKG showed normal sinus rhythm.  Left ventricular hypertrophy. Her holter showed average heart rate 90 beats per minute.  Rare PVCs.  Rare PACs.  One hundred twelve beat episode of atrial tachycardia.    05/03/2023: She feels good. No complaints. Blood pressure has been well controlled.  Renal artery ultrasound shows elevated velocities left renal artery suggestive of at least 60% stenosis.    CARDIOVASCULAR HISTORY:     Atrial tachycardia  Coronary calcification seen on CAT scan  Aortic atherosclerosis    PAST MEDICAL HISTORY:     Past Medical History:   Diagnosis Date    Arthralgia     Colon polyp     Degenerative disc disease at L5-S1 level     High cholesterol     Hypertension     Nuclear sclerosis of both eyes 1/8/2020    Sciatica     Spinal stenosis     Tobacco use 10/21/2021       PAST SURGICAL HISTORY:     Past Surgical History:   Procedure Laterality Date    BACK SURGERY      lumbar laminectomy    COLONOSCOPY N/A 7/23/2020    Procedure: COLONOSCOPY;  Surgeon: Donal Moore MD;  Location: [facility] ENDO;  Service: Endoscopy;  Laterality: N/A;    EPIDURAL STEROID INJECTION Left 9/9/2020    Procedure: Injection, Steroid, Epidural Transforaminal;  Surgeon: Jun Leavitt Jr., MD;  Location: [facility] ENDO;  Service: Pain Management;  Laterality: Left;  Left L5 + S1 TF WADE  Arrive @ 1300; ASA last 9/1; No DM    EPIDURAL STEROID INJECTION Left 2/12/2021    Procedure: Injection, Steroid, Epidural Transforaminal;  Surgeon: Jun Leavitt Jr., MD;  Location: [facility] ENDO;  Service: Pain Management;  Laterality: Left;  Left L4 + L5 TF WADE  Arrive @ 1230; IV Sedation; ASA last 2/4; No DM    TRANSFORAMINAL EPIDURAL INJECTION OF STEROID Left 3/14/2022    Procedure: INJECTION, STEROID, EPIDURAL, TRANSFORAMINAL APPROACH, L4-L5 & L5-S1;  Surgeon: Darya Ayala MD;  Location: [facility] PAIN [facility]GT;  Service: Pain Management;  Laterality: Left;    TRANSFORAMINAL EPIDURAL INJECTION OF STEROID Left 8/29/2022     Procedure: LUMBAR TRANSFORAMINAL LEFT L4/5 AND L5/S1 CONTRAST;  Surgeon: Darya Ayala MD;  [facility];  Service: Pain Management;  Laterality: Left;    TUBAL LIGATION         ALLERGIES AND MEDICATION:     Review of patient's allergies indicates:   Allergen Reactions    Codeine         Medication List            Accurate as of May 3, 2023  1:08 PM. If you have any questions, ask your nurse or doctor.                CONTINUE taking these medications      amitriptyline 100 MG tablet  Commonly known as: ELAVIL  Take 1 tablet (100 mg total) by mouth every evening.     aspirin 81 MG EC tablet  Commonly known as: ECOTRIN     clotrimazole-betamethasone 1-0.05% cream  Commonly known as: LOTRISONE  APPLY  CREAM TOPICALLY TO AFFECTED AREA TWICE DAILY     diclofenac sodium 1 % Gel  Commonly known as: VOLTAREN  APPLY 2 GRAMS TOPICALLY TO AFFECTED AREA 4 TIMES DAILY     ergocalciferol 50,000 unit Cap  Commonly known as: ERGOCALCIFEROL     fluticasone propionate 50 mcg/actuation nasal spray  Commonly known as: FLONASE  1 spray (50 mcg total) by Each Nostril route once daily.     hydrALAZINE 100 MG tablet  Commonly known as: APRESOLINE  Take 1 tablet (100 mg total) by mouth every 12 (twelve) hours.     hydrOXYzine HCL 25 MG tablet  Commonly known as: ATARAX  Take 1 tablet by mouth three times daily as needed     metoprolol succinate 25 MG 24 hr tablet  Commonly known as: TOPROL-XL  Take 1 tablet (25 mg total) by mouth once daily.     mupirocin 2 % ointment  Commonly known as: BACTROBAN  Apply topically 3 (three) times daily.     NIFEdipine 30 MG (OSM) 24 hr tablet  Commonly known as: PROCARDIA-XL  Take 1 tablet (30 mg total) by mouth once daily.     nystatin powder  Commonly known as: NYSTOP  APPLY TOPICALLY 4 TIMES A DAY     olopatadine 0.1 % ophthalmic solution  Commonly known as: PATANOL  Place 1 drop into both eyes 2 (two) times daily.     omeprazole 40 MG capsule  Commonly known as: PRILOSEC  Take 1 capsule (40  mg total) by mouth once daily.     pravastatin 40 MG tablet  Commonly known as: PRAVACHOL  Take 1 tablet by mouth once daily     spironolactone 50 MG tablet  Commonly known as: ALDACTONE  Take 1 tablet (50 mg total) by mouth once daily.     tiZANidine 4 MG tablet  Commonly known as: ZANAFLEX  TAKE 1 TABLET BY MOUTH EVERY 6 HOURS AS NEEDED     traMADoL 50 mg tablet  Commonly known as: ULTRAM  TAKE 1 TABLET BY MOUTH EVERY 6 HOURS AS NEEDED FOR PAIN     triamcinolone acetonide 0.1% 0.1 % ointment  Commonly known as: KENALOG  AAA bid     triamcinolone acetonide 0.1%-ciclopirox-magnesium hydroxide 400 mg/5 ml  Apply to affected area twice a day after cool blow dry     valsartan 320 MG tablet  Commonly known as: DIOVAN  Take 1 tablet (320 mg total) by mouth once daily.              SOCIAL HISTORY:     Social History     Socioeconomic History    Marital status:    Tobacco Use    Smoking status: Former     Packs/day: 0.25     Years: 35.00     Pack years: 8.75     Types: Cigarettes     Quit date: 3/15/2022     Years since quittin.1    Smokeless tobacco: Current    Tobacco comments:     Occasional spoker some times 1-2 cigarettes/day sometimes none for weeks   Substance and Sexual Activity    Alcohol use: Yes     Alcohol/week: 3.0 standard drinks     Types: 3 Glasses of wine per week     Comment: Occasional wine    Drug use: Yes     Comment: Tramadol twice a day as needed    Sexual activity: Not Currently     Partners: Male     Birth control/protection: Post-menopausal     Comment: Tubal 30+ years ago     Social Determinants of Health     Financial Resource Strain: Medium Risk    Difficulty of Paying Living Expenses: Somewhat hard   Food Insecurity: Food Insecurity Present    Worried About Running Out of Food in the Last Year: Often true    Ran Out of Food in the Last Year: Often true   Transportation Needs: No Transportation Needs    Lack of Transportation (Medical): No    Lack of Transportation (Non-Medical): No    Physical Activity: Insufficiently Active    Days of Exercise per Week: 3 days    Minutes of Exercise per Session: 30 min   Stress: Stress Concern Present    Feeling of Stress : Very much   Social Connections: Unknown    Frequency of Communication with Friends and Family: More than three times a week    Frequency of Social Gatherings with Friends and Family: Once a week    Active Member of Clubs or Organizations: No    Attends Club or Organization Meetings: Never    Marital Status:    Housing Stability: Low Risk     Unable to Pay for Housing in the Last Year: No    Number of Places Lived in the Last Year: 1    Unstable Housing in the Last Year: No       FAMILY HISTORY:     Family History   Problem Relation Age of Onset    Breast cancer Mother     Hypertension Mother             Hypotension Mother     Cancer Mother          due to breast cancer    Miscarriages / Stillbirths Mother         3 stillborn children    Cataracts Father     Glaucoma Father     Diabetes Father             Arthritis Father     Vision loss Father         Glaucoma -     Glaucoma Sister     Arthritis Sister     Diabetes Sister     Hypertension Sister     Vision loss Sister         Glasses    Drug abuse Brother     Learning disabilities Brother     No Known Problems Brother     No Known Problems Brother     No Known Problems Maternal Aunt     No Known Problems Maternal Uncle     No Known Problems Paternal Aunt     No Known Problems Paternal Uncle     No Known Problems Maternal Grandmother     No Known Problems Maternal Grandfather     No Known Problems Paternal Grandmother     No Known Problems Paternal Grandfather     No Known Problems Son     No Known Problems Other     Arthritis Sister     Depression Sister     Diabetes Sister     Hearing loss Sister         Trouble hearing    Hypertension Sister     Stroke Sister         Browne Vargas Disease, Coma for a month due to diabetes,Some body weakness    Vision  loss Sister         Lasix surgery    Arthritis Sister     COPD Sister     Diabetes Sister     Hearing loss Sister     Heart disease Sister     Hypertension Sister     Arthritis Brother     Hypertension Brother     Early death Sister         Still born    Early death Sister         Still born twin to Debby Benítez    Early death Brother     Hypertension Sister     Hypertension Brother     Learning disabilities Son     Learning disabilities Son         Great grandson    Learning disabilities Daughter         Granddaughter    Mental illness Sister         Her son Matthias Benítez    Vision loss Sister         Glasses/contacts    Vision loss Sister         Glasses    Amblyopia Neg Hx     Blindness Neg Hx     Macular degeneration Neg Hx     Retinal detachment Neg Hx     Strabismus Neg Hx     Thyroid disease Neg Hx        REVIEW OF SYSTEMS:   Review of Systems   Constitutional:  Negative for chills, diaphoresis, fever, malaise/fatigue and weight loss.   HENT:  Negative for congestion, hearing loss, sinus pain, sore throat and tinnitus.    Eyes:  Negative for blurred vision, double vision, photophobia and pain.   Respiratory:  Negative for cough, hemoptysis, sputum production, shortness of breath, wheezing and stridor.    Cardiovascular:  Negative for chest pain, palpitations, orthopnea, claudication, leg swelling and PND.   Gastrointestinal:  Negative for abdominal pain, blood in stool, heartburn, melena, nausea and vomiting.   Musculoskeletal:  Negative for back pain, falls, joint pain, myalgias and neck pain.   Neurological:  Negative for dizziness, tingling, tremors, sensory change, speech change, focal weakness, seizures, loss of consciousness, weakness and headaches.   Endo/Heme/Allergies:  Does not bruise/bleed easily.   Psychiatric/Behavioral:  Negative for depression, memory loss and substance abuse. The patient is not nervous/anxious.      PHYSICAL EXAM:     Vitals:    05/03/23 1114   BP: 124/62   Pulse: 71   Resp:  "15    Body mass index is 45.11 kg/m².  Weight: 119.2 kg (262 lb 12.6 oz)   Height: 5' 4" (162.6 cm)     Physical Exam  Vitals reviewed.   Constitutional:       General: She is not in acute distress.     Appearance: She is obese. She is not ill-appearing.   Neurological:      Mental Status: She is alert and oriented to person, place, and time.   Psychiatric:         Speech: Speech normal.         Behavior: Behavior normal.         Thought Content: Thought content normal.   She has been dieting.  Exercising.    DATA:   EKG: (personally reviewed tracing)  03/02/2023-sinus rhythm with PACs.  Laboratory:  CBC:  Recent Labs   Lab 03/07/23  0917   WBC 4.86   Hemoglobin 12.7   Hematocrit 40.6   Platelets 264       CHEMISTRIES:  Recent Labs   Lab 07/20/20  1030 02/02/23  1535 03/07/23  0917   Glucose 117 H 96 115 H   Sodium 142 144 144   Potassium 4.3 4.1 4.5   BUN 15 29 H 18   Creatinine 1.2 1.3 1.3   eGFR if  54 A  --   --    eGFR if non  47 A  --   --    Calcium 9.1 9.9 9.8       CARDIAC BIOMARKERS:        COAGS:        LIPIDS/LFTS:  Recent Labs   Lab 07/20/20  1030 10/21/21  1156 03/07/23  0917   Cholesterol  --  151 160   Triglycerides  --  106 94   HDL  --  47 40   LDL Cholesterol  --  82.8 101.2   Non-HDL Cholesterol  --  104 120   AST 14  --  15   ALT 11  --  14       Cardiovascular Testing:    Renal artery ultrasound 04/28/2023:    There is 60-79% stenosis in the Left Renal Artery.  Left kidney 11.20 cm.  Right kidney 9.00 cm.    Holter 03/13/2023:    The diary was not returned  Sinus rhythm with heart rates varying between 57 and 141 BPM with an average of 90 BPM.  There were rare PVCs. There were 1 triplets.  There were rare PACs. Atrial tachycardia. Longest 12 beats.    Echocardiogram 02/08/2023:    The left ventricle is normal in size with concentric hypertrophy and normal systolic function.  The estimated ejection fraction is 65%.  Normal left ventricular diastolic " function.  Normal right ventricular size with normal right ventricular systolic function.  Mild left atrial enlargement.  Intermediate central venous pressure (8 mmHg).    ASSESSMENT:     Hypertension  Hyperlipidemia: last   Obstructive sleep apnea  Obesity  Coronary calcifications seen on CT scan  Aortic atherosclerosis    PLAN:     Hypertension:  Continue current management.  Discussed renal artery ultrasound with patient.  Should blood pressure be difficult control she is amenable to proceeding with angiography.  Hyperlipidemia:  Continue current management.  Return to clinic 3 months.           Epifanio Calhoun MD, MPH, FACC, [facility]

## 2023-05-08 ENCOUNTER — HOSPITAL ENCOUNTER (OUTPATIENT)
Dept: RADIOLOGY | Facility: HOSPITAL | Age: 73
Discharge: HOME | End: 2023-05-08
Attending: FAMILY MEDICINE
Payer: MEDICARE

## 2023-05-08 VITALS — WEIGHT: 262.81 LBS | BODY MASS INDEX: 44.87 KG/M2 | HEIGHT: 64 IN

## 2023-05-08 DIAGNOSIS — Z12.31 BREAST CANCER SCREENING BY MAMMOGRAM: ICD-10-CM

## 2023-05-08 PROCEDURE — 77063 BREAST TOMOSYNTHESIS BI: CPT | Mod: 26,,, | Performed by: RADIOLOGY

## 2023-05-08 PROCEDURE — 77067 SCR MAMMO BI INCL CAD: CPT | Mod: 26,,, | Performed by: RADIOLOGY

## 2023-05-08 PROCEDURE — 77067 SCR MAMMO BI INCL CAD: CPT | Mod: TC

## 2023-05-08 PROCEDURE — 77063 MAMMO DIGITAL SCREENING BILAT WITH TOMO: ICD-10-PCS | Mod: 26,,, | Performed by: RADIOLOGY

## 2023-05-08 PROCEDURE — 77067 MAMMO DIGITAL SCREENING BILAT WITH TOMO: ICD-10-PCS | Mod: 26,,, | Performed by: RADIOLOGY

## 2023-06-05 ENCOUNTER — OFFICE VISIT (OUTPATIENT)
Dept: DERMATOLOGY | Facility: CLINIC | Age: 73
End: 2023-06-05
Payer: MEDICARE

## 2023-06-05 ENCOUNTER — PATIENT MESSAGE (OUTPATIENT)
Dept: DERMATOLOGY | Facility: CLINIC | Age: 73
End: 2023-06-05

## 2023-06-05 DIAGNOSIS — L82.1 SEBORRHEIC KERATOSES: ICD-10-CM

## 2023-06-05 DIAGNOSIS — L81.4 LENTIGO: Primary | ICD-10-CM

## 2023-06-05 PROCEDURE — 99213 PR OFFICE/OUTPT VISIT, EST, LEVL III, 20-29 MIN: ICD-10-PCS | Mod: 95,,, | Performed by: DERMATOLOGY

## 2023-06-05 PROCEDURE — 1160F RVW MEDS BY RX/DR IN RCRD: CPT | Mod: CPTII,95,, | Performed by: DERMATOLOGY

## 2023-06-05 PROCEDURE — 4010F PR ACE/ARB THEARPY RXD/TAKEN: ICD-10-PCS | Mod: CPTII,95,, | Performed by: DERMATOLOGY

## 2023-06-05 PROCEDURE — 3044F PR MOST RECENT HEMOGLOBIN A1C LEVEL <7.0%: ICD-10-PCS | Mod: CPTII,95,, | Performed by: DERMATOLOGY

## 2023-06-05 PROCEDURE — 1159F MED LIST DOCD IN RCRD: CPT | Mod: CPTII,95,, | Performed by: DERMATOLOGY

## 2023-06-05 PROCEDURE — 1160F PR REVIEW ALL MEDS BY PRESCRIBER/CLIN PHARMACIST DOCUMENTED: ICD-10-PCS | Mod: CPTII,95,, | Performed by: DERMATOLOGY

## 2023-06-05 PROCEDURE — 1159F PR MEDICATION LIST DOCUMENTED IN MEDICAL RECORD: ICD-10-PCS | Mod: CPTII,95,, | Performed by: DERMATOLOGY

## 2023-06-05 PROCEDURE — 4010F ACE/ARB THERAPY RXD/TAKEN: CPT | Mod: CPTII,95,, | Performed by: DERMATOLOGY

## 2023-06-05 PROCEDURE — 99213 OFFICE O/P EST LOW 20 MIN: CPT | Mod: 95,,, | Performed by: DERMATOLOGY

## 2023-06-05 PROCEDURE — 3044F HG A1C LEVEL LT 7.0%: CPT | Mod: CPTII,95,, | Performed by: DERMATOLOGY

## 2023-06-05 NOTE — PROGRESS NOTES
Subjective:      Patient ID:  Amanda Holt is a 72 y.o. female who presents for No chief complaint on file.    History of Present Illness: The patient presents with chief complaint of spot.  Location: left leg and back   Duration: months  Signs/Symptoms: itchy, scaly    Prior treatments: none     Review of Systems    Objective:   Physical Exam   Constitutional: She appears well-developed and well-nourished. No distress.   Neurological: She is alert and oriented to person, place, and time. She is not disoriented.   Psychiatric: She has a normal mood and affect.   Skin:   Areas Examined (abnormalities noted in diagram):   Back Inspection Performed  LUE Inspection Performed  RLE Inspected          Diagram Legend     Erythematous scaling macule/papule c/w actinic keratosis       Vascular papule c/w angioma      Pigmented verrucoid papule/plaque c/w seborrheic keratosis      Yellow umbilicated papule c/w sebaceous hyperplasia      Irregularly shaped tan macule c/w lentigo     1-2 mm smooth white papules consistent with Milia      Movable subcutaneous cyst with punctum c/w epidermal inclusion cyst      Subcutaneous movable cyst c/w pilar cyst      Firm pink to brown papule c/w dermatofibroma      Pedunculated fleshy papule(s) c/w skin tag(s)      Evenly pigmented macule c/w junctional nevus     Mildly variegated pigmented, slightly irregular-bordered macule c/w mildly atypical nevus      Flesh colored to evenly pigmented papule c/w intradermal nevus       Pink pearly papule/plaque c/w basal cell carcinoma      Erythematous hyperkeratotic cursted plaque c/w SCC      Surgical scar with no sign of skin cancer recurrence      Open and closed comedones      Inflammatory papules and pustules      Verrucoid papule consistent consistent with wart     Erythematous eczematous patches and plaques     Dystrophic onycholytic nail with subungual debris c/w onychomycosis     Umbilicated papule    Erythematous-base  heme-crusted tan verrucoid plaque consistent with inflamed seborrheic keratosis     Erythematous Silvery Scaling Plaque c/w Psoriasis     See annotation        Assessment / Plan:        Lentigo  This is a benign hyperpigmented sun induced lesion. Recommend daily sun protection/avoidance and use of at least SPF 30, broad spectrum sunscreen (OTC drug) will reduce the number of new lesions. Treatment of these benign lesions are considered cosmetic.    Seborrheic keratoses  These are benign inherited growths without a malignant potential. Reassurance given to patient. No treatment is necessary.   - [place] hydrogen peroxide that works to get rid  it       The patient location is: Home in [place]  The chief complaint leading to consultation is: spot  Visit type: audiovisual    Face to Face time with patient: 20  25 minutes of total time spent on the encounter, which includes face to face time and non-face to face time preparing to see the patient (eg, review of tests), Obtaining and/or reviewing separately obtained history, Documenting clinical information in the electronic or other health record, Independently interpreting results (not separately reported) and communicating results to the patient/family/caregiver, or Care coordination (not separately reported).         Each patient to whom he or she provides medical services by telemedicine is:  (1) informed of the relationship between the physician and patient and the respective role of any other health care provider with respect to management of the patient; and (2) notified that he or she may decline to receive medical services by telemedicine and may withdraw from such care at any time.  \       F/u in 3 mo for TBSE    No follow-ups on file.

## 2023-06-28 ENCOUNTER — OFFICE VISIT (OUTPATIENT)
Dept: BARIATRICS | Facility: CLINIC | Age: 73
End: 2023-06-28
Payer: MEDICARE

## 2023-06-28 ENCOUNTER — TELEPHONE (OUTPATIENT)
Dept: FAMILY MEDICINE | Facility: CLINIC | Age: 73
End: 2023-06-28
Payer: MEDICARE

## 2023-06-28 VITALS
WEIGHT: 260.88 LBS | SYSTOLIC BLOOD PRESSURE: 122 MMHG | HEART RATE: 71 BPM | DIASTOLIC BLOOD PRESSURE: 82 MMHG | BODY MASS INDEX: 44.78 KG/M2 | OXYGEN SATURATION: 98 %

## 2023-06-28 DIAGNOSIS — G47.33 OSA (OBSTRUCTIVE SLEEP APNEA): ICD-10-CM

## 2023-06-28 DIAGNOSIS — E66.01 CLASS 3 SEVERE OBESITY DUE TO EXCESS CALORIES WITH SERIOUS COMORBIDITY AND BODY MASS INDEX (BMI) OF 40.0 TO 44.9 IN ADULT: Primary | ICD-10-CM

## 2023-06-28 DIAGNOSIS — I10 PRIMARY HYPERTENSION: ICD-10-CM

## 2023-06-28 DIAGNOSIS — Z71.3 ENCOUNTER FOR WEIGHT LOSS COUNSELING: ICD-10-CM

## 2023-06-28 PROCEDURE — 99204 PR OFFICE/OUTPT VISIT, NEW, LEVL IV, 45-59 MIN: ICD-10-PCS | Mod: S$GLB,,, | Performed by: STUDENT IN AN ORGANIZED HEALTH CARE EDUCATION/TRAINING PROGRAM

## 2023-06-28 PROCEDURE — 3008F PR BODY MASS INDEX (BMI) DOCUMENTED: ICD-10-PCS | Mod: CPTII,S$GLB,, | Performed by: STUDENT IN AN ORGANIZED HEALTH CARE EDUCATION/TRAINING PROGRAM

## 2023-06-28 PROCEDURE — 1159F MED LIST DOCD IN RCRD: CPT | Mod: CPTII,S$GLB,, | Performed by: STUDENT IN AN ORGANIZED HEALTH CARE EDUCATION/TRAINING PROGRAM

## 2023-06-28 PROCEDURE — 1160F RVW MEDS BY RX/DR IN RCRD: CPT | Mod: CPTII,S$GLB,, | Performed by: STUDENT IN AN ORGANIZED HEALTH CARE EDUCATION/TRAINING PROGRAM

## 2023-06-28 PROCEDURE — 3074F SYST BP LT 130 MM HG: CPT | Mod: CPTII,S$GLB,, | Performed by: STUDENT IN AN ORGANIZED HEALTH CARE EDUCATION/TRAINING PROGRAM

## 2023-06-28 PROCEDURE — 1101F PR PT FALLS ASSESS DOC 0-1 FALLS W/OUT INJ PAST YR: ICD-10-PCS | Mod: CPTII,S$GLB,, | Performed by: STUDENT IN AN ORGANIZED HEALTH CARE EDUCATION/TRAINING PROGRAM

## 2023-06-28 PROCEDURE — 3044F HG A1C LEVEL LT 7.0%: CPT | Mod: CPTII,S$GLB,, | Performed by: STUDENT IN AN ORGANIZED HEALTH CARE EDUCATION/TRAINING PROGRAM

## 2023-06-28 PROCEDURE — 1125F PR PAIN SEVERITY QUANTIFIED, PAIN PRESENT: ICD-10-PCS | Mod: CPTII,S$GLB,, | Performed by: STUDENT IN AN ORGANIZED HEALTH CARE EDUCATION/TRAINING PROGRAM

## 2023-06-28 PROCEDURE — 1101F PT FALLS ASSESS-DOCD LE1/YR: CPT | Mod: CPTII,S$GLB,, | Performed by: STUDENT IN AN ORGANIZED HEALTH CARE EDUCATION/TRAINING PROGRAM

## 2023-06-28 PROCEDURE — 99999 PR PBB SHADOW E&M-EST. PATIENT-LVL V: ICD-10-PCS | Mod: PBBFAC,,, | Performed by: STUDENT IN AN ORGANIZED HEALTH CARE EDUCATION/TRAINING PROGRAM

## 2023-06-28 PROCEDURE — 4010F PR ACE/ARB THEARPY RXD/TAKEN: ICD-10-PCS | Mod: CPTII,S$GLB,, | Performed by: STUDENT IN AN ORGANIZED HEALTH CARE EDUCATION/TRAINING PROGRAM

## 2023-06-28 PROCEDURE — 4010F ACE/ARB THERAPY RXD/TAKEN: CPT | Mod: CPTII,S$GLB,, | Performed by: STUDENT IN AN ORGANIZED HEALTH CARE EDUCATION/TRAINING PROGRAM

## 2023-06-28 PROCEDURE — 3079F PR MOST RECENT DIASTOLIC BLOOD PRESSURE 80-89 MM HG: ICD-10-PCS | Mod: CPTII,S$GLB,, | Performed by: STUDENT IN AN ORGANIZED HEALTH CARE EDUCATION/TRAINING PROGRAM

## 2023-06-28 PROCEDURE — 3074F PR MOST RECENT SYSTOLIC BLOOD PRESSURE < 130 MM HG: ICD-10-PCS | Mod: CPTII,S$GLB,, | Performed by: STUDENT IN AN ORGANIZED HEALTH CARE EDUCATION/TRAINING PROGRAM

## 2023-06-28 PROCEDURE — 3079F DIAST BP 80-89 MM HG: CPT | Mod: CPTII,S$GLB,, | Performed by: STUDENT IN AN ORGANIZED HEALTH CARE EDUCATION/TRAINING PROGRAM

## 2023-06-28 PROCEDURE — 99204 OFFICE O/P NEW MOD 45 MIN: CPT | Mod: S$GLB,,, | Performed by: STUDENT IN AN ORGANIZED HEALTH CARE EDUCATION/TRAINING PROGRAM

## 2023-06-28 PROCEDURE — 1160F PR REVIEW ALL MEDS BY PRESCRIBER/CLIN PHARMACIST DOCUMENTED: ICD-10-PCS | Mod: CPTII,S$GLB,, | Performed by: STUDENT IN AN ORGANIZED HEALTH CARE EDUCATION/TRAINING PROGRAM

## 2023-06-28 PROCEDURE — 3044F PR MOST RECENT HEMOGLOBIN A1C LEVEL <7.0%: ICD-10-PCS | Mod: CPTII,S$GLB,, | Performed by: STUDENT IN AN ORGANIZED HEALTH CARE EDUCATION/TRAINING PROGRAM

## 2023-06-28 PROCEDURE — 3008F BODY MASS INDEX DOCD: CPT | Mod: CPTII,S$GLB,, | Performed by: STUDENT IN AN ORGANIZED HEALTH CARE EDUCATION/TRAINING PROGRAM

## 2023-06-28 PROCEDURE — 3288F PR FALLS RISK ASSESSMENT DOCUMENTED: ICD-10-PCS | Mod: CPTII,S$GLB,, | Performed by: STUDENT IN AN ORGANIZED HEALTH CARE EDUCATION/TRAINING PROGRAM

## 2023-06-28 PROCEDURE — 1159F PR MEDICATION LIST DOCUMENTED IN MEDICAL RECORD: ICD-10-PCS | Mod: CPTII,S$GLB,, | Performed by: STUDENT IN AN ORGANIZED HEALTH CARE EDUCATION/TRAINING PROGRAM

## 2023-06-28 PROCEDURE — 3288F FALL RISK ASSESSMENT DOCD: CPT | Mod: CPTII,S$GLB,, | Performed by: STUDENT IN AN ORGANIZED HEALTH CARE EDUCATION/TRAINING PROGRAM

## 2023-06-28 PROCEDURE — 1125F AMNT PAIN NOTED PAIN PRSNT: CPT | Mod: CPTII,S$GLB,, | Performed by: STUDENT IN AN ORGANIZED HEALTH CARE EDUCATION/TRAINING PROGRAM

## 2023-06-28 PROCEDURE — 99999 PR PBB SHADOW E&M-EST. PATIENT-LVL V: CPT | Mod: PBBFAC,,, | Performed by: STUDENT IN AN ORGANIZED HEALTH CARE EDUCATION/TRAINING PROGRAM

## 2023-06-28 RX ORDER — SEMAGLUTIDE 0.68 MG/ML
0.5 INJECTION, SOLUTION SUBCUTANEOUS
Qty: 3 ML | Refills: 2 | Status: SHIPPED | OUTPATIENT
Start: 2023-06-28 | End: 2023-09-28 | Stop reason: SDUPTHER

## 2023-06-28 NOTE — PROGRESS NOTES
Subjective     Patient ID: Amanda Holt is a 72 y.o. female.    Chief Complaint: Consult and Obesity    Patient presents for treatment of obesity.   Back surgery and pain has lead to decreased activity and weight gain    Co-morbidities  HTN  CKD  ANTONIA  GERD  Anxiety  Depression    Negative for thyroid cancer    Current Physical Activity  Limited by back pain    Current Eating Habits - tracking on Lose   Deli meat and cheese roll up  Eats meats and salad  Snacks on fruits - grapes, watermelon  Limits red meat    Medical Weight Loss  6/28/2023: 260.9 lbs, BMI 44.8, BFP 53%, .3 lbs, SMM 67.7 lbs, BMR 1571 kcal      Review of Systems   Constitutional:  Negative for chills and fever.   Respiratory:  Negative for shortness of breath.    Cardiovascular:  Negative for chest pain.   Gastrointestinal:  Negative for abdominal pain, nausea and vomiting.   Neurological:  Negative for dizziness and light-headedness.   Psychiatric/Behavioral:  The patient is not nervous/anxious.         Objective    Latest Reference Range & Units 03/07/23 09:17   WBC 3.90 - 12.70 K/uL 4.86   RBC 4.00 - 5.40 M/uL 4.23   Hemoglobin 12.0 - 16.0 g/dL 12.7   Hematocrit 37.0 - 48.5 % 40.6   MCV 82 - 98 fL 96   MCH 27.0 - 31.0 pg 30.0   MCHC 32.0 - 36.0 g/dL 31.3 (L)   RDW 11.5 - 14.5 % 11.9   Platelets 150 - 450 K/uL 264   MPV 9.2 - 12.9 fL 9.8   Gran % 38.0 - 73.0 % 54.7   Lymph % 18.0 - 48.0 % 32.1   Mono % 4.0 - 15.0 % 10.3   Eosinophil % 0.0 - 8.0 % 2.5   Basophil % 0.0 - 1.9 % 0.2   Immature Granulocytes 0.0 - 0.5 % 0.2   Gran # (ANC) 1.8 - 7.7 K/uL 2.7   Lymph # 1.0 - 4.8 K/uL 1.6   Mono # 0.3 - 1.0 K/uL 0.5   Eos # 0.0 - 0.5 K/uL 0.1   Baso # 0.00 - 0.20 K/uL 0.01   Immature Grans (Abs) 0.00 - 0.04 K/uL 0.01   nRBC 0 /100 WBC 0   Differential Method  Automated   Sodium 136 - 145 mmol/L 144   Potassium 3.5 - 5.1 mmol/L 4.5   Chloride 95 - 110 mmol/L 112 (H)   CO2 23 - 29 mmol/L 25   Anion Gap 8 - 16 mmol/L 7 (L)   BUN 8 - 23 mg/dL  18   Creatinine 0.5 - 1.4 mg/dL 1.3   eGFR >60 mL/min/1.73 m^2 44 !   Glucose 70 - 110 mg/dL 115 (H)   Calcium 8.7 - 10.5 mg/dL 9.8   Alkaline Phosphatase 55 - 135 U/L 93   PROTEIN TOTAL 6.0 - 8.4 g/dL 7.6   Albumin 3.5 - 5.2 g/dL 3.3 (L)   BILIRUBIN TOTAL 0.1 - 1.0 mg/dL 0.6   AST 10 - 40 U/L 15   ALT 10 - 44 U/L 14   Cholesterol 120 - 199 mg/dL 160   HDL 40 - 75 mg/dL 40   HDL/Cholesterol Ratio 20.0 - 50.0 % 25.0   LDL Cholesterol External 63.0 - 159.0 mg/dL 101.2   Non-HDL Cholesterol mg/dL 120   Total Cholesterol/HDL Ratio 2.0 - 5.0  4.0   Triglycerides 30 - 150 mg/dL 94   Hemoglobin A1C External 4.0 - 5.6 % 5.4   Estimated Avg Glucose 68 - 131 mg/dL 108   TSH 0.400 - 4.000 uIU/mL 1.632   (L): Data is abnormally low  (H): Data is abnormally high  !: Data is abnormal    Vitals:    06/28/23 1403   BP: 122/82   Pulse: 71       Physical Exam  Vitals reviewed.   Constitutional:       General: She is not in acute distress.     Appearance: Normal appearance. She is obese. She is not ill-appearing, toxic-appearing or diaphoretic.   HENT:      Head: Normocephalic and atraumatic.   Cardiovascular:      Rate and Rhythm: Normal rate.   Pulmonary:      Effort: Pulmonary effort is normal. No respiratory distress.   Skin:     General: Skin is warm and dry.   Neurological:      Mental Status: She is alert and oriented to person, place, and time.          Assessment and Plan   Amanda was seen today for consult and obesity.    Diagnoses and all orders for this visit:    Class 3 severe obesity due to excess calories with serious comorbidity and body mass index (BMI) of 40.0 to 44.9 in adult  -     Ambulatory referral/consult to Bariatric Medicine  -     semaglutide (OZEMPIC) 0.25 mg or 0.5 mg (2 mg/3 mL) pen injector; Inject 0.5 mg into the skin every 7 days. Start with Ozempic 0.25 mg once a week for 4 weeks, then increase to 0.5 mg once a week    Primary hypertension    ANTONIA (obstructive sleep apnea)    Encounter for weight loss  counseling      - Log all food and beverage intake with a daily calorie goal of 1200 calories per day    - Activity as tolerated

## 2023-06-28 NOTE — TELEPHONE ENCOUNTER
----- Message from Davis Marley sent at 6/28/2023  4:13 PM CDT -----  Regarding: Self [phone]  Type: Patient Call Back     What is the request in detail: Pt is requesting a call back from nurse to inform Dr bennett that she started a new medication and wanted to make her aware     Can the clinic reply by [facility]? No     Would the patient rather a call back or a response via [facility]? Call back    Best call back number: [phone]      Additional Information:    Thank you.

## 2023-06-28 NOTE — TELEPHONE ENCOUNTER
Rt pt call , explained Dr. Torres is able to review notes of ozempic prescription and may discuss at next ov .

## 2023-06-28 NOTE — PATIENT INSTRUCTIONS
Start Ozempic once a week. Start with 0.25 mg once a week x 4 weeks, then 0.5 mg weekly.     Decrease portions as soon as you start Ozempic. Avoid fried, greasy, fatty foods.     Some nausea in the first 2 weeks is not unusual.     If you get pain across the upper abdomen and around to your back, please call the office.               Copyright © 2011, [facility]. For more information about The Healthy Eating Plate, please see The Nutrition Source, Department of Nutrition, [employer], www.thenutritionsource.org, and [employer], www.health.[employer].edu.      Meal Planning & Grocery Shopping    Meal planning builds the foundation for healthy eating. When you have structured ideas for healthy meals and foods available at home to prepare those meals, weight control becomes easier.  If only healthy foods are available at home, then you will be much more likely to eat healthy foods. And you will be less likely to go to a restaurant or  a fast food meal, which tend to be unhealthy and higher in calories than meals prepared at home.      Take 5-10 minutes each week to plan meals for the next 7 days.  Make a grocery list based on the meal plan.    Grocery Shopping Tips:  Shop on a full stomach.  Schedule your shopping for times when you are most motivated and able to be disciplined about your purchases. For example, after a stressful day at work it may be difficult to make the healthiest choices. Shopping at other times, such as early in the morning or after dinner, may be easier.  Focus your shopping on the outside aisles of the store, which tend to contain more fresh foods and lower calorie foods. The inside aisles tend to have more processed foods.  Stick to your list. Avoid buying unhealthy items just because they are on sale.   Compare nutrition labels to check the number of calories and percentage of fat.      What to buy:    Vegetables  Fresh  vegetables  Frozen vegetables with no sauce or added salt  Canned vegetables with no sauce or added salt    Protein  Lean meats, such as chicken and turkey  Limit red meats, such as beef to no more than 1x/week  Limit processed meats, such as cold cuts, mancilla, sausage, and hot dogs. Look for brands that have no nitrites and are minimally processed. Consider turkey sausage or turkey mancilla.  Fish and Shellfish  Eggs  Dried beans  Canned beans (reduced sodium)    Fat  Use healthy oils, such as olive oil or canola oil, for cooking, salad dressings, etc.  Unflavored nuts and seeds  Nut butters (no added sugar)    Dairy  Yogurt (no sugar added)  Cheese  Low-fat milk  Unsweetened nondairy milks (almond milk, soy milk, etc)    Fruit  Fresh Fruit  Frozen fruit with no added sugar  Canned fruit with no added sugar  Dried fruit with no added sugar  100% fruit juice    Whole Grains  Single ingredient grains, such as oats, quinoa, brown rice  Whole-wheat pasta  Sprouted whole-grain bread    What to avoid:  - Avoid fried foods  - Avoid foods with added sugar  - Avoid sugar-sweetened beverages  - Avoid ultra-processed foods      Snacks: (100-200 calories; >5g protein)     - 1 low-fat cheese stick with 8 cherry tomatoes or 1 serving fresh fruit  - 4 thin slices fat-free turkey breast and 1 slice low-fat cheese  - 4 thin slices fat-free honey ham with wedge of melon  - 2 slices of turkey mancilla  - Boiled eggs (can buy at [employer] already boiled w/ shell removed)  - for convenience,  [place] read, snack, go (deli meat and cheese rolls)  - P3 packets (Protein packs w/ cheese, nuts, lean deli meat)  - [facility] Fit and Lean Protein Pudding (find at [employer] - per 1 cup serving = 100 calories, 15 g protein, 0 g sugar)  - 6-8 edamame pods (equivalent to about 1/4 cup edamame without pods).   - 1/4 cup unsalted nuts with ½ cup fruit  - 6-oz container Dannon Light n Fit vanilla yogurt, topped with 1oz unsalted nuts         - apple,  "celery or baby carrots spread with 2 Tbsp PB2  - apple slices with 1 oz slice low-fat cheese  - Apple slices dipped in 2 Tbsp of PB2  - 2 Tbsp PB2 mixed in light or greek yogurt or sugar-free pudding  - celery, cucumber, bell pepper or baby carrots dipped in ¼ cup hummus bean spread   - celery, cucumber, baby carrots dipped in high protein greek yogurt (Mix 16 oz plain greek yogurt + 1 packet of hidden valley ranch dip mix)  - Burt Links Beef Steak - 14g protein! (similar to beef jerky but very lean)  - 2 wedges Laughing Cow - Light Herb & Garlic Cheese with sliced cucumber or green bell pepper  - 1/2 cup low-fat cottage cheese with ¼ cup fruit or ¼ cup salsa  - 1/2 cup low fat cottage cheese with 10-15 cherry tomatoes  - 8 oz glass of FAIRLIFE fat free milk (13 g protein)  - 8 oz glass of FAIRLIFE fat free milk + 1 packet of sugar-free hot cocoa  - Add Atkins advantage Cafe Caramel shake to decaf coffee. Serve hot or blend with ice for "frappaccino" like drink  - RTD Protein drinks: Atkins, Low Carb Slim Fast, EAS light, Muscle Milk Light, etc.  - Homemade Protein drinks: GNC Soy95, Isopure, Nectar, UNJURY, Whey Gourmet, etc. Mix 1 scoop powder with 8oz skim/1% milk or light soymilk.  - Protein bars: Atkins, EAS, Pure Protein,  Quest, Think Thin, Detour, etc. Must have 0-4 grams sugar - Read the label.     ** Be CREATIVE. You can always snack on bites of grilled chicken or tuna salad made with low fat zee, if needed!         "

## 2023-06-29 DIAGNOSIS — J34.89 NASAL DRAINAGE: ICD-10-CM

## 2023-06-29 DIAGNOSIS — F41.9 ANXIETY: ICD-10-CM

## 2023-06-29 DIAGNOSIS — B37.2 CANDIDAL INTERTRIGO: ICD-10-CM

## 2023-06-29 RX ORDER — FLUTICASONE PROPIONATE 50 MCG
SPRAY, SUSPENSION (ML) NASAL
Qty: 48 G | Refills: 3 | Status: SHIPPED | OUTPATIENT
Start: 2023-06-29 | End: 2024-03-27 | Stop reason: SDUPTHER

## 2023-06-30 RX ORDER — HYDROXYZINE HYDROCHLORIDE 25 MG/1
TABLET, FILM COATED ORAL
Qty: 45 TABLET | Refills: 0 | Status: SHIPPED | OUTPATIENT
Start: 2023-06-30

## 2023-06-30 RX ORDER — DICLOFENAC SODIUM 10 MG/G
GEL TOPICAL
Qty: 100 G | Refills: 0 | Status: SHIPPED | OUTPATIENT
Start: 2023-06-30 | End: 2023-08-23

## 2023-06-30 RX ORDER — NYSTATIN 100000 [USP'U]/G
POWDER TOPICAL
Qty: 30 G | Refills: 0 | Status: SHIPPED | OUTPATIENT
Start: 2023-06-30 | End: 2024-02-29

## 2023-06-30 NOTE — TELEPHONE ENCOUNTER
No care due was identified.  [facility] Embedded Care Due Messages. Reference number: [id number].   6/29/2023 8:09:49 PM CDT

## 2023-06-30 NOTE — TELEPHONE ENCOUNTER
Refill Routing Note   Medication(s) are not appropriate for processing by [facility] for the following reason(s):      - Outside of protocol    [facility] action(s):  Route  Approve          Medication reconciliation completed: No     Appointments  past 12m or future 3m with PCP    Date Provider   Last Visit   4/17/2023 Simona Torres MD   Next Visit   7/17/2023 Simona Torres MD   ED visits in past 90 days: 0        Note composed:9:15 PM 06/29/2023

## 2023-07-26 ENCOUNTER — PATIENT MESSAGE (OUTPATIENT)
Dept: FAMILY MEDICINE | Facility: CLINIC | Age: 73
End: 2023-07-26
Payer: MEDICARE

## 2023-07-26 ENCOUNTER — PATIENT MESSAGE (OUTPATIENT)
Dept: CARDIOLOGY | Facility: CLINIC | Age: 73
End: 2023-07-26
Payer: MEDICARE

## 2023-07-26 DIAGNOSIS — G47.33 OSA (OBSTRUCTIVE SLEEP APNEA): Primary | ICD-10-CM

## 2023-08-01 DIAGNOSIS — I10 PRIMARY HYPERTENSION: ICD-10-CM

## 2023-08-02 RX ORDER — NIFEDIPINE 30 MG/1
30 TABLET, EXTENDED RELEASE ORAL
Qty: 90 TABLET | Refills: 2 | Status: SHIPPED | OUTPATIENT
Start: 2023-08-02

## 2023-08-02 NOTE — TELEPHONE ENCOUNTER
Refill Decision Note   Amanda Holt  is requesting a refill authorization.  Brief Assessment and Rationale for Refill:  Approve     Medication Therapy Plan:         Comments:     Note composed:11:16 AM 08/02/2023

## 2023-08-02 NOTE — TELEPHONE ENCOUNTER
CC:  Emery Valenzuela is here today for left heel pain .    Referring MD   PCP Eryn De La Vega MD  Medications: medications verified, no change  Refills needed today?  NO  denies known Latex allergy or symptoms of Latex sensitivity.  Patient would like communication of their results via:      Cell Phone:   Telephone Information:   Mobile [phone]     Okay to leave a message containing results? Yes  Tobacco history: verified                 No care due was identified.  [facility] Embedded Care Due Messages. Reference number: [id number].   8/02/2023 11:11:43 AM CDT

## 2023-08-11 ENCOUNTER — CLINICAL SUPPORT (OUTPATIENT)
Dept: ENDOSCOPY | Facility: HOSPITAL | Age: 73
End: 2023-08-11
Attending: FAMILY MEDICINE
Payer: MEDICARE

## 2023-08-11 ENCOUNTER — TELEPHONE (OUTPATIENT)
Dept: ENDOSCOPY | Facility: HOSPITAL | Age: 73
End: 2023-08-11

## 2023-08-11 VITALS — HEIGHT: 64 IN | WEIGHT: 261 LBS | BODY MASS INDEX: 44.56 KG/M2

## 2023-08-11 DIAGNOSIS — Z12.11 COLON CANCER SCREENING: ICD-10-CM

## 2023-08-11 DIAGNOSIS — Z86.010 HISTORY OF COLON POLYPS: Primary | ICD-10-CM

## 2023-08-11 RX ORDER — POLYETHYLENE GLYCOL 3350, SODIUM SULFATE ANHYDROUS, SODIUM BICARBONATE, SODIUM CHLORIDE, POTASSIUM CHLORIDE 236; 22.74; 6.74; 5.86; 2.97 G/4L; G/4L; G/4L; G/4L; G/4L
4 POWDER, FOR SOLUTION ORAL ONCE
Qty: 4000 ML | Refills: 0 | Status: SHIPPED | OUTPATIENT
Start: 2023-08-11 | End: 2023-08-11

## 2023-08-11 NOTE — TELEPHONE ENCOUNTER
Spoke to patient to schedule procedure(s) Colonoscopy      Physician to perform procedure(s) Dr. EDGARDO Goel   Date of Procedure (s) 11/7/23  Arrival Time 10:00 AM  Time of Procedure(s) 11:00 AM   Location of Procedure(s) [address]  Type of Rx Prep sent to patient: PEG  Instructions provided to patient via [facility]    Patient was informed on the following information and verbalized understanding. Screening questionnaire reviewed with patient and complete. If procedure requires anesthesia, a responsible adult needs to be present to accompany the patient home, patient cannot drive after receiving anesthesia. Appointment details are tentative, especially check-in time. Patient will receive a prep-op call 4 days prior to confirm check-in time for procedure. If applicable the patient should contact their pharmacy to verify Rx for procedure prep is ready for pick-up. Patient was advised to call the scheduling department at [phone] if pharmacy states no Rx is available. Patient was advised to call the endoscopy scheduling department if any questions or concerns arise.      [facility] Scheduling Department

## 2023-08-16 ENCOUNTER — PATIENT MESSAGE (OUTPATIENT)
Dept: FAMILY MEDICINE | Facility: CLINIC | Age: 73
End: 2023-08-16
Payer: MEDICARE

## 2023-08-18 DIAGNOSIS — Z01.30 BLOOD PRESSURE CHECK: ICD-10-CM

## 2023-08-18 NOTE — TELEPHONE ENCOUNTER
No care due was identified.  [facility] Embedded Care Due Messages. Reference number: [id number].   8/18/2023 3:22:18 PM CDT

## 2023-08-20 RX ORDER — SPIRONOLACTONE 50 MG/1
50 TABLET, FILM COATED ORAL
Qty: 90 TABLET | Refills: 1 | Status: SHIPPED | OUTPATIENT
Start: 2023-08-20 | End: 2024-03-27

## 2023-08-20 NOTE — TELEPHONE ENCOUNTER
Refill Decision Note   Amanda Holt  is requesting a refill authorization.  Brief Assessment and Rationale for Refill:  Approve     Medication Therapy Plan:         Comments:     Note composed:1:45 AM 08/20/2023

## 2023-08-21 ENCOUNTER — PATIENT MESSAGE (OUTPATIENT)
Dept: ENDOSCOPY | Facility: HOSPITAL | Age: 73
End: 2023-08-21
Payer: MEDICARE

## 2023-08-21 ENCOUNTER — TELEPHONE (OUTPATIENT)
Dept: ENDOSCOPY | Facility: HOSPITAL | Age: 73
End: 2023-08-21
Payer: MEDICARE

## 2023-08-22 DIAGNOSIS — M48.00 SPINAL STENOSIS, UNSPECIFIED SPINAL REGION: ICD-10-CM

## 2023-08-22 DIAGNOSIS — I10 PRIMARY HYPERTENSION: ICD-10-CM

## 2023-08-22 NOTE — TELEPHONE ENCOUNTER
No care due was identified.  [facility] Embedded Care Due Messages. Reference number: [id number].   8/22/2023 12:28:07 PM CDT

## 2023-08-23 ENCOUNTER — PATIENT MESSAGE (OUTPATIENT)
Dept: FAMILY MEDICINE | Facility: CLINIC | Age: 73
End: 2023-08-23
Payer: MEDICARE

## 2023-08-23 RX ORDER — HYDRALAZINE HYDROCHLORIDE 100 MG/1
100 TABLET, FILM COATED ORAL EVERY 12 HOURS
Qty: 180 TABLET | Refills: 3 | Status: SHIPPED | OUTPATIENT
Start: 2023-08-23 | End: 2024-08-22

## 2023-08-23 RX ORDER — TIZANIDINE 4 MG/1
TABLET ORAL
Qty: 90 TABLET | Refills: 0 | Status: SHIPPED | OUTPATIENT
Start: 2023-08-23 | End: 2023-10-23

## 2023-08-23 RX ORDER — DICLOFENAC SODIUM 10 MG/G
GEL TOPICAL
Qty: 100 G | Refills: 0 | Status: SHIPPED | OUTPATIENT
Start: 2023-08-23 | End: 2023-10-27

## 2023-08-23 RX ORDER — TRAMADOL HYDROCHLORIDE 50 MG/1
TABLET ORAL
Qty: 120 TABLET | Refills: 0 | Status: SHIPPED | OUTPATIENT
Start: 2023-08-23 | End: 2023-09-21 | Stop reason: SDUPTHER

## 2023-08-23 NOTE — TELEPHONE ENCOUNTER
----- Message from Davis Marley sent at 8/23/2023  3:07 PM CDT -----  Regarding: Self [phone]   Type: RX Refill Request    Who Called: Self     Have you contacted your pharmacy: yes     Refill    RX Name and Strength: traMADoL (ULTRAM) 50 mg tablet, diclofenac sodium (VOLTAREN) 1 % Gel, hydrALAZINE (APRESOLINE) 100 MG tablet, tiZANidine (ZANAFLEX) 4 MG tablet    Preferred Pharmacy with phone number: .    [facility] [id number] [place], [place] - [address]  [address] [facility] [address]  Phone: [phone] Fax: [phone]        Local or Mail Order: local     Would the patient rather a call back or a response via [facility]? Call back     Best Call Back Number: [phone]      Additional Information: Pt would like a call back asap due to you not having transportation     Thank you.

## 2023-08-23 NOTE — TELEPHONE ENCOUNTER
LOV 4/17/2023    Pt granddaughter at pharmacy completing pt grocery . Was trying to get rx today while there .

## 2023-08-29 ENCOUNTER — PATIENT MESSAGE (OUTPATIENT)
Dept: ADMINISTRATIVE | Facility: OTHER | Age: 73
End: 2023-08-29
Payer: MEDICARE

## 2023-09-20 ENCOUNTER — OFFICE VISIT (OUTPATIENT)
Dept: DERMATOLOGY | Facility: CLINIC | Age: 73
End: 2023-09-20
Payer: MEDICARE

## 2023-09-20 DIAGNOSIS — D18.01 CHERRY ANGIOMA: ICD-10-CM

## 2023-09-20 DIAGNOSIS — D22.9 MULTIPLE BENIGN NEVI: ICD-10-CM

## 2023-09-20 DIAGNOSIS — L91.8 SKIN TAG: ICD-10-CM

## 2023-09-20 DIAGNOSIS — Z12.83 SCREENING EXAM FOR SKIN CANCER: ICD-10-CM

## 2023-09-20 DIAGNOSIS — L82.1 SEBORRHEIC KERATOSES: ICD-10-CM

## 2023-09-20 DIAGNOSIS — D48.5 NEOPLASM OF UNCERTAIN BEHAVIOR OF SKIN: Primary | ICD-10-CM

## 2023-09-20 DIAGNOSIS — L81.4 LENTIGO: ICD-10-CM

## 2023-09-20 DIAGNOSIS — L29.9 ITCHING: ICD-10-CM

## 2023-09-20 PROCEDURE — 3288F FALL RISK ASSESSMENT DOCD: CPT | Mod: CPTII,S$GLB,, | Performed by: DERMATOLOGY

## 2023-09-20 PROCEDURE — 1101F PT FALLS ASSESS-DOCD LE1/YR: CPT | Mod: CPTII,S$GLB,, | Performed by: DERMATOLOGY

## 2023-09-20 PROCEDURE — 99999 PR PBB SHADOW E&M-EST. PATIENT-LVL III: CPT | Mod: PBBFAC,,, | Performed by: DERMATOLOGY

## 2023-09-20 PROCEDURE — 1126F PR PAIN SEVERITY QUANTIFIED, NO PAIN PRESENT: ICD-10-PCS | Mod: CPTII,S$GLB,, | Performed by: DERMATOLOGY

## 2023-09-20 PROCEDURE — 4010F ACE/ARB THERAPY RXD/TAKEN: CPT | Mod: CPTII,S$GLB,, | Performed by: DERMATOLOGY

## 2023-09-20 PROCEDURE — 3288F PR FALLS RISK ASSESSMENT DOCUMENTED: ICD-10-PCS | Mod: CPTII,S$GLB,, | Performed by: DERMATOLOGY

## 2023-09-20 PROCEDURE — 1159F PR MEDICATION LIST DOCUMENTED IN MEDICAL RECORD: ICD-10-PCS | Mod: CPTII,S$GLB,, | Performed by: DERMATOLOGY

## 2023-09-20 PROCEDURE — 99999 PR PBB SHADOW E&M-EST. PATIENT-LVL III: ICD-10-PCS | Mod: PBBFAC,,, | Performed by: DERMATOLOGY

## 2023-09-20 PROCEDURE — 4010F PR ACE/ARB THEARPY RXD/TAKEN: ICD-10-PCS | Mod: CPTII,S$GLB,, | Performed by: DERMATOLOGY

## 2023-09-20 PROCEDURE — 11102 PR TANGENTIAL BIOPSY, SKIN, SINGLE LESION: ICD-10-PCS | Mod: S$GLB,,, | Performed by: DERMATOLOGY

## 2023-09-20 PROCEDURE — 99214 OFFICE O/P EST MOD 30 MIN: CPT | Mod: 25,S$GLB,, | Performed by: DERMATOLOGY

## 2023-09-20 PROCEDURE — 88305 TISSUE EXAM BY PATHOLOGIST: ICD-10-PCS | Mod: 26,,, | Performed by: PATHOLOGY

## 2023-09-20 PROCEDURE — 1160F PR REVIEW ALL MEDS BY PRESCRIBER/CLIN PHARMACIST DOCUMENTED: ICD-10-PCS | Mod: CPTII,S$GLB,, | Performed by: DERMATOLOGY

## 2023-09-20 PROCEDURE — 88305 TISSUE EXAM BY PATHOLOGIST: CPT | Performed by: PATHOLOGY

## 2023-09-20 PROCEDURE — 99214 PR OFFICE/OUTPT VISIT, EST, LEVL IV, 30-39 MIN: ICD-10-PCS | Mod: 25,S$GLB,, | Performed by: DERMATOLOGY

## 2023-09-20 PROCEDURE — 3044F PR MOST RECENT HEMOGLOBIN A1C LEVEL <7.0%: ICD-10-PCS | Mod: CPTII,S$GLB,, | Performed by: DERMATOLOGY

## 2023-09-20 PROCEDURE — 3044F HG A1C LEVEL LT 7.0%: CPT | Mod: CPTII,S$GLB,, | Performed by: DERMATOLOGY

## 2023-09-20 PROCEDURE — 88305 TISSUE EXAM BY PATHOLOGIST: CPT | Mod: 26,,, | Performed by: PATHOLOGY

## 2023-09-20 PROCEDURE — 1159F MED LIST DOCD IN RCRD: CPT | Mod: CPTII,S$GLB,, | Performed by: DERMATOLOGY

## 2023-09-20 PROCEDURE — 1101F PR PT FALLS ASSESS DOC 0-1 FALLS W/OUT INJ PAST YR: ICD-10-PCS | Mod: CPTII,S$GLB,, | Performed by: DERMATOLOGY

## 2023-09-20 PROCEDURE — 1126F AMNT PAIN NOTED NONE PRSNT: CPT | Mod: CPTII,S$GLB,, | Performed by: DERMATOLOGY

## 2023-09-20 PROCEDURE — 1160F RVW MEDS BY RX/DR IN RCRD: CPT | Mod: CPTII,S$GLB,, | Performed by: DERMATOLOGY

## 2023-09-20 PROCEDURE — 11102 TANGNTL BX SKIN SINGLE LES: CPT | Mod: S$GLB,,, | Performed by: DERMATOLOGY

## 2023-09-20 RX ORDER — TRIAMCINOLONE ACETONIDE 1 MG/G
OINTMENT TOPICAL
Qty: 454 G | Refills: 1 | Status: SHIPPED | OUTPATIENT
Start: 2023-09-20

## 2023-09-20 RX ORDER — GABAPENTIN 300 MG/1
CAPSULE ORAL
Qty: 90 CAPSULE | Refills: 11 | Status: SHIPPED | OUTPATIENT
Start: 2023-09-20 | End: 2024-03-14

## 2023-09-20 NOTE — PROGRESS NOTES
Subjective:      Patient ID:  Amanda Holt is a 72 y.o. female who presents for   Chief Complaint   Patient presents with    Skin Check     TBSE      History of Present Illness: The patient presents for follow up of skin check.    The patient was last seen on: 06/05/2023 with  virtual visit skin lesions.     Other skin complaints: lesions to shoulders and itching      Review of Systems   Constitutional:  Negative for fever, chills, fatigue and malaise.   Skin:  Positive for itching. Negative for daily sunscreen use and activity-related sunscreen use.   Hematologic/Lymphatic: Bruises/bleeds easily.       Objective:   Physical Exam   Constitutional: She appears well-developed and well-nourished. No distress.   Neurological: She is alert and oriented to person, place, and time. She is not disoriented.   Psychiatric: She has a normal mood and affect.   Skin:   Areas Examined (abnormalities noted in diagram):   Scalp / Hair Palpated and Inspected  Head / Face Inspection Performed  Neck Inspection Performed  Chest / Axilla Inspection Performed  Abdomen Inspection Performed  Genitals / Buttocks / Groin Inspection Performed  Back Inspection Performed  RUE Inspected  LUE Inspection Performed  RLE Inspected  LLE Inspection Performed  Nails and Digits Inspection Performed                 Diagram Legend     Erythematous scaling macule/papule c/w actinic keratosis       Vascular papule c/w angioma      Pigmented verrucoid papule/plaque c/w seborrheic keratosis      Yellow umbilicated papule c/w sebaceous hyperplasia      Irregularly shaped tan macule c/w lentigo     1-2 mm smooth white papules consistent with Milia      Movable subcutaneous cyst with punctum c/w epidermal inclusion cyst      Subcutaneous movable cyst c/w pilar cyst      Firm pink to brown papule c/w dermatofibroma      Pedunculated fleshy papule(s) c/w skin tag(s)      Evenly pigmented macule c/w junctional nevus     Mildly variegated  pigmented, slightly irregular-bordered macule c/w mildly atypical nevus      Flesh colored to evenly pigmented papule c/w intradermal nevus       Pink pearly papule/plaque c/w basal cell carcinoma      Erythematous hyperkeratotic cursted plaque c/w SCC      Surgical scar with no sign of skin cancer recurrence      Open and closed comedones      Inflammatory papules and pustules      Verrucoid papule consistent consistent with wart     Erythematous eczematous patches and plaques     Dystrophic onycholytic nail with subungual debris c/w onychomycosis     Umbilicated papule    Erythematous-base heme-crusted tan verrucoid plaque consistent with inflamed seborrheic keratosis     Erythematous Silvery Scaling Plaque c/w Psoriasis     See annotation      Assessment / Plan:      Pathology Orders:       Normal Orders This Visit    Specimen to Pathology, Dermatology     Questions:    Procedure Type: Dermatology and skin neoplasms    Number of Specimens: 1    ------------------------: -------------------------    Spec 1 Procedure: Biopsy    Spec 1 Clinical Impression: r/o isk    Spec 1 Source: right temple    Release to patient: Immediate          Neoplasm of uncertain behavior of skin  -     Specimen to Pathology, Dermatology  Shave biopsy procedure note:    Shave biopsy performed after verbal consent including risk of infection, scar, recurrence, need for additional treatment of site. Area prepped with alcohol, anesthetized with approximately 1.0cc of 1% lidocaine with epinephrine. Lesional tissue shaved with razor blade. Hemostasis achieved with application of aluminum chloride followed by hyfrecation. No complications. Dressing applied. Wound care explained.    Itching  -     triamcinolone acetonide 0.1% (KENALOG) 0.1 % ointment; AAA bid; not more than 2 weeks straight in same location, avoid use on face and groin  Dispense: 454 g; Refill: 1  Discussed good skin care regimen including using a mild soap and moisturizing  cream 1 - 2 times per day. Limit to one bath/shower per day and use lukewarm (not hot) water.     Discussed with patient the importance of not manipulating skin lesions. Trauma often exacerbates condition. Trimming nails is recommended to avoid puncturing skin.     -Find outlets that make you happy- reading a book, walking, cross words, anything to keep her hands busy  -for lesions on the body.  Recommends bathing in the eczema creamy wash therapy or Dove, followed by using itch prescription compound or triamcinolone ointment. thkeep the AC down on low, apply ice or CeraVe anti itch cream when needed.  In the a.m. reapply  the steroid or itch compound cream    -- Start Compound prescription of Amitriptyline 2% Ketamine 1% cream disp 454g refill 2 , AAA TID x 1 week, then as needed, sent to [place] pharmacy      - Start N-acetylcystine OTC   Instructions for takin mg daily for one week  then 600 mg twice daily for one week  then 1200 mg every morning and 600 mg at night for one week  then 1200 mg twice daily    - Reviewed Labs with patient CBC, CMP, TSH, there are no underlying liver, kidney or thyroid problems.     Intertrigo  -improved under breasts continue shake lotion as needed  Flares:  Recommend white vinegar: water 1:1 compresses 2x/day followed by cool blow dry and then application of prescription medication.     Maintenance:  Cool blow dry after showering 1x/day then apply Zeasorb AF powder.      Seborrheic keratoses  These are benign inherited growths without a malignant potential. Reassurance given to patient. No treatment is necessary.     Lentigo  This is a benign hyperpigmented sun induced lesion. Recommend daily sun protection/avoidance and use of at least SPF 30, broad spectrum sunscreen (OTC drug) will reduce the number of new lesions. Treatment of these benign lesions are considered cosmetic.    Screening exam for skin cancer  Patient instructed in importance in daily sun protection of at least  spf 30. Sun avoidance and topical protection discussed.     Recommend  for daily use on face and neck.    Patient encouraged to wear hat for all outdoor exposure.     Also discussed sun protective clothing. Integrity TrackingC or Pond5 are good brands, UPF 50 or above. Recommend long sleeves when out in the sun.     Skin tag  Reassurance given to patient. No treatment is necessary.   Treatment of benign, asymptomatic lesions may be considered cosmetic.    Cherry angioma  This is a benign vascular lesion. Reassurance given. No treatment required.     Multiple benign nevi  TBSE body skin examination performed today including at least 12 points as noted in physical examination. No lesions suspicious for malignancy noted.  Reassurance provided.  Instructed patient to observe lesion(s) for changes and follow up in clinic if changes are noted. Discussed ABCDE's of moles and brochure provided.    F/u 3-4 mo for itch          No follow-ups on file.

## 2023-09-20 NOTE — PATIENT INSTRUCTIONS
Itching  -     triamcinolone acetonide 0.1% (KENALOG) 0.1 % ointment; AAA bid; not more than 2 weeks straight in same location, avoid use on face and groin  Dispense: 454 g; Refill: 1  Discussed good skin care regimen including using a mild soap and moisturizing cream 1 - 2 times per day. Limit to one bath/shower per day and use lukewarm (not hot) water.     Discussed with patient the importance of not manipulating skin lesions. Trauma often exacerbates condition. Trimming nails is recommended to avoid puncturing skin.     -Find outlets that make you happy- reading a book, walking, cross words, anything to keep her hands busy  -for lesions on the body.  Recommends bathing in the eczema creamy wash therapy or Dove, followed by using itch prescription compound or triamcinolone ointment. thkeep the AC down on low, apply ice or CeraVe anti itch cream when needed.  In the a.m. reapply  the steroid or itch compound cream    -- Start Compound prescription of Amitriptyline 2% Ketamine 1% cream disp 454g refill 2 , AAA TID x 1 week, then as needed, sent to [place] pharmacy      - Start N-acetylcystine OTC   Instructions for takin mg daily for one week  then 600 mg twice daily for one week  then 1200 mg every morning and 600 mg at night for one week  then 1200 mg twice daily    - Reviewed Labs with patient CBC, CMP, TSH, there are no underlying liver, kidney or thyroid problems.     Intertrigo  -improved under breasts continue shake lotion as needed  Flares:  Recommend white vinegar: water 1:1 compresses 2x/day followed by cool blow dry and then application of prescription medication.     Maintenance:  Cool blow dry after showering 1x/day then apply Zeasorb AF powder.   W Plasty Text: The lesion was extirpated to the level of the fat with a #15 scalpel blade.  Given the location of the defect, shape of the defect and the proximity to free margins a W-plasty was deemed most appropriate for repair.  Using a sterile surgical marker, the appropriate transposition arms of the W-plasty were drawn incorporating the defect and placing the expected incisions within the relaxed skin tension lines where possible.    The area thus outlined was incised deep to adipose tissue with a #15 scalpel blade.  The skin margins were undermined to an appropriate distance in all directions utilizing iris scissors.  The opposing transposition arms were then transposed into place in opposite direction and anchored with interrupted buried subcutaneous sutures.

## 2023-09-21 ENCOUNTER — PATIENT MESSAGE (OUTPATIENT)
Dept: FAMILY MEDICINE | Facility: CLINIC | Age: 73
End: 2023-09-21
Payer: MEDICARE

## 2023-09-21 DIAGNOSIS — M48.00 SPINAL STENOSIS, UNSPECIFIED SPINAL REGION: ICD-10-CM

## 2023-09-21 NOTE — TELEPHONE ENCOUNTER
No care due was identified.  [facility] Embedded Care Due Messages. Reference number: [id number].   9/21/2023 3:05:25 PM CDT

## 2023-09-22 RX ORDER — TRAMADOL HYDROCHLORIDE 50 MG/1
50 TABLET ORAL EVERY 6 HOURS PRN
Qty: 120 TABLET | Refills: 0 | Status: SHIPPED | OUTPATIENT
Start: 2023-09-22 | End: 2023-10-23

## 2023-09-27 LAB
FINAL PATHOLOGIC DIAGNOSIS: NORMAL
Lab: NORMAL

## 2023-09-28 ENCOUNTER — OFFICE VISIT (OUTPATIENT)
Dept: BARIATRICS | Facility: CLINIC | Age: 73
End: 2023-09-28
Payer: MEDICARE

## 2023-09-28 VITALS
SYSTOLIC BLOOD PRESSURE: 129 MMHG | HEART RATE: 77 BPM | OXYGEN SATURATION: 98 % | WEIGHT: 249.81 LBS | BODY MASS INDEX: 42.88 KG/M2 | DIASTOLIC BLOOD PRESSURE: 60 MMHG

## 2023-09-28 DIAGNOSIS — Z71.3 ENCOUNTER FOR WEIGHT LOSS COUNSELING: ICD-10-CM

## 2023-09-28 DIAGNOSIS — I10 PRIMARY HYPERTENSION: ICD-10-CM

## 2023-09-28 DIAGNOSIS — E66.01 CLASS 3 SEVERE OBESITY DUE TO EXCESS CALORIES WITH SERIOUS COMORBIDITY AND BODY MASS INDEX (BMI) OF 40.0 TO 44.9 IN ADULT: Primary | ICD-10-CM

## 2023-09-28 DIAGNOSIS — G47.33 OSA (OBSTRUCTIVE SLEEP APNEA): ICD-10-CM

## 2023-09-28 PROCEDURE — 1159F MED LIST DOCD IN RCRD: CPT | Mod: CPTII,S$GLB,, | Performed by: STUDENT IN AN ORGANIZED HEALTH CARE EDUCATION/TRAINING PROGRAM

## 2023-09-28 PROCEDURE — 3288F FALL RISK ASSESSMENT DOCD: CPT | Mod: CPTII,S$GLB,, | Performed by: STUDENT IN AN ORGANIZED HEALTH CARE EDUCATION/TRAINING PROGRAM

## 2023-09-28 PROCEDURE — 1159F PR MEDICATION LIST DOCUMENTED IN MEDICAL RECORD: ICD-10-PCS | Mod: CPTII,S$GLB,, | Performed by: STUDENT IN AN ORGANIZED HEALTH CARE EDUCATION/TRAINING PROGRAM

## 2023-09-28 PROCEDURE — 3044F PR MOST RECENT HEMOGLOBIN A1C LEVEL <7.0%: ICD-10-PCS | Mod: CPTII,S$GLB,, | Performed by: STUDENT IN AN ORGANIZED HEALTH CARE EDUCATION/TRAINING PROGRAM

## 2023-09-28 PROCEDURE — 3288F PR FALLS RISK ASSESSMENT DOCUMENTED: ICD-10-PCS | Mod: CPTII,S$GLB,, | Performed by: STUDENT IN AN ORGANIZED HEALTH CARE EDUCATION/TRAINING PROGRAM

## 2023-09-28 PROCEDURE — 1101F PT FALLS ASSESS-DOCD LE1/YR: CPT | Mod: CPTII,S$GLB,, | Performed by: STUDENT IN AN ORGANIZED HEALTH CARE EDUCATION/TRAINING PROGRAM

## 2023-09-28 PROCEDURE — 99213 OFFICE O/P EST LOW 20 MIN: CPT | Mod: S$GLB,,, | Performed by: STUDENT IN AN ORGANIZED HEALTH CARE EDUCATION/TRAINING PROGRAM

## 2023-09-28 PROCEDURE — 99999 PR PBB SHADOW E&M-EST. PATIENT-LVL IV: CPT | Mod: PBBFAC,,, | Performed by: STUDENT IN AN ORGANIZED HEALTH CARE EDUCATION/TRAINING PROGRAM

## 2023-09-28 PROCEDURE — 1101F PR PT FALLS ASSESS DOC 0-1 FALLS W/OUT INJ PAST YR: ICD-10-PCS | Mod: CPTII,S$GLB,, | Performed by: STUDENT IN AN ORGANIZED HEALTH CARE EDUCATION/TRAINING PROGRAM

## 2023-09-28 PROCEDURE — 4010F PR ACE/ARB THEARPY RXD/TAKEN: ICD-10-PCS | Mod: CPTII,S$GLB,, | Performed by: STUDENT IN AN ORGANIZED HEALTH CARE EDUCATION/TRAINING PROGRAM

## 2023-09-28 PROCEDURE — 3078F PR MOST RECENT DIASTOLIC BLOOD PRESSURE < 80 MM HG: ICD-10-PCS | Mod: CPTII,S$GLB,, | Performed by: STUDENT IN AN ORGANIZED HEALTH CARE EDUCATION/TRAINING PROGRAM

## 2023-09-28 PROCEDURE — 3074F SYST BP LT 130 MM HG: CPT | Mod: CPTII,S$GLB,, | Performed by: STUDENT IN AN ORGANIZED HEALTH CARE EDUCATION/TRAINING PROGRAM

## 2023-09-28 PROCEDURE — 3044F HG A1C LEVEL LT 7.0%: CPT | Mod: CPTII,S$GLB,, | Performed by: STUDENT IN AN ORGANIZED HEALTH CARE EDUCATION/TRAINING PROGRAM

## 2023-09-28 PROCEDURE — 1126F AMNT PAIN NOTED NONE PRSNT: CPT | Mod: CPTII,S$GLB,, | Performed by: STUDENT IN AN ORGANIZED HEALTH CARE EDUCATION/TRAINING PROGRAM

## 2023-09-28 PROCEDURE — 99213 PR OFFICE/OUTPT VISIT, EST, LEVL III, 20-29 MIN: ICD-10-PCS | Mod: S$GLB,,, | Performed by: STUDENT IN AN ORGANIZED HEALTH CARE EDUCATION/TRAINING PROGRAM

## 2023-09-28 PROCEDURE — 1126F PR PAIN SEVERITY QUANTIFIED, NO PAIN PRESENT: ICD-10-PCS | Mod: CPTII,S$GLB,, | Performed by: STUDENT IN AN ORGANIZED HEALTH CARE EDUCATION/TRAINING PROGRAM

## 2023-09-28 PROCEDURE — 4010F ACE/ARB THERAPY RXD/TAKEN: CPT | Mod: CPTII,S$GLB,, | Performed by: STUDENT IN AN ORGANIZED HEALTH CARE EDUCATION/TRAINING PROGRAM

## 2023-09-28 PROCEDURE — 3078F DIAST BP <80 MM HG: CPT | Mod: CPTII,S$GLB,, | Performed by: STUDENT IN AN ORGANIZED HEALTH CARE EDUCATION/TRAINING PROGRAM

## 2023-09-28 PROCEDURE — 3008F BODY MASS INDEX DOCD: CPT | Mod: CPTII,S$GLB,, | Performed by: STUDENT IN AN ORGANIZED HEALTH CARE EDUCATION/TRAINING PROGRAM

## 2023-09-28 PROCEDURE — 99999 PR PBB SHADOW E&M-EST. PATIENT-LVL IV: ICD-10-PCS | Mod: PBBFAC,,, | Performed by: STUDENT IN AN ORGANIZED HEALTH CARE EDUCATION/TRAINING PROGRAM

## 2023-09-28 PROCEDURE — 3008F PR BODY MASS INDEX (BMI) DOCUMENTED: ICD-10-PCS | Mod: CPTII,S$GLB,, | Performed by: STUDENT IN AN ORGANIZED HEALTH CARE EDUCATION/TRAINING PROGRAM

## 2023-09-28 PROCEDURE — 1160F RVW MEDS BY RX/DR IN RCRD: CPT | Mod: CPTII,S$GLB,, | Performed by: STUDENT IN AN ORGANIZED HEALTH CARE EDUCATION/TRAINING PROGRAM

## 2023-09-28 PROCEDURE — 1160F PR REVIEW ALL MEDS BY PRESCRIBER/CLIN PHARMACIST DOCUMENTED: ICD-10-PCS | Mod: CPTII,S$GLB,, | Performed by: STUDENT IN AN ORGANIZED HEALTH CARE EDUCATION/TRAINING PROGRAM

## 2023-09-28 PROCEDURE — 3074F PR MOST RECENT SYSTOLIC BLOOD PRESSURE < 130 MM HG: ICD-10-PCS | Mod: CPTII,S$GLB,, | Performed by: STUDENT IN AN ORGANIZED HEALTH CARE EDUCATION/TRAINING PROGRAM

## 2023-09-28 RX ORDER — SEMAGLUTIDE 0.68 MG/ML
0.5 INJECTION, SOLUTION SUBCUTANEOUS
Qty: 3 ML | Refills: 2 | Status: SHIPPED | OUTPATIENT
Start: 2023-09-28 | End: 2023-11-21

## 2023-09-28 NOTE — PROGRESS NOTES
Subjective     Patient ID: Amanda Holt is a 72 y.o. female.    Chief Complaint: Obesity, Weight Check, and Follow-up    Patient presents for treatment of obesity.   Back surgery and pain has lead to decreased activity and weight gain    Co-morbidities  HTN  CKD  ANTONIA  GERD  Anxiety  Depression    Negative for thyroid cancer    Current Physical Activity  Limited by back pain, sometimes walks around store where she can lean on basket  Has stairs in house  House work    Current Eating Habits - tracking on Lose It  Deli meat and cheese roll up  Eats meats and salad  Snacks on fruits - grapes, watermelon  Limits red meat  Chicken, shrimp, fish  Canned vegetables, lettuce  Rice occasionally  Chips occasionally    Medical Weight Loss  6/28/2023: 260.9 lbs, BMI 44.8, BFP 53%, .3 lbs, SMM 67.7 lbs, BMR 1571 kcal  9/28/2023: 249.8 lbs, BMI 42.9, BFP 52.6%, .4 lbs, SMM 65.3 lbs, BMR 1529 kcal      Review of Systems   Constitutional:  Negative for chills and fever.   Respiratory:  Negative for shortness of breath.    Cardiovascular:  Negative for chest pain.   Gastrointestinal:  Negative for abdominal pain, nausea and vomiting.   Neurological:  Negative for dizziness and light-headedness.   Psychiatric/Behavioral:  The patient is not nervous/anxious.           Objective    Latest Reference Range & Units 03/07/23 09:17   WBC 3.90 - 12.70 K/uL 4.86   RBC 4.00 - 5.40 M/uL 4.23   Hemoglobin 12.0 - 16.0 g/dL 12.7   Hematocrit 37.0 - 48.5 % 40.6   MCV 82 - 98 fL 96   MCH 27.0 - 31.0 pg 30.0   MCHC 32.0 - 36.0 g/dL 31.3 (L)   RDW 11.5 - 14.5 % 11.9   Platelets 150 - 450 K/uL 264   MPV 9.2 - 12.9 fL 9.8   Gran % 38.0 - 73.0 % 54.7   Lymph % 18.0 - 48.0 % 32.1   Mono % 4.0 - 15.0 % 10.3   Eosinophil % 0.0 - 8.0 % 2.5   Basophil % 0.0 - 1.9 % 0.2   Immature Granulocytes 0.0 - 0.5 % 0.2   Gran # (ANC) 1.8 - 7.7 K/uL 2.7   Lymph # 1.0 - 4.8 K/uL 1.6   Mono # 0.3 - 1.0 K/uL 0.5   Eos # 0.0 - 0.5 K/uL 0.1   Baso #  0.00 - 0.20 K/uL 0.01   Immature Grans (Abs) 0.00 - 0.04 K/uL 0.01   nRBC 0 /100 WBC 0   Differential Method  Automated   Sodium 136 - 145 mmol/L 144   Potassium 3.5 - 5.1 mmol/L 4.5   Chloride 95 - 110 mmol/L 112 (H)   CO2 23 - 29 mmol/L 25   Anion Gap 8 - 16 mmol/L 7 (L)   BUN 8 - 23 mg/dL 18   Creatinine 0.5 - 1.4 mg/dL 1.3   eGFR >60 mL/min/1.73 m^2 44 !   Glucose 70 - 110 mg/dL 115 (H)   Calcium 8.7 - 10.5 mg/dL 9.8   Alkaline Phosphatase 55 - 135 U/L 93   PROTEIN TOTAL 6.0 - 8.4 g/dL 7.6   Albumin 3.5 - 5.2 g/dL 3.3 (L)   BILIRUBIN TOTAL 0.1 - 1.0 mg/dL 0.6   AST 10 - 40 U/L 15   ALT 10 - 44 U/L 14   Cholesterol 120 - 199 mg/dL 160   HDL 40 - 75 mg/dL 40   HDL/Cholesterol Ratio 20.0 - 50.0 % 25.0   LDL Cholesterol External 63.0 - 159.0 mg/dL 101.2   Non-HDL Cholesterol mg/dL 120   Total Cholesterol/HDL Ratio 2.0 - 5.0  4.0   Triglycerides 30 - 150 mg/dL 94   Hemoglobin A1C External 4.0 - 5.6 % 5.4   Estimated Avg Glucose 68 - 131 mg/dL 108   TSH 0.400 - 4.000 uIU/mL 1.632   (L): Data is abnormally low  (H): Data is abnormally high  !: Data is abnormal    Vitals:    09/28/23 1111   BP: 129/60   Pulse: 77         Physical Exam  Vitals reviewed.   Constitutional:       General: She is not in acute distress.     Appearance: Normal appearance. She is obese. She is not ill-appearing, toxic-appearing or diaphoretic.   HENT:      Head: Normocephalic and atraumatic.   Cardiovascular:      Rate and Rhythm: Normal rate.   Pulmonary:      Effort: Pulmonary effort is normal. No respiratory distress.   Skin:     General: Skin is warm and dry.   Neurological:      Mental Status: She is alert and oriented to person, place, and time.            Assessment and Plan   Amanda was seen today for obesity, weight check and follow-up.    Diagnoses and all orders for this visit:    Class 3 severe obesity due to excess calories with serious comorbidity and body mass index (BMI) of 40.0 to 44.9 in adult  -     semaglutide (OZEMPIC)  0.25 mg or 0.5 mg (2 mg/3 mL) pen injector; Inject 0.5 mg into the skin every 7 days.    Primary hypertension    ANTONIA (obstructive sleep apnea)    Encounter for weight loss counseling        - Log all food and beverage intake with a daily calorie goal of 1200 calories per day    - Activity as tolerated

## 2023-10-03 ENCOUNTER — PATIENT MESSAGE (OUTPATIENT)
Dept: DERMATOLOGY | Facility: CLINIC | Age: 73
End: 2023-10-03
Payer: MEDICARE

## 2023-10-21 DIAGNOSIS — H10.13 ALLERGIC CONJUNCTIVITIS OF BOTH EYES: ICD-10-CM

## 2023-10-21 DIAGNOSIS — I70.0 ATHEROSCLEROSIS OF AORTA: ICD-10-CM

## 2023-10-21 DIAGNOSIS — M48.00 SPINAL STENOSIS, UNSPECIFIED SPINAL REGION: ICD-10-CM

## 2023-10-22 NOTE — TELEPHONE ENCOUNTER
No care due was identified.  Health [facility] Embedded Care Due Messages. Reference number: [id number].   10/21/2023 9:41:44 PM CDT

## 2023-10-23 RX ORDER — TIZANIDINE 4 MG/1
TABLET ORAL
Qty: 90 TABLET | Refills: 0 | Status: SHIPPED | OUTPATIENT
Start: 2023-10-23 | End: 2024-02-29

## 2023-10-23 RX ORDER — PRAVASTATIN SODIUM 40 MG/1
TABLET ORAL
Qty: 90 TABLET | Refills: 1 | Status: SHIPPED | OUTPATIENT
Start: 2023-10-23

## 2023-10-23 RX ORDER — OLOPATADINE HYDROCHLORIDE 1 MG/ML
1 SOLUTION/ DROPS OPHTHALMIC 2 TIMES DAILY
Qty: 5 ML | Refills: 0 | Status: SHIPPED | OUTPATIENT
Start: 2023-10-23 | End: 2024-02-29

## 2023-10-23 RX ORDER — TRAMADOL HYDROCHLORIDE 50 MG/1
50 TABLET ORAL EVERY 6 HOURS PRN
Qty: 120 TABLET | Refills: 2 | Status: SHIPPED | OUTPATIENT
Start: 2023-10-23 | End: 2024-01-30 | Stop reason: SDUPTHER

## 2023-10-23 NOTE — TELEPHONE ENCOUNTER
Refill Routing Note   Medication(s) are not appropriate for processing by [facility] for the following reason(s):      Medication outside of protocol    [facility] action(s):  Route  Approve Care Due:  None identified            Appointments  past 12m or future 3m with PCP    Date Provider   Last Visit   4/17/2023 Simona Torres MD   Next Visit   Visit date not found Simona Torres MD   ED visits in past 90 days: 0        Note composed:2:23 PM 10/23/2023

## 2023-10-24 DIAGNOSIS — M51.36 DDD (DEGENERATIVE DISC DISEASE), LUMBAR: Primary | ICD-10-CM

## 2023-10-24 NOTE — TELEPHONE ENCOUNTER
No care due was identified.  [facility] Embedded Care Due Messages. Reference number: [id number].   10/24/2023 2:42:53 PM CDT

## 2023-10-26 ENCOUNTER — OFFICE VISIT (OUTPATIENT)
Dept: OPTOMETRY | Facility: CLINIC | Age: 73
End: 2023-10-26
Payer: MEDICARE

## 2023-10-26 DIAGNOSIS — H57.89 EYE IRRITATION: Primary | ICD-10-CM

## 2023-10-26 PROCEDURE — 3044F HG A1C LEVEL LT 7.0%: CPT | Mod: CPTII,S$GLB,, | Performed by: OPTOMETRIST

## 2023-10-26 PROCEDURE — 4010F PR ACE/ARB THEARPY RXD/TAKEN: ICD-10-PCS | Mod: CPTII,S$GLB,, | Performed by: OPTOMETRIST

## 2023-10-26 PROCEDURE — 99999 PR PBB SHADOW E&M-EST. PATIENT-LVL III: CPT | Mod: PBBFAC,,, | Performed by: OPTOMETRIST

## 2023-10-26 PROCEDURE — 1125F AMNT PAIN NOTED PAIN PRSNT: CPT | Mod: CPTII,S$GLB,, | Performed by: OPTOMETRIST

## 2023-10-26 PROCEDURE — 1160F RVW MEDS BY RX/DR IN RCRD: CPT | Mod: CPTII,S$GLB,, | Performed by: OPTOMETRIST

## 2023-10-26 PROCEDURE — 99999 PR PBB SHADOW E&M-EST. PATIENT-LVL III: ICD-10-PCS | Mod: PBBFAC,,, | Performed by: OPTOMETRIST

## 2023-10-26 PROCEDURE — 1159F MED LIST DOCD IN RCRD: CPT | Mod: CPTII,S$GLB,, | Performed by: OPTOMETRIST

## 2023-10-26 PROCEDURE — 99213 PR OFFICE/OUTPT VISIT, EST, LEVL III, 20-29 MIN: ICD-10-PCS | Mod: S$GLB,,, | Performed by: OPTOMETRIST

## 2023-10-26 PROCEDURE — 4010F ACE/ARB THERAPY RXD/TAKEN: CPT | Mod: CPTII,S$GLB,, | Performed by: OPTOMETRIST

## 2023-10-26 PROCEDURE — 1125F PR PAIN SEVERITY QUANTIFIED, PAIN PRESENT: ICD-10-PCS | Mod: CPTII,S$GLB,, | Performed by: OPTOMETRIST

## 2023-10-26 PROCEDURE — 1159F PR MEDICATION LIST DOCUMENTED IN MEDICAL RECORD: ICD-10-PCS | Mod: CPTII,S$GLB,, | Performed by: OPTOMETRIST

## 2023-10-26 PROCEDURE — 3044F PR MOST RECENT HEMOGLOBIN A1C LEVEL <7.0%: ICD-10-PCS | Mod: CPTII,S$GLB,, | Performed by: OPTOMETRIST

## 2023-10-26 PROCEDURE — 99213 OFFICE O/P EST LOW 20 MIN: CPT | Mod: S$GLB,,, | Performed by: OPTOMETRIST

## 2023-10-26 PROCEDURE — 1160F PR REVIEW ALL MEDS BY PRESCRIBER/CLIN PHARMACIST DOCUMENTED: ICD-10-PCS | Mod: CPTII,S$GLB,, | Performed by: OPTOMETRIST

## 2023-10-26 NOTE — PROGRESS NOTES
Subjective:       Patient ID: Amanda Holt is a 72 y.o. female      Chief Complaint   Patient presents with    Eye Problem     History of Present Illness    Dls: 2/28/23 Dr. Schmitz     71 y/o female presents today with c/o 2-3 weeks was cleaning a leidy closet since red ou swollen lids double vision last for 1 min but then cleared up. Pt states still having tearing ou red ou itching swollen lids nasally fbs ou mucous ou. Pt states stop wearing scls today.     Assessment/Plan:     1. Eye irritation  No FB, no infection. Pt states sinuses may be flaring up. Can continue olopatadine for itchy eyes. Add AT BID - QID OU PRN.     Follow up if symptoms worsen or fail to improve.

## 2023-10-27 RX ORDER — DICLOFENAC SODIUM 10 MG/G
GEL TOPICAL 2 TIMES DAILY
Qty: 100 G | Refills: 5 | Status: SHIPPED | OUTPATIENT
Start: 2023-10-27 | End: 2024-03-13

## 2023-10-30 ENCOUNTER — TELEPHONE (OUTPATIENT)
Dept: FAMILY MEDICINE | Facility: CLINIC | Age: 73
End: 2023-10-30
Payer: MEDICARE

## 2023-10-30 ENCOUNTER — TELEPHONE (OUTPATIENT)
Dept: OBSTETRICS AND GYNECOLOGY | Facility: CLINIC | Age: 73
End: 2023-10-30
Payer: MEDICARE

## 2023-10-30 ENCOUNTER — TELEPHONE (OUTPATIENT)
Dept: BARIATRICS | Facility: CLINIC | Age: 73
End: 2023-10-30
Payer: MEDICARE

## 2023-10-30 ENCOUNTER — PATIENT MESSAGE (OUTPATIENT)
Dept: FAMILY MEDICINE | Facility: CLINIC | Age: 73
End: 2023-10-30
Payer: MEDICARE

## 2023-10-30 DIAGNOSIS — M51.36 DDD (DEGENERATIVE DISC DISEASE), LUMBAR: Primary | ICD-10-CM

## 2023-10-30 NOTE — TELEPHONE ENCOUNTER
Pt stated she has called all of the [facility] pharmacies located on the [facility] . Ozempic 0.25 mg is not in stock at [facility] .     Phoned [facility] on [address]. Phamacist stated they have Ozempic 0.25 in stock . Pharmacist will initiate at medication transfer from [facility] pharmacy and reach out to pt if she has any trouble .    Pt is aware Ozempic 0.25 mg is being transfer to [facility] location . Pt stated this pharmacy location is closer to home.

## 2023-10-30 NOTE — TELEPHONE ENCOUNTER
Please advise if she will require a virtual for this, she last saw you on 4/17/23 and has an upcoming appointment for 11/13/23

## 2023-10-30 NOTE — TELEPHONE ENCOUNTER
----- Message from Lico Davis sent at 10/27/2023  4:47 PM CDT -----  Type: Patient Call Back    Who called:[facility]    What is the request in detail:How many gram need for application on diclofenac sodium (VOLTAREN) 1 % Gel    Can the clinic reply by [facility]?No    Would the patient rather a call back or a response via [facility]? Call    Best call back number:[phone]    Additional Information:

## 2023-10-30 NOTE — TELEPHONE ENCOUNTER
----- Message from Wilfred Carranza LPN sent at 10/30/2023  9:56 AM CDT -----  Contact: Amanda Wright!     Message below is for Xenia Miller     Thank you!   Asuncion  ----- Message -----  From: Alfred Rizzo  Sent: 10/30/2023   9:34 AM CDT  To: Paul Gomez Staff    Type:  Patient Returning Call    Who Called:Amanda  Who Left Message for Patient:nurse  Does the patient know what this is regarding?:missed call  Would the patient rather a call back or a response via [facility]? C all  Best Call Back Number:[phone]  Additional Information:

## 2023-10-30 NOTE — TELEPHONE ENCOUNTER
----- Message from Luh Becker sent at 10/30/2023  8:57 AM CDT -----  Regarding: pt adivce  Contact: [phone]  Pt calling in regards to all pharmacy she called is out of OZEMPIC, needing to know where she probably can locate it at, pt miss dosage 10/28/23, due to being out. Peterson cook

## 2023-11-08 ENCOUNTER — OFFICE VISIT (OUTPATIENT)
Dept: CARDIOLOGY | Facility: CLINIC | Age: 73
End: 2023-11-08
Payer: MEDICARE

## 2023-11-08 ENCOUNTER — PATIENT OUTREACH (OUTPATIENT)
Dept: ADMINISTRATIVE | Facility: HOSPITAL | Age: 73
End: 2023-11-08
Payer: MEDICARE

## 2023-11-08 ENCOUNTER — TELEPHONE (OUTPATIENT)
Dept: CARDIOLOGY | Facility: CLINIC | Age: 73
End: 2023-11-08

## 2023-11-08 VITALS
DIASTOLIC BLOOD PRESSURE: 58 MMHG | HEIGHT: 64 IN | RESPIRATION RATE: 17 BRPM | SYSTOLIC BLOOD PRESSURE: 128 MMHG | HEART RATE: 60 BPM | BODY MASS INDEX: 42.61 KG/M2 | WEIGHT: 249.56 LBS

## 2023-11-08 DIAGNOSIS — Z12.12 ENCOUNTER FOR COLORECTAL CANCER SCREENING: Primary | ICD-10-CM

## 2023-11-08 DIAGNOSIS — E66.01 MORBID OBESITY, UNSPECIFIED OBESITY TYPE: ICD-10-CM

## 2023-11-08 DIAGNOSIS — I25.10 CORONARY ARTERY CALCIFICATION SEEN ON CAT SCAN: ICD-10-CM

## 2023-11-08 DIAGNOSIS — E78.2 MIXED HYPERLIPIDEMIA: ICD-10-CM

## 2023-11-08 DIAGNOSIS — I70.0 ATHEROSCLEROSIS OF AORTA: ICD-10-CM

## 2023-11-08 DIAGNOSIS — I70.1 LEFT RENAL ARTERY STENOSIS: ICD-10-CM

## 2023-11-08 DIAGNOSIS — I10 PRIMARY HYPERTENSION: Primary | ICD-10-CM

## 2023-11-08 DIAGNOSIS — Z12.11 ENCOUNTER FOR COLORECTAL CANCER SCREENING: Primary | ICD-10-CM

## 2023-11-08 PROBLEM — I20.9 ANGINA PECTORIS: Status: RESOLVED | Noted: 2021-03-03 | Resolved: 2023-11-08

## 2023-11-08 PROCEDURE — 1101F PR PT FALLS ASSESS DOC 0-1 FALLS W/OUT INJ PAST YR: ICD-10-PCS | Mod: CPTII,S$GLB,, | Performed by: INTERNAL MEDICINE

## 2023-11-08 PROCEDURE — 3078F DIAST BP <80 MM HG: CPT | Mod: CPTII,S$GLB,, | Performed by: INTERNAL MEDICINE

## 2023-11-08 PROCEDURE — 4010F PR ACE/ARB THEARPY RXD/TAKEN: ICD-10-PCS | Mod: CPTII,S$GLB,, | Performed by: INTERNAL MEDICINE

## 2023-11-08 PROCEDURE — 3074F PR MOST RECENT SYSTOLIC BLOOD PRESSURE < 130 MM HG: ICD-10-PCS | Mod: CPTII,S$GLB,, | Performed by: INTERNAL MEDICINE

## 2023-11-08 PROCEDURE — 1159F PR MEDICATION LIST DOCUMENTED IN MEDICAL RECORD: ICD-10-PCS | Mod: CPTII,S$GLB,, | Performed by: INTERNAL MEDICINE

## 2023-11-08 PROCEDURE — 3044F PR MOST RECENT HEMOGLOBIN A1C LEVEL <7.0%: ICD-10-PCS | Mod: CPTII,S$GLB,, | Performed by: INTERNAL MEDICINE

## 2023-11-08 PROCEDURE — 3078F PR MOST RECENT DIASTOLIC BLOOD PRESSURE < 80 MM HG: ICD-10-PCS | Mod: CPTII,S$GLB,, | Performed by: INTERNAL MEDICINE

## 2023-11-08 PROCEDURE — 1159F MED LIST DOCD IN RCRD: CPT | Mod: CPTII,S$GLB,, | Performed by: INTERNAL MEDICINE

## 2023-11-08 PROCEDURE — 99999 PR PBB SHADOW E&M-EST. PATIENT-LVL V: ICD-10-PCS | Mod: PBBFAC,,, | Performed by: INTERNAL MEDICINE

## 2023-11-08 PROCEDURE — 3008F PR BODY MASS INDEX (BMI) DOCUMENTED: ICD-10-PCS | Mod: CPTII,S$GLB,, | Performed by: INTERNAL MEDICINE

## 2023-11-08 PROCEDURE — 1101F PT FALLS ASSESS-DOCD LE1/YR: CPT | Mod: CPTII,S$GLB,, | Performed by: INTERNAL MEDICINE

## 2023-11-08 PROCEDURE — 99214 PR OFFICE/OUTPT VISIT, EST, LEVL IV, 30-39 MIN: ICD-10-PCS | Mod: S$GLB,,, | Performed by: INTERNAL MEDICINE

## 2023-11-08 PROCEDURE — 1126F PR PAIN SEVERITY QUANTIFIED, NO PAIN PRESENT: ICD-10-PCS | Mod: CPTII,S$GLB,, | Performed by: INTERNAL MEDICINE

## 2023-11-08 PROCEDURE — 1160F RVW MEDS BY RX/DR IN RCRD: CPT | Mod: CPTII,S$GLB,, | Performed by: INTERNAL MEDICINE

## 2023-11-08 PROCEDURE — 93000 EKG 12-LEAD: ICD-10-PCS | Mod: S$GLB,,, | Performed by: INTERNAL MEDICINE

## 2023-11-08 PROCEDURE — 93000 ELECTROCARDIOGRAM COMPLETE: CPT | Mod: S$GLB,,, | Performed by: INTERNAL MEDICINE

## 2023-11-08 PROCEDURE — 3074F SYST BP LT 130 MM HG: CPT | Mod: CPTII,S$GLB,, | Performed by: INTERNAL MEDICINE

## 2023-11-08 PROCEDURE — 3288F PR FALLS RISK ASSESSMENT DOCUMENTED: ICD-10-PCS | Mod: CPTII,S$GLB,, | Performed by: INTERNAL MEDICINE

## 2023-11-08 PROCEDURE — 3288F FALL RISK ASSESSMENT DOCD: CPT | Mod: CPTII,S$GLB,, | Performed by: INTERNAL MEDICINE

## 2023-11-08 PROCEDURE — 1160F PR REVIEW ALL MEDS BY PRESCRIBER/CLIN PHARMACIST DOCUMENTED: ICD-10-PCS | Mod: CPTII,S$GLB,, | Performed by: INTERNAL MEDICINE

## 2023-11-08 PROCEDURE — 3044F HG A1C LEVEL LT 7.0%: CPT | Mod: CPTII,S$GLB,, | Performed by: INTERNAL MEDICINE

## 2023-11-08 PROCEDURE — 99214 OFFICE O/P EST MOD 30 MIN: CPT | Mod: S$GLB,,, | Performed by: INTERNAL MEDICINE

## 2023-11-08 PROCEDURE — 1126F AMNT PAIN NOTED NONE PRSNT: CPT | Mod: CPTII,S$GLB,, | Performed by: INTERNAL MEDICINE

## 2023-11-08 PROCEDURE — 99999 PR PBB SHADOW E&M-EST. PATIENT-LVL V: CPT | Mod: PBBFAC,,, | Performed by: INTERNAL MEDICINE

## 2023-11-08 PROCEDURE — 3008F BODY MASS INDEX DOCD: CPT | Mod: CPTII,S$GLB,, | Performed by: INTERNAL MEDICINE

## 2023-11-08 PROCEDURE — 4010F ACE/ARB THERAPY RXD/TAKEN: CPT | Mod: CPTII,S$GLB,, | Performed by: INTERNAL MEDICINE

## 2023-11-08 NOTE — PROGRESS NOTES
CARDIOLOGY CLINIC VISIT        HISTORY OF PRESENT ILLNESS:     05/26/2020: Amanda Holt presents for establishment of care. Previously seen by Dr. Fink. No hx of CAD. Had a stress test in 2018. Had hypertensive response to exercise. Started on medications. BP at home according to patient wnl. Denies chest pain or sob.     03/02/2023:  Recent echocardiogram showed normal left ventricular systolic function with estimated ejection fraction of 65%.  Over the past year she is had roughly a 50 lb weight gain.  Blood pressure has been difficult to control.  Was changed from amlodipine and losartan to valsartan and Aldactone.  Blood pressure continues to be elevated.  She is not been using her CPAP since it has been recalled.  She also notes daily palpitations.  Last a few seconds.  No aggravating or alleviating factors.    03/21/2023:  The patient location is: home   The chief complaint leading to consultation is: follow up    Visit type: audiovisual    Face to Face time with patient: 15  30 minutes of total time spent on the encounter, which includes face to face time and non-face to face time preparing to see the patient (eg, review of tests), Obtaining and/or reviewing separately obtained history, Documenting clinical information in the electronic or other health record, Independently interpreting results (not separately reported) and communicating results to the patient/family/caregiver, or Care coordination (not separately reported).     Each patient to whom he or she provides medical services by telemedicine is:  (1) informed of the relationship between the physician and patient and the respective role of any other health care provider with respect to management of the patient; and (2) notified that he or she may decline to receive medical services by telemedicine and may withdraw from such care at any time.    Notes:   Recent emergency room visit secondary to elevated blood pressure.  240/130.  Patient given  additional hydralazine.  EKG showed normal sinus rhythm.  Left ventricular hypertrophy. Her holter showed average heart rate 90 beats per minute.  Rare PVCs.  Rare PACs.  One hundred twelve beat episode of atrial tachycardia.    05/03/2023: She feels good. No complaints. Blood pressure has been well controlled.  Renal artery ultrasound shows elevated velocities left renal artery suggestive of at least 60% stenosis.    11/08/2023: Last seen by Dr. Calhoun May 2023.  She denies intercurrent angina, dyspnea, palpitations, or syncope.  There has been no PND, orthopnea, melena, hematuria, or claudication symptoms.    CARDIOVASCULAR HISTORY:     Atrial tachycardia  Coronary calcification seen on CAT scan  Aortic atherosclerosis    PAST MEDICAL HISTORY:     Past Medical History:   Diagnosis Date    Arthralgia     Colon polyp     Degenerative disc disease at L5-S1 level     High cholesterol     Hypertension     Nuclear sclerosis of both eyes 1/8/2020    Sciatica     Spinal stenosis     Tobacco use 10/21/2021       PAST SURGICAL HISTORY:     Past Surgical History:   Procedure Laterality Date    BACK SURGERY      lumbar laminectomy    COLONOSCOPY N/A 7/23/2020    Procedure: COLONOSCOPY;  Surgeon: Donla Moore MD;  Location: [facility] END[facility];  Service: Endoscopy;  Laterality: N/A;    EPIDURAL STEROID INJECTION Left 9/9/2020    Procedure: Injection, Steroid, Epidural Transforaminal;  Surgeon: Jun Leavitt Jr., MD;  Location: [facility] ENDO;  Service: Pain Management;  Laterality: Left;  Left L5 + S1 TF WADE  Arrive @ 1300; ASA last 9/1; No DM    EPIDURAL STEROID INJECTION Left 2/12/2021    Procedure: Injection, Steroid, Epidural Transforaminal;  Surgeon: Jun Leavitt Jr., MD;  Location: [facility] ENDO;  Service: Pain Management;  Laterality: Left;  Left L4 + L5 TF WADE  Arrive @ 1230; IV Sedation; ASA last 2/4; No DM    TRANSFORAMINAL EPIDURAL INJECTION OF STEROID Left 3/14/2022    Procedure: INJECTION, STEROID, EPIDURAL,  TRANSFORAMINAL APPROACH, L4-L5 & L5-S1;  Surgeon: Darya Ayala MD;  Location: [facility];  Service: Pain Management;  Laterality: Left;    TRANSFORAMINAL EPIDURAL INJECTION OF STEROID Left 8/29/2022    Procedure: LUMBAR TRANSFORAMINAL LEFT L4/5 AND L5/S1 CONTRAST;  Surgeon: Darya Ayala MD;  Location: [facility];  Service: Pain Management;  Laterality: Left;    TUBAL LIGATION         ALLERGIES AND MEDICATION:     Review of patient's allergies indicates:   Allergen Reactions    Codeine         Medication List            Accurate as of November 8, 2023  3:12 PM. If you have any questions, ask your nurse or doctor.                CONTINUE taking these medications      amitriptyline 100 MG tablet  Commonly known as: ELAVIL  Take 1 tablet (100 mg total) by mouth every evening.     aspirin 81 MG EC tablet  Commonly known as: ECOTRIN     clotrimazole-betamethasone 1-0.05% cream  Commonly known as: LOTRISONE  APPLY  CREAM TOPICALLY TO AFFECTED AREA TWICE DAILY     diclofenac sodium 1 % Gel  Commonly known as: VOLTAREN  Apply topically 2 (two) times daily.     ergocalciferol 50,000 unit Cap  Commonly known as: ERGOCALCIFEROL     fluticasone propionate 50 mcg/actuation nasal spray  Commonly known as: FLONASE  Use 1 spray(s) in each nostril once daily     gabapentin 300 MG capsule  Commonly known as: NEURONTIN  Start off with 1 pill at night okay to increase to 2 pills at night, if desired can take up to 3 (1 in am and 2 in pm)     hydrALAZINE 100 MG tablet  Commonly known as: APRESOLINE  Take 1 tablet (100 mg total) by mouth every 12 (twelve) hours.     hydrOXYzine HCL 25 MG tablet  Commonly known as: ATARAX  Take 1 tablet by mouth three times daily as needed     metoprolol succinate 25 MG 24 hr tablet  Commonly known as: TOPROL-XL  Take 1 tablet (25 mg total) by mouth once daily.     mupirocin 2 % ointment  Commonly known as: BACTROBAN  Apply topically 3 (three) times daily.     NIFEdipine 30 MG (OSM) 24 hr  tablet  Commonly known as: PROCARDIA-XL  Take 1 tablet by mouth once daily     nystatin powder  Commonly known as: NYSTOP  APPLY  POWDER TOPICALLY TO AFFECTED AREA 4 TIMES DAILY     olopatadine 0.1 % ophthalmic solution  Commonly known as: PATANOL  INSTILL 1 DROP INTO EACH EYE TWICE DAILY     omeprazole 40 MG capsule  Commonly known as: PRILOSEC  Take 1 capsule (40 mg total) by mouth once daily.     OZEMPIC 0.25 mg or 0.5 mg (2 mg/3 mL) pen injector  Generic drug: semaglutide  Inject 0.5 mg into the skin every 7 days.     pravastatin 40 MG tablet  Commonly known as: PRAVACHOL  Take 1 tablet by mouth once daily     spironolactone 50 MG tablet  Commonly known as: ALDACTONE  Take 1 tablet by mouth once daily     tiZANidine 4 MG tablet  Commonly known as: ZANAFLEX  TAKE 1 TABLET BY MOUTH EVERY 6 HOURS AS NEEDED     traMADoL 50 mg tablet  Commonly known as: ULTRAM  TAKE 1 TABLET BY MOUTH EVERY 6 HOURS AS NEEDED     * triamcinolone acetonide 0.1% 0.1 % ointment  Commonly known as: KENALOG  AAA bid     * triamcinolone acetonide 0.1% 0.1 % ointment  Commonly known as: KENALOG  AAA bid; not more than 2 weeks straight in same location, avoid use on face and groin     triamcinolone acetonide 0.1%-ciclopirox-magnesium hydroxide 400 mg/5 ml  Apply to affected area twice a day after cool blow dry     valsartan 320 MG tablet  Commonly known as: DIOVAN  Take 1 tablet (320 mg total) by mouth once daily.           * This list has 2 medication(s) that are the same as other medications prescribed for you. Read the directions carefully, and ask your doctor or other care provider to review them with you.                  SOCIAL HISTORY:     Social History     Socioeconomic History    Marital status:    Tobacco Use    Smoking status: Former     Current packs/day: 0.00     Average packs/day: 0.3 packs/day for 35.0 years (8.8 ttl pk-yrs)     Types: Cigarettes     Start date: 3/15/1987     Quit date: 3/15/2022     Years since  quittin.6    Smokeless tobacco: Current    Tobacco comments:     Occasional spoker some times 1-2 cigarettes/day sometimes none for weeks   Substance and Sexual Activity    Alcohol use: Yes     Alcohol/week: 3.0 standard drinks of alcohol     Types: 3 Glasses of wine per week     Comment: Occasional wine    Drug use: Yes     Comment: Tramadol twice a day as needed    Sexual activity: Not Currently     Partners: Male     Birth control/protection: Post-menopausal     Comment: Tubal 30+ years ago     Social Determinants of Health     Financial Resource Strain: Medium Risk (2023)    Overall Financial Resource Strain (CARDIA)     Difficulty of Paying Living Expenses: Somewhat hard   Food Insecurity: Food Insecurity Present (2023)    Hunger Vital Sign     Worried About Running Out of Food in the Last Year: Often true     Ran Out of Food in the Last Year: Often true   Transportation Needs: No Transportation Needs (2023)    PRAPARE - Transportation     Lack of Transportation (Medical): No     Lack of Transportation (Non-Medical): No   Physical Activity: Insufficiently Active (2023)    Exercise Vital Sign     Days of Exercise per Week: 3 days     Minutes of Exercise per Session: 10 min   Stress: Stress Concern Present (2023)    [place] [employer] - Occupational Stress Questionnaire     Feeling of Stress : Very much   Social Connections: Unknown (2023)    Social Connection and Isolation Panel [NHANES]     Frequency of Communication with Friends and Family: More than three times a week     Frequency of Social Gatherings with Friends and Family: Once a week     Active Member of Clubs or Organizations: No     Attends Club or Organization Meetings: Never     Marital Status:    Housing Stability: Low Risk  (2023)    Housing Stability Vital Sign     Unable to Pay for Housing in the Last Year: No     Number of Places Lived in the Last Year: 1     Unstable Housing  in the Last Year: No       FAMILY HISTORY:     Family History   Problem Relation Age of Onset    Breast cancer Mother     Hypertension Mother             Hypotension Mother     Cancer Mother          due to breast cancer    Miscarriages / Stillbirths Mother         3 stillborn children    Cataracts Father     Glaucoma Father     Diabetes Father             Arthritis Father     Vision loss Father         Glaucoma -     Glaucoma Sister     Arthritis Sister     Diabetes Sister     Hypertension Sister     Vision loss Sister         Glasses    Drug abuse Brother     Learning disabilities Brother     No Known Problems Brother     No Known Problems Brother     No Known Problems Maternal Aunt     No Known Problems Maternal Uncle     No Known Problems Paternal Aunt     No Known Problems Paternal Uncle     No Known Problems Maternal Grandmother     No Known Problems Maternal Grandfather     No Known Problems Paternal Grandmother     No Known Problems Paternal Grandfather     No Known Problems Son     No Known Problems Other     Arthritis Sister     Depression Sister     Diabetes Sister     Hearing loss Sister         Trouble hearing    Hypertension Sister     Stroke Sister         Browne Vargas Disease, Coma for a month due to diabetes,Some body weakness    Vision loss Sister         Lasix surgery    Arthritis Sister     COPD Sister     Diabetes Sister     Hearing loss Sister     Heart disease Sister     Hypertension Sister     Arthritis Brother     Hypertension Brother     Early death Sister         Still born    Early death Sister         Still born twin to Debyb Benítez    Early death Brother     Hypertension Sister     Hypertension Brother     Learning disabilities Son     Learning disabilities Son         Great grandson    Learning disabilities Daughter         Granddaughter    Mental illness Sister         Her son Matthias Benítez    Vision loss Sister         Glasses/contacts    Vision loss  "Sister         Glasses    Amblyopia Neg Hx     Blindness Neg Hx     Macular degeneration Neg Hx     Retinal detachment Neg Hx     Strabismus Neg Hx     Thyroid disease Neg Hx        REVIEW OF SYSTEMS:   Review of Systems   Constitutional:  Negative for chills, diaphoresis, fever, malaise/fatigue and weight loss.   HENT:  Negative for congestion, hearing loss, sinus pain, sore throat and tinnitus.    Eyes:  Negative for blurred vision, double vision, photophobia and pain.   Respiratory:  Negative for cough, hemoptysis, sputum production, shortness of breath, wheezing and stridor.    Cardiovascular:  Negative for chest pain, palpitations, orthopnea, claudication, leg swelling and PND.   Gastrointestinal:  Negative for abdominal pain, blood in stool, heartburn, melena, nausea and vomiting.   Musculoskeletal:  Negative for back pain, falls, joint pain, myalgias and neck pain.   Neurological:  Negative for dizziness, tingling, tremors, sensory change, speech change, focal weakness, seizures, loss of consciousness, weakness and headaches.   Endo/Heme/Allergies:  Does not bruise/bleed easily.   Psychiatric/Behavioral:  Negative for depression, memory loss and substance abuse. The patient is not nervous/anxious.        PHYSICAL EXAM:     Vitals:    11/08/23 1504   BP: (!) 128/58   Pulse: 60   Resp: 17    Body mass index is 42.84 kg/m².  Weight: 113.2 kg (249 lb 9 oz)   Height: 5' 4" (162.6 cm)     Physical Exam  Vitals reviewed.   Constitutional:       General: She is not in acute distress.     Appearance: She is well-developed. She is obese. She is not ill-appearing, toxic-appearing or diaphoretic.   HENT:      Head: Normocephalic and atraumatic.   Eyes:      General: No scleral icterus.     Extraocular Movements: Extraocular movements intact.      Conjunctiva/sclera: Conjunctivae normal.      Pupils: Pupils are equal, round, and reactive to light.   Neck:      Thyroid: No thyromegaly.      Vascular: Normal carotid pulses. " No carotid bruit or JVD.      Trachea: Trachea normal.   Cardiovascular:      Rate and Rhythm: Normal rate and regular rhythm.      Pulses:           Carotid pulses are 2+ on the right side and 2+ on the left side.     Heart sounds: S1 normal and S2 normal. No murmur heard.     No friction rub. No gallop.   Pulmonary:      Effort: Pulmonary effort is normal. No respiratory distress.      Breath sounds: Normal breath sounds. No stridor. No wheezing, rhonchi or rales.   Chest:      Chest wall: No tenderness.   Abdominal:      General: There is no distension.      Palpations: Abdomen is soft.   Musculoskeletal:         General: No swelling or tenderness. Normal range of motion.      Cervical back: Normal range of motion and neck supple. No edema or rigidity.      Right lower leg: No edema.      Left lower leg: No edema.   Feet:      Right foot:      Skin integrity: No ulcer.      Left foot:      Skin integrity: No ulcer.   Skin:     General: Skin is warm and dry.      Coloration: Skin is not jaundiced.   Neurological:      General: No focal deficit present.      Mental Status: She is alert and oriented to person, place, and time.      Cranial Nerves: No cranial nerve deficit.   Psychiatric:         Mood and Affect: Mood normal.         Speech: Speech normal.         Behavior: Behavior normal. Behavior is cooperative.         Thought Content: Thought content normal.     She has been dieting.  Exercising.    DATA:   EKG: (personally reviewed tracing)  03/02/2023-sinus rhythm with PACs.  11/8/23 SR 90    Laboratory:  CBC:  Recent Labs   Lab 03/07/23  0917   WBC 4.86   Hemoglobin 12.7   Hematocrit 40.6   Platelets 264         CHEMISTRIES:  Recent Labs   Lab 02/02/23  1535 03/07/23  0917   Glucose 96 115 H   Sodium 144 144   Potassium 4.1 4.5   BUN 29 H 18   Creatinine 1.3 1.3   Calcium 9.9 9.8         CARDIAC BIOMARKERS:        COAGS:        LIPIDS/LFTS:  Recent Labs   Lab 10/21/21  1156 03/07/23  0917   Cholesterol 151  160   Triglycerides 106 94   HDL 47 40   LDL Cholesterol 82.8 101.2   Non-HDL Cholesterol 104 120   AST  --  15   ALT  --  14         Cardiovascular Testing:    Renal artery ultrasound 04/28/2023:    There is 60-79% stenosis in the Left Renal Artery.  Left kidney 11.20 cm.  Right kidney 9.00 cm.    Holter 03/13/2023:    The diary was not returned  Sinus rhythm with heart rates varying between 57 and 141 BPM with an average of 90 BPM.  There were rare PVCs. There were 1 triplets.  There were rare PACs. Atrial tachycardia. Longest 12 beats.    Echocardiogram 02/08/2023:    The left ventricle is normal in size with concentric hypertrophy and normal systolic function.  The estimated ejection fraction is 65%.  Normal left ventricular diastolic function.  Normal right ventricular size with normal right ventricular systolic function.  Mild left atrial enlargement.  Intermediate central venous pressure (8 mmHg).    ASSESSMENT:     Hypertension, controlled. ?L REMY on US 4/2023.  Hyperlipidemia: on prava 40mg  Obstructive sleep apnea  Obesity, BMI 43  Coronary calcifications seen on CT scan  Aortic atherosclerosis (CT chest 02/15/2023)    PLAN:   Cont med rx  Cont ASA 81mg qd  Diet/exercise/weight loss  RTC 1 year (Nov 2024) with Dr. Calhoun.      Jun Fink MD, [facility]

## 2023-11-10 ENCOUNTER — TELEPHONE (OUTPATIENT)
Dept: ENDOSCOPY | Facility: HOSPITAL | Age: 73
End: 2023-11-10
Payer: MEDICARE

## 2023-11-10 NOTE — TELEPHONE ENCOUNTER
Contacted patient for pre call for 11/17 colonoscopy.  Patient has to cancel because she does not have a ride home.  She is going to discuss her son's schedule for him, and will call back to reschedule.  Contact number provided.

## 2023-11-13 ENCOUNTER — TELEPHONE (OUTPATIENT)
Dept: ENDOSCOPY | Facility: HOSPITAL | Age: 73
End: 2023-11-13
Payer: MEDICARE

## 2023-11-13 ENCOUNTER — OFFICE VISIT (OUTPATIENT)
Dept: FAMILY MEDICINE | Facility: CLINIC | Age: 73
End: 2023-11-13
Payer: MEDICARE

## 2023-11-13 VITALS
WEIGHT: 248.88 LBS | HEIGHT: 64 IN | SYSTOLIC BLOOD PRESSURE: 124 MMHG | RESPIRATION RATE: 16 BRPM | DIASTOLIC BLOOD PRESSURE: 68 MMHG | OXYGEN SATURATION: 99 % | TEMPERATURE: 98 F | HEART RATE: 84 BPM | BODY MASS INDEX: 42.49 KG/M2

## 2023-11-13 DIAGNOSIS — N28.1 RENAL CYST: ICD-10-CM

## 2023-11-13 DIAGNOSIS — K21.9 GASTROESOPHAGEAL REFLUX DISEASE WITHOUT ESOPHAGITIS: ICD-10-CM

## 2023-11-13 DIAGNOSIS — G47.33 OSA (OBSTRUCTIVE SLEEP APNEA): ICD-10-CM

## 2023-11-13 DIAGNOSIS — M54.16 LUMBAR RADICULOPATHY: ICD-10-CM

## 2023-11-13 DIAGNOSIS — M48.00 SPINAL STENOSIS, UNSPECIFIED SPINAL REGION: Primary | ICD-10-CM

## 2023-11-13 PROCEDURE — 1101F PT FALLS ASSESS-DOCD LE1/YR: CPT | Mod: CPTII,S$GLB,, | Performed by: FAMILY MEDICINE

## 2023-11-13 PROCEDURE — 4010F ACE/ARB THERAPY RXD/TAKEN: CPT | Mod: CPTII,S$GLB,, | Performed by: FAMILY MEDICINE

## 2023-11-13 PROCEDURE — 3008F PR BODY MASS INDEX (BMI) DOCUMENTED: ICD-10-PCS | Mod: CPTII,S$GLB,, | Performed by: FAMILY MEDICINE

## 2023-11-13 PROCEDURE — 3044F PR MOST RECENT HEMOGLOBIN A1C LEVEL <7.0%: ICD-10-PCS | Mod: CPTII,S$GLB,, | Performed by: FAMILY MEDICINE

## 2023-11-13 PROCEDURE — 3008F BODY MASS INDEX DOCD: CPT | Mod: CPTII,S$GLB,, | Performed by: FAMILY MEDICINE

## 2023-11-13 PROCEDURE — 3044F HG A1C LEVEL LT 7.0%: CPT | Mod: CPTII,S$GLB,, | Performed by: FAMILY MEDICINE

## 2023-11-13 PROCEDURE — 3078F PR MOST RECENT DIASTOLIC BLOOD PRESSURE < 80 MM HG: ICD-10-PCS | Mod: CPTII,S$GLB,, | Performed by: FAMILY MEDICINE

## 2023-11-13 PROCEDURE — 99214 OFFICE O/P EST MOD 30 MIN: CPT | Mod: S$GLB,,, | Performed by: FAMILY MEDICINE

## 2023-11-13 PROCEDURE — 3078F DIAST BP <80 MM HG: CPT | Mod: CPTII,S$GLB,, | Performed by: FAMILY MEDICINE

## 2023-11-13 PROCEDURE — 99999 PR PBB SHADOW E&M-EST. PATIENT-LVL V: CPT | Mod: PBBFAC,,, | Performed by: FAMILY MEDICINE

## 2023-11-13 PROCEDURE — 99999 PR PBB SHADOW E&M-EST. PATIENT-LVL V: ICD-10-PCS | Mod: PBBFAC,,, | Performed by: FAMILY MEDICINE

## 2023-11-13 PROCEDURE — 99214 PR OFFICE/OUTPT VISIT, EST, LEVL IV, 30-39 MIN: ICD-10-PCS | Mod: S$GLB,,, | Performed by: FAMILY MEDICINE

## 2023-11-13 PROCEDURE — 3074F SYST BP LT 130 MM HG: CPT | Mod: CPTII,S$GLB,, | Performed by: FAMILY MEDICINE

## 2023-11-13 PROCEDURE — 3288F FALL RISK ASSESSMENT DOCD: CPT | Mod: CPTII,S$GLB,, | Performed by: FAMILY MEDICINE

## 2023-11-13 PROCEDURE — 3074F PR MOST RECENT SYSTOLIC BLOOD PRESSURE < 130 MM HG: ICD-10-PCS | Mod: CPTII,S$GLB,, | Performed by: FAMILY MEDICINE

## 2023-11-13 PROCEDURE — 3288F PR FALLS RISK ASSESSMENT DOCUMENTED: ICD-10-PCS | Mod: CPTII,S$GLB,, | Performed by: FAMILY MEDICINE

## 2023-11-13 PROCEDURE — 1126F AMNT PAIN NOTED NONE PRSNT: CPT | Mod: CPTII,S$GLB,, | Performed by: FAMILY MEDICINE

## 2023-11-13 PROCEDURE — 1159F MED LIST DOCD IN RCRD: CPT | Mod: CPTII,S$GLB,, | Performed by: FAMILY MEDICINE

## 2023-11-13 PROCEDURE — 4010F PR ACE/ARB THEARPY RXD/TAKEN: ICD-10-PCS | Mod: CPTII,S$GLB,, | Performed by: FAMILY MEDICINE

## 2023-11-13 PROCEDURE — 1101F PR PT FALLS ASSESS DOC 0-1 FALLS W/OUT INJ PAST YR: ICD-10-PCS | Mod: CPTII,S$GLB,, | Performed by: FAMILY MEDICINE

## 2023-11-13 PROCEDURE — 1126F PR PAIN SEVERITY QUANTIFIED, NO PAIN PRESENT: ICD-10-PCS | Mod: CPTII,S$GLB,, | Performed by: FAMILY MEDICINE

## 2023-11-13 PROCEDURE — 1159F PR MEDICATION LIST DOCUMENTED IN MEDICAL RECORD: ICD-10-PCS | Mod: CPTII,S$GLB,, | Performed by: FAMILY MEDICINE

## 2023-11-13 RX ORDER — AMITRIPTYLINE HYDROCHLORIDE 100 MG/1
100 TABLET ORAL NIGHTLY
Qty: 90 TABLET | Refills: 1 | Status: SHIPPED | OUTPATIENT
Start: 2023-11-13 | End: 2024-11-12

## 2023-11-13 RX ORDER — OMEPRAZOLE 40 MG/1
40 CAPSULE, DELAYED RELEASE ORAL DAILY
Qty: 90 CAPSULE | Refills: 2 | Status: SHIPPED | OUTPATIENT
Start: 2023-11-13

## 2023-11-13 NOTE — PROGRESS NOTES
Chief Complaint   Patient presents with    Hypertension    spinal stenosis       HPI  Amanda Holt is a 73 y.o. female with multiple medical diagnoses as listed in the medical history and problem list that presents for follow-up for HTN and spinal stenosis    Spinal stenosis- she is having numbness in her left lower leg, stable on current pain medication, she is staying active, working to lose weight  HTN- controlled  Numbness- and swelling over her shoulder, with some itching relieved by lidocaine  Renal cyst- previous US in 2019 detect this      ALLERGIES AND MEDICATIONS: updated and reviewed.  Review of patient's allergies indicates:   Allergen Reactions    Codeine      Medication List with Changes/Refills   Current Medications    ASPIRIN (ECOTRIN) 81 MG EC TABLET        CLOTRIMAZOLE-BETAMETHASONE 1-0.05% (LOTRISONE) CREAM    APPLY  CREAM TOPICALLY TO AFFECTED AREA TWICE DAILY    DICLOFENAC SODIUM (VOLTAREN) 1 % GEL    Apply topically 2 (two) times daily.    ERGOCALCIFEROL (ERGOCALCIFEROL) 50,000 UNIT CAP    Take 1 capsule by mouth every 7 days.    FLUTICASONE PROPIONATE (FLONASE) 50 MCG/ACTUATION NASAL SPRAY    Use 1 spray(s) in each nostril once daily    GABAPENTIN (NEURONTIN) 300 MG CAPSULE    Start off with 1 pill at night okay to increase to 2 pills at night, if desired can take up to 3 (1 in am and 2 in pm)    HYDRALAZINE (APRESOLINE) 100 MG TABLET    Take 1 tablet (100 mg total) by mouth every 12 (twelve) hours.    HYDROXYZINE HCL (ATARAX) 25 MG TABLET    Take 1 tablet by mouth three times daily as needed    METOPROLOL SUCCINATE (TOPROL-XL) 25 MG 24 HR TABLET    Take 1 tablet (25 mg total) by mouth once daily.    MUPIROCIN (BACTROBAN) 2 % OINTMENT    Apply topically 3 (three) times daily.    NIFEDIPINE (PROCARDIA-XL) 30 MG (OSM) 24 HR TABLET    Take 1 tablet by mouth once daily    NYSTATIN (NYSTOP) POWDER    APPLY  POWDER TOPICALLY TO AFFECTED AREA 4 TIMES DAILY    OLOPATADINE (PATANOL) 0.1 %  OPHTHALMIC SOLUTION    INSTILL 1 DROP INTO EACH EYE TWICE DAILY    PRAVASTATIN (PRAVACHOL) 40 MG TABLET    Take 1 tablet by mouth once daily    SEMAGLUTIDE (OZEMPIC) 0.25 MG OR 0.5 MG (2 MG/3 ML) PEN INJECTOR    Inject 0.5 mg into the skin every 7 days.    SPIRONOLACTONE (ALDACTONE) 50 MG TABLET    Take 1 tablet by mouth once daily    TIZANIDINE (ZANAFLEX) 4 MG TABLET    TAKE 1 TABLET BY MOUTH EVERY 6 HOURS AS NEEDED    TRAMADOL (ULTRAM) 50 MG TABLET    TAKE 1 TABLET BY MOUTH EVERY 6 HOURS AS NEEDED    TRIAMCINOLONE ACETONIDE 0.1% (KENALOG) 0.1 % OINTMENT    AAA bid    TRIAMCINOLONE ACETONIDE 0.1% (KENALOG) 0.1 % OINTMENT    AAA bid; not more than 2 weeks straight in same location, avoid use on face and groin    TRIAMCINOLONE ACETONIDE 0.1%-CICLOPIROX-MAGNESIUM HYDROXIDE 400 MG/5 ML    Apply to affected area twice a day after cool blow dry    VALSARTAN (DIOVAN) 320 MG TABLET    Take 1 tablet (320 mg total) by mouth once daily.   Changed and/or Refilled Medications    Modified Medication Previous Medication    AMITRIPTYLINE (ELAVIL) 100 MG TABLET amitriptyline (ELAVIL) 100 MG tablet       Take 1 tablet (100 mg total) by mouth every evening.    Take 1 tablet (100 mg total) by mouth every evening.    OMEPRAZOLE (PRILOSEC) 40 MG CAPSULE omeprazole (PRILOSEC) 40 MG capsule       Take 1 capsule (40 mg total) by mouth once daily.    Take 1 capsule (40 mg total) by mouth once daily.       ROS  Review of Systems   Constitutional:  Negative for chills, diaphoresis, fatigue, fever and unexpected weight change.   HENT:  Negative for rhinorrhea, sinus pressure, sore throat and tinnitus.    Eyes:  Negative for photophobia and visual disturbance.   Respiratory:  Negative for cough, shortness of breath and wheezing.    Cardiovascular:  Negative for chest pain and palpitations.   Gastrointestinal:  Negative for abdominal pain, blood in stool, constipation, diarrhea, nausea and vomiting.   Genitourinary:  Negative for dysuria, flank  "pain, frequency and vaginal discharge.   Musculoskeletal:  Positive for arthralgias. Negative for joint swelling.   Skin:  Negative for rash.   Neurological:  Positive for numbness. Negative for speech difficulty, weakness, light-headedness and headaches.   Psychiatric/Behavioral:  Negative for behavioral problems and dysphoric mood.        Physical Exam  Vitals:    11/13/23 1326   BP: 124/68   Pulse: 84   Resp: 16   Temp: 98 °F (36.7 °C)   TempSrc: Oral   SpO2: 99%   Weight: 112.9 kg (248 lb 14.4 oz)   Height: 5' 4" (1.626 m)    Body mass index is 42.72 kg/m².  Weight: 112.9 kg (248 lb 14.4 oz)   Height: 5' 4" (162.6 cm)     Physical Exam  Vitals and nursing note reviewed.   Constitutional:       Appearance: She is well-developed.   Skin:     General: Skin is warm and dry.      Findings: No erythema or rash.   Neurological:      Mental Status: She is alert. Mental status is at baseline.   Psychiatric:         Behavior: Behavior normal.         Health Maintenance         Date Due Completion Date    TETANUS VACCINE Never done ---    Sign Pain Contract Never done ---    Urine Drug Screen Never done ---    Naloxone Prescription Never done ---    Shingles Vaccine (1 of 2) Never done ---    RSV Vaccine (Age 60+) (1 - 1-dose 60+ series) Never done ---    Pneumococcal Vaccines (Age 65+) (1 - PCV) Never done ---    COVID-19 Vaccine (5 - 2023-24 season) 09/01/2023 2/17/2022    Colorectal Cancer Screening 11/17/2023 (Originally 1950) 7/23/2020    Mammogram 05/08/2024 5/8/2023    High Dose Statin 11/08/2024 11/8/2023    Aspirin/Antiplatelet Therapy 11/08/2024 11/8/2023    Lipid Panel 03/07/2028 3/7/2023            Health maintenance reviewed and addressed as ordered      ASSESSMENT/PLAN       1. Spinal stenosis, unspecified spinal region  Continue current regimen    2. Renal cyst  Will image to f/u  - US Retroperitoneal Complete; Future    3. ANTONIA (obstructive sleep apnea)  Finally has CPAP machine    4. Lumbar " radiculopathy  Continue current regimen    - amitriptyline (ELAVIL) 100 MG tablet; Take 1 tablet (100 mg total) by mouth every evening.  Dispense: 90 tablet; Refill: 1    5. Gastroesophageal reflux disease without esophagitis  Continue current regimen    - omeprazole (PRILOSEC) 40 MG capsule; Take 1 capsule (40 mg total) by mouth once daily.  Dispense: 90 capsule; Refill: 2        Simona Torres MD  11/13/2023 1:35 PM        Follow up in about 3 months (around 2/13/2024) for management of chronic conditions.    Orders Placed This Encounter   Procedures    US Retroperitoneal Complete

## 2023-11-13 NOTE — TELEPHONE ENCOUNTER
----- Message from Danelle Copeland sent at 11/13/2023  8:23 AM CST -----  Regarding: FW: Reschedule for Renetta Patient    ----- Message -----  From: Mary Bueno RN  Sent: 11/10/2023  11:22 AM CST  To: [facility] End[facility] Schedulers  Subject: Reschedule for Renetta Patient                  Good Morning,    I got a message from Dr. Savage's MA this patient needs to reschedule her Colon.  She does not have transportation on that day.      ThanksMary

## 2023-11-13 NOTE — TELEPHONE ENCOUNTER
Contact and spoke with patient to reschedule her colonoscopy. Per patient stated she has to check with her son schedule. Patient will call the office back to schedule.

## 2023-11-21 DIAGNOSIS — E66.01 CLASS 3 SEVERE OBESITY DUE TO EXCESS CALORIES WITH SERIOUS COMORBIDITY AND BODY MASS INDEX (BMI) OF 40.0 TO 44.9 IN ADULT: ICD-10-CM

## 2023-11-21 RX ORDER — SEMAGLUTIDE 0.68 MG/ML
0.5 INJECTION, SOLUTION SUBCUTANEOUS
Qty: 3 ML | Refills: 2 | Status: SHIPPED | OUTPATIENT
Start: 2023-11-21 | End: 2024-01-15 | Stop reason: SDUPTHER

## 2023-12-18 ENCOUNTER — PATIENT MESSAGE (OUTPATIENT)
Dept: DERMATOLOGY | Facility: CLINIC | Age: 73
End: 2023-12-18
Payer: MEDICARE

## 2023-12-18 ENCOUNTER — TELEPHONE (OUTPATIENT)
Dept: BARIATRICS | Facility: CLINIC | Age: 73
End: 2023-12-18
Payer: MEDICARE

## 2023-12-18 NOTE — TELEPHONE ENCOUNTER
----- Message from Jigar Samaniego sent at 12/18/2023 10:11 AM CST -----  Contact: [phone]  Amanda Holt calling regarding Appointment Access  (message) Pt asking for a call back she needs to reschedule her appt that is schedule for today 12/18 at 11:30 due to not feeling well

## 2023-12-22 ENCOUNTER — TELEPHONE (OUTPATIENT)
Dept: BARIATRICS | Facility: CLINIC | Age: 73
End: 2023-12-22
Payer: MEDICARE

## 2023-12-22 ENCOUNTER — TELEPHONE (OUTPATIENT)
Dept: FAMILY MEDICINE | Facility: CLINIC | Age: 73
End: 2023-12-22
Payer: MEDICARE

## 2023-12-22 DIAGNOSIS — M21.611 BUNION OF RIGHT FOOT: Primary | ICD-10-CM

## 2023-12-22 NOTE — TELEPHONE ENCOUNTER
----- Message from Jenny Santos sent at 12/22/2023 12:49 PM CST -----  Regarding: self [phone]'  Type:  Patient Requesting Referral    Who Called:self    Referral to What Specialty: Podiatry    Reason for Referral: Bunion on right foot     Does the patient want the referral with a specific physician?: no    [facility]     Would the patient rather a call back or a response via [facility]? Call back      Best Call Back Number: [phone]'

## 2023-12-22 NOTE — TELEPHONE ENCOUNTER
----- Message from Neva Alberto sent at 12/22/2023 12:57 PM CST -----  Regarding: appt access  Contact: pt [phone]  PATIENT CALL    Pt called regarding her recent missed appt. Would like to be scheduled for the next soonest available. Please call back at [phone]

## 2023-12-22 NOTE — TELEPHONE ENCOUNTER
Returned pt's call in regard to rescheduling missed f/u appt with MD Miller. Pt has been rescheduled to soonest available appt and verbalized understanding. Call was disconnected.

## 2024-01-03 ENCOUNTER — HOSPITAL ENCOUNTER (OUTPATIENT)
Dept: RADIOLOGY | Facility: HOSPITAL | Age: 74
Discharge: HOME | End: 2024-01-03
Attending: PODIATRIST
Payer: MEDICARE

## 2024-01-03 ENCOUNTER — OFFICE VISIT (OUTPATIENT)
Dept: PODIATRY | Facility: CLINIC | Age: 74
End: 2024-01-03
Payer: MEDICARE

## 2024-01-03 VITALS — HEIGHT: 64 IN | BODY MASS INDEX: 42.49 KG/M2 | WEIGHT: 248.88 LBS

## 2024-01-03 DIAGNOSIS — M79.671 RIGHT FOOT PAIN: Primary | ICD-10-CM

## 2024-01-03 DIAGNOSIS — M21.611 BUNION OF RIGHT FOOT: ICD-10-CM

## 2024-01-03 DIAGNOSIS — M20.5X2 HALLUX LIMITUS, ACQUIRED, LEFT: ICD-10-CM

## 2024-01-03 DIAGNOSIS — M20.5X1 HALLUX LIMITUS, ACQUIRED, RIGHT: ICD-10-CM

## 2024-01-03 DIAGNOSIS — M79.671 RIGHT FOOT PAIN: ICD-10-CM

## 2024-01-03 PROCEDURE — 99203 OFFICE O/P NEW LOW 30 MIN: CPT | Mod: S$GLB,,, | Performed by: PODIATRIST

## 2024-01-03 PROCEDURE — 99999 PR PBB SHADOW E&M-EST. PATIENT-LVL III: CPT | Mod: PBBFAC,,, | Performed by: PODIATRIST

## 2024-01-03 PROCEDURE — 73630 X-RAY EXAM OF FOOT: CPT | Mod: TC,FY,PO,RT

## 2024-01-03 PROCEDURE — 73630 X-RAY EXAM OF FOOT: CPT | Mod: 26,RT,, | Performed by: STUDENT IN AN ORGANIZED HEALTH CARE EDUCATION/TRAINING PROGRAM

## 2024-01-03 NOTE — PATIENT INSTRUCTIONS
Very best Over the counter pain creams: Voltaren Gel, Biofreeze, Bengay, tiger balm, two old goat, lidocaine gel,  Absorbine Veterinary Liniment Gel Topical Analgesic Sore Muscle and Joint Pain Relief    Recommend lotions: eucerin, eucerin for diabetics, aquaphor, A&D ointment, gold bond for diabetics, sween, Converse's Bees all purpose baby ointment,  urea 40 with aloe or SkinIntegra rapid crack repair (found on amazon.com)    Shoe recommendations: (try 6pm.com, zappos.com , nordstromrack.[email or web address], or shoes.[employer] for discounted prices) you can visit varsity shoes in [place], DSW shoes in [place]  or lora rack in the [facility] (there are also several shoe brand outlets in the [facility])    ONLY purchase stability style tennis shoes NO flex, foam, free, yoga mat style shoes    Shoe examples:    Asics (GT 4000 or gel foundations), new balance stability type shoes (such as the 940 series), saucony (stabil c3),  Zavala (GTS or Beast or   transcend), propet, HokaOne (tennis shoe) Valdo (tennis shoes and boots)    Sofft Enzo (women) Fernando&Goyo (men), clarks, crocs, aerosoles, naturalizers, SAS, ecco, born, james penaloza, rockports (dress shoes)    Vionic, burkenstocks, fitflops, propet, taos, baretraps (sandals)    HokaOne sandals, crocs, propet (house shoes)      Nail Home remedy:  Vicks Vapor rub or Emuaid to nails for easier manageability      What Is Arthritis in the Foot?  Degenerative arthritis is a condition that slowly wears away joints, the area where bones meet and move. In the beginning, you may notice that the affected joint seems stiff. It may even ache. As the joint lining (cartilage) breaks down, the bones rub against each other, causing pain and swelling. Over time, small pieces of rough or splintered bone (bone spurs) develop, and the joints range of motion becomes limited. But movement doesnt have to cause pain. The effects of arthritis can be reduced.    The big-toe joint  When arthritis affects  your big toe, your foot hurts when it pushes off the ground. Arthritis often appears in the big-toe joint along with a bunion (a bony bump at the side of the joint) or a bone spur on top of the joint.    Other joints  When arthritis affects the rear or midfoot joints, you feel pain when you put weight on your foot. Arthritis may affect the joint where the ankle and foot meet. It may also affect other joints nearby.  Date Last Reviewed: 7/1/2016 © 2000-2017 [employer]. [address] [place] [address]. All rights reserved. This information is not intended as a substitute for professional medical care. Always follow your healthcare professional's instructions.        Treating Arthritis in the Foot  If your symptoms are mild, medications may be enough to reduce pain and swelling. For more severe arthritis, surgery may be needed to improve the condition of the joint.    Medicine  Your doctor may prescribe medicine--pills or injections--to limit pain and swelling. Ice, aspirin, acetaminophen, or ibuprofen may help relieve mild symptoms that occur after activity.  Surgery and bone trimming  To ease movement and reduce pain, your doctor may trim damaged bone. If arthritis is severe, the joint may be fused or removed. If the bone is not damaged too badly, your doctor may simply shave away bone spurs. Any excess bone growth related to a bunion may also be trimmed.  Fusing joints  If damage is more severe, your doctor may fuse the joint to prevent the bones from rubbing. Afterward, staples, plates, or screws may hold the bones in place so they heal properly. In some cases, the joint may be removed and replaced with an implant.  After surgery  During the early stages of recovery, your foot is likely to be bandaged and immobilized for a while. For best results, follow up with your doctor as scheduled. These visits help ensure that your foot heals properly.  As you heal  After surgery, youll be told how  to care for your incision and how soon to begin walking on the foot. Until the foot can bear weight, you may need to walk with crutches or a cane.  For surgery on the big toe, your foot may be splinted to limit movement for several weeks. Despite this, you should be able to walk soon after surgery.  For surgery on rear or midfoot joints, you may need to wear a cast or surgical shoe. These joints are fairly large, so full recovery may take a few months. Once the bone has healed, any staples, plates, or screws may be removed.  Date Last Reviewed: 7/1/2016 © 2000-2017 [employer]. [address] [place] [address], [place] [address]. All rights reserved. This information is not intended as a substitute for professional medical care. Always follow your healthcare professional's instructions.        Foot Surgery: Degenerative Joint Disease    Degenerative joint disease (arthritis) often happens in the joint of a big toe. This bone growth may cause pain and stiffness in the joint. Left untreated, arthritis can break down the cartilage and destroy the joint. Your treatment choices depend on how damaged your joint is. There are many nonsurgical treatments, but if these are not helpful, surgery may be considered.    Cheilectomy  This is done when the arthritic joint and cartilage can be saved. A bone spur caused by arthritis may be symptomatic on the top of the big toe joint. The procedure involves removing this bone spur, usually with a small part of the top of the joint itself.  You will need to wear a surgical shoe for several weeks. Once the foot heals, joint movement is restored.    Fusion  In fusion, the cartilage and some bone on both sides of the joint are removed. Then, the big toe and metatarsal bones are held together with staples, screws, or a plate and screws. Your foot may be placed in a cast. While you heal, you will be asked not to bear weight on this foot. You may also need crutches for several weeks.  Because the joint has been removed, your toe will be less flexible.    Arthroplasty  During surgery, bone growth caused by the arthritis is trimmed, and part of the joint is removed. A pin can be used to align the bones and to keep them from touching. The pin is removed after several weeks. In some cases, the entire joint may be replaced with an implant. You may have to wear a splint or a surgical shoe for several weeks. When healed, the bones become connected with scar tissue.  Date Last Reviewed: 10/15/2015  © 5639-0297 [employer]. [address] [place] [address]. All rights reserved. This information is not intended as a substitute for professional medical care. Always follow your healthcare professional's instructions.

## 2024-01-03 NOTE — PROGRESS NOTES
Subjective:      Patient ID: Amanda Holt is a 73 y.o. female.    Chief Complaint: Ankle Pain      Amanda Holt is a 73 y.o. female who presents to the podiatry clinic  with complaint of Painful, discolored bony prominence of the right 1st metatarsophalangeal joint for several weeks.  Patient relates that prior to this she had suffered for several months with a bout of plantar fasciitis to the right heel.  She also relates that she had new casual tennis shoe that in the 2 times she has wore it caused a flare to this site.  Patient also relates that up until about 3 years ago she wore heels as her primary shoe gear both in the home and outside of the home.  She presents to clinic today in a flat but cushioned sandal.      Patient Active Problem List   Diagnosis    Contact lens overwear of both eyes    Refractive error    Nuclear sclerosis of both eyes    Spinal stenosis    Hypertension    ANTONIA (obstructive sleep apnea)    Gastroesophageal reflux disease without esophagitis    CKD (chronic kidney disease)    Chronic low back pain    Bilateral hearing loss    DDD (degenerative disc disease), lumbar    CTS (carpal tunnel syndrome)    Anxiety    Asymptomatic microscopic hematuria    Lumbar radiculopathy    Lumbar spondylosis    Lumbar herniated disc    Other nonthrombocytopenic purpura    Major depressive disorder, recurrent, mild    Atherosclerosis of aorta    Stage 3a chronic kidney disease    Wears contact lenses    Posterior vitreous detachment of right eye    Class 3 severe obesity due to excess calories with serious comorbidity in adult    Decreased functional mobility and endurance    Sleep-related breathing disorder    AKBAR (dyspnea on exertion)    Opioid dependence, uncomplicated    Peripheral edema    Insomnia    Former smoker       Current Outpatient Medications on File Prior to Visit   Medication Sig Dispense Refill    amitriptyline (ELAVIL) 100 MG tablet Take 1 tablet (100 mg total) by  mouth every evening. 90 tablet 1    aspirin (ECOTRIN) 81 MG EC tablet       clotrimazole-betamethasone 1-0.05% (LOTRISONE) cream APPLY  CREAM TOPICALLY TO AFFECTED AREA TWICE DAILY 45 g 0    diclofenac sodium (VOLTAREN) 1 % Gel Apply topically 2 (two) times daily. 100 g 5    ergocalciferol (ERGOCALCIFEROL) 50,000 unit Cap Take 1 capsule by mouth every 7 days.      fluticasone propionate (FLONASE) 50 mcg/actuation nasal spray Use 1 spray(s) in each nostril once daily 48 g 3    gabapentin (NEURONTIN) 300 MG capsule Start off with 1 pill at night okay to increase to 2 pills at night, if desired can take up to 3 (1 in am and 2 in pm) 90 capsule 11    hydrALAZINE (APRESOLINE) 100 MG tablet Take 1 tablet (100 mg total) by mouth every 12 (twelve) hours. 180 tablet 3    hydrOXYzine HCL (ATARAX) 25 MG tablet Take 1 tablet by mouth three times daily as needed 45 tablet 0    metoprolol succinate (TOPROL-XL) 25 MG 24 hr tablet Take 1 tablet (25 mg total) by mouth once daily. 30 tablet 11    mupirocin (BACTROBAN) 2 % ointment Apply topically 3 (three) times daily. 22 g 0    NIFEdipine (PROCARDIA-XL) 30 MG (OSM) 24 hr tablet Take 1 tablet by mouth once daily 90 tablet 2    nystatin (NYSTOP) powder APPLY  POWDER TOPICALLY TO AFFECTED AREA 4 TIMES DAILY 30 g 0    olopatadine (PATANOL) 0.1 % ophthalmic solution INSTILL 1 DROP INTO EACH EYE TWICE DAILY 5 mL 0    omeprazole (PRILOSEC) 40 MG capsule Take 1 capsule (40 mg total) by mouth once daily. 90 capsule 2    pravastatin (PRAVACHOL) 40 MG tablet Take 1 tablet by mouth once daily 90 tablet 1    semaglutide (OZEMPIC) 0.25 mg or 0.5 mg (2 mg/3 mL) pen injector Inject 0.5 mg into the skin every 7 days. 3 mL 2    spironolactone (ALDACTONE) 50 MG tablet Take 1 tablet by mouth once daily 90 tablet 1    tiZANidine (ZANAFLEX) 4 MG tablet TAKE 1 TABLET BY MOUTH EVERY 6 HOURS AS NEEDED 90 tablet 0    traMADoL (ULTRAM) 50 mg tablet TAKE 1 TABLET BY MOUTH EVERY 6 HOURS AS NEEDED 120 tablet 2     triamcinolone acetonide 0.1% (KENALOG) 0.1 % ointment AAA bid 454 g 3    triamcinolone acetonide 0.1% (KENALOG) 0.1 % ointment AAA bid; not more than 2 weeks straight in same location, avoid use on face and groin 454 g 1    triamcinolone acetonide 0.1%-ciclopirox-magnesium hydroxide 400 mg/5 ml Apply to affected area twice a day after cool blow dry 60 g 5    valsartan (DIOVAN) 320 MG tablet Take 1 tablet (320 mg total) by mouth once daily. 90 tablet 3     No current facility-administered medications on file prior to visit.       Review of patient's allergies indicates:   Allergen Reactions    Codeine        Past Surgical History:   Procedure Laterality Date    BACK SURGERY      lumbar laminectomy    COLONOSCOPY N/A 7/23/2020    Procedure: COLONOSCOPY;  Surgeon: Donal Moore MD;  Location: [facility] ENDO;  Service: Endoscopy;  Laterality: N/A;    EPIDURAL STEROID INJECTION Left 9/9/2020    Procedure: Injection, Steroid, Epidural Transforaminal;  Surgeon: Jun Leavitt Jr., MD;  Location: [facility];  Service: Pain Management;  Laterality: Left;  Left L5 + S1 TF WADE  Arrive @ 1300; ASA last 9/1; No DM    EPIDURAL STEROID INJECTION Left 2/12/2021    Procedure: Injection, Steroid, Epidural Transforaminal;  Surgeon: Jun Leavitt Jr., MD;  Location: [facility] ENDO;  Service: Pain Management;  Laterality: Left;  Left L4 + L5 TF WADE  Arrive @ 1230; IV Sedation; ASA last 2/4; No DM    TRANSFORAMINAL EPIDURAL INJECTION OF STEROID Left 3/14/2022    Procedure: INJECTION, STEROID, EPIDURAL, TRANSFORAMINAL APPROACH, L4-L5 & L5-S1;  Surgeon: Darya Ayala MD;  [place] [facility] [place];  Service: Pain Management;  Laterality: Left;    TRANSFORAMINAL EPIDURAL INJECTION OF STEROID Left 8/29/2022    Procedure: LUMBAR TRANSFORAMINAL LEFT L4/5 AND L5/S1 CONTRAST;  Surgeon: Darya Ayala MD;  Location: [facility] [place];  Service: Pain Management;  Laterality: Left;    TUBAL LIGATION         Family History   Problem Relation Age of  Onset    Breast cancer Mother     Hypertension Mother             Hypotension Mother     Cancer Mother          due to breast cancer    Miscarriages / Stillbirths Mother         3 stillborn children    Cataracts Father     Glaucoma Father     Diabetes Father             Arthritis Father     Vision loss Father         Glaucoma -     Glaucoma Sister     Arthritis Sister     Diabetes Sister     Hypertension Sister     Vision loss Sister         Glasses    Drug abuse Brother     Learning disabilities Brother     No Known Problems Brother     No Known Problems Brother     No Known Problems Maternal Aunt     No Known Problems Maternal Uncle     No Known Problems Paternal Aunt     No Known Problems Paternal Uncle     No Known Problems Maternal Grandmother     No Known Problems Maternal Grandfather     No Known Problems Paternal Grandmother     No Known Problems Paternal Grandfather     No Known Problems Son     No Known Problems Other     Arthritis Sister     Depression Sister     Diabetes Sister     Hearing loss Sister         Trouble hearing    Hypertension Sister     Stroke Sister         Browne Vargas Disease, Coma for a month due to diabetes,Some body weakness    Vision loss Sister         Lasix surgery    Arthritis Sister     COPD Sister     Diabetes Sister     Hearing loss Sister     Heart disease Sister     Hypertension Sister     Arthritis Brother     Hypertension Brother     Early death Sister         Still born    Early death Sister         Still born twin to Debby Benítez    Early death Brother     Hypertension Sister     Hypertension Brother     Learning disabilities Son     Learning disabilities Son         Great grandson    Learning disabilities Daughter         Granddaughter    Mental illness Sister         Her son Matthias Benítez    Vision loss Sister         Glasses/contacts    Vision loss Sister         Glasses    Amblyopia Neg Hx     Blindness Neg Hx     Macular degeneration  Neg Hx     Retinal detachment Neg Hx     Strabismus Neg Hx     Thyroid disease Neg Hx        Social History     Socioeconomic History    Marital status:    Tobacco Use    Smoking status: Former     Current packs/day: 0.00     Average packs/day: 0.3 packs/day for 35.0 years (8.8 ttl pk-yrs)     Types: Cigarettes     Start date: 3/15/1987     Quit date: 3/15/2022     Years since quittin.8    Smokeless tobacco: Current    Tobacco comments:     Occasional spoker some times 1-2 cigarettes/day sometimes none for weeks   Substance and Sexual Activity    Alcohol use: Yes     Alcohol/week: 3.0 standard drinks of alcohol     Types: 3 Glasses of wine per week     Comment: Occasional wine    Drug use: Yes     Comment: Tramadol twice a day as needed    Sexual activity: Not Currently     Partners: Male     Birth control/protection: Post-menopausal     Comment: Tubal 30+ years ago     Social Determinants of Health     Financial Resource Strain: High Risk (2023)    Overall Financial Resource Strain (CARDIA)     Difficulty of Paying Living Expenses: Very hard   Food Insecurity: Food Insecurity Present (2023)    Hunger Vital Sign     Worried About Running Out of Food in the Last Year: Sometimes true     Ran Out of Food in the Last Year: Sometimes true   Transportation Needs: No Transportation Needs (2023)    PRAPARE - Transportation     Lack of Transportation (Medical): No     Lack of Transportation (Non-Medical): No   Physical Activity: Insufficiently Active (2023)    Exercise Vital Sign     Days of Exercise per Week: 3 days     Minutes of Exercise per Session: 20 min   Stress: Stress Concern Present (2023)    [place] [employer] - Occupational Stress Questionnaire     Feeling of Stress : Very much   Social Connections: Unknown (2023)    Social Connection and Isolation Panel [NHANES]     Frequency of Communication with Friends and Family: More than three times a week      "Frequency of Social Gatherings with Friends and Family: Once a week     Active Member of Clubs or Organizations: No     Attends Club or Organization Meetings: Never     Marital Status:    Housing Stability: Low Risk  (11/9/2023)    Housing Stability Vital Sign     Unable to Pay for Housing in the Last Year: No     Number of Places Lived in the Last Year: 1     Unstable Housing in the Last Year: No       Review of Systems   Constitutional: Negative for chills and fever.   Cardiovascular:  Negative for claudication and leg swelling.   Respiratory:  Negative for cough and shortness of breath.    Skin:  Positive for color change. Negative for itching and rash.   Musculoskeletal:  Positive for arthritis, back pain, joint pain, myalgias and stiffness. Negative for falls, joint swelling and muscle weakness.   Gastrointestinal:  Negative for diarrhea, nausea and vomiting.   Neurological:  Positive for paresthesias. Negative for numbness, tremors and weakness.   Psychiatric/Behavioral:  Negative for altered mental status and hallucinations.            Objective:      Vitals:    01/03/24 0942   Weight: 112.9 kg (248 lb 14.4 oz)   Height: 5' 4" (1.626 m)       Physical Exam  Vitals and nursing note reviewed.   Constitutional:       General: She is not in acute distress.     Appearance: She is well-developed. She is not toxic-appearing or diaphoretic.      Comments: alert and oriented x 3.    Cardiovascular:      Pulses:           Dorsalis pedis pulses are 2+ on the right side and 2+ on the left side.        Posterior tibial pulses are 2+ on the right side and 2+ on the left side.      Comments:  Capillary refill time is within normal limits. Digital hair present.   Pulmonary:      Effort: No respiratory distress.   Musculoskeletal:         General: No deformity.      Right ankle: No tenderness. No lateral malleolus, medial malleolus, AITF ligament, CF ligament or posterior TF ligament tenderness.      Right Achilles " Tendon: No defects. Marroquin's test negative.      Left ankle: No tenderness. No lateral malleolus, medial malleolus, AITF ligament, CF ligament or posterior TF ligament tenderness.      Left Achilles Tendon: No defects. Marroquin's test negative.      Right foot: Swelling, tenderness and bony tenderness (bony prom to dorsal medial 1st MTPJ) present.      Left foot: No tenderness or bony tenderness.      Comments: There is equinus deformity bilateral with decreased dorsiflexion at the ankle joint bilateral.     Decreased first MPJ range of motion both weightbearing and nonweightbearing, + crepitus observed the first MP joint, + dorsal flag sign. Mild  bunion deformity is observed .    Patient has hammertoes of digits 2-5 bilateral partially reducible              Feet:      Right foot:      Protective Sensation: 5 sites tested.  5 sites sensed.      Skin integrity: Skin integrity normal.      Left foot:      Protective Sensation: 5 sites tested.  5 sites sensed.      Skin integrity: Skin integrity normal.   Lymphadenopathy:      Comments: No lymphatic streaking     Skin:     General: Skin is warm and dry.      Coloration: Skin is not pale.      Findings: No rash.      Nails: There is no clubbing.      Comments: Skin is of normal turgor.   Normal temperature gradient.  Examination of the skin reveals no evidence of significant rashes, open lesions, suspicious appearing nevi or other concerning lesions.    Neurological:      Sensory: No sensory deficit.      Motor: No atrophy.      Comments: Light touch present     Psychiatric:         Attention and Perception: She is attentive.         Mood and Affect: Mood is not anxious. Affect is not inappropriate.         Speech: She is communicative. Speech is not slurred.         Behavior: Behavior is not combative.               Assessment:       Encounter Diagnoses   Name Primary?    Right foot pain Yes    Bunion of right foot     Hallux limitus, acquired, left     Hallux  limitus, acquired, right          Plan:     Problem List Items Addressed This Visit    None  Visit Diagnoses       Right foot pain    -  Primary    Relevant Orders    X-Ray Foot Complete Right (Completed)    Bunion of right foot        Relevant Orders    X-Ray Foot Complete Right (Completed)    Hallux limitus, acquired, left        Hallux limitus, acquired, right                 I counseled the patient on her conditions, their implications and medical management.    Personally and independently visualized and interpreted imaging with patient. Discussed my interpretation in detail, answering patient inquiries.  If there are discrepancies with radiologist read we will discuss post encounter     Type of Reading: DPM.  Radiology Procedure Done: Right Great Toe.  Interpretation: Significant DJD with dorsal bony prominence         Patient education on the chronic nature of arthralgias of the feet. Discussed non-surgical treatment options, including injection, supportive shoegear, inserts.     Patient instructed on adequate icing techniques. Patient should ice the affected area at least 10 minutes when inflammed. I advised the patient that extra icing would also be beneficial to ensure adequate anti inflammatory effect. I gave written and verbal instructions on stretching exercises. Patient expressed understanding. Discussed  wearing appropriate shoe gear and avoiding flats, slippers, sandals, and going barefoot. My recommendation for OTC supports is [employer].     We also discussed cortisone injections and NSAID therapy.     Patient has topical Voltaren in the home that she states that she would prefer to use as opposed to having any new prescription medications.    RTC if no improvement, at this time she will receive cortisone injections and referral to PT. Patient is amenable to plan.

## 2024-01-04 ENCOUNTER — TELEPHONE (OUTPATIENT)
Dept: DERMATOLOGY | Facility: CLINIC | Age: 74
End: 2024-01-04
Payer: MEDICARE

## 2024-01-04 ENCOUNTER — OFFICE VISIT (OUTPATIENT)
Dept: DERMATOLOGY | Facility: CLINIC | Age: 74
End: 2024-01-04
Payer: MEDICARE

## 2024-01-04 ENCOUNTER — PATIENT MESSAGE (OUTPATIENT)
Dept: DERMATOLOGY | Facility: CLINIC | Age: 74
End: 2024-01-04

## 2024-01-04 DIAGNOSIS — R20.8 DYSESTHESIA OF SCALP: Primary | ICD-10-CM

## 2024-01-04 PROCEDURE — 99214 OFFICE O/P EST MOD 30 MIN: CPT | Mod: 95,,, | Performed by: DERMATOLOGY

## 2024-01-04 RX ORDER — MOMETASONE FUROATE 1 MG/ML
SOLUTION TOPICAL
Qty: 60 ML | Refills: 5 | Status: SHIPPED | OUTPATIENT
Start: 2024-01-04

## 2024-01-04 NOTE — TELEPHONE ENCOUNTER
At this time this nurse writer calls pt back via pt urgent call back request message; pt states that she is logged into the virtual appointment at this time and had originally called as she had some problems getting on the appointment but has no issues at this time; pt thanks nurse and call completed; physician notified that pt ready in virtual appointment at this time.

## 2024-01-05 ENCOUNTER — LAB VISIT (OUTPATIENT)
Dept: LAB | Facility: HOSPITAL | Age: 74
End: 2024-01-05
Payer: MEDICARE

## 2024-01-05 ENCOUNTER — OFFICE VISIT (OUTPATIENT)
Dept: NEUROLOGY | Facility: CLINIC | Age: 74
End: 2024-01-05
Payer: MEDICARE

## 2024-01-05 VITALS
HEART RATE: 75 BPM | HEIGHT: 64 IN | SYSTOLIC BLOOD PRESSURE: 106 MMHG | DIASTOLIC BLOOD PRESSURE: 67 MMHG | BODY MASS INDEX: 41.79 KG/M2 | WEIGHT: 244.81 LBS

## 2024-01-05 DIAGNOSIS — E66.01 MORBID OBESITY, UNSPECIFIED OBESITY TYPE: ICD-10-CM

## 2024-01-05 DIAGNOSIS — G44.40 REBOUND HEADACHE: ICD-10-CM

## 2024-01-05 DIAGNOSIS — R20.2 PARESTHESIAS: Primary | ICD-10-CM

## 2024-01-05 DIAGNOSIS — R20.2 PARESTHESIAS: ICD-10-CM

## 2024-01-05 DIAGNOSIS — R20.8 DYSESTHESIA OF SCALP: ICD-10-CM

## 2024-01-05 DIAGNOSIS — G47.33 OSA (OBSTRUCTIVE SLEEP APNEA): ICD-10-CM

## 2024-01-05 LAB
BASOPHILS # BLD AUTO: 0.02 K/UL (ref 0–0.2)
BASOPHILS NFR BLD: 0.3 % (ref 0–1.9)
DIFFERENTIAL METHOD BLD: ABNORMAL
EOSINOPHIL # BLD AUTO: 0.1 K/UL (ref 0–0.5)
EOSINOPHIL NFR BLD: 2.4 % (ref 0–8)
ERYTHROCYTE [DISTWIDTH] IN BLOOD BY AUTOMATED COUNT: 13 % (ref 11.5–14.5)
ERYTHROCYTE [SEDIMENTATION RATE] IN BLOOD BY PHOTOMETRIC METHOD: 74 MM/HR (ref 0–36)
HCT VFR BLD AUTO: 37.1 % (ref 37–48.5)
HGB BLD-MCNC: 11.5 G/DL (ref 12–16)
IMM GRANULOCYTES # BLD AUTO: 0.03 K/UL (ref 0–0.04)
IMM GRANULOCYTES NFR BLD AUTO: 0.5 % (ref 0–0.5)
LYMPHOCYTES # BLD AUTO: 2 K/UL (ref 1–4.8)
LYMPHOCYTES NFR BLD: 33.1 % (ref 18–48)
MCH RBC QN AUTO: 29.4 PG (ref 27–31)
MCHC RBC AUTO-ENTMCNC: 31 G/DL (ref 32–36)
MCV RBC AUTO: 95 FL (ref 82–98)
MONOCYTES # BLD AUTO: 0.5 K/UL (ref 0.3–1)
MONOCYTES NFR BLD: 7.9 % (ref 4–15)
NEUTROPHILS # BLD AUTO: 3.3 K/UL (ref 1.8–7.7)
NEUTROPHILS NFR BLD: 55.8 % (ref 38–73)
NRBC BLD-RTO: 0 /100 WBC
PLATELET # BLD AUTO: 300 K/UL (ref 150–450)
PMV BLD AUTO: 9.4 FL (ref 9.2–12.9)
RBC # BLD AUTO: 3.91 M/UL (ref 4–5.4)
T4 SERPL-MCNC: 7 UG/DL (ref 4.5–11.5)
TSH SERPL DL<=0.005 MIU/L-ACNC: 1.44 UIU/ML (ref 0.4–4)
WBC # BLD AUTO: 5.93 K/UL (ref 3.9–12.7)

## 2024-01-05 PROCEDURE — 85025 COMPLETE CBC W/AUTO DIFF WBC: CPT | Performed by: PHYSICIAN ASSISTANT

## 2024-01-05 PROCEDURE — 82300 ASSAY OF CADMIUM: CPT | Performed by: PHYSICIAN ASSISTANT

## 2024-01-05 PROCEDURE — 84252 ASSAY OF VITAMIN B-2: CPT | Performed by: PHYSICIAN ASSISTANT

## 2024-01-05 PROCEDURE — 99204 OFFICE O/P NEW MOD 45 MIN: CPT | Mod: S$GLB,,, | Performed by: PHYSICIAN ASSISTANT

## 2024-01-05 PROCEDURE — 87522 HEPATITIS C REVRS TRNSCRPJ: CPT | Performed by: PHYSICIAN ASSISTANT

## 2024-01-05 PROCEDURE — 84590 ASSAY OF VITAMIN A: CPT | Performed by: PHYSICIAN ASSISTANT

## 2024-01-05 PROCEDURE — 85652 RBC SED RATE AUTOMATED: CPT | Performed by: PHYSICIAN ASSISTANT

## 2024-01-05 PROCEDURE — 84443 ASSAY THYROID STIM HORMONE: CPT | Performed by: PHYSICIAN ASSISTANT

## 2024-01-05 PROCEDURE — 36415 COLL VENOUS BLD VENIPUNCTURE: CPT | Performed by: PHYSICIAN ASSISTANT

## 2024-01-05 PROCEDURE — 84207 ASSAY OF VITAMIN B-6: CPT | Performed by: PHYSICIAN ASSISTANT

## 2024-01-05 PROCEDURE — 80321 ALCOHOLS BIOMARKERS 1OR 2: CPT | Performed by: PHYSICIAN ASSISTANT

## 2024-01-05 PROCEDURE — 84255 ASSAY OF SELENIUM: CPT | Performed by: PHYSICIAN ASSISTANT

## 2024-01-05 PROCEDURE — 84446 ASSAY OF VITAMIN E: CPT | Performed by: PHYSICIAN ASSISTANT

## 2024-01-05 PROCEDURE — 86592 SYPHILIS TEST NON-TREP QUAL: CPT | Performed by: PHYSICIAN ASSISTANT

## 2024-01-05 PROCEDURE — 82525 ASSAY OF COPPER: CPT | Performed by: PHYSICIAN ASSISTANT

## 2024-01-05 PROCEDURE — 84436 ASSAY OF TOTAL THYROXINE: CPT | Performed by: PHYSICIAN ASSISTANT

## 2024-01-05 PROCEDURE — 84425 ASSAY OF VITAMIN B-1: CPT | Performed by: PHYSICIAN ASSISTANT

## 2024-01-05 PROCEDURE — 99999 PR PBB SHADOW E&M-EST. PATIENT-LVL V: CPT | Mod: PBBFAC,,, | Performed by: PHYSICIAN ASSISTANT

## 2024-01-05 RX ORDER — PREDNISONE 5 MG/1
TABLET ORAL
Qty: 21 TABLET | Refills: 0 | Status: SHIPPED | OUTPATIENT
Start: 2024-01-05 | End: 2024-01-10

## 2024-01-05 NOTE — PATIENT INSTRUCTIONS
Prednisone taper to break current headache cycle, do not exceed 3 doses of BC powder in one week to prevent rebound headache    Will do lab evaluation to see if this is contributing to your symptoms

## 2024-01-05 NOTE — PROGRESS NOTES
"[facility] - NEUROLOGY [address]TH [address] [facility], [place]    Date: 1/5/24  Patient Name: Aamnda Holt   MRN: [id number]   PCP: Simona Torres  Referring Provider: Naz Walters MD    Chief Complaint: Dysesthesia of the scalp   Subjective:       HPI:   Ms. Amanda Holt is a 73 y.o. female DDD lumbar spine s/p laminectomy, HTN, arthrosclerosis of the aorta, CKD stage 3A, and ANTONIA presenting for evaluation of dyesthesias of the scalp, per chart review the patient was seen yesterday with dermatology.     She reports that about 3 months ago she began to develop itching on the scalp. She has changed her hair products and done multiple topical treatments without relief. She notes that she also feels as if she has something "crawling" under her skin on the scalp. She notes that the itching/crawling sensation is holocephalic. She denies any irritating or aggravating factors. She notes that her itching is bothersome during the day but most bothersome at night time. She does have Amitriptyline following her back surgery which typically provides some relief. She does have gabapentin as well however does not regularly take this due to her kidney concerns. She denies any rashes. She does find that sometimes her symptoms can radiate to her back with the itching sensation but does not have the "crawling" sensation that she experiences on the scalp. She notes that she has also been having burning and stinging pain that radiates on her back shoulders that has been ongoing x 1 year. She notes that this also has some uncontrolled itching.     She endorses headaches that began around the same time as the itching began and are located in the front bilaterally. Reports the pain is throbbing. Headaches tend to last her a few hours.     Takes BC powder daily for headache relief    Began Ozempic in June and has cut out red meat from her diet.     She also notes that she has R arm numbness " and tingling however is known to have carpal tunnel of the R hand. She noted that the other week she slept on it funny and it caused an irritation of her symptoms.     Reports that she has shooting pain in the L leg.     PAST MEDICAL HISTORY:  Past Medical History:   Diagnosis Date    Arthralgia     Colon polyp     Degenerative disc disease at L5-S1 level     High cholesterol     Hypertension     Nuclear sclerosis of both eyes 1/8/2020    Sciatica     Spinal stenosis     Tobacco use 10/21/2021       PAST SURGICAL HISTORY:  Past Surgical History:   Procedure Laterality Date    BACK SURGERY      lumbar laminectomy    COLONOSCOPY N/A 7/23/2020    Procedure: COLONOSCOPY;  Surgeon: Donal Moore MD;  Location: [facility] ENDO;  Service: Endoscopy;  Laterality: N/A;    EPIDURAL STEROID INJECTION Left 9/9/2020    Procedure: Injection, Steroid, Epidural Transforaminal;  Surgeon: Jun Leavitt Jr., MD;  Location: [facility] ENDO;  Service: Pain Management;  Laterality: Left;  Left L5 + S1 TF WADE  Arrive @ 1300; ASA last 9/1; No DM    EPIDURAL STEROID INJECTION Left 2/12/2021    Procedure: Injection, Steroid, Epidural Transforaminal;  Surgeon: Jun Leavitt Jr., MD;  Location: [facility] ENDO;  Service: Pain Management;  Laterality: Left;  Left L4 + L5 TF WADE  Arrive @ 1230; IV Sedation; ASA last 2/4; No DM    TRANSFORAMINAL EPIDURAL INJECTION OF STEROID Left 3/14/2022    Procedure: INJECTION, STEROID, EPIDURAL, TRANSFORAMINAL APPROACH, L4-L5 & L5-S1;  Surgeon: Darya Ayala MD;  Location: [facility] PAIN MGT;  Service: Pain Management;  Laterality: Left;    TRANSFORAMINAL EPIDURAL INJECTION OF STEROID Left 8/29/2022    Procedure: LUMBAR TRANSFORAMINAL LEFT L4/5 AND L5/S1 CONTRAST;  Surgeon: Darya Ayala MD;  Location: [facility] PAIN MGT;  Service: Pain Management;  Laterality: Left;    TUBAL LIGATION         CURRENT MEDS:  Current Outpatient Medications   Medication Sig Dispense Refill    amitriptyline (ELAVIL) 100 MG tablet Take 1  tablet (100 mg total) by mouth every evening. 90 tablet 1    aspirin (ECOTRIN) 81 MG EC tablet       clotrimazole-betamethasone 1-0.05% (LOTRISONE) cream APPLY  CREAM TOPICALLY TO AFFECTED AREA TWICE DAILY 45 g 0    diclofenac sodium (VOLTAREN) 1 % Gel Apply topically 2 (two) times daily. 100 g 5    ergocalciferol (ERGOCALCIFEROL) 50,000 unit Cap Take 1 capsule by mouth every 7 days.      fluticasone propionate (FLONASE) 50 mcg/actuation nasal spray Use 1 spray(s) in each nostril once daily 48 g 3    gabapentin (NEURONTIN) 300 MG capsule Start off with 1 pill at night okay to increase to 2 pills at night, if desired can take up to 3 (1 in am and 2 in pm) 90 capsule 11    hydrALAZINE (APRESOLINE) 100 MG tablet Take 1 tablet (100 mg total) by mouth every 12 (twelve) hours. 180 tablet 3    hydrOXYzine HCL (ATARAX) 25 MG tablet Take 1 tablet by mouth three times daily as needed 45 tablet 0    metoprolol succinate (TOPROL-XL) 25 MG 24 hr tablet Take 1 tablet (25 mg total) by mouth once daily. 30 tablet 11    mometasone (ELOCON) 0.1 % solution Apply once to twice daily prn itching 60 mL 5    mupirocin (BACTROBAN) 2 % ointment Apply topically 3 (three) times daily. 22 g 0    NIFEdipine (PROCARDIA-XL) 30 MG (OSM) 24 hr tablet Take 1 tablet by mouth once daily 90 tablet 2    nystatin (NYSTOP) powder APPLY  POWDER TOPICALLY TO AFFECTED AREA 4 TIMES DAILY 30 g 0    olopatadine (PATANOL) 0.1 % ophthalmic solution INSTILL 1 DROP INTO EACH EYE TWICE DAILY 5 mL 0    omeprazole (PRILOSEC) 40 MG capsule Take 1 capsule (40 mg total) by mouth once daily. 90 capsule 2    pravastatin (PRAVACHOL) 40 MG tablet Take 1 tablet by mouth once daily 90 tablet 1    predniSONE (DELTASONE) 5 MG tablet Take 6 tablets (30 mg total) by mouth once daily for 1 day, THEN 5 tablets (25 mg total) once daily for 1 day, THEN 4 tablets (20 mg total) once daily for 1 day, THEN 3 tablets (15 mg total) once daily for 1 day, THEN 2 tablets (10 mg total) once  daily for 1 day, THEN 1 tablet (5 mg total) once daily for 1 day. 21 tablet 0    semaglutide (OZEMPIC) 0.25 mg or 0.5 mg (2 mg/3 mL) pen injector Inject 0.5 mg into the skin every 7 days. 3 mL 2    spironolactone (ALDACTONE) 50 MG tablet Take 1 tablet by mouth once daily 90 tablet 1    tiZANidine (ZANAFLEX) 4 MG tablet TAKE 1 TABLET BY MOUTH EVERY 6 HOURS AS NEEDED 90 tablet 0    traMADoL (ULTRAM) 50 mg tablet TAKE 1 TABLET BY MOUTH EVERY 6 HOURS AS NEEDED 120 tablet 2    triamcinolone acetonide 0.1% (KENALOG) 0.1 % ointment AAA bid 454 g 3    triamcinolone acetonide 0.1% (KENALOG) 0.1 % ointment AAA bid; not more than 2 weeks straight in same location, avoid use on face and groin 454 g 1    triamcinolone acetonide 0.1%-ciclopirox-magnesium hydroxide 400 mg/5 ml Apply to affected area twice a day after cool blow dry 60 g 5    valsartan (DIOVAN) 320 MG tablet Take 1 tablet (320 mg total) by mouth once daily. 90 tablet 3     No current facility-administered medications for this visit.       ALLERGIES:  Review of patient's allergies indicates:   Allergen Reactions    Codeine        FAMILY HISTORY:  Family History   Problem Relation Age of Onset    Breast cancer Mother     Hypertension Mother             Hypotension Mother     Cancer Mother          due to breast cancer    Miscarriages / Stillbirths Mother         3 stillborn children    Cataracts Father     Glaucoma Father     Diabetes Father             Arthritis Father     Vision loss Father         Glaucoma -     Glaucoma Sister     Arthritis Sister     Diabetes Sister     Hypertension Sister     Vision loss Sister         Glasses    Drug abuse Brother     Learning disabilities Brother     No Known Problems Brother     No Known Problems Brother     No Known Problems Maternal Aunt     No Known Problems Maternal Uncle     No Known Problems Paternal Aunt     No Known Problems Paternal Uncle     No Known Problems Maternal Grandmother      No Known Problems Maternal Grandfather     No Known Problems Paternal Grandmother     No Known Problems Paternal Grandfather     No Known Problems Son     No Known Problems Other     Arthritis Sister     Depression Sister     Diabetes Sister     Hearing loss Sister         Trouble hearing    Hypertension Sister     Stroke Sister         Browne Vargas Disease, Coma for a month due to diabetes,Some body weakness    Vision loss Sister         Lasix surgery    Arthritis Sister     COPD Sister     Diabetes Sister     Hearing loss Sister     Heart disease Sister     Hypertension Sister     Arthritis Brother     Hypertension Brother     Early death Sister         Still born    Early death Sister         Still born twin to Debby Benítez    Early death Brother     Hypertension Sister     Hypertension Brother     Learning disabilities Son     Learning disabilities Son         Great grandson    Learning disabilities Daughter         Granddaughter    Mental illness Sister         Her son Matthias Benítez    Vision loss Sister         Glasses/contacts    Vision loss Sister         Glasses    Amblyopia Neg Hx     Blindness Neg Hx     Macular degeneration Neg Hx     Retinal detachment Neg Hx     Strabismus Neg Hx     Thyroid disease Neg Hx        SOCIAL HISTORY:  Social History     Tobacco Use    Smoking status: Former     Current packs/day: 0.00     Average packs/day: 0.3 packs/day for 35.0 years (8.8 ttl pk-yrs)     Types: Cigarettes     Start date: 3/15/1987     Quit date: 3/15/2022     Years since quittin.8    Smokeless tobacco: Current    Tobacco comments:     Occasional spoker some times 1-2 cigarettes/day sometimes none for weeks   Substance Use Topics    Alcohol use: Yes     Alcohol/week: 3.0 standard drinks of alcohol     Types: 3 Glasses of wine per week     Comment: Occasional wine    Drug use: Yes     Comment: Tramadol twice a day as needed       Review of Systems:  Review of Systems   Constitutional:  Negative for  "fever, malaise/fatigue and weight loss.   HENT:  Negative for congestion, ear discharge, ear pain, hearing loss, nosebleeds, sinus pain and sore throat.    Eyes:  Negative for blurred vision, double vision, photophobia, pain and discharge.   Respiratory:  Negative for cough, hemoptysis and shortness of breath.    Cardiovascular:  Negative for chest pain, palpitations, orthopnea and leg swelling.   Gastrointestinal:  Negative for abdominal pain, blood in stool, constipation, diarrhea, nausea and vomiting.   Genitourinary:  Negative for dysuria, frequency, hematuria and urgency.   Musculoskeletal:  Positive for back pain. Negative for falls, myalgias and neck pain.   Skin:  Positive for itching. Negative for rash.   Neurological:  Positive for tingling and headaches. Negative for dizziness, focal weakness, seizures, loss of consciousness and weakness.            Objective:     Vitals:    01/05/24 1103   BP: 106/67   Pulse: 75   Weight: 111 kg (244 lb 13.1 oz)   Height: 5' 4" (1.626 m)         Lab Results   Component Value Date    WBC 4.86 03/07/2023    HGB 12.7 03/07/2023    HCT 40.6 03/07/2023     03/07/2023    CHOL 160 03/07/2023    TRIG 94 03/07/2023    HDL 40 03/07/2023    ALT 14 03/07/2023    AST 15 03/07/2023     03/07/2023    K 4.5 03/07/2023     (H) 03/07/2023    CREATININE 1.3 03/07/2023    BUN 18 03/07/2023    CO2 25 03/07/2023    TSH 1.632 03/07/2023    HGBA1C 5.4 03/07/2023       NEUROLOGICAL EXAMINATION:     MENTAL STATUS   Oriented to person, place, and time.   Level of consciousness: alert    CRANIAL NERVES     CN III, IV, VI   Pupils are equal, round, and reactive to light.  Extraocular motions are normal.     CN V   Facial sensation intact.     CN VII   Facial expression full, symmetric.     CN VIII   CN VIII normal.     CN IX, X   CN IX normal.   CN X normal.     CN XI   CN XI normal.     CN XII   CN XII normal.        Bilateral exopthalmos noted on exam      MOTOR EXAM   Muscle " bulk: normal    REFLEXES     Reflexes   Right brachioradialis: 2+  Left brachioradialis: 2+  Right biceps: 2+  Left biceps: 2+  Right triceps: 2+  Left triceps: 2+  Right patellar: 1+  Left patellar: 1+  Right achilles: 0 (trace)  Left achilles: 0 (trace)  Right plantar: normal  Left plantar: normal  Right Lei: absent  Left Lei: absent  Right ankle clonus: absent  Left ankle clonus: absent    ? ?    MUSCLE STRENGTH:     Fascics Atrophy RIGHT    LEFT Atrophy Fascics     5 Neck Ext. 5       5 Neck Flex 5       5 Deltoids 5       5 Sh.Ext.Rot. 5       5 Sh.Int.Rot. 5       5 Biceps 5       5 Triceps 5       5 Forearm.Pr. 5       5 Wrist Ext. 5       5 Wrist Flex 5       5 Finger Ext. 5       5 Finger Flex 5       5 FPL 5       5 Inteross. 5                         5 Iliopsoas 5       5 Hip Abduct 5       5 Hip Adduct 5       5 Quads 5       5 Hams 5       5 Dorsiflex 5       5 Plantar Flex 5       5 Ankle Richard 5       5 Ankle Invert 5       5 Toe Ext. 5       5 Toe Flex 5                         REFLEXES:    RIGHT Reflex   LEFT   2+ Biceps 2+   2+ Brachiorad. 2+   2+ Triceps 2+    Pectoralis     Jaw Jerk     Lei's         2+ Patellar 2+   2+ Ankle 2+    Suprapatellar              Down PLANTAR Down       Diagnostic Data:     2/23/22 MRI LUMBAR SPINE WITHOUT CONTRAST     CLINICAL HISTORY:  Low back pain, symptoms persist with > 6wks conservative treatment; Dorsalgia, unspecified     TECHNIQUE:  Multiplanar, multisequence MR images were acquired from the thoracolumbar junction to the sacrum without the administration of contrast.     COMPARISON:  Radiograph 07/16/2021     FINDINGS:  Alignment: Relatively well maintained     Vertebrae: Normal marrow signal. No fracture.  Left-sided at L4-5.  There is transitional anatomy at what will be termed L5.  Rudimentary disc space at what will be termed L5-S1.     Discs: As below.  There is height loss which is most notable at L4-5.     Cord: Normal.  Conus terminates  at L1.     Degenerative findings:     T12-L1: Unremarkable.     L1-L2: Unremarkable.     L2-L3: Mild facet hypertrophy.  No significant nerve root compression.     L3-L4: Circumferential disc bulge and facet hypertrophy.  Moderate canal stenosis and moderate bilateral neural foraminal narrowing.     L4-L5: Intervertebral disc height loss with circumferential disc bulge and facet hypertrophy.  Moderate bilateral neural foraminal narrowing.     L5-S1: Rudimentary disc space.  There is facet arthropathy.  Moderate right and mild left neural foraminal narrowing.     Paraspinal muscles & soft tissues: Multiple bilateral renal cyst, increased in number when compared to 2019 exam.     Impression:     Left-sided fusion L4-L5 without evidence of complication.  There is transitional anatomy at what will be termed L5 with rudimentary disc space at L5-S1.     Degenerative change of the inferior lumbar spine as detailed above most notable at L3-4, L4-5, L5-S1.     Numerous bilateral renal cysts which are increased in number when compared to 2019 CT.  Further evaluation with a renal ultrasound may be obtained.    Assessment:   Amanda Holt is a 73 y.o. female presenting for evaluation of itching of the scalp.     Plan:   Paresthesias:     Discussed Amitriptyline with the patient do not exceed 100mg QHS due to the patients age can consider switching to Nortriptyline vs. Cymbalta for relief of neuropathic itching    Will do lab work to see if there are any metabolic factors contributing to the patients symptoms.       Headache:   Discussed not using more than 3 doses of BC powder in one week to prevent rebound headache     Discussed that daily headaches upon waking are likely 2/2 CPAP not being used with the patients known ANTONIA.    Called in prednisone taper to give some relief of rebound headache      Problem List Items Addressed This Visit          Other    ANTONIA (obstructive sleep apnea)    Overview     -PSG 12/10/2022:   The overall AHI was 6.1 with an oxygen allyssa of 83.0%. The AHI in REM sleep was 43.2. The central apnea index was 0.2   -current cpap is 2 years.  D/w [facility], current unit was dispensed 2021.  Await replacement apap.    -we discussed at length about Respironics recall.  Patient already registered her apap.  Risk and benefits discussed.  Per patient, sleep quality improves with apap.  Still waiting for replacement apap.  Current apap is less than 5 years.           Current Assessment & Plan     Noted to not have good sleep and is not using her CPAP following a recall           Other Visit Diagnoses       Paresthesias    -  Primary    Relevant Orders    Vitamin B6    Vitamin B2    Vitamin B12 Deficiency Panel    Vitamin B1    T4    TSH    RPR    Hepatitis C RNA, Quantitative, PCR    Heavy Metals Screen, Blood (Quantitative)    CBC Auto Differential    Phosphatidylethanol (PETH)    Sedimentation rate    VITAMIN A    VITAMIN E    SELENIUM SERUM    COPPER, SERUM    Dysesthesia of scalp        Rebound headache        Relevant Medications    predniSONE (DELTASONE) 5 MG tablet    Morbid obesity, unspecified obesity type                  I spent a total of 45 minutes on the day of the visit. This includes face to face time and non-face to face time preparing to see the patient (eg, review of tests), obtaining and/or reviewing separately obtained history, documenting clinical information in the electronic or other health record, independently interpreting results and communicating results to the patient/family/caregiver, or care coordinator.    Melissa Prieto PA-C  Supervising physician Natan Thao MD was available for all questions during this exam.  [facility]

## 2024-01-06 LAB
ARSENIC BLD-MCNC: 2 NG/ML
CADMIUM BLD-MCNC: 0.4 NG/ML
CITY: NORMAL
COUNTY: NORMAL
GUARDIAN FIRST NAME: NORMAL
GUARDIAN LAST NAME: NORMAL
HOME PHONE: NORMAL
LEAD BLD-MCNC: 2.6 MCG/DL
MERCURY BLD-MCNC: <1 NG/ML
RACE: NORMAL
RPR SER QL: NORMAL
STATE: NORMAL
STREET ADDRESS: NORMAL
VENOUS/CAPILLARY: NORMAL
ZIP: NORMAL

## 2024-01-08 ENCOUNTER — HOSPITAL ENCOUNTER (OUTPATIENT)
Dept: RADIOLOGY | Facility: HOSPITAL | Age: 74
Discharge: HOME | End: 2024-01-08
Payer: MEDICARE

## 2024-01-08 ENCOUNTER — OFFICE VISIT (OUTPATIENT)
Dept: OBSTETRICS AND GYNECOLOGY | Facility: CLINIC | Age: 74
End: 2024-01-08
Payer: MEDICARE

## 2024-01-08 VITALS
WEIGHT: 246.94 LBS | HEIGHT: 64 IN | BODY MASS INDEX: 42.16 KG/M2 | DIASTOLIC BLOOD PRESSURE: 86 MMHG | SYSTOLIC BLOOD PRESSURE: 142 MMHG

## 2024-01-08 DIAGNOSIS — Z01.419 WELL WOMAN EXAM WITH ROUTINE GYNECOLOGICAL EXAM: Primary | ICD-10-CM

## 2024-01-08 DIAGNOSIS — N28.1 RENAL CYST: ICD-10-CM

## 2024-01-08 LAB
CLINICAL BIOCHEMIST REVIEW: NORMAL
HCV RNA SERPL QL NAA+PROBE: NOT DETECTED
HCV RNA SPEC NAA+PROBE-ACNC: NOT DETECTED IU/ML
PLPETH BLD-MCNC: NORMAL NG/ML
POPETH BLD-MCNC: 25 NG/ML
SELENIUM SERPL-MCNC: 120 MCG/L (ref 110–165)

## 2024-01-08 PROCEDURE — G0101 CA SCREEN;PELVIC/BREAST EXAM: HCPCS | Mod: S$GLB,,, | Performed by: OBSTETRICS & GYNECOLOGY

## 2024-01-08 PROCEDURE — 99999 PR PBB SHADOW E&M-EST. PATIENT-LVL IV: CPT | Mod: PBBFAC,,, | Performed by: OBSTETRICS & GYNECOLOGY

## 2024-01-08 PROCEDURE — 76770 US EXAM ABDO BACK WALL COMP: CPT | Mod: TC

## 2024-01-08 PROCEDURE — 76770 US EXAM ABDO BACK WALL COMP: CPT | Mod: 26,,, | Performed by: RADIOLOGY

## 2024-01-08 RX ORDER — MELOXICAM 7.5 MG/1
7.5 TABLET ORAL DAILY
COMMUNITY

## 2024-01-08 NOTE — PROGRESS NOTES
Subjective:       Patient ID: Amanda Holt is a 73 y.o. female.    Chief Complaint:  Well Woman (NP here to establish care and annual. Last normal mammogram was 2023)        History of Present Illness  HPI  Annual Exam-Postmenopausal  Patient presents for annual exam. The patient has no GYN complaints today. The patient is not currently sexually active. GYN screening history:  Last normal mammogram was 2023 . The patient is not taking hormone replacement therapy. Patient denies post-menopausal vaginal bleeding. The patient wears seatbelts: yes. The patient participates in regular exercise: no. Has the patient ever been transfused or tattooed?: no. The patient reports that there is not domestic violence in her life.    History of chronic back pain.  Status post multiple epidural injections and laminectomy  History of CHTN  History of colonic polyps    GYN & OB History  No LMP recorded. Patient is postmenopausal.   Date of Last Pap: No result found    OB History    Para Term  AB Living   2 2 2         SAB IAB Ectopic Multiple Live Births                  # Outcome Date GA Lbr Sagar/2nd Weight Sex Delivery Anes PTL Lv   2 Term            1 Term                Past Medical History:   Diagnosis Date    Arthralgia     Colon polyp     Degenerative disc disease at L5-S1 level     High cholesterol     Hypertension     Nuclear sclerosis of both eyes 2020    Sciatica     Spinal stenosis     Tobacco use 10/21/2021       Past Surgical History:   Procedure Laterality Date    BACK SURGERY      lumbar laminectomy    COLONOSCOPY N/A 2020    Procedure: COLONOSCOPY;  Surgeon: Donal Moore MD;  Location: [facility] ENDO;  Service: Endoscopy;  Laterality: N/A;    EPIDURAL STEROID INJECTION Left 2020    Procedure: Injection, Steroid, Epidural Transforaminal;  Surgeon: Jun Leavitt Jr., MD;  Location: [facility] ENDO;  Service: Pain Management;  Laterality: Left;  Left L5 + S1 TF  WADE  Arrive @ 1300; ASA last ; No DM    EPIDURAL STEROID INJECTION Left 2021    Procedure: Injection, Steroid, Epidural Transforaminal;  Surgeon: Jun Leavitt Jr., MD;  Location: [facility] ENDO;  Service: Pain Management;  Laterality: Left;  Left L4 + L5 TF WADE  Arrive @ 1230; IV Sedation; ASA last ; No DM    TRANSFORAMINAL EPIDURAL INJECTION OF STEROID Left 3/14/2022    Procedure: INJECTION, STEROID, EPIDURAL, TRANSFORAMINAL APPROACH, L4-L5 & L5-S1;  Surgeon: Darya Ayala MD;  Location: [facility] PAIN MGT;  Service: Pain Management;  Laterality: Left;    TRANSFORAMINAL EPIDURAL INJECTION OF STEROID Left 2022    Procedure: LUMBAR TRANSFORAMINAL LEFT L4/5 AND L5/S1 CONTRAST;  Surgeon: Darya Ayala MD;  Location: [facility] PAIN MGT;  Service: Pain Management;  Laterality: Left;    TUBAL LIGATION         Family History   Problem Relation Age of Onset    Breast cancer Mother     Hypertension Mother             Hypotension Mother     Cancer Mother          due to breast cancer    Miscarriages / Stillbirths Mother         3 stillborn children    Cataracts Father     Glaucoma Father     Diabetes Father             Arthritis Father     Vision loss Father         Glaucoma -     Glaucoma Sister     Arthritis Sister     Diabetes Sister     Hypertension Sister     Vision loss Sister         Glasses    Drug abuse Brother     Learning disabilities Brother     No Known Problems Brother     No Known Problems Brother     No Known Problems Maternal Aunt     No Known Problems Maternal Uncle     No Known Problems Paternal Aunt     No Known Problems Paternal Uncle     No Known Problems Maternal Grandmother     No Known Problems Maternal Grandfather     No Known Problems Paternal Grandmother     No Known Problems Paternal Grandfather     No Known Problems Son     No Known Problems Other     Arthritis Sister     Depression Sister     Diabetes Sister     Hearing loss Sister         Trouble hearing     Hypertension Sister     Stroke Sister         Browne Vargas Disease, Coma for a month due to diabetes,Some body weakness    Vision loss Sister         Lasix surgery    Arthritis Sister     COPD Sister     Diabetes Sister     Hearing loss Sister     Heart disease Sister     Hypertension Sister     Arthritis Brother     Hypertension Brother     Early death Sister         Still born    Early death Sister         Still born twin to Debby Benítez    Early death Brother     Hypertension Sister     Hypertension Brother     Learning disabilities Son     Learning disabilities Son         Great grandson    Learning disabilities Daughter         Granddaughter    Mental illness Sister         Her son Matthias Benítez    Vision loss Sister         Glasses/contacts    Vision loss Sister         Glasses    Amblyopia Neg Hx     Blindness Neg Hx     Macular degeneration Neg Hx     Retinal detachment Neg Hx     Strabismus Neg Hx     Thyroid disease Neg Hx        Social History     Socioeconomic History    Marital status:    Tobacco Use    Smoking status: Former     Current packs/day: 0.00     Average packs/day: 0.3 packs/day for 35.0 years (8.8 ttl pk-yrs)     Types: Cigarettes     Start date: 3/15/1987     Quit date: 3/15/2022     Years since quittin.8    Smokeless tobacco: Current    Tobacco comments:     Occasional spoker some times 1-2 cigarettes/day sometimes none for weeks   Substance and Sexual Activity    Alcohol use: Yes     Alcohol/week: 3.0 standard drinks of alcohol     Types: 3 Glasses of wine per week     Comment: Occasional wine    Drug use: Yes     Comment: Tramadol twice a day as needed    Sexual activity: Not Currently     Partners: Male     Birth control/protection: Post-menopausal     Comment: Tubal 30+ years ago   Social History Narrative    Lives alone    No partner    Was a  for a non-profit organization     Social Determinants of Health     Financial Resource Strain: High Risk  (11/9/2023)    Overall Financial Resource Strain (CARDIA)     Difficulty of Paying Living Expenses: Very hard   Food Insecurity: Food Insecurity Present (11/9/2023)    Hunger Vital Sign     Worried About Running Out of Food in the Last Year: Sometimes true     Ran Out of Food in the Last Year: Sometimes true   Transportation Needs: No Transportation Needs (11/9/2023)    PRAPARE - Transportation     Lack of Transportation (Medical): No     Lack of Transportation (Non-Medical): No   Physical Activity: Insufficiently Active (11/9/2023)    Exercise Vital Sign     Days of Exercise per Week: 3 days     Minutes of Exercise per Session: 20 min   Stress: Stress Concern Present (11/9/2023)    [employer] - Occupational Stress Questionnaire     Feeling of Stress : Very much   Social Connections: Unknown (11/9/2023)    Social Connection and Isolation Panel [NHANES]     Frequency of Communication with Friends and Family: More than three times a week     Frequency of Social Gatherings with Friends and Family: Once a week     Active Member of Clubs or Organizations: No     Attends Club or Organization Meetings: Never     Marital Status:    Housing Stability: Low Risk  (11/9/2023)    Housing Stability Vital Sign     Unable to Pay for Housing in the Last Year: No     Number of Places Lived in the Last Year: 1     Unstable Housing in the Last Year: No       Current Outpatient Medications   Medication Sig Dispense Refill    amitriptyline (ELAVIL) 100 MG tablet Take 1 tablet (100 mg total) by mouth every evening. 90 tablet 1    aspirin (ECOTRIN) 81 MG EC tablet       clotrimazole-betamethasone 1-0.05% (LOTRISONE) cream APPLY  CREAM TOPICALLY TO AFFECTED AREA TWICE DAILY 45 g 0    diclofenac sodium (VOLTAREN) 1 % Gel Apply topically 2 (two) times daily. 100 g 5    ergocalciferol (ERGOCALCIFEROL) 50,000 unit Cap Take 1 capsule by mouth every 7 days.      fluticasone propionate (FLONASE) 50  mcg/actuation nasal spray Use 1 spray(s) in each nostril once daily 48 g 3    gabapentin (NEURONTIN) 300 MG capsule Start off with 1 pill at night okay to increase to 2 pills at night, if desired can take up to 3 (1 in am and 2 in pm) 90 capsule 11    hydrALAZINE (APRESOLINE) 100 MG tablet Take 1 tablet (100 mg total) by mouth every 12 (twelve) hours. 180 tablet 3    hydrOXYzine HCL (ATARAX) 25 MG tablet Take 1 tablet by mouth three times daily as needed 45 tablet 0    meloxicam (MOBIC) 7.5 MG tablet Take 7.5 mg by mouth once daily.      metoprolol succinate (TOPROL-XL) 25 MG 24 hr tablet Take 1 tablet (25 mg total) by mouth once daily. 30 tablet 11    mometasone (ELOCON) 0.1 % solution Apply once to twice daily prn itching 60 mL 5    mupirocin (BACTROBAN) 2 % ointment Apply topically 3 (three) times daily. 22 g 0    NIFEdipine (PROCARDIA-XL) 30 MG (OSM) 24 hr tablet Take 1 tablet by mouth once daily 90 tablet 2    nystatin (NYSTOP) powder APPLY  POWDER TOPICALLY TO AFFECTED AREA 4 TIMES DAILY 30 g 0    olopatadine (PATANOL) 0.1 % ophthalmic solution INSTILL 1 DROP INTO EACH EYE TWICE DAILY 5 mL 0    omeprazole (PRILOSEC) 40 MG capsule Take 1 capsule (40 mg total) by mouth once daily. 90 capsule 2    pravastatin (PRAVACHOL) 40 MG tablet Take 1 tablet by mouth once daily 90 tablet 1    predniSONE (DELTASONE) 5 MG tablet Take 6 tablets (30 mg total) by mouth once daily for 1 day, THEN 5 tablets (25 mg total) once daily for 1 day, THEN 4 tablets (20 mg total) once daily for 1 day, THEN 3 tablets (15 mg total) once daily for 1 day, THEN 2 tablets (10 mg total) once daily for 1 day, THEN 1 tablet (5 mg total) once daily for 1 day. 21 tablet 0    semaglutide (OZEMPIC) 0.25 mg or 0.5 mg (2 mg/3 mL) pen injector Inject 0.5 mg into the skin every 7 days. 3 mL 2    spironolactone (ALDACTONE) 50 MG tablet Take 1 tablet by mouth once daily 90 tablet 1    tiZANidine (ZANAFLEX) 4 MG tablet TAKE 1 TABLET BY MOUTH EVERY 6 HOURS AS  NEEDED 90 tablet 0    traMADoL (ULTRAM) 50 mg tablet TAKE 1 TABLET BY MOUTH EVERY 6 HOURS AS NEEDED 120 tablet 2    triamcinolone acetonide 0.1% (KENALOG) 0.1 % ointment AAA bid 454 g 3    triamcinolone acetonide 0.1% (KENALOG) 0.1 % ointment AAA bid; not more than 2 weeks straight in same location, avoid use on face and groin 454 g 1    triamcinolone acetonide 0.1%-ciclopirox-magnesium hydroxide 400 mg/5 ml Apply to affected area twice a day after cool blow dry 60 g 5    valsartan (DIOVAN) 320 MG tablet Take 1 tablet (320 mg total) by mouth once daily. 90 tablet 3     No current facility-administered medications for this visit.       Review of patient's allergies indicates:   Allergen Reactions    Codeine         Review of Systems  Review of Systems   Constitutional:  Negative for activity change, appetite change, chills, fatigue, fever and unexpected weight change.   HENT:  Negative for mouth sores.    Respiratory:  Negative for cough, shortness of breath and wheezing.    Cardiovascular:  Negative for chest pain and palpitations.   Gastrointestinal:  Negative for abdominal pain, bloating, blood in stool, constipation, nausea and vomiting.   Endocrine: Negative for diabetes and hot flashes.   Genitourinary:  Negative for dysmenorrhea, dyspareunia, dysuria, frequency, hematuria, menorrhagia, menstrual problem, pelvic pain, urgency, vaginal bleeding, vaginal discharge, vaginal pain, urinary incontinence, postcoital bleeding and vaginal odor.   Musculoskeletal:  Positive for arthralgias, back pain and leg pain. Negative for myalgias.   Integumentary:  Negative for rash, breast mass and nipple discharge.   Neurological:  Negative for seizures and headaches.   Psychiatric/Behavioral:  Negative for depression and sleep disturbance. The patient is not nervous/anxious.    Breast: Negative for mass, mastodynia and nipple discharge          Objective:    Physical Exam:   Constitutional: She appears well-developed and  well-nourished. No distress.   BMI of 42.38    HENT:   Head: Normocephalic and atraumatic.    Eyes: EOM are normal.      Pulmonary/Chest: Effort normal. No respiratory distress.   Breasts: Non-tender, no engorgement, no masses, no retraction, no discharge. Negative for lymphadenopathy.         Abdominal: Soft. She exhibits no distension. There is no abdominal tenderness. There is no rebound and no guarding.     Genitourinary:    Vagina and uterus normal.   No vaginal discharge in the vagina.    Genitourinary Comments: Vulva is atrophic without any obvious lesions.  Urethral meatus normal size and location without any lesion.  Urethra is non-tender without stricture or discharge.  Bladder is non-tender.  Vaginal vault with good support.  Minimal white discharge noted.  No obvious lesion.  Normal rugation.  Cervix is without any cervical motion tenderness.  No obvious lesion.  Uterus is small, non-tender, normal contour.  Adnexa is without any masses or tenderness.  Perineum without obvious lesion.               Musculoskeletal: Normal range of motion.       Neurological: She is alert.    Skin: Skin is warm and dry.    Psychiatric: She has a normal mood and affect.          Assessment:        1. Well woman exam with routine gynecological exam    2.  Menopause         Plan:          I have discussed with the patient her condition.  Monthly breast examination was instructed, discussed, and encouraged.  Patient was encouraged to consume a low-calorie, low fat diet, and to increase of physical activity.  Healthy habits encouraged.  A Pap smear was not performed; she no longer would need Pap according to the USPSTF recommendations.  Mammogram was not ordered because she had one done within the past year, according to ACOG guidelines.  Gonorrhea and Chlamydia testing not performed;  HIV test not offered, again according to guidelines.  Colonoscopy discussed according to ACS guideline.   Care Gaps addressed.  Patient is to  continue her medications as prescribed.  She will come back to see me in one year for her annual visit.  She can come back to see me sooner as necessary.  All of her questions were answered appropriately to her satisfaction.

## 2024-01-09 LAB
A-TOCOPHEROL VIT E SERPL-MCNC: 1006 UG/DL (ref 500–1800)
PYRIDOXAL SERPL-MCNC: 2 UG/L (ref 5–50)
VIT A SERPL-MCNC: 53 UG/DL (ref 38–106)
VIT B2 SERPL-MCNC: 2 MCG/L (ref 1–19)

## 2024-01-10 LAB
COPPER SERPL-MCNC: 1448 UG/L (ref 810–1990)
VIT B1 BLD-MCNC: 97 UG/L (ref 38–122)

## 2024-01-11 NOTE — PROGRESS NOTES
Patient Information  Name: Amanda Holt  : 1950  MRN: [id number]     Referring Physician:  Dr. Kong ref. provider found   Primary Care Physician:  Simona Jesus MD   Date of Visit: 2024      Subjective:       Amanda Holt is a 73 y.o. female who presents for No chief complaint on file.      History of Present Illness: The patient presents with chief complaint of Itch .  Location: scalp  Duration: months  Signs/Symptoms: itching    Prior treatments: rubbing         Patient was last seen in Dermatology: 2023.    Prior notes by myself reviewed.   Clinical documentation obtained by nursing staff reviewed.    Review of Systems   Constitutional:  Negative for fever, chills, fatigue and malaise.   Respiratory:  Negative for cough.    Skin:  Positive for itching. Negative for rash.   Hematologic/Lymphatic: Negative for adenopathy.   Allergic/Immunologic: Negative for environmental allergies.        Objective:    Physical Exam   Constitutional: She appears well-developed and well-nourished. No distress.   Neurological: She is alert and oriented to person, place, and time. She is not disoriented.   Psychiatric: She has a normal mood and affect.   Skin:   Areas Examined (abnormalities noted in diagram):   Scalp / Hair Palpated and Inspected  Head / Face Inspection Performed              Diagram Legend     Erythematous scaling macule/papule c/w actinic keratosis       Vascular papule c/w angioma      Pigmented verrucoid papule/plaque c/w seborrheic keratosis      Yellow umbilicated papule c/w sebaceous hyperplasia      Irregularly shaped tan macule c/w lentigo     1-2 mm smooth white papules consistent with Milia      Movable subcutaneous cyst with punctum c/w epidermal inclusion cyst      Subcutaneous movable cyst c/w pilar cyst      Firm pink to brown papule c/w dermatofibroma      Pedunculated fleshy papule(s) c/w skin tag(s)      Evenly pigmented macule c/w junctional nevus      Mildly variegated pigmented, slightly irregular-bordered macule c/w mildly atypical nevus      Flesh colored to evenly pigmented papule c/w intradermal nevus       Pink pearly papule/plaque c/w basal cell carcinoma      Erythematous hyperkeratotic cursted plaque c/w SCC      Surgical scar with no sign of skin cancer recurrence      Open and closed comedones      Inflammatory papules and pustules      Verrucoid papule consistent consistent with wart     Erythematous eczematous patches and plaques     Dystrophic onycholytic nail with subungual debris c/w onychomycosis     Umbilicated papule    Erythematous-base heme-crusted tan verrucoid plaque consistent with inflamed seborrheic keratosis     Erythematous Silvery Scaling Plaque c/w Psoriasis     See annotation          [] Data reviewed    [] Prior external notes reviewed    [] Independent review of test    [] Management discussed with another provider    [] Independent historian    Assessment / Plan:        Dysesthesia of scalp  -     mometasone (ELOCON) 0.1 % solution; Apply once to twice daily prn itching  Dispense: 60 mL; Refill: 5  -     Ambulatory referral/consult to Neurology; Future; Expected date: 01/11/2024    Counseling on topical steroids:  Patient counseled that the prolonged use of topical steroids can result in the increased appearance superficial blood vessels (telangiectasias) lightening (hypopigmentation), and   thinning of the skin ( atrophy).  Patient understands to avoid using high potency steroids in skin folds, the groin or the face.  The patient verbalized understanding of proper use and possible adverse effects of topical steroids.  All patient's questions and concerns were addressed.    Discussed neurology as weird sensations          The patient location is: [place]  The chief complaint leading to consultation is: skin sensation    Visit type: audiovisual    Face to Face time with patient: 5 minutes  15 minutes of total time spent on the  encounter, which includes face to face time and non-face to face time preparing to see the patient (eg, review of tests), Obtaining and/or reviewing separately obtained history, Documenting clinical information in the electronic or other health record, Independently interpreting results (not separately reported) and communicating results to the patient/family/caregiver, or Care coordination (not separately reported).         Each patient to whom he or she provides medical services by telemedicine is:  (1) informed of the relationship between the physician and patient and the respective role of any other health care provider with respect to management of the patient; and (2) notified that he or she may decline to receive medical services by telemedicine and may withdraw from such care at any time.          LOS NUMBER AND COMPLEXITY OF PROBLEMS    COMPLEXITY OF DATA RISK TOTAL TIME (m)   96059  [id number] [] 1 self-limited or minor problem [] Minimal to none [] No treatment recommended or patient to monitor. Reassurance.  15-29  10-19   91997  [id number] Low  [] 2 or more self limited or minor problems  [] 1 stable chronic illness  [] 1 acute, uncomplicated illness or injury Limited (2)  [] Prior external notes from each unique source  [] Review result of each unique test  [] Order each unique test  OR [] Independent historian Low  []  OTC medications   []  Discussed/Decision for minor skin surgery (no risk factors) 30-44  20-29   68000  11461 Moderate  []  1 or more chronic unstable illness (not at goal or progression or exacerbation) or SE of treatment  []  2 or more stable chronic illnesses  []  1 acute illness with systemic symptoms  []  1 acute complicated injury  []  1 undiagnosed new problem with uncertain prognosis Moderate (1/3 below)  []  3 or more data items        *Now includes independent historian  []  Independent interpretation of a test  []  Discuss management/test with another provider Moderate  []   Prescription drug mgmt  []  Discussed/Decision for Minor surgery with risk factors  []  Mgmt limited by social determinates 45-59  30-39   81228  91790 High  []  1 or more chronic illness with severe exacerbation, progression or SE of treatment  []  1 acute or chronic illness/injury that poses a threat to life or bodily function Extensive (2/3 below)  []  3 or more data items        *Now includes independent historian.  []  Independent interpretation of a test  []  Discuss management/test with another provider High  []  Major surgery with risk discussed  []  Drug therapy requiring intensive monitoring for toxicity  []  Hospitalization  []  Decision for DNR 60-74  40-54

## 2024-01-15 DIAGNOSIS — E66.01 CLASS 3 SEVERE OBESITY DUE TO EXCESS CALORIES WITH SERIOUS COMORBIDITY AND BODY MASS INDEX (BMI) OF 40.0 TO 44.9 IN ADULT: ICD-10-CM

## 2024-01-16 RX ORDER — SEMAGLUTIDE 0.68 MG/ML
0.5 INJECTION, SOLUTION SUBCUTANEOUS
Qty: 3 ML | Refills: 2 | Status: SHIPPED | OUTPATIENT
Start: 2024-01-16 | End: 2024-02-28

## 2024-01-23 ENCOUNTER — OFFICE VISIT (OUTPATIENT)
Dept: OPTOMETRY | Facility: CLINIC | Age: 74
End: 2024-01-23
Payer: MEDICARE

## 2024-01-23 ENCOUNTER — OFFICE VISIT (OUTPATIENT)
Dept: NEUROLOGY | Facility: CLINIC | Age: 74
End: 2024-01-23
Payer: MEDICARE

## 2024-01-23 ENCOUNTER — PATIENT MESSAGE (OUTPATIENT)
Dept: FAMILY MEDICINE | Facility: CLINIC | Age: 74
End: 2024-01-23
Payer: MEDICARE

## 2024-01-23 DIAGNOSIS — H52.7 REFRACTIVE ERROR: ICD-10-CM

## 2024-01-23 DIAGNOSIS — Z97.3 WEARS CONTACT LENSES: ICD-10-CM

## 2024-01-23 DIAGNOSIS — E53.8 B12 DEFICIENCY: Primary | ICD-10-CM

## 2024-01-23 DIAGNOSIS — H25.13 NUCLEAR SCLEROSIS OF BOTH EYES: Primary | ICD-10-CM

## 2024-01-23 DIAGNOSIS — Z46.0 ENCOUNTER FOR FITTING OR ADJUSTMENT OF SPECTACLES OR CONTACT LENSES: Primary | ICD-10-CM

## 2024-01-23 DIAGNOSIS — R20.2 PARESTHESIAS: ICD-10-CM

## 2024-01-23 PROCEDURE — 92310 CONTACT LENS FITTING OU: CPT | Mod: CSM,S$GLB,, | Performed by: OPTOMETRIST

## 2024-01-23 PROCEDURE — 92014 COMPRE OPH EXAM EST PT 1/>: CPT | Mod: S$GLB,,, | Performed by: OPTOMETRIST

## 2024-01-23 PROCEDURE — 92015 DETERMINE REFRACTIVE STATE: CPT | Mod: S$GLB,,, | Performed by: OPTOMETRIST

## 2024-01-23 PROCEDURE — 99999 PR PBB SHADOW E&M-EST. PATIENT-LVL III: CPT | Mod: PBBFAC,,, | Performed by: OPTOMETRIST

## 2024-01-23 PROCEDURE — 99499 UNLISTED E&M SERVICE: CPT | Mod: ,,, | Performed by: OPTOMETRIST

## 2024-01-23 PROCEDURE — 99213 OFFICE O/P EST LOW 20 MIN: CPT | Mod: 95,,, | Performed by: PHYSICIAN ASSISTANT

## 2024-01-23 RX ORDER — CYANOCOBALAMIN, ISOPROPYL ALCOHOL 1000MCG/ML
1 KIT INJECTION
Qty: 1 KIT | Refills: 5 | Status: SHIPPED | OUTPATIENT
Start: 2024-01-23

## 2024-01-23 NOTE — PROGRESS NOTES
[facility] - NEUROLOGY [address]TH [address] [facility], [place]    Date: 1/23/24  Patient Name: Amanda Holt   MRN: [id number]   PCP: Simona Torres  Referring Provider: No ref. provider found    Chief Complaint: Dysesthesia of the scalp   Subjective:       The patient location is: home  The chief complaint leading to consultation is: Lab Results  Visit type: Virtual visit with audio  and video  Total time spent with patient: 6        Each patient to whom I provide medical services by telemedicine is:  (1) informed of the relationship between the physician and patient and the respective role of any other health care provider with respect to management of the patient; and (2) notified that they may decline to receive medical services by telemedicine and may withdraw from such care at any time. Patient verbally consented to receive this service via voice-only telephone call.     Interval History 01/23/2024    I have reviewed all relevant history in Epic. The patient presents for routine follow up regarding lab results and her scalp discomfort. Discussed with the patient that her results were normal except for slightly elevated sed rate and decreased vitamin B12 which I recommend taking B12 injections for supplementation. Discussion with the patient and she is comfortable with injections for supplementation.   She notes that she was also having gabapentin prescribed and was not comfortable with this due to some concerns over her kidney function. She has also corrected her Amitriptyline use to no longer be in use. She also notes some benefit from topical lidocaine.       HPI:   Ms. Amanda Holt is a 73 y.o. female DDD lumbar spine s/p laminectomy, HTN, arthrosclerosis of the aorta, CKD stage 3A, and ANTONIA presenting for evaluation of dyesthesias of the scalp, per chart review the patient was seen yesterday with dermatology.     She reports that about 3 months ago she began to  "develop itching on the scalp. She has changed her hair products and done multiple topical treatments without relief. She notes that she also feels as if she has something "crawling" under her skin on the scalp. She notes that the itching/crawling sensation is holocephalic. She denies any irritating or aggravating factors. She notes that her itching is bothersome during the day but most bothersome at night time. She does have Amitriptyline following her back surgery which typically provides some relief. She does have gabapentin as well however does not regularly take this due to her kidney concerns. She denies any rashes. She does find that sometimes her symptoms can radiate to her back with the itching sensation but does not have the "crawling" sensation that she experiences on the scalp. She notes that she has also been having burning and stinging pain that radiates on her back shoulders that has been ongoing x 1 year. She notes that this also has some uncontrolled itching.     She endorses headaches that began around the same time as the itching began and are located in the front bilaterally. Reports the pain is throbbing. Headaches tend to last her a few hours.     Takes BC powder daily for headache relief    Began Ozempic in June and has cut out red meat from her diet.     She also notes that she has R arm numbness and tingling however is known to have carpal tunnel of the R hand. She noted that the other week she slept on it funny and it caused an irritation of her symptoms.     Reports that she has shooting pain in the L leg.     PAST MEDICAL HISTORY:  Past Medical History:   Diagnosis Date    Arthralgia     Colon polyp     Degenerative disc disease at L5-S1 level     High cholesterol     Hypertension     Nuclear sclerosis of both eyes 1/8/2020    Sciatica     Spinal stenosis     Tobacco use 10/21/2021       PAST SURGICAL HISTORY:  Past Surgical History:   Procedure Laterality Date    BACK SURGERY      lumbar " laminectomy    COLONOSCOPY N/A 7/23/2020    Procedure: COLONOSCOPY;  Surgeon: Donal Moore MD;  Location: [facility] ENDO;  Service: Endoscopy;  Laterality: N/A;    EPIDURAL STEROID INJECTION Left 9/9/2020    Procedure: Injection, Steroid, Epidural Transforaminal;  Surgeon: Jun Leavitt Jr., MD;  Location: [facility] ENDO;  Service: Pain Management;  Laterality: Left;  Left L5 + S1 TF WADE  Arrive @ 1300; ASA last 9/1; No DM    EPIDURAL STEROID INJECTION Left 2/12/2021    Procedure: Injection, Steroid, Epidural Transforaminal;  Surgeon: Jun Leavitt Jr., MD;  Location: [facility] ENDO;  Service: Pain Management;  Laterality: Left;  Left L4 + L5 TF WADE  Arrive @ 1230; IV Sedation; ASA last 2/4; No DM    TRANSFORAMINAL EPIDURAL INJECTION OF STEROID Left 3/14/2022    Procedure: INJECTION, STEROID, EPIDURAL, TRANSFORAMINAL APPROACH, L4-L5 & L5-S1;  Surgeon: Darya Ayala MD;  Location: [facility] PAIN MGT;  Service: Pain Management;  Laterality: Left;    TRANSFORAMINAL EPIDURAL INJECTION OF STEROID Left 8/29/2022    Procedure: LUMBAR TRANSFORAMINAL LEFT L4/5 AND L5/S1 CONTRAST;  Surgeon: Darya Ayala MD;  Location: [facility] PAIN MGT;  Service: Pain Management;  Laterality: Left;    TUBAL LIGATION         CURRENT MEDS:  Current Outpatient Medications   Medication Sig Dispense Refill    amitriptyline (ELAVIL) 100 MG tablet Take 1 tablet (100 mg total) by mouth every evening. 90 tablet 1    aspirin (ECOTRIN) 81 MG EC tablet       clotrimazole-betamethasone 1-0.05% (LOTRISONE) cream APPLY  CREAM TOPICALLY TO AFFECTED AREA TWICE DAILY 45 g 0    diclofenac sodium (VOLTAREN) 1 % Gel Apply topically 2 (two) times daily. 100 g 5    ergocalciferol (ERGOCALCIFEROL) 50,000 unit Cap Take 1 capsule by mouth every 7 days.      fluticasone propionate (FLONASE) 50 mcg/actuation nasal spray Use 1 spray(s) in each nostril once daily 48 g 3    gabapentin (NEURONTIN) 300 MG capsule Start off with 1 pill at night okay to increase to 2 pills at night,  if desired can take up to 3 (1 in am and 2 in pm) 90 capsule 11    hydrALAZINE (APRESOLINE) 100 MG tablet Take 1 tablet (100 mg total) by mouth every 12 (twelve) hours. 180 tablet 3    hydrOXYzine HCL (ATARAX) 25 MG tablet Take 1 tablet by mouth three times daily as needed 45 tablet 0    meloxicam (MOBIC) 7.5 MG tablet Take 7.5 mg by mouth once daily.      metoprolol succinate (TOPROL-XL) 25 MG 24 hr tablet Take 1 tablet (25 mg total) by mouth once daily. 30 tablet 11    mometasone (ELOCON) 0.1 % solution Apply once to twice daily prn itching 60 mL 5    mupirocin (BACTROBAN) 2 % ointment Apply topically 3 (three) times daily. 22 g 0    NIFEdipine (PROCARDIA-XL) 30 MG (OSM) 24 hr tablet Take 1 tablet by mouth once daily 90 tablet 2    nystatin (NYSTOP) powder APPLY  POWDER TOPICALLY TO AFFECTED AREA 4 TIMES DAILY 30 g 0    olopatadine (PATANOL) 0.1 % ophthalmic solution INSTILL 1 DROP INTO EACH EYE TWICE DAILY 5 mL 0    omeprazole (PRILOSEC) 40 MG capsule Take 1 capsule (40 mg total) by mouth once daily. 90 capsule 2    pravastatin (PRAVACHOL) 40 MG tablet Take 1 tablet by mouth once daily 90 tablet 1    semaglutide (OZEMPIC) 0.25 mg or 0.5 mg (2 mg/3 mL) pen injector Inject 0.5 mg into the skin every 7 days. 3 mL 2    spironolactone (ALDACTONE) 50 MG tablet Take 1 tablet by mouth once daily 90 tablet 1    tiZANidine (ZANAFLEX) 4 MG tablet TAKE 1 TABLET BY MOUTH EVERY 6 HOURS AS NEEDED 90 tablet 0    traMADoL (ULTRAM) 50 mg tablet TAKE 1 TABLET BY MOUTH EVERY 6 HOURS AS NEEDED 120 tablet 2    triamcinolone acetonide 0.1% (KENALOG) 0.1 % ointment AAA bid 454 g 3    triamcinolone acetonide 0.1% (KENALOG) 0.1 % ointment AAA bid; not more than 2 weeks straight in same location, avoid use on face and groin 454 g 1    triamcinolone acetonide 0.1%-ciclopirox-magnesium hydroxide 400 mg/5 ml Apply to affected area twice a day after cool blow dry 60 g 5    valsartan (DIOVAN) 320 MG tablet Take 1 tablet (320 mg total) by mouth  once daily. 90 tablet 3     No current facility-administered medications for this visit.       ALLERGIES:  Review of patient's allergies indicates:   Allergen Reactions    Codeine        FAMILY HISTORY:  Family History   Problem Relation Age of Onset    Breast cancer Mother     Hypertension Mother             Hypotension Mother     Cancer Mother          due to breast cancer    Miscarriages / Stillbirths Mother         3 stillborn children    Cataracts Father     Glaucoma Father     Diabetes Father             Arthritis Father     Vision loss Father         Glaucoma -     Glaucoma Sister     Arthritis Sister     Diabetes Sister     Hypertension Sister     Vision loss Sister         Glasses    Drug abuse Brother     Learning disabilities Brother     No Known Problems Brother     No Known Problems Brother     No Known Problems Maternal Aunt     No Known Problems Maternal Uncle     No Known Problems Paternal Aunt     No Known Problems Paternal Uncle     No Known Problems Maternal Grandmother     No Known Problems Maternal Grandfather     No Known Problems Paternal Grandmother     No Known Problems Paternal Grandfather     No Known Problems Son     No Known Problems Other     Arthritis Sister     Depression Sister     Diabetes Sister     Hearing loss Sister         Trouble hearing    Hypertension Sister     Stroke Sister         Browne Vargas Disease, Coma for a month due to diabetes,Some body weakness    Vision loss Sister         Lasix surgery    Arthritis Sister     COPD Sister     Diabetes Sister     Hearing loss Sister     Heart disease Sister     Hypertension Sister     Arthritis Brother     Hypertension Brother     Early death Sister         Still born    Early death Sister         Still born twin to Debby Benítez    Early death Brother     Hypertension Sister     Hypertension Brother     Learning disabilities Son     Learning disabilities Son         Great grandson    Learning  disabilities Daughter         Granddaughter    Mental illness Sister         Her son Matthias Benítez    Vision loss Sister         Glasses/contacts    Vision loss Sister         Glasses    Amblyopia Neg Hx     Blindness Neg Hx     Macular degeneration Neg Hx     Retinal detachment Neg Hx     Strabismus Neg Hx     Thyroid disease Neg Hx        SOCIAL HISTORY:  Social History     Tobacco Use    Smoking status: Former     Current packs/day: 0.00     Average packs/day: 0.3 packs/day for 35.0 years (8.8 ttl pk-yrs)     Types: Cigarettes     Start date: 3/15/1987     Quit date: 3/15/2022     Years since quittin.8    Smokeless tobacco: Current    Tobacco comments:     Occasional spoker some times 1-2 cigarettes/day sometimes none for weeks   Substance Use Topics    Alcohol use: Yes     Alcohol/week: 3.0 standard drinks of alcohol     Types: 3 Glasses of wine per week     Comment: Occasional wine    Drug use: Yes     Comment: Tramadol twice a day as needed       Review of Systems:  Review of Systems   Constitutional:  Negative for fever, malaise/fatigue and weight loss.   HENT:  Negative for congestion, ear discharge, ear pain, hearing loss, nosebleeds, sinus pain and sore throat.    Eyes:  Negative for blurred vision, double vision, photophobia, pain and discharge.   Respiratory:  Negative for cough, hemoptysis and shortness of breath.    Cardiovascular:  Negative for chest pain, palpitations, orthopnea and leg swelling.   Gastrointestinal:  Negative for abdominal pain, blood in stool, constipation, diarrhea, nausea and vomiting.   Genitourinary:  Negative for dysuria, frequency, hematuria and urgency.   Musculoskeletal:  Positive for back pain. Negative for falls, myalgias and neck pain.   Skin:  Positive for itching. Negative for rash.   Neurological:  Positive for tingling and headaches. Negative for dizziness, focal weakness, seizures, loss of consciousness and weakness.            Objective:     There were no  vitals filed for this visit.        Lab Results   Component Value Date    WBC 5.93 01/05/2024    HGB 11.5 (L) 01/05/2024    HCT 37.1 01/05/2024     01/05/2024    CHOL 160 03/07/2023    TRIG 94 03/07/2023    HDL 40 03/07/2023    ALT 14 03/07/2023    AST 15 03/07/2023     03/07/2023    K 4.5 03/07/2023     (H) 03/07/2023    CREATININE 1.3 03/07/2023    BUN 18 03/07/2023    CO2 25 03/07/2023    TSH 1.440 01/05/2024    HGBA1C 5.4 03/07/2023    VKWEXAZV69 319 01/08/2024    VITAMINB6 2 (L) 01/05/2024       NEUROLOGICAL EXAMINATION:     MENTAL STATUS   Oriented to person, place, and time.   Level of consciousness: alert    CRANIAL NERVES     CN III, IV, VI   Extraocular motions are normal.     CN VII   Facial expression full, symmetric.     CN VIII   CN VIII normal.     CN XI   CN XI normal.     CN XII   CN XII normal.        Bilateral exopthalmos noted on exam      MOTOR EXAM        Limited 2/2 Virtual Visit     REFLEXES     Reflexes   Right achilles reflex grade: trace.  Left achilles reflex grade: trace.        Diagnostic Data:     2/23/22 MRI LUMBAR SPINE WITHOUT CONTRAST     CLINICAL HISTORY:  Low back pain, symptoms persist with > 6wks conservative treatment; Dorsalgia, unspecified     TECHNIQUE:  Multiplanar, multisequence MR images were acquired from the thoracolumbar junction to the sacrum without the administration of contrast.     COMPARISON:  Radiograph 07/16/2021     FINDINGS:  Alignment: Relatively well maintained     Vertebrae: Normal marrow signal. No fracture.  Left-sided at L4-5.  There is transitional anatomy at what will be termed L5.  Rudimentary disc space at what will be termed L5-S1.     Discs: As below.  There is height loss which is most notable at L4-5.     Cord: Normal.  Conus terminates at L1.     Degenerative findings:     T12-L1: Unremarkable.     L1-L2: Unremarkable.     L2-L3: Mild facet hypertrophy.  No significant nerve root compression.     L3-L4: Circumferential disc  bulge and facet hypertrophy.  Moderate canal stenosis and moderate bilateral neural foraminal narrowing.     L4-L5: Intervertebral disc height loss with circumferential disc bulge and facet hypertrophy.  Moderate bilateral neural foraminal narrowing.     L5-S1: Rudimentary disc space.  There is facet arthropathy.  Moderate right and mild left neural foraminal narrowing.     Paraspinal muscles & soft tissues: Multiple bilateral renal cyst, increased in number when compared to 2019 exam.     Impression:     Left-sided fusion L4-L5 without evidence of complication.  There is transitional anatomy at what will be termed L5 with rudimentary disc space at L5-S1.     Degenerative change of the inferior lumbar spine as detailed above most notable at L3-4, L4-5, L5-S1.     Numerous bilateral renal cysts which are increased in number when compared to 2019 CT.  Further evaluation with a renal ultrasound may be obtained.    Assessment:   Amanda Holt is a 73 y.o. female presenting for evaluation of itching of the scalp.     Plan:   Paresthesias:     -Supplement with B12 injections     -Recheck sed rate due to slightly elevated on exam       Problem List Items Addressed This Visit    None          I spent a total of 20 minutes on the day of the visit. This includes face to face time and non-face to face time preparing to see the patient (eg, review of tests), obtaining and/or reviewing separately obtained history, documenting clinical information in the electronic or other health record, independently interpreting results and communicating results to the patient/family/caregiver, or care coordinator.    Melissa Prieto PA-C  Supervising physician Natan Thao MD was available for all questions during this exam.  [facility]

## 2024-01-23 NOTE — PROGRESS NOTES
Subjective:       Patient ID: Amanda Holt is a 73 y.o. female      Chief Complaint   Patient presents with    Concerns About Ocular Health    Contact Lens Follow Up     History of Present Illness  HPI    Dls: 10/26/23 Dr. Schmitz     74 y/o female presents today for ocular health check.  Pt states no changes in vision. Pt wears scls and pal's.     No tearing  No itching   No burning  No pain  + ha's  + floaters  No flashes    Eye meds  Pataday OU PRN     Pohx:   None    Fohx:   Cat- father, sister   Glaucoma - father     Wearing acuvue oasys , replacing monthly. occasionally sleep in lenses   Color dailies, replacing daily       Last edited by Marian Schmitz, OD on 1/23/2024  3:47 PM.      Assessment/Plan:     1. Nuclear sclerosis of both eyes  Educated pt on presence of cataracts and effects on vision. No surgery at this time. Recheck in one year, sooner PRN.    2. Refractive error  Educated patient on refractive error and discussed lens options. Dispensed updated spectacle Rx. Educated about adaptation period to new specs.    Eyeglass Final Rx       Eyeglass Final Rx         Sphere Cylinder Axis Add    Right -5.50 +3.50 110 +2.50    Left -4.50 +3.00 085 +2.50      Expiration Date: 1/23/2025                    3. Wears contact lenses  Pt wants to try Suma, Rx not in office. Trials ordered. Appt for pickup.     Follow up for CL follow up when trials arrive.

## 2024-01-30 ENCOUNTER — PATIENT MESSAGE (OUTPATIENT)
Dept: FAMILY MEDICINE | Facility: CLINIC | Age: 74
End: 2024-01-30
Payer: MEDICARE

## 2024-01-30 DIAGNOSIS — M48.00 SPINAL STENOSIS, UNSPECIFIED SPINAL REGION: ICD-10-CM

## 2024-01-30 RX ORDER — TRAMADOL HYDROCHLORIDE 50 MG/1
50 TABLET ORAL EVERY 6 HOURS PRN
Qty: 120 TABLET | Refills: 2 | Status: SHIPPED | OUTPATIENT
Start: 2024-01-30 | End: 2024-03-14

## 2024-01-30 NOTE — TELEPHONE ENCOUNTER
Care Due:                  Date            Visit Type   Department     Provider  --------------------------------------------------------------------------------                                [facility]                              PRIMARY      MEDICINE/  Last Visit: 11-      CARE ([facility])   MARIA G Torres                              [facility]                              PRIMARY      MEDICINE/  Next Visit: 02-      CARE ([facility])   MARIA G Min  Test          Frequency    Reason                     Performed    Due Date  --------------------------------------------------------------------------------    CMP.........  12 months..  diclofenac, pravastatin,   03- 03-                             spironolactone, valsartan    Lipid Panel.  12 months..  pravastatin..............  03- 03-    Health [facility] Embedded Care Due Messages. Reference number: [id number].   1/30/2024 2:36:13 PM CST

## 2024-02-02 NOTE — PROGRESS NOTES
Subjective:       Amanda Holt is a 73 y.o. female who is a new patient who was seen  for evaluation of renal cysts.      Previously seen for gross hematuria in 2019. She is s/p negative workup (CT uro and cysto). +smoker. No h/o stones. No UTIs. No family history of  malignancy.     Recent MEÑO (1/2024) showed b/l simple cysts - largest 2.1cm on R an 3.1cm on L.       The following portions of the patient's history were reviewed and updated as appropriate: allergies, current medications, past family history, past medical history, past social history, past surgical history and problem list.    Review of Systems  12 point review of systems completed. Pertinent positive and negatives listed in HPI        Objective:    Vitals: Wt 114 kg (251 lb 5.2 oz)   BMI 43.14 kg/m²     Physical Exam   General: well developed, well nourished in no acute distress  Head: normocephalic, atraumatic  Neck: no obvious enlargement of thyroid  HEENT: EOMI, mucus membranes moist, sclera anicteric, no hearing impairment  Lungs: symmetric expansion, non-labored breathing  Neuro: alert and oriented x 3, no gross deficits  Psych: normal judgment and insight, normal mood/affect and non-anxious  Genitourinary: deferred      Lab Review   Urine analysis today in clinic shows positive for trace protein, 5-10 RBCs    Lab Results   Component Value Date    WBC 5.93 01/05/2024    HGB 11.5 (L) 01/05/2024    HCT 37.1 01/05/2024    MCV 95 01/05/2024     01/05/2024     Lab Results   Component Value Date    CREATININE 1.3 03/07/2023    BUN 18 03/07/2023         Imaging  Images and reports were personally reviewed by me and discussed with patient  CT uro, MEÑO       Assessment/Plan:      1. Renal cysts, acquired, bilateral    - Simple b/l cysts   - No need for further surveillance   - No malignant potential     2. Hematuria, gross    - s/p prior workup in 2019 - negative         Follow up in PRN

## 2024-02-05 ENCOUNTER — OFFICE VISIT (OUTPATIENT)
Dept: UROLOGY | Facility: CLINIC | Age: 74
End: 2024-02-05
Payer: MEDICARE

## 2024-02-05 VITALS — WEIGHT: 251.31 LBS | BODY MASS INDEX: 43.14 KG/M2

## 2024-02-05 DIAGNOSIS — R31.0 HEMATURIA, GROSS: ICD-10-CM

## 2024-02-05 DIAGNOSIS — N28.1 RENAL CYSTS, ACQUIRED, BILATERAL: Primary | ICD-10-CM

## 2024-02-05 PROCEDURE — 99203 OFFICE O/P NEW LOW 30 MIN: CPT | Mod: S$GLB,,, | Performed by: UROLOGY

## 2024-02-05 PROCEDURE — 99999 PR PBB SHADOW E&M-EST. PATIENT-LVL III: CPT | Mod: PBBFAC,,, | Performed by: UROLOGY

## 2024-02-07 ENCOUNTER — TELEPHONE (OUTPATIENT)
Dept: NEUROSURGERY | Facility: CLINIC | Age: 74
End: 2024-02-07
Payer: MEDICARE

## 2024-02-07 DIAGNOSIS — M51.36 DDD (DEGENERATIVE DISC DISEASE), LUMBAR: ICD-10-CM

## 2024-02-07 DIAGNOSIS — R20.2 TINGLING: ICD-10-CM

## 2024-02-07 DIAGNOSIS — R20.8 BURNING SENSATION: Primary | ICD-10-CM

## 2024-02-07 DIAGNOSIS — M48.00 SPINAL STENOSIS, UNSPECIFIED SPINAL REGION: ICD-10-CM

## 2024-02-07 NOTE — TELEPHONE ENCOUNTER
----- Message from Zoe Vogel MA sent at 2/6/2024  4:41 PM CST -----    ----- Message -----  From: Lona Alberto  Sent: 2/6/2024   4:40 PM CST  To: [facility]AIN Staff    Type:  Sooner Apoointment Request    Caller is requesting a sooner appointment.  Caller declined first available appointment listed below.  Caller will not accept being placed on the waitlist and is requesting a message be sent to doctor.    Name of Caller:Pt  When is the first available appointment?  Symptoms:  Would the patient rather a call back or a response via [facility]? Call  Best Call Back Number: [phone]  Additional Information:

## 2024-02-07 NOTE — TELEPHONE ENCOUNTER
S/w patient. Has a history of lower back pain, previous sx at L5-6. Has been seeing PM but injections are not helping as much as they used to. After walking for about 10 mins, pt endorses left leg numbness. Pt unsure about surgery, but would like to know what her options are. Pt also mentioned burning/swelling/itching sensation around neck and shoulders. Has cream for symptoms, but derm. Suggested could be nerve in nature. Gave date/time/location for XR and appt. V/u and thanks.

## 2024-02-12 ENCOUNTER — TELEPHONE (OUTPATIENT)
Dept: FAMILY MEDICINE | Facility: CLINIC | Age: 74
End: 2024-02-12
Payer: MEDICARE

## 2024-02-12 ENCOUNTER — TELEPHONE (OUTPATIENT)
Dept: BARIATRICS | Facility: CLINIC | Age: 74
End: 2024-02-12
Payer: MEDICARE

## 2024-02-12 NOTE — TELEPHONE ENCOUNTER
I don't think this is true as their not covering it for weight loss which is why she needs to have diabetes    Can we speak to someone from [facility] to discuss this    Also there's no information about who or where to send this letter, and since she is seeing bariatrics they should be able to write this     Simona Torres MD

## 2024-02-12 NOTE — TELEPHONE ENCOUNTER
Contacted the patient. Patient was advised that she will not be able to get medication Ozempic due to not having the diagnosis of type 2 diabetes. Patient stated that she reached out to her insurance company and was informed that she would be able to medication if she can get it from PCP. Patient was advised that would be her best option. Patient verbalized understanding and call was disconnected.

## 2024-02-12 NOTE — TELEPHONE ENCOUNTER
Pt said that bariatric clinic told her that due to she isn't a diabetic ozempic isn't eligible to her . PHN to her as long as Dr. Torres writes letter saying that the rx is for weight loss she can receive  it     LOV 11/13/2023

## 2024-02-12 NOTE — TELEPHONE ENCOUNTER
----- Message from Sandy Herrmann sent at 2/12/2024  9:41 AM CST -----  Name of Who is calling :CLARICE KLEIN [[id number]]        What is the request in detail:Pt stated that she needs a call back from the nurse. Please assist        Can the clinic reply by [facility]:no             What number to call back if not in [facility]: [phone]

## 2024-02-12 NOTE — TELEPHONE ENCOUNTER
----- Message from Sandy Herrmann sent at 2/12/2024  9:42 AM CST -----  Name of Who is calling :CLARICE KLEIN [[id number]]        What is the request in detail:Pt needs the nurse to call back pt. Please assist         Can the clinic reply by [facility]:no             What number to call back if not in [facility]:[phone]

## 2024-02-15 ENCOUNTER — TELEPHONE (OUTPATIENT)
Dept: FAMILY MEDICINE | Facility: CLINIC | Age: 74
End: 2024-02-15
Payer: MEDICARE

## 2024-02-15 ENCOUNTER — PATIENT MESSAGE (OUTPATIENT)
Dept: FAMILY MEDICINE | Facility: CLINIC | Age: 74
End: 2024-02-15
Payer: MEDICARE

## 2024-02-15 NOTE — TELEPHONE ENCOUNTER
----- Message from Pamela Nunez sent at 2/15/2024  8:58 AM CST -----  .Type:  Patient Returning Call    Who Called: Self     Who Left Message for Patient: Desiree    Does the patient know what this is regarding?: Yes     Would the patient rather a call back or a response via [facility]? Call Back     Best Call Back Number: [phone]    Additional Information:

## 2024-02-15 NOTE — TELEPHONE ENCOUNTER
----- Message from Lula Armstrong sent at 2/15/2024 10:50 AM CST -----  Regarding: [facility] [phone]  Type:  Patient Returning Call    Who Called: [facility] with Dr. Xenia Islas    Who Left Message for Patient: Desiree     Does the patient know what this is regarding?: no    Would the patient rather a call back or a response via [facility]? Call back     Best Call Back Number: [phone]

## 2024-02-19 ENCOUNTER — OFFICE VISIT (OUTPATIENT)
Dept: OPTOMETRY | Facility: CLINIC | Age: 74
End: 2024-02-19
Payer: MEDICARE

## 2024-02-19 DIAGNOSIS — Z46.0 FITTING AND ADJUSTMENT OF SPECTACLES AND CONTACT LENSES: Primary | ICD-10-CM

## 2024-02-19 PROCEDURE — 99499 UNLISTED E&M SERVICE: CPT | Mod: S$GLB,,, | Performed by: OPTOMETRIST

## 2024-02-19 NOTE — PROGRESS NOTES
Subjective:       Patient ID: Amanda Holt is a 73 y.o. female      Chief Complaint   Patient presents with    Concerns About Ocular Health     History of Present Illness   Dls: 1/23/24 Dr. Schmitz     74 y/o female presents today for clfu  Inserted cls on arrival ou.       Assessment/Plan:     1. Fitting and adjustment of spectacles and contact lenses  Switch to suma lenses. DVO with readers.  Pt still wants daily colors, aware no toric correction - pt only wears socially, does not drive with colors. Pt understands driving with clear contacts or glasses.        Contact lens Rx released to pt. Daily wear only advised, replacement monthly (suma) //  daily (colors) with education to risks of extended wear. Advised against sleeping, swimming, showering in lenses. Okay to order if happy with comfort and VA. Discussed lens care, compliance and solutions. RTC yearly contact lens health check.     Contact Lens Final Rx       Final Contact Lens Rx         Brand Base Curve Diameter Sphere Cylinder Axis    Right Acuvue Suma for Astigmatism  8.6 14.5 -2.50 -2.25 010    Left Acuvue Suma for Astigmatism  8.6 14.5 -1.75 -2.25 180      Expiration Date: 2/19/2025    Replacement: Monthly    Solutions: OptiFree PureMoist    Wearing Schedule: Daily Wear              Final Contact Lens Rx #2         Brand Base Curve Diameter Sphere Cylinder Axis    Right Dailies Colors 8.6 13.8 -3.00 Sphere     Left Dailies Colors 8.6 13.8 -3.00 Sphere       Expiration Date: 2/19/2025    Replacement: Daily    Wearing Schedule: Daily Wear                        Follow up in about 1 year (around 2/19/2025).

## 2024-02-23 ENCOUNTER — HOSPITAL ENCOUNTER (OUTPATIENT)
Dept: RADIOLOGY | Facility: HOSPITAL | Age: 74
Discharge: HOME | End: 2024-02-23
Attending: PHYSICIAN ASSISTANT
Payer: MEDICARE

## 2024-02-23 ENCOUNTER — OFFICE VISIT (OUTPATIENT)
Dept: NEUROSURGERY | Facility: CLINIC | Age: 74
End: 2024-02-23
Payer: MEDICARE

## 2024-02-23 VITALS
HEIGHT: 64 IN | WEIGHT: 250.69 LBS | OXYGEN SATURATION: 100 % | HEART RATE: 76 BPM | BODY MASS INDEX: 42.8 KG/M2 | DIASTOLIC BLOOD PRESSURE: 81 MMHG | SYSTOLIC BLOOD PRESSURE: 169 MMHG

## 2024-02-23 DIAGNOSIS — R20.2 TINGLING: ICD-10-CM

## 2024-02-23 DIAGNOSIS — M48.00 SPINAL STENOSIS, UNSPECIFIED SPINAL REGION: ICD-10-CM

## 2024-02-23 DIAGNOSIS — M51.36 DDD (DEGENERATIVE DISC DISEASE), LUMBAR: ICD-10-CM

## 2024-02-23 DIAGNOSIS — M54.9 DORSALGIA, UNSPECIFIED: ICD-10-CM

## 2024-02-23 DIAGNOSIS — M48.02 SPINAL STENOSIS, CERVICAL REGION: Primary | ICD-10-CM

## 2024-02-23 DIAGNOSIS — R20.8 BURNING SENSATION: ICD-10-CM

## 2024-02-23 PROBLEM — E55.9 VITAMIN D DEFICIENCY: Status: ACTIVE | Noted: 2021-07-14

## 2024-02-23 PROBLEM — Q61.02 MULTIPLE RENAL CYSTS: Status: ACTIVE | Noted: 2021-07-14

## 2024-02-23 PROBLEM — E78.5 HYPERLIPIDEMIA: Status: ACTIVE | Noted: 2021-07-14

## 2024-02-23 PROBLEM — I12.9 HYPERTENSIVE RENAL DISEASE: Status: ACTIVE | Noted: 2021-07-14

## 2024-02-23 PROCEDURE — 99204 OFFICE O/P NEW MOD 45 MIN: CPT | Mod: S$GLB,,, | Performed by: PHYSICIAN ASSISTANT

## 2024-02-23 PROCEDURE — 72114 X-RAY EXAM L-S SPINE BENDING: CPT | Mod: TC,FY

## 2024-02-23 PROCEDURE — 72114 X-RAY EXAM L-S SPINE BENDING: CPT | Mod: 26,,, | Performed by: RADIOLOGY

## 2024-02-23 PROCEDURE — 72052 X-RAY EXAM NECK SPINE 6/>VWS: CPT | Mod: TC,FY

## 2024-02-23 PROCEDURE — 72052 X-RAY EXAM NECK SPINE 6/>VWS: CPT | Mod: 26,,, | Performed by: RADIOLOGY

## 2024-02-23 NOTE — PROGRESS NOTES
[facility]  Neurosurgery    SUBJECTIVE:     History of Present Illness:  Amanda Holt is a 73 y.o. female with below listed PMH who presents with chronic back pain and b/l hand numbness.  Patient was seen by Dr. Davidson in 2020 who has a h/o left-sided L4-5 laminectomy and fusion in 2018 and outside hospital.  At the time of her last visit, conservative management was recommended and she was not interested in surgery.  She attempted traditional PT but was unable to completed due to pain.  She was then referred to aqua therapy which helped for a period of time.  She stopped about 8 months ago when the weather got cold and has not returned.  She has tried multiple pain management interventions with no lasting relief.  She is new to me today and is still largely disinterested in surgery, but wanted to review her options given the acute worsening in her pain.      She reports significantly increased b/l low back pain.  If she stands for more than 10 minutes, her entire LLE will go numb.  Routine housework can be performed who it results in a couple of days lying in bed to recuperate.  Earlier this month, she did a more intense house cleaning that involved her moving a heavy metal door multiple times.  Since that time, her back pain has failed to recover despite her typical recuperation period.  Endorses chronic constipation that is worse this month.    She further endorses a h/o carpal tunnel syndrome with b/l hand tingling.  The right hand involves all 5 fingers where as the left-hand only involves the 5th digit.  She is dropping items from her hands and feels that they are beginning to lock up on her.  She also notes intense itching and burning across her shoulders.  She was also evaluated by Neurology and Dermatology for intensely itchy scalp which was managed with steroids.      (Not in a hospital admission)      Review of patient's allergies indicates:   Allergen Reactions    Codeine        Past  Medical History:   Diagnosis Date    Arthralgia     Colon polyp     Degenerative disc disease at L5-S1 level     High cholesterol     Hypertension     Notalgia paresthetica     Nuclear sclerosis of both eyes 2020    Sciatica     Spinal stenosis     Tobacco use 10/21/2021     Past Surgical History:   Procedure Laterality Date    BACK SURGERY      lumbar laminectomy    COLONOSCOPY N/A 2020    Procedure: COLONOSCOPY;  Surgeon: Donal Moore MD;  Location: [facility] ENDO;  Service: Endoscopy;  Laterality: N/A;    EPIDURAL STEROID INJECTION Left 2020    Procedure: Injection, Steroid, Epidural Transforaminal;  Surgeon: Jun Leavitt Jr., MD;  Location: [facility] ENDO;  Service: Pain Management;  Laterality: Left;  Left L5 + S1 TF WADE  Arrive @ 1300; ASA last ; No DM    EPIDURAL STEROID INJECTION Left 2021    Procedure: Injection, Steroid, Epidural Transforaminal;  Surgeon: Jun Leavitt Jr., MD;  Location: [facility] ENDO;  Service: Pain Management;  Laterality: Left;  Left L4 + L5 TF WADE  Arrive @ 1230; IV Sedation; ASA last ; No DM    TRANSFORAMINAL EPIDURAL INJECTION OF STEROID Left 3/14/2022    Procedure: INJECTION, STEROID, EPIDURAL, TRANSFORAMINAL APPROACH, L4-L5 & L5-S1;  Surgeon: Darya Ayala MD;  Location: [facility] PAIN MGT;  Service: Pain Management;  Laterality: Left;    TRANSFORAMINAL EPIDURAL INJECTION OF STEROID Left 2022    Procedure: LUMBAR TRANSFORAMINAL LEFT L4/5 AND L5/S1 CONTRAST;  Surgeon: Darya Ayala MD;  Location: [facility] PAIN MGT;  Service: Pain Management;  Laterality: Left;    TUBAL LIGATION       Family History   Problem Relation Age of Onset    Breast cancer Mother     Hypertension Mother             Hypotension Mother     Cancer Mother          due to breast cancer    Miscarriages / Stillbirths Mother         3 stillborn children    Cataracts Father     Glaucoma Father     Diabetes Father             Arthritis Father     Vision loss Father          Glaucoma -     Glaucoma Sister     Arthritis Sister     Diabetes Sister     Hypertension Sister     Vision loss Sister         Glasses    Drug abuse Brother     Learning disabilities Brother     No Known Problems Brother     No Known Problems Brother     No Known Problems Maternal Aunt     No Known Problems Maternal Uncle     No Known Problems Paternal Aunt     No Known Problems Paternal Uncle     No Known Problems Maternal Grandmother     No Known Problems Maternal Grandfather     No Known Problems Paternal Grandmother     No Known Problems Paternal Grandfather     No Known Problems Son     No Known Problems Other     Arthritis Sister     Depression Sister     Diabetes Sister     Hearing loss Sister         Trouble hearing    Hypertension Sister     Stroke Sister         Browne Vargas Disease, Coma for a month due to diabetes,Some body weakness    Vision loss Sister         Lasix surgery    Arthritis Sister     COPD Sister     Diabetes Sister     Hearing loss Sister     Heart disease Sister     Hypertension Sister     Arthritis Brother     Hypertension Brother     Early death Sister         Still born    Early death Sister         Still born twin to Debby Benítez    Early death Brother     Hypertension Sister     Hypertension Brother     Learning disabilities Son     Learning disabilities Son         Great grandson    Learning disabilities Daughter         Granddaughter    Mental illness Sister         Her son Matthias Benítez    Vision loss Sister         Glasses/contacts    Vision loss Sister         Glasses    Amblyopia Neg Hx     Blindness Neg Hx     Macular degeneration Neg Hx     Retinal detachment Neg Hx     Strabismus Neg Hx     Thyroid disease Neg Hx      Social History     Tobacco Use    Smoking status: Former     Current packs/day: 0.00     Average packs/day: 0.3 packs/day for 35.0 years (8.8 ttl pk-yrs)     Types: Cigarettes     Start date: 3/15/1987     Quit date: 3/15/2022     Years since  "quittin.9    Smokeless tobacco: Current    Tobacco comments:     Occasional spoker some times 1-2 cigarettes/day sometimes none for weeks   Substance Use Topics    Alcohol use: Yes     Alcohol/week: 3.0 standard drinks of alcohol     Types: 3 Glasses of wine per week     Comment: Occasional wine    Drug use: Not Currently     Comment: Tramadol twice a day as needed        Review of Systems:  As noted in HPI    OBJECTIVE:     Vital Signs (Most Recent):  Vitals:    24 1052   BP: (!) 169/81   Pulse: 76   SpO2: 100%   Weight: 113.7 kg (250 lb 10.6 oz)   Height: 5' 4" (1.626 m)   PainSc: 10-Worst pain ever   PainLoc: Back         Physical Exam:  General: well developed, well nourished, no distress  Head: normocephalic, atraumatic  Neurologic: Alert and oriented. Thought content appropriate  GCS: Motor: 6/Verbal: 5/Eyes: 4 GCS Total: 15  Language: No aphasia  Speech: No dysarthria  Cranial nerves: face symmetric, tongue midline, CN II-XII grossly intact.   Eyes: pupils equal, round, reactive to light with accommodation, EOMI.   Pulmonary: normal respirations, not labored, no accessory muscles used    Sensory: intact to light touch throughout; decreased to b/l hands in the C7, and C8 dermatomes; decreased in left leg    Motor Strength: Moves all extremities spontaneously with good tone.  Full strength upper and lower extremities. No abnormal movements seen.     Strength  Deltoids Triceps Biceps Wrist Extension Wrist Flexion Hand  FA   Upper: R 5/5 5/5 5/5 5/5 5/5 5/5 5/5    L 5/5 5/5 5/5 5/5 5/5 5/5 5/5     Iliopsoas Quadriceps Knee  Flexion Tibialis  anterior Gastro- cnemius     Lower: R 5/5 5/5 5/5 5/5 5/5      L 5/5 5/5 5/5 5/5 5/5       Lei:  Positive bilaterally  Clonus: absent    Gait: normal    Wrist Tinel's: Positive bilaterally    Diagnostic Results:  I have personally reviewed imaging and agree with the findings.     X-ray cervical spine with flexion-extension views 2024   No fractures. " Alignment within normal limits. No prevertebral soft tissue swelling. No subluxation with extension and flexion views. Degenerative disc disease at C5-6, noting moderate disc height loss with endplate changes. Multilevel facet joint arthropathy.     X-ray lumbar spine standing with flexion-extension views 02/23/2024   Prior L4-5 posterior fusion on the left.  The hardware is intact.     Grade I anterolisthesis of L3 on L4, above the fusion, progressed from prior studies.  No pars defects.  Paradoxically this appears to mildly increased on extension but is stable on flexion.      ASSESSMENT/PLAN:     Amanda Hotl is a 73 y.o. female who presents with acutely worsened chronic low back pain along with acutely worsened chronic carpal tunnel symptoms.  I suspect numbness and tingling in her hands is related to carpal tunnel syndrome previously noted.  I have provided the patient with a home exercise regimen and recommend nightly wrist bracing.  If her symptoms fail to improve, we could consider repeating EMG for confirmation and discuss surgical options.  For her back pain, I am hopeful that this flare-up can be managed with aquatherapy, but left leg numbness following heavy lifting incident warrants a repeat lumbar MRI.  I will also order a cervical MRI for evaluation of newly noted b/l Lei's reflex.  Follow up to review imaging.      Please feel free to call with any further questions      Valerie Quiñones PA-C  [facility]  Department of Neurosurgery  [phone]    Disclaimer: This note was dictated by speech recognition. Minor errors in transcription may be present.  Please call with any questions.

## 2024-02-24 ENCOUNTER — PATIENT MESSAGE (OUTPATIENT)
Dept: OPTOMETRY | Facility: CLINIC | Age: 74
End: 2024-02-24
Payer: MEDICARE

## 2024-02-25 ENCOUNTER — PATIENT MESSAGE (OUTPATIENT)
Dept: ADMINISTRATIVE | Facility: OTHER | Age: 74
End: 2024-02-25
Payer: MEDICARE

## 2024-02-28 ENCOUNTER — OFFICE VISIT (OUTPATIENT)
Dept: FAMILY MEDICINE | Facility: CLINIC | Age: 74
End: 2024-02-28
Payer: MEDICARE

## 2024-02-28 VITALS
HEIGHT: 64 IN | SYSTOLIC BLOOD PRESSURE: 152 MMHG | OXYGEN SATURATION: 98 % | BODY MASS INDEX: 42.52 KG/M2 | WEIGHT: 249.06 LBS | DIASTOLIC BLOOD PRESSURE: 88 MMHG | HEART RATE: 78 BPM | TEMPERATURE: 97 F

## 2024-02-28 DIAGNOSIS — M51.36 DDD (DEGENERATIVE DISC DISEASE), LUMBAR: ICD-10-CM

## 2024-02-28 DIAGNOSIS — E53.8 VITAMIN B 12 DEFICIENCY: ICD-10-CM

## 2024-02-28 DIAGNOSIS — M54.40 CHRONIC LEFT-SIDED LOW BACK PAIN WITH SCIATICA, SCIATICA LATERALITY UNSPECIFIED: ICD-10-CM

## 2024-02-28 DIAGNOSIS — G89.29 CHRONIC LEFT-SIDED LOW BACK PAIN WITH SCIATICA, SCIATICA LATERALITY UNSPECIFIED: ICD-10-CM

## 2024-02-28 DIAGNOSIS — I10 PRIMARY HYPERTENSION: Primary | ICD-10-CM

## 2024-02-28 PROCEDURE — 99214 OFFICE O/P EST MOD 30 MIN: CPT | Mod: S$GLB,,, | Performed by: FAMILY MEDICINE

## 2024-02-28 PROCEDURE — 99999 PR PBB SHADOW E&M-EST. PATIENT-LVL V: CPT | Mod: PBBFAC,,, | Performed by: FAMILY MEDICINE

## 2024-02-28 NOTE — PROGRESS NOTES
Chief Complaint   Patient presents with    Follow-up       HPI  Amanda Holt is a 73 y.o. female with multiple medical diagnoses as listed in the medical history and problem list that presents for follow-up for chronic pain    Chronic pain- she has been following up with the neurologist and neurosurgeon, her pain has flared up since she lifted a door several weeks ago; she is pending MRI for her neck and her lower back, she has had a feeling of her scalp crawling as well; does not want to increase her pain medicine as codiene makes her itch  HTN- she has not taken her BP medicine today      ALLERGIES AND MEDICATIONS: updated and reviewed.  Review of patient's allergies indicates:   Allergen Reactions    Codeine      Medication List with Changes/Refills   Current Medications    AMITRIPTYLINE (ELAVIL) 100 MG TABLET    Take 1 tablet (100 mg total) by mouth every evening.    ASPIRIN (ECOTRIN) 81 MG EC TABLET        CLOTRIMAZOLE-BETAMETHASONE 1-0.05% (LOTRISONE) CREAM    APPLY  CREAM TOPICALLY TO AFFECTED AREA TWICE DAILY    CYANOCOBALAMIN, VITAMIN B-12, (B-12 COMPLIANCE) 1,000 MCG/ML KIT    Inject 1 mL as directed every 28 days.    DICLOFENAC SODIUM (VOLTAREN) 1 % GEL    Apply topically 2 (two) times daily.    ERGOCALCIFEROL (ERGOCALCIFEROL) 50,000 UNIT CAP    Take 1 capsule by mouth every 7 days.    FLUTICASONE PROPIONATE (FLONASE) 50 MCG/ACTUATION NASAL SPRAY    Use 1 spray(s) in each nostril once daily    GABAPENTIN (NEURONTIN) 300 MG CAPSULE    Start off with 1 pill at night okay to increase to 2 pills at night, if desired can take up to 3 (1 in am and 2 in pm)    HYDRALAZINE (APRESOLINE) 100 MG TABLET    Take 1 tablet (100 mg total) by mouth every 12 (twelve) hours.    HYDROXYZINE HCL (ATARAX) 25 MG TABLET    Take 1 tablet by mouth three times daily as needed    MELOXICAM (MOBIC) 7.5 MG TABLET    Take 7.5 mg by mouth once daily.    METOPROLOL SUCCINATE (TOPROL-XL) 25 MG 24 HR TABLET    Take 1 tablet (25  mg total) by mouth once daily.    MOMETASONE (ELOCON) 0.1 % SOLUTION    Apply once to twice daily prn itching    MUPIROCIN (BACTROBAN) 2 % OINTMENT    Apply topically 3 (three) times daily.    NIFEDIPINE (PROCARDIA-XL) 30 MG (OSM) 24 HR TABLET    Take 1 tablet by mouth once daily    NYSTATIN (NYSTOP) POWDER    APPLY  POWDER TOPICALLY TO AFFECTED AREA 4 TIMES DAILY    OLOPATADINE (PATANOL) 0.1 % OPHTHALMIC SOLUTION    INSTILL 1 DROP INTO EACH EYE TWICE DAILY    OMEPRAZOLE (PRILOSEC) 40 MG CAPSULE    Take 1 capsule (40 mg total) by mouth once daily.    PRAVASTATIN (PRAVACHOL) 40 MG TABLET    Take 1 tablet by mouth once daily    SPIRONOLACTONE (ALDACTONE) 50 MG TABLET    Take 1 tablet by mouth once daily    TIZANIDINE (ZANAFLEX) 4 MG TABLET    TAKE 1 TABLET BY MOUTH EVERY 6 HOURS AS NEEDED    TRAMADOL (ULTRAM) 50 MG TABLET    Take 1 tablet (50 mg total) by mouth every 6 (six) hours as needed.    TRIAMCINOLONE ACETONIDE 0.1% (KENALOG) 0.1 % OINTMENT    AAA bid    TRIAMCINOLONE ACETONIDE 0.1% (KENALOG) 0.1 % OINTMENT    AAA bid; not more than 2 weeks straight in same location, avoid use on face and groin    TRIAMCINOLONE ACETONIDE 0.1%-CICLOPIROX-MAGNESIUM HYDROXIDE 400 MG/5 ML    Apply to affected area twice a day after cool blow dry    VALSARTAN (DIOVAN) 320 MG TABLET    Take 1 tablet (320 mg total) by mouth once daily.   Discontinued Medications    SEMAGLUTIDE (OZEMPIC) 0.25 MG OR 0.5 MG (2 MG/3 ML) PEN INJECTOR    Inject 0.5 mg into the skin every 7 days.       ROS  Review of Systems   Constitutional:  Negative for chills, diaphoresis, fatigue, fever and unexpected weight change.   HENT:  Negative for rhinorrhea, sinus pressure, sore throat and tinnitus.    Eyes:  Negative for photophobia and visual disturbance.   Respiratory:  Negative for cough, shortness of breath and wheezing.    Cardiovascular:  Negative for chest pain and palpitations.   Gastrointestinal:  Negative for abdominal pain, blood in stool,  "constipation, diarrhea, nausea and vomiting.   Genitourinary:  Negative for dysuria, flank pain, frequency and vaginal discharge.   Musculoskeletal:  Positive for arthralgias and back pain. Negative for joint swelling.   Skin:  Negative for rash.   Neurological:  Negative for speech difficulty, weakness, light-headedness and headaches.   Psychiatric/Behavioral:  Negative for behavioral problems and dysphoric mood.        Physical Exam  Vitals:    02/28/24 0943   BP: (!) 152/88   BP Location: Right arm   Patient Position: Sitting   BP Method: Thigh Cuff (Manual)   Pulse: 78   Temp: 97 °F (36.1 °C)   TempSrc: Oral   SpO2: 98%   Weight: 113 kg (249 lb 0.5 oz)   Height: 5' 4" (1.626 m)    Body mass index is 42.75 kg/m².  Weight: 113 kg (249 lb 0.5 oz)   Height: 5' 4" (162.6 cm)     Physical Exam  Vitals and nursing note reviewed.   Constitutional:       Appearance: She is well-developed.   Skin:     General: Skin is warm and dry.      Findings: No erythema or rash.   Neurological:      Mental Status: She is alert. Mental status is at baseline.   Psychiatric:         Behavior: Behavior normal.         Health Maintenance         Date Due Completion Date    TETANUS VACCINE Never done ---    Shingles Vaccine (1 of 2) Never done ---    RSV Vaccine (Age 60+ and Pregnant patients) (1 - 1-dose 60+ series) Never done ---    Pneumococcal Vaccines (Age 65+) (1 of 1 - PCV) Never done ---    Colorectal Cancer Screening 07/23/2023 7/23/2020    COVID-19 Vaccine (5 - 2023-24 season) 09/01/2023 2/17/2022    Mammogram 05/08/2024 5/8/2023    High Dose Statin 02/23/2025 2/23/2024    Aspirin/Antiplatelet Therapy 02/23/2025 2/23/2024    Lipid Panel 03/07/2028 3/7/2023            Health maintenance reviewed and addressed as ordered      ASSESSMENT/PLAN       1. Primary hypertension  Pt to resume f/u with digital medicine  Continue current regimen    2. Chronic left-sided low back pain with sciatica, sciatica laterality unspecified  F/u with " neurology and neurosurgery  Continue tramadol  Pending MRI    3. DDD (degenerative disc disease), lumbar  Pending MRI    4. Vitamin B 12 deficiency    Begin injections    Simona Torres MD  02/28/2024 9:52 AM        Follow up in about 3 months (around 5/28/2024).    No orders of the defined types were placed in this encounter.

## 2024-02-29 DIAGNOSIS — B37.2 CANDIDAL INTERTRIGO: ICD-10-CM

## 2024-02-29 DIAGNOSIS — H10.13 ALLERGIC CONJUNCTIVITIS OF BOTH EYES: ICD-10-CM

## 2024-02-29 DIAGNOSIS — I10 PRIMARY HYPERTENSION: ICD-10-CM

## 2024-02-29 RX ORDER — METOPROLOL SUCCINATE 25 MG/1
25 TABLET, EXTENDED RELEASE ORAL
Qty: 90 TABLET | Refills: 3 | Status: SHIPPED | OUTPATIENT
Start: 2024-02-29

## 2024-02-29 RX ORDER — NYSTATIN 100000 [USP'U]/G
POWDER TOPICAL
Qty: 30 G | Refills: 1 | Status: SHIPPED | OUTPATIENT
Start: 2024-02-29

## 2024-02-29 RX ORDER — TIZANIDINE 4 MG/1
TABLET ORAL
Qty: 90 TABLET | Refills: 1 | Status: SHIPPED | OUTPATIENT
Start: 2024-02-29

## 2024-02-29 RX ORDER — OLOPATADINE HYDROCHLORIDE 1 MG/ML
1 SOLUTION/ DROPS OPHTHALMIC 2 TIMES DAILY
Qty: 5 ML | Refills: 0 | Status: SHIPPED | OUTPATIENT
Start: 2024-02-29

## 2024-02-29 NOTE — TELEPHONE ENCOUNTER
No care due was identified.  [facility] Embedded Care Due Messages. Reference number: [id number].   2/29/2024 1:41:10 PM CST

## 2024-02-29 NOTE — TELEPHONE ENCOUNTER
Patient reexamined after second liter bolus. Blood pressure did not drop on orthostatic blood pressure measurement but pulse went up into the 120s. Patient still with persistent tachycardia at rest 1 teens/120s.   I went to examine patient and stood her u Please see the attached refill request.

## 2024-03-05 ENCOUNTER — HOSPITAL ENCOUNTER (OUTPATIENT)
Dept: RADIOLOGY | Facility: HOSPITAL | Age: 74
Discharge: HOME | End: 2024-03-05
Attending: PHYSICIAN ASSISTANT
Payer: MEDICARE

## 2024-03-05 ENCOUNTER — TELEPHONE (OUTPATIENT)
Dept: NEUROSURGERY | Facility: CLINIC | Age: 74
End: 2024-03-05
Payer: MEDICARE

## 2024-03-05 DIAGNOSIS — M54.9 DORSALGIA, UNSPECIFIED: ICD-10-CM

## 2024-03-05 DIAGNOSIS — M48.02 SPINAL STENOSIS, CERVICAL REGION: ICD-10-CM

## 2024-03-05 PROCEDURE — 72141 MRI NECK SPINE W/O DYE: CPT | Mod: TC

## 2024-03-05 PROCEDURE — 72148 MRI LUMBAR SPINE W/O DYE: CPT | Mod: 26,,, | Performed by: RADIOLOGY

## 2024-03-05 PROCEDURE — 72148 MRI LUMBAR SPINE W/O DYE: CPT | Mod: TC

## 2024-03-05 PROCEDURE — 72141 MRI NECK SPINE W/O DYE: CPT | Mod: 26,,, | Performed by: RADIOLOGY

## 2024-03-06 ENCOUNTER — OFFICE VISIT (OUTPATIENT)
Dept: NEUROSURGERY | Facility: CLINIC | Age: 74
End: 2024-03-06
Payer: MEDICARE

## 2024-03-06 DIAGNOSIS — M54.12 CERVICAL RADICULOPATHY: Primary | ICD-10-CM

## 2024-03-06 PROCEDURE — 99213 OFFICE O/P EST LOW 20 MIN: CPT | Mod: 95,,, | Performed by: PHYSICIAN ASSISTANT

## 2024-03-06 NOTE — PROGRESS NOTES
The patient location is: [place]   The chief complaint leading to consultation is: imaging review    Visit type: audiovisual    Face to Face time with patient: 15 min  20 minutes of total time spent on the encounter, which includes face to face time and non-face to face time preparing to see the patient (eg, review of tests), Obtaining and/or reviewing separately obtained history, Documenting clinical information in the electronic or other health record, Independently interpreting results (not separately reported) and communicating results to the patient/family/caregiver, or Care coordination (not separately reported).         Each patient to whom he or she provides medical services by telemedicine is:  (1) informed of the relationship between the physician and patient and the respective role of any other health care provider with respect to management of the patient; and (2) notified that he or she may decline to receive medical services by telemedicine and may withdraw from such care at any time.    Notes:     [facility]  Neurosurgery    SUBJECTIVE:     Interval history 3/6/24:  Patient returns via virtual visit to review new cervical and lumbar MRIs. She denies any worsening of her sxs. Wrist bracing helps the numbness in her hands in the AM, but tingling worsens with fine motor activities. Still complaining of numbness in bilateral pinky fingers.     Back pain is not improved at this point. She plans to participate in water aerobics at the [employer].     History of Present Illness 2/23/24:  Amanda Holt is a 73 y.o. female with below listed PMH who presents with chronic back pain and b/l hand numbness.  Patient was seen by Dr. Avitia in 2020 who has a h/o left-sided L4-5 laminectomy and fusion in 2018 at an outside hospital.  At the time of her last visit, conservative management was recommended and she was not interested in surgery.  She attempted traditional PT but was  unable to completed due to pain.  She was then referred to aqua therapy which helped for a period of time.  She stopped about 8 months ago when the weather got cold and has not returned.  She has tried multiple pain management interventions with no lasting relief.  She is new to me today and is still largely disinterested in surgery, but wanted to review her options given the acute worsening in her pain.      She reports significantly increased b/l low back pain.  If she stands for more than 10 minutes, her entire LLE will go numb.  Routine housework can be performed but it results in a couple of days lying in bed to recuperate.  Earlier this month, she did a more intense house cleaning that involved her moving a heavy metal door multiple times.  Since that time, her back pain has failed to recover despite her typical recuperation period.  Endorses chronic constipation that is worse this month.    She further endorses a h/o carpal tunnel syndrome with b/l hand tingling.  The right hand involves all 5 fingers where as the left-hand only involves the 5th digit.  She is dropping items from her hands and feels that they are beginning to lock up on her.  She also notes intense itching and burning across her shoulders.  She was also evaluated by Neurology and Dermatology for intensely itchy scalp which was managed with steroids.      (Not in a hospital admission)      Review of patient's allergies indicates:   Allergen Reactions    Codeine        Past Medical History:   Diagnosis Date    Arthralgia     Colon polyp     Degenerative disc disease at L5-S1 level     High cholesterol     Hypertension     Notalgia paresthetica     Nuclear sclerosis of both eyes 01/08/2020    Sciatica     Spinal stenosis     Tobacco use 10/21/2021     Past Surgical History:   Procedure Laterality Date    BACK SURGERY      lumbar laminectomy    COLON SURGERY  2022    Tear repaired    COLONOSCOPY N/A 07/23/2020    Procedure: COLONOSCOPY;  Surgeon:  Donal Moore MD;  Location: [facility] ENDO;  Service: Endoscopy;  Laterality: N/A;    EPIDURAL STEROID INJECTION Left 2020    Procedure: Injection, Steroid, Epidural Transforaminal;  Surgeon: Jun Leavitt Jr., MD;  Location: [facility] ENDO;  Service: Pain Management;  Laterality: Left;  Left L5 + S1 TF WADE  Arrive @ 1300; ASA last ; No DM    EPIDURAL STEROID INJECTION Left 2021    Procedure: Injection, Steroid, Epidural Transforaminal;  Surgeon: Jun Leavitt Jr., MD;  Location: [facility] ENDO;  Service: Pain Management;  Laterality: Left;  Left L4 + L5 TF WADE  Arrive @ 1230; IV Sedation; ASA last ; No DM    SPINE SURGERY  10/08/18    Bilateral Lumbar Laminectomy with Fusion and Screws    TRANSFORAMINAL EPIDURAL INJECTION OF STEROID Left 2022    Procedure: INJECTION, STEROID, EPIDURAL, TRANSFORAMINAL APPROACH, L4-L5 & L5-S1;  Surgeon: Darya Ayala MD;  Location: [facility], [place], operated by [facility];  Service: Pain Management;  Laterality: Left;    TRANSFORAMINAL EPIDURAL INJECTION OF STEROID Left 2022    Procedure: LUMBAR TRANSFORAMINAL LEFT L4/5 AND L5/S1 CONTRAST;  Surgeon: Darya Ayala MD;  Location: [facility], [place], operated by [facility];  Service: Pain Management;  Laterality: Left;    TUBAL LIGATION       Family History   Problem Relation Age of Onset    Breast cancer Mother     Hypertension Mother             Hypotension Mother     Cancer Mother          due to breast cancer    Miscarriages / Stillbirths Mother         3 stillborn children    Cataracts Father     Glaucoma Father     Diabetes Father             Arthritis Father     Vision loss Father         Glaucoma -     Glaucoma Sister     Arthritis Sister     Diabetes Sister     Hypertension Sister     Vision loss Sister         Glasses    Drug abuse Brother     Learning disabilities Brother     No Known Problems Brother     No Known Problems Brother     No Known Problems Maternal Aunt     No Known Problems Maternal Uncle     No Known  Problems Paternal Aunt     No Known Problems Paternal Uncle     No Known Problems Maternal Grandmother     No Known Problems Maternal Grandfather     No Known Problems Paternal Grandmother     No Known Problems Paternal Grandfather     No Known Problems Son     No Known Problems Other     Arthritis Sister     Depression Sister     Diabetes Sister     Hearing loss Sister         Trouble hearing    Hypertension Sister     Stroke Sister         Browne Vargas Disease, Coma for a month due to diabetes,Some body weakness    Vision loss Sister         Lasix surgery    Arthritis Sister     COPD Sister     Diabetes Sister     Hearing loss Sister     Heart disease Sister     Hypertension Sister     Arthritis Brother     Hypertension Brother     Early death Sister         Still born    Early death Sister         Still born twin to Debby Benítez    Early death Brother     Hypertension Sister     Hypertension Brother     Learning disabilities Son     Learning disabilities Son         Great grandson    Learning disabilities Daughter         Granddaughter    Mental illness Sister         Her son Matthias Benítez    Vision loss Sister         Glasses/contacts    Vision loss Sister         Glasses    Amblyopia Neg Hx     Blindness Neg Hx     Macular degeneration Neg Hx     Retinal detachment Neg Hx     Strabismus Neg Hx     Thyroid disease Neg Hx      Social History     Tobacco Use    Smoking status: Former     Current packs/day: 0.00     Average packs/day: 0.3 packs/day for 35.0 years (8.8 ttl pk-yrs)     Types: Cigarettes, Vaping with nicotine     Start date: 3/15/1987     Quit date: 3/15/2022     Years since quittin.9    Smokeless tobacco: Current    Tobacco comments:     Occasional spoker some times 1-2 cigarettes/day sometimes none for weeks   Substance Use Topics    Alcohol use: Yes     Alcohol/week: 3.0 standard drinks of alcohol     Types: 3 Glasses of wine per week     Comment: Occasional wine    Drug use: Not Currently      Comment: Tramadol twice a day as needed        Review of Systems:  As noted in HPI    OBJECTIVE:     Vital Signs (Most Recent):  There were no vitals filed for this visit. (Virtual visit)        Physical Exam:  General: well developed, well nourished, no distress  Head: normocephalic, atraumatic  Neurologic: Alert and oriented. Thought content appropriate  GCS: Motor: 6/Verbal: 5/Eyes: 4 GCS Total: 15  Language: No aphasia  Speech: No dysarthria  Cranial nerves: face symmetric, tongue midline, CN II-XII grossly intact.   Eyes: pupils equal, round, reactive to light with accommodation, EOMI.   Pulmonary: normal respirations, not labored, no accessory muscles used    Motor Strength: Moves all extremities spontaneously with good tone.      Diagnostic Results:  I have personally reviewed imaging and agree with the findings.     MRI lumbar spine 3/5/24  Moderate stenosis at L3-4, adjacent to prior fusion. L>R facet arthropathy at the same level     MRI cervical spine 3/5/24  No significant central stenosis at any level   C5-6: severe left foraminal stenosis   C7-T1: L>R severe bilateral foraminal stenosis   No foraminal stenosis at C6-7       X-ray cervical spine with flexion-extension views 02/23/2024   No fractures. Alignment within normal limits. No prevertebral soft tissue swelling. No subluxation with extension and flexion views. Degenerative disc disease at C5-6, noting moderate disc height loss with endplate changes. Multilevel facet joint arthropathy.     X-ray lumbar spine standing with flexion-extension views 02/23/2024   Prior L4-5 posterior fusion on the left.  The hardware is intact.     Grade I anterolisthesis of L3 on L4, above the fusion, progressed from prior studies.  No pars defects.  Paradoxically this appears to mildly increased on extension but is stable on flexion.      ASSESSMENT/PLAN:     Amanda Holt is a 73 y.o. female who presents via virtual visit for FU of acutely worsened chronic  low back pain along with acutely worsened chronic carpal tunnel symptoms. Carpal tunnel sxs are helped with nightly bracing. Additional numbness in her hands appears to be due to foraminal stenosis at C7-T1. Will order WADE and PT and reevaluate in 3 months. Low back pain will be managed conservatively with water aerobics. Advised to call if numbness worsens or fails to improve. Moderate stenosis is unlikely to be the source of her leg numbness at this time. Adjacent level degeneration could certainly be the cause of her back pain. Will ctm and FU in 3 months.         Please feel free to call with any further questions      Valerie Quiñones PA-C  [facility]  Department of Neurosurgery  [phone]    Disclaimer: This note was dictated by speech recognition. Minor errors in transcription may be present.  Please call with any questions.

## 2024-03-07 ENCOUNTER — TELEPHONE (OUTPATIENT)
Dept: PAIN MEDICINE | Facility: CLINIC | Age: 74
End: 2024-03-07
Payer: MEDICARE

## 2024-03-07 NOTE — TELEPHONE ENCOUNTER
"Upon calling pt to discuss scheduling C7-T1 WADE ref: Ishmael, she stated she had "a bad experience with Dagmar as he does not prescribe pain medication" and will talk with Neurosurgery re: her concerns.  After reviewing her chart, I believe she is referring to sedation for procedure- she was administered IV sedation for previous procedures at [facility]. Dr. Leavitt updated- willing to order IV sedation for case. Pt will discuss with her son and get back to us.  Referring provider also updated.  "

## 2024-03-07 NOTE — TELEPHONE ENCOUNTER
----- Message from Jun Leavitt Jr., MD sent at 3/6/2024 12:34 PM CST -----  Regarding: RE: Order for CLARICE KLEIN  OK to schedule for C7-T1 ILESI. Thanks.   ----- Message -----  From: Juana Verduzco RN  Sent: 3/6/2024  12:00 PM CST  To: Jun Leavitt Jr., MD  Subject: FW: Order for CLARICE KLEIN               ----- Message -----  From: Valerie Quiñones PA-C  Sent: 3/6/2024  11:35 AM CST  To: [facility] Pain Management Schedulers  Subject: Order for CLARICE KLEIN                   Patient Name: CLARICE KLEIN([id number])  Sex: Female  : 1950      PCP: SHARON SHI    [place] [facility] [place]     Types of orders made on 2024: Outpatient Referral, Procedure Request    Order Date:3/6/2024  Ordering User:VALERIE QUIÑONES [[id number]]  Encounter Provider:Valerie Quiñones PA-C [[id number]]  Authorizing Provider: Valerie Quiñones PA-C [[id number]]  Supervising Provider:MARCIN PUENTE [[id number]]  Type of Supervision:Supervision Required  Department:[facility] NEUROSURGERY[[id number]]    Common Order Information  Procedure -> Epidural Injection (specify level) Cmt: C7-T1 ILESI    Pre-op Diagnosis -> Cervical radiculopathy     Order Specific Information  Order: Procedure Order to Pain Manageme  nt [Custom: [id number]]  Order #:          [id number]: 1 FUTURE    Priority: Routine  Class: Clinic Performed    Future Order Information      Expires on:2025            Expected by:2024                   Associated Diagnoses      M54.12 Cervical radiculopathy      Physician -> Dagmar         Facility Name: -> [facility]           Priority: Routine  Class: Clinic Performed    Fut  ure Order Information      Expires on:2025            Expected by:2024                   Associated Diagnoses      M54.12 Cervical radiculopathy      Procedure -> Epidural Injection (specify level) Cmt: C7-T1 ILESI        Physician -> Nadirorhies         Pre-op Diagnosis ->  Cervical radiculopathy         [facility]

## 2024-03-11 ENCOUNTER — TELEPHONE (OUTPATIENT)
Dept: FAMILY MEDICINE | Facility: CLINIC | Age: 74
End: 2024-03-11
Payer: MEDICARE

## 2024-03-11 ENCOUNTER — TELEPHONE (OUTPATIENT)
Dept: PAIN MEDICINE | Facility: CLINIC | Age: 74
End: 2024-03-11
Payer: MEDICARE

## 2024-03-11 NOTE — TELEPHONE ENCOUNTER
Can we schedule a virtual visit to discuss    The other medicines have codeine and she has an allergy    Simona Torres MD

## 2024-03-11 NOTE — TELEPHONE ENCOUNTER
----- Message from Lico Davis sent at 3/11/2024  8:56 AM CDT -----  Type: Patient Call Back    Who called:Self    What is the request in detail:In regards to scheduling epidural     Can the clinic reply by [facility]?No    Would the patient rather a call back or a response via [facility]? Call    Best call back number:[phone] (home)       Additional Information:

## 2024-03-11 NOTE — TELEPHONE ENCOUNTER
Rt pt call , says that at last visit discussed changing medication tramadol . She has done her mri and is ready for the alternative   LOV 2/28/2024

## 2024-03-11 NOTE — TELEPHONE ENCOUNTER
----- Message from Lico Davis sent at 3/11/2024  8:55 AM CDT -----  Type: Patient Call Back    Who called:Self    What is the request in detail:In regards to medication change    Can the clinic reply by [facility]?No    Would the patient rather a call back or a response via [facility]? Call    Best call back number:[phone] (home)       Additional Information:

## 2024-03-13 ENCOUNTER — OFFICE VISIT (OUTPATIENT)
Dept: BARIATRICS | Facility: CLINIC | Age: 74
End: 2024-03-13
Payer: MEDICARE

## 2024-03-13 ENCOUNTER — TELEPHONE (OUTPATIENT)
Dept: PAIN MEDICINE | Facility: CLINIC | Age: 74
End: 2024-03-13
Payer: MEDICARE

## 2024-03-13 VITALS
OXYGEN SATURATION: 99 % | DIASTOLIC BLOOD PRESSURE: 80 MMHG | WEIGHT: 253.88 LBS | SYSTOLIC BLOOD PRESSURE: 142 MMHG | BODY MASS INDEX: 43.58 KG/M2 | HEART RATE: 84 BPM

## 2024-03-13 DIAGNOSIS — Z71.3 ENCOUNTER FOR WEIGHT LOSS COUNSELING: ICD-10-CM

## 2024-03-13 DIAGNOSIS — E66.01 CLASS 3 SEVERE OBESITY DUE TO EXCESS CALORIES WITH SERIOUS COMORBIDITY AND BODY MASS INDEX (BMI) OF 45.0 TO 49.9 IN ADULT: Primary | ICD-10-CM

## 2024-03-13 DIAGNOSIS — G47.33 OSA (OBSTRUCTIVE SLEEP APNEA): ICD-10-CM

## 2024-03-13 DIAGNOSIS — E78.5 HYPERLIPIDEMIA, UNSPECIFIED HYPERLIPIDEMIA TYPE: ICD-10-CM

## 2024-03-13 DIAGNOSIS — I10 PRIMARY HYPERTENSION: ICD-10-CM

## 2024-03-13 PROCEDURE — 99999 PR PBB SHADOW E&M-EST. PATIENT-LVL IV: CPT | Mod: PBBFAC,,, | Performed by: STUDENT IN AN ORGANIZED HEALTH CARE EDUCATION/TRAINING PROGRAM

## 2024-03-13 PROCEDURE — 99213 OFFICE O/P EST LOW 20 MIN: CPT | Mod: S$GLB,,, | Performed by: STUDENT IN AN ORGANIZED HEALTH CARE EDUCATION/TRAINING PROGRAM

## 2024-03-13 NOTE — TELEPHONE ENCOUNTER
----- Message from Sheeba Claros sent at 3/13/2024 11:20 AM CDT -----  Regarding: call back  Name of caller: Amanda       What is the requesting detail: pt Is requesting a call back regarding her appt tomorrow.Please advise       Can the clinic reply by [facility]:       What number to call back:[phone]

## 2024-03-13 NOTE — PROGRESS NOTES
Subjective     Patient ID: Amanda Holt is a 73 y.o. female.    Chief Complaint: Follow-up, Obesity, and Weight Check    Patient presents for treatment of obesity.   Back surgery and pain has lead to decreased activity and weight gain    Co-morbidities  HTN  CKD  ANTONIA  GERD  Anxiety  Depression    Negative for thyroid cancer    Current Physical Activity  Limited by back pain, sometimes walks around store where she can lean on basket  Has stairs in house - has not been using stairs due to back pain  House work    Current Eating Habits - tracking on [employer]  Unable to stand at stove due to increased back pain, so eating a lot of sandwiches    Medical Weight Loss  6/28/2023: 260.9 lbs, BMI 44.8, BFP 53%, .3 lbs, SMM 67.7 lbs, BMR 1571 kcal  9/28/2023: 249.8 lbs, BMI 42.9, BFP 52.6%, .4 lbs, SMM 65.3 lbs, BMR 1529 kcal  3/13/2024: 253.9 lbs, BMI 43.6, BFP 52%,  lbs, SMM 67.5 lbs, BMR 1564 kcal      Review of Systems   Constitutional:  Negative for chills and fever.   Respiratory:  Negative for shortness of breath.    Cardiovascular:  Negative for chest pain.   Gastrointestinal:  Negative for abdominal pain, nausea and vomiting.   Neurological:  Negative for dizziness and light-headedness.   Psychiatric/Behavioral:  The patient is not nervous/anxious.           Objective    Latest Reference Range & Units 03/07/23 09:17   WBC 3.90 - 12.70 K/uL 4.86   RBC 4.00 - 5.40 M/uL 4.23   Hemoglobin 12.0 - 16.0 g/dL 12.7   Hematocrit 37.0 - 48.5 % 40.6   MCV 82 - 98 fL 96   MCH 27.0 - 31.0 pg 30.0   MCHC 32.0 - 36.0 g/dL 31.3 (L)   RDW 11.5 - 14.5 % 11.9   Platelets 150 - 450 K/uL 264   MPV 9.2 - 12.9 fL 9.8   Gran % 38.0 - 73.0 % 54.7   Lymph % 18.0 - 48.0 % 32.1   Mono % 4.0 - 15.0 % 10.3   Eosinophil % 0.0 - 8.0 % 2.5   Basophil % 0.0 - 1.9 % 0.2   Immature Granulocytes 0.0 - 0.5 % 0.2   Gran # (ANC) 1.8 - 7.7 K/uL 2.7   Lymph # 1.0 - 4.8 K/uL 1.6   Mono # 0.3 - 1.0 K/uL 0.5   Eos # 0.0 - 0.5 K/uL  0.1   Baso # 0.00 - 0.20 K/uL 0.01   Immature Grans (Abs) 0.00 - 0.04 K/uL 0.01   nRBC 0 /100 WBC 0   Differential Method  Automated   Sodium 136 - 145 mmol/L 144   Potassium 3.5 - 5.1 mmol/L 4.5   Chloride 95 - 110 mmol/L 112 (H)   CO2 23 - 29 mmol/L 25   Anion Gap 8 - 16 mmol/L 7 (L)   BUN 8 - 23 mg/dL 18   Creatinine 0.5 - 1.4 mg/dL 1.3   eGFR >60 mL/min/1.73 m^2 44 !   Glucose 70 - 110 mg/dL 115 (H)   Calcium 8.7 - 10.5 mg/dL 9.8   Alkaline Phosphatase 55 - 135 U/L 93   PROTEIN TOTAL 6.0 - 8.4 g/dL 7.6   Albumin 3.5 - 5.2 g/dL 3.3 (L)   BILIRUBIN TOTAL 0.1 - 1.0 mg/dL 0.6   AST 10 - 40 U/L 15   ALT 10 - 44 U/L 14   Cholesterol 120 - 199 mg/dL 160   HDL 40 - 75 mg/dL 40   HDL/Cholesterol Ratio 20.0 - 50.0 % 25.0   LDL Cholesterol External 63.0 - 159.0 mg/dL 101.2   Non-HDL Cholesterol mg/dL 120   Total Cholesterol/HDL Ratio 2.0 - 5.0  4.0   Triglycerides 30 - 150 mg/dL 94   Hemoglobin A1C External 4.0 - 5.6 % 5.4   Estimated Avg Glucose 68 - 131 mg/dL 108   TSH 0.400 - 4.000 uIU/mL 1.632   (L): Data is abnormally low  (H): Data is abnormally high  !: Data is abnormal    Vitals:    03/13/24 0939   BP: (!) 142/80   Pulse: 84       Physical Exam  Vitals reviewed.   Constitutional:       General: She is not in acute distress.     Appearance: Normal appearance. She is obese. She is not ill-appearing, toxic-appearing or diaphoretic.   HENT:      Head: Normocephalic and atraumatic.   Cardiovascular:      Rate and Rhythm: Normal rate.   Pulmonary:      Effort: Pulmonary effort is normal. No respiratory distress.   Skin:     General: Skin is warm and dry.   Neurological:      Mental Status: She is alert and oriented to person, place, and time.            Assessment and Plan   Amanda was seen today for follow-up, obesity and weight check.    Diagnoses and all orders for this visit:    Class 3 severe obesity due to excess calories with serious comorbidity and body mass index (BMI) of 45.0 to 49.9 in adult    Primary  hypertension    Hyperlipidemia, unspecified hyperlipidemia type    ANTONIA (obstructive sleep apnea)    Encounter for weight loss counseling          - Log all food and beverage intake with a daily calorie goal of 1200 calories per day    - Activity as tolerated

## 2024-03-14 ENCOUNTER — OFFICE VISIT (OUTPATIENT)
Dept: PAIN MEDICINE | Facility: CLINIC | Age: 74
End: 2024-03-14
Attending: ANESTHESIOLOGY
Payer: MEDICARE

## 2024-03-14 ENCOUNTER — PATIENT MESSAGE (OUTPATIENT)
Dept: FAMILY MEDICINE | Facility: CLINIC | Age: 74
End: 2024-03-14
Payer: MEDICARE

## 2024-03-14 ENCOUNTER — TELEPHONE (OUTPATIENT)
Dept: PAIN MEDICINE | Facility: OTHER | Age: 74
End: 2024-03-14
Payer: MEDICARE

## 2024-03-14 ENCOUNTER — OFFICE VISIT (OUTPATIENT)
Dept: FAMILY MEDICINE | Facility: CLINIC | Age: 74
End: 2024-03-14
Payer: MEDICARE

## 2024-03-14 VITALS
OXYGEN SATURATION: 100 % | HEART RATE: 88 BPM | DIASTOLIC BLOOD PRESSURE: 86 MMHG | RESPIRATION RATE: 18 BRPM | BODY MASS INDEX: 43.51 KG/M2 | HEIGHT: 64 IN | WEIGHT: 254.88 LBS | SYSTOLIC BLOOD PRESSURE: 147 MMHG

## 2024-03-14 DIAGNOSIS — M48.00 SPINAL STENOSIS, UNSPECIFIED SPINAL REGION: Primary | ICD-10-CM

## 2024-03-14 DIAGNOSIS — M54.16 LUMBAR RADICULOPATHY: ICD-10-CM

## 2024-03-14 DIAGNOSIS — M51.36 DDD (DEGENERATIVE DISC DISEASE), LUMBAR: ICD-10-CM

## 2024-03-14 DIAGNOSIS — M25.48 EFFUSION OF LUMBAR FACET JOINT: Primary | ICD-10-CM

## 2024-03-14 PROBLEM — F11.20 OPIOID DEPENDENCE, UNCOMPLICATED: Status: RESOLVED | Noted: 2023-01-12 | Resolved: 2024-03-14

## 2024-03-14 PROCEDURE — 99214 OFFICE O/P EST MOD 30 MIN: CPT | Mod: GC,S$GLB,, | Performed by: ANESTHESIOLOGY

## 2024-03-14 PROCEDURE — 99999 PR PBB SHADOW E&M-EST. PATIENT-LVL V: CPT | Mod: PBBFAC,,, | Performed by: ANESTHESIOLOGY

## 2024-03-14 PROCEDURE — 99213 OFFICE O/P EST LOW 20 MIN: CPT | Mod: 95,,, | Performed by: FAMILY MEDICINE

## 2024-03-14 RX ORDER — OXYCODONE AND ACETAMINOPHEN 10; 325 MG/1; MG/1
1 TABLET ORAL EVERY 4 HOURS PRN
Qty: 45 TABLET | Refills: 0 | Status: SHIPPED | OUTPATIENT
Start: 2024-03-14 | End: 2024-03-15 | Stop reason: SDUPTHER

## 2024-03-14 NOTE — TELEPHONE ENCOUNTER
----- Message from Simona Torres MD sent at 3/14/2024  2:54 PM CDT -----  Regarding: RE: Request to hold Aspirin  Okay to hold ASA for 5 days  ----- Message -----  From: Kiesha Esparza LPN  Sent: 3/14/2024   2:43 PM CDT  To: Simona Torres MD  Subject: Request to hold Aspirin                          Good afternoon,    Patient will be scheduled to have a procedure with Dr. Ayala, Bilateral L3/4 Facet Joint Injection . Staff is requesting to hold Aspirin for 5 days prior to procedure. Please advise.    Thank you,  Kiesha

## 2024-03-14 NOTE — PROGRESS NOTES
Chief Complaint   Patient presents with    spinal stenosis       The patient location is:  Patient Home   The chief complaint leading to consultation is: spinal stenosis  Visit type: Virtual visit with synchronous audio and video  Total time spent with patient: 15 min  Each patient to whom he or she provides medical services by telemedicine is:  (1) informed of the relationship between the physician and patient and the respective role of any other health care provider with respect to management of the patient; and (2) notified that he or she may decline to receive medical services by telemedicine and may withdraw from such care at any time.      DEBBIE Holt is a 73 y.o. female with multiple medical diagnoses as listed in the medical history and problem list that presents for follow-up for spinal stenosis    She had previously wanted to defer changing her pain medicine until she had her MRI done, MRI shows moderate to severe stenosis and surgery is recommended but she would like to defer that at this time  She has a visit with pain management to discuss if WADE or nerve block would be helpful as her pain reduces her ability to function  She did take one of her sister's pain pills and is not sure what strength it is    PAST MEDICAL HISTORY:  Past Medical History:   Diagnosis Date    Arthralgia     Colon polyp     Degenerative disc disease at L5-S1 level     High cholesterol     Hypertension     Notalgia paresthetica     Nuclear sclerosis of both eyes 01/08/2020    Sciatica     Spinal stenosis     Tobacco use 10/21/2021       PAST SURGICAL HISTORY:  Past Surgical History:   Procedure Laterality Date    BACK SURGERY      lumbar laminectomy    COLON SURGERY  2022    Tear repaired    COLONOSCOPY N/A 07/23/2020    Procedure: COLONOSCOPY;  Surgeon: Donal Moore MD;  Location: [facility];  Service: Endoscopy;  Laterality: N/A;    EPIDURAL STEROID INJECTION Left 09/09/2020    Procedure: Injection, Steroid,  Epidural Transforaminal;  Surgeon: Jun Leavitt Jr., MD;  Location: [facility] ENDO;  Service: Pain Management;  Laterality: Left;  Left L5 + S1 TF WADE  Arrive @ 1300; ASA last ; No DM    EPIDURAL STEROID INJECTION Left 2021    Procedure: Injection, Steroid, Epidural Transforaminal;  Surgeon: Jun Leavitt Jr., MD;  Location: [facility] ENDO;  Service: Pain Management;  Laterality: Left;  Left L4 + L5 TF WADE  Arrive @ 1230; IV Sedation; ASA last ; No DM    SPINE SURGERY  10/08/18    Bilateral Lumbar Laminectomy with Fusion and Screws    TRANSFORAMINAL EPIDURAL INJECTION OF STEROID Left 2022    Procedure: INJECTION, STEROID, EPIDURAL, TRANSFORAMINAL APPROACH, L4-L5 & L5-S1;  Surgeon: Darya Ayala MD;  Location: [facility] PAIN [facility]GT;  Service: Pain Management;  Laterality: Left;    TRANSFORAMINAL EPIDURAL INJECTION OF STEROID Left 2022    Procedure: LUMBAR TRANSFORAMINAL LEFT L4/5 AND L5/S1 CONTRAST;  Surgeon: Darya Ayala MD;  Location: [facility]IN [facility];  Service: Pain Management;  Laterality: Left;    TUBAL LIGATION         SOCIAL HISTORY:  Social History     Socioeconomic History    Marital status:    Tobacco Use    Smoking status: Former     Current packs/day: 0.00     Average packs/day: 0.3 packs/day for 35.0 years (8.8 ttl pk-yrs)     Types: Cigarettes, Vaping with nicotine     Start date: 3/15/1987     Quit date: 3/15/2022     Years since quittin.0    Smokeless tobacco: Current    Tobacco comments:     Occasional spoker some times 1-2 cigarettes/day sometimes none for weeks   Substance and Sexual Activity    Alcohol use: Yes     Alcohol/week: 3.0 standard drinks of alcohol     Types: 3 Glasses of wine per week     Comment: Occasional wine    Drug use: Not Currently     Comment: Tramadol twice a day as needed    Sexual activity: Yes     Partners: Male     Birth control/protection: Post-menopausal, None     Comment: Tubal 30+ years ago   Social History Narrative    Lives alone    No  partner    Was a  for a non-profit organization     Social Determinants of Health     Financial Resource Strain: High Risk (2023)    Overall Financial Resource Strain (CARDIA)     Difficulty of Paying Living Expenses: Very hard   Food Insecurity: Food Insecurity Present (2023)    Hunger Vital Sign     Worried About Running Out of Food in the Last Year: Sometimes true     Ran Out of Food in the Last Year: Sometimes true   Transportation Needs: No Transportation Needs (2023)    PRAPARE - Transportation     Lack of Transportation (Medical): No     Lack of Transportation (Non-Medical): No   Physical Activity: Insufficiently Active (2023)    Exercise Vital Sign     Days of Exercise per Week: 3 days     Minutes of Exercise per Session: 20 min   Stress: Stress Concern Present (2023)    [employer] - Occupational Stress Questionnaire     Feeling of Stress : Very much   Social Connections: Unknown (2023)    Social Connection and Isolation Panel [NHANES]     Frequency of Communication with Friends and Family: More than three times a week     Frequency of Social Gatherings with Friends and Family: Once a week     Active Member of Clubs or Organizations: No     Attends Club or Organization Meetings: Never     Marital Status:    Housing Stability: Low Risk  (2023)    Housing Stability Vital Sign     Unable to Pay for Housing in the Last Year: No     Number of Places Lived in the Last Year: 1     Unstable Housing in the Last Year: No       FAMILY HISTORY:  Family History   Problem Relation Age of Onset    Breast cancer Mother     Hypertension Mother             Hypotension Mother     Cancer Mother          due to breast cancer    Miscarriages / Stillbirths Mother         3 stillborn children    Cataracts Father     Glaucoma Father     Diabetes Father             Arthritis Father     Vision loss Father          Glaucoma -     Glaucoma Sister     Arthritis Sister     Diabetes Sister     Hypertension Sister     Vision loss Sister         Glasses    Drug abuse Brother     Learning disabilities Brother     No Known Problems Brother     No Known Problems Brother     No Known Problems Maternal Aunt     No Known Problems Maternal Uncle     No Known Problems Paternal Aunt     No Known Problems Paternal Uncle     No Known Problems Maternal Grandmother     No Known Problems Maternal Grandfather     No Known Problems Paternal Grandmother     No Known Problems Paternal Grandfather     No Known Problems Son     No Known Problems Other     Arthritis Sister     Depression Sister     Diabetes Sister     Hearing loss Sister         Trouble hearing    Hypertension Sister     Stroke Sister         Browne Vargas Disease, Coma for a month due to diabetes,Some body weakness    Vision loss Sister         Lasix surgery    Arthritis Sister     COPD Sister     Diabetes Sister     Hearing loss Sister     Heart disease Sister     Hypertension Sister     Arthritis Brother     Hypertension Brother     Early death Sister         Still born    Early death Sister         Still born twin to Debby Benítez    Early death Brother     Hypertension Sister     Hypertension Brother     Learning disabilities Son     Learning disabilities Son         Great grandson    Learning disabilities Daughter         Granddaughter    Mental illness Sister         Her son Matthias Benítez    Vision loss Sister         Glasses/contacts    Vision loss Sister         Glasses    Amblyopia Neg Hx     Blindness Neg Hx     Macular degeneration Neg Hx     Retinal detachment Neg Hx     Strabismus Neg Hx     Thyroid disease Neg Hx        ALLERGIES AND MEDICATIONS: updated and reviewed.  Review of patient's allergies indicates:   Allergen Reactions    Codeine      Medication List with Changes/Refills   Current Medications    AMITRIPTYLINE (ELAVIL) 100 MG TABLET    Take 1 tablet (100  mg total) by mouth every evening.    ASPIRIN (ECOTRIN) 81 MG EC TABLET        CLOTRIMAZOLE-BETAMETHASONE 1-0.05% (LOTRISONE) CREAM    APPLY  CREAM TOPICALLY TO AFFECTED AREA TWICE DAILY    CYANOCOBALAMIN, VITAMIN B-12, (B-12 COMPLIANCE) 1,000 MCG/ML KIT    Inject 1 mL as directed every 28 days.    DICLOFENAC SODIUM (VOLTAREN) 1 % GEL    Apply topically 2 (two) times daily.    ERGOCALCIFEROL (ERGOCALCIFEROL) 50,000 UNIT CAP    Take 1 capsule by mouth every 7 days.    FLUTICASONE PROPIONATE (FLONASE) 50 MCG/ACTUATION NASAL SPRAY    Use 1 spray(s) in each nostril once daily    HYDRALAZINE (APRESOLINE) 100 MG TABLET    Take 1 tablet (100 mg total) by mouth every 12 (twelve) hours.    HYDROXYZINE HCL (ATARAX) 25 MG TABLET    Take 1 tablet by mouth three times daily as needed    MELOXICAM (MOBIC) 7.5 MG TABLET    Take 7.5 mg by mouth once daily.    METOPROLOL SUCCINATE (TOPROL-XL) 25 MG 24 HR TABLET    Take 1 tablet by mouth once daily    MOMETASONE (ELOCON) 0.1 % SOLUTION    Apply once to twice daily prn itching    MUPIROCIN (BACTROBAN) 2 % OINTMENT    Apply topically 3 (three) times daily.    NIFEDIPINE (PROCARDIA-XL) 30 MG (OSM) 24 HR TABLET    Take 1 tablet by mouth once daily    NYSTATIN (MYCOSTATIN) POWDER    APPLY POWDER TOPICALLY TO AFFECTED AREA 4 TIMES DAILY    OLOPATADINE (PATANOL) 0.1 % OPHTHALMIC SOLUTION    INSTILL 1 DROP INTO EACH EYE TWICE DAILY    OMEPRAZOLE (PRILOSEC) 40 MG CAPSULE    Take 1 capsule (40 mg total) by mouth once daily.    PRAVASTATIN (PRAVACHOL) 40 MG TABLET    Take 1 tablet by mouth once daily    SPIRONOLACTONE (ALDACTONE) 50 MG TABLET    Take 1 tablet by mouth once daily    TIZANIDINE (ZANAFLEX) 4 MG TABLET    TAKE 1 TABLET BY MOUTH EVERY 6 HOURS AS NEEDED    TRIAMCINOLONE ACETONIDE 0.1% (KENALOG) 0.1 % OINTMENT    AAA bid    TRIAMCINOLONE ACETONIDE 0.1% (KENALOG) 0.1 % OINTMENT    AAA bid; not more than 2 weeks straight in same location, avoid use on face and groin    TRIAMCINOLONE  ACETONIDE 0.1%-CICLOPIROX-MAGNESIUM HYDROXIDE 400 MG/5 ML    Apply to affected area twice a day after cool blow dry    VALSARTAN (DIOVAN) 320 MG TABLET    Take 1 tablet (320 mg total) by mouth once daily.   Discontinued Medications    GABAPENTIN (NEURONTIN) 300 MG CAPSULE    Start off with 1 pill at night okay to increase to 2 pills at night, if desired can take up to 3 (1 in am and 2 in pm)    TRAMADOL (ULTRAM) 50 MG TABLET    Take 1 tablet (50 mg total) by mouth every 6 (six) hours as needed.       ROS  Review of Systems   Constitutional:  Negative for chills, diaphoresis, fatigue, fever and unexpected weight change.   HENT:  Negative for rhinorrhea, sinus pressure, sore throat and tinnitus.    Eyes:  Negative for photophobia and visual disturbance.   Respiratory:  Negative for cough, shortness of breath and wheezing.    Cardiovascular:  Negative for chest pain and palpitations.   Gastrointestinal:  Negative for abdominal pain, blood in stool, constipation, diarrhea, nausea and vomiting.   Genitourinary:  Negative for dysuria, flank pain, frequency and vaginal discharge.   Musculoskeletal:  Positive for back pain. Negative for arthralgias and joint swelling.   Skin:  Negative for rash.   Neurological:  Negative for speech difficulty, weakness, light-headedness and headaches.   Psychiatric/Behavioral:  Negative for behavioral problems and dysphoric mood.        Physical Exam  There were no vitals filed for this visit. There is no height or weight on file to calculate BMI.            Physical Exam  Vitals and nursing note reviewed.   Constitutional:       Appearance: She is well-developed.   Skin:     General: Skin is warm and dry.      Findings: No erythema or rash.   Neurological:      Mental Status: She is alert. Mental status is at baseline.   Psychiatric:         Behavior: Behavior normal.         Health Maintenance         Date Due Completion Date    TETANUS VACCINE Never done ---    Shingles Vaccine (1 of 2)  Never done ---    RSV Vaccine (Age 60+ and Pregnant patients) (1 - 1-dose 60+ series) Never done ---    Pneumococcal Vaccines (Age 65+) (1 of 1 - PCV) Never done ---    Colorectal Cancer Screening 07/23/2023 7/23/2020    COVID-19 Vaccine (5 - 2023-24 season) 09/01/2023 2/17/2022    Mammogram 05/08/2024 5/8/2023    High Dose Statin 03/13/2025 3/13/2024    Aspirin/Antiplatelet Therapy 03/13/2025 3/13/2024    Lipid Panel 03/07/2028 3/7/2023            Health maintenance reviewed and addressed as ordered      ASSESSMENT     1. Spinal stenosis, unspecified spinal region    2. DDD (degenerative disc disease), lumbar        PLAN:     Problem List Items Addressed This Visit          Neuro    DDD (degenerative disc disease), lumbar    Spinal stenosis - Primary     We discussed that her condition is severe enough to warrant surgery but if WADE/nerve block may help, she can discuss this with pain management and we can pursue surgery if there is no improvement  She has a codeine allergy, but I would consider starting either vicodin or percocet as she is willing to take medicine to help with the itching as this is  not a true allergy  She was advised not to take any medication that is not prescribed for her;     Simona Torres MD  03/14/2024 8:20 AM        No follow-ups on file.    No orders of the defined types were placed in this encounter.
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
## 2024-03-15 RX ORDER — OXYCODONE AND ACETAMINOPHEN 10; 325 MG/1; MG/1
1 TABLET ORAL EVERY 4 HOURS PRN
Qty: 45 TABLET | Refills: 0 | Status: SHIPPED | OUTPATIENT
Start: 2024-03-15 | End: 2024-03-28 | Stop reason: SDUPTHER

## 2024-03-15 NOTE — TELEPHONE ENCOUNTER
No care due was identified.  [facility] Embedded Care Due Messages. Reference number: [id number].   3/15/2024 2:38:50 PM CDT

## 2024-03-18 ENCOUNTER — PATIENT MESSAGE (OUTPATIENT)
Dept: PAIN MEDICINE | Facility: OTHER | Age: 74
End: 2024-03-18
Payer: MEDICARE

## 2024-03-18 ENCOUNTER — TELEPHONE (OUTPATIENT)
Dept: FAMILY MEDICINE | Facility: CLINIC | Age: 74
End: 2024-03-18
Payer: MEDICARE

## 2024-03-18 NOTE — TELEPHONE ENCOUNTER
Rt pt call she  asked about ocoxyCODONE-acetaminophen , informed was sent to [facility] on 3/15 . She also asked if a cheaper alternative can be sent for diclofenac sodium (VOLTAREN) 1 % Gel  since it will cost $50

## 2024-03-18 NOTE — TELEPHONE ENCOUNTER
----- Message from Chinyere Dubon sent at 3/18/2024  8:05 AM CDT -----  Type: Patient Call Back    Who called: self     What is the request in detail: patient would like to speak with nurse concern 2 medication. Please call    Can the clinic reply by [facility]? no    Would the patient rather a call back or a response via [facility]?  call    Best call back number: .[phone] (home)      Additional Information:

## 2024-03-18 NOTE — TELEPHONE ENCOUNTER
----- Message from Danisha Arredondo sent at 3/18/2024  2:17 PM CDT -----  Type:  Patient Returning Call    Who Called: pt     Who Left Message for Patient: Desiree    Does the patient know what this is regarding?:no    Would the patient rather a call back or a response via [facility]? call    Best Call Back Number:[phone] (home)       Additional Information:

## 2024-03-19 ENCOUNTER — PATIENT MESSAGE (OUTPATIENT)
Dept: ADMINISTRATIVE | Facility: OTHER | Age: 74
End: 2024-03-19
Payer: MEDICARE

## 2024-03-20 ENCOUNTER — PATIENT MESSAGE (OUTPATIENT)
Dept: ADMINISTRATIVE | Facility: OTHER | Age: 74
End: 2024-03-20
Payer: MEDICARE

## 2024-03-25 ENCOUNTER — HOSPITAL ENCOUNTER (OUTPATIENT)
Facility: OTHER | Age: 74
Discharge: HOME | End: 2024-03-25
Attending: ANESTHESIOLOGY | Admitting: ANESTHESIOLOGY
Payer: MEDICARE

## 2024-03-25 VITALS
WEIGHT: 249 LBS | OXYGEN SATURATION: 100 % | DIASTOLIC BLOOD PRESSURE: 71 MMHG | RESPIRATION RATE: 16 BRPM | BODY MASS INDEX: 42.51 KG/M2 | HEIGHT: 64 IN | TEMPERATURE: 98 F | HEART RATE: 87 BPM | SYSTOLIC BLOOD PRESSURE: 144 MMHG

## 2024-03-25 DIAGNOSIS — G89.29 CHRONIC PAIN: ICD-10-CM

## 2024-03-25 DIAGNOSIS — M47.9 OSTEOARTHRITIS OF SPINE, UNSPECIFIED SPINAL OSTEOARTHRITIS COMPLICATION STATUS, UNSPECIFIED SPINAL REGION: Primary | ICD-10-CM

## 2024-03-25 DIAGNOSIS — M47.819 SPONDYLOSIS WITHOUT MYELOPATHY: ICD-10-CM

## 2024-03-25 PROCEDURE — 64493 INJ PARAVERT F JNT L/S 1 LEV: CPT | Mod: 50 | Performed by: ANESTHESIOLOGY

## 2024-03-25 PROCEDURE — 25500020 PHARM REV CODE 255: Performed by: ANESTHESIOLOGY

## 2024-03-25 PROCEDURE — 25000003 PHARM REV CODE 250

## 2024-03-25 PROCEDURE — 63600175 PHARM REV CODE 636 W HCPCS: Performed by: ANESTHESIOLOGY

## 2024-03-25 PROCEDURE — 25000003 PHARM REV CODE 250: Performed by: ANESTHESIOLOGY

## 2024-03-25 PROCEDURE — 64493 INJ PARAVERT F JNT L/S 1 LEV: CPT | Mod: 50,,, | Performed by: ANESTHESIOLOGY

## 2024-03-25 RX ORDER — FENTANYL CITRATE 50 UG/ML
INJECTION, SOLUTION INTRAMUSCULAR; INTRAVENOUS
Status: DISCONTINUED | OUTPATIENT
Start: 2024-03-25 | End: 2024-03-25 | Stop reason: HOSPADM

## 2024-03-25 RX ORDER — MIDAZOLAM HYDROCHLORIDE 1 MG/ML
INJECTION INTRAMUSCULAR; INTRAVENOUS
Status: DISCONTINUED | OUTPATIENT
Start: 2024-03-25 | End: 2024-03-25 | Stop reason: HOSPADM

## 2024-03-25 RX ORDER — LIDOCAINE HYDROCHLORIDE 20 MG/ML
INJECTION, SOLUTION INFILTRATION; PERINEURAL
Status: DISCONTINUED | OUTPATIENT
Start: 2024-03-25 | End: 2024-03-25 | Stop reason: HOSPADM

## 2024-03-25 RX ORDER — BUPIVACAINE HYDROCHLORIDE 2.5 MG/ML
INJECTION, SOLUTION EPIDURAL; INFILTRATION; INTRACAUDAL
Status: DISCONTINUED | OUTPATIENT
Start: 2024-03-25 | End: 2024-03-25 | Stop reason: HOSPADM

## 2024-03-25 RX ORDER — SODIUM CHLORIDE 9 MG/ML
INJECTION, SOLUTION INTRAVENOUS CONTINUOUS
Status: DISCONTINUED | OUTPATIENT
Start: 2024-03-25 | End: 2024-03-25 | Stop reason: HOSPADM

## 2024-03-25 RX ORDER — DEXAMETHASONE SODIUM PHOSPHATE 10 MG/ML
INJECTION INTRAMUSCULAR; INTRAVENOUS
Status: DISCONTINUED | OUTPATIENT
Start: 2024-03-25 | End: 2024-03-25 | Stop reason: HOSPADM

## 2024-03-25 NOTE — OP NOTE
Therapeutic Lumbar Facet Joint Injection under Fluoroscopy     The procedure, risks, benefits, and options were discussed with the patient. There are no contraindications to the procedure. The patent expressed understanding and agreed to the procedure. Informed written consent was obtained prior to the start of the procedure and can be found in the patient's chart.    PATIENT NAME: Amanda Holt   MRN: [id number]     DATE OF PROCEDURE: 03/25/2024    PROCEDURE: Bilateral L3/4 Lumbar Facet Joint Injection under Fluoroscopy      PRE-OP DIAGNOSIS: Effusion of lumbar facet joint [M25.48] Lumbar spondylosis [M47.816]    POST-OP DIAGNOSIS: Same    PHYSICIAN: Darya Ayala MD    ASSISTANTS:  None    MEDICATIONS INJECTED: Preservative-free Decadron 10mg with 1cc of Bupivacaine 0.25%    LOCAL ANESTHETIC INJECTED: Xylocaine 2%     SEDATION: Versed 2mg and Fentanyl 50mcg                                                                                                                                                                                     Conscious sedation ordered by M.D. Patient re-evaluation prior to administration of conscious sedation. No changes noted in patient's status from initial evaluation. The patient's vital signs were monitored by RN and patient remained hemodynamically stable throughout the procedure.    Event Time In   Sedation Start 0925   Sedation End 0931       ESTIMATED BLOOD LOSS: None    COMPLICATIONS: None    TECHNIQUE: Time-out was performed to identify the patient and procedure to be performed. With the patient laying in a prone position, the surgical area was prepped and draped in the usual sterile fashion using ChloraPrep and a fenestrated drape. The level was determined under fluoroscopy guidance. Skin anesthesia was achieved by injecting Lidocaine 2% over the injection sites. A 22 gauge, 3.5 inch needle was introduced to each level using AP, lateral and/or contralateral oblique fluoroscopic  imaging. Contrast dye  Omnipaque (300mg/mL) was given until dye spread was in the distribution of the facet joint without any intravascular spread.  Aspiration of joint 0.1 ml both sides clear. After negative aspiration for blood or CSF was confirmed, 1 mL of the medication mixture listed above was injected slowly into each joint with displacement of the contrast confirming that the medication went into the distribution of the facet joints. The needles were removed and bleeding was nil. A sterile dressing was applied. No specimens collected. The patient tolerated the procedure well.    PRE-PROCEDURE PAIN SCORE: 10/10    POST-PROCEDURE PAIN SCORE: 10/10    The patient was monitored after the procedure in the recovery area. They were given post-procedure and discharge instructions to follow at home. The patient was discharged in a stable condition.        Darya Ayala MD

## 2024-03-25 NOTE — DISCHARGE INSTRUCTIONS

## 2024-03-25 NOTE — DISCHARGE SUMMARY
Discharge Note  Short Stay      SUMMARY     Admit Date: 3/25/2024    Attending Physician: Darya Ayala      Discharge Physician: Darya Aayla      Discharge Date: 3/25/2024 9:35 AM    Procedure(s) (LRB):  FACET JOINT INJECTION BILATERAL L3/4 *ASPIRIN CLEARANCE IN CHART* (Bilateral)    Final Diagnosis: Effusion of lumbar facet joint [M25.48]    Disposition: Home or self care    Patient Instructions:   Current Discharge Medication List        CONTINUE these medications which have NOT CHANGED    Details   amitriptyline (ELAVIL) 100 MG tablet Take 1 tablet (100 mg total) by mouth every evening.  Qty: 90 tablet, Refills: 1    Associated Diagnoses: Lumbar radiculopathy      aspirin (ECOTRIN) 81 MG EC tablet       clotrimazole-betamethasone 1-0.05% (LOTRISONE) cream APPLY  CREAM TOPICALLY TO AFFECTED AREA TWICE DAILY  Qty: 45 g, Refills: 0    Associated Diagnoses: Candidal intertrigo      cyanocobalamin, vitamin B-12, (B-12 COMPLIANCE) 1,000 mcg/mL Kit Inject 1 mL as directed every 28 days.  Qty: 1 kit, Refills: 5    Associated Diagnoses: B12 deficiency      diclofenac sodium (VOLTAREN) 1 % Gel Apply topically 2 (two) times daily.  Qty: 100 g, Refills: 5    Associated Diagnoses: DDD (degenerative disc disease), lumbar      ergocalciferol (ERGOCALCIFEROL) 50,000 unit Cap Take 1 capsule by mouth every 7 days.      fluticasone propionate (FLONASE) 50 mcg/actuation nasal spray Use 1 spray(s) in each nostril once daily  Qty: 48 g, Refills: 3    Associated Diagnoses: Nasal drainage      hydrALAZINE (APRESOLINE) 100 MG tablet Take 1 tablet (100 mg total) by mouth every 12 (twelve) hours.  Qty: 180 tablet, Refills: 3    Comments: .  Associated Diagnoses: Primary hypertension      hydrOXYzine HCL (ATARAX) 25 MG tablet Take 1 tablet by mouth three times daily as needed  Qty: 45 tablet, Refills: 0    Associated Diagnoses: Anxiety      meloxicam (MOBIC) 7.5 MG tablet Take 7.5 mg by mouth once daily.      metoprolol succinate (TOPROL-XL)  25 MG 24 hr tablet Take 1 tablet by mouth once daily  Qty: 90 tablet, Refills: 3    Comments: .  Associated Diagnoses: Primary hypertension      mometasone (ELOCON) 0.1 % solution Apply once to twice daily prn itching  Qty: 60 mL, Refills: 5    Associated Diagnoses: Dysesthesia of scalp      mupirocin (BACTROBAN) 2 % ointment Apply topically 3 (three) times daily.  Qty: 22 g, Refills: 0    Associated Diagnoses: Inflamed epidermoid cyst of skin      NIFEdipine (PROCARDIA-XL) 30 MG (OSM) 24 hr tablet Take 1 tablet by mouth once daily  Qty: 90 tablet, Refills: 2    Comments: .  Associated Diagnoses: Primary hypertension      nystatin (MYCOSTATIN) powder APPLY POWDER TOPICALLY TO AFFECTED AREA 4 TIMES DAILY  Qty: 30 g, Refills: 1    Associated Diagnoses: Candidal intertrigo      olopatadine (PATANOL) 0.1 % ophthalmic solution INSTILL 1 DROP INTO EACH EYE TWICE DAILY  Qty: 5 mL, Refills: 0    Associated Diagnoses: Allergic conjunctivitis of both eyes      omeprazole (PRILOSEC) 40 MG capsule Take 1 capsule (40 mg total) by mouth once daily.  Qty: 90 capsule, Refills: 2    Associated Diagnoses: Gastroesophageal reflux disease without esophagitis      oxyCODONE-acetaminophen (PERCOCET)  mg per tablet Take 1 tablet by mouth every 4 (four) hours as needed for Pain.  Qty: 45 tablet, Refills: 0    Comments: Quantity prescribed more than 7 day supply? Yes, quantity medically necessary  Associated Diagnoses: Spinal stenosis, unspecified spinal region; DDD (degenerative disc disease), lumbar      pravastatin (PRAVACHOL) 40 MG tablet Take 1 tablet by mouth once daily  Qty: 90 tablet, Refills: 1    Associated Diagnoses: Atherosclerosis of aorta      spironolactone (ALDACTONE) 50 MG tablet Take 1 tablet by mouth once daily  Qty: 90 tablet, Refills: 1    Comments: .  Associated Diagnoses: Blood pressure check      tiZANidine (ZANAFLEX) 4 MG tablet TAKE 1 TABLET BY MOUTH EVERY 6 HOURS AS NEEDED  Qty: 90 tablet, Refills: 1      !!  triamcinolone acetonide 0.1% (KENALOG) 0.1 % ointment AAA bid  Qty: 454 g, Refills: 3    Associated Diagnoses: Subacute dermatitis      !! triamcinolone acetonide 0.1% (KENALOG) 0.1 % ointment AAA bid; not more than 2 weeks straight in same location, avoid use on face and groin  Qty: 454 g, Refills: 1    Associated Diagnoses: Itching      triamcinolone acetonide 0.1%-ciclopirox-magnesium hydroxide 400 mg/5 ml Apply to affected area twice a day after cool blow dry  Qty: 60 g, Refills: 5    Comments: Pharmacist: mix 30 grams of TAC 0.1% cream with 30 grams of Loprox cream in 120cc of Milk of Magnesia  Associated Diagnoses: Intertrigo      valsartan (DIOVAN) 320 MG tablet Take 1 tablet (320 mg total) by mouth once daily.  Qty: 90 tablet, Refills: 3    Comments: .  Associated Diagnoses: Hypertension, uncontrolled       !! - Potential duplicate medications found. Please discuss with provider.              Discharge Diagnosis: Effusion of lumbar facet joint [M25.48]  Condition on Discharge: Stable with no complications to procedure   Diet on Discharge: Same as before.  Activity: as per instruction sheet.  Discharge to: Home with a responsible adult.  Follow up: 2-4 weeks       Please call my office or pager at [phone] if experienced any weakness or loss of sensation, fever > 101.5, pain uncontrolled with oral medications, persistent nausea/vomiting/or diarrhea, redness or drainage from the incisions, or any other worrisome concerns. If physician on call was not reached or could not communicate with our office for any reason please go to the nearest emergency department

## 2024-03-26 ENCOUNTER — PATIENT MESSAGE (OUTPATIENT)
Dept: FAMILY MEDICINE | Facility: CLINIC | Age: 74
End: 2024-03-26
Payer: MEDICARE

## 2024-03-26 DIAGNOSIS — M51.36 DDD (DEGENERATIVE DISC DISEASE), LUMBAR: ICD-10-CM

## 2024-03-26 DIAGNOSIS — Z01.30 BLOOD PRESSURE CHECK: ICD-10-CM

## 2024-03-26 DIAGNOSIS — J34.89 NASAL DRAINAGE: ICD-10-CM

## 2024-03-26 DIAGNOSIS — M48.00 SPINAL STENOSIS, UNSPECIFIED SPINAL REGION: ICD-10-CM

## 2024-03-26 DIAGNOSIS — I10 HYPERTENSION, UNCONTROLLED: ICD-10-CM

## 2024-03-26 NOTE — TELEPHONE ENCOUNTER
No care due was identified.  [facility] Embedded Care Due Messages. Reference number: [id number].   3/26/2024 9:48:36 AM CDT

## 2024-03-27 DIAGNOSIS — J34.89 NASAL DRAINAGE: ICD-10-CM

## 2024-03-27 RX ORDER — SPIRONOLACTONE 50 MG/1
50 TABLET, FILM COATED ORAL
Qty: 90 TABLET | Refills: 1 | Status: SHIPPED | OUTPATIENT
Start: 2024-03-27

## 2024-03-27 RX ORDER — VALSARTAN 320 MG/1
320 TABLET ORAL
Qty: 90 TABLET | Refills: 1 | Status: SHIPPED | OUTPATIENT
Start: 2024-03-27

## 2024-03-27 RX ORDER — FLUTICASONE PROPIONATE 50 MCG
1 SPRAY, SUSPENSION (ML) NASAL DAILY
Qty: 48 G | Refills: 3 | Status: SHIPPED | OUTPATIENT
Start: 2024-03-27

## 2024-03-27 NOTE — TELEPHONE ENCOUNTER
No care due was identified.  [facility] Embedded Care Due Messages. Reference number: [id number].   3/27/2024 3:08:34 PM CDT

## 2024-03-27 NOTE — TELEPHONE ENCOUNTER
Refill Routing Note   Medication(s) are not appropriate for processing by [facility] for the following reason(s):        Required vitals abnormal  Required labs outdated    [facility] action(s):  Defer               Appointments  past 12m or future 3m with PCP    Date Provider   Last Visit   3/14/2024 Simona Torres MD   Next Visit   5/27/2024 Simona Torres MD   ED visits in past 90 days: 0        Note composed:11:04 AM 03/27/2024

## 2024-03-28 ENCOUNTER — TELEPHONE (OUTPATIENT)
Dept: NEUROSURGERY | Facility: CLINIC | Age: 74
End: 2024-03-28
Payer: MEDICARE

## 2024-03-28 ENCOUNTER — TELEPHONE (OUTPATIENT)
Dept: FAMILY MEDICINE | Facility: CLINIC | Age: 74
End: 2024-03-28
Payer: MEDICARE

## 2024-03-28 RX ORDER — OXYCODONE AND ACETAMINOPHEN 10; 325 MG/1; MG/1
1 TABLET ORAL EVERY 4 HOURS PRN
Qty: 45 TABLET | Refills: 0 | Status: SHIPPED | OUTPATIENT
Start: 2024-03-28 | End: 2024-04-11 | Stop reason: SDUPTHER

## 2024-03-28 NOTE — TELEPHONE ENCOUNTER
----- Message from Jenny Santos sent at 3/28/2024  3:43 PM CDT -----  Regarding: SELF [phone]  .Type: Patient Call Back    Who called:SELF    What is the request in detail:Patient requesting a call back regarding a new prescription for oxyCODONE-acetaminophen (PERCOCET)  mg per tablet    [address] SAMANTHA JAIMES  [address] [facility]  [address] [facility]  [place] [address]  Phone: [phone] Fax: [phone]      Can the clinic reply by [facility]?no    Would the patient rather a call back or a response via [facility]?call back     Best call back number [phone]

## 2024-03-28 NOTE — TELEPHONE ENCOUNTER
----- Message from Reina Benítez sent at 3/28/2024  8:23 AM CDT -----  Regarding: Pt Advice  Contact: [phone]  Pt calling regarding previous care. Would like to discuss possible back surgery and schedule appt. Please call [phone]

## 2024-03-28 NOTE — TELEPHONE ENCOUNTER
Returned call to pt. Offered appt on 5/14/24 at 2:00pm. 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 Pt stated she was seen by Dr. Kurtz couple year ago. At that time, Dr. Kurtz discuss the possibility for back surgery. Pt refused at that time and would like conservative treatment. She now would like to schedule an appointment with Dr. Kurtz for surgical consultation. Pt will send a message back after she ask her son.

## 2024-04-11 DIAGNOSIS — M48.00 SPINAL STENOSIS, UNSPECIFIED SPINAL REGION: ICD-10-CM

## 2024-04-11 DIAGNOSIS — M51.36 DDD (DEGENERATIVE DISC DISEASE), LUMBAR: ICD-10-CM

## 2024-04-12 RX ORDER — OXYCODONE AND ACETAMINOPHEN 10; 325 MG/1; MG/1
1 TABLET ORAL EVERY 4 HOURS PRN
Qty: 45 TABLET | Refills: 0 | Status: SHIPPED | OUTPATIENT
Start: 2024-04-12 | End: 2024-04-25 | Stop reason: SDUPTHER

## 2024-04-12 NOTE — TELEPHONE ENCOUNTER
Care Due:                  Date            Visit Type   Department     Provider  --------------------------------------------------------------------------------                                ESTABLISHED   [facility]                              PATIENT -    MEDICINE/  Last Visit: 03-      VIRTUAL      INTERNAL CHAPARRITA Torres                              EP -         [facility]                              PRIMARY      MEDICINE/  Next Visit: 05-      CARE ([facility])   INTERNAL CHAPARRITA Mni  Test          Frequency    Reason                     Performed    Due Date  --------------------------------------------------------------------------------    CMP.........  12 months..  diclofenac, pravastatin,   03- 03-                             spironolactone, valsartan    Lipid Panel.  12 months..  pravastatin..............  03- 03-    Health [facility] Embedded Care Due Messages. Reference number: [id number].   4/11/2024 7:19:58 PM CDT

## 2024-04-24 ENCOUNTER — PATIENT MESSAGE (OUTPATIENT)
Dept: FAMILY MEDICINE | Facility: CLINIC | Age: 74
End: 2024-04-24
Payer: MEDICARE

## 2024-04-24 DIAGNOSIS — M51.36 DDD (DEGENERATIVE DISC DISEASE), LUMBAR: ICD-10-CM

## 2024-04-24 DIAGNOSIS — M48.00 SPINAL STENOSIS, UNSPECIFIED SPINAL REGION: ICD-10-CM

## 2024-04-24 RX ORDER — OXYCODONE AND ACETAMINOPHEN 10; 325 MG/1; MG/1
1 TABLET ORAL EVERY 4 HOURS PRN
Qty: 45 TABLET | Refills: 0 | Status: CANCELLED | OUTPATIENT
Start: 2024-04-24

## 2024-04-24 NOTE — TELEPHONE ENCOUNTER
No care due was identified.  [facility] Embedded Care Due Messages. Reference number: [id number].   4/24/2024 10:56:30 AM CDT

## 2024-04-25 ENCOUNTER — PATIENT MESSAGE (OUTPATIENT)
Dept: FAMILY MEDICINE | Facility: CLINIC | Age: 74
End: 2024-04-25
Payer: MEDICARE

## 2024-04-25 DIAGNOSIS — M51.36 DDD (DEGENERATIVE DISC DISEASE), LUMBAR: ICD-10-CM

## 2024-04-25 DIAGNOSIS — M48.00 SPINAL STENOSIS, UNSPECIFIED SPINAL REGION: ICD-10-CM

## 2024-04-25 DIAGNOSIS — I70.0 ATHEROSCLEROSIS OF AORTA: ICD-10-CM

## 2024-04-25 RX ORDER — PRAVASTATIN SODIUM 40 MG/1
40 TABLET ORAL DAILY
Qty: 90 TABLET | Refills: 1 | Status: CANCELLED | OUTPATIENT
Start: 2024-04-25

## 2024-04-25 RX ORDER — OXYCODONE AND ACETAMINOPHEN 10; 325 MG/1; MG/1
1 TABLET ORAL EVERY 4 HOURS PRN
Qty: 45 TABLET | Refills: 0 | Status: CANCELLED | OUTPATIENT
Start: 2024-04-25

## 2024-04-25 NOTE — TELEPHONE ENCOUNTER
No care due was identified.  [facility] Embedded Care Due Messages. Reference number: [id number].   4/25/2024 12:11:42 PM CDT

## 2024-04-25 NOTE — TELEPHONE ENCOUNTER
Patient notified message has been sent to provider in regards to refill request. Awaiting response.

## 2024-04-25 NOTE — TELEPHONE ENCOUNTER
----- Message from Davis Marley sent at 4/25/2024  4:17 PM CDT -----  Regarding: Self .[phone]   Type: RX Refill Request    Who Called: Self     Have you contacted your pharmacy: yes ( pharmacy states it was denied )     Refill    RX Name and Strength:oxyCODONE-acetaminophen (PERCOCET)  mg per tablet    Preferred Pharmacy with phone number: .  [facility] [address] [place], [place] - [address] [facility]  [address] [facility] [place] [address]  Phone: [phone] Fax: [phone]          Local or Mail Order: local     Would the patient rather a call back or a response via [facility]? Call back     Best Call Back Number:.[phone]      Additional Information:  Please let pt know why her script was denied  ( was it by insurance or by the DR )     Thank you.

## 2024-04-25 NOTE — TELEPHONE ENCOUNTER
----- Message from Nathaniel Peerz MA sent at 4/25/2024  3:14 PM CDT -----  Type: Patient Call Back    Who called: Self    What is the request in detail: pt. States she's still trying to get her medication refilled .. She would like for it to be called in stating she has been calling for 2 days now..     oxyCODONE-acetaminophen (PERCOCET)  mg per tablet    Can the clinic reply by [facility]?No    Would the patient rather a call back or a response via [facility]? Yes, call    Best call back number: [phone] (home)

## 2024-04-25 NOTE — TELEPHONE ENCOUNTER
No care due was identified.  [facility] Embedded Care Due Messages. Reference number: [id number].   4/25/2024 5:00:12 PM CDT

## 2024-04-26 RX ORDER — OXYCODONE AND ACETAMINOPHEN 10; 325 MG/1; MG/1
1 TABLET ORAL EVERY 4 HOURS PRN
Qty: 45 TABLET | Refills: 0 | Status: SHIPPED | OUTPATIENT
Start: 2024-04-26 | End: 2024-05-13 | Stop reason: SDUPTHER

## 2024-05-13 DIAGNOSIS — M48.00 SPINAL STENOSIS, UNSPECIFIED SPINAL REGION: ICD-10-CM

## 2024-05-13 DIAGNOSIS — M51.36 DDD (DEGENERATIVE DISC DISEASE), LUMBAR: ICD-10-CM

## 2024-05-13 RX ORDER — DICLOFENAC SODIUM 10 MG/G
GEL TOPICAL 2 TIMES DAILY
Qty: 100 G | Refills: 5 | Status: CANCELLED | OUTPATIENT
Start: 2024-05-13 | End: 2024-08-11

## 2024-05-13 NOTE — TELEPHONE ENCOUNTER
No care due was identified.  [facility] Embedded Care Due Messages. Reference number: [id number].   5/13/2024 11:36:34 AM CDT

## 2024-05-13 NOTE — TELEPHONE ENCOUNTER
No care due was identified.  [facility] Embedded Care Due Messages. Reference number: [id number].   5/13/2024 9:40:22 AM CDT

## 2024-05-14 ENCOUNTER — PATIENT MESSAGE (OUTPATIENT)
Dept: NEUROSURGERY | Facility: CLINIC | Age: 74
End: 2024-05-14
Payer: MEDICARE

## 2024-05-16 ENCOUNTER — PATIENT MESSAGE (OUTPATIENT)
Dept: FAMILY MEDICINE | Facility: CLINIC | Age: 74
End: 2024-05-16
Payer: MEDICARE

## 2024-05-17 ENCOUNTER — TELEPHONE (OUTPATIENT)
Dept: FAMILY MEDICINE | Facility: CLINIC | Age: 74
End: 2024-05-17
Payer: MEDICARE

## 2024-05-17 DIAGNOSIS — M51.36 DDD (DEGENERATIVE DISC DISEASE), LUMBAR: ICD-10-CM

## 2024-05-17 RX ORDER — OXYCODONE AND ACETAMINOPHEN 10; 325 MG/1; MG/1
1 TABLET ORAL EVERY 4 HOURS PRN
Qty: 45 TABLET | Refills: 0 | Status: SHIPPED | OUTPATIENT
Start: 2024-05-17 | End: 2024-05-27 | Stop reason: SDUPTHER

## 2024-05-17 NOTE — TELEPHONE ENCOUNTER
----- Message from Zac Sagar sent at 5/16/2024  2:23 PM CDT -----  Regarding: self  Type: Patient Call Back    Who called:self    What is the request in detail:pt is calling in about her medications, wanted to get a callback  oxyCODONE-acetaminophen (PERCOCET)  mg per tablet    Can the clinic reply by [facility]?no    Would the patient rather a call back or a response via [facility]? callback    Best call back number:[phone]    Additional Information:

## 2024-05-27 ENCOUNTER — OFFICE VISIT (OUTPATIENT)
Dept: FAMILY MEDICINE | Facility: CLINIC | Age: 74
End: 2024-05-27
Payer: MEDICARE

## 2024-05-27 VITALS
BODY MASS INDEX: 44.9 KG/M2 | SYSTOLIC BLOOD PRESSURE: 154 MMHG | HEIGHT: 64 IN | OXYGEN SATURATION: 96 % | DIASTOLIC BLOOD PRESSURE: 66 MMHG | TEMPERATURE: 98 F | HEART RATE: 93 BPM | WEIGHT: 263 LBS

## 2024-05-27 DIAGNOSIS — F33.0 MAJOR DEPRESSIVE DISORDER, RECURRENT, MILD: ICD-10-CM

## 2024-05-27 DIAGNOSIS — I10 PRIMARY HYPERTENSION: ICD-10-CM

## 2024-05-27 DIAGNOSIS — F41.9 ANXIETY: ICD-10-CM

## 2024-05-27 DIAGNOSIS — M54.16 LUMBAR RADICULOPATHY: ICD-10-CM

## 2024-05-27 DIAGNOSIS — M51.36 DDD (DEGENERATIVE DISC DISEASE), LUMBAR: ICD-10-CM

## 2024-05-27 DIAGNOSIS — M48.00 SPINAL STENOSIS, UNSPECIFIED SPINAL REGION: Primary | ICD-10-CM

## 2024-05-27 PROCEDURE — 3008F BODY MASS INDEX DOCD: CPT | Mod: CPTII,S$GLB,, | Performed by: FAMILY MEDICINE

## 2024-05-27 PROCEDURE — 1159F MED LIST DOCD IN RCRD: CPT | Mod: CPTII,S$GLB,, | Performed by: FAMILY MEDICINE

## 2024-05-27 PROCEDURE — 3077F SYST BP >= 140 MM HG: CPT | Mod: CPTII,S$GLB,, | Performed by: FAMILY MEDICINE

## 2024-05-27 PROCEDURE — 99214 OFFICE O/P EST MOD 30 MIN: CPT | Mod: S$GLB,,, | Performed by: FAMILY MEDICINE

## 2024-05-27 PROCEDURE — 1101F PT FALLS ASSESS-DOCD LE1/YR: CPT | Mod: CPTII,S$GLB,, | Performed by: FAMILY MEDICINE

## 2024-05-27 PROCEDURE — 3288F FALL RISK ASSESSMENT DOCD: CPT | Mod: CPTII,S$GLB,, | Performed by: FAMILY MEDICINE

## 2024-05-27 PROCEDURE — 4010F ACE/ARB THERAPY RXD/TAKEN: CPT | Mod: CPTII,S$GLB,, | Performed by: FAMILY MEDICINE

## 2024-05-27 PROCEDURE — 3078F DIAST BP <80 MM HG: CPT | Mod: CPTII,S$GLB,, | Performed by: FAMILY MEDICINE

## 2024-05-27 PROCEDURE — 1125F AMNT PAIN NOTED PAIN PRSNT: CPT | Mod: CPTII,S$GLB,, | Performed by: FAMILY MEDICINE

## 2024-05-27 PROCEDURE — 99999 PR PBB SHADOW E&M-EST. PATIENT-LVL V: CPT | Mod: PBBFAC,,, | Performed by: FAMILY MEDICINE

## 2024-05-27 RX ORDER — OXYCODONE AND ACETAMINOPHEN 10; 325 MG/1; MG/1
1 TABLET ORAL EVERY 4 HOURS PRN
Qty: 60 TABLET | Refills: 0 | Status: SHIPPED | OUTPATIENT
Start: 2024-06-26 | End: 2024-07-26

## 2024-05-27 RX ORDER — OXYCODONE AND ACETAMINOPHEN 10; 325 MG/1; MG/1
1 TABLET ORAL EVERY 4 HOURS PRN
Qty: 60 TABLET | Refills: 0 | Status: SHIPPED | OUTPATIENT
Start: 2024-05-27 | End: 2024-06-26

## 2024-05-27 RX ORDER — OXYCODONE AND ACETAMINOPHEN 10; 325 MG/1; MG/1
1 TABLET ORAL EVERY 4 HOURS PRN
Qty: 60 TABLET | Refills: 0 | Status: SHIPPED | OUTPATIENT
Start: 2024-07-26 | End: 2024-08-25

## 2024-05-27 RX ORDER — AMITRIPTYLINE HYDROCHLORIDE 50 MG/1
50 TABLET, FILM COATED ORAL NIGHTLY
Qty: 90 TABLET | Refills: 1 | Status: SHIPPED | OUTPATIENT
Start: 2024-05-27 | End: 2025-05-27

## 2024-05-27 NOTE — PROGRESS NOTES
Chief Complaint   Patient presents with    Follow-up     Back and leg pain worsen x 2m  Headache        HPI  Amanda Holt is a 73 y.o. female with multiple medical diagnoses as listed in the medical history and problem list that presents for follow-up for back and leg pain along with a headache    Headache- she has not been eating very much, at times eating once a day, has to go down stairs to make her food since her lower back injury  Back pain- has limited activities of daily life, she is pending visits with pain mgmt and surgery, she is not sure if she wants to have surgery but her quality of life is so limited she is very depressed and tearful about this, also tearful about the weight she has gained due to her inactivity      ALLERGIES AND MEDICATIONS: updated and reviewed.  Review of patient's allergies indicates:   Allergen Reactions    Codeine      Medication List with Changes/Refills   New Medications    AMITRIPTYLINE (ELAVIL) 50 MG TABLET    Take 1 tablet (50 mg total) by mouth every evening.    OXYCODONE-ACETAMINOPHEN (PERCOCET)  MG PER TABLET    Take 1 tablet by mouth every 4 (four) hours as needed for Pain.    OXYCODONE-ACETAMINOPHEN (PERCOCET)  MG PER TABLET    Take 1 tablet by mouth every 4 (four) hours as needed for Pain.   Current Medications    ASPIRIN (ECOTRIN) 81 MG EC TABLET        CLOTRIMAZOLE-BETAMETHASONE 1-0.05% (LOTRISONE) CREAM    APPLY  CREAM TOPICALLY TO AFFECTED AREA TWICE DAILY    CYANOCOBALAMIN, VITAMIN B-12, (B-12 COMPLIANCE) 1,000 MCG/ML KIT    Inject 1 mL as directed every 28 days.    DICLOFENAC SODIUM (VOLTAREN) 1 % GEL    Apply topically 2 (two) times daily.    ERGOCALCIFEROL (ERGOCALCIFEROL) 50,000 UNIT CAP    Take 1 capsule by mouth every 7 days.    FLUTICASONE PROPIONATE (FLONASE) 50 MCG/ACTUATION NASAL SPRAY    1 spray (50 mcg total) by Each Nostril route once daily.    HYDRALAZINE (APRESOLINE) 100 MG TABLET    Take 1 tablet (100 mg total) by mouth every  12 (twelve) hours.    HYDROXYZINE HCL (ATARAX) 25 MG TABLET    Take 1 tablet by mouth three times daily as needed    MELOXICAM (MOBIC) 7.5 MG TABLET    Take 7.5 mg by mouth once daily.    METOPROLOL SUCCINATE (TOPROL-XL) 25 MG 24 HR TABLET    Take 1 tablet by mouth once daily    MOMETASONE (ELOCON) 0.1 % SOLUTION    Apply once to twice daily prn itching    MUPIROCIN (BACTROBAN) 2 % OINTMENT    Apply topically 3 (three) times daily.    NIFEDIPINE (PROCARDIA-XL) 30 MG (OSM) 24 HR TABLET    Take 1 tablet by mouth once daily    NYSTATIN (MYCOSTATIN) POWDER    APPLY POWDER TOPICALLY TO AFFECTED AREA 4 TIMES DAILY    OLOPATADINE (PATANOL) 0.1 % OPHTHALMIC SOLUTION    INSTILL 1 DROP INTO EACH EYE TWICE DAILY    OMEPRAZOLE (PRILOSEC) 40 MG CAPSULE    Take 1 capsule (40 mg total) by mouth once daily.    PRAVASTATIN (PRAVACHOL) 40 MG TABLET    Take 1 tablet by mouth once daily    SPIRONOLACTONE (ALDACTONE) 50 MG TABLET    Take 1 tablet by mouth once daily    TIZANIDINE (ZANAFLEX) 4 MG TABLET    TAKE 1 TABLET BY MOUTH EVERY 6 HOURS AS NEEDED    TRIAMCINOLONE ACETONIDE 0.1% (KENALOG) 0.1 % OINTMENT    AAA bid    TRIAMCINOLONE ACETONIDE 0.1% (KENALOG) 0.1 % OINTMENT    AAA bid; not more than 2 weeks straight in same location, avoid use on face and groin    TRIAMCINOLONE ACETONIDE 0.1%-CICLOPIROX-MAGNESIUM HYDROXIDE 400 MG/5 ML    Apply to affected area twice a day after cool blow dry    VALSARTAN (DIOVAN) 320 MG TABLET    Take 1 tablet by mouth once daily   Changed and/or Refilled Medications    Modified Medication Previous Medication    OXYCODONE-ACETAMINOPHEN (PERCOCET)  MG PER TABLET oxyCODONE-acetaminophen (PERCOCET)  mg per tablet       Take 1 tablet by mouth every 4 (four) hours as needed for Pain.    Take 1 tablet by mouth every 4 (four) hours as needed for Pain.   Discontinued Medications    AMITRIPTYLINE (ELAVIL) 100 MG TABLET    Take 1 tablet (100 mg total) by mouth every evening.       ROS  Review of  "Systems   Constitutional:  Negative for chills, diaphoresis, fatigue, fever and unexpected weight change.   HENT:  Negative for rhinorrhea, sinus pressure, sore throat and tinnitus.    Eyes:  Negative for photophobia and visual disturbance.   Respiratory:  Negative for cough, shortness of breath and wheezing.    Cardiovascular:  Negative for chest pain and palpitations.   Gastrointestinal:  Negative for abdominal pain, blood in stool, constipation, diarrhea, nausea and vomiting.   Genitourinary:  Negative for dysuria, flank pain, frequency and vaginal discharge.   Musculoskeletal:  Positive for arthralgias and back pain. Negative for joint swelling.   Skin:  Negative for rash.   Neurological:  Negative for speech difficulty, weakness, light-headedness and headaches.   Psychiatric/Behavioral:  Negative for behavioral problems and dysphoric mood.        Physical Exam  Vitals:    05/27/24 1142   BP: (!) 154/66   BP Location: Left arm   Patient Position: Sitting   BP Method: Large (Manual)   Pulse: 93   Temp: 98.2 °F (36.8 °C)   TempSrc: Oral   SpO2: 96%   Weight: 119.3 kg (263 lb 0.1 oz)   Height: 5' 4" (1.626 m)    Body mass index is 45.15 kg/m².  Weight: 119.3 kg (263 lb 0.1 oz)   Height: 5' 4" (162.6 cm)     Physical Exam  Vitals and nursing note reviewed.   Constitutional:       Appearance: She is well-developed.   Skin:     General: Skin is warm and dry.      Findings: No erythema or rash.   Neurological:      Mental Status: She is alert. Mental status is at baseline.   Psychiatric:         Mood and Affect: Mood is depressed.         Behavior: Behavior normal.         Health Maintenance         Date Due Completion Date    Annual UACr Never done ---    TETANUS VACCINE Never done ---    Sign Pain Contract Never done ---    Urine Drug Screen Never done ---    Naloxone Prescription Never done ---    Shingles Vaccine (1 of 2) Never done ---    RSV Vaccine (Age 60+ and Pregnant patients) (1 - 1-dose 60+ series) Never " done ---    Pneumococcal Vaccines (Age 65+) (1 of 1 - PCV) Never done ---    Colorectal Cancer Screening 07/23/2023 7/23/2020    COVID-19 Vaccine (5 - 2023-24 season) 09/01/2023 2/17/2022    Mammogram 05/08/2024 5/8/2023    High Dose Statin 05/27/2025 5/27/2024    Aspirin/Antiplatelet Therapy 05/27/2025 5/27/2024    Lipid Panel 03/07/2028 3/7/2023            Health maintenance reviewed and addressed as ordered      ASSESSMENT/PLAN       1. Spinal stenosis, unspecified spinal region   [place]  database queried/reviewed  Continue current regimen    - oxyCODONE-acetaminophen (PERCOCET)  mg per tablet; Take 1 tablet by mouth every 4 (four) hours as needed for Pain.  Dispense: 60 tablet; Refill: 0  - oxyCODONE-acetaminophen (PERCOCET)  mg per tablet; Take 1 tablet by mouth every 4 (four) hours as needed for Pain.  Dispense: 60 tablet; Refill: 0  - oxyCODONE-acetaminophen (PERCOCET)  mg per tablet; Take 1 tablet by mouth every 4 (four) hours as needed for Pain.  Dispense: 60 tablet; Refill: 0    2. Major depressive disorder, recurrent, mild  Decrease dose to 50mg as she is not taking consistently due to drowsiness  - amitriptyline (ELAVIL) 50 MG tablet; Take 1 tablet (50 mg total) by mouth every evening.  Dispense: 90 tablet; Refill: 1  - Ambulatory referral/consult to Psychiatry; Future    3. Anxiety  Refer for therapy  - Ambulatory referral/consult to Psychiatry; Future    4. Primary hypertension  Continue current regimen      5. Lumbar radiculopathy  Pending PT and f/u with surgery    6. DDD (degenerative disc disease), lumbar  She has had improvement in pain but has days where she needs to take more medicine than others and has run out  - oxyCODONE-acetaminophen (PERCOCET)  mg per tablet; Take 1 tablet by mouth every 4 (four) hours as needed for Pain.  Dispense: 60 tablet; Refill: 0  - oxyCODONE-acetaminophen (PERCOCET)  mg per tablet; Take 1 tablet by mouth every 4 (four) hours as needed  for Pain.  Dispense: 60 tablet; Refill: 0  - oxyCODONE-acetaminophen (PERCOCET)  mg per tablet; Take 1 tablet by mouth every 4 (four) hours as needed for Pain.  Dispense: 60 tablet; Refill: 0        Simona Torres MD  05/27/2024 11:53 AM        Follow up in about 2 months (around 7/27/2024).    Orders Placed This Encounter   Procedures    Ambulatory referral/consult to Psychiatry

## 2024-06-03 ENCOUNTER — PATIENT MESSAGE (OUTPATIENT)
Dept: PSYCHIATRY | Facility: CLINIC | Age: 74
End: 2024-06-03
Payer: MEDICARE

## 2024-06-03 ENCOUNTER — PATIENT OUTREACH (OUTPATIENT)
Dept: ADMINISTRATIVE | Facility: HOSPITAL | Age: 74
End: 2024-06-03
Payer: MEDICARE

## 2024-06-03 NOTE — PROGRESS NOTES
Portal message sent for a BP reading and order so pt can schedule her mammogram. Gap report updated. Immunization's updated/triggered.

## 2024-06-04 ENCOUNTER — PATIENT MESSAGE (OUTPATIENT)
Dept: ADMINISTRATIVE | Facility: HOSPITAL | Age: 74
End: 2024-06-04
Payer: MEDICARE

## 2024-06-05 ENCOUNTER — PATIENT MESSAGE (OUTPATIENT)
Dept: NEUROSURGERY | Facility: CLINIC | Age: 74
End: 2024-06-05
Payer: MEDICARE

## 2024-06-11 ENCOUNTER — PATIENT MESSAGE (OUTPATIENT)
Dept: NEUROSURGERY | Facility: CLINIC | Age: 74
End: 2024-06-11
Payer: MEDICARE

## 2024-06-14 ENCOUNTER — TELEPHONE (OUTPATIENT)
Dept: FAMILY MEDICINE | Facility: CLINIC | Age: 74
End: 2024-06-14
Payer: MEDICARE

## 2024-06-14 RX ORDER — OXYCODONE AND ACETAMINOPHEN 10; 325 MG/1; MG/1
1 TABLET ORAL EVERY 4 HOURS PRN
Qty: 120 TABLET | Refills: 0 | Status: SHIPPED | OUTPATIENT
Start: 2024-06-14 | End: 2024-07-14

## 2024-06-14 NOTE — TELEPHONE ENCOUNTER
----- Message from Jenny Santos sent at 6/13/2024 12:00 PM CDT -----  Regarding: self [phone]  .Type: Patient Call Back    Who called:self    What is the request in detail: patient requesting a call back from the nurse regarding medication oxyCODONE-acetaminophen (PERCOCET)  mg per tablet.    Can the clinic reply by [facility]?no    Would the patient rather a call back or a response via [facility]?call back    Best call back number [phone]

## 2024-06-17 DIAGNOSIS — B37.2 CANDIDAL INTERTRIGO: ICD-10-CM

## 2024-06-17 DIAGNOSIS — I10 PRIMARY HYPERTENSION: ICD-10-CM

## 2024-06-18 NOTE — TELEPHONE ENCOUNTER
Care Due:                  Date            Visit Type   Department     Provider  --------------------------------------------------------------------------------                                [facility] FAMILY                              PRIMARY      MEDICINE/  Last Visit: 05-      CARE ([facility])   INTERNAL MED   Simona Torres  Next Visit: None Scheduled  None         None Found                                                            Last  Test          Frequency    Reason                     Performed    Due Date  --------------------------------------------------------------------------------    CMP.........  12 months..  diclofenac, pravastatin,   03- 03-                             spironolactone, valsartan    Lipid Panel.  12 months..  pravastatin..............  03- 03-    [facility] Embedded Care Due Messages. Reference number: [id number].   6/17/2024 10:10:00 PM CDT

## 2024-06-20 RX ORDER — TIZANIDINE 4 MG/1
TABLET ORAL
Qty: 90 TABLET | Refills: 0 | Status: SHIPPED | OUTPATIENT
Start: 2024-06-20

## 2024-06-20 RX ORDER — NYSTATIN 100000 [USP'U]/G
POWDER TOPICAL
Qty: 30 G | Refills: 0 | Status: SHIPPED | OUTPATIENT
Start: 2024-06-20

## 2024-06-20 RX ORDER — NIFEDIPINE 30 MG/1
30 TABLET, EXTENDED RELEASE ORAL
Qty: 90 TABLET | Refills: 0 | Status: SHIPPED | OUTPATIENT
Start: 2024-06-20

## 2024-06-24 ENCOUNTER — HOSPITAL ENCOUNTER (EMERGENCY)
Facility: HOSPITAL | Age: 74
Discharge: HOME | End: 2024-06-24
Attending: EMERGENCY MEDICINE
Payer: MEDICARE

## 2024-06-24 VITALS
HEART RATE: 100 BPM | SYSTOLIC BLOOD PRESSURE: 137 MMHG | HEIGHT: 64 IN | RESPIRATION RATE: 20 BRPM | TEMPERATURE: 99 F | OXYGEN SATURATION: 99 % | BODY MASS INDEX: 42 KG/M2 | WEIGHT: 246 LBS | DIASTOLIC BLOOD PRESSURE: 82 MMHG

## 2024-06-24 DIAGNOSIS — S89.91XA KNEE INJURY, RIGHT, INITIAL ENCOUNTER: Primary | ICD-10-CM

## 2024-06-24 DIAGNOSIS — T14.90XA INJURY: ICD-10-CM

## 2024-06-24 PROCEDURE — 99284 EMERGENCY DEPT VISIT MOD MDM: CPT | Mod: 25

## 2024-06-24 RX ORDER — LIDOCAINE 50 MG/G
1 PATCH TOPICAL DAILY
Qty: 15 PATCH | Refills: 0 | Status: SHIPPED | OUTPATIENT
Start: 2024-06-24

## 2024-06-24 RX ORDER — ACETAMINOPHEN 500 MG
1000 TABLET ORAL EVERY 6 HOURS PRN
Qty: 28 TABLET | Refills: 0 | Status: SHIPPED | OUTPATIENT
Start: 2024-06-24

## 2024-06-24 RX ORDER — DICLOFENAC SODIUM 10 MG/G
2 GEL TOPICAL DAILY
Qty: 100 G | Refills: 0 | Status: SHIPPED | OUTPATIENT
Start: 2024-06-24

## 2024-06-24 NOTE — FIRST PROVIDER EVALUATION
"Medical screening examination initiated.  I have conducted a focused provider triage encounter, findings are as follows:    Brief history of present illness:  twisted right knee in 3/2024; has been having pain and swelling since    Vitals:    06/24/24 1645   BP: 137/82   BP Location: Right arm   Patient Position: Sitting   Pulse: 100   Resp: 20   Temp: 98.7 °F (37.1 °C)   TempSrc: Oral   SpO2: 99%   Weight: 111.6 kg (246 lb)   Height: 5' 4" (1.626 m)       Pertinent physical exam:  NAD, normal gait    Brief workup plan:  xray    Preliminary workup initiated; this workup will be continued and followed by the physician or advanced practice provider that is assigned to the patient when roomed.  "

## 2024-06-24 NOTE — ED TRIAGE NOTES
Patient reports right knee pain ongoing from March after twisting it, denies any recent injury or fall.

## 2024-06-24 NOTE — DISCHARGE INSTRUCTIONS

## 2024-06-24 NOTE — MEDICAL/APP STUDENT
History     Chief Complaint   Patient presents with    Knee Pain     Reports stepping out of truck and twisting knee in march. States since then has had intermittent R knee pain and swelling. Denies pain meds pta.      72 y/o F with PMH of HTN, chronic low back pain, left sciatica, and L5-S1 laminectomy with fusion presents with 10/10 aching, throbbing, cramping pain in her right knee with associated swelling, catching and popping x3 months after she twisted it while getting out of her vehicle. Pain is worse with use and straightening her knee. Pain is better with ice. Patient denies her knee locking up, knee instability, fever, chills, N/V/D.     The history is provided by the patient. No  was used.       Past Medical History:   Diagnosis Date    Arthralgia     Colon polyp     Degenerative disc disease at L5-S1 level     High cholesterol     Hypertension     Notalgia paresthetica     Nuclear sclerosis of both eyes 01/08/2020    Sciatica     Spinal stenosis     Tobacco use 10/21/2021       Past Surgical History:   Procedure Laterality Date    BACK SURGERY      lumbar laminectomy    COLON SURGERY  2022    Tear repaired    COLONOSCOPY N/A 07/23/2020    Procedure: COLONOSCOPY;  Surgeon: Donal Moore MD;  Location: [facility] ENDO;  Service: Endoscopy;  Laterality: N/A;    EPIDURAL STEROID INJECTION Left 09/09/2020    Procedure: Injection, Steroid, Epidural Transforaminal;  Surgeon: Jun Leavitt Jr., MD;  Location: [facility] ENDO;  Service: Pain Management;  Laterality: Left;  Left L5 + S1 TF WADE  Arrive @ 1300; ASA last 9/1; No DM    EPIDURAL STEROID INJECTION Left 02/12/2021    Procedure: Injection, Steroid, Epidural Transforaminal;  Surgeon: Jun Leavitt Jr., MD;  Location: [facility] ENDO;  Service: Pain Management;  Laterality: Left;  Left L4 + L5 TF WADE  Arrive @ 1230; IV Sedation; ASA last 2/4; No DM    INJECTION OF FACET JOINT Bilateral 3/25/2024    Procedure: FACET JOINT INJECTION  BILATERAL L3/4 *ASPIRIN CLEARANCE IN CHART*;  Surgeon: Darya Ayala MD;  Location: [facility];  Service: Pain Management;  Laterality: Bilateral;  [phone]    SPINE SURGERY  10/08/18    Bilateral Lumbar Laminectomy with Fusion and Screws    TRANSFORAMINAL EPIDURAL INJECTION OF STEROID Left 2022    Procedure: INJECTION, STEROID, EPIDURAL, TRANSFORAMINAL APPROACH, L4-L5 & L5-S1;  Surgeon: Darya Ayala MD;  Location: [facility];  Service: Pain Management;  Laterality: Left;    TRANSFORAMINAL EPIDURAL INJECTION OF STEROID Left 2022    Procedure: LUMBAR TRANSFORAMINAL LEFT L4/5 AND L5/S1 CONTRAST;  Surgeon: Darya Ayala MD;  Location: [facility];  Service: Pain Management;  Laterality: Left;    TUBAL LIGATION         Family History   Problem Relation Name Age of Onset    Breast cancer Mother Marianna Benítez     Hypertension Mother Marianna Benítez             Hypotension Mother Marianna Benítez     Cancer Mother Marianna Benítez          due to breast cancer    Miscarriages / Stillbirths Mother Marianna Benítez         3 stillborn children    Cataracts Father Matthias Benítez     Glaucoma Father Matthias Benítez     Diabetes Father Matthias Benítez             Arthritis Father Matthias Benítez     Vision loss Father Matthias Benítez         Glaucoma -     Glaucoma Sister Debby Benítez     Arthritis Sister Debby eBnítez     Diabetes Sister Debby Benítez     Hypertension Sister Debby Benítez     Vision loss Sister Debby Benítez         Glasses    Drug abuse Brother Sky Jeyson     Learning disabilities Brother Sky Jeyson     No Known Problems Brother      No Known Problems Brother x3     No Known Problems Maternal Aunt      No Known Problems Maternal Uncle      No Known Problems Paternal Aunt      No Known Problems Paternal Uncle      No Known Problems Maternal Grandmother      No Known Problems Maternal Grandfather      No Known Problems  Paternal Grandmother      No Known Problems Paternal Grandfather      No Known Problems Son x2     No Known Problems Other      Arthritis Sister Susan Benítez     Depression Sister Susan Benítez     Diabetes Sister Susan Benítez     Hearing loss Sister Susan Benítez         Trouble hearing    Hypertension Sister Susan Benítez     Stroke Sister Susan Benítez         Browne Hunt Disease, Coma for a month due to diabetes,Some body weakness    Vision loss Sister Susan Benítez         Lasix surgery    Arthritis Sister Urszula Green     COPD Sister Urszula Green     Diabetes Sister Urszula Green     Hearing loss Sister Urszula Green     Heart disease Sister Urszula Green     Hypertension Sister Urszula Green     Arthritis Brother Murtaza Nowak     Hypertension Brother Murtaza Nowak     Early death Sister Christine Benítez         Still born    Early death Sister Catherine Benítez         Still born twin to Debby Benítez    Early death Brother Krishan Benítez stillborn     Hypertension Sister Kateryna Kraft     Hypertension Brother Robbie Benítez     Learning disabilities Son Vaibhav Holt     Learning disabilities Son Fidel [place]         Great grandson    Learning disabilities Daughter Carmen's Aries         Granddaughter    Mental illness Sister Shawna Benítez         Her son Matthias Benítez    Vision loss Sister Shawna Benítez         Glasses/contacts    Vision loss Sister Chelly Benítez         Glasses    Amblyopia Neg Hx      Blindness Neg Hx      Macular degeneration Neg Hx      Retinal detachment Neg Hx      Strabismus Neg Hx      Thyroid disease Neg Hx         Social History     Tobacco Use    Smoking status: Former     Current packs/day: 0.00     Average packs/day: 0.3 packs/day for 35.0 years (8.8 ttl pk-yrs)     Types: Cigarettes, Vaping with nicotine     Start date: 3/15/1987     Quit date: 3/15/2022     Years since quittin.2    Smokeless tobacco: Current    Tobacco comments:     Occasional spoker some times 1-2 cigarettes/day  "sometimes none for weeks   Substance Use Topics    Alcohol use: Yes     Alcohol/week: 3.0 standard drinks of alcohol     Types: 3 Glasses of wine per week     Comment: Occasional wine    Drug use: Not Currently     Comment: Tramadol twice a day as needed       Review of Systems   Constitutional:  Negative for appetite change, chills, fatigue and fever.   HENT:  Negative for congestion, rhinorrhea and sore throat.    Respiratory:  Negative for cough, chest tightness and shortness of breath.    Cardiovascular:  Negative for chest pain and palpitations.   Gastrointestinal:  Negative for abdominal pain, constipation, diarrhea, nausea and vomiting.   Genitourinary:  Negative for dysuria.   Musculoskeletal:  Positive for back pain, gait problem and joint swelling (right knee).   Neurological:  Negative for dizziness and headaches.       Physical Exam   /82 (BP Location: Right arm, Patient Position: Sitting)   Pulse 100   Temp 98.7 °F (37.1 °C) (Oral)   Resp 20   Ht 5' 4" (1.626 m)   Wt 111.6 kg (246 lb)   SpO2 99%   Breastfeeding No   BMI 42.23 kg/m²     Physical Exam    Constitutional: She appears well-developed and well-nourished. No distress.   Cardiovascular:  Normal rate, regular rhythm, normal heart sounds and intact distal pulses.     Exam reveals no gallop and no friction rub.       No murmur heard.  Pulmonary/Chest: Breath sounds normal. No respiratory distress. She has no wheezes. She has no rhonchi. She has no rales.   Abdominal: Abdomen is soft. Bowel sounds are normal. She exhibits no distension. There is no abdominal tenderness. There is no rebound and no guarding.   Musculoskeletal:         General: No tenderness or edema.     Neurological: She is alert.   Skin: Skin is warm and dry. Capillary refill takes less than 2 seconds.         ED Course           "

## 2024-06-25 ENCOUNTER — PATIENT OUTREACH (OUTPATIENT)
Dept: EMERGENCY MEDICINE | Facility: HOSPITAL | Age: 74
End: 2024-06-25
Payer: MEDICARE

## 2024-06-25 NOTE — ED PROVIDER NOTES
Encounter Date: 6/24/2024       History     Chief Complaint   Patient presents with    Knee Pain     Reports stepping out of truck and twisting knee in march. States since then has had intermittent R knee pain and swelling. Denies pain meds pta.      the patient is a 73-year-old female with past medical history of CKD, chronic pain, and degenerative disc disease who presents to the emergency department with a chief complaint of right knee pain.  Three months ago, she was getting out of her truck when she twisted her knee.  Since that time she has had intermittent pain and swelling of the right knee.  Swelling has been worse over the last 3-4 days.  Denies any feelings of instability.  Denies numbness, weakness, swelling, erythema, warmth, or paresthesias distal to the injury.  She follows with pain management for chronic back pain that does not have an orthopedic doctor.  Per chart review, her primary care provider also prescribes her Percocets for her chronic pain.  Most recently, 120 tablets were dispensed 14 days ago.    The history is provided by the patient. No  was used.     Review of patient's allergies indicates:   Allergen Reactions    Codeine      Past Medical History:   Diagnosis Date    Arthralgia     Colon polyp     Degenerative disc disease at L5-S1 level     High cholesterol     Hypertension     Notalgia paresthetica     Nuclear sclerosis of both eyes 01/08/2020    Sciatica     Spinal stenosis     Tobacco use 10/21/2021     Past Surgical History:   Procedure Laterality Date    BACK SURGERY      lumbar laminectomy    COLON SURGERY  2022    Tear repaired    COLONOSCOPY N/A 07/23/2020    Procedure: COLONOSCOPY;  Surgeon: Donal Moore MD;  Location: [facility] ENDO;  Service: Endoscopy;  Laterality: N/A;    EPIDURAL STEROID INJECTION Left 09/09/2020    Procedure: Injection, Steroid, Epidural Transforaminal;  Surgeon: Jun Leavitt Jr., MD;  Location: [facility] ENDO;  Service: Pain  Management;  Laterality: Left;  Left L5 + S1 TF WADE  Arrive @ 1300; ASA last ; No DM    EPIDURAL STEROID INJECTION Left 2021    Procedure: Injection, Steroid, Epidural Transforaminal;  Surgeon: Jun Leavitt Jr., MD;  Location: [facility] ENDO;  Service: Pain Management;  Laterality: Left;  Left L4 + L5 TF WADE  Arrive @ 1230; IV Sedation; ASA last ; No DM    INJECTION OF FACET JOINT Bilateral 3/25/2024    Procedure: FACET JOINT INJECTION BILATERAL L3/4 *ASPIRIN CLEARANCE IN CHART*;  Surgeon: Darya Ayala MD;  Location: [facility] [place];  Service: Pain Management;  Laterality: Bilateral;  [phone]    SPINE SURGERY  10/08/18    Bilateral Lumbar Laminectomy with Fusion and Screws    TRANSFORAMINAL EPIDURAL INJECTION OF STEROID Left 2022    Procedure: INJECTION, STEROID, EPIDURAL, TRANSFORAMINAL APPROACH, L4-L5 & L5-S1;  Surgeon: Darya Ayala MD;  Location: [facility] [place];  Service: Pain Management;  Laterality: Left;    TRANSFORAMINAL EPIDURAL INJECTION OF STEROID Left 2022    Procedure: LUMBAR TRANSFORAMINAL LEFT L4/5 AND L5/S1 CONTRAST;  Surgeon: Darya Ayala MD;  Location: [facility] [place];  Service: Pain Management;  Laterality: Left;    TUBAL LIGATION       Family History   Problem Relation Name Age of Onset    Breast cancer Mother Marianna Benítez     Hypertension Mother Marianna Benítez             Hypotension Mother Marianna Benítez     Cancer Mother Marianna Benítez          due to breast cancer    Miscarriages / Stillbirths Mother Marianna Benítez         3 stillborn children    Cataracts Father Matthias Benítez     Glaucoma Father Matthias Benítez     Diabetes Father Matthias Benítez             Arthritis Father Matthias Benítez     Vision loss Father Matthias Benítez         Glaucoma -     Glaucoma Sister Debby Benítez     Arthritis Sister Debby Benítez     Diabetes Sister Debby Benítez     Hypertension Sister Debby Benítez     Vision loss  Sister Debby Benítez         Glasses    Drug abuse Brother Sky Jeyson     Learning disabilities Brother Sky Jeyson     No Known Problems Brother      No Known Problems Brother x3     No Known Problems Maternal Aunt      No Known Problems Maternal Uncle      No Known Problems Paternal Aunt      No Known Problems Paternal Uncle      No Known Problems Maternal Grandmother      No Known Problems Maternal Grandfather      No Known Problems Paternal Grandmother      No Known Problems Paternal Grandfather      No Known Problems Son x2     No Known Problems Other      Arthritis Sister Susan Benítez     Depression Sister Susan Benítez     Diabetes Sister Susan Benítez     Hearing loss Sister Susan Benítez         Trouble hearing    Hypertension Sister Susan Benítez     Stroke Sister Susan Benítez         Browne Hunt Disease, Coma for a month due to diabetes,Some body weakness    Vision loss Sister Susan Benítez         Lasix surgery    Arthritis Sister Urszula Green     COPD Sister Urszula Green     Diabetes Sister Urszula Green     Hearing loss Sister Urszula Green     Heart disease Sister Urszula Green     Hypertension Sister Urszula Green     Arthritis Brother Murtaza Nowak     Hypertension Brother Murtaza Nowak     Early death Sister Christine Benítez         Still born    Early death Sister Catherine Benítez         Still born twin to Debby Benítez    Early death Brother Krishan Benítez stillborn     Hypertension Sister Kateryna Kraft     Hypertension Brother Robbie Benítez     Learning disabilities Son Vaibhav Holt     Learning disabilities Son Fidel [place]         Great grandson    Learning disabilities Daughter Carmen's Aries         Granddaughter    Mental illness Sister Shawna Jeyson         Her son Matthias Benítez    Vision loss Sister Shawna Jeyson         Glasses/contacts    Vision loss Sister Chelly Jeyson         Glasses    Amblyopia Neg Hx      Blindness Neg Hx      Macular degeneration Neg Hx      Retinal  detachment Neg Hx      Strabismus Neg Hx      Thyroid disease Neg Hx       Social History     Tobacco Use    Smoking status: Former     Current packs/day: 0.00     Average packs/day: 0.3 packs/day for 35.0 years (8.8 ttl pk-yrs)     Types: Cigarettes, Vaping with nicotine     Start date: 3/15/1987     Quit date: 3/15/2022     Years since quittin.2    Smokeless tobacco: Current    Tobacco comments:     Occasional spoker some times 1-2 cigarettes/day sometimes none for weeks   Substance Use Topics    Alcohol use: Yes     Alcohol/week: 3.0 standard drinks of alcohol     Types: 3 Glasses of wine per week     Comment: Occasional wine    Drug use: Not Currently     Comment: Tramadol twice a day as needed     Review of Systems   Constitutional:  Negative for chills and fever.   HENT:  Negative for sore throat.    Respiratory:  Negative for shortness of breath.    Cardiovascular:  Negative for chest pain.   Gastrointestinal:  Negative for abdominal pain, nausea and vomiting.   Genitourinary:  Negative for dysuria.   Musculoskeletal:  Positive for arthralgias and joint swelling. Negative for back pain.   Skin:  Negative for rash.   Neurological:  Negative for weakness.   Hematological:  Does not bruise/bleed easily.       Physical Exam     Initial Vitals [24 1645]   BP Pulse Resp Temp SpO2   137/82 100 20 98.7 °F (37.1 °C) 99 %      MAP       --         Physical Exam    Nursing note and vitals reviewed.  Constitutional: Vital signs are normal. She appears well-developed and well-nourished. She is cooperative. She does not appear ill. No distress.   Clinically well-appearing.  No acute distress.   HENT:   Head: Normocephalic and atraumatic.   Right Ear: Hearing and external ear normal.   Left Ear: Hearing and external ear normal.   Nose: Nose normal.   Eyes: Conjunctivae and EOM are normal.   Neck: Phonation normal.   Normal range of motion.  Cardiovascular:  Normal rate and regular rhythm.           No murmur  heard.  Pulmonary/Chest: Effort normal. No respiratory distress.   Abdominal: Abdomen is soft. She exhibits no distension. There is no abdominal tenderness.   Musculoskeletal:      Cervical back: Normal range of motion.      Comments: Ambulatory with an antalgic gait favoring the right leg.  Right knee with no significant swelling compared to the contralateral knee.  There was mild tenderness to palpation along the medial joint line.  Negative anterior Lachman's, negative posterior drawer, no laxity on varus or valgus stress.  Neurovascularly intact distal.  DP pulses 2+ and symmetrical bilaterally.  No lower extremity edema.  Capillary refill less than 2 seconds in bilateral great toes.     Neurological: She is alert and oriented to person, place, and time. GCS eye subscore is 4. GCS verbal subscore is 5. GCS motor subscore is 6.   Skin: Skin is warm. Capillary refill takes less than 2 seconds.         ED Course   Procedures  Labs Reviewed - No data to display       Imaging Results              X-Ray Knee 3 View Right (Final result)  Result time 06/24/24 17:46:06      Final result by Iam Horn MD (06/24/24 17:46:06)                   Impression:      No acute process.      Electronically signed by: Iam Horn MD  Date:    06/24/2024  Time:    17:46               Narrative:    EXAMINATION:  XR KNEE 3 VIEW RIGHT    CLINICAL HISTORY:  Injury, unspecified, initial encounter    TECHNIQUE:  AP, lateral, and Merchant views of the right knee were performed.    COMPARISON:  07/16/2021.    FINDINGS:  The bone mineralization is within normal limits.  There is no cortical step-off.  There is no evidence of periostitis.    There is mild tricompartmental joint space narrowing with minimal osteophytosis.  The soft tissues are unremarkable.  No radiopaque foreign body is identified.    There is no evidence of a fracture or dislocation.                                       Medications - No data to display  Medical Decision  Making  73-year-old female presenting to the emergency department with a chief complaint of right knee pain after injuring it 3 months ago while getting out of a truck.  Mechanism sounds like a twisting injury.  Intermittent pain and swelling since that time, worse over the last 3 days.  On exam, clinically well-appearing and in no acute distress.  Vitals within acceptable ranges.  Right knee with no laxity or instability.  Patient was ambulatory.  Neurovascularly intact distal.  Mild tenderness to palpation along the medial joint line.      Differential diagnosis includes but is not limited to osteoarthritis, rheumatoid arthritis, septic arthritis, gout, pseudogout, ligamentous injury to the ACL, MCL, PCL, or LCL, contusion, fracture, dislocation, patellar bursitis, patellofemoral syndrome     X-rays negative for any acute fractures or dislocations.  Presentation is consistent with possible meniscus injury or other soft tissue injury within the knee.  Doubt any ligamentous injury that would require emergent intervention at this time.  Very low suspicion for septic arthritis, patient was good active range of motion in his ambulatory.  She was not febrile or tachycardic.  Stable for discharge home at this time.  I will send her home with Tylenol, Voltaren, Ace wrap, ice pack, and Lidoderm patches.  Follow up with primary care Orthopedics.  Referral placed.    Return precautions were discussed, all patient questions were answered, and the patient was agreeable to the plan of care.  She was discharged home in stable condition and will follow up with her primary care provider or return to the emergency department if her symptoms worsen or do not improve.     Amount and/or Complexity of Data Reviewed  Radiology:  Decision-making details documented in ED Course.    Risk  OTC drugs.  Prescription drug management.                                      Clinical Impression:  Final diagnoses:  [T14.90XA] Injury  [S89.91XA] Knee  injury, right, initial encounter (Primary)          ED Disposition Condition    Discharge Stable          ED Prescriptions       Medication Sig Dispense Start Date End Date Auth. Provider    acetaminophen (TYLENOL) 500 MG tablet Take 2 tablets (1,000 mg total) by mouth every 6 (six) hours as needed. 28 tablet 6/24/2024 -- Donell Feliz, DEONNA    diclofenac sodium (VOLTAREN ARTHRITIS PAIN) 1 % Gel Apply 2 g topically once daily. 100 g 6/24/2024 -- Donell Feliz, DEONNA    LIDOcaine (LIDODERM) 5 % Place 1 patch onto the skin once daily. Remove & Discard patch within 12 hours or as directed by MD 15 patch 6/24/2024 -- Donell Feliz, DEONNA          Follow-up Information       Follow up With Specialties Details Why Contact Info    Simona Torres MD Family Medicine Schedule an appointment as soon as possible for a visit  As needed [address] [facility] [address]  [phone]               Donell Feliz, DEONNA  06/24/24 [id number]

## 2024-06-25 NOTE — PROGRESS NOTES
Nahomi Moody  ED Navigator  Emergency Department    Project: [facility] ED Navigator  Role: Community Health Worker    Date: 2024  Patient Name: Amanda Holt  MRN: [id number]  PCP: Simona Torres MD    Assessment:     Amanda Holt is a 73 y.o. female who has presented to ED for right knee injury. Patient has visited the ED 1 times in the past 3 months. Patient did not contact PCP.     ED Navigator Initial Assessment    ED Navigator Enrollment Documentation  Consent to Services  Does patient consent to completing the assessment?: Yes  Contact  Method of Initial Contact: Phone  Transportation  Does the patient have issues with Transportation?: No  Does the patient have transportation to and from healthcare appointments?: Yes  Insurance Coverage  Do you have coverage/adequate coverage?: Yes  Type/kind of coverage: [employer]  Specialist Appointment  PCP Follow Up Appointment  Medications  Is patient able to afford medication?: Yes  Is patient unable to get medication due to lack of transportation?: No  Psychological  Food  Communication/Education  Other Financial Concerns  Other Social Barriers/Concerns  Primary Barrier  Barriers identified: Structural barrier (service availability, waiting times, etc.)  Root Cause of ED Utilization: Chronic Conditions  Plan to address Chronic Conditions: Schedule appointment for patient with their PCP/specialist per ED discharge instructions  Next steps: Provided Education         Social History     Socioeconomic History    Marital status:    Tobacco Use    Smoking status: Former     Current packs/day: 0.00     Average packs/day: 0.3 packs/day for 35.0 years (8.8 ttl pk-yrs)     Types: Cigarettes, Vaping with nicotine     Start date: 3/15/1987     Quit date: 3/15/2022     Years since quittin.2    Smokeless tobacco: Current    Tobacco comments:     Occasional spoker some times 1-2 cigarettes/day sometimes  none for weeks   Substance and Sexual Activity    Alcohol use: Yes     Alcohol/week: 3.0 standard drinks of alcohol     Types: 3 Glasses of wine per week     Comment: Occasional wine    Drug use: Not Currently     Comment: Tramadol twice a day as needed    Sexual activity: Yes     Partners: Male     Birth control/protection: Post-menopausal, None     Comment: Tubal 30+ years ago   Social History Narrative    Lives alone    No partner    Was a  for a non-profit organization     Social Determinants of Health     Financial Resource Strain: Patient Declined (6/25/2024)    Overall Financial Resource Strain (CARDIA)     Difficulty of Paying Living Expenses: Patient declined   Food Insecurity: Patient Declined (6/25/2024)    Hunger Vital Sign     Worried About Running Out of Food in the Last Year: Patient declined     Ran Out of Food in the Last Year: Patient declined   Transportation Needs: No Transportation Needs (6/25/2024)    PRAPARE - Transportation     Lack of Transportation (Medical): No     Lack of Transportation (Non-Medical): No   Physical Activity: Patient Declined (6/25/2024)    Exercise Vital Sign     Days of Exercise per Week: Patient declined     Minutes of Exercise per Session: Patient declined   Stress: Patient Declined (6/25/2024)    [place] [employer] - Occupational Stress Questionnaire     Feeling of Stress : Patient declined   Housing Stability: Patient Declined (6/25/2024)    Housing Stability Vital Sign     Unable to Pay for Housing in the Last Year: Patient declined     Homeless in the Last Year: Patient declined       Plan:   Patient visited the ED on 6/24/2024.  Patient's orthopedic appointment is scheduled for 7/11/2024 at 10:20 AM.  Patient denied no other needs to be addressed at this time.

## 2024-06-26 ENCOUNTER — PATIENT MESSAGE (OUTPATIENT)
Dept: NEUROSURGERY | Facility: CLINIC | Age: 74
End: 2024-06-26
Payer: MEDICARE

## 2024-06-26 ENCOUNTER — TELEPHONE (OUTPATIENT)
Dept: NEUROSURGERY | Facility: CLINIC | Age: 74
End: 2024-06-26
Payer: MEDICARE

## 2024-07-01 ENCOUNTER — PATIENT MESSAGE (OUTPATIENT)
Dept: NEUROSURGERY | Facility: CLINIC | Age: 74
End: 2024-07-01

## 2024-07-01 ENCOUNTER — TELEPHONE (OUTPATIENT)
Dept: NEUROSURGERY | Facility: CLINIC | Age: 74
End: 2024-07-01
Payer: MEDICARE

## 2024-07-01 ENCOUNTER — OFFICE VISIT (OUTPATIENT)
Dept: NEUROSURGERY | Facility: CLINIC | Age: 74
End: 2024-07-01
Payer: MEDICARE

## 2024-07-01 ENCOUNTER — TELEPHONE (OUTPATIENT)
Dept: NEUROLOGY | Facility: CLINIC | Age: 74
End: 2024-07-01
Payer: MEDICARE

## 2024-07-01 DIAGNOSIS — M51.36 DDD (DEGENERATIVE DISC DISEASE), LUMBAR: ICD-10-CM

## 2024-07-01 DIAGNOSIS — M48.02 SPINAL STENOSIS, CERVICAL REGION: ICD-10-CM

## 2024-07-01 DIAGNOSIS — M54.12 CERVICAL RADICULOPATHY: Primary | ICD-10-CM

## 2024-07-01 DIAGNOSIS — M54.16 LUMBAR RADICULOPATHY, CHRONIC: Primary | ICD-10-CM

## 2024-07-01 PROCEDURE — 4010F ACE/ARB THERAPY RXD/TAKEN: CPT | Mod: CPTII,95,, | Performed by: NEUROLOGICAL SURGERY

## 2024-07-01 PROCEDURE — 99215 OFFICE O/P EST HI 40 MIN: CPT | Mod: 95,,, | Performed by: NEUROLOGICAL SURGERY

## 2024-07-01 NOTE — PROGRESS NOTES
Neurosurgery  Established Patient    Virtual visit Attestation   The patient location is: Home  The chief complaint leading to consultation is: discuss surgery for chronic back pain   Visit type: audiovisual  Total time spent with patient: 20 min     Each patient to whom he or she provides medical services by telemedicine is:  (1) informed of the relationship between the physician and patient and the respective role of any other health care provider with respect to management of the patient; and (2) notified that he or she may decline to receive medical services by telemedicine and may withdraw from such care at any time.     Hemant Attestation  SNOW, Radha Hendricks, attest that this documentation has been prepared under the direction and in the presence of Azra Kurtz MD.     SUBJECTIVE:     History of Present Illness 05/26/20  61-year-old female with a history hypertension, coronary artery disease, hypokalemia and back pain for several years.  She is status post previous L4-5 laminectomy and fusion in 2018.  She notes now she has back pain with pain going down the left lateral aspect of the leg.  She notes the pain is 10/10 in severity, and both her back and her legs.  She notes she has had this pain since April of 2019.  She had her previous L4-5 laminectomy and fusion in October of 2018.  She notes that she had remained pain free after having back pain bilateral leg pain going down the posterior aspect of both legs prior to surgery.  And had improved initially after surgery.  She notes the pain is primarily in her left leg goes on lateral aspect of her leg.  She notes that she is able to walk about 10 or 15 ft and the pain is much more pronounced.  She notes that her pain is also worsened when sitting.  She notes that lying on her right side somewhat improves the pain.  She has previously had 1% lidocaine cream which reported her with some relief.  She denies any jhony weakness in the bilateral lower  extremities.  She denies any weakness in the upper extremities.  She denies any bowel or bladder incontinence.  She denies any chest pain, shortness of breath, nausea, vomiting, or emesis.  She denies any perirectal anesthesia.  She denies any genital anesthesia.  History was garnered over a audio visual virtual visit.    Interval history 08/25/20  Patient notes that she continues to have pain which is primarily 10/10 in severity with motion.  She notes that her pain decreases to 6/10 when she is sitting down or lying recumbent leaning forward.  She does not have any significant alleviating factors other than decreasing motion.  She notes that when she is standing for extended periods of time, or walking for extended period of time that dramatically increases her pain.  She denies any jhony weakness in lower extremities he denies any low bowel or bladder incontinence.  She notes that she has not been able to see the pain clinic on a regular basis, or physical therapy, secondary to quarantine.  She also has not undergone EMG nerve conduction study.  She has an appointment to see the pain physicians in the next coming weeks.     Interval history 07/01/24  Amanda Holt is a 72 y/o female, PMHx of HTN, HLD, Stage 3a CKD, and carpal tunnel syndrome, that presents to me for evaluation of chronic back pain and to discuss possible surgical treatment. Today she reports ongoing chronic lower left back pain. She had addressed this pain by undergoing a L sided L4-5 fusion in 2018 which helped her pain lessen. However, overtime the back pain worsened and is now a 10/10 most days. Aggravated with prolonged standing or walking. Sitting and lying on her R side somewhat lessens her pain. Was prescribed Tramadol then Percocet, but neither provided relief. Had also attempted epidural injections on the L and R which decreased the frequency of her pain. Back pain also radiates down her L leg (side, then down front of lower leg). L  leg pain is associated with ipsilateral numbness. No urinary incontinence but does note constipation. This is the extent of her complaints today. This was a virtual audio visual visit.    Review of patient's allergies indicates:   Allergen Reactions    Codeine        Current Outpatient Medications   Medication Sig Dispense Refill    acetaminophen (TYLENOL) 500 MG tablet Take 2 tablets (1,000 mg total) by mouth every 6 (six) hours as needed. 28 tablet 0    amitriptyline (ELAVIL) 50 MG tablet Take 1 tablet (50 mg total) by mouth every evening. 90 tablet 1    aspirin (ECOTRIN) 81 MG EC tablet       clotrimazole-betamethasone 1-0.05% (LOTRISONE) cream APPLY  CREAM TOPICALLY TO AFFECTED AREA TWICE DAILY 45 g 0    cyanocobalamin, vitamin B-12, (B-12 COMPLIANCE) 1,000 mcg/mL Kit Inject 1 mL as directed every 28 days. 1 kit 5    diclofenac sodium (VOLTAREN ARTHRITIS PAIN) 1 % Gel Apply 2 g topically once daily. 100 g 0    ergocalciferol (ERGOCALCIFEROL) 50,000 unit Cap Take 1 capsule by mouth every 7 days.      fluticasone propionate (FLONASE) 50 mcg/actuation nasal spray 1 spray (50 mcg total) by Each Nostril route once daily. 48 g 3    hydrALAZINE (APRESOLINE) 100 MG tablet Take 1 tablet (100 mg total) by mouth every 12 (twelve) hours. 180 tablet 3    hydrOXYzine HCL (ATARAX) 25 MG tablet Take 1 tablet by mouth three times daily as needed 45 tablet 0    LIDOcaine (LIDODERM) 5 % Place 1 patch onto the skin once daily. Remove & Discard patch within 12 hours or as directed by MD 15 patch 0    meloxicam (MOBIC) 7.5 MG tablet Take 7.5 mg by mouth once daily.      metoprolol succinate (TOPROL-XL) 25 MG 24 hr tablet Take 1 tablet by mouth once daily 90 tablet 3    mometasone (ELOCON) 0.1 % solution Apply once to twice daily prn itching 60 mL 5    mupirocin (BACTROBAN) 2 % ointment Apply topically 3 (three) times daily. 22 g 0    NIFEdipine (PROCARDIA-XL) 30 MG (OSM) 24 hr tablet Take 1 tablet by mouth once daily 90 tablet 0     nystatin (MYCOSTATIN) powder APPLY  POWDER TOPICALLY TO AFFECTED AREA 4 TIMES DAILY 30 g 0    olopatadine (PATANOL) 0.1 % ophthalmic solution INSTILL 1 DROP INTO EACH EYE TWICE DAILY 5 mL 0    omeprazole (PRILOSEC) 40 MG capsule Take 1 capsule (40 mg total) by mouth once daily. 90 capsule 2    oxyCODONE-acetaminophen (PERCOCET)  mg per tablet Take 1 tablet by mouth every 4 (four) hours as needed for Pain. 60 tablet 0    [START ON 7/26/2024] oxyCODONE-acetaminophen (PERCOCET)  mg per tablet Take 1 tablet by mouth every 4 (four) hours as needed for Pain. 60 tablet 0    oxyCODONE-acetaminophen (PERCOCET)  mg per tablet Take 1 tablet by mouth every 4 (four) hours as needed for Pain. 120 tablet 0    pravastatin (PRAVACHOL) 40 MG tablet Take 1 tablet by mouth once daily 90 tablet 1    spironolactone (ALDACTONE) 50 MG tablet Take 1 tablet by mouth once daily 90 tablet 1    tiZANidine (ZANAFLEX) 4 MG tablet TAKE 1 TABLET BY MOUTH EVERY 6 HOURS AS NEEDED 90 tablet 0    triamcinolone acetonide 0.1% (KENALOG) 0.1 % ointment AAA bid 454 g 3    triamcinolone acetonide 0.1% (KENALOG) 0.1 % ointment AAA bid; not more than 2 weeks straight in same location, avoid use on face and groin 454 g 1    triamcinolone acetonide 0.1%-ciclopirox-magnesium hydroxide 400 mg/5 ml Apply to affected area twice a day after cool blow dry 60 g 5    valsartan (DIOVAN) 320 MG tablet Take 1 tablet by mouth once daily 90 tablet 1     No current facility-administered medications for this visit.       Past Medical History:   Diagnosis Date    Arthralgia     Colon polyp     Degenerative disc disease at L5-S1 level     High cholesterol     Hypertension     Notalgia paresthetica     Nuclear sclerosis of both eyes 01/08/2020    Sciatica     Spinal stenosis     Tobacco use 10/21/2021     Past Surgical History:   Procedure Laterality Date    BACK SURGERY      lumbar laminectomy    COLON SURGERY  2022    Tear repaired    COLONOSCOPY N/A  07/23/2020    Procedure: COLONOSCOPY;  Surgeon: Donal Moore MD;  Location: [facility] ENDO;  Service: Endoscopy;  Laterality: N/A;    EPIDURAL STEROID INJECTION Left 09/09/2020    Procedure: Injection, Steroid, Epidural Transforaminal;  Surgeon: Jun Leavitt Jr., MD;  Location: [facility] ENDO;  Service: Pain Management;  Laterality: Left;  Left L5 + S1 TF WADE  Arrive @ 1300; ASA last 9/1; No DM    EPIDURAL STEROID INJECTION Left 02/12/2021    Procedure: Injection, Steroid, Epidural Transforaminal;  Surgeon: Jun Leavitt Jr., MD;  Location: [facility] ENDO;  Service: Pain Management;  Laterality: Left;  Left L4 + L5 TF WADE  Arrive @ 1230; IV Sedation; ASA last 2/4; No DM    INJECTION OF FACET JOINT Bilateral 3/25/2024    Procedure: FACET JOINT INJECTION BILATERAL L3/4 *ASPIRIN CLEARANCE IN CHART*;  Surgeon: Darya Ayala MD;  Location: [facility] PAIN MGT;  Service: Pain Management;  Laterality: Bilateral;  [phone]    SPINE SURGERY  10/08/18    Bilateral Lumbar Laminectomy with Fusion and Screws    TRANSFORAMINAL EPIDURAL INJECTION OF STEROID Left 03/14/2022    Procedure: INJECTION, STEROID, EPIDURAL, TRANSFORAMINAL APPROACH, L4-L5 & L5-S1;  Surgeon: Darya Ayala MD;  Location: [facility] PAIN MGT;  Service: Pain Management;  Laterality: Left;    TRANSFORAMINAL EPIDURAL INJECTION OF STEROID Left 08/29/2022    Procedure: LUMBAR TRANSFORAMINAL LEFT L4/5 AND L5/S1 CONTRAST;  Surgeon: Darya Ayala MD;  Location: [facility] PAIN MGT;  Service: Pain Management;  Laterality: Left;    TUBAL LIGATION       Family History       Problem Relation (Age of Onset)    Arthritis Father, Sister, Sister, Sister, Brother    Breast cancer Mother    COPD Sister    Cancer Mother    Cataracts Father    Depression Sister    Diabetes Father, Sister, Sister, Sister    Drug abuse Brother    Early death Sister, Sister, Brother    Glaucoma Father, Sister    Hearing loss Sister, Sister    Heart disease Sister    Hypertension Mother, Sister, Sister, Sister,  Brother, Sister, Brother    Hypotension Mother    Learning disabilities Brother, Son, Son, Daughter    Mental illness Sister    Miscarriages / Stillbirths Mother    No Known Problems Brother, Brother, Maternal Aunt, Maternal Uncle, Paternal Aunt, Paternal Uncle, Maternal Grandmother, Maternal Grandfather, Paternal Grandmother, Paternal Grandfather, Son, Other    Stroke Sister    Vision loss Father, Sister, Sister, Sister, Sister          Social History     Socioeconomic History    Marital status:    Tobacco Use    Smoking status: Former     Current packs/day: 0.00     Average packs/day: 0.3 packs/day for 35.0 years (8.8 ttl pk-yrs)     Types: Cigarettes, Vaping with nicotine     Start date: 3/15/1987     Quit date: 3/15/2022     Years since quittin.2    Smokeless tobacco: Current    Tobacco comments:     Occasional spoker some times 1-2 cigarettes/day sometimes none for weeks   Substance and Sexual Activity    Alcohol use: Yes     Alcohol/week: 3.0 standard drinks of alcohol     Types: 3 Glasses of wine per week     Comment: Occasional wine    Drug use: Not Currently     Comment: Tramadol twice a day as needed    Sexual activity: Yes     Partners: Male     Birth control/protection: Post-menopausal, None     Comment: Tubal 30+ years ago   Social History Narrative    Lives alone    No partner    Was a  for a non-profit organization     Social Determinants of Health     Financial Resource Strain: Patient Declined (2024)    Overall Financial Resource Strain (CARDIA)     Difficulty of Paying Living Expenses: Patient declined   Food Insecurity: Patient Declined (2024)    Hunger Vital Sign     Worried About Running Out of Food in the Last Year: Patient declined     Ran Out of Food in the Last Year: Patient declined   Transportation Needs: No Transportation Needs (2024)    PRAPARE - Transportation     Lack of Transportation (Medical): No     Lack of Transportation  (Non-Medical): No   Physical Activity: Patient Declined (6/25/2024)    Exercise Vital Sign     Days of Exercise per Week: Patient declined     Minutes of Exercise per Session: Patient declined   Stress: Patient Declined (6/25/2024)    [employer] [place] [employer] - Occupational Stress Questionnaire     Feeling of Stress : Patient declined   Housing Stability: Patient Declined (6/25/2024)    Housing Stability Vital Sign     Unable to Pay for Housing in the Last Year: Patient declined     Homeless in the Last Year: Patient declined       Review of Systems   Gastrointestinal:  Positive for constipation.   Musculoskeletal:  Positive for back pain (lower L).   Neurological:  Positive for numbness (L leg).   All other systems reviewed and are negative.    OBJECTIVE:     Vital Signs     There is no height or weight on file to calculate BMI.    Neurosurgery Physical Exam  On virtual audio visual visit   Head is normocephalic atraumatic   Neck is grossly supple  No appreciable labored breathing   Admitting appears to be nontender   Patient is able to move extremities     On virtual audio visual visit the patient's speech is fluent, goal directed without any noted dysarthria or aphasia   Unable to evaluate pupils on virtual audio visual visit   Face is symmetric   Tongue is midline  Unable to evaluate palate   Hearing appears to be intact on virtual audio visual visit to voice   Patient able to move both upper and lower extremities without any gross motor asymmetry on virtual audio visual visit     Diagnostic Results:  I personally reviewed the patient's Diagnostic Imaging.     MRI C Spine 03/05/24  Degenerative disc spondylosis:  C3-C4: Disc normal.  Facet joint arthropathy with mild moderate right foramen stenosis.     C4-C5: Disc normal.  Facet joint arthropathy with mild left foramen stenosis.     C5-C6: Left uncinate process hypertrophy facet joint arthropathy with moderate severe left foramen stenosis.      C7-T1: Disc normal.  Facet joint arthropathy with mild moderate left foramen stenosis.     No significant spinal stenosis. Incidental remote nonspecific ischemic change gliosis included pete and adjacent midbrain.    MRI L Spine 03/05/24  Postoperative and degenerative change of bilateral laminectomy and left-sided instrumented L4-5 fusion, with central spinal stenosis at L3-4 and moderate-severe right L5-S1 foraminal stenosis.    XR C Spine Flex/Ex 02/23/24  No acute findings.     XR L Spine Standing w/ Flex/Ex 02/23/24  Prior L4-5 posterior fusion on the left.  The hardware is intact.     Grade I anterolisthesis of L3 on L4, above the fusion, progressed from prior studies.  No pars defects.  Paradoxically this appears to mildly increased on extension but is stable on flexion.    ASSESSMENT/PLAN:   73-year-old female with hx of L4-5 pedicle screw placement unilaterally (2018) and chronic lower left back and leg pain.    Patient presents with chronic lower left back pain associated with L leg pain and numbness.   Discussed imaging results from MRI and XR of C spine revealing degenerative disc spondylosis as described above with no acute findings. Also discussed imaging results from MRI and XR of L spine revealing postoperative and degenerative change of bilateral laminectomy and left-sided instrumented L4-5 fusion. There is also grade I anterolisthesis of L3 on L4.   After discussion with the patient, I recommend obtaining a CT scan of the lumbar spine, Standing Scoliosis XR films, and a repeat EMG Nerve Conduction study to better evaluate the cause of the pt's back pain and associated symptoms. Once these imaging studies have been reviewed, we will then discuss possible treatment options to address her back pain. I have answered all of their questions and patient wish to proceed with this plan. We will schedule patient.      Thank you so very much for allowing me to participate in the care of this patient.   Please feel free to call any questions, comments, or concerns.     Azra Kurtz MD,MSc  Department of Neurosurgery   Department of Radiology  Department of Neurology  [facility]    [facility]   [facility] [employer] [facility] [employer]     Total time spent in counseling and discussion about further management options including relevant lab work, treatment,  prognosis, medications and intended side effects was more than 60 minutes. More than 50 % of the time was spent in counseling and coordination of care.  I spent a total of 60 minutes on the day of the visit.This includes face to face time and non-face to face time preparing to see the patient (eg, review of tests), Obtaining and/or reviewing separately obtained history, Documenting clinical information in the electronic or other health record, Independently interpreting resultsand communicating results to the patient/family/caregiver, or Care coordination      Scribe Attestation  I, Dr.Vernard Kurtz personally performed the services described in this documentation. All medical record entries made by the scribe, Radha Hendricks, were at my direction and in my presence.  I have reviewed the chart and agree that the record reflects my personal performance and is accurate and complete.

## 2024-07-08 ENCOUNTER — HOSPITAL ENCOUNTER (OUTPATIENT)
Dept: RADIOLOGY | Facility: HOSPITAL | Age: 74
Discharge: HOME | End: 2024-07-08
Attending: NEUROLOGICAL SURGERY
Payer: MEDICARE

## 2024-07-08 DIAGNOSIS — M54.16 LUMBAR RADICULOPATHY, CHRONIC: ICD-10-CM

## 2024-07-08 PROCEDURE — 72131 CT LUMBAR SPINE W/O DYE: CPT | Mod: TC

## 2024-07-08 PROCEDURE — 72082 X-RAY EXAM ENTIRE SPI 2/3 VW: CPT | Mod: TC,FY

## 2024-07-08 PROCEDURE — 72131 CT LUMBAR SPINE W/O DYE: CPT | Mod: 26,,, | Performed by: RADIOLOGY

## 2024-07-08 PROCEDURE — 72082 X-RAY EXAM ENTIRE SPI 2/3 VW: CPT | Mod: 26,,, | Performed by: RADIOLOGY

## 2024-07-11 ENCOUNTER — OFFICE VISIT (OUTPATIENT)
Dept: ORTHOPEDICS | Facility: CLINIC | Age: 74
End: 2024-07-11
Payer: MEDICARE

## 2024-07-11 VITALS — WEIGHT: 246.06 LBS | BODY MASS INDEX: 42.01 KG/M2 | HEIGHT: 64 IN

## 2024-07-11 DIAGNOSIS — M17.11 PRIMARY OSTEOARTHRITIS OF RIGHT KNEE: Primary | ICD-10-CM

## 2024-07-11 DIAGNOSIS — S89.91XA KNEE INJURY, RIGHT, INITIAL ENCOUNTER: ICD-10-CM

## 2024-07-11 PROCEDURE — 99999 PR PBB SHADOW E&M-EST. PATIENT-LVL III: CPT | Mod: PBBFAC,,, | Performed by: ORTHOPAEDIC SURGERY

## 2024-07-11 NOTE — PROGRESS NOTES
NEW PATIENT ORTHOPAEDIC: Knee    PRIMARY CARE PHYSICIAN: Simona Torres MD   REFERRING PROVIDER: Roberto Hercules  [address] [facility] [address]  [place] [address]     ASSESSMENT & PLAN:    Impression:  Right Knee Mild Osteoarthritis, Primary      Follow Up Plan: PRN     Injection:     Amanda Holt has physical exam evidence of above and wishes to pursue an injection. We discussed alternative non operative modalities including physical therapy, anti-inflammatories, bracing, maintenance of appropriate weight, rest, ambulatory devices. We discussed the risk, benefits, and expectations regarding injection. They have elected to proceed with injection. Should injections fail next step would be MRI. See procedure note for billing purposes.     Non operative care:    Amanda Holt has physical exam evidence of above and wishes to pursue an non-operative care. I am recommending the following: injection    Patient comes in with a greater than three-month history of having intermittent right knee pain and swelling.  She associates this with a slip that she sustained.  She has confounding lumbar spine issues and previous surgery on her lumbar spine.  She reports an unsteady gait relative to this.  She reports pain in the medial and posterior aspects of her knee.  She reports intermittent mechanical symptoms in her knee.  What is related to arthritis I think she may benefit from intra-articular injection.  She is on narcotic medications at baseline.  She would injections fail to provide long-lasting relief could think about physical therapy or an MRI.    The patient has been ordered:  Office Intraarticular injection    CONSULTS:   None    ACTIVE PROBLEM LIST  Patient Active Problem List   Diagnosis    Contact lens overwear of both eyes    Refractive error    Nuclear sclerosis of both eyes    Spinal stenosis    Hypertension    ANTONIA (obstructive sleep apnea)    Gastroesophageal reflux disease without  esophagitis    CKD (chronic kidney disease)    Chronic low back pain    Bilateral hearing loss    DDD (degenerative disc disease), lumbar    CTS (carpal tunnel syndrome)    Anxiety    Asymptomatic microscopic hematuria    Lumbar radiculopathy    Lumbar spondylosis    Lumbar herniated disc    Other nonthrombocytopenic purpura    Major depressive disorder, recurrent, mild    Atherosclerosis of aorta    Stage 3a chronic kidney disease    Wears contact lenses    Posterior vitreous detachment of right eye    Class 3 severe obesity due to excess calories with serious comorbidity in adult    Decreased functional mobility and endurance    Sleep-related breathing disorder    AKBAR (dyspnea on exertion)    Peripheral edema    Insomnia    Former smoker    Hyperlipidemia    Hypertensive renal disease    Multiple renal cysts    Vitamin D deficiency           SUBJECTIVE    CHIEF COMPLAINT: Knee Pain    HPI:   Amanda Holt is a 73 y.o. female here for evaluation and management of right knee pain. There is a specific incident that brought about this pain. she has had progressive problems with the knee(s) starting 3 months ago but is now progressing to interfere with activities which include: walking and functional household ADL's    Currently the pain in the joint is rated at mild with activity. The pain is intermittent and is located in the knee, located medially, located anterior, and located posterior. The pain is described as aching and throbbing. Relieving factors include ambulatory device, rest, prescription medication, and repositioning.     There is not associated Catching, Clicking, and Popping.     Amanda Holt has no additional complaints.     PROGRESSIVE SYMPTOMS:  Pain worsened by weight bearing  Pain effecting living situation    FUNCTIONAL STATUS:   Walk a block or two on level ground     PREVIOUS TREATMENTS:  Medical: Intolerant of NSAIDS and Tylenol  Physical Therapy: Use of Ambulatory Aid and  Activities Modified   Previous Orthopaedic Surgery: Lumbar    REVIEW OF SYSTEMS:  PAIN ASSESSMENT:  See HPI.  MUSCULOSKELETAL: See HPI.  OTHER 10 point review of systems is negative except as stated in HPI above    PAST MEDICAL HISTORY   has a past medical history of Arthralgia, Colon polyp, Degenerative disc disease at L5-S1 level, High cholesterol, Hypertension, Notalgia paresthetica, Nuclear sclerosis of both eyes (01/08/2020), Sciatica, Spinal stenosis, and Tobacco use (10/21/2021).     PAST SURGICAL HISTORY   has a past surgical history that includes Tubal ligation; Back surgery; Colonoscopy (N/A, 07/23/2020); Epidural steroid injection (Left, 09/09/2020); Epidural steroid injection (Left, 02/12/2021); Transforaminal epidural injection of steroid (Left, 03/14/2022); Transforaminal epidural injection of steroid (Left, 08/29/2022); Spine surgery (10/08/18); Colon surgery (2022); and Injection of facet joint (Bilateral, 3/25/2024).     FAMILY HISTORY  family history includes Arthritis in her brother, father, sister, sister, and sister; Breast cancer in her mother; COPD in her sister; Cancer in her mother; Cataracts in her father; Depression in her sister; Diabetes in her father, sister, sister, and sister; Drug abuse in her brother; Early death in her brother, sister, and sister; Glaucoma in her father and sister; Hearing loss in her sister and sister; Heart disease in her sister; Hypertension in her brother, brother, mother, sister, sister, sister, and sister; Hypotension in her mother; Learning disabilities in her brother, daughter, son, and son; Mental illness in her sister; Miscarriages / Stillbirths in her mother; No Known Problems in her brother, brother, maternal aunt, maternal grandfather, maternal grandmother, maternal uncle, paternal aunt, paternal grandfather, paternal grandmother, paternal uncle, son, and another family member; Stroke in her sister; Vision loss in her father, sister, sister, sister,  and sister.     SOCIAL HISTORY   reports that she quit smoking about 2 years ago. Her smoking use included cigarettes and vaping with nicotine. She started smoking about 37 years ago. She has a 8.8 pack-year smoking history. She uses smokeless tobacco. She reports current alcohol use of about 3.0 standard drinks of alcohol per week. She reports that she does not currently use drugs.     ALLERGIES   Review of patient's allergies indicates:   Allergen Reactions    Codeine         MEDICATIONS  Current Outpatient Medications on File Prior to Visit   Medication Sig Dispense Refill    acetaminophen (TYLENOL) 500 MG tablet Take 2 tablets (1,000 mg total) by mouth every 6 (six) hours as needed. 28 tablet 0    amitriptyline (ELAVIL) 50 MG tablet Take 1 tablet (50 mg total) by mouth every evening. 90 tablet 1    aspirin (ECOTRIN) 81 MG EC tablet       clotrimazole-betamethasone 1-0.05% (LOTRISONE) cream APPLY  CREAM TOPICALLY TO AFFECTED AREA TWICE DAILY 45 g 0    cyanocobalamin, vitamin B-12, (B-12 COMPLIANCE) 1,000 mcg/mL Kit Inject 1 mL as directed every 28 days. 1 kit 5    diclofenac sodium (VOLTAREN ARTHRITIS PAIN) 1 % Gel Apply 2 g topically once daily. 100 g 0    ergocalciferol (ERGOCALCIFEROL) 50,000 unit Cap Take 1 capsule by mouth every 7 days.      fluticasone propionate (FLONASE) 50 mcg/actuation nasal spray 1 spray (50 mcg total) by Each Nostril route once daily. 48 g 3    hydrALAZINE (APRESOLINE) 100 MG tablet Take 1 tablet (100 mg total) by mouth every 12 (twelve) hours. 180 tablet 3    hydrOXYzine HCL (ATARAX) 25 MG tablet Take 1 tablet by mouth three times daily as needed 45 tablet 0    LIDOcaine (LIDODERM) 5 % Place 1 patch onto the skin once daily. Remove & Discard patch within 12 hours or as directed by MD 15 patch 0    metoprolol succinate (TOPROL-XL) 25 MG 24 hr tablet Take 1 tablet by mouth once daily 90 tablet 3    mometasone (ELOCON) 0.1 % solution Apply once to twice daily prn itching 60 mL 5     "mupirocin (BACTROBAN) 2 % ointment Apply topically 3 (three) times daily. 22 g 0    NIFEdipine (PROCARDIA-XL) 30 MG (OSM) 24 hr tablet Take 1 tablet by mouth once daily 90 tablet 0    nystatin (MYCOSTATIN) powder APPLY  POWDER TOPICALLY TO AFFECTED AREA 4 TIMES DAILY 30 g 0    olopatadine (PATANOL) 0.1 % ophthalmic solution INSTILL 1 DROP INTO EACH EYE TWICE DAILY 5 mL 0    omeprazole (PRILOSEC) 40 MG capsule Take 1 capsule (40 mg total) by mouth once daily. 90 capsule 2    oxyCODONE-acetaminophen (PERCOCET)  mg per tablet Take 1 tablet by mouth every 4 (four) hours as needed for Pain. 120 tablet 0    pravastatin (PRAVACHOL) 40 MG tablet Take 1 tablet by mouth once daily 90 tablet 1    spironolactone (ALDACTONE) 50 MG tablet Take 1 tablet by mouth once daily 90 tablet 1    tiZANidine (ZANAFLEX) 4 MG tablet TAKE 1 TABLET BY MOUTH EVERY 6 HOURS AS NEEDED 90 tablet 0    triamcinolone acetonide 0.1%-ciclopirox-magnesium hydroxide 400 mg/5 ml Apply to affected area twice a day after cool blow dry 60 g 5    valsartan (DIOVAN) 320 MG tablet Take 1 tablet by mouth once daily 90 tablet 1    meloxicam (MOBIC) 7.5 MG tablet Take 7.5 mg by mouth once daily.      oxyCODONE-acetaminophen (PERCOCET)  mg per tablet Take 1 tablet by mouth every 4 (four) hours as needed for Pain. 60 tablet 0    [START ON 7/26/2024] oxyCODONE-acetaminophen (PERCOCET)  mg per tablet Take 1 tablet by mouth every 4 (four) hours as needed for Pain. 60 tablet 0    triamcinolone acetonide 0.1% (KENALOG) 0.1 % ointment AAA bid 454 g 3    triamcinolone acetonide 0.1% (KENALOG) 0.1 % ointment AAA bid; not more than 2 weeks straight in same location, avoid use on face and groin 454 g 1     No current facility-administered medications on file prior to visit.          PHYSICAL EXAM   height is 5' 4" (1.626 m) and weight is 111.6 kg (246 lb 0.5 oz).   Body mass index is 42.23 kg/m².      All other systems deferred.  GENERAL:  No acute " distress  HABITUS: Morbid Obese  GAIT: Antalgic  SKIN: Normal  and No erythema, warmth, fluctuance     KNEE EXAM:    right:   Effusion: Minimal joint effusion  TTP: Yes over Medial Joint Line   No crepitus with passive knee ROM  Passive ROM: Extension 0, Flexion 130  No pain with manipulation of patella  Stable to varus/valgus stress. No increased laxity to anterior/posterior drawer testing  negative Agustin's test  No pain with IR/ER rotation of the hip  5/5 strength in knee flexion and extension, ankle plantarflexion and dorsiflexion  Neurovascular Status: Sensation intact to light touch in Sural, Saphenous, SPN, DPN, Tibial nerve distribution  2+ pulse DP/PT, normal capillary refill, foot has normal warmth    DATA:  Diagnostic tests reviewed for today's visit:     4v of the knee reveal Mild degenerative changes of the Medial and Patellofemoral compartment. There is evidence of advanced osteoarthritis changes with Subchondral sclerosis and Joint space narrowing. The limb is in netural alignment. The patella is tracking midline.

## 2024-07-24 DIAGNOSIS — M54.16 LUMBAR RADICULOPATHY: Primary | ICD-10-CM

## 2024-07-24 DIAGNOSIS — K21.9 GASTROESOPHAGEAL REFLUX DISEASE WITHOUT ESOPHAGITIS: ICD-10-CM

## 2024-07-24 DIAGNOSIS — B37.2 CANDIDAL INTERTRIGO: ICD-10-CM

## 2024-07-24 DIAGNOSIS — Z01.30 BLOOD PRESSURE CHECK: ICD-10-CM

## 2024-07-24 DIAGNOSIS — I10 HYPERTENSION, UNCONTROLLED: ICD-10-CM

## 2024-07-24 DIAGNOSIS — I10 PRIMARY HYPERTENSION: ICD-10-CM

## 2024-07-24 RX ORDER — OXYCODONE AND ACETAMINOPHEN 10; 325 MG/1; MG/1
1 TABLET ORAL EVERY 4 HOURS PRN
Qty: 120 TABLET | Refills: 0 | Status: SHIPPED | OUTPATIENT
Start: 2024-07-24 | End: 2024-08-23

## 2024-07-24 RX ORDER — NYSTATIN 100000 [USP'U]/G
POWDER TOPICAL
Qty: 30 G | Refills: 0 | Status: SHIPPED | OUTPATIENT
Start: 2024-07-24

## 2024-07-24 RX ORDER — VALSARTAN 320 MG/1
320 TABLET ORAL
Qty: 90 TABLET | Refills: 0 | Status: SHIPPED | OUTPATIENT
Start: 2024-07-24

## 2024-07-24 RX ORDER — OMEPRAZOLE 40 MG/1
40 CAPSULE, DELAYED RELEASE ORAL
Qty: 90 CAPSULE | Refills: 0 | Status: SHIPPED | OUTPATIENT
Start: 2024-07-24

## 2024-07-24 RX ORDER — NIFEDIPINE 30 MG/1
30 TABLET, EXTENDED RELEASE ORAL
Qty: 90 TABLET | Refills: 0 | Status: SHIPPED | OUTPATIENT
Start: 2024-07-24

## 2024-07-24 RX ORDER — SPIRONOLACTONE 50 MG/1
50 TABLET, FILM COATED ORAL
Qty: 90 TABLET | Refills: 0 | Status: SHIPPED | OUTPATIENT
Start: 2024-07-24

## 2024-07-24 RX ORDER — TIZANIDINE 4 MG/1
TABLET ORAL
Qty: 90 TABLET | Refills: 0 | Status: SHIPPED | OUTPATIENT
Start: 2024-07-24

## 2024-07-24 NOTE — TELEPHONE ENCOUNTER
No care due was identified.  [facility] Embedded Care Due Messages. Reference number: [id number].   7/24/2024 1:28:28 PM CDT

## 2024-07-24 NOTE — TELEPHONE ENCOUNTER
No care due was identified.  [facility] Embedded Care Due Messages. Reference number: [id number].   7/24/2024 9:47:43 AM CDT

## 2024-08-02 ENCOUNTER — OFFICE VISIT (OUTPATIENT)
Dept: FAMILY MEDICINE | Facility: CLINIC | Age: 74
End: 2024-08-02
Payer: MEDICARE

## 2024-08-02 ENCOUNTER — LAB VISIT (OUTPATIENT)
Dept: LAB | Facility: HOSPITAL | Age: 74
End: 2024-08-02
Attending: FAMILY MEDICINE
Payer: MEDICARE

## 2024-08-02 VITALS
HEART RATE: 89 BPM | HEIGHT: 64 IN | WEIGHT: 263.44 LBS | DIASTOLIC BLOOD PRESSURE: 56 MMHG | OXYGEN SATURATION: 97 % | TEMPERATURE: 99 F | BODY MASS INDEX: 44.97 KG/M2 | SYSTOLIC BLOOD PRESSURE: 102 MMHG

## 2024-08-02 DIAGNOSIS — R63.5 WEIGHT GAIN: ICD-10-CM

## 2024-08-02 DIAGNOSIS — I70.0 ATHEROSCLEROSIS OF AORTA: ICD-10-CM

## 2024-08-02 DIAGNOSIS — N18.31 STAGE 3A CHRONIC KIDNEY DISEASE: ICD-10-CM

## 2024-08-02 DIAGNOSIS — E66.01 CLASS 3 SEVERE OBESITY WITH BODY MASS INDEX (BMI) OF 45.0 TO 49.9 IN ADULT, UNSPECIFIED OBESITY TYPE, UNSPECIFIED WHETHER SERIOUS COMORBIDITY PRESENT: ICD-10-CM

## 2024-08-02 DIAGNOSIS — R79.9 ABNORMAL FINDING OF BLOOD CHEMISTRY, UNSPECIFIED: ICD-10-CM

## 2024-08-02 DIAGNOSIS — I10 PRIMARY HYPERTENSION: Primary | ICD-10-CM

## 2024-08-02 DIAGNOSIS — M54.16 LUMBAR RADICULOPATHY: ICD-10-CM

## 2024-08-02 DIAGNOSIS — I10 PRIMARY HYPERTENSION: ICD-10-CM

## 2024-08-02 LAB
ALBUMIN SERPL BCP-MCNC: 3.3 G/DL (ref 3.5–5.2)
ALP SERPL-CCNC: 104 U/L (ref 55–135)
ALT SERPL W/O P-5'-P-CCNC: 23 U/L (ref 10–44)
ANION GAP SERPL CALC-SCNC: 11 MMOL/L (ref 8–16)
AST SERPL-CCNC: 19 U/L (ref 10–40)
BILIRUB SERPL-MCNC: 0.3 MG/DL (ref 0.1–1)
BUN SERPL-MCNC: 45 MG/DL (ref 8–23)
CALCIUM SERPL-MCNC: 9.8 MG/DL (ref 8.7–10.5)
CHLORIDE SERPL-SCNC: 110 MMOL/L (ref 95–110)
CO2 SERPL-SCNC: 17 MMOL/L (ref 23–29)
CREAT SERPL-MCNC: 3 MG/DL (ref 0.5–1.4)
EST. GFR  (NO RACE VARIABLE): 16 ML/MIN/1.73 M^2
GLUCOSE SERPL-MCNC: 158 MG/DL (ref 70–110)
POTASSIUM SERPL-SCNC: 4.5 MMOL/L (ref 3.5–5.1)
PROT SERPL-MCNC: 7.3 G/DL (ref 6–8.4)
SODIUM SERPL-SCNC: 138 MMOL/L (ref 136–145)
T4 FREE SERPL-MCNC: 0.94 NG/DL (ref 0.71–1.51)
TSH SERPL DL<=0.005 MIU/L-ACNC: 1.67 UIU/ML (ref 0.4–4)

## 2024-08-02 PROCEDURE — 84443 ASSAY THYROID STIM HORMONE: CPT | Performed by: FAMILY MEDICINE

## 2024-08-02 PROCEDURE — 36415 COLL VENOUS BLD VENIPUNCTURE: CPT | Mod: PN | Performed by: FAMILY MEDICINE

## 2024-08-02 PROCEDURE — 3074F SYST BP LT 130 MM HG: CPT | Mod: CPTII,S$GLB,, | Performed by: FAMILY MEDICINE

## 2024-08-02 PROCEDURE — 4010F ACE/ARB THERAPY RXD/TAKEN: CPT | Mod: CPTII,S$GLB,, | Performed by: FAMILY MEDICINE

## 2024-08-02 PROCEDURE — 3078F DIAST BP <80 MM HG: CPT | Mod: CPTII,S$GLB,, | Performed by: FAMILY MEDICINE

## 2024-08-02 PROCEDURE — 3288F FALL RISK ASSESSMENT DOCD: CPT | Mod: CPTII,S$GLB,, | Performed by: FAMILY MEDICINE

## 2024-08-02 PROCEDURE — 1125F AMNT PAIN NOTED PAIN PRSNT: CPT | Mod: CPTII,S$GLB,, | Performed by: FAMILY MEDICINE

## 2024-08-02 PROCEDURE — 80053 COMPREHEN METABOLIC PANEL: CPT | Performed by: FAMILY MEDICINE

## 2024-08-02 PROCEDURE — 99214 OFFICE O/P EST MOD 30 MIN: CPT | Mod: S$GLB,,, | Performed by: FAMILY MEDICINE

## 2024-08-02 PROCEDURE — 1159F MED LIST DOCD IN RCRD: CPT | Mod: CPTII,S$GLB,, | Performed by: FAMILY MEDICINE

## 2024-08-02 PROCEDURE — 83036 HEMOGLOBIN GLYCOSYLATED A1C: CPT | Performed by: FAMILY MEDICINE

## 2024-08-02 PROCEDURE — 3008F BODY MASS INDEX DOCD: CPT | Mod: CPTII,S$GLB,, | Performed by: FAMILY MEDICINE

## 2024-08-02 PROCEDURE — 99999 PR PBB SHADOW E&M-EST. PATIENT-LVL V: CPT | Mod: PBBFAC,,, | Performed by: FAMILY MEDICINE

## 2024-08-02 PROCEDURE — 3044F HG A1C LEVEL LT 7.0%: CPT | Mod: CPTII,S$GLB,, | Performed by: FAMILY MEDICINE

## 2024-08-02 PROCEDURE — 84439 ASSAY OF FREE THYROXINE: CPT | Performed by: FAMILY MEDICINE

## 2024-08-02 PROCEDURE — 1100F PTFALLS ASSESS-DOCD GE2>/YR: CPT | Mod: CPTII,S$GLB,, | Performed by: FAMILY MEDICINE

## 2024-08-02 RX ORDER — OXYCODONE AND ACETAMINOPHEN 7.5; 325 MG/1; MG/1
1 TABLET ORAL EVERY 4 HOURS PRN
Qty: 120 TABLET | Refills: 0 | Status: SHIPPED | OUTPATIENT
Start: 2024-08-02

## 2024-08-02 RX ORDER — SEMAGLUTIDE 0.25 MG/.5ML
0.25 INJECTION, SOLUTION SUBCUTANEOUS WEEKLY
Qty: 2 ML | Refills: 0 | Status: SHIPPED | OUTPATIENT
Start: 2024-08-02 | End: 2024-09-01

## 2024-08-03 LAB
ESTIMATED AVG GLUCOSE: 114 MG/DL (ref 68–131)
HBA1C MFR BLD: 5.6 % (ref 4–5.6)

## 2024-08-07 DIAGNOSIS — B37.2 CANDIDAL INTERTRIGO: ICD-10-CM

## 2024-08-07 DIAGNOSIS — M54.16 LUMBAR RADICULOPATHY: ICD-10-CM

## 2024-08-07 RX ORDER — TIZANIDINE 4 MG/1
TABLET ORAL
Qty: 90 TABLET | Refills: 3 | Status: SHIPPED | OUTPATIENT
Start: 2024-08-07

## 2024-08-07 RX ORDER — NYSTATIN 100000 [USP'U]/G
POWDER TOPICAL
Qty: 30 G | Refills: 5 | Status: SHIPPED | OUTPATIENT
Start: 2024-08-07

## 2024-08-08 ENCOUNTER — PROCEDURE VISIT (OUTPATIENT)
Dept: NEUROLOGY | Facility: CLINIC | Age: 74
End: 2024-08-08
Payer: MEDICARE

## 2024-08-08 DIAGNOSIS — E53.8 B12 DEFICIENCY: ICD-10-CM

## 2024-08-08 DIAGNOSIS — M54.16 LUMBAR RADICULOPATHY, CHRONIC: Primary | ICD-10-CM

## 2024-08-08 LAB
ALBUMIN/CREAT UR: 4.1 UG/MG (ref 0–30)
CREAT UR-MCNC: 218 MG/DL (ref 15–325)
MICROALBUMIN UR DL<=1MG/L-MCNC: 9 UG/ML

## 2024-08-08 PROCEDURE — 95907 NVR CNDJ TST 1-2 STUDIES: CPT | Mod: S$GLB,,, | Performed by: STUDENT IN AN ORGANIZED HEALTH CARE EDUCATION/TRAINING PROGRAM

## 2024-08-08 PROCEDURE — 99214 OFFICE O/P EST MOD 30 MIN: CPT | Mod: S$GLB,,, | Performed by: STUDENT IN AN ORGANIZED HEALTH CARE EDUCATION/TRAINING PROGRAM

## 2024-08-09 ENCOUNTER — TELEPHONE (OUTPATIENT)
Dept: FAMILY MEDICINE | Facility: CLINIC | Age: 74
End: 2024-08-09
Payer: MEDICARE

## 2024-08-09 DIAGNOSIS — N17.9 AKI (ACUTE KIDNEY INJURY): Primary | ICD-10-CM

## 2024-08-13 ENCOUNTER — TELEPHONE (OUTPATIENT)
Dept: NEUROSURGERY | Facility: CLINIC | Age: 74
End: 2024-08-13
Payer: MEDICARE

## 2024-08-13 NOTE — TELEPHONE ENCOUNTER
----- Message from Tatum Samla Kan sent at 8/13/2024 11:31 AM CDT -----  Regarding: Experiencing Symptoms  Contact: Pt @[phone]  Pt is calling to speak to someone in the office to discuss symptoms they are having. Pt is asking for a call back today. Please call to advise. Thanks.         Symptoms:  Shooting lower back pain and numbness in right thigh

## 2024-08-13 NOTE — TELEPHONE ENCOUNTER
Returned call to pt. Pt stated after the EMG, her leg began to swollen. This morning pt woke up with numbness on the R thigh. Lower back pain, a litter bit down from her waist, excruciated when she twisted her body. Advised pt to go to the nearest ER if the symptom is not getting better or it's getting worse. Pt v/u.

## 2024-08-18 ENCOUNTER — PATIENT MESSAGE (OUTPATIENT)
Dept: FAMILY MEDICINE | Facility: CLINIC | Age: 74
End: 2024-08-18
Payer: MEDICARE

## 2024-08-18 DIAGNOSIS — F41.9 ANXIETY: ICD-10-CM

## 2024-08-18 DIAGNOSIS — I70.0 ATHEROSCLEROSIS OF AORTA: ICD-10-CM

## 2024-08-18 NOTE — TELEPHONE ENCOUNTER
Care Due:                  Date            Visit Type   Department     Provider  --------------------------------------------------------------------------------                                [facility]                              PRIMARY      MEDICINE/  Last Visit: 08-      CARE ([facility])   MARIA G Torres                              [facility]                              PRIMARY      MEDICINE/  Next Visit: 11-      CARE ([facility])   MARIA G Min  Test          Frequency    Reason                     Performed    Due Date  --------------------------------------------------------------------------------    Lipid Panel.  12 months..  pravastatin..............  03- 03-    [facility] Embedded Care Due Messages. Reference number: [id number].   8/18/2024 12:28:16 AM CDT

## 2024-08-18 NOTE — TELEPHONE ENCOUNTER
Refill Routing Note   Medication(s) are not appropriate for processing by [facility] for the following reason(s):        Required labs outdated  Outside of protocol    [facility] action(s):  Defer  Route     Requires labs : Yes             Appointments  past 12m or future 3m with PCP    Date Provider   Last Visit   8/2/2024 Simona Torres MD   Next Visit   8/18/2024 Simona Torres MD   ED visits in past 90 days: 1        Note composed:8:06 AM 08/18/2024              s

## 2024-08-19 ENCOUNTER — PATIENT MESSAGE (OUTPATIENT)
Dept: NEUROSURGERY | Facility: CLINIC | Age: 74
End: 2024-08-19

## 2024-08-19 ENCOUNTER — OFFICE VISIT (OUTPATIENT)
Dept: NEUROSURGERY | Facility: CLINIC | Age: 74
End: 2024-08-19
Payer: MEDICARE

## 2024-08-19 ENCOUNTER — TELEPHONE (OUTPATIENT)
Dept: NEUROSURGERY | Facility: CLINIC | Age: 74
End: 2024-08-19
Payer: MEDICARE

## 2024-08-19 DIAGNOSIS — M48.02 SPINAL STENOSIS, CERVICAL REGION: ICD-10-CM

## 2024-08-19 DIAGNOSIS — M54.16 LUMBAR RADICULOPATHY, CHRONIC: Primary | ICD-10-CM

## 2024-08-19 DIAGNOSIS — M51.36 DDD (DEGENERATIVE DISC DISEASE), LUMBAR: ICD-10-CM

## 2024-08-19 DIAGNOSIS — M54.12 CERVICAL RADICULOPATHY: ICD-10-CM

## 2024-08-19 PROCEDURE — 99215 OFFICE O/P EST HI 40 MIN: CPT | Mod: 95,,, | Performed by: NEUROLOGICAL SURGERY

## 2024-08-19 PROCEDURE — 3066F NEPHROPATHY DOC TX: CPT | Mod: CPTII,95,, | Performed by: NEUROLOGICAL SURGERY

## 2024-08-19 PROCEDURE — 4010F ACE/ARB THERAPY RXD/TAKEN: CPT | Mod: CPTII,95,, | Performed by: NEUROLOGICAL SURGERY

## 2024-08-19 PROCEDURE — 3044F HG A1C LEVEL LT 7.0%: CPT | Mod: CPTII,95,, | Performed by: NEUROLOGICAL SURGERY

## 2024-08-19 PROCEDURE — 3061F NEG MICROALBUMINURIA REV: CPT | Mod: CPTII,95,, | Performed by: NEUROLOGICAL SURGERY

## 2024-08-19 RX ORDER — HYDROXYZINE HYDROCHLORIDE 25 MG/1
TABLET, FILM COATED ORAL
Qty: 45 TABLET | Refills: 0 | Status: SHIPPED | OUTPATIENT
Start: 2024-08-19

## 2024-08-19 RX ORDER — PRAVASTATIN SODIUM 40 MG/1
TABLET ORAL
Qty: 90 TABLET | Refills: 0 | Status: SHIPPED | OUTPATIENT
Start: 2024-08-19

## 2024-08-19 NOTE — PROGRESS NOTES
Neurosurgery  Established Patient    Virtual visit Attestation   The patient location is: Home  The chief complaint leading to consultation is: f/u CT, XR Scoli, EMG   Visit type: audiovisual  Total time spent with patient: 20 min     Each patient to whom he or she provides medical services by telemedicine is:  (1) informed of the relationship between the physician and patient and the respective role of any other health care provider with respect to management of the patient; and (2) notified that he or she may decline to receive medical services by telemedicine and may withdraw from such care at any time.     Hemant Attestation  SNOW, Radha Hendricks, attest that this documentation has been prepared under the direction and in the presence of Azra Kurtz MD.    SUBJECTIVE:     History of Present Illness 05/26/20  61-year-old female with a history hypertension, coronary artery disease, hypokalemia and back pain for several years.  She is status post previous L4-5 laminectomy and fusion in 2018.  She notes now she has back pain with pain going down the left lateral aspect of the leg.  She notes the pain is 10/10 in severity, and both her back and her legs.  She notes she has had this pain since April of 2019.  She had her previous L4-5 laminectomy and fusion in October of 2018.  She notes that she had remained pain free after having back pain bilateral leg pain going down the posterior aspect of both legs prior to surgery.  And had improved initially after surgery.  She notes the pain is primarily in her left leg goes on lateral aspect of her leg.  She notes that she is able to walk about 10 or 15 ft and the pain is much more pronounced.  She notes that her pain is also worsened when sitting.  She notes that lying on her right side somewhat improves the pain.  She has previously had 1% lidocaine cream which reported her with some relief.  She denies any jhony weakness in the bilateral lower extremities.  She denies any  weakness in the upper extremities.  She denies any bowel or bladder incontinence.  She denies any chest pain, shortness of breath, nausea, vomiting, or emesis.  She denies any perirectal anesthesia.  She denies any genital anesthesia.  History was garnered over a audio visual virtual visit.     Interval history 07/01/24  Amanda Holt is a 72 y/o female, PMHx of HTN, HLD, Stage 3a CKD, and carpal tunnel syndrome, that presents to me for evaluation of chronic back pain and to discuss possible surgical treatment. Today she reports ongoing chronic lower left back pain. She had addressed this pain by undergoing a L sided L4-5 fusion in 2018 which helped her pain lessen. However, overtime the back pain worsened and is now a 10/10 most days. Aggravated with prolonged standing or walking. Sitting and lying on her R side somewhat lessens her pain. Was prescribed Tramadol then Percocet, but neither provided relief. Had also attempted epidural injections on the L and R which decreased the frequency of her pain. Back pain also radiates down her L leg (side, then down front of lower leg). L leg pain is associated with ipsilateral numbness. No urinary incontinence but does note constipation. This is the extent of her complaints today. This was a virtual audio visual visit.     Interval history 08/19/24  Patient returns to clinic for f/u and imaging results. She reports being unable to complete previous EMG Nerve Conduction study due to intolerable pain. Pt was given a cortisone shot, but it caused swelling in her L leg. She continues to have left-sided back pain that is especially notable when walking. Prolonged walking (> 10 min) is associated with LLE numbness that begins in her thigh and travels down. Causes her to have to drag her leg and lean on something for support. This is the extent of her complaints at this time.    This was a virtual audio visual visit.    Review of patient's allergies indicates:   Allergen  Reactions    Codeine        Current Outpatient Medications   Medication Sig Dispense Refill    acetaminophen (TYLENOL) 500 MG tablet Take 2 tablets (1,000 mg total) by mouth every 6 (six) hours as needed. 28 tablet 0    amitriptyline (ELAVIL) 50 MG tablet Take 1 tablet (50 mg total) by mouth every evening. 90 tablet 1    aspirin (ECOTRIN) 81 MG EC tablet       clotrimazole-betamethasone 1-0.05% (LOTRISONE) cream APPLY  CREAM TOPICALLY TO AFFECTED AREA TWICE DAILY 45 g 0    cyanocobalamin, vitamin B-12, (B-12 COMPLIANCE) 1,000 mcg/mL Kit Inject 1 mL as directed every 28 days. 1 kit 5    diclofenac sodium (VOLTAREN ARTHRITIS PAIN) 1 % Gel Apply 2 g topically once daily. 100 g 0    ergocalciferol (ERGOCALCIFEROL) 50,000 unit Cap Take 1 capsule by mouth every 7 days.      fluticasone propionate (FLONASE) 50 mcg/actuation nasal spray 1 spray (50 mcg total) by Each Nostril route once daily. 48 g 3    hydrALAZINE (APRESOLINE) 100 MG tablet Take 1 tablet (100 mg total) by mouth every 12 (twelve) hours. 180 tablet 3    hydrOXYzine HCL (ATARAX) 25 MG tablet Take 1 tablet by mouth three times daily as needed 45 tablet 0    LIDOcaine (LIDODERM) 5 % Place 1 patch onto the skin once daily. Remove & Discard patch within 12 hours or as directed by MD 15 patch 0    metoprolol succinate (TOPROL-XL) 25 MG 24 hr tablet Take 1 tablet by mouth once daily 90 tablet 3    mometasone (ELOCON) 0.1 % solution Apply once to twice daily prn itching 60 mL 5    mupirocin (BACTROBAN) 2 % ointment Apply topically 3 (three) times daily. 22 g 0    nystatin (MYCOSTATIN) powder APPLY  POWDER TOPICALLY TO AFFECTED AREA 4 TIMES DAILY 30 g 5    olopatadine (PATANOL) 0.1 % ophthalmic solution INSTILL 1 DROP INTO EACH EYE TWICE DAILY 5 mL 0    omeprazole (PRILOSEC) 40 MG capsule Take 1 capsule by mouth once daily 90 capsule 0    oxyCODONE-acetaminophen (PERCOCET) 7.5-325 mg per tablet Take 1 tablet by mouth every 4 (four) hours as needed for Pain. 120 tablet  0    pravastatin (PRAVACHOL) 40 MG tablet Take 1 tablet by mouth once daily 90 tablet 1    semaglutide, weight loss, (WEGOVY) 0.25 mg/0.5 mL PnIj Inject 0.25 mg into the skin once a week. 2 mL 0    spironolactone (ALDACTONE) 50 MG tablet Take 1 tablet by mouth once daily 90 tablet 0    tiZANidine (ZANAFLEX) 4 MG tablet TAKE 1 TABLET BY MOUTH EVERY 6 HOURS AS NEEDED 90 tablet 3    triamcinolone acetonide 0.1%-ciclopirox-magnesium hydroxide 400 mg/5 ml Apply to affected area twice a day after cool blow dry 60 g 5    valsartan (DIOVAN) 320 MG tablet Take 1 tablet by mouth once daily 90 tablet 0     No current facility-administered medications for this visit.       Past Medical History:   Diagnosis Date    Arthralgia     Colon polyp     Degenerative disc disease at L5-S1 level     High cholesterol     Hypertension     Notalgia paresthetica     Nuclear sclerosis of both eyes 01/08/2020    Sciatica     Spinal stenosis     Tobacco use 10/21/2021     Past Surgical History:   Procedure Laterality Date    BACK SURGERY      lumbar laminectomy    COLON SURGERY  2022    Tear repaired    COLONOSCOPY N/A 07/23/2020    Procedure: COLONOSCOPY;  Surgeon: Donal Moore MD;  Location: [facility] ENDO;  Service: Endoscopy;  Laterality: N/A;    EPIDURAL STEROID INJECTION Left 09/09/2020    Procedure: Injection, Steroid, Epidural Transforaminal;  Surgeon: Jun Leavitt Jr., MD;  Location: [facility] ENDO;  Service: Pain Management;  Laterality: Left;  Left L5 + S1 TF WADE  Arrive @ 1300; ASA last 9/1; No DM    EPIDURAL STEROID INJECTION Left 02/12/2021    Procedure: Injection, Steroid, Epidural Transforaminal;  Surgeon: Jun Leavitt Jr., MD;  Location: [facility] ENDO;  Service: Pain Management;  Laterality: Left;  Left L4 + L5 TF WADE  Arrive @ 1230; IV Sedation; ASA last 2/4; No DM    INJECTION OF FACET JOINT Bilateral 3/25/2024    Procedure: FACET JOINT INJECTION BILATERAL L3/4 *ASPIRIN CLEARANCE IN CHART*;  Surgeon: Darya Ayala MD;   Location: [facility] PAIN MGT;  Service: Pain Management;  Laterality: Bilateral;  [phone]    SPINE SURGERY  10/08/18    Bilateral Lumbar Laminectomy with Fusion and Screws    TRANSFORAMINAL EPIDURAL INJECTION OF STEROID Left 2022    Procedure: INJECTION, STEROID, EPIDURAL, TRANSFORAMINAL APPROACH, L4-L5 & L5-S1;  Surgeon: Darya Ayala MD;  Location: [facility] PAIN MGT;  Service: Pain Management;  Laterality: Left;    TRANSFORAMINAL EPIDURAL INJECTION OF STEROID Left 2022    Procedure: LUMBAR TRANSFORAMINAL LEFT L4/5 AND L5/S1 CONTRAST;  Surgeon: Darya Ayala MD;  Location: [facility] PAIN MGT;  Service: Pain Management;  Laterality: Left;    TUBAL LIGATION       Family History       Problem Relation (Age of Onset)    Arthritis Father, Sister, Sister, Sister, Brother    Breast cancer Mother    COPD Sister    Cancer Mother    Cataracts Father    Depression Sister    Diabetes Father, Sister, Sister, Sister    Drug abuse Brother    Early death Sister, Sister, Brother    Glaucoma Father, Sister    Hearing loss Sister, Sister    Heart disease Sister    Hypertension Mother, Sister, Sister, Sister, Brother, Sister, Brother    Hypotension Mother    Learning disabilities Brother, Son, Son, Daughter    Mental illness Sister    Miscarriages / Stillbirths Mother    No Known Problems Brother, Brother, Maternal Aunt, Maternal Uncle, Paternal Aunt, Paternal Uncle, Maternal Grandmother, Maternal Grandfather, Paternal Grandmother, Paternal Grandfather, Son, Other    Stroke Sister    Vision loss Father, Sister, Sister, Sister, Sister          Social History     Socioeconomic History    Marital status:    Tobacco Use    Smoking status: Former     Current packs/day: 0.00     Average packs/day: 0.3 packs/day for 35.0 years (8.8 ttl pk-yrs)     Types: Cigarettes     Start date: 3/15/1987     Quit date: 3/15/2022     Years since quittin.4    Smokeless tobacco: Current    Tobacco comments:     Occasional spoker some times 1-2  cigarettes/day sometimes none for weeks   Substance and Sexual Activity    Alcohol use: Yes     Alcohol/week: 3.0 standard drinks of alcohol     Types: 3 Glasses of wine per week     Comment: Occasional wine    Drug use: Not Currently     Comment: Tramadol twice a day as needed    Sexual activity: Yes     Partners: Male     Birth control/protection: Post-menopausal, None     Comment: Tubal 30+ years ago   Social History Narrative    Lives alone    No partner    Was a  for a non-profit organization     Social Determinants of Health     Financial Resource Strain: Patient Declined (6/25/2024)    Overall Financial Resource Strain (CARDIA)     Difficulty of Paying Living Expenses: Patient declined   Food Insecurity: Patient Declined (6/25/2024)    Hunger Vital Sign     Worried About Running Out of Food in the Last Year: Patient declined     Ran Out of Food in the Last Year: Patient declined   Transportation Needs: No Transportation Needs (6/25/2024)    PRAPARE - Transportation     Lack of Transportation (Medical): No     Lack of Transportation (Non-Medical): No   Physical Activity: Patient Declined (6/25/2024)    Exercise Vital Sign     Days of Exercise per Week: Patient declined     Minutes of Exercise per Session: Patient declined   Stress: Patient Declined (6/25/2024)    [employer] - Occupational Stress Questionnaire     Feeling of Stress : Patient declined   Housing Stability: Patient Declined (6/25/2024)    Housing Stability Vital Sign     Unable to Pay for Housing in the Last Year: Patient declined     Homeless in the Last Year: Patient declined       Review of Systems   All other systems reviewed and are negative.    OBJECTIVE:     Vital Signs     There is no height or weight on file to calculate BMI.    Neurosurgery Physical Exam  On virtual audio visual visit   Head is normocephalic atraumatic   Neck is grossly supple  No appreciable labored breathing    Admitting appears to be nontender   Patient is able to move extremities     On virtual audio visual visit the patient's speech is fluent, goal directed without any noted dysarthria or aphasia   Unable to evaluate pupils on virtual audio visual visit   Face is symmetric   Tongue is midline  Unable to evaluate palate   Hearing appears to be intact on virtual audio visual visit to voice   Patient able to move both upper and lower extremities without any gross motor asymmetry on virtual audio visual visit     Diagnostic Results:  I personally reviewed the patient's Diagnostic Imaging.     EMG NCV 08/08/24  Nerve conduction studies/EMG today was ordered secondary to patient unable to tolerate procedure. Only 2 motor nerves (R and L peroneal nerves) were obtained which had normal conduction velocities.     CT Lumbar Spine WO Cont 07/08/24  At L4-L5 there are left-sided pedicle screws and fixation tracey and the laminectomy. Fusion mass noted near screws. No hardware failure is seen. There is moderate DJD at L4-L5 and L5-S1 and mild above this level.  No fracture dislocation bone destruction seen.  T11-T12, T12-L1, L1-L2, demonstrate no abnormality.     L2-L3 demonstrates a mild disc bulge.  There is mild canal narrowing.  The neural foramina are patent.     L3-L4 demonstrates degenerative change.  The canal and neural foramina are patent.     L4-L5 demonstrates a laminectomy.  The canal is patent.  There is mild neural foraminal narrowing.     L5-S1 demonstrates degenerative change.  The canal is patent.  There is bilateral neural foraminal narrowing.     No paraspinal masses are seen.  There may be left renal cysts.     XR Scoliosis Complete 07/08/24  Two views: There is DJD and dish.  There are left pedicle screws and a fixation tracey at L4-L5.  There is mild anterolisthesis of L3 on L4.  No fracture dislocation bone destruction seen.  No hardware failure or complication seen.  There is a mild dextroconvex curvature of the  T-spine.     Lumbar Lordosis: 30.8 degrees  Pelvic Incidence: 60.4 degrees   Pelvic Tilt: 27.8 degrees  Sacral [place]: 34 degrees  Sagittal Balance: Positive, 16.77 cm    ASSESSMENT/PLAN:   73-year-old female with hx of L4-5 pedicle screw placement unilaterally (2018) and chronic lower left back and leg numbness.     Patient presents with chronic lower left back pain associated with LLE numbness that occurs during prolonged walking.  Discussed imaging results of CT Lumbar Spine revealing left-sided pedicle screws and fixation tracey and the laminectomy at L4-L5. No hardware failure. There is moderate DJD at L4-L5 and L5-S1 and mild above this level.  No fracture dislocation bone destruction seen.  T11-T12, T12-L1, L1-L2, demonstrate no abnormality.  Discussed imaging results of XR Scoliosis revealing DJD and dish. There are left pedicle screws and a fixation tracey at L4-L5. There is mild anterolisthesis of L3 on L4. No fracture dislocation bone destruction seen. No hardware failure or complication seen. There is a mild dextroconvex curvature of the T-spine.   Also reviewed results of EMG nerve conduction study from 08/08/24. R and L peroneal nerves had normal conduction velocities.  After discussion with the patient, I recommend referral to interventional pain management to undergo basivertebral nerve ablation at L3, L4, and L5, L3-5 epidural nerve injections, and facet block/ablation to address the patient's back pain. I'd also like to order a Flex/Ex XR of the Lumbar Spine. I have answered all of their questions and patient wish to proceed with this plan. We will schedule patient.      Thank you so very much for allowing me to participate in the care of this patient.  Please feel free to call any questions, comments, or concerns.     Azra Kurtz MD,MSc  Department of Neurosurgery   Department of Radiology  Department of Neurology  [facility]    [place]  [facility]   [employer] [facility] [employer] [facility]     Total time spent in counseling and discussion about further management options including relevant lab work, treatment,  prognosis, medications and intended side effects was more than 60 minutes. More than 50 % of the time was spent in counseling and coordination of care.  I spent a total of 60 minutes on the day of the visit.This includes face to face time and non-face to face time preparing to see the patient (eg, review of tests), Obtaining and/or reviewing separately obtained history, Documenting clinical information in the electronic or other health record, Independently interpreting resultsand communicating results to the patient/family/caregiver, or Care coordination.     Scribe Attestation  I, Dr. Azra Kurtz personally performed the services described in this documentation. All medical record entries made by the scribe, Radha Hendricks, were at my direction and in my presence.  I have reviewed the chart and agree that the record reflects my personal performance and is accurate and complete.

## 2024-08-22 ENCOUNTER — HOSPITAL ENCOUNTER (OUTPATIENT)
Dept: RADIOLOGY | Facility: HOSPITAL | Age: 74
Discharge: HOME | End: 2024-08-22
Attending: NEUROLOGICAL SURGERY
Payer: MEDICARE

## 2024-08-22 DIAGNOSIS — M54.16 LUMBAR RADICULOPATHY, CHRONIC: ICD-10-CM

## 2024-08-22 PROCEDURE — 72114 X-RAY EXAM L-S SPINE BENDING: CPT | Mod: 26,,, | Performed by: RADIOLOGY

## 2024-08-22 PROCEDURE — 72114 X-RAY EXAM L-S SPINE BENDING: CPT | Mod: TC,FY

## 2024-08-26 ENCOUNTER — PATIENT MESSAGE (OUTPATIENT)
Dept: ADMINISTRATIVE | Facility: OTHER | Age: 74
End: 2024-08-26
Payer: MEDICARE

## 2024-08-29 ENCOUNTER — PATIENT OUTREACH (OUTPATIENT)
Dept: ADMINISTRATIVE | Facility: HOSPITAL | Age: 74
End: 2024-08-29
Payer: MEDICARE

## 2024-08-29 NOTE — LETTER
AUTHORIZATION FOR RELEASE OF   CONFIDENTIAL INFORMATION    Dear Donal Moore MD,    We are seeing Amanda Holt, date of birth 1950, in the clinic at [facility]. Simona Torres MD is the patient's PCP. Amanda Holt has an outstanding lab/procedure at the time we reviewed her chart. In order to help keep her health information updated, she has authorized us to request the following medical record(s):        (  )  MAMMOGRAM                                      ( X )  COLONOSCOPY      (  )  PAP SMEAR                                          (  )  OUTSIDE LAB RESULTS     (  )  DEXA SCAN                                          (  )  EYE EXAM            (  )  FOOT EXAM                                          (  )  ENTIRE RECORD     (  )  OUTSIDE IMMUNIZATIONS                 (  )  _______________         Please fax records to [facility], [place], [place], [place], FAX [phone] [address] [facility] [place] [address]       If you have any questions, please contact Sera Mccabe MA,[facility] at [phone].            Patient Name: Amanda Holt  : 1950  Patient Phone #: [phone]

## 2024-08-29 NOTE — PROGRESS NOTES
Lab completed on 08/07/24. HERMELINDA sent requesting colonoscopy. Gap report updated. Immunization's updated/triggered.

## 2024-09-03 ENCOUNTER — PATIENT MESSAGE (OUTPATIENT)
Dept: FAMILY MEDICINE | Facility: CLINIC | Age: 74
End: 2024-09-03
Payer: MEDICARE

## 2024-09-03 DIAGNOSIS — M54.16 LUMBAR RADICULOPATHY: ICD-10-CM

## 2024-09-03 DIAGNOSIS — E66.01 CLASS 3 SEVERE OBESITY WITH BODY MASS INDEX (BMI) OF 45.0 TO 49.9 IN ADULT, UNSPECIFIED OBESITY TYPE, UNSPECIFIED WHETHER SERIOUS COMORBIDITY PRESENT: ICD-10-CM

## 2024-09-03 NOTE — TELEPHONE ENCOUNTER
No care due was identified.  [facility] Embedded Care Due Messages. Reference number: [id number].   9/03/2024 4:42:39 PM CDT

## 2024-09-04 RX ORDER — OXYCODONE AND ACETAMINOPHEN 7.5; 325 MG/1; MG/1
1 TABLET ORAL EVERY 4 HOURS PRN
Qty: 120 TABLET | Refills: 0 | Status: SHIPPED | OUTPATIENT
Start: 2024-09-04

## 2024-09-04 RX ORDER — SEMAGLUTIDE 0.25 MG/.5ML
0.25 INJECTION, SOLUTION SUBCUTANEOUS WEEKLY
Qty: 2 ML | Refills: 0 | Status: SHIPPED | OUTPATIENT
Start: 2024-09-04 | End: 2024-10-04

## 2024-09-10 ENCOUNTER — PATIENT OUTREACH (OUTPATIENT)
Dept: ADMINISTRATIVE | Facility: HOSPITAL | Age: 74
End: 2024-09-10
Payer: MEDICARE

## 2024-09-10 DIAGNOSIS — Z12.11 COLON CANCER SCREENING: Primary | ICD-10-CM

## 2024-09-10 NOTE — PROGRESS NOTES
Colonoscopy received form 2020, pt still due. Endo order placed. Immunization's updated/triggered.

## 2024-09-17 ENCOUNTER — OFFICE VISIT (OUTPATIENT)
Dept: PAIN MEDICINE | Facility: CLINIC | Age: 74
End: 2024-09-17
Payer: MEDICARE

## 2024-09-17 VITALS
OXYGEN SATURATION: 100 % | BODY MASS INDEX: 45.17 KG/M2 | HEART RATE: 90 BPM | RESPIRATION RATE: 12 BRPM | HEIGHT: 64 IN | SYSTOLIC BLOOD PRESSURE: 144 MMHG | WEIGHT: 264.56 LBS | DIASTOLIC BLOOD PRESSURE: 85 MMHG

## 2024-09-17 DIAGNOSIS — M54.16 LUMBAR RADICULOPATHY: Primary | ICD-10-CM

## 2024-09-17 DIAGNOSIS — M54.16 LUMBAR RADICULOPATHY, CHRONIC: ICD-10-CM

## 2024-09-17 DIAGNOSIS — M51.36 DDD (DEGENERATIVE DISC DISEASE), LUMBAR: ICD-10-CM

## 2024-09-17 PROCEDURE — 4010F ACE/ARB THERAPY RXD/TAKEN: CPT | Mod: CPTII,S$GLB,, | Performed by: ANESTHESIOLOGY

## 2024-09-17 PROCEDURE — 99999 PR PBB SHADOW E&M-EST. PATIENT-LVL V: CPT | Mod: PBBFAC,,, | Performed by: ANESTHESIOLOGY

## 2024-09-17 PROCEDURE — 3008F BODY MASS INDEX DOCD: CPT | Mod: CPTII,S$GLB,, | Performed by: ANESTHESIOLOGY

## 2024-09-17 PROCEDURE — 3066F NEPHROPATHY DOC TX: CPT | Mod: CPTII,S$GLB,, | Performed by: ANESTHESIOLOGY

## 2024-09-17 PROCEDURE — 1159F MED LIST DOCD IN RCRD: CPT | Mod: CPTII,S$GLB,, | Performed by: ANESTHESIOLOGY

## 2024-09-17 PROCEDURE — 3077F SYST BP >= 140 MM HG: CPT | Mod: CPTII,S$GLB,, | Performed by: ANESTHESIOLOGY

## 2024-09-17 PROCEDURE — 99214 OFFICE O/P EST MOD 30 MIN: CPT | Mod: GC,S$GLB,, | Performed by: ANESTHESIOLOGY

## 2024-09-17 PROCEDURE — 1101F PT FALLS ASSESS-DOCD LE1/YR: CPT | Mod: CPTII,S$GLB,, | Performed by: ANESTHESIOLOGY

## 2024-09-17 PROCEDURE — 3061F NEG MICROALBUMINURIA REV: CPT | Mod: CPTII,S$GLB,, | Performed by: ANESTHESIOLOGY

## 2024-09-17 PROCEDURE — 3079F DIAST BP 80-89 MM HG: CPT | Mod: CPTII,S$GLB,, | Performed by: ANESTHESIOLOGY

## 2024-09-17 PROCEDURE — 1160F RVW MEDS BY RX/DR IN RCRD: CPT | Mod: CPTII,S$GLB,, | Performed by: ANESTHESIOLOGY

## 2024-09-17 PROCEDURE — 3044F HG A1C LEVEL LT 7.0%: CPT | Mod: CPTII,S$GLB,, | Performed by: ANESTHESIOLOGY

## 2024-09-17 PROCEDURE — 1125F AMNT PAIN NOTED PAIN PRSNT: CPT | Mod: CPTII,S$GLB,, | Performed by: ANESTHESIOLOGY

## 2024-09-17 PROCEDURE — 3288F FALL RISK ASSESSMENT DOCD: CPT | Mod: CPTII,S$GLB,, | Performed by: ANESTHESIOLOGY

## 2024-09-17 NOTE — H&P (VIEW-ONLY)
Chronic Pain - New Consult    Referring Physician: Azra Kurtz MD           SUBJECTIVE:    Amanda Holt presents to the clinic for the evaluation of lower back pain that started several months ago.  Patient describes the pain as sharp achy and shooting with radiation down her left lower extremity.  Patient said the pain radiates all the way to her left feet. Patient said her entire left leg becomes numb and weak especially after walking for about 10 minutes.  Her pain score today is 8/10. The pain is rated with a score of  6/10 on the BEST day and a score of 10/10 on the WORST day.  Symptoms interfere with daily activity, sleeping, and work. The pain is exacerbated by Standing, Bending, Walking, Lifting, and Getting out of bed/chair.  The pain is mitigated by 7.5 Percocet . She reports spending 3 hours per day reclining. The patient reports 4 hours of uninterrupted sleep per night.    Patient denies night fever/night sweats, urinary incontinence, bowel incontinence, and significant weight loss.  Patient reports weakness and numbness in the left lower extremity.    Physical Therapy/Home Exercise: yes (aqua therapy)    Pain Disability Index Review:      3/14/2024     1:42 PM 4/13/2022    10:04 AM 3/28/2022    10:32 AM   Last 3 PDI Scores   Pain Disability Index (PDI) 63 30 26     Pain Medications:  Percocet 7.5  Tizanidine 4 mg     report:  Reviewed and consistent with medication use as prescribed.    Pain Procedures:   L4-5 laminectomy and fusion in 2018  09/09/2020-transforaminal WADE  02/12/2021-transforaminal WADE  3/14/2022 - L4-5 and L5-S1 lumbar transforaminal WADE  08/29/2022/ -  left L4-5 and L5-S1 lumbar transforaminal WADE  03/25/2024 - bilateral L3/4 facet joint injection -limited relief    Imaging:   MRI LUMBAR SPINE WITHOUT CONTRAST     FINDINGS:  Lumbar spine alignment is within normal limits. The vertebral body heights are well maintained, with no fracture.  No marrow signal  abnormality suspicious for an infiltrative process.  Degenerative fatty marrow metaplasia endplate change at L4-5.     The conus is normal in appearance.  The adjacent soft tissue structures show no significant abnormalities.     Bilateral T2 hyperintense renal lesions are suggestive of cysts.     L1-L2: There is no focal disc herniation. No significant central canal or neural foraminal narrowing.     L2-L3: There is no focal disc herniation. No significant central canal or neural foraminal narrowing.     L3-L4: Circumferential disc bulge, spondylitic spurring, facet arthropathy and bilateral facet joint effusions.  Moderate central spinal stenosis and mild right foraminal stenosis.     L4-L5: Operative change of bilateral laminectomy and left-sided instrumented fusion.  Circumferential disc bulge and spondylitic spurring.  Mild bilateral neural foraminal narrowing.     L5-S1: Circumferential disc bulge and partial anterior bony ankylosis.  Mild facet arthropathy.  Flattening of the medial right exiting L5 nerve root suggests at least moderate and possibly severe foraminal stenosis.     Impression:     Postoperative and degenerative change as described, with central spinal stenosis at L3-4 and moderate-severe right L5-S1 foraminal stenosis.    XR LUMBAR SPINE 5 VIEW WITH FLEX AND EXT     FINDINGS:  prior L4-5 posterior instrumented lumbar fusion. No evidence of hardware failure or loosening. There is prominent L3-4 facet arthropathy with slight anterolisthesis, similar to prior exam.  No subluxation with extension and flexion views. No fractures.     Impression:     No acute findings.    Past Medical History:   Diagnosis Date    Arthralgia     Colon polyp     Degenerative disc disease at L5-S1 level     High cholesterol     Hypertension     Notalgia paresthetica     Nuclear sclerosis of both eyes 01/08/2020    Sciatica     Spinal stenosis     Tobacco use 10/21/2021     Past Surgical History:   Procedure Laterality  Date    BACK SURGERY      lumbar laminectomy    COLON SURGERY      Tear repaired    COLONOSCOPY N/A 2020    Procedure: COLONOSCOPY;  Surgeon: Donal Moore MD;  Location: [facility];  Service: Endoscopy;  Laterality: N/A;    EPIDURAL STEROID INJECTION Left 2020    Procedure: Injection, Steroid, Epidural Transforaminal;  Surgeon: Jun Leavitt Jr., MD;  Location: [facility] ENDO;  Service: Pain Management;  Laterality: Left;  Left L5 + S1 TF WADE  Arrive @ 1300; ASA last ; No DM    EPIDURAL STEROID INJECTION Left 2021    Procedure: Injection, Steroid, Epidural Transforaminal;  Surgeon: Jun Leavitt Jr., MD;  Location: [facility] ENDO;  Service: Pain Management;  Laterality: Left;  Left L4 + L5 TF WADE  Arrive @ 1230; IV Sedation; ASA last ; No DM    INJECTION OF FACET JOINT Bilateral 3/25/2024    Procedure: FACET JOINT INJECTION BILATERAL L3/4 *ASPIRIN CLEARANCE IN CHART*;  Surgeon: Darya Ayala MD;  Location: [facility] PAIN MGT;  Service: Pain Management;  Laterality: Bilateral;  [phone]    SPINE SURGERY  10/08/18    Bilateral Lumbar Laminectomy with Fusion and Screws    TRANSFORAMINAL EPIDURAL INJECTION OF STEROID Left 2022    Procedure: INJECTION, STEROID, EPIDURAL, TRANSFORAMINAL APPROACH, L4-L5 & L5-S1;  Surgeon: Darya Ayala MD;  Location: [facility] PAIN MGT;  Service: Pain Management;  Laterality: Left;    TRANSFORAMINAL EPIDURAL INJECTION OF STEROID Left 2022    Procedure: LUMBAR TRANSFORAMINAL LEFT L4/5 AND L5/S1 CONTRAST;  Surgeon: Darya Ayala MD;  Location: [facility] PAIN MGT;  Service: Pain Management;  Laterality: Left;    TUBAL LIGATION       Social History     Socioeconomic History    Marital status:    Tobacco Use    Smoking status: Former     Current packs/day: 0.00     Average packs/day: 0.3 packs/day for 35.0 years (8.8 ttl pk-yrs)     Types: Cigarettes     Start date: 3/15/1987     Quit date: 3/15/2022     Years since quittin.5    Smokeless tobacco:  Current    Tobacco comments:     Occasional spoker some times 1-2 cigarettes/day sometimes none for weeks   Substance and Sexual Activity    Alcohol use: Yes     Alcohol/week: 3.0 standard drinks of alcohol     Types: 3 Glasses of wine per week     Comment: Occasional wine    Drug use: Not Currently     Comment: Tramadol twice a day as needed    Sexual activity: Yes     Partners: Male     Birth control/protection: Post-menopausal, None     Comment: Tubal 30+ years ago   Social History Narrative    Lives alone    No partner    Was a  for a non-profit organization     Social Determinants of Health     Financial Resource Strain: Patient Declined (2024)    Overall Financial Resource Strain (CARDIA)     Difficulty of Paying Living Expenses: Patient declined   Food Insecurity: Patient Declined (2024)    Hunger Vital Sign     Worried About Running Out of Food in the Last Year: Patient declined     Ran Out of Food in the Last Year: Patient declined   Transportation Needs: No Transportation Needs (2024)    PRAPARE - Transportation     Lack of Transportation (Medical): No     Lack of Transportation (Non-Medical): No   Physical Activity: Patient Declined (2024)    Exercise Vital Sign     Days of Exercise per Week: Patient declined     Minutes of Exercise per Session: Patient declined   Stress: Patient Declined (2024)    [employer] [place] [employer] - Occupational Stress Questionnaire     Feeling of Stress : Patient declined   Housing Stability: Patient Declined (2024)    Housing Stability Vital Sign     Unable to Pay for Housing in the Last Year: Patient declined     Homeless in the Last Year: Patient declined     Family History   Problem Relation Name Age of Onset    Breast cancer Mother Marianna Benítez     Hypertension Mother Marianna Benítez             Hypotension Mother Marianna Benítez     Cancer Mother Marianna Benítez          due to breast cancer     Miscarriages / Stillbirths Mother Marianna Benítez         3 stillborn children    Cataracts Father Matthias The.  Jeyson     Glaucoma Father Matthias The.  Jeyson     Diabetes Father Matthias The.  Jeyson             Arthritis Father Matthias The.  Jeyson     Vision loss Father Matthias The.  Jeyson         Glaucoma -     Glaucoma Sister Debby Benítez     Arthritis Sister Debby Benítez     Diabetes Sister Debby Benítez     Hypertension Sister Debby Benítez     Vision loss Sister Debby Benítez         Glasses    Drug abuse Brother Sky Benítez     Learning disabilities Brother Sky Benítez     No Known Problems Brother      No Known Problems Brother x3     No Known Problems Maternal Aunt      No Known Problems Maternal Uncle      No Known Problems Paternal Aunt      No Known Problems Paternal Uncle      No Known Problems Maternal Grandmother      No Known Problems Maternal Grandfather      No Known Problems Paternal Grandmother      No Known Problems Paternal Grandfather      No Known Problems Son x2     No Known Problems Other      Arthritis Sister Susan Bneítez     Depression Sister Susan Benítez     Diabetes Sister Susan Benítez     Hearing loss Sister Susan Benítez         Trouble hearing    Hypertension Sister Susan Benítez     Stroke Sister Susan Benítez         B[place]ne Hunt Disease, Coma for a month due to diabetes,Some body weakness    Vision loss Sister Susan Benítez         Lasix surgery    Arthritis Sister Urszula Green     COPD Sister Urszula Dawson     Diabetes Sister Urszula Green     Hearing loss Sister Urszula Green     Heart disease Sister Urszula Green     Hypertension Sister Urszula Green     Arthritis Brother Murtaza Nowak     Hypertension Brother Murtaza Nowak     Early death Sister Christine Benítez         Still born    Early death Sister Catherine Benítez         Still born twin to Debby Benítez    Early death Brother Sonrob Jeyson stillborn     Hypertension Sister Kateryna Kraft     Hypertension Brother  Robbie Benítez     Learning disabilities Son Vaibhav Holt     Learning disabilities Son Fidel Chavez grandson    Learning disabilities Daughter Carmen's Aries         Granddaughter    Mental illness Sister Shawna Benítez         Her son Matthias Benítez    Vision loss Sister Shawna Benítez         Glasses/contacts    Vision loss Sister Chelly Benítez         Glasses    Amblyopia Neg Hx      Blindness Neg Hx      Macular degeneration Neg Hx      Retinal detachment Neg Hx      Strabismus Neg Hx      Thyroid disease Neg Hx       Review of patient's allergies indicates:   Allergen Reactions    Codeine      Current Outpatient Medications   Medication Sig    acetaminophen (TYLENOL) 500 MG tablet Take 2 tablets (1,000 mg total) by mouth every 6 (six) hours as needed.    amitriptyline (ELAVIL) 50 MG tablet Take 1 tablet (50 mg total) by mouth every evening.    aspirin (ECOTRIN) 81 MG EC tablet     clotrimazole-betamethasone 1-0.05% (LOTRISONE) cream APPLY  CREAM TOPICALLY TO AFFECTED AREA TWICE DAILY    cyanocobalamin, vitamin B-12, (B-12 COMPLIANCE) 1,000 mcg/mL Kit Inject 1 mL as directed every 28 days.    diclofenac sodium (VOLTAREN ARTHRITIS PAIN) 1 % Gel Apply 2 g topically once daily.    ergocalciferol (ERGOCALCIFEROL) 50,000 unit Cap Take 1 capsule by mouth every 7 days.    fluticasone propionate (FLONASE) 50 mcg/actuation nasal spray 1 spray (50 mcg total) by Each Nostril route once daily.    hydrOXYzine HCL (ATARAX) 25 MG tablet Take 1 tablet by mouth three times daily as needed    LIDOcaine (LIDODERM) 5 % Place 1 patch onto the skin once daily. Remove & Discard patch within 12 hours or as directed by MD    metoprolol succinate (TOPROL-XL) 25 MG 24 hr tablet Take 1 tablet by mouth once daily    mometasone (ELOCON) 0.1 % solution Apply once to twice daily prn itching    mupirocin (BACTROBAN) 2 % ointment Apply topically 3 (three) times daily.    nystatin (MYCOSTATIN) powder APPLY  POWDER  "TOPICALLY TO AFFECTED AREA 4 TIMES DAILY    olopatadine (PATANOL) 0.1 % ophthalmic solution INSTILL 1 DROP INTO EACH EYE TWICE DAILY    omeprazole (PRILOSEC) 40 MG capsule Take 1 capsule by mouth once daily    oxyCODONE-acetaminophen (PERCOCET) 7.5-325 mg per tablet Take 1 tablet by mouth every 4 (four) hours as needed for Pain.    pravastatin (PRAVACHOL) 40 MG tablet Take 1 tablet by mouth once daily    semaglutide, weight loss, (WEGOVY) 0.25 mg/0.5 mL PnIj Inject 0.25 mg into the skin once a week.    tiZANidine (ZANAFLEX) 4 MG tablet TAKE 1 TABLET BY MOUTH EVERY 6 HOURS AS NEEDED    triamcinolone acetonide 0.1%-ciclopirox-magnesium hydroxide 400 mg/5 ml Apply to affected area twice a day after cool blow dry    valsartan (DIOVAN) 320 MG tablet Take 1 tablet by mouth once daily    hydrALAZINE (APRESOLINE) 100 MG tablet Take 1 tablet (100 mg total) by mouth every 12 (twelve) hours.     No current facility-administered medications for this visit.       REVIEW OF SYSTEMS:    GENERAL:  No weight loss, malaise or fevers.  HEENT:  Negative for frequent or significant headaches.  NECK:  Negative for lumps, goiter, pain and significant neck swelling.  RESPIRATORY:  Negative for cough, wheezing or shortness of breath.  CARDIOVASCULAR:  Negative for chest pain, leg swelling or palpitations.  GI:  Negative for abdominal discomfort, blood in stools or black stools or change in bowel habits.  MUSCULOSKELETAL:  See HPI.  SKIN:  Negative for lesions, rash, and itching.  PSYCH:  Negative for sleep disturbance, mood disorder and recent psychosocial stressors.  HEMATOLOGY/LYMPHOLOGY:  Negative for prolonged bleeding, bruising easily or swollen nodes.  NEURO:   No history of headaches, syncope, paralysis, seizures or tremors.  All other reviewed and negative other than HPI.    OBJECTIVE:    BP (!) 144/85 (BP Location: Right arm, Patient Position: Sitting, BP Method: Medium (Automatic))   Pulse 90   Resp 12   Ht 5' 4" (1.626 m)   " Wt 120 kg (264 lb 8.8 oz)   SpO2 100%   BMI 45.41 kg/m²     PHYSICAL EXAMINATION:    GENERAL: Well appearing, in no acute distress, alert and oriented x3.  PSYCH:  Mood and affect appropriate.  SKIN: Skin color, texture, turgor normal, no rashes or lesions.  HEAD/FACE:  Normocephalic, atraumatic. Cranial nerves grossly intact.  NECK: Normal ROM. Supple. No pain to palpation over the cervical paraspinous muscles. Spurling Negative. No pain with neck flexion, extension, or lateral rotation.   CV: RRR with palpation of the radial artery.  PULM: No evidence of respiratory difficulty, symmetric chest rise.  GI:  Soft and non-distended.  MSK: Straight leg raising is positive L > R to radicular pain. Pain to palpation over the facet joints of the lumbar spine. Pain with lumbar facet loading. No pain over the SI joints. Sacral Thrust is negative. [place] test is negative.  Gaenslen Test is negative. No pain over the GBT bilaterally. Limited range of motion of the lumbar spine with pain reproduction.  Peripheral joint ROM is full and pain free without obvious instability or laxity in all four extremities. No obvious deformities, edema, or skin discoloration.  No atrophy or tone abnormalities are noted.   NEURO: Bilateral upper and lower extremity coordination and strength is symmetric.  Decrease sensation in the LLE to light touch.   MENTAL STATUS: A x O x 3, good concentration, speech is fluent and goal directed  MOTOR: 5/5 in all muscle groups  GAIT: Antalgic. Ambulates unassisted.      ASSESSMENT: 73 y.o. year old female with lower back pain, consistent with      1. Lumbar radiculopathy        2. Lumbar radiculopathy, chronic  Ambulatory referral/consult to Pain Clinic    Procedure Order to Pain Management    CANCELED: Procedure Order to Pain Management    CANCELED: Procedure Order to Pain Management      3. DDD (degenerative disc disease), lumbar          PLAN:     - Counseled patient regarding the importance of activity  modification, smoking cessation, alcohol cessation, constant sleeping habits, and physical therapy.    - Continue current medication regimen prescribed by PMD.     - Continue to follow up with Neurosurgery. As per NS note, they recommended basivertebral nerve ablation at L3, L4, and L5 and facet block/ablations L3-5.    - Continue HEP and Aqua therapy.     - Schedule for left TFESI L3/4 and L5/S1. Discussed that the WADE would be beneficial at the level above and below her current lumbar fusion.     - Follow up in clinic 2 weeks after procedure.     The above plan and management options were discussed at length with patient. Patient is in agreement with the above and verbalized understanding. It will be communicated with the referring physician via electronic record, fax, or mail.    Moshe Gibson MD   International Pain Medicine Fellow   [facility]     I spent a total of 30 minutes on the day of the visit.  This includes face to face time and non-face to face time preparing to see the patient by reviewing previous labs/imaging, obtaining and/or reviewing separately obtained history, documenting clinical information in the electronic or other health record, independently interpreting results and communicating results to the patient/family/caregiver.    Jason Gomez

## 2024-09-17 NOTE — PROGRESS NOTES
Chronic Pain - New Consult    Referring Physician: Azra Kurtz MD           SUBJECTIVE:    Amanda Holt presents to the clinic for the evaluation of lower back pain that started several months ago.  Patient describes the pain as sharp achy and shooting with radiation down her left lower extremity.  Patient said the pain radiates all the way to her left feet. Patient said her entire left leg becomes numb and weak especially after walking for about 10 minutes.  Her pain score today is 8/10. The pain is rated with a score of  6/10 on the BEST day and a score of 10/10 on the WORST day.  Symptoms interfere with daily activity, sleeping, and work. The pain is exacerbated by Standing, Bending, Walking, Lifting, and Getting out of bed/chair.  The pain is mitigated by 7.5 Percocet . She reports spending 3 hours per day reclining. The patient reports 4 hours of uninterrupted sleep per night.    Patient denies night fever/night sweats, urinary incontinence, bowel incontinence, and significant weight loss.  Patient reports weakness and numbness in the left lower extremity.    Physical Therapy/Home Exercise: yes (aqua therapy)    Pain Disability Index Review:      3/14/2024     1:42 PM 4/13/2022    10:04 AM 3/28/2022    10:32 AM   Last 3 PDI Scores   Pain Disability Index (PDI) 63 30 26     Pain Medications:  Percocet 7.5  Tizanidine 4 mg     report:  Reviewed and consistent with medication use as prescribed.    Pain Procedures:   L4-5 laminectomy and fusion in 2018  09/09/2020-transforaminal WADE  02/12/2021-transforaminal WADE  3/14/2022 - L4-5 and L5-S1 lumbar transforaminal WADE  08/29/2022/ -  left L4-5 and L5-S1 lumbar transforaminal WADE  03/25/2024 - bilateral L3/4 facet joint injection -limited relief    Imaging:   MRI LUMBAR SPINE WITHOUT CONTRAST     FINDINGS:  Lumbar spine alignment is within normal limits. The vertebral body heights are well maintained, with no fracture.  No marrow signal  abnormality suspicious for an infiltrative process.  Degenerative fatty marrow metaplasia endplate change at L4-5.     The conus is normal in appearance.  The adjacent soft tissue structures show no significant abnormalities.     Bilateral T2 hyperintense renal lesions are suggestive of cysts.     L1-L2: There is no focal disc herniation. No significant central canal or neural foraminal narrowing.     L2-L3: There is no focal disc herniation. No significant central canal or neural foraminal narrowing.     L3-L4: Circumferential disc bulge, spondylitic spurring, facet arthropathy and bilateral facet joint effusions.  Moderate central spinal stenosis and mild right foraminal stenosis.     L4-L5: Operative change of bilateral laminectomy and left-sided instrumented fusion.  Circumferential disc bulge and spondylitic spurring.  Mild bilateral neural foraminal narrowing.     L5-S1: Circumferential disc bulge and partial anterior bony ankylosis.  Mild facet arthropathy.  Flattening of the medial right exiting L5 nerve root suggests at least moderate and possibly severe foraminal stenosis.     Impression:     Postoperative and degenerative change as described, with central spinal stenosis at L3-4 and moderate-severe right L5-S1 foraminal stenosis.    XR LUMBAR SPINE 5 VIEW WITH FLEX AND EXT     FINDINGS:  prior L4-5 posterior instrumented lumbar fusion. No evidence of hardware failure or loosening. There is prominent L3-4 facet arthropathy with slight anterolisthesis, similar to prior exam.  No subluxation with extension and flexion views. No fractures.     Impression:     No acute findings.    Past Medical History:   Diagnosis Date    Arthralgia     Colon polyp     Degenerative disc disease at L5-S1 level     High cholesterol     Hypertension     Notalgia paresthetica     Nuclear sclerosis of both eyes 01/08/2020    Sciatica     Spinal stenosis     Tobacco use 10/21/2021     Past Surgical History:   Procedure Laterality  Date    BACK SURGERY      lumbar laminectomy    COLON SURGERY      Tear repaired    COLONOSCOPY N/A 2020    Procedure: COLONOSCOPY;  Surgeon: Donal Moore MD;  Location: [facility] END[facility];  Service: Endoscopy;  Laterality: N/A;    EPIDURAL STEROID INJECTION Left 2020    Procedure: Injection, Steroid, Epidural Transforaminal;  Surgeon: Jun Leavitt Jr., MD;  Location: [facility] ENDO;  Service: Pain Management;  Laterality: Left;  Left L5 + S1 TF WADE  Arrive @ 1300; ASA last ; No DM    EPIDURAL STEROID INJECTION Left 2021    Procedure: Injection, Steroid, Epidural Transforaminal;  Surgeon: Jun Leavitt Jr., MD;  Location: [facility] ENDO;  Service: Pain Management;  Laterality: Left;  Left L4 + L5 TF WADE  Arrive @ 1230; IV Sedation; ASA last ; No DM    INJECTION OF FACET JOINT Bilateral 3/25/2024    Procedure: FACET JOINT INJECTION BILATERAL L3/4 *ASPIRIN CLEARANCE IN CHART*;  Surgeon: Darya Ayala MD;  Location: [facility] PAIN [facility]GT;  Service: Pain Management;  Laterality: Bilateral;  [phone]    SPINE SURGERY  10/08/18    Bilateral Lumbar Laminectomy with Fusion and Screws    TRANSFORAMINAL EPIDURAL INJECTION OF STEROID Left 2022    Procedure: INJECTION, STEROID, EPIDURAL, TRANSFORAMINAL APPROACH, L4-L5 & L5-S1;  Surgeon: Darya Ayala MD;  Location: [facility]GT;  Service: Pain Management;  Laterality: Left;    TRANSFORAMINAL EPIDURAL INJECTION OF STEROID Left 2022    Procedure: LUMBAR TRANSFORAMINAL LEFT L4/5 AND L5/S1 CONTRAST;  Surgeon: Darya Ayala MD;  Location: [facility];  Service: Pain Management;  Laterality: Left;    TUBAL LIGATION       Social History     Socioeconomic History    Marital status:    Tobacco Use    Smoking status: Former     Current packs/day: 0.00     Average packs/day: 0.3 packs/day for 35.0 years (8.8 ttl pk-yrs)     Types: Cigarettes     Start date: 3/15/1987     Quit date: 3/15/2022     Years since quittin.5    Smokeless tobacco:  Current    Tobacco comments:     Occasional spoker some times 1-2 cigarettes/day sometimes none for weeks   Substance and Sexual Activity    Alcohol use: Yes     Alcohol/week: 3.0 standard drinks of alcohol     Types: 3 Glasses of wine per week     Comment: Occasional wine    Drug use: Not Currently     Comment: Tramadol twice a day as needed    Sexual activity: Yes     Partners: Male     Birth control/protection: Post-menopausal, None     Comment: Tubal 30+ years ago   Social History Narrative    Lives alone    No partner    Was a  for a non-profit organization     Social Determinants of Health     Financial Resource Strain: Patient Declined (2024)    Overall Financial Resource Strain (CARDIA)     Difficulty of Paying Living Expenses: Patient declined   Food Insecurity: Patient Declined (2024)    Hunger Vital Sign     Worried About Running Out of Food in the Last Year: Patient declined     Ran Out of Food in the Last Year: Patient declined   Transportation Needs: No Transportation Needs (2024)    PRAPARE - Transportation     Lack of Transportation (Medical): No     Lack of Transportation (Non-Medical): No   Physical Activity: Patient Declined (2024)    Exercise Vital Sign     Days of Exercise per Week: Patient declined     Minutes of Exercise per Session: Patient declined   Stress: Patient Declined (2024)    [employer] - Occupational Stress Questionnaire     Feeling of Stress : Patient declined   Housing Stability: Patient Declined (2024)    Housing Stability Vital Sign     Unable to Pay for Housing in the Last Year: Patient declined     Homeless in the Last Year: Patient declined     Family History   Problem Relation Name Age of Onset    Breast cancer Mother Marianna Benítez     Hypertension Mother Marianna Benítez             Hypotension Mother Marianna Benítez     Cancer Mother Marianna Benítez          due to breast cancer     Miscarriages / Stillbirths Mother Marianna Benítez         3 stillborn children    Cataracts Father Matthias The.  Jeyson     Glaucoma Father Matthias The.  Jeyson     Diabetes Father Matthias The.  Jeyson             Arthritis Father Matthias The.  Jeyson     Vision loss Father Matthias The.  Jeyson         Glaucoma -     Glaucoma Sister Debby Benítez     Arthritis Sister Debby Benítez     Diabetes Sister Debby Benítez     Hypertension Sister Debby Benítez     Vision loss Sister Debby Benítez         Glasses    Drug abuse Brother Sky Benítez     Learning disabilities Brother Sky Benítez     No Known Problems Brother      No Known Problems Brother x3     No Known Problems Maternal Aunt      No Known Problems Maternal Uncle      No Known Problems Paternal Aunt      No Known Problems Paternal Uncle      No Known Problems Maternal Grandmother      No Known Problems Maternal Grandfather      No Known Problems Paternal Grandmother      No Known Problems Paternal Grandfather      No Known Problems Son x2     No Known Problems Other      Arthritis Sister Susan Benítez     Depression Sister Susan Benítez     Diabetes Sister Susan Benítez     Hearing loss Sister Susan Benítez         Trouble hearing    Hypertension Sister Susan Benítez     Stroke Sister Susan Benítez         Browne Hunt Disease, Coma for a month due to diabetes,Some body weakness    Vision loss Sister Susan Benítez         Lasix surgery    Arthritis Sister Urszula Green     COPD Sister Urszula Dawson     Diabetes Sister Urszula Green     Hearing loss Sister Urszula Green     Heart disease Sister Urszula Green     Hypertension Sister Urszula Green     Arthritis Brother Murtaza Nowak     Hypertension Brother Murtaza Nowak     Early death Sister Christine Benítez         Still born    Early death Sister Catherine Benítez         Still born twin to Debby Benítez    Early death Brother Sonrob Jeyson stillborn     Hypertension Sister Kateryna Kraft     Hypertension Brother  Robbie Benítez     Learning disabilities Son Vaibhav Holt     Learning disabilities Son Fidel Chavez grandson    Learning disabilities Daughter Carmen's Aries         Granddaughter    Mental illness Sister Shawna Benítez         Her son Matthias Benítez    Vision loss Sister Shawna Benítez         Glasses/contacts    Vision loss Sister Chelly Benítez         Glasses    Amblyopia Neg Hx      Blindness Neg Hx      Macular degeneration Neg Hx      Retinal detachment Neg Hx      Strabismus Neg Hx      Thyroid disease Neg Hx       Review of patient's allergies indicates:   Allergen Reactions    Codeine      Current Outpatient Medications   Medication Sig    acetaminophen (TYLENOL) 500 MG tablet Take 2 tablets (1,000 mg total) by mouth every 6 (six) hours as needed.    amitriptyline (ELAVIL) 50 MG tablet Take 1 tablet (50 mg total) by mouth every evening.    aspirin (ECOTRIN) 81 MG EC tablet     clotrimazole-betamethasone 1-0.05% (LOTRISONE) cream APPLY  CREAM TOPICALLY TO AFFECTED AREA TWICE DAILY    cyanocobalamin, vitamin B-12, (B-12 COMPLIANCE) 1,000 mcg/mL Kit Inject 1 mL as directed every 28 days.    diclofenac sodium (VOLTAREN ARTHRITIS PAIN) 1 % Gel Apply 2 g topically once daily.    ergocalciferol (ERGOCALCIFEROL) 50,000 unit Cap Take 1 capsule by mouth every 7 days.    fluticasone propionate (FLONASE) 50 mcg/actuation nasal spray 1 spray (50 mcg total) by Each Nostril route once daily.    hydrOXYzine HCL (ATARAX) 25 MG tablet Take 1 tablet by mouth three times daily as needed    LIDOcaine (LIDODERM) 5 % Place 1 patch onto the skin once daily. Remove & Discard patch within 12 hours or as directed by MD    metoprolol succinate (TOPROL-XL) 25 MG 24 hr tablet Take 1 tablet by mouth once daily    mometasone (ELOCON) 0.1 % solution Apply once to twice daily prn itching    mupirocin (BACTROBAN) 2 % ointment Apply topically 3 (three) times daily.    nystatin (MYCOSTATIN) powder APPLY  POWDER  "TOPICALLY TO AFFECTED AREA 4 TIMES DAILY    olopatadine (PATANOL) 0.1 % ophthalmic solution INSTILL 1 DROP INTO EACH EYE TWICE DAILY    omeprazole (PRILOSEC) 40 MG capsule Take 1 capsule by mouth once daily    oxyCODONE-acetaminophen (PERCOCET) 7.5-325 mg per tablet Take 1 tablet by mouth every 4 (four) hours as needed for Pain.    pravastatin (PRAVACHOL) 40 MG tablet Take 1 tablet by mouth once daily    semaglutide, weight loss, (WEGOVY) 0.25 mg/0.5 mL PnIj Inject 0.25 mg into the skin once a week.    tiZANidine (ZANAFLEX) 4 MG tablet TAKE 1 TABLET BY MOUTH EVERY 6 HOURS AS NEEDED    triamcinolone acetonide 0.1%-ciclopirox-magnesium hydroxide 400 mg/5 ml Apply to affected area twice a day after cool blow dry    valsartan (DIOVAN) 320 MG tablet Take 1 tablet by mouth once daily    hydrALAZINE (APRESOLINE) 100 MG tablet Take 1 tablet (100 mg total) by mouth every 12 (twelve) hours.     No current facility-administered medications for this visit.       REVIEW OF SYSTEMS:    GENERAL:  No weight loss, malaise or fevers.  HEENT:  Negative for frequent or significant headaches.  NECK:  Negative for lumps, goiter, pain and significant neck swelling.  RESPIRATORY:  Negative for cough, wheezing or shortness of breath.  CARDIOVASCULAR:  Negative for chest pain, leg swelling or palpitations.  GI:  Negative for abdominal discomfort, blood in stools or black stools or change in bowel habits.  MUSCULOSKELETAL:  See HPI.  SKIN:  Negative for lesions, rash, and itching.  PSYCH:  Negative for sleep disturbance, mood disorder and recent psychosocial stressors.  HEMATOLOGY/LYMPHOLOGY:  Negative for prolonged bleeding, bruising easily or swollen nodes.  NEURO:   No history of headaches, syncope, paralysis, seizures or tremors.  All other reviewed and negative other than HPI.    OBJECTIVE:    BP (!) 144/85 (BP Location: Right arm, Patient Position: Sitting, BP Method: Medium (Automatic))   Pulse 90   Resp 12   Ht 5' 4" (1.626 m)   " Wt 120 kg (264 lb 8.8 oz)   SpO2 100%   BMI 45.41 kg/m²     PHYSICAL EXAMINATION:    GENERAL: Well appearing, in no acute distress, alert and oriented x3.  PSYCH:  Mood and affect appropriate.  SKIN: Skin color, texture, turgor normal, no rashes or lesions.  HEAD/FACE:  Normocephalic, atraumatic. Cranial nerves grossly intact.  NECK: Normal ROM. Supple. No pain to palpation over the cervical paraspinous muscles. Spurling Negative. No pain with neck flexion, extension, or lateral rotation.   CV: RRR with palpation of the radial artery.  PULM: No evidence of respiratory difficulty, symmetric chest rise.  GI:  Soft and non-distended.  MSK: Straight leg raising is positive L > R to radicular pain. Pain to palpation over the facet joints of the lumbar spine. Pain with lumbar facet loading. No pain over the SI joints. Sacral Thrust is negative. [place]O test is negative.  Gaenslen Test is negative. No pain over the GBT bilaterally. Limited range of motion of the lumbar spine with pain reproduction.  Peripheral joint ROM is full and pain free without obvious instability or laxity in all four extremities. No obvious deformities, edema, or skin discoloration.  No atrophy or tone abnormalities are noted.   NEURO: Bilateral upper and lower extremity coordination and strength is symmetric.  Decrease sensation in the LLE to light touch.   MENTAL STATUS: A x O x 3, good concentration, speech is fluent and goal directed  MOTOR: 5/5 in all muscle groups  GAIT: Antalgic. Ambulates unassisted.      ASSESSMENT: 73 y.o. year old female with lower back pain, consistent with      1. Lumbar radiculopathy        2. Lumbar radiculopathy, chronic  Ambulatory referral/consult to Pain Clinic    Procedure Order to Pain Management    CANCELED: Procedure Order to Pain Management    CANCELED: Procedure Order to Pain Management      3. DDD (degenerative disc disease), lumbar          PLAN:     - Counseled patient regarding the importance of activity  modification, smoking cessation, alcohol cessation, constant sleeping habits, and physical therapy.    - Continue current medication regimen prescribed by PMD.     - Continue to follow up with Neurosurgery. As per NS note, they recommended basivertebral nerve ablation at L3, L4, and L5 and facet block/ablations L3-5.    - Continue HEP and Aqua therapy.     - Schedule for left TFESI L3/4 and L5/S1. Discussed that the WADE would be beneficial at the level above and below her current lumbar fusion.     - Follow up in clinic 2 weeks after procedure.     The above plan and management options were discussed at length with patient. Patient is in agreement with the above and verbalized understanding. It will be communicated with the referring physician via electronic record, fax, or mail.    Moshe Gibson MD   International [employer] Fellow   [facility]     I spent a total of 30 minutes on the day of the visit.  This includes face to face time and non-face to face time preparing to see the patient by reviewing previous labs/imaging, obtaining and/or reviewing separately obtained history, documenting clinical information in the electronic or other health record, independently interpreting results and communicating results to the patient/family/caregiver.    Jason Gomez

## 2024-09-17 NOTE — PROGRESS NOTES
Chronic Pain - New Consult    Referring Physician: Azra Kurtz MD           SUBJECTIVE:    Amanda Holt presents to the clinic for the evaluation of lower back pain that started several months ago.  Patient describes the pain as sharp achy and shooting with radiation down her left lower extremity.  Patient said the pain radiates all the way to her left feet. Patient said her entire left leg becomes numb and weak especially after walking for about 10 minutes.  Her pain score today is 8/10. The pain is rated with a score of  6/10 on the BEST day and a score of 10/10 on the WORST day.  Symptoms interfere with daily activity, sleeping, and work. The pain is exacerbated by Standing, Bending, Walking, Lifting, and Getting out of bed/chair.  The pain is mitigated by 7.5 Percocet . She reports spending 3 hours per day reclining. The patient reports 4 hours of uninterrupted sleep per night.    Patient denies night fever/night sweats, urinary incontinence, bowel incontinence, and significant weight loss.  Patient reports weakness and numbness in the left lower extremity.    Physical Therapy/Home Exercise: yes (aqua therapy)    Pain Disability Index Review:      3/14/2024     1:42 PM 4/13/2022    10:04 AM 3/28/2022    10:32 AM   Last 3 PDI Scores   Pain Disability Index (PDI) 63 30 26     Pain Medications:  Percocet 7.5  Tizanidine 4 mg     report:  Reviewed and consistent with medication use as prescribed.    Pain Procedures:   L4-5 laminectomy and fusion in 2018  09/09/2020-transforaminal WADE  02/12/2021-transforaminal WADE  3/14/2022 - L4-5 and L5-S1 lumbar transforaminal WADE  08/29/2022/ -  left L4-5 and L5-S1 lumbar transforaminal WADE  03/25/2024 - bilateral L3/4 facet joint injection -limited relief    Imaging:   MRI LUMBAR SPINE WITHOUT CONTRAST     FINDINGS:  Lumbar spine alignment is within normal limits. The vertebral body heights are well maintained, with no fracture.  No marrow signal  abnormality suspicious for an infiltrative process.  Degenerative fatty marrow metaplasia endplate change at L4-5.     The conus is normal in appearance.  The adjacent soft tissue structures show no significant abnormalities.     Bilateral T2 hyperintense renal lesions are suggestive of cysts.     L1-L2: There is no focal disc herniation. No significant central canal or neural foraminal narrowing.     L2-L3: There is no focal disc herniation. No significant central canal or neural foraminal narrowing.     L3-L4: Circumferential disc bulge, spondylitic spurring, facet arthropathy and bilateral facet joint effusions.  Moderate central spinal stenosis and mild right foraminal stenosis.     L4-L5: Operative change of bilateral laminectomy and left-sided instrumented fusion.  Circumferential disc bulge and spondylitic spurring.  Mild bilateral neural foraminal narrowing.     L5-S1: Circumferential disc bulge and partial anterior bony ankylosis.  Mild facet arthropathy.  Flattening of the medial right exiting L5 nerve root suggests at least moderate and possibly severe foraminal stenosis.     Impression:     Postoperative and degenerative change as described, with central spinal stenosis at L3-4 and moderate-severe right L5-S1 foraminal stenosis.    XR LUMBAR SPINE 5 VIEW WITH FLEX AND EXT     FINDINGS:  prior L4-5 posterior instrumented lumbar fusion. No evidence of hardware failure or loosening. There is prominent L3-4 facet arthropathy with slight anterolisthesis, similar to prior exam.  No subluxation with extension and flexion views. No fractures.     Impression:     No acute findings.    Past Medical History:   Diagnosis Date    Arthralgia     Colon polyp     Degenerative disc disease at L5-S1 level     High cholesterol     Hypertension     Notalgia paresthetica     Nuclear sclerosis of both eyes 01/08/2020    Sciatica     Spinal stenosis     Tobacco use 10/21/2021     Past Surgical History:   Procedure Laterality  Date    BACK SURGERY      lumbar laminectomy    COLON SURGERY      Tear repaired    COLONOSCOPY N/A 2020    Procedure: COLONOSCOPY;  Surgeon: Donal Moore MD;  Location: [facility] ENDO;  Service: Endoscopy;  Laterality: N/A;    EPIDURAL STEROID INJECTION Left 2020    Procedure: Injection, Steroid, Epidural Transforaminal;  Surgeon: Jun Leavitt Jr., MD;  Location: [facility] ENDO;  Service: Pain Management;  Laterality: Left;  Left L5 + S1 TF WADE  Arrive @ 1300; ASA last ; No DM    EPIDURAL STEROID INJECTION Left 2021    Procedure: Injection, Steroid, Epidural Transforaminal;  Surgeon: Jun Leavitt Jr., MD;  Location: [facility] ENDO;  Service: Pain Management;  Laterality: Left;  Left L4 + L5 TF WADE  Arrive @ 1230; IV Sedation; ASA last ; No DM    INJECTION OF FACET JOINT Bilateral 3/25/2024    Procedure: FACET JOINT INJECTION BILATERAL L3/4 *ASPIRIN CLEARANCE IN CHART*;  Surgeon: Darya Ayala MD;  Location: [facility], [place] [facility]IN MGT;  Service: Pain Management;  Laterality: Bilateral;  [phone]    SPINE SURGERY  10/08/18    Bilateral Lumbar Laminectomy with Fusion and Screws    TRANSFORAMINAL EPIDURAL INJECTION OF STEROID Left 2022    Procedure: INJECTION, STEROID, EPIDURAL, TRANSFORAMINAL APPROACH, L4-L5 & L5-S1;  Surgeon: Darya Ayala MD;  Location: [facility], [place] [facility]IN [facility]GT;  Service: Pain Management;  Laterality: Left;    TRANSFORAMINAL EPIDURAL INJECTION OF STEROID Left 2022    Procedure: LUMBAR TRANSFORAMINAL LEFT L4/5 AND L5/S1 CONTRAST;  Surgeon: Darya Ayala MD;  Location: [facility] [place] [facility]IN [facility]GT;  Service: Pain Management;  Laterality: Left;    TUBAL LIGATION       Social History     Socioeconomic History    Marital status:    Tobacco Use    Smoking status: Former     Current packs/day: 0.00     Average packs/day: 0.3 packs/day for 35.0 years (8.8 ttl pk-yrs)     Types: Cigarettes     Start date: 3/15/1987     Quit date: 3/15/2022     Years since quittin.5    Smokeless tobacco:  Current    Tobacco comments:     Occasional spoker some times 1-2 cigarettes/day sometimes none for weeks   Substance and Sexual Activity    Alcohol use: Yes     Alcohol/week: 3.0 standard drinks of alcohol     Types: 3 Glasses of wine per week     Comment: Occasional wine    Drug use: Not Currently     Comment: Tramadol twice a day as needed    Sexual activity: Yes     Partners: Male     Birth control/protection: Post-menopausal, None     Comment: Tubal 30+ years ago   Social History Narrative    Lives alone    No partner    Was a  for a non-profit organization     Social Determinants of Health     Financial Resource Strain: Patient Declined (2024)    Overall Financial Resource Strain (CARDIA)     Difficulty of Paying Living Expenses: Patient declined   Food Insecurity: Patient Declined (2024)    Hunger Vital Sign     Worried About Running Out of Food in the Last Year: Patient declined     Ran Out of Food in the Last Year: Patient declined   Transportation Needs: No Transportation Needs (2024)    PRAPARE - Transportation     Lack of Transportation (Medical): No     Lack of Transportation (Non-Medical): No   Physical Activity: Patient Declined (2024)    Exercise Vital Sign     Days of Exercise per Week: Patient declined     Minutes of Exercise per Session: Patient declined   Stress: Patient Declined (2024)    [employer] [place] [facility] [employer] - Occupational Stress Questionnaire     Feeling of Stress : Patient declined   Housing Stability: Patient Declined (2024)    Housing Stability Vital Sign     Unable to Pay for Housing in the Last Year: Patient declined     Homeless in the Last Year: Patient declined     Family History   Problem Relation Name Age of Onset    Breast cancer Mother Marianna Benítez     Hypertension Mother Marianna Benítez             Hypotension Mother Marianna Benítez     Cancer Mother Marianna Benítez          due to breast cancer     Miscarriages / Stillbirths Mother Marianna Benítez         3 stillborn children    Cataracts Father Matthias The.  Jeyson     Glaucoma Father Matthias The.  Jeyson     Diabetes Father Matthias The.  Jeyson             Arthritis Father Matthias The.  Jeyson     Vision loss Father Matthias The.  Jeyson         Glaucoma -     Glaucoma Sister Debby Benítez     Arthritis Sister Debby Benítez     Diabetes Sister Debby Benítez     Hypertension Sister Debby Benítez     Vision loss Sister Debby Benítez         Glasses    Drug abuse Brother Sky Benítez     Learning disabilities Brother Sky Benítez     No Known Problems Brother      No Known Problems Brother x3     No Known Problems Maternal Aunt      No Known Problems Maternal Uncle      No Known Problems Paternal Aunt      No Known Problems Paternal Uncle      No Known Problems Maternal Grandmother      No Known Problems Maternal Grandfather      No Known Problems Paternal Grandmother      No Known Problems Paternal Grandfather      No Known Problems Son x2     No Known Problems Other      Arthritis Sister Susan Benítez     Depression Sister Susan Benítez     Diabetes Sister Susan Benítez     Hearing loss Sister Susan Benítez         Trouble hearing    Hypertension Sister Susan Benítez     Stroke Sister Susan Benítez         Browne Hunt Disease, Coma for a month due to diabetes,Some body weakness    Vision loss Sister Susan Benítez         Lasix surgery    Arthritis Sister Urszula Green     COPD Sister Urszula Dawson     Diabetes Sister Urszula Green     Hearing loss Sister Urszula Green     Heart disease Sister Urszula Green     Hypertension Sister Urszula Green     Arthritis Brother Murtaza Nowak     Hypertension Brother Murtaza Nowak     Early death Sister Christine Benítez         Still born    Early death Sister Catherine Benítez         Still born twin to Debby Benítez    Early death Brother Sonrob Jeyson stillborn     Hypertension Sister Kateryna Kraft     Hypertension Brother  Robbie Benítez     Learning disabilities Son Vaibhav Holt     Learning disabilities Son Fidel Chavez grandson    Learning disabilities Daughter Carmen's Aries         Granddaughter    Mental illness Sister Shawna Benítez         Her son Matthias Benítez    Vision loss Sister Shawna Benítez         Glasses/contacts    Vision loss Sister Chelly Benítez         Glasses    Amblyopia Neg Hx      Blindness Neg Hx      Macular degeneration Neg Hx      Retinal detachment Neg Hx      Strabismus Neg Hx      Thyroid disease Neg Hx       Review of patient's allergies indicates:   Allergen Reactions    Codeine      Current Outpatient Medications   Medication Sig    acetaminophen (TYLENOL) 500 MG tablet Take 2 tablets (1,000 mg total) by mouth every 6 (six) hours as needed.    amitriptyline (ELAVIL) 50 MG tablet Take 1 tablet (50 mg total) by mouth every evening.    aspirin (ECOTRIN) 81 MG EC tablet     clotrimazole-betamethasone 1-0.05% (LOTRISONE) cream APPLY  CREAM TOPICALLY TO AFFECTED AREA TWICE DAILY    cyanocobalamin, vitamin B-12, (B-12 COMPLIANCE) 1,000 mcg/mL Kit Inject 1 mL as directed every 28 days.    diclofenac sodium (VOLTAREN ARTHRITIS PAIN) 1 % Gel Apply 2 g topically once daily.    ergocalciferol (ERGOCALCIFEROL) 50,000 unit Cap Take 1 capsule by mouth every 7 days.    fluticasone propionate (FLONASE) 50 mcg/actuation nasal spray 1 spray (50 mcg total) by Each Nostril route once daily.    hydrOXYzine HCL (ATARAX) 25 MG tablet Take 1 tablet by mouth three times daily as needed    LIDOcaine (LIDODERM) 5 % Place 1 patch onto the skin once daily. Remove & Discard patch within 12 hours or as directed by MD    metoprolol succinate (TOPROL-XL) 25 MG 24 hr tablet Take 1 tablet by mouth once daily    mometasone (ELOCON) 0.1 % solution Apply once to twice daily prn itching    mupirocin (BACTROBAN) 2 % ointment Apply topically 3 (three) times daily.    nystatin (MYCOSTATIN) powder APPLY  POWDER  "TOPICALLY TO AFFECTED AREA 4 TIMES DAILY    olopatadine (PATANOL) 0.1 % ophthalmic solution INSTILL 1 DROP INTO EACH EYE TWICE DAILY    omeprazole (PRILOSEC) 40 MG capsule Take 1 capsule by mouth once daily    oxyCODONE-acetaminophen (PERCOCET) 7.5-325 mg per tablet Take 1 tablet by mouth every 4 (four) hours as needed for Pain.    pravastatin (PRAVACHOL) 40 MG tablet Take 1 tablet by mouth once daily    semaglutide, weight loss, (WEGOVY) 0.25 mg/0.5 mL PnIj Inject 0.25 mg into the skin once a week.    tiZANidine (ZANAFLEX) 4 MG tablet TAKE 1 TABLET BY MOUTH EVERY 6 HOURS AS NEEDED    triamcinolone acetonide 0.1%-ciclopirox-magnesium hydroxide 400 mg/5 ml Apply to affected area twice a day after cool blow dry    valsartan (DIOVAN) 320 MG tablet Take 1 tablet by mouth once daily    hydrALAZINE (APRESOLINE) 100 MG tablet Take 1 tablet (100 mg total) by mouth every 12 (twelve) hours.     No current facility-administered medications for this visit.       REVIEW OF SYSTEMS:    GENERAL:  No weight loss, malaise or fevers.  HEENT:  Negative for frequent or significant headaches.  NECK:  Negative for lumps, goiter, pain and significant neck swelling.  RESPIRATORY:  Negative for cough, wheezing or shortness of breath.  CARDIOVASCULAR:  Negative for chest pain, leg swelling or palpitations.  GI:  Negative for abdominal discomfort, blood in stools or black stools or change in bowel habits.  MUSCULOSKELETAL:  See HPI.  SKIN:  Negative for lesions, rash, and itching.  PSYCH:  Negative for sleep disturbance, mood disorder and recent psychosocial stressors.  HEMATOLOGY/LYMPHOLOGY:  Negative for prolonged bleeding, bruising easily or swollen nodes.  NEURO:   No history of headaches, syncope, paralysis, seizures or tremors.  All other reviewed and negative other than HPI.    OBJECTIVE:    BP (!) 144/85 (BP Location: Right arm, Patient Position: Sitting, BP Method: Medium (Automatic))   Pulse 90   Resp 12   Ht 5' 4" (1.626 m)   " Wt 120 kg (264 lb 8.8 oz)   SpO2 100%   BMI 45.41 kg/m²     PHYSICAL EXAMINATION:    GENERAL: Well appearing, in no acute distress, alert and oriented x3.  PSYCH:  Mood and affect appropriate.  SKIN: Skin color, texture, turgor normal, no rashes or lesions.  HEAD/FACE:  Normocephalic, atraumatic. Cranial nerves grossly intact.  NECK: Normal ROM. Supple. No pain to palpation over the cervical paraspinous muscles. Spurling Negative. No pain with neck flexion, extension, or lateral rotation.   CV: RRR with palpation of the radial artery.  PULM: No evidence of respiratory difficulty, symmetric chest rise.  GI:  Soft and non-distended.  MSK: Straight leg raising is positive L > R to radicular pain. Pain to palpation over the facet joints of the lumbar spine. Pain with lumbar facet loading. No pain over the SI joints. Sacral Thrust is negative. OR[place]O test is negative.  Gaenslen Test is negative. No pain over the GBT bilaterally. Limited range of motion of the lumbar spine with pain reproduction.  Peripheral joint ROM is full and pain free without obvious instability or laxity in all four extremities. No obvious deformities, edema, or skin discoloration.  No atrophy or tone abnormalities are noted.   NEURO: Bilateral upper and lower extremity coordination and strength is symmetric.  Decrease sensation in the LLE to light touch.   MENTAL STATUS: A x O x 3, good concentration, speech is fluent and goal directed  MOTOR: 5/5 in all muscle groups  GAIT: Antalgic. Ambulates unassisted.      ASSESSMENT: 73 y.o. year old female with lower back pain, consistent with      1. Lumbar radiculopathy        2. Lumbar radiculopathy, chronic  Ambulatory referral/consult to Pain Clinic    Procedure Order to Pain Management    CANCELED: Procedure Order to Pain Management    CANCELED: Procedure Order to Pain Management      3. DDD (degenerative disc disease), lumbar          PLAN:     - Counseled patient regarding the importance of activity  modification, smoking cessation, alcohol cessation, constant sleeping habits, and physical therapy.    - Continue current medication regimen prescribed by PMD.     - Continue to follow up with Neurosurgery. As per NS note, they recommended basivertebral nerve ablation at L3, L4, and L5 and facet block/ablations L3-5.    - Continue HEP and Aqua therapy.     - Schedule for left TFESI L3/4 and L5/S1. Discussed that the WADE would be beneficial at the level above and below her current lumbar fusion.     - Follow up in clinic 2 weeks after procedure.     The above plan and management options were discussed at length with patient. Patient is in agreement with the above and verbalized understanding. It will be communicated with the referring physician via electronic record, fax, or mail.    Moshe Gibson MD   International [employer] Fellow   [facility]     I spent a total of 30 minutes on the day of the visit.  This includes face to face time and non-face to face time preparing to see the patient by reviewing previous labs/imaging, obtaining and/or reviewing separately obtained history, documenting clinical information in the electronic or other health record, independently interpreting results and communicating results to the patient/family/caregiver.    Jason Gomez

## 2024-09-18 ENCOUNTER — PATIENT MESSAGE (OUTPATIENT)
Dept: PAIN MEDICINE | Facility: OTHER | Age: 74
End: 2024-09-18
Payer: MEDICARE

## 2024-09-18 DIAGNOSIS — M54.16 LUMBAR RADICULOPATHY, CHRONIC: Primary | ICD-10-CM

## 2024-09-18 DIAGNOSIS — F41.9 ANXIETY: ICD-10-CM

## 2024-09-19 RX ORDER — HYDROXYZINE HYDROCHLORIDE 25 MG/1
TABLET, FILM COATED ORAL
Qty: 45 TABLET | Refills: 0 | Status: SHIPPED | OUTPATIENT
Start: 2024-09-19

## 2024-09-19 NOTE — TELEPHONE ENCOUNTER
Refill Routing Note   Medication(s) are not appropriate for processing by [facility] for the following reason(s):        Outside of protocol    [facility] action(s):  Route               Appointments  past 12m or future 3m with PCP    Date Provider   Last Visit   8/2/2024 Simona Torres MD   Next Visit   11/6/2024 Simona Torres MD   ED visits in past 90 days: 1        Note composed:8:12 AM 09/19/2024

## 2024-09-22 ENCOUNTER — PATIENT MESSAGE (OUTPATIENT)
Dept: ADMINISTRATIVE | Facility: OTHER | Age: 74
End: 2024-09-22
Payer: MEDICARE

## 2024-09-23 ENCOUNTER — TELEPHONE (OUTPATIENT)
Dept: PAIN MEDICINE | Facility: CLINIC | Age: 74
End: 2024-09-23
Payer: MEDICARE

## 2024-09-23 ENCOUNTER — PATIENT MESSAGE (OUTPATIENT)
Dept: FAMILY MEDICINE | Facility: CLINIC | Age: 74
End: 2024-09-23
Payer: MEDICARE

## 2024-09-23 DIAGNOSIS — M54.16 LUMBAR RADICULOPATHY: ICD-10-CM

## 2024-09-23 NOTE — TELEPHONE ENCOUNTER
----- Message from Renae Avalos sent at 9/23/2024  1:59 PM CDT -----  Regarding: Conformation for procedure  Name of Who is Calling:           What is the request in detail:Pt is requesting a call back because she needs to confirm date and time for appointment so that she can set up her ride.            Can the clinic reply by [facility]:No           What Number to Call Back if not in [facility]:[phone]

## 2024-09-25 NOTE — TELEPHONE ENCOUNTER
No care due was identified.  [facility] Embedded Care Due Messages. Reference number: [id number].   9/25/2024 7:58:23 AM CDT

## 2024-09-26 RX ORDER — OXYCODONE AND ACETAMINOPHEN 7.5; 325 MG/1; MG/1
1 TABLET ORAL EVERY 4 HOURS PRN
Qty: 120 TABLET | Refills: 0 | Status: SHIPPED | OUTPATIENT
Start: 2024-09-26

## 2024-09-27 ENCOUNTER — HOSPITAL ENCOUNTER (OUTPATIENT)
Facility: OTHER | Age: 74
Discharge: HOME | End: 2024-09-27
Attending: ANESTHESIOLOGY | Admitting: ANESTHESIOLOGY
Payer: MEDICARE

## 2024-09-27 VITALS
SYSTOLIC BLOOD PRESSURE: 138 MMHG | WEIGHT: 240 LBS | OXYGEN SATURATION: 96 % | DIASTOLIC BLOOD PRESSURE: 71 MMHG | TEMPERATURE: 98 F | BODY MASS INDEX: 40.97 KG/M2 | HEIGHT: 64 IN | HEART RATE: 89 BPM | RESPIRATION RATE: 16 BRPM

## 2024-09-27 DIAGNOSIS — G89.29 CHRONIC PAIN: ICD-10-CM

## 2024-09-27 DIAGNOSIS — M54.16 LUMBAR RADICULOPATHY: Primary | ICD-10-CM

## 2024-09-27 PROCEDURE — 63600175 PHARM REV CODE 636 W HCPCS: Performed by: ANESTHESIOLOGY

## 2024-09-27 PROCEDURE — 64483 NJX AA&/STRD TFRM EPI L/S 1: CPT | Mod: LT | Performed by: ANESTHESIOLOGY

## 2024-09-27 PROCEDURE — 25500020 PHARM REV CODE 255: Performed by: ANESTHESIOLOGY

## 2024-09-27 PROCEDURE — 64484 NJX AA&/STRD TFRM EPI L/S EA: CPT | Mod: LT | Performed by: ANESTHESIOLOGY

## 2024-09-27 PROCEDURE — 99152 MOD SED SAME PHYS/QHP 5/>YRS: CPT | Performed by: ANESTHESIOLOGY

## 2024-09-27 PROCEDURE — 64484 NJX AA&/STRD TFRM EPI L/S EA: CPT | Mod: LT,,, | Performed by: ANESTHESIOLOGY

## 2024-09-27 PROCEDURE — 25000003 PHARM REV CODE 250: Performed by: ANESTHESIOLOGY

## 2024-09-27 PROCEDURE — 64483 NJX AA&/STRD TFRM EPI L/S 1: CPT | Mod: LT,,, | Performed by: ANESTHESIOLOGY

## 2024-09-27 RX ORDER — LIDOCAINE HYDROCHLORIDE 10 MG/ML
INJECTION, SOLUTION EPIDURAL; INFILTRATION; INTRACAUDAL; PERINEURAL
Status: DISCONTINUED | OUTPATIENT
Start: 2024-09-27 | End: 2024-09-27 | Stop reason: HOSPADM

## 2024-09-27 RX ORDER — LIDOCAINE HYDROCHLORIDE 20 MG/ML
INJECTION, SOLUTION INFILTRATION; PERINEURAL
Status: DISCONTINUED | OUTPATIENT
Start: 2024-09-27 | End: 2024-09-27 | Stop reason: HOSPADM

## 2024-09-27 RX ORDER — DEXAMETHASONE SODIUM PHOSPHATE 10 MG/ML
INJECTION INTRAMUSCULAR; INTRAVENOUS
Status: DISCONTINUED | OUTPATIENT
Start: 2024-09-27 | End: 2024-09-27 | Stop reason: HOSPADM

## 2024-09-27 RX ORDER — MIDAZOLAM HYDROCHLORIDE 1 MG/ML
INJECTION INTRAMUSCULAR; INTRAVENOUS
Status: DISCONTINUED | OUTPATIENT
Start: 2024-09-27 | End: 2024-09-27 | Stop reason: HOSPADM

## 2024-09-27 RX ORDER — FENTANYL CITRATE 50 UG/ML
INJECTION, SOLUTION INTRAMUSCULAR; INTRAVENOUS
Status: DISCONTINUED | OUTPATIENT
Start: 2024-09-27 | End: 2024-09-27 | Stop reason: HOSPADM

## 2024-09-27 RX ORDER — SODIUM CHLORIDE 9 MG/ML
INJECTION, SOLUTION INTRAVENOUS CONTINUOUS
Status: DISCONTINUED | OUTPATIENT
Start: 2024-09-27 | End: 2024-09-27 | Stop reason: HOSPADM

## 2024-09-27 NOTE — OP NOTE
Lumbar Transforaminal Epidural Steroid Injection under Fluoroscopic Guidance    The procedure, risks, benefits, and options were discussed with the patient. There are no contraindications to the procedure. The patent expressed understanding and agreed to the procedure. Informed written consent was obtained prior to the start of the procedure and can be found in the patient's chart.    PATIENT NAME: Amanda Holt   MRN: [id number]     DATE OF PROCEDURE: 09/27/2024    PROCEDURE:  Left  L3/4 and L5/S1 Lumbar Transforaminal Epidural Steroid Injection under Fluoroscopic Guidance    PRE-OP DIAGNOSIS: Lumbar radiculopathy, chronic [M54.16] Lumbar radiculopathy [M54.16]    POST-OP DIAGNOSIS: Same    PHYSICIAN: Jason Gomez MD    ASSISTANTS: Chinmay Bennett MD  [facility]     MEDICATIONS INJECTED: Preservative-free Decadron 10mg with 5cc of Lidocaine 1% MPF     LOCAL ANESTHETIC INJECTED: Xylocaine 2%     SEDATION: Versed 3mg and Fentanyl 100mcg                                                                                                                                                                                     Conscious sedation ordered by M.D. Patient re-evaluation prior to administration of conscious sedation. No changes noted in patient's status from initial evaluation. The patient's vital signs were monitored by RN and patient remained hemodynamically stable throughout the procedure.    Event Time In   Sedation Start 1101   Sedation End 1113       ESTIMATED BLOOD LOSS: None    COMPLICATIONS: None    TECHNIQUE: Time-out was performed to identify the patient and procedure to be performed. With the patient laying in a prone position, the surgical area was prepped and draped in the usual sterile fashion using ChloraPrep and a fenestrated drape.The levels were determined under fluoroscopy guidance. Skin anesthesia was achieved by injecting Lidocaine 2% over the injection sites. The transforaminal spaces  were then approached with a 22 gauge, 5 inch spinal quinke needle that was introduced under fluoroscopic guidance in the AP and Lateral views. Once the needle tip was in the area of the transforaminal space, and there was no blood, CSF or paraesthesias, contrast dye Omnipaque (300mg/mL) was injected to confirm placement and there was no vascular runoff. Fluoroscopic imaging in the AP and lateral views revealed a clear outline of the spinal nerve with proximal spread of agent through the neural foramen into the epidural space. 3 mL of the medication mixture listed above was injected slowly at each site. Displacement of the radio opaque contrast after injection of the medication confirmed that the medication went into the area of the transforaminal spaces. The needles were removed and bleeding was nil. A sterile dressing was applied. No specimens collected. The patient tolerated the procedure well.       The patient was monitored after the procedure in the recovery area. They were given post-procedure and discharge instructions to follow at home. The patient was discharged in a stable condition.    Sherry Larson MD      I reviewed and edited the fellow's note. I conducted my own interview and physical examination. I agree with the findings. I was present and supervising all critical portions of the procedure.    Jason Gomez MD

## 2024-09-27 NOTE — DISCHARGE SUMMARY
Discharge Note  Short Stay      SUMMARY     Admit Date: 9/27/2024    Attending Physician: Jason Gomez MD    Discharge Physician: Jason Gomez MD      Discharge Date: 9/27/2024 11:13 AM    Procedure(s) (LRB):  LUMBAR TRANSFORAMINAL LEFT L3/4 AND L5/S1 (Left)    Final Diagnosis:  Lumbar radiculopathy, chronic [M54.16]      Disposition: Home or self care    Patient Instructions:   Current Discharge Medication List        CONTINUE these medications which have NOT CHANGED    Details   acetaminophen (TYLENOL) 500 MG tablet Take 2 tablets (1,000 mg total) by mouth every 6 (six) hours as needed.  Qty: 28 tablet, Refills: 0      amitriptyline (ELAVIL) 50 MG tablet Take 1 tablet (50 mg total) by mouth every evening.  Qty: 90 tablet, Refills: 1    Associated Diagnoses: Major depressive disorder, recurrent, mild      aspirin (ECOTRIN) 81 MG EC tablet       clotrimazole-betamethasone 1-0.05% (LOTRISONE) cream APPLY  CREAM TOPICALLY TO AFFECTED AREA TWICE DAILY  Qty: 45 g, Refills: 0    Associated Diagnoses: Candidal intertrigo      cyanocobalamin, vitamin B-12, (B-12 COMPLIANCE) 1,000 mcg/mL Kit Inject 1 mL as directed every 28 days.  Qty: 1 kit, Refills: 5    Associated Diagnoses: B12 deficiency      diclofenac sodium (VOLTAREN ARTHRITIS PAIN) 1 % Gel Apply 2 g topically once daily.  Qty: 100 g, Refills: 0      ergocalciferol (ERGOCALCIFEROL) 50,000 unit Cap Take 1 capsule by mouth every 7 days.      fluticasone propionate (FLONASE) 50 mcg/actuation nasal spray 1 spray (50 mcg total) by Each Nostril route once daily.  Qty: 48 g, Refills: 3    Associated Diagnoses: Nasal drainage      hydrALAZINE (APRESOLINE) 100 MG tablet Take 1 tablet (100 mg total) by mouth every 12 (twelve) hours.  Qty: 180 tablet, Refills: 3    Comments: .  Associated Diagnoses: Primary hypertension      hydrOXYzine HCL (ATARAX) 25 MG tablet Take 1 tablet by mouth three times daily as needed  Qty: 45 tablet, Refills: 0    Associated Diagnoses:  Anxiety      LIDOcaine (LIDODERM) 5 % Place 1 patch onto the skin once daily. Remove & Discard patch within 12 hours or as directed by MD  Qty: 15 patch, Refills: 0      metoprolol succinate (TOPROL-XL) 25 MG 24 hr tablet Take 1 tablet by mouth once daily  Qty: 90 tablet, Refills: 3    Comments: .  Associated Diagnoses: Primary hypertension      mometasone (ELOCON) 0.1 % solution Apply once to twice daily prn itching  Qty: 60 mL, Refills: 5    Associated Diagnoses: Dysesthesia of scalp      mupirocin (BACTROBAN) 2 % ointment Apply topically 3 (three) times daily.  Qty: 22 g, Refills: 0    Associated Diagnoses: Inflamed epidermoid cyst of skin      nystatin (MYCOSTATIN) powder APPLY  POWDER TOPICALLY TO AFFECTED AREA 4 TIMES DAILY  Qty: 30 g, Refills: 5    Associated Diagnoses: Candidal intertrigo      olopatadine (PATANOL) 0.1 % ophthalmic solution INSTILL 1 DROP INTO EACH EYE TWICE DAILY  Qty: 5 mL, Refills: 0    Associated Diagnoses: Allergic conjunctivitis of both eyes      omeprazole (PRILOSEC) 40 MG capsule Take 1 capsule by mouth once daily  Qty: 90 capsule, Refills: 0    Associated Diagnoses: Gastroesophageal reflux disease without esophagitis      oxyCODONE-acetaminophen (PERCOCET) 7.5-325 mg per tablet Take 1 tablet by mouth every 4 (four) hours as needed for Pain.  Qty: 120 tablet, Refills: 0    Comments: Quantity prescribed more than 7 day supply? Yes, quantity medically necessary  Associated Diagnoses: Lumbar radiculopathy      pravastatin (PRAVACHOL) 40 MG tablet Take 1 tablet by mouth once daily  Qty: 90 tablet, Refills: 0    Associated Diagnoses: Atherosclerosis of aorta      semaglutide, weight loss, (WEGOVY) 0.25 mg/0.5 mL PnIj Inject 0.25 mg into the skin once a week.  Qty: 2 mL, Refills: 0    Associated Diagnoses: Class 3 severe obesity with body mass index (BMI) of 45.0 to 49.9 in adult, unspecified obesity type, unspecified whether serious comorbidity present      tiZANidine (ZANAFLEX) 4 MG  tablet TAKE 1 TABLET BY MOUTH EVERY 6 HOURS AS NEEDED  Qty: 90 tablet, Refills: 3    Associated Diagnoses: Lumbar radiculopathy      triamcinolone acetonide 0.1%-ciclopirox-magnesium hydroxide 400 mg/5 ml Apply to affected area twice a day after cool blow dry  Qty: 60 g, Refills: 5    Comments: Pharmacist: mix 30 grams of TAC 0.1% cream with 30 grams of Loprox cream in 120cc of Milk of Magnesia  Associated Diagnoses: Intertrigo      valsartan (DIOVAN) 320 MG tablet Take 1 tablet by mouth once daily  Qty: 90 tablet, Refills: 0    Comments: .  Associated Diagnoses: Hypertension, uncontrolled                 Discharge Diagnosis: Same as above  Condition on Discharge: Stable with no complications to procedure   Diet on Discharge: Same as before.  Activity: as per instruction sheet.  Discharge to: Home with a responsible adult.  Follow up: 2-4 weeks       Please call my office or pager at [phone] if experienced any weakness or loss of sensation, fever > 101.5, pain uncontrolled with oral medications, persistent nausea/vomiting/or diarrhea, redness or drainage from the incisions, or any other worrisome concerns. If physician on call was not reached or could not communicate with our office for any reason please go to the nearest emergency department     Sherry Larson MD

## 2024-09-27 NOTE — DISCHARGE INSTRUCTIONS

## 2024-09-29 ENCOUNTER — PATIENT MESSAGE (OUTPATIENT)
Dept: ADMINISTRATIVE | Facility: OTHER | Age: 74
End: 2024-09-29
Payer: MEDICARE

## 2024-09-30 ENCOUNTER — HOSPITAL ENCOUNTER (EMERGENCY)
Facility: HOSPITAL | Age: 74
Discharge: HOME | End: 2024-09-30
Attending: EMERGENCY MEDICINE
Payer: MEDICARE

## 2024-09-30 ENCOUNTER — PATIENT MESSAGE (OUTPATIENT)
Dept: FAMILY MEDICINE | Facility: CLINIC | Age: 74
End: 2024-09-30
Payer: MEDICARE

## 2024-09-30 ENCOUNTER — TELEPHONE (OUTPATIENT)
Dept: FAMILY MEDICINE | Facility: CLINIC | Age: 74
End: 2024-09-30
Payer: MEDICARE

## 2024-09-30 VITALS
RESPIRATION RATE: 19 BRPM | HEART RATE: 89 BPM | OXYGEN SATURATION: 99 % | TEMPERATURE: 98 F | HEIGHT: 64 IN | SYSTOLIC BLOOD PRESSURE: 203 MMHG | DIASTOLIC BLOOD PRESSURE: 92 MMHG | BODY MASS INDEX: 42 KG/M2 | WEIGHT: 246 LBS

## 2024-09-30 DIAGNOSIS — R03.0 ELEVATED BLOOD PRESSURE READING: Primary | ICD-10-CM

## 2024-09-30 DIAGNOSIS — I10 HYPERTENSION: ICD-10-CM

## 2024-09-30 LAB
ALBUMIN SERPL BCP-MCNC: 3.3 G/DL (ref 3.5–5.2)
ALP SERPL-CCNC: 91 U/L (ref 55–135)
ALT SERPL W/O P-5'-P-CCNC: 22 U/L (ref 10–44)
ANION GAP SERPL CALC-SCNC: 9 MMOL/L (ref 8–16)
AST SERPL-CCNC: 21 U/L (ref 10–40)
BASOPHILS # BLD AUTO: 0.02 K/UL (ref 0–0.2)
BASOPHILS NFR BLD: 0.3 % (ref 0–1.9)
BILIRUB SERPL-MCNC: 0.3 MG/DL (ref 0.1–1)
BUN SERPL-MCNC: 24 MG/DL (ref 8–23)
CALCIUM SERPL-MCNC: 9.5 MG/DL (ref 8.7–10.5)
CHLORIDE SERPL-SCNC: 110 MMOL/L (ref 95–110)
CO2 SERPL-SCNC: 22 MMOL/L (ref 23–29)
CREAT SERPL-MCNC: 1.5 MG/DL (ref 0.5–1.4)
DIFFERENTIAL METHOD BLD: ABNORMAL
EOSINOPHIL # BLD AUTO: 0.2 K/UL (ref 0–0.5)
EOSINOPHIL NFR BLD: 2.2 % (ref 0–8)
ERYTHROCYTE [DISTWIDTH] IN BLOOD BY AUTOMATED COUNT: 12.3 % (ref 11.5–14.5)
EST. GFR  (NO RACE VARIABLE): 37 ML/MIN/1.73 M^2
GLUCOSE SERPL-MCNC: 118 MG/DL (ref 70–110)
HCT VFR BLD AUTO: 37.8 % (ref 37–48.5)
HGB BLD-MCNC: 11.3 G/DL (ref 12–16)
IMM GRANULOCYTES # BLD AUTO: 0.02 K/UL (ref 0–0.04)
IMM GRANULOCYTES NFR BLD AUTO: 0.3 % (ref 0–0.5)
LYMPHOCYTES # BLD AUTO: 1.9 K/UL (ref 1–4.8)
LYMPHOCYTES NFR BLD: 26.1 % (ref 18–48)
MCH RBC QN AUTO: 29.3 PG (ref 27–31)
MCHC RBC AUTO-ENTMCNC: 29.9 G/DL (ref 32–36)
MCV RBC AUTO: 98 FL (ref 82–98)
MONOCYTES # BLD AUTO: 0.7 K/UL (ref 0.3–1)
MONOCYTES NFR BLD: 9.7 % (ref 4–15)
NEUTROPHILS # BLD AUTO: 4.5 K/UL (ref 1.8–7.7)
NEUTROPHILS NFR BLD: 61.4 % (ref 38–73)
NRBC BLD-RTO: 0 /100 WBC
PLATELET # BLD AUTO: 279 K/UL (ref 150–450)
PMV BLD AUTO: 9.2 FL (ref 9.2–12.9)
POTASSIUM SERPL-SCNC: 4.4 MMOL/L (ref 3.5–5.1)
PROT SERPL-MCNC: 7.1 G/DL (ref 6–8.4)
RBC # BLD AUTO: 3.86 M/UL (ref 4–5.4)
SODIUM SERPL-SCNC: 141 MMOL/L (ref 136–145)
WBC # BLD AUTO: 7.31 K/UL (ref 3.9–12.7)

## 2024-09-30 PROCEDURE — 80053 COMPREHEN METABOLIC PANEL: CPT | Performed by: PHYSICIAN ASSISTANT

## 2024-09-30 PROCEDURE — 99284 EMERGENCY DEPT VISIT MOD MDM: CPT | Mod: 25

## 2024-09-30 PROCEDURE — 93010 ELECTROCARDIOGRAM REPORT: CPT | Mod: ,,, | Performed by: INTERNAL MEDICINE

## 2024-09-30 PROCEDURE — 25000003 PHARM REV CODE 250: Performed by: PHYSICIAN ASSISTANT

## 2024-09-30 PROCEDURE — 85025 COMPLETE CBC W/AUTO DIFF WBC: CPT | Performed by: PHYSICIAN ASSISTANT

## 2024-09-30 PROCEDURE — 63600175 PHARM REV CODE 636 W HCPCS: Performed by: PHYSICIAN ASSISTANT

## 2024-09-30 PROCEDURE — 96374 THER/PROPH/DIAG INJ IV PUSH: CPT

## 2024-09-30 PROCEDURE — 93005 ELECTROCARDIOGRAM TRACING: CPT

## 2024-09-30 RX ORDER — CLONIDINE HYDROCHLORIDE 0.1 MG/1
0.1 TABLET ORAL
Status: DISCONTINUED | OUTPATIENT
Start: 2024-09-30 | End: 2024-09-30

## 2024-09-30 RX ORDER — HYDRALAZINE HYDROCHLORIDE 20 MG/ML
5 INJECTION INTRAMUSCULAR; INTRAVENOUS
Status: COMPLETED | OUTPATIENT
Start: 2024-09-30 | End: 2024-09-30

## 2024-09-30 RX ORDER — AMLODIPINE BESYLATE 5 MG/1
5 TABLET ORAL DAILY
Qty: 30 TABLET | Refills: 0 | Status: SHIPPED | OUTPATIENT
Start: 2024-09-30 | End: 2024-09-30 | Stop reason: CLARIF

## 2024-09-30 RX ORDER — ACETAMINOPHEN 500 MG
500 TABLET ORAL
Status: COMPLETED | OUTPATIENT
Start: 2024-09-30 | End: 2024-09-30

## 2024-09-30 RX ORDER — AMLODIPINE BESYLATE 5 MG/1
5 TABLET ORAL
Status: COMPLETED | OUTPATIENT
Start: 2024-09-30 | End: 2024-09-30

## 2024-09-30 RX ADMIN — AMLODIPINE BESYLATE 5 MG: 5 TABLET ORAL at 02:09

## 2024-09-30 RX ADMIN — ACETAMINOPHEN 500 MG: 500 TABLET ORAL at 02:09

## 2024-09-30 RX ADMIN — HYDRALAZINE HYDROCHLORIDE 5 MG: 20 INJECTION INTRAMUSCULAR; INTRAVENOUS at 02:09

## 2024-09-30 NOTE — TELEPHONE ENCOUNTER
Spoke with pt, pt stated that she has been having elevated b/p. After speaking with Dr. Torres pt was advised to go to urgent care. Pt stated that she will go to ED today.

## 2024-09-30 NOTE — TELEPHONE ENCOUNTER
----- Message from Med Assistant Nathaniel sent at 9/30/2024  9:45 AM CDT -----  Type:  Needs Medical Advice/Symptom-based Call    Who Called: Self    Symptoms (please be specific): states she's been having elevated blood pressures and she would like to speak to her provider to see if she needs to come in  .         How long has patient had these symptoms: Since August     Would the patient rather a call back or a response via [facility]? Yes, call     Best Call Back Number:  [phone] (home)

## 2024-09-30 NOTE — DISCHARGE INSTRUCTIONS

## 2024-09-30 NOTE — ED TRIAGE NOTES
Pt reports elevated bp.   She takes her medications daily.  Recently was taken off of nifedipine due to her kidney function.  Recent stress of death of grandson, hx: HTN, HLD, degenerative disc disease.

## 2024-09-30 NOTE — ED TRIAGE NOTES
Patient reports elevated BP for past few days, had reading 200/100 prior to taking medications, took BP meds around 11AM, states slight HA, has chronic back pain had epidural on Friday for, denies any other symptoms.

## 2024-10-01 ENCOUNTER — OFFICE VISIT (OUTPATIENT)
Dept: ORTHOPEDICS | Facility: CLINIC | Age: 74
End: 2024-10-01
Payer: MEDICARE

## 2024-10-01 ENCOUNTER — PATIENT MESSAGE (OUTPATIENT)
Dept: FAMILY MEDICINE | Facility: CLINIC | Age: 74
End: 2024-10-01
Payer: MEDICARE

## 2024-10-01 ENCOUNTER — NURSE TRIAGE (OUTPATIENT)
Dept: ADMINISTRATIVE | Facility: CLINIC | Age: 74
End: 2024-10-01
Payer: MEDICARE

## 2024-10-01 VITALS
DIASTOLIC BLOOD PRESSURE: 98 MMHG | BODY MASS INDEX: 42.01 KG/M2 | SYSTOLIC BLOOD PRESSURE: 180 MMHG | HEIGHT: 64 IN | WEIGHT: 246.06 LBS

## 2024-10-01 DIAGNOSIS — M17.11 PRIMARY OSTEOARTHRITIS OF RIGHT KNEE: Primary | ICD-10-CM

## 2024-10-01 LAB
OHS QRS DURATION: 68 MS
OHS QTC CALCULATION: 444 MS

## 2024-10-01 PROCEDURE — 4010F ACE/ARB THERAPY RXD/TAKEN: CPT | Mod: CPTII,S$GLB,, | Performed by: ORTHOPAEDIC SURGERY

## 2024-10-01 PROCEDURE — 3077F SYST BP >= 140 MM HG: CPT | Mod: CPTII,S$GLB,, | Performed by: ORTHOPAEDIC SURGERY

## 2024-10-01 PROCEDURE — 3080F DIAST BP >= 90 MM HG: CPT | Mod: CPTII,S$GLB,, | Performed by: ORTHOPAEDIC SURGERY

## 2024-10-01 PROCEDURE — 1125F AMNT PAIN NOTED PAIN PRSNT: CPT | Mod: CPTII,S$GLB,, | Performed by: ORTHOPAEDIC SURGERY

## 2024-10-01 PROCEDURE — 20610 DRAIN/INJ JOINT/BURSA W/O US: CPT | Mod: RT,S$GLB,, | Performed by: ORTHOPAEDIC SURGERY

## 2024-10-01 PROCEDURE — 3008F BODY MASS INDEX DOCD: CPT | Mod: CPTII,S$GLB,, | Performed by: ORTHOPAEDIC SURGERY

## 2024-10-01 PROCEDURE — 1159F MED LIST DOCD IN RCRD: CPT | Mod: CPTII,S$GLB,, | Performed by: ORTHOPAEDIC SURGERY

## 2024-10-01 PROCEDURE — 3066F NEPHROPATHY DOC TX: CPT | Mod: CPTII,S$GLB,, | Performed by: ORTHOPAEDIC SURGERY

## 2024-10-01 PROCEDURE — 99213 OFFICE O/P EST LOW 20 MIN: CPT | Mod: 25,S$GLB,, | Performed by: ORTHOPAEDIC SURGERY

## 2024-10-01 PROCEDURE — 99999 PR PBB SHADOW E&M-EST. PATIENT-LVL IV: CPT | Mod: PBBFAC,,, | Performed by: ORTHOPAEDIC SURGERY

## 2024-10-01 PROCEDURE — 3061F NEG MICROALBUMINURIA REV: CPT | Mod: CPTII,S$GLB,, | Performed by: ORTHOPAEDIC SURGERY

## 2024-10-01 PROCEDURE — 3288F FALL RISK ASSESSMENT DOCD: CPT | Mod: CPTII,S$GLB,, | Performed by: ORTHOPAEDIC SURGERY

## 2024-10-01 PROCEDURE — 1101F PT FALLS ASSESS-DOCD LE1/YR: CPT | Mod: CPTII,S$GLB,, | Performed by: ORTHOPAEDIC SURGERY

## 2024-10-01 PROCEDURE — 3044F HG A1C LEVEL LT 7.0%: CPT | Mod: CPTII,S$GLB,, | Performed by: ORTHOPAEDIC SURGERY

## 2024-10-01 RX ORDER — TRIAMCINOLONE ACETONIDE 40 MG/ML
40 INJECTION, SUSPENSION INTRA-ARTICULAR; INTRAMUSCULAR
Status: DISCONTINUED | OUTPATIENT
Start: 2024-10-01 | End: 2024-10-01 | Stop reason: HOSPADM

## 2024-10-01 RX ADMIN — TRIAMCINOLONE ACETONIDE 40 MG: 40 INJECTION, SUSPENSION INTRA-ARTICULAR; INTRAMUSCULAR at 09:10

## 2024-10-01 NOTE — ED PROVIDER NOTES
Encounter Date: 9/30/2024       History     Chief Complaint   Patient presents with    Hypertension    Headache     Pt to ED from home with c/o HTN and headache, /105 at home. Pt denies cp, sob n/v/d. Pt states took BP meds prior to arrival     Chief complaint: Hypertension     HPI:     73-year-old female with history of hypertension hyperlipidemia CKD, chronic lumbar back pain followed by pain management sent by digital hypertension program for elevated blood pressure 211/105 at home.  She states it was was taken at 10:00 a.m..  She took her blood pressure medications valsartan, hydralazine and metoprolol at 11:00 a.m. she reports her blood pressure has been uncontrolled and elevated at since her primary care doctor stopped her nifedipine due to hypotension and to poor renal function.      She reports that she has headaches in the mornings whenever she wakes up that she feels this is secondary to sleep apnea she only sleeps a proximally 2 hours per evening.  Denies any worsening headache, visual disturbance, weakness, paresthesias, confusion, gait or speech difficulty.    She additionally reports he has been under increased stress with the recent death of her grandson this month.  She was taking fluoxetine and amitriptyline for depression anxiety.  She denies suicidal homicidal ideation.      She reports she was a poor appetite.  Denies any decreased p.o. intake or decreased urine output, dysuria hematuria or frequency      Denies chest pain.  Reports she was shortness breath intermittently for the past 2 months that she feels this is secondary to unintentional weight gain in the past year from being unable to exercise as she usually does due to her back pain and a knee injury that occurred 7 months ago.  She denies lower extremity swelling, history of DVT or PE, recent travel trauma surgery history of cancer hormone therapy hemoptysis.    The history is provided by the patient.     Review of patient's  allergies indicates:   Allergen Reactions    Codeine      Past Medical History:   Diagnosis Date    Arthralgia     Colon polyp     Degenerative disc disease at L5-S1 level     High cholesterol     Hypertension     Notalgia paresthetica     Nuclear sclerosis of both eyes 01/08/2020    Sciatica     Spinal stenosis     Tobacco use 10/21/2021     Past Surgical History:   Procedure Laterality Date    BACK SURGERY      lumbar laminectomy    COLON SURGERY  2022    Tear repaired    COLONOSCOPY N/A 07/23/2020    Procedure: COLONOSCOPY;  Surgeon: Donal Moore MD;  Location: [facility] ENDO;  Service: Endoscopy;  Laterality: N/A;    EPIDURAL STEROID INJECTION Left 09/09/2020    Procedure: Injection, Steroid, Epidural Transforaminal;  Surgeon: Jun Leavitt Jr., MD;  Location: [facility] ENDO;  Service: Pain Management;  Laterality: Left;  Left L5 + S1 TF WADE  Arrive @ 1300; ASA last 9/1; No DM    EPIDURAL STEROID INJECTION Left 02/12/2021    Procedure: Injection, Steroid, Epidural Transforaminal;  Surgeon: Jun Leavitt Jr., MD;  Location: [facility] ENDO;  Service: Pain Management;  Laterality: Left;  Left L4 + L5 TF WADE  Arrive @ 1230; IV Sedation; ASA last 2/4; No DM    INJECTION OF FACET JOINT Bilateral 3/25/2024    Procedure: FACET JOINT INJECTION BILATERAL L3/4 *ASPIRIN CLEARANCE IN CHART*;  Surgeon: Darya Ayala MD;  Location: [facility] PAIN MGT;  Service: Pain Management;  Laterality: Bilateral;  [phone]    SPINE SURGERY  10/08/18    Bilateral Lumbar Laminectomy with Fusion and Screws    TRANSFORAMINAL EPIDURAL INJECTION OF STEROID Left 03/14/2022    Procedure: INJECTION, STEROID, EPIDURAL, TRANSFORAMINAL APPROACH, L4-L5 & L5-S1;  Surgeon: Darya Ayala MD;  Location: [facility] PAIN MGT;  Service: Pain Management;  Laterality: Left;    TRANSFORAMINAL EPIDURAL INJECTION OF STEROID Left 08/29/2022    Procedure: LUMBAR TRANSFORAMINAL LEFT L4/5 AND L5/S1 CONTRAST;  Surgeon: Darya Ayala MD;  Location: [facility] PAIN MGT;  Service:  Pain Management;  Laterality: Left;    TRANSFORAMINAL EPIDURAL INJECTION OF STEROID Left 2024    Procedure: LUMBAR TRANSFORAMINAL LEFT L3/4 AND L5/S1;  Surgeon: Jason Gomez MD;  Location: [facility];  Service: Pain Management;  Laterality: Left;  [phone]  2 WK F/U RONAK    TUBAL LIGATION       Family History   Problem Relation Name Age of Onset    Breast cancer Mother Marianna Benítez     Hypertension Mother Marianna Benítez             Hypotension Mother Marianna Benítez     Cancer Mother Marianna Benítez          due to breast cancer    Miscarriages / Stillbirths Mother Marianna Benítez         3 stillborn children    Cataracts Father Matthias Rahman.  Jeyson     Glaucoma Father Matthias Rahman.  Jeyson     Diabetes Father Matthias Rahman.  Jeyson             Arthritis Father Matthias Rahman.  Jeyson     Vision loss Father Matthias Benítez         Glaucoma -     Glaucoma Sister Debby Benítez     Arthritis Sister Debby Benítez     Diabetes Sister Debby Benítez     Hypertension Sister Debby Benítez     Vision loss Sister Debby Benítez         Glasses    Drug abuse Brother Sky Jeyson     Learning disabilities Brother Sky Jeyson     No Known Problems Brother      No Known Problems Brother x3     No Known Problems Maternal Aunt      No Known Problems Maternal Uncle      No Known Problems Paternal Aunt      No Known Problems Paternal Uncle      No Known Problems Maternal Grandmother      No Known Problems Maternal Grandfather      No Known Problems Paternal Grandmother      No Known Problems Paternal Grandfather      No Known Problems Son x2     No Known Problems Other      Arthritis Sister Susan Benítez     Depression Sister Susan Benítez     Diabetes Sister Susan Benítez     Hearing loss Sister Susan Benítez         Trouble hearing    Hypertension Sister Susan Benítez     Stroke Sister Susan Benítez         B[place] Hunt Disease, Coma for a month due to diabetes,Some body weakness     Vision loss Sister Susan Benítez         Lasix surgery    Arthritis Sister Urszula Dawson     COPD Sister Urszula Green     Diabetes Sister Urszula Dawson     Hearing loss Sister Urszula Dawson     Heart disease Sister Urszula Green     Hypertension Sister Urszula Dawson     Arthritis Brother Murtaza Nowak     Hypertension Brother Murtaza Nowak     Early death Sister Christine Benítez         Still born    Early death Sister Catherine Benítez         Still born twin to Debby Benítez    Early death Brother Krishan Benítez stillborn     Hypertension Sister Kateryna Kraft     Hypertension Brother Robbie Benítez     Learning disabilities Son Vaibhav Holt     Learning disabilities Son Fidel [place]         Great grandson    Learning disabilities Daughter Carmen's Aries         Granddaughter    Mental illness Sister Shawna Benítez         Her son Matthias Benítez    Vision loss Sister Shawna Benítez         Glasses/contacts    Vision loss Sister Chelly Benítez         Glasses    Amblyopia Neg Hx      Blindness Neg Hx      Macular degeneration Neg Hx      Retinal detachment Neg Hx      Strabismus Neg Hx      Thyroid disease Neg Hx       Social History     Tobacco Use    Smoking status: Former     Current packs/day: 0.00     Average packs/day: 0.3 packs/day for 35.0 years (8.8 ttl pk-yrs)     Types: Cigarettes     Start date: 3/15/1987     Quit date: 3/15/2022     Years since quittin.5    Smokeless tobacco: Current    Tobacco comments:     Occasional spoker some times 1-2 cigarettes/day sometimes none for weeks   Substance Use Topics    Alcohol use: Yes     Alcohol/week: 3.0 standard drinks of alcohol     Types: 3 Glasses of wine per week     Comment: Occasional wine    Drug use: Not Currently     Comment: Tramadol twice a day as needed     Review of Systems   Constitutional:  Negative for chills and fever.   HENT:  Negative for congestion, ear pain, nosebleeds, rhinorrhea, sore throat and trouble swallowing.    Eyes:  Negative for redness.    Respiratory:  Positive for shortness of breath. Negative for cough and stridor.    Cardiovascular:  Negative for chest pain and leg swelling.   Gastrointestinal:  Negative for abdominal pain, constipation, diarrhea, nausea and vomiting.   Genitourinary:  Negative for decreased urine volume, dysuria, frequency, hematuria and urgency.   Musculoskeletal:  Negative for back pain and neck pain.   Skin:  Negative for rash and wound.   Neurological:  Positive for headaches. Negative for dizziness, speech difficulty, weakness, light-headedness and numbness.   Hematological:  Does not bruise/bleed easily.   Psychiatric/Behavioral:  Negative for confusion.        Physical Exam     Initial Vitals [09/30/24 1122]   BP Pulse Resp Temp SpO2   (!) 215/125 84 17 98.4 °F (36.9 °C) 97 %      MAP       --         Physical Exam    Nursing note and vitals reviewed.  Constitutional: She appears well-developed and well-nourished.   HENT:   Head: Normocephalic.   Right Ear: External ear normal.   Left Ear: External ear normal.   Nose: Nose normal. Mouth/Throat: Oropharynx is clear and moist.   Eyes: Conjunctivae are normal.   Cardiovascular:  Normal rate and regular rhythm.     Exam reveals no gallop and no friction rub.       No murmur heard.  Pulmonary/Chest: Breath sounds normal. She has no wheezes. She has no rhonchi. She has no rales.   Abdominal: Abdomen is soft. Bowel sounds are normal. She exhibits no distension. There is no abdominal tenderness. There is no rebound, no guarding, no tenderness at McBurney's point and negative Nance's sign.   Musculoskeletal:         General: Normal range of motion.     Lymphadenopathy:     She has no cervical adenopathy.   Neurological: She is alert. She has normal strength. No cranial nerve deficit or sensory deficit. Coordination and gait normal.   Skin: Skin is warm and dry.   Psychiatric: She has a normal mood and affect.         ED Course   Procedures  Labs Reviewed   CBC W/ AUTO  DIFFERENTIAL - Abnormal       Result Value    WBC 7.31      RBC 3.86 (*)     Hemoglobin 11.3 (*)     Hematocrit 37.8      MCV 98      MCH 29.3      MCHC 29.9 (*)     RDW 12.3      Platelets 279      MPV 9.2      Immature Granulocytes 0.3      Gran # (ANC) 4.5      Immature Grans (Abs) 0.02      Lymph # 1.9      Mono # 0.7      Eos # 0.2      Baso # 0.02      nRBC 0      Gran % 61.4      Lymph % 26.1      Mono % 9.7      Eosinophil % 2.2      Basophil % 0.3      Differential Method Automated     COMPREHENSIVE METABOLIC PANEL - Abnormal    Sodium 141      Potassium 4.4      Chloride 110      CO2 22 (*)     Glucose 118 (*)     BUN 24 (*)     Creatinine 1.5 (*)     Calcium 9.5      Total Protein 7.1      Albumin 3.3 (*)     Total Bilirubin 0.3      Alkaline Phosphatase 91      AST 21      ALT 22      eGFR 37 (*)     Anion Gap 9            Imaging Results    None          Medications   acetaminophen tablet 500 mg (500 mg Oral Given 9/30/24 1423)   amLODIPine tablet 5 mg (5 mg Oral Given 9/30/24 1423)   hydrALAZINE injection 5 mg (5 mg Intravenous Given 9/30/24 1434)     Medical Decision Making  73-year-old female presenting for elevated blood pressure.  She was complaining of headache and shortness breath intermittently however this are chronic symptoms that she has.  No new features or worsening.    Patient was afebrile nontoxic appearing in no distress.  Exam above.  Blood pressure was elevated.  No focal neurologic deficits.  No meningeal signs.  No sinus tenderness.  No URI symptoms.  She was not appear dehydrated.  CBC without leukocytosis or significant anemia.  CMP with chronic kidney disease with improvement compared to prior.  She denies any chest pain or shortness breath currently.  She states she was feeling short of breath intermittently that she states this due to anxiety.  No evidence of fluid overload on exam.   EKG with NSR. No malignant arrhythmia or acute ischemia  Considered but low suspicion for  hypertensive emergency at this time.   IV hydralazine and po amlodipine given in ED with improvement of BP.   Asymptomatic at DC. PCP follow up in 1 days. Return to ER for worsening or a sneeded    Amount and/or Complexity of Data Reviewed  Labs: ordered.    Risk  OTC drugs.  Prescription drug management.                                      Clinical Impression:  Final diagnoses:  [I10] Hypertension  [R03.0] Elevated blood pressure reading (Primary)          ED Disposition Condition    Discharge Stable          ED Prescriptions       Medication Sig Dispense Start Date End Date Auth. Provider    amLODIPine (NORVASC) 5 MG tablet  (Status: Discontinued) Take 1 tablet (5 mg total) by mouth once daily. 30 tablet 9/30/2024 9/30/2024 Agnes Jennings PA-C          Follow-up Information       Follow up With Specialties Details Why Contact Info    Simona Torres MD Family Medicine Schedule an appointment as soon as possible for a visit in 2 days for follow up [address]  [address]  [place] [address]  [phone]      [facility] Emergency Medicine Go to  As needed, If symptoms worsen [address] [facility] [place] [id number] [phone]             Agnes Jennings PA-C  09/30/24 2034

## 2024-10-01 NOTE — PROGRESS NOTES
NEW PATIENT ORTHOPAEDIC: Knee    PRIMARY CARE PHYSICIAN: Simona Torres MD   REFERRING PROVIDER: No referring provider defined for this encounter.     ASSESSMENT & PLAN:    Impression:  Right Knee Mild Osteoarthritis, Primary      Follow Up Plan: PRN     Injection:     Amanda Holt has physical exam evidence of above and wishes to pursue an injection. We discussed alternative non operative modalities including physical therapy, anti-inflammatories, bracing, maintenance of appropriate weight, rest, ambulatory devices. We discussed the risk, benefits, and expectations regarding injection. They have elected to proceed with injection. Should injections fail next step would be MRI. See procedure note for billing purposes.     Non operative care:    Amanda Holt has physical exam evidence of above and wishes to pursue an non-operative care. I am recommending the following: injection    Patient comes in with a greater than three-month history of having intermittent right knee pain and swelling.  She associates this with a slip that she sustained.  She has confounding lumbar spine issues and previous surgery on her lumbar spine.  She reports an unsteady gait relative to this.  She reports pain in the medial and posterior aspects of her knee.  She reports intermittent mechanical symptoms in her knee.  What is related to arthritis I think she may benefit from intra-articular injection.  She is on narcotic medications at baseline.  She would injections fail to provide long-lasting relief could think about physical therapy or an MRI.    The patient has been ordered:  Office Intraarticular injection    CONSULTS:   None    ACTIVE PROBLEM LIST  Patient Active Problem List   Diagnosis    Contact lens overwear of both eyes    Refractive error    Nuclear sclerosis of both eyes    Spinal stenosis    Hypertension    ANTONIA (obstructive sleep apnea)    Gastroesophageal reflux disease without esophagitis    CKD (chronic  kidney disease)    Chronic low back pain    Bilateral hearing loss    DDD (degenerative disc disease), lumbar    CTS (carpal tunnel syndrome)    Anxiety    Asymptomatic microscopic hematuria    Lumbar radiculopathy, chronic    Lumbar spondylosis    Lumbar herniated disc    Other nonthrombocytopenic purpura    Major depressive disorder, recurrent, mild    Atherosclerosis of aorta    Stage 3a chronic kidney disease    Wears contact lenses    Posterior vitreous detachment of right eye    Class 3 severe obesity due to excess calories with serious comorbidity in adult    Decreased functional mobility and endurance    Sleep-related breathing disorder    AKBAR (dyspnea on exertion)    Peripheral edema    Insomnia    Former smoker    Hyperlipidemia    Hypertensive renal disease    Multiple renal cysts    Vitamin D deficiency    B12 deficiency           SUBJECTIVE    CHIEF COMPLAINT: Knee Pain    HPI:   Amanda Holt is a 73 y.o. female here for evaluation and management of right knee pain. There is a specific incident that brought about this pain. she has had progressive problems with the knee(s) starting 3 months ago but is now progressing to interfere with activities which include: walking and functional household ADL's    Currently the pain in the joint is rated at mild with activity. The pain is intermittent and is located in the knee, located medially, located anterior, and located posterior. The pain is described as aching and throbbing. Relieving factors include ambulatory device, rest, prescription medication, and repositioning.     There is not associated Catching, Clicking, and Popping.     Amanda Holt has no additional complaints.     10/1/24: Patient comes in for repeat evaluation of right knee pain. She had injection, it was helping with her pain and mechanical symptoms in her knee. However, she had an EMG test, unable to completed secondary to pan and discomfort, since has ongoing pain.      PROGRESSIVE SYMPTOMS:  Pain worsened by weight bearing  Pain effecting living situation    FUNCTIONAL STATUS:   Walk a block or two on level ground     PREVIOUS TREATMENTS:  Medical: Intolerant of NSAIDS and Tylenol  Physical Therapy: Use of Ambulatory Aid and Activities Modified   Previous Orthopaedic Surgery: Lumbar    REVIEW OF SYSTEMS:  PAIN ASSESSMENT:  See HPI.  MUSCULOSKELETAL: See HPI.  OTHER 10 point review of systems is negative except as stated in HPI above    PAST MEDICAL HISTORY   has a past medical history of Arthralgia, Colon polyp, Degenerative disc disease at L5-S1 level, High cholesterol, Hypertension, Notalgia paresthetica, Nuclear sclerosis of both eyes (01/08/2020), Sciatica, Spinal stenosis, and Tobacco use (10/21/2021).     PAST SURGICAL HISTORY   has a past surgical history that includes Tubal ligation; Back surgery; Colonoscopy (N/A, 07/23/2020); Epidural steroid injection (Left, 09/09/2020); Epidural steroid injection (Left, 02/12/2021); Transforaminal epidural injection of steroid (Left, 03/14/2022); Transforaminal epidural injection of steroid (Left, 08/29/2022); Spine surgery (10/08/18); Colon surgery (2022); Injection of facet joint (Bilateral, 3/25/2024); and Transforaminal epidural injection of steroid (Left, 9/27/2024).     FAMILY HISTORY  family history includes Arthritis in her brother, father, sister, sister, and sister; Breast cancer in her mother; COPD in her sister; Cancer in her mother; Cataracts in her father; Depression in her sister; Diabetes in her father, sister, sister, and sister; Drug abuse in her brother; Early death in her brother, sister, and sister; Glaucoma in her father and sister; Hearing loss in her sister and sister; Heart disease in her sister; Hypertension in her brother, brother, mother, sister, sister, sister, and sister; Hypotension in her mother; Learning disabilities in her brother, daughter, son, and son; Mental illness in her sister; Miscarriages  / Stillbirths in her mother; No Known Problems in her brother, brother, maternal aunt, maternal grandfather, maternal grandmother, maternal uncle, paternal aunt, paternal grandfather, paternal grandmother, paternal uncle, son, and another family member; Stroke in her sister; Vision loss in her father, sister, sister, sister, and sister.     SOCIAL HISTORY   reports that she quit smoking about 2 years ago. Her smoking use included cigarettes. She started smoking about 37 years ago. She has a 8.8 pack-year smoking history. She uses smokeless tobacco. She reports current alcohol use of about 3.0 standard drinks of alcohol per week. She reports that she does not currently use drugs.     ALLERGIES   Review of patient's allergies indicates:   Allergen Reactions    Codeine         MEDICATIONS  Current Outpatient Medications on File Prior to Visit   Medication Sig Dispense Refill    acetaminophen (TYLENOL) 500 MG tablet Take 2 tablets (1,000 mg total) by mouth every 6 (six) hours as needed. 28 tablet 0    amitriptyline (ELAVIL) 50 MG tablet Take 1 tablet (50 mg total) by mouth every evening. 90 tablet 1    aspirin (ECOTRIN) 81 MG EC tablet       clotrimazole-betamethasone 1-0.05% (LOTRISONE) cream APPLY  CREAM TOPICALLY TO AFFECTED AREA TWICE DAILY 45 g 0    cyanocobalamin, vitamin B-12, (B-12 COMPLIANCE) 1,000 mcg/mL Kit Inject 1 mL as directed every 28 days. 1 kit 5    diclofenac sodium (VOLTAREN ARTHRITIS PAIN) 1 % Gel Apply 2 g topically once daily. 100 g 0    ergocalciferol (ERGOCALCIFEROL) 50,000 unit Cap Take 1 capsule by mouth every 7 days.      fluticasone propionate (FLONASE) 50 mcg/actuation nasal spray 1 spray (50 mcg total) by Each Nostril route once daily. 48 g 3    hydrOXYzine HCL (ATARAX) 25 MG tablet Take 1 tablet by mouth three times daily as needed 45 tablet 0    LIDOcaine (LIDODERM) 5 % Place 1 patch onto the skin once daily. Remove & Discard patch within 12 hours or as directed by MD 15 patch 0     "metoprolol succinate (TOPROL-XL) 25 MG 24 hr tablet Take 1 tablet by mouth once daily 90 tablet 3    mometasone (ELOCON) 0.1 % solution Apply once to twice daily prn itching 60 mL 5    mupirocin (BACTROBAN) 2 % ointment Apply topically 3 (three) times daily. 22 g 0    nystatin (MYCOSTATIN) powder APPLY  POWDER TOPICALLY TO AFFECTED AREA 4 TIMES DAILY 30 g 5    olopatadine (PATANOL) 0.1 % ophthalmic solution INSTILL 1 DROP INTO EACH EYE TWICE DAILY 5 mL 0    oxyCODONE-acetaminophen (PERCOCET) 7.5-325 mg per tablet Take 1 tablet by mouth every 4 (four) hours as needed for Pain. 120 tablet 0    pravastatin (PRAVACHOL) 40 MG tablet Take 1 tablet by mouth once daily 90 tablet 0    semaglutide, weight loss, (WEGOVY) 0.25 mg/0.5 mL PnIj Inject 0.25 mg into the skin once a week. 2 mL 0    tiZANidine (ZANAFLEX) 4 MG tablet TAKE 1 TABLET BY MOUTH EVERY 6 HOURS AS NEEDED 90 tablet 3    triamcinolone acetonide 0.1%-ciclopirox-magnesium hydroxide 400 mg/5 ml Apply to affected area twice a day after cool blow dry 60 g 5    valsartan (DIOVAN) 320 MG tablet Take 1 tablet by mouth once daily 90 tablet 0    hydrALAZINE (APRESOLINE) 100 MG tablet Take 1 tablet (100 mg total) by mouth every 12 (twelve) hours. 180 tablet 3     Current Facility-Administered Medications on File Prior to Visit   Medication Dose Route Frequency Provider Last Rate Last Admin    [COMPLETED] acetaminophen tablet 500 mg  500 mg Oral ED 1 Time Agnes Jennings PA-C   500 mg at 09/30/24 1423    [COMPLETED] amLODIPine tablet 5 mg  5 mg Oral ED 1 Time Agnes Jennings PA-C   5 mg at 09/30/24 1423    [COMPLETED] hydrALAZINE injection 5 mg  5 mg Intravenous ED 1 Time Agnes Jennings PA-C   5 mg at 09/30/24 1434    [DISCONTINUED] cloNIDine tablet 0.1 mg  0.1 mg Oral ED 1 Time Agnes Jennings PA-C        [DISCONTINUED] cloNIDine tablet 0.1 mg  0.1 mg Oral ED 1 Time Agnes Jennings, PA-C              PHYSICAL EXAM   height is 5' 4" " (1.626 m) and weight is 111.6 kg (246 lb 0.5 oz). Her blood pressure is 180/98 (abnormal).   Body mass index is 42.23 kg/m².      All other systems deferred.  GENERAL:  No acute distress  HABITUS: Morbid Obese  GAIT: Antalgic  SKIN: Normal  and No erythema, warmth, fluctuance     KNEE EXAM:    right:   Effusion: Minimal joint effusion  TTP: Yes over Medial Joint Line   No crepitus with passive knee ROM  Passive ROM: Extension 0, Flexion 130  No pain with manipulation of patella  Stable to varus/valgus stress. No increased laxity to anterior/posterior drawer testing  negative Agustin's test  No pain with IR/ER rotation of the hip  5/5 strength in knee flexion and extension, ankle plantarflexion and dorsiflexion  Neurovascular Status: Sensation intact to light touch in Sural, Saphenous, SPN, DPN, Tibial nerve distribution  2+ pulse DP/PT, normal capillary refill, foot has normal warmth    DATA:  Diagnostic tests reviewed for today's visit:     4v of the knee reveal Mild degenerative changes of the Medial and Patellofemoral compartment. There is evidence of advanced osteoarthritis changes with Subchondral sclerosis and Joint space narrowing. The limb is in netural alignment. The patella is tracking midline.

## 2024-10-01 NOTE — TELEPHONE ENCOUNTER
Pt calling with c/o elevated BP x a few days. Pt reports going to ER yesterday for elevated BP and told to follow up with PCP for medication adjustment. Pt has virtual appt scheduled for 10/3/24. Pt reports her BP at this time is 190/98. Care advice recommends pt be seen in office today, no appointments available per care advice. ODVV offered and accepted.   Encounter routed to pts PCP for follow up call.  Reason for Disposition   Systolic BP >= 180 OR Diastolic >= 110    Additional Information   Negative: Sounds like a life-threatening emergency to the triager   Negative: Systolic BP >= 160 OR Diastolic >= 100, and any cardiac (e.g., breathing difficulty, chest pain) or neurologic symptoms (e.g., new-onset blurred or double vision)   Negative: Patient sounds very sick or weak to the triager   Negative: Systolic BP >= 200 OR Diastolic >= 120 and having NO cardiac or neurologic symptoms   Negative: Pregnant 20 or more weeks (or postpartum < 6 weeks) with Systolic BP >= 140 OR Diastolic >= 90   Negative: Systolic BP >= 180 OR Diastolic >= 110, and missed most recent dose of blood pressure medication    Protocols used: Blood Pressure - High-A-OH

## 2024-10-01 NOTE — PROCEDURES
Large Joint Aspiration/Injection: R knee    Date/Time: 10/1/2024 9:40 AM    Performed by: Baltazar العراقي MD  Authorized by: Baltazar العراقي MD    Consent Done?:  Yes (Verbal)  Indications:  Arthritis and pain  Prep: patient was prepped and draped in usual sterile fashion      Local anesthesia used?: Yes    Anesthesia:  Local infiltration  Local anesthetic:  Topical anesthetic and lidocaine 1% without epinephrine    Details:  Needle Size:  22 G  Approach:  Anterolateral  Location:  Knee  Site:  R knee  Medications:  40 mg triamcinolone acetonide 40 mg/mL  Patient tolerance:  Patient tolerated the procedure well with no immediate complications

## 2024-10-02 VITALS — DIASTOLIC BLOOD PRESSURE: 98 MMHG | SYSTOLIC BLOOD PRESSURE: 180 MMHG

## 2024-10-03 ENCOUNTER — OFFICE VISIT (OUTPATIENT)
Dept: FAMILY MEDICINE | Facility: CLINIC | Age: 74
End: 2024-10-03
Payer: MEDICARE

## 2024-10-03 VITALS — SYSTOLIC BLOOD PRESSURE: 153 MMHG | DIASTOLIC BLOOD PRESSURE: 89 MMHG

## 2024-10-03 DIAGNOSIS — N18.32 STAGE 3B CHRONIC KIDNEY DISEASE: ICD-10-CM

## 2024-10-03 DIAGNOSIS — I10 PRIMARY HYPERTENSION: Primary | ICD-10-CM

## 2024-10-03 DIAGNOSIS — K21.9 GASTROESOPHAGEAL REFLUX DISEASE WITHOUT ESOPHAGITIS: ICD-10-CM

## 2024-10-03 DIAGNOSIS — M54.16 LUMBAR RADICULOPATHY, CHRONIC: ICD-10-CM

## 2024-10-03 PROBLEM — D69.2 OTHER NONTHROMBOCYTOPENIC PURPURA: Status: RESOLVED | Noted: 2021-03-03 | Resolved: 2024-10-03

## 2024-10-03 PROCEDURE — 3044F HG A1C LEVEL LT 7.0%: CPT | Mod: CPTII,95,, | Performed by: FAMILY MEDICINE

## 2024-10-03 PROCEDURE — 3077F SYST BP >= 140 MM HG: CPT | Mod: CPTII,95,, | Performed by: FAMILY MEDICINE

## 2024-10-03 PROCEDURE — 3061F NEG MICROALBUMINURIA REV: CPT | Mod: CPTII,95,, | Performed by: FAMILY MEDICINE

## 2024-10-03 PROCEDURE — 3066F NEPHROPATHY DOC TX: CPT | Mod: CPTII,95,, | Performed by: FAMILY MEDICINE

## 2024-10-03 PROCEDURE — 4010F ACE/ARB THERAPY RXD/TAKEN: CPT | Mod: CPTII,95,, | Performed by: FAMILY MEDICINE

## 2024-10-03 PROCEDURE — 99214 OFFICE O/P EST MOD 30 MIN: CPT | Mod: 95,,, | Performed by: FAMILY MEDICINE

## 2024-10-03 PROCEDURE — 3079F DIAST BP 80-89 MM HG: CPT | Mod: CPTII,95,, | Performed by: FAMILY MEDICINE

## 2024-10-03 RX ORDER — FAMOTIDINE 40 MG/1
40 TABLET, FILM COATED ORAL DAILY
Qty: 90 TABLET | Refills: 1 | Status: SHIPPED | OUTPATIENT
Start: 2024-10-03 | End: 2025-04-01

## 2024-10-03 NOTE — PROGRESS NOTES
Chief Complaint   Patient presents with    Hypertension       The patient location is:  Patient Home   The chief complaint leading to consultation is: hypertension  Visit type: Virtual visit with synchronous audio and video  Total time spent with patient: 10 min  Each patient to whom he or she provides medical services by telemedicine is:  (1) informed of the relationship between the physician and patient and the respective role of any other health care provider with respect to management of the patient; and (2) notified that he or she may decline to receive medical services by telemedicine and may withdraw from such care at any time.      DEBBIE Holt is a 73 y.o. female with multiple medical diagnoses as listed in the medical history and problem list that presents for follow-up for elevated blood pressure    She was seen in the ED for this several days ago for elevated blood pressure and headaches, she was given amlodipine and nifedipine in the ED, she did take nifedipine once yesterday and readings this AM seem improved but still high    She is taking hydralazine, metoprolol, valsartan. She was taken off of aldactone due to decreased kidney function by her nephrologist Dr. Bloom along with her omeprazole, she is having some acid reflux symptoms     Her blood pressure has been higher as she is grieving the loss of her grandson who was murdered, she has a hx of lumbar pain s/p epidural injection on Friday; she has also had an injection in her knee with orthopedics yesterday      PAST MEDICAL HISTORY:  Past Medical History:   Diagnosis Date    Arthralgia     Colon polyp     Degenerative disc disease at L5-S1 level     High cholesterol     Hypertension     Notalgia paresthetica     Nuclear sclerosis of both eyes 01/08/2020    Sciatica     Spinal stenosis     Tobacco use 10/21/2021       PAST SURGICAL HISTORY:  Past Surgical History:   Procedure Laterality Date    BACK SURGERY      lumbar laminectomy     COLON SURGERY  2022    Tear repaired    COLONOSCOPY N/A 07/23/2020    Procedure: COLONOSCOPY;  Surgeon: Donal Moore MD;  Location: [facility];  Service: Endoscopy;  Laterality: N/A;    EPIDURAL STEROID INJECTION Left 09/09/2020    Procedure: Injection, Steroid, Epidural Transforaminal;  Surgeon: Jun Leavitt Jr., MD;  Location: [facility] END[facility];  Service: Pain Management;  Laterality: Left;  Left L5 + S1 TF WADE  Arrive @ 1300; ASA last 9/1; No DM    EPIDURAL STEROID INJECTION Left 02/12/2021    Procedure: Injection, Steroid, Epidural Transforaminal;  Surgeon: Jun Leavitt Jr., MD;  Location: [facility];  Service: Pain Management;  Laterality: Left;  Left L4 + L5 TF WADE  Arrive @ 1230; IV Sedation; ASA last 2/4; No DM    INJECTION OF FACET JOINT Bilateral 3/25/2024    Procedure: FACET JOINT INJECTION BILATERAL L3/4 *ASPIRIN CLEARANCE IN CHART*;  Surgeon: Darya Ayala MD;  Location: [facility]GT;  Service: Pain Management;  Laterality: Bilateral;  [phone]    SPINE SURGERY  10/08/18    Bilateral Lumbar Laminectomy with Fusion and Screws    TRANSFORAMINAL EPIDURAL INJECTION OF STEROID Left 03/14/2022    Procedure: INJECTION, STEROID, EPIDURAL, TRANSFORAMINAL APPROACH, L4-L5 & L5-S1;  Surgeon: Darya Ayala MD;  Location: [facility];  Service: Pain Management;  Laterality: Left;    TRANSFORAMINAL EPIDURAL INJECTION OF STEROID Left 08/29/2022    Procedure: LUMBAR TRANSFORAMINAL LEFT L4/5 AND L5/S1 CONTRAST;  Surgeon: Darya Ayala MD;  Location: [facility];  Service: Pain Management;  Laterality: Left;    TRANSFORAMINAL EPIDURAL INJECTION OF STEROID Left 9/27/2024    Procedure: LUMBAR TRANSFORAMINAL LEFT L3/4 AND L5/S1;  Surgeon: Jason Gomez MD;  Location: [facility];  Service: Pain Management;  Laterality: Left;  [phone]  2 WK F/U RONAK    TUBAL LIGATION         SOCIAL HISTORY:  Social History     Socioeconomic History    Marital status:    Tobacco Use    Smoking status: Former      Current packs/day: 0.00     Average packs/day: 0.3 packs/day for 35.0 years (8.8 ttl pk-yrs)     Types: Cigarettes     Start date: 3/15/1987     Quit date: 3/15/2022     Years since quittin.5    Smokeless tobacco: Current    Tobacco comments:     Occasional spoker some times 1-2 cigarettes/day sometimes none for weeks   Substance and Sexual Activity    Alcohol use: Yes     Alcohol/week: 3.0 standard drinks of alcohol     Types: 3 Glasses of wine per week     Comment: Occasional wine    Drug use: Not Currently     Comment: Tramadol twice a day as needed    Sexual activity: Yes     Partners: Male     Birth control/protection: Post-menopausal, None     Comment: Tubal 30+ years ago   Social History Narrative    Lives alone    No partner    Was a  for a non-profit organization     Social Drivers of Health     Financial Resource Strain: Patient Declined (2024)    Overall Financial Resource Strain (CARDIA)     Difficulty of Paying Living Expenses: Patient declined   Food Insecurity: Patient Declined (2024)    Hunger Vital Sign     Worried About Running Out of Food in the Last Year: Patient declined     Ran Out of Food in the Last Year: Patient declined   Transportation Needs: No Transportation Needs (2024)    PRAPARE - Transportation     Lack of Transportation (Medical): No     Lack of Transportation (Non-Medical): No   Physical Activity: Patient Declined (2024)    Exercise Vital Sign     Days of Exercise per Week: Patient declined     Minutes of Exercise per Session: Patient declined   Stress: Patient Declined (2024)    [employer] - Occupational Stress Questionnaire     Feeling of Stress : Patient declined   Housing Stability: Patient Declined (2024)    Housing Stability Vital Sign     Unable to Pay for Housing in the Last Year: Patient declined     Homeless in the Last Year: Patient declined       FAMILY HISTORY:  Family History    Problem Relation Name Age of Onset    Breast cancer Mother Marianna Benítez     Hypertension Mother Marianna Benítez             Hypotension Mother Marianna Benítez     Cancer Mother Marianna Benítez          due to breast cancer    Miscarriages / Stillbirths Mother Marianna Benítez         3 stillborn children    Cataracts Father Matthias The.  Jeyson     Glaucoma Father Matthias The.  Jeyson     Diabetes Father Matthias The.  Jeyson             Arthritis Father Matthias Rahman.  Jeyson     Vision loss Father Matthias The.  Jeyson         Glaucoma -     Glaucoma Sister Debby Benítez     Arthritis Sister Debby Benítez     Diabetes Sister Debby Benítez     Hypertension Sister Debby Benítez     Vision loss Sister Debby Benítez         Glasses    Drug abuse Brother Sky Jeyson     Learning disabilities Brother Sky Jeyson     No Known Problems Brother      No Known Problems Brother x3     No Known Problems Maternal Aunt      No Known Problems Maternal Uncle      No Known Problems Paternal Aunt      No Known Problems Paternal Uncle      No Known Problems Maternal Grandmother      No Known Problems Maternal Grandfather      No Known Problems Paternal Grandmother      No Known Problems Paternal Grandfather      No Known Problems Son x2     No Known Problems Other      Arthritis Sister Susan Benítez     Depression Sister Susan Benítez     Diabetes Sister Susan Benítez     Hearing loss Sister Susan Benítez         Trouble hearing    Hypertension Sister Susan Benítez     Stroke Sister Susan Benítez         B[place]ne Hunt Disease, Coma for a month due to diabetes,Some body weakness    Vision loss Sister Susan Benítez         Lasix surgery    Arthritis Sister Urszula Green     COPD Sister Urszula Green     Diabetes Sister Urszula Green     Hearing loss Sister Urszula Green     Heart disease Sister Urszula Green     Hypertension Sister Urszula Green     Arthritis Brother Murtaza Nowak     Hypertension Brother Murtaza Nowak      Early death Sister Christine Benítez         Still born    Early death Sister Catherine Benítez         Still born twin to Debby Benítez    Early death Brother Krishan Benítez stillborn     Hypertension Sister Kateryna Kraft     Hypertension Brother Robbie Benítez     Learning disabilities Son Vaibhav Holt     Learning disabilities Son Fidel [place]         Great grandson    Learning disabilities Daughter Carmen's Aries         Granddaughter    Mental illness Sister Shawna Benítez         Her son Matthias Benítez    Vision loss Sister Shawna Benítez         Glasses/contacts    Vision loss Sister Chelly Benítez         Glasses    Amblyopia Neg Hx      Blindness Neg Hx      Macular degeneration Neg Hx      Retinal detachment Neg Hx      Strabismus Neg Hx      Thyroid disease Neg Hx         ALLERGIES AND MEDICATIONS: updated and reviewed.  Review of patient's allergies indicates:   Allergen Reactions    Codeine      Medication List with Changes/Refills   New Medications    FAMOTIDINE (PEPCID) 40 MG TABLET    Take 1 tablet (40 mg total) by mouth once daily.   Current Medications    ACETAMINOPHEN (TYLENOL) 500 MG TABLET    Take 2 tablets (1,000 mg total) by mouth every 6 (six) hours as needed.    AMITRIPTYLINE (ELAVIL) 50 MG TABLET    Take 1 tablet (50 mg total) by mouth every evening.    ASPIRIN (ECOTRIN) 81 MG EC TABLET        CLOTRIMAZOLE-BETAMETHASONE 1-0.05% (LOTRISONE) CREAM    APPLY  CREAM TOPICALLY TO AFFECTED AREA TWICE DAILY    CYANOCOBALAMIN, VITAMIN B-12, (B-12 COMPLIANCE) 1,000 MCG/ML KIT    Inject 1 mL as directed every 28 days.    DICLOFENAC SODIUM (VOLTAREN ARTHRITIS PAIN) 1 % GEL    Apply 2 g topically once daily.    ERGOCALCIFEROL (ERGOCALCIFEROL) 50,000 UNIT CAP    Take 1 capsule by mouth every 7 days.    FLUTICASONE PROPIONATE (FLONASE) 50 MCG/ACTUATION NASAL SPRAY    1 spray (50 mcg total) by Each Nostril route once daily.    HYDRALAZINE (APRESOLINE) 100 MG TABLET    Take 1 tablet (100 mg total) by  mouth every 12 (twelve) hours.    HYDROXYZINE HCL (ATARAX) 25 MG TABLET    Take 1 tablet by mouth three times daily as needed    LIDOCAINE (LIDODERM) 5 %    Place 1 patch onto the skin once daily. Remove & Discard patch within 12 hours or as directed by MD    METOPROLOL SUCCINATE (TOPROL-XL) 25 MG 24 HR TABLET    Take 1 tablet by mouth once daily    MOMETASONE (ELOCON) 0.1 % SOLUTION    Apply once to twice daily prn itching    MUPIROCIN (BACTROBAN) 2 % OINTMENT    Apply topically 3 (three) times daily.    NIFEDIPINE (PROCARDIA-XL) 30 MG (OSM) 24 HR TABLET    Take 1 tablet (30 mg total) by mouth once daily.    NYSTATIN (MYCOSTATIN) POWDER    APPLY  POWDER TOPICALLY TO AFFECTED AREA 4 TIMES DAILY    OLOPATADINE (PATANOL) 0.1 % OPHTHALMIC SOLUTION    INSTILL 1 DROP INTO EACH EYE TWICE DAILY    OXYCODONE-ACETAMINOPHEN (PERCOCET) 7.5-325 MG PER TABLET    Take 1 tablet by mouth every 4 (four) hours as needed for Pain.    PRAVASTATIN (PRAVACHOL) 40 MG TABLET    Take 1 tablet by mouth once daily    SEMAGLUTIDE, WEIGHT LOSS, (WEGOVY) 0.25 MG/0.5 ML PNIJ    Inject 0.25 mg into the skin once a week.    TIZANIDINE (ZANAFLEX) 4 MG TABLET    TAKE 1 TABLET BY MOUTH EVERY 6 HOURS AS NEEDED    TRIAMCINOLONE ACETONIDE 0.1%-CICLOPIROX-MAGNESIUM HYDROXIDE 400 MG/5 ML    Apply to affected area twice a day after cool blow dry    VALSARTAN (DIOVAN) 320 MG TABLET    Take 1 tablet by mouth once daily       ROS  Review of Systems   Constitutional:  Negative for chills, diaphoresis, fatigue, fever and unexpected weight change.   HENT:  Negative for rhinorrhea, sinus pressure, sore throat and tinnitus.    Eyes:  Negative for photophobia and visual disturbance.   Respiratory:  Negative for cough, shortness of breath and wheezing.    Cardiovascular:  Negative for chest pain and palpitations.   Gastrointestinal:  Negative for abdominal pain, blood in stool, constipation, diarrhea, nausea and vomiting.   Genitourinary:  Negative for dysuria, flank  pain, frequency and vaginal discharge.   Musculoskeletal:  Positive for arthralgias and back pain. Negative for joint swelling.   Skin:  Negative for rash.   Neurological:  Negative for speech difficulty, weakness, light-headedness and headaches.   Psychiatric/Behavioral:  Negative for behavioral problems and dysphoric mood.        Physical Exam  Vitals:    10/03/24 1008   BP: (!) 153/89  Comment: has not taken BP meds    There is no height or weight on file to calculate BMI.            Physical Exam  Vitals and nursing note reviewed.   Constitutional:       Appearance: She is well-developed.   Skin:     General: Skin is warm and dry.      Findings: No erythema or rash.   Neurological:      Mental Status: She is alert. Mental status is at baseline.   Psychiatric:         Behavior: Behavior normal.         Health Maintenance         Date Due Completion Date    TETANUS VACCINE Never done ---    Sign Pain Contract Never done ---    Urine Drug Screen Never done ---    Naloxone Prescription Never done ---    Shingles Vaccine (1 of 2) Never done ---    RSV Vaccine (Age 60+ and Pregnant patients) (1 - Risk 60-74 years 1-dose series) Never done ---    Pneumococcal Vaccines (Age 65+) (1 of 1 - PCV) Never done ---    Colorectal Cancer Screening 07/23/2023 7/23/2020    Mammogram 05/08/2024 5/8/2023    Influenza Vaccine (1) 09/01/2024 11/8/2023    Override on 4/13/2023: Declined (Declined for season)    COVID-19 Vaccine (5 - 2024-25 season) 09/01/2024 2/17/2022    Annual UACr 08/07/2025 8/7/2024    High Dose Statin 10/01/2025 10/1/2024    Aspirin/Antiplatelet Therapy 10/01/2025 10/1/2024    Lipid Panel 03/07/2028 3/7/2023            Health maintenance reviewed and addressed as ordered      ASSESSMENT     1. Primary hypertension    2. Lumbar radiculopathy, chronic    3. Gastroesophageal reflux disease without esophagitis    4. Stage 3b chronic kidney disease        PLAN:     Problem List Items Addressed This Visit          Neuro     Lumbar radiculopathy, chronic  S/p epidural injection, avoiding strenuous activity  Continue pain medicine, will  not increase due to concern for hallucinations which have happened with higher doses       Cardiac/Vascular    Hypertension - Primary  Continue nifedipine with current medications  F/u with digital medicine  F/u with me in 1 month       Renal/    Stage 3b chronic kidney disease  Has f/u with renal  Advised to stay off of spironolactone as renal function is improving       GI    Gastroesophageal reflux disease without esophagitis  Begin pepcid in place of omeprazole    Relevant Medications    famotidine (PEPCID) 40 MG tablet         Simona Torres MD  10/03/2024 10:03 AM        No follow-ups on file.    No orders of the defined types were placed in this encounter.

## 2024-10-04 ENCOUNTER — TELEPHONE (OUTPATIENT)
Dept: PAIN MEDICINE | Facility: CLINIC | Age: 74
End: 2024-10-04
Payer: MEDICARE

## 2024-10-04 NOTE — TELEPHONE ENCOUNTER
Staff spoke with pt and expressed to her that if she is still feeling pain it is recommended to ome in person for the visit. Pt was experiencing pain, pt agreed to come in.

## 2024-10-04 NOTE — TELEPHONE ENCOUNTER
----- Message from Sheeba sent at 10/4/2024 11:25 AM CDT -----  Regarding: virtual  Name of caller: Amanda       What is the requesting detail: pt is requesting to change her in office appt to a virtual appt. Please advise       Can the clinic reply by [facility]:       What number to call back: [phone]

## 2024-10-11 ENCOUNTER — PATIENT MESSAGE (OUTPATIENT)
Dept: PAIN MEDICINE | Facility: OTHER | Age: 74
End: 2024-10-11
Payer: MEDICARE

## 2024-10-11 ENCOUNTER — OFFICE VISIT (OUTPATIENT)
Dept: PAIN MEDICINE | Facility: CLINIC | Age: 74
End: 2024-10-11
Payer: MEDICARE

## 2024-10-11 VITALS
OXYGEN SATURATION: 100 % | WEIGHT: 246.06 LBS | BODY MASS INDEX: 42.23 KG/M2 | SYSTOLIC BLOOD PRESSURE: 125 MMHG | RESPIRATION RATE: 18 BRPM | HEART RATE: 85 BPM | DIASTOLIC BLOOD PRESSURE: 78 MMHG | TEMPERATURE: 98 F

## 2024-10-11 DIAGNOSIS — M51.360 DEGENERATION OF INTERVERTEBRAL DISC OF LUMBAR REGION WITH DISCOGENIC BACK PAIN: ICD-10-CM

## 2024-10-11 DIAGNOSIS — G57.03 PIRIFORMIS SYNDROME OF BOTH SIDES: ICD-10-CM

## 2024-10-11 DIAGNOSIS — M70.62 GREATER TROCHANTERIC BURSITIS OF LEFT HIP: ICD-10-CM

## 2024-10-11 DIAGNOSIS — M25.48 EFFUSION OF LUMBAR FACET JOINT: ICD-10-CM

## 2024-10-11 DIAGNOSIS — M54.16 LUMBAR RADICULOPATHY, CHRONIC: ICD-10-CM

## 2024-10-11 DIAGNOSIS — M46.1 BILATERAL SACROILIITIS: Primary | ICD-10-CM

## 2024-10-11 DIAGNOSIS — M54.16 LUMBAR RADICULOPATHY: ICD-10-CM

## 2024-10-11 DIAGNOSIS — M79.18 MYOFASCIAL PAIN: Primary | ICD-10-CM

## 2024-10-11 DIAGNOSIS — M47.819 SPONDYLOSIS WITHOUT MYELOPATHY: ICD-10-CM

## 2024-10-11 DIAGNOSIS — M79.18 MYOFASCIAL PAIN: ICD-10-CM

## 2024-10-11 PROCEDURE — 99999 PR PBB SHADOW E&M-EST. PATIENT-LVL V: CPT | Mod: PBBFAC,,,

## 2024-10-11 NOTE — PROGRESS NOTES
Interventional Pain Management - Established Visit  Follow-Up      Referring Physician: No ref. provider found           SUBJECTIVE:    Interval History 10/11/2024:  Amanda Holt returns to clinic for follow-up after Left L3/4 and L5/S1 TFESI on 9/27/2024. She reports no relief of back or leg pain. She continues with left leg numbness with walking more than 8-10 minutes. She states it is the entire left leg, and does not describe the numbness in a specific dermatome. She continues Percocet and Tizanidine from her PCP with good relief. She denies any perceived side effects. She states she has not participated in PT or HEP in a couple of years. She states she did well in Aquatic Therapy in the past, as traditional Physical Therapy has been too painful to complete. She denies recent health changes. She denies recent falls or trauma. She denies new onset fever/night sweats, urinary incontinence, bowel incontinence, significant weight changes, significant motor weakness or changes, or loss of sensations. Her pain today is 8/10.      Of note, she reports her 27 year-old grandson was murdered. She has been dealing with this and it has caused her to not take care of herself as much as she had in the past. She would like to get back into caring for herself and her health.       HPI 9/17/2024:  Amanda Holt presents to the clinic for the evaluation of lower back pain that started several months ago.  Patient describes the pain as sharp achy and shooting with radiation down her left lower extremity.  Patient said the pain radiates all the way to her left feet. Patient said her entire left leg becomes numb and weak especially after walking for about 10 minutes.  Her pain score today is 8/10. The pain is rated with a score of  6/10 on the BEST day and a score of 10/10 on the WORST day.  Symptoms interfere with daily activity, sleeping, and work. The pain is exacerbated by Standing, Bending, Walking, Lifting,  and Getting out of bed/chair.  The pain is mitigated by 7.5 Percocet . She reports spending 3 hours per day reclining. The patient reports 4 hours of uninterrupted sleep per night.    Patient denies night fever/night sweats, urinary incontinence, bowel incontinence, and significant weight loss.  Patient reports weakness and numbness in the left lower extremity.    Physical Therapy/Home Exercise: yes (aqua therapy)    Pain Disability Index Review:      3/14/2024     1:42 PM 4/13/2022    10:04 AM 3/28/2022    10:32 AM   Last 3 PDI Scores   Pain Disability Index (PDI) 63 30 26     Pain Medications:  Percocet 7.5  Tizanidine 4 mg     report:  Reviewed and consistent with medication use as prescribed.    Pain Procedures:   L4-5 laminectomy and fusion in 2018  09/09/2020-transforaminal WADE  02/12/2021-transforaminal WADE  3/14/2022 - L4-5 and L5-S1 lumbar transforaminal WADE  08/29/2022/ -  left L4-5 and L5-S1 lumbar transforaminal WADE  03/25/2024 - bilateral L3/4 facet joint injection -limited relief  9/27/2024 - Left L3/4 and L5/S1 TFESI - no relief    Imaging:   MRI LUMBAR SPINE WITHOUT CONTRAST     FINDINGS:  Lumbar spine alignment is within normal limits. The vertebral body heights are well maintained, with no fracture.  No marrow signal abnormality suspicious for an infiltrative process.  Degenerative fatty marrow metaplasia endplate change at L4-5.     The conus is normal in appearance.  The adjacent soft tissue structures show no significant abnormalities.     Bilateral T2 hyperintense renal lesions are suggestive of cysts.     L1-L2: There is no focal disc herniation. No significant central canal or neural foraminal narrowing.     L2-L3: There is no focal disc herniation. No significant central canal or neural foraminal narrowing.     L3-L4: Circumferential disc bulge, spondylitic spurring, facet arthropathy and bilateral facet joint effusions.  Moderate central spinal stenosis and mild right foraminal  stenosis.     L4-L5: Operative change of bilateral laminectomy and left-sided instrumented fusion.  Circumferential disc bulge and spondylitic spurring.  Mild bilateral neural foraminal narrowing.     L5-S1: Circumferential disc bulge and partial anterior bony ankylosis.  Mild facet arthropathy.  Flattening of the medial right exiting L5 nerve root suggests at least moderate and possibly severe foraminal stenosis.     Impression:     Postoperative and degenerative change as described, with central spinal stenosis at L3-4 and moderate-severe right L5-S1 foraminal stenosis.    XR LUMBAR SPINE 5 VIEW WITH FLEX AND EXT     FINDINGS:  prior L4-5 posterior instrumented lumbar fusion. No evidence of hardware failure or loosening. There is prominent L3-4 facet arthropathy with slight anterolisthesis, similar to prior exam.  No subluxation with extension and flexion views. No fractures.     Impression:     No acute findings.    Past Medical History:   Diagnosis Date    Arthralgia     Colon polyp     Degenerative disc disease at L5-S1 level     High cholesterol     Hypertension     Notalgia paresthetica     Nuclear sclerosis of both eyes 01/08/2020    Sciatica     Spinal stenosis     Tobacco use 10/21/2021     Past Surgical History:   Procedure Laterality Date    BACK SURGERY      lumbar laminectomy    COLON SURGERY  2022    Tear repaired    COLONOSCOPY N/A 07/23/2020    Procedure: COLONOSCOPY;  Surgeon: Donal Moore MD;  Location: [facility];  Service: Endoscopy;  Laterality: N/A;    EPIDURAL STEROID INJECTION Left 09/09/2020    Procedure: Injection, Steroid, Epidural Transforaminal;  Surgeon: Jun Leavitt Jr., MD;  Location: [facility];  Service: Pain Management;  Laterality: Left;  Left L5 + S1 TF WADE  Arrive @ 1300; ASA last 9/1; No DM    EPIDURAL STEROID INJECTION Left 02/12/2021    Procedure: Injection, Steroid, Epidural Transforaminal;  Surgeon: Jun Leavitt Jr., MD;  Location: [facility] END[facility];  Service:  Pain Management;  Laterality: Left;  Left L4 + L5 TF WADE  Arrive @ 1230; IV Sedation; ASA last 2/4; No DM    INJECTION OF FACET JOINT Bilateral 3/25/2024    Procedure: FACET JOINT INJECTION BILATERAL L3/4 *ASPIRIN CLEARANCE IN CHART*;  Surgeon: Darya Ayala MD;  Location: [facility];  Service: Pain Management;  Laterality: Bilateral;  [phone]    SPINE SURGERY  10/08/18    Bilateral Lumbar Laminectomy with Fusion and Screws    TRANSFORAMINAL EPIDURAL INJECTION OF STEROID Left 2022    Procedure: INJECTION, STEROID, EPIDURAL, TRANSFORAMINAL APPROACH, L4-L5 & L5-S1;  Surgeon: Darya Ayala MD;  Location: [facility];  Service: Pain Management;  Laterality: Left;    TRANSFORAMINAL EPIDURAL INJECTION OF STEROID Left 2022    Procedure: LUMBAR TRANSFORAMINAL LEFT L4/5 AND L5/S1 CONTRAST;  Surgeon: Darya Ayala MD;  Location: [facility];  Service: Pain Management;  Laterality: Left;    TRANSFORAMINAL EPIDURAL INJECTION OF STEROID Left 2024    Procedure: LUMBAR TRANSFORAMINAL LEFT L3/4 AND L5/S1;  Surgeon: Jason Gomez MD;  Location: [facility];  Service: Pain Management;  Laterality: Left;  [phone]  2 WK F/U RONAK    TUBAL LIGATION       Social History     Socioeconomic History    Marital status:    Tobacco Use    Smoking status: Former     Current packs/day: 0.00     Average packs/day: 0.3 packs/day for 35.0 years (8.8 ttl pk-yrs)     Types: Cigarettes     Start date: 3/15/1987     Quit date: 3/15/2022     Years since quittin.5    Smokeless tobacco: Current    Tobacco comments:     Occasional spoker some times 1-2 cigarettes/day sometimes none for weeks   Substance and Sexual Activity    Alcohol use: Yes     Alcohol/week: 3.0 standard drinks of alcohol     Types: 3 Glasses of wine per week     Comment: Occasional wine    Drug use: Not Currently     Comment: Tramadol twice a day as needed    Sexual activity: Yes     Partners: Male     Birth control/protection:  Post-menopausal, None     Comment: Tubal 30+ years ago   Social History Narrative    Lives alone    No partner    Was a  for a non-profit organization     Social Drivers of Health     Financial Resource Strain: Patient Declined (2024)    Overall Financial Resource Strain (CARDIA)     Difficulty of Paying Living Expenses: Patient declined   Food Insecurity: Patient Declined (2024)    Hunger Vital Sign     Worried About Running Out of Food in the Last Year: Patient declined     Ran Out of Food in the Last Year: Patient declined   Transportation Needs: No Transportation Needs (2024)    PRAPARE - Transportation     Lack of Transportation (Medical): No     Lack of Transportation (Non-Medical): No   Physical Activity: Patient Declined (2024)    Exercise Vital Sign     Days of Exercise per Week: Patient declined     Minutes of Exercise per Session: Patient declined   Stress: Patient Declined (2024)    [employer] - Occupational Stress Questionnaire     Feeling of Stress : Patient declined   Housing Stability: Patient Declined (2024)    Housing Stability Vital Sign     Unable to Pay for Housing in the Last Year: Patient declined     Homeless in the Last Year: Patient declined     Family History   Problem Relation Name Age of Onset    Breast cancer Mother Marianna Benítez     Hypertension Mother Marianna Benítez             Hypotension Mother Marianna Benítez     Cancer Mother Marianna Benítez          due to breast cancer    Miscarriages / Stillbirths Mother Marianna Benítez         3 stillborn children    Cataracts Father Matthias Benítez     Glaucoma Father Matthias Benítez     Diabetes Father Matthias Benítez             Arthritis Father Matthias Benítez     Vision loss Father Matthias Benítez         Glaucoma -     Glaucoma Sister Debby Benítez     Arthritis Sister Debby Benítez     Diabetes Sister  Debby Jeyson     Hypertension Sister Debby Benítez     Vision loss Sister Debby Benítez         Glasses    Drug abuse Brother Sky Jeyson     Learning disabilities Brother Sky Jeyson     No Known Problems Brother      No Known Problems Brother x3     No Known Problems Maternal Aunt      No Known Problems Maternal Uncle      No Known Problems Paternal Aunt      No Known Problems Paternal Uncle      No Known Problems Maternal Grandmother      No Known Problems Maternal Grandfather      No Known Problems Paternal Grandmother      No Known Problems Paternal Grandfather      No Known Problems Son x2     No Known Problems Other      Arthritis Sister Susan Benítez     Depression Sister Susan Benítez     Diabetes Sister Susan Benítez     Hearing loss Sister Susan Benítez         Trouble hearing    Hypertension Sister Susan Benítez     Stroke Sister Susan Benítez         B[place]ne Hunt Disease, Coma for a month due to diabetes,Some body weakness    Vision loss Sister Susan Benítez         Lasix surgery    Arthritis Sister Urszula Green     COPD Sister Urszula Green     Diabetes Sister Urszula Green     Hearing loss Sister Urszula Green     Heart disease Sister Urszula Green     Hypertension Sister Urszula Green     Arthritis Brother Murtaza Nowak     Hypertension Brother Murtaza Nowak     Early death Sister Christine Benítez         Still born    Early death Sister Catherine Benítez         Still born twin to Debby Benítez    Early death Brother Krishan Benítez stillborn     Hypertension Sister Kateryna Kraft     Hypertension Brother Robbie Benítez     Learning disabilities Son Vaibhav Holt     Learning disabilities Son Kaincrystal [place]         Great grandson    Learning disabilities Daughter Carmen's Aries         Granddaughter    Mental illness Sister Shawna Benítez         Her son Matthias Jeyson    Vision loss Sister Shawna Benítez         Glasses/contacts    Vision loss Sister Chelly Benítez         Glasses    Amblyopia Neg Hx       Blindness Neg Hx      Macular degeneration Neg Hx      Retinal detachment Neg Hx      Strabismus Neg Hx      Thyroid disease Neg Hx       Review of patient's allergies indicates:   Allergen Reactions    Codeine      Current Outpatient Medications   Medication Sig    acetaminophen (TYLENOL) 500 MG tablet Take 2 tablets (1,000 mg total) by mouth every 6 (six) hours as needed.    amitriptyline (ELAVIL) 50 MG tablet Take 1 tablet (50 mg total) by mouth every evening.    aspirin (ECOTRIN) 81 MG EC tablet     clotrimazole-betamethasone 1-0.05% (LOTRISONE) cream APPLY  CREAM TOPICALLY TO AFFECTED AREA TWICE DAILY    cyanocobalamin, vitamin B-12, (B-12 COMPLIANCE) 1,000 mcg/mL Kit Inject 1 mL as directed every 28 days.    diclofenac sodium (VOLTAREN ARTHRITIS PAIN) 1 % Gel Apply 2 g topically once daily.    ergocalciferol (ERGOCALCIFEROL) 50,000 unit Cap Take 1 capsule by mouth every 7 days.    famotidine (PEPCID) 40 MG tablet Take 1 tablet (40 mg total) by mouth once daily.    fluticasone propionate (FLONASE) 50 mcg/actuation nasal spray 1 spray (50 mcg total) by Each Nostril route once daily.    hydrALAZINE (APRESOLINE) 100 MG tablet Take 1 tablet (100 mg total) by mouth every 12 (twelve) hours.    hydrOXYzine HCL (ATARAX) 25 MG tablet Take 1 tablet by mouth three times daily as needed    LIDOcaine (LIDODERM) 5 % Place 1 patch onto the skin once daily. Remove & Discard patch within 12 hours or as directed by MD    metoprolol succinate (TOPROL-XL) 25 MG 24 hr tablet Take 1 tablet by mouth once daily    mometasone (ELOCON) 0.1 % solution Apply once to twice daily prn itching    mupirocin (BACTROBAN) 2 % ointment Apply topically 3 (three) times daily.    NIFEdipine (PROCARDIA-XL) 30 MG (OSM) 24 hr tablet Take 1 tablet (30 mg total) by mouth once daily.    nystatin (MYCOSTATIN) powder APPLY  POWDER TOPICALLY TO AFFECTED AREA 4 TIMES DAILY    olopatadine (PATANOL) 0.1 % ophthalmic solution INSTILL 1 DROP INTO EACH EYE TWICE  DAILY    oxyCODONE-acetaminophen (PERCOCET) 7.5-325 mg per tablet Take 1 tablet by mouth every 4 (four) hours as needed for Pain.    pravastatin (PRAVACHOL) 40 MG tablet Take 1 tablet by mouth once daily    tiZANidine (ZANAFLEX) 4 MG tablet TAKE 1 TABLET BY MOUTH EVERY 6 HOURS AS NEEDED    triamcinolone acetonide 0.1%-ciclopirox-magnesium hydroxide 400 mg/5 ml Apply to affected area twice a day after cool blow dry    valsartan (DIOVAN) 320 MG tablet Take 1 tablet by mouth once daily     No current facility-administered medications for this visit.       REVIEW OF SYSTEMS:    GENERAL:  No weight loss, malaise or fevers.  HEENT:  Negative for frequent or significant headaches.  NECK:  Negative for lumps, goiter, pain and significant neck swelling.  RESPIRATORY:  Negative for cough, wheezing or shortness of breath.  CARDIOVASCULAR:  Negative for chest pain, leg swelling or palpitations.  GI:  Negative for abdominal discomfort, blood in stools or black stools or change in bowel habits.  MUSCULOSKELETAL:  See HPI.  SKIN:  Negative for lesions, rash, and itching.  PSYCH:  Negative for sleep disturbance, mood disorder and recent psychosocial stressors.  HEMATOLOGY/LYMPHOLOGY:  Negative for prolonged bleeding, bruising easily or swollen nodes.  NEURO:   No history of headaches, syncope, paralysis, seizures or tremors.  All other reviewed and negative other than HPI.    OBJECTIVE:    /78   Pulse 85   Temp 98.3 °F (36.8 °C)   Resp 18   Wt 111.6 kg (246 lb 0.5 oz)   SpO2 100%   BMI 42.23 kg/m²     PHYSICAL EXAMINATION:     GENERAL: Well appearing, in no acute distress, alert and oriented x3.  PSYCH:  Mood and affect appropriate.  SKIN: Skin color, texture, turgor normal, no rashes or lesions.  HEAD/FACE:  Normocephalic, atraumatic. Cranial nerves grossly intact.  NECK: Normal ROM. Supple. No pain to palpation over the cervical paraspinous muscles. Spurling Negative. No pain with neck flexion, extension, or lateral  rotation.   CV: RRR with palpation of the radial artery.  PULM: No evidence of respiratory difficulty, symmetric chest rise.  GI:  Soft and non-distended.  BACK: straight leg raising is negative to radicular pain. Pain to palpation over the facet joints of the lumbar spine. Pain with lumbar facet loading. Pain with extension. Pain over bilateral lumbar paraspinals and lower back musculature. Positive axial loading test bilaterally. Positive tenderness over bilateral SIJ with positive thigh and sacral thrust test, Positive FABERE,Ganselin and Yeoman's test bilaterally. Negative FADIR   MSK: Shoulder, Hip, Knee provocative maneuvers are negative. Tenderness over bilateral GTB Left > Right. Tenderness to palpation of left piriformis with positive piriformis stretch test. Bilateral upper and lower extremity strength is normal and symmetric.  No atrophy or tone abnormalities are noted.   EXTREMITIES: Peripheral joint ROM is full and pain free without obvious instability or laxity in all four extremities. No obvious deformities, edema, or skin discoloration.  No atrophy or tone abnormalities are noted.   NEURO: Bilateral upper and lower extremity coordination and strength is symmetric.  Decrease sensation in the LLE to light touch.   MENTAL STATUS: A x O x 3, good concentration, speech is fluent and goal directed  MOTOR: 5/5 in all muscle groups  GAIT: Antalgic. Ambulates unassisted.      ASSESSMENT: 73 y.o. year old female with lower back pain, consistent with      1. Bilateral sacroiliitis  Ambulatory referral/consult to Physical/Occupational Therapy      2. Piriformis syndrome of both sides  Ambulatory referral/consult to Physical/Occupational Therapy    Procedure Order to Pain Management      3. Greater trochanteric bursitis of left hip  Procedure Order to Pain Management      4. Lumbar radiculopathy  Ambulatory referral/consult to Physical/Occupational Therapy    EMG W/ ULTRASOUND AND NERVE CONDUCTION TEST 1  Extremity      5. Myofascial pain  Procedure Order to Pain Management      6. Lumbar radiculopathy, chronic        7. Degeneration of intervertebral disc of lumbar region with discogenic back pain        8. Spondylosis without myelopathy        9. Effusion of lumbar facet joint            PLAN:     - Counseled patient regarding the importance of activity modification, smoking cessation, alcohol cessation, constant sleeping habits, and physical therapy.    - She is s/p left TFESI L3/4 and L5/S1 with limited pain relief.     - Procedure order placed for Left Piriformis and Left GTB    - Repeat EMG/NCS with Dr Carmona as previous was stopped 2/2 to pain. Patient states she will try to get through the next EMG.    - Referral to Aquatic Therapy.    - Provided patient with HEP via AVS to complete daily.    - Continue current medication regimen prescribed by PCP.     - Follow up in clinic 2 weeks after procedure.     - Continue to follow up with Neurosurgery. As per NS note, they recommended basivertebral nerve ablation at L3, L4, and L5 and facet block/ablations L3-5. Consider at follow-up.    The above plan and management options were discussed at length with patient. Patient is in agreement with the above and verbalized understanding.     Marisa Cespedes NP  10/11/2024      I spent a total of 48 minutes on the day of the visit.  This includes face to face time and non-face to face time preparing to see the patient (eg, review of tests), obtaining and/or reviewing separately obtained history, documenting clinical information in the electronic or other health record, independently interpreting results and communicating results to the patient/family/caregiver, or care coordinator.

## 2024-10-14 ENCOUNTER — PATIENT MESSAGE (OUTPATIENT)
Dept: PAIN MEDICINE | Facility: OTHER | Age: 74
End: 2024-10-14
Payer: MEDICARE

## 2024-10-17 ENCOUNTER — PATIENT MESSAGE (OUTPATIENT)
Dept: FAMILY MEDICINE | Facility: CLINIC | Age: 74
End: 2024-10-17
Payer: MEDICARE

## 2024-10-24 ENCOUNTER — TELEPHONE (OUTPATIENT)
Dept: SPINE | Facility: CLINIC | Age: 74
End: 2024-10-24
Payer: MEDICARE

## 2024-10-24 ENCOUNTER — TELEPHONE (OUTPATIENT)
Dept: PAIN MEDICINE | Facility: CLINIC | Age: 74
End: 2024-10-24
Payer: MEDICARE

## 2024-10-24 NOTE — TELEPHONE ENCOUNTER
----- Message from Renae sent at 10/24/2024  3:53 PM CDT -----  Regarding: Appointment Change  Name of Who is Calling:Amanda           What is the request in detail:Pt is requesting a call back because she needs an appointment date change.            Can the clinic reply by [facility]:No            What Number to Call Back if not in [facility]: [phone]

## 2024-10-24 NOTE — TELEPHONE ENCOUNTER
----- Message from Renae sent at 10/24/2024  3:56 PM CDT -----  Regarding: Referral  Name of Who is Calling:Amanda            What is the request in detail:Pt pt is requesting a call back because she was supposed to have a call back for a nerve test. And she says no one has called.            Can the clinic reply by [facility]:No            What Number to Call Back if not in [facility]: [phone]

## 2024-10-25 ENCOUNTER — PATIENT MESSAGE (OUTPATIENT)
Dept: PAIN MEDICINE | Facility: CLINIC | Age: 74
End: 2024-10-25
Payer: MEDICARE

## 2024-10-25 ENCOUNTER — PATIENT MESSAGE (OUTPATIENT)
Dept: PAIN MEDICINE | Facility: OTHER | Age: 74
End: 2024-10-25
Payer: MEDICARE

## 2024-10-28 ENCOUNTER — TELEPHONE (OUTPATIENT)
Dept: NEUROLOGY | Facility: CLINIC | Age: 74
End: 2024-10-28
Payer: MEDICARE

## 2024-11-06 ENCOUNTER — OFFICE VISIT (OUTPATIENT)
Dept: FAMILY MEDICINE | Facility: CLINIC | Age: 74
End: 2024-11-06
Payer: MEDICARE

## 2024-11-06 VITALS
BODY MASS INDEX: 44.53 KG/M2 | WEIGHT: 260.81 LBS | OXYGEN SATURATION: 97 % | HEART RATE: 83 BPM | RESPIRATION RATE: 18 BRPM | HEIGHT: 64 IN | TEMPERATURE: 98 F | SYSTOLIC BLOOD PRESSURE: 120 MMHG | DIASTOLIC BLOOD PRESSURE: 68 MMHG

## 2024-11-06 DIAGNOSIS — M54.16 LUMBAR RADICULOPATHY: ICD-10-CM

## 2024-11-06 DIAGNOSIS — M48.00 SPINAL STENOSIS, UNSPECIFIED SPINAL REGION: ICD-10-CM

## 2024-11-06 DIAGNOSIS — R20.0 LEFT LEG NUMBNESS: ICD-10-CM

## 2024-11-06 DIAGNOSIS — E66.01 CLASS 3 SEVERE OBESITY DUE TO EXCESS CALORIES WITH SERIOUS COMORBIDITY AND BODY MASS INDEX (BMI) OF 40.0 TO 44.9 IN ADULT: Primary | ICD-10-CM

## 2024-11-06 DIAGNOSIS — E66.813 CLASS 3 SEVERE OBESITY DUE TO EXCESS CALORIES WITH SERIOUS COMORBIDITY AND BODY MASS INDEX (BMI) OF 40.0 TO 44.9 IN ADULT: Primary | ICD-10-CM

## 2024-11-06 DIAGNOSIS — I10 PRIMARY HYPERTENSION: ICD-10-CM

## 2024-11-06 PROCEDURE — 3078F DIAST BP <80 MM HG: CPT | Mod: CPTII,S$GLB,, | Performed by: FAMILY MEDICINE

## 2024-11-06 PROCEDURE — 3288F FALL RISK ASSESSMENT DOCD: CPT | Mod: CPTII,S$GLB,, | Performed by: FAMILY MEDICINE

## 2024-11-06 PROCEDURE — 3074F SYST BP LT 130 MM HG: CPT | Mod: CPTII,S$GLB,, | Performed by: FAMILY MEDICINE

## 2024-11-06 PROCEDURE — 3061F NEG MICROALBUMINURIA REV: CPT | Mod: CPTII,S$GLB,, | Performed by: FAMILY MEDICINE

## 2024-11-06 PROCEDURE — 3066F NEPHROPATHY DOC TX: CPT | Mod: CPTII,S$GLB,, | Performed by: FAMILY MEDICINE

## 2024-11-06 PROCEDURE — 1159F MED LIST DOCD IN RCRD: CPT | Mod: CPTII,S$GLB,, | Performed by: FAMILY MEDICINE

## 2024-11-06 PROCEDURE — 3044F HG A1C LEVEL LT 7.0%: CPT | Mod: CPTII,S$GLB,, | Performed by: FAMILY MEDICINE

## 2024-11-06 PROCEDURE — 4010F ACE/ARB THERAPY RXD/TAKEN: CPT | Mod: CPTII,S$GLB,, | Performed by: FAMILY MEDICINE

## 2024-11-06 PROCEDURE — 3008F BODY MASS INDEX DOCD: CPT | Mod: CPTII,S$GLB,, | Performed by: FAMILY MEDICINE

## 2024-11-06 PROCEDURE — 1101F PT FALLS ASSESS-DOCD LE1/YR: CPT | Mod: CPTII,S$GLB,, | Performed by: FAMILY MEDICINE

## 2024-11-06 PROCEDURE — 99214 OFFICE O/P EST MOD 30 MIN: CPT | Mod: S$GLB,,, | Performed by: FAMILY MEDICINE

## 2024-11-06 PROCEDURE — 1125F AMNT PAIN NOTED PAIN PRSNT: CPT | Mod: CPTII,S$GLB,, | Performed by: FAMILY MEDICINE

## 2024-11-06 PROCEDURE — 99999 PR PBB SHADOW E&M-EST. PATIENT-LVL IV: CPT | Mod: PBBFAC,,, | Performed by: FAMILY MEDICINE

## 2024-11-06 RX ORDER — PREGABALIN 25 MG/1
25 CAPSULE ORAL 2 TIMES DAILY
Qty: 60 CAPSULE | Refills: 2 | Status: SHIPPED | OUTPATIENT
Start: 2024-11-06 | End: 2025-02-04

## 2024-11-06 RX ORDER — NIFEDIPINE 30 MG/1
30 TABLET, EXTENDED RELEASE ORAL DAILY
Qty: 30 TABLET | Refills: 2 | COMMUNITY
Start: 2024-11-06 | End: 2024-11-06

## 2024-11-06 RX ORDER — TIRZEPATIDE 2.5 MG/.5ML
2.5 INJECTION, SOLUTION SUBCUTANEOUS
Qty: 2 ML | Refills: 0 | Status: SHIPPED | OUTPATIENT
Start: 2024-11-06 | End: 2024-12-06

## 2024-11-06 RX ORDER — OMEPRAZOLE 40 MG/1
1 CAPSULE, DELAYED RELEASE ORAL
COMMUNITY
Start: 2024-10-24

## 2024-11-06 RX ORDER — OXYCODONE AND ACETAMINOPHEN 7.5; 325 MG/1; MG/1
1 TABLET ORAL EVERY 4 HOURS PRN
Qty: 120 TABLET | Refills: 0 | Status: SHIPPED | OUTPATIENT
Start: 2024-12-06 | End: 2025-01-05

## 2024-11-06 RX ORDER — OXYCODONE AND ACETAMINOPHEN 7.5; 325 MG/1; MG/1
1 TABLET ORAL EVERY 4 HOURS PRN
Qty: 120 TABLET | Refills: 0 | Status: SHIPPED | OUTPATIENT
Start: 2024-11-06

## 2024-11-06 RX ORDER — OXYCODONE AND ACETAMINOPHEN 7.5; 325 MG/1; MG/1
1 TABLET ORAL EVERY 4 HOURS PRN
Qty: 120 TABLET | Refills: 0 | Status: SHIPPED | OUTPATIENT
Start: 2025-01-05 | End: 2025-02-04

## 2024-11-06 NOTE — PROGRESS NOTES
Chief Complaint   Patient presents with    Follow-up     Weight loss options       HPI  Amanda Holt is a 74 y.o. female with multiple medical diagnoses as listed in the medical history and problem list that presents for follow-up for chronic pain    Chronic low back pain- hx of spinal stenosis, she has had epidural injection above and below the lumbar fusion; she is pending EMG repeat for the numbness in her leg as she was not able to complete the first one; also being recommended to physical therapy but is on the wait list for aqua therapy, she has felt like she is not hallucinating on her current dose of medication    Weight loss- she would like to try GLP 1 medications as she is told from her insurance that they are covered      ALLERGIES AND MEDICATIONS: updated and reviewed.  Review of patient's allergies indicates:   Allergen Reactions    Codeine      Medication List with Changes/Refills   New Medications    OXYCODONE-ACETAMINOPHEN (PERCOCET) 7.5-325 MG PER TABLET    Take 1 tablet by mouth every 4 (four) hours as needed for Pain.    OXYCODONE-ACETAMINOPHEN (PERCOCET) 7.5-325 MG PER TABLET    Take 1 tablet by mouth every 4 (four) hours as needed for Pain.    PREGABALIN (LYRICA) 25 MG CAPSULE    Take 1 capsule (25 mg total) by mouth 2 (two) times daily.    TIRZEPATIDE (MOUNJARO) 2.5 MG/0.5 ML PNIJ    Inject 2.5 mg into the skin every 7 days.   Current Medications    ACETAMINOPHEN (TYLENOL) 500 MG TABLET    Take 2 tablets (1,000 mg total) by mouth every 6 (six) hours as needed.    AMITRIPTYLINE (ELAVIL) 50 MG TABLET    Take 1 tablet (50 mg total) by mouth every evening.    ASPIRIN (ECOTRIN) 81 MG EC TABLET        CYANOCOBALAMIN, VITAMIN B-12, (B-12 COMPLIANCE) 1,000 MCG/ML KIT    Inject 1 mL as directed every 28 days.    DICLOFENAC SODIUM (VOLTAREN ARTHRITIS PAIN) 1 % GEL    Apply 2 g topically once daily.    ERGOCALCIFEROL (ERGOCALCIFEROL) 50,000 UNIT CAP    Take 1 capsule by mouth every 7 days.     FLUTICASONE PROPIONATE (FLONASE) 50 MCG/ACTUATION NASAL SPRAY    1 spray (50 mcg total) by Each Nostril route once daily.    HYDRALAZINE (APRESOLINE) 100 MG TABLET    Take 1 tablet (100 mg total) by mouth every 12 (twelve) hours.    HYDROXYZINE HCL (ATARAX) 25 MG TABLET    Take 1 tablet by mouth three times daily as needed    METOPROLOL SUCCINATE (TOPROL-XL) 25 MG 24 HR TABLET    Take 1 tablet by mouth once daily    MOMETASONE (ELOCON) 0.1 % SOLUTION    Apply once to twice daily prn itching    MUPIROCIN (BACTROBAN) 2 % OINTMENT    Apply topically 3 (three) times daily.    NIFEDIPINE (PROCARDIA-XL) 60 MG (OSM) 24 HR TABLET    Take 1 tablet (60 mg total) by mouth once daily.    NYSTATIN (MYCOSTATIN) POWDER    APPLY  POWDER TOPICALLY TO AFFECTED AREA 4 TIMES DAILY    OLOPATADINE (PATANOL) 0.1 % OPHTHALMIC SOLUTION    INSTILL 1 DROP INTO EACH EYE TWICE DAILY    OMEPRAZOLE (PRILOSEC) 40 MG CAPSULE    Take 1 capsule by mouth.    PRAVASTATIN (PRAVACHOL) 40 MG TABLET    Take 1 tablet by mouth once daily    TIZANIDINE (ZANAFLEX) 4 MG TABLET    TAKE 1 TABLET BY MOUTH EVERY 6 HOURS AS NEEDED    TRIAMCINOLONE ACETONIDE 0.1%-CICLOPIROX-MAGNESIUM HYDROXIDE 400 MG/5 ML    Apply to affected area twice a day after cool blow dry    VALSARTAN (DIOVAN) 320 MG TABLET    Take 1 tablet by mouth once daily   Changed and/or Refilled Medications    Modified Medication Previous Medication    OXYCODONE-ACETAMINOPHEN (PERCOCET) 7.5-325 MG PER TABLET oxyCODONE-acetaminophen (PERCOCET) 7.5-325 mg per tablet       Take 1 tablet by mouth every 4 (four) hours as needed for Pain.    Take 1 tablet by mouth every 4 (four) hours as needed for Pain.   Discontinued Medications    CLOTRIMAZOLE-BETAMETHASONE 1-0.05% (LOTRISONE) CREAM    APPLY  CREAM TOPICALLY TO AFFECTED AREA TWICE DAILY    FAMOTIDINE (PEPCID) 40 MG TABLET    Take 1 tablet (40 mg total) by mouth once daily.    LIDOCAINE (LIDODERM) 5 %    Place 1 patch onto the skin once daily. Remove &  "Discard patch within 12 hours or as directed by MD    NIFEDIPINE (PROCARDIA-XL) 30 MG (OSM) 24 HR TABLET    Take 1 tablet (30 mg total) by mouth once daily.       ROS  Review of Systems   Constitutional:  Negative for chills, diaphoresis, fatigue, fever and unexpected weight change.   HENT:  Negative for rhinorrhea, sinus pressure, sore throat and tinnitus.    Eyes:  Negative for photophobia and visual disturbance.   Respiratory:  Negative for cough, shortness of breath and wheezing.    Cardiovascular:  Negative for chest pain and palpitations.   Gastrointestinal:  Negative for abdominal pain, blood in stool, constipation, diarrhea, nausea and vomiting.   Genitourinary:  Negative for dysuria, flank pain, frequency and vaginal discharge.   Musculoskeletal:  Positive for arthralgias and back pain. Negative for joint swelling.   Skin:  Negative for rash.   Neurological:  Negative for speech difficulty, weakness, light-headedness and headaches.   Psychiatric/Behavioral:  Negative for behavioral problems and dysphoric mood.        Physical Exam  Vitals:    11/06/24 1119   BP: 120/68   BP Location: Left arm   Patient Position: Sitting   Pulse: 83   Resp: 18   Temp: 98.1 °F (36.7 °C)   TempSrc: Oral   SpO2: 97%   Weight: 118.3 kg (260 lb 12.9 oz)   Height: 5' 4" (1.626 m)    Body mass index is 44.77 kg/m².  Weight: 118.3 kg (260 lb 12.9 oz)   Height: 5' 4" (162.6 cm)     Physical Exam  Vitals and nursing note reviewed.   Constitutional:       Appearance: She is well-developed.   Skin:     General: Skin is warm and dry.      Findings: No erythema or rash.   Neurological:      Mental Status: She is alert. Mental status is at baseline.   Psychiatric:         Behavior: Behavior normal.         Health Maintenance         Date Due Completion Date    TETANUS VACCINE Never done ---    Sign Pain Contract Never done ---    Urine Drug Screen Never done ---    Naloxone Prescription Never done ---    Shingles Vaccine (1 of 2) Never " done ---    RSV Vaccine (Age 60+ and Pregnant patients) (1 - Risk 60-74 years 1-dose series) Never done ---    Pneumococcal Vaccines (Age 65+) (1 of 1 - PCV) Never done ---    Colorectal Cancer Screening 07/23/2023 7/23/2020    Mammogram 05/08/2024 5/8/2023    Influenza Vaccine (1) 09/01/2024 11/8/2023    Override on 4/13/2023: Declined (Declined for season)    COVID-19 Vaccine (5 - 2024-25 season) 09/01/2024 2/17/2022    Annual UACr 08/07/2025 8/7/2024    Aspirin/Antiplatelet Therapy 11/06/2025 11/6/2024    Lipid Panel 03/07/2028 3/7/2023            Health maintenance reviewed and addressed as ordered      ASSESSMENT/PLAN       1. Class 3 severe obesity due to excess calories with serious comorbidity and body mass index (BMI) of 40.0 to 44.9 in adult  We have discussed this is the third PA we have done for a GLP and we will not continue if not covered by insurance  - tirzepatide (MOUNJARO) 2.5 mg/0.5 mL PnIj; Inject 2.5 mg into the skin every 7 days.  Dispense: 2 mL; Refill: 0    2. Spinal stenosis, unspecified spinal region  Continue f/u with pain mgmt  Add lyrica for neuropathic pain control  - pregabalin (LYRICA) 25 MG capsule; Take 1 capsule (25 mg total) by mouth 2 (two) times daily.  Dispense: 60 capsule; Refill: 2    3. Primary hypertension  controlled    4. Lumbar radiculopathy  I don't advise increasing this due to past hx of hallucinations  F/u with pain mgmt pending WADE  - oxyCODONE-acetaminophen (PERCOCET) 7.5-325 mg per tablet; Take 1 tablet by mouth every 4 (four) hours as needed for Pain.  Dispense: 120 tablet; Refill: 0  - oxyCODONE-acetaminophen (PERCOCET) 7.5-325 mg per tablet; Take 1 tablet by mouth every 4 (four) hours as needed for Pain.  Dispense: 120 tablet; Refill: 0  - oxyCODONE-acetaminophen (PERCOCET) 7.5-325 mg per tablet; Take 1 tablet by mouth every 4 (four) hours as needed for Pain.  Dispense: 120 tablet; Refill: 0    5. Left leg numbness  Trial of lyrca  On wait list for aqua  therapy  - pregabalin (LYRICA) 25 MG capsule; Take 1 capsule (25 mg total) by mouth 2 (two) times daily.  Dispense: 60 capsule; Refill: 2        Simona Torres MD  11/08/2024 11:25 AM        Follow up in about 3 months (around 2/6/2025) for management of chronic conditions.    No orders of the defined types were placed in this encounter.

## 2024-11-28 DIAGNOSIS — F41.9 ANXIETY: ICD-10-CM

## 2024-11-28 DIAGNOSIS — Z01.30 BLOOD PRESSURE CHECK: ICD-10-CM

## 2024-11-28 DIAGNOSIS — I10 HYPERTENSION, UNCONTROLLED: ICD-10-CM

## 2024-11-28 NOTE — TELEPHONE ENCOUNTER
Care Due:                  Date            Visit Type   Department     Provider  --------------------------------------------------------------------------------                                [facility]                              PRIMARY      MEDICINE/  Last Visit: 11-      CARE ([facility])   INTERNAL CHAPARRITA Torres                              [facility]                              PRIMARY      MEDICINE/  Next Visit: 02-      CARE ([facility])   MARIA G Min  Test          Frequency    Reason                     Performed    Due Date  --------------------------------------------------------------------------------    HBA1C.......  6 months...  tirzepatide..............  08- 01-    Lipid Panel.  12 months..  pravastatin..............  03- 03-    [facility] Embedded Care Due Messages. Reference number: [id number].   11/28/2024 4:15:17 PM CST

## 2024-11-29 RX ORDER — VALSARTAN 320 MG/1
320 TABLET ORAL
Qty: 90 TABLET | Refills: 3 | Status: SHIPPED | OUTPATIENT
Start: 2024-11-29

## 2024-11-29 RX ORDER — HYDROXYZINE HYDROCHLORIDE 25 MG/1
TABLET, FILM COATED ORAL
Qty: 45 TABLET | Refills: 0 | Status: SHIPPED | OUTPATIENT
Start: 2024-11-29

## 2024-11-29 RX ORDER — SPIRONOLACTONE 50 MG/1
50 TABLET, FILM COATED ORAL
Qty: 90 TABLET | Refills: 0 | OUTPATIENT
Start: 2024-11-29

## 2024-11-29 RX ORDER — HYDROXYZINE HYDROCHLORIDE 25 MG/1
TABLET, FILM COATED ORAL
Qty: 45 TABLET | Refills: 0 | Status: SHIPPED | OUTPATIENT
Start: 2024-11-29 | End: 2024-11-29

## 2024-11-29 NOTE — TELEPHONE ENCOUNTER
Refill Routing Note   Medication(s) are not appropriate for processing by [facility] for the following reason(s):        Outside of protocol    [facility] action(s):  Defer  Quick Discontinue  Route     Requires labs : Yes      Medication Therapy Plan: Spironolactone dc'd on 9/17/24      Appointments  past 12m or future 3m with PCP    Date Provider   Last Visit   11/6/2024 Simona Torres MD   Next Visit   2/6/2025 Simona Torres MD   ED visits in past 90 days: 1        Note composed:10:12 AM 11/29/2024

## 2024-11-29 NOTE — TELEPHONE ENCOUNTER
Refill Routing Note   Medication(s) are not appropriate for processing by [facility] for the following reason(s):        Outside of protocol    [facility] action(s):  Route               Appointments  past 12m or future 3m with PCP    Date Provider   Last Visit   11/6/2024 Simona Torres MD   Next Visit   11/28/2024 Simona Torres MD   ED visits in past 90 days: 1        Note composed:8:03 AM 11/29/2024

## 2024-12-07 ENCOUNTER — PATIENT MESSAGE (OUTPATIENT)
Dept: OTHER | Facility: OTHER | Age: 74
End: 2024-12-07
Payer: MEDICARE

## 2024-12-11 ENCOUNTER — CLINICAL SUPPORT (OUTPATIENT)
Dept: REHABILITATION | Facility: HOSPITAL | Age: 74
End: 2024-12-11
Payer: MEDICARE

## 2024-12-11 DIAGNOSIS — M79.18 PIRIFORMIS MUSCLE PAIN: Primary | ICD-10-CM

## 2024-12-11 DIAGNOSIS — M54.16 LUMBAR RADICULOPATHY: ICD-10-CM

## 2024-12-11 DIAGNOSIS — M46.1 BILATERAL SACROILIITIS: ICD-10-CM

## 2024-12-11 DIAGNOSIS — G57.03 PIRIFORMIS SYNDROME OF BOTH SIDES: ICD-10-CM

## 2024-12-11 PROCEDURE — 97161 PT EVAL LOW COMPLEX 20 MIN: CPT

## 2024-12-11 NOTE — PROGRESS NOTES
[facility]   Physical Therapy Initial Evaluation     Date: 12/11/2024   Name: Amanda Arredondo [facility] Number: [id number]    Therapy Diagnosis:   Encounter Diagnoses   Name Primary?    Bilateral sacroiliitis     Piriformis syndrome of both sides     Lumbar radiculopathy     Piriformis muscle pain Yes     Physician: Marisa Cespedes NP    Physician Orders: Aquatic Therapy   Medical Diagnosis from Referral:   M46.1 (ICD-10-CM) - Bilateral sacroiliitis   G57.03 (ICD-10-CM) - Piriformis syndrome of both sides   M54.16 (ICD-10-CM) - Lumbar radiculopathy     Evaluation Date: 12/11/2024  Authorization Period Expiration: 10/11/25  Plan of Care Expiration: 2/11/15  Visit # / Visits authorized: 1/ 1     Precautions: Standard and Fall    Time In: 9:30 am   Time Out: 10:10 am   Total Appointment Time (timed & untimed codes): 40 minutes      SUBJECTIVE   Date of onset: chronic    History of current condition - Amanda reports: she has a history of L5 Lumbar Laminectomy in 2018.  Since that time she has continued with back pain worse on the left side.  She stated if she stands/walks for greater than 8-10 minutes her left leg goes numb.  She has seen a Neurosurgeon who stated she now has some issues at the L4 level.  She has has several injections/epidurals but continues with pain. She tried to get an EMG a few weeks ago but it was very painful and she was unable to tolerate the test so she will re-try with another MD 12/23/24.  She does not want another surgery so wanted to try Aquatic PT again since it worked in the past. She stated land PT increases her symptoms.     Falls: She took Codeine and hallucinated and fell.     Prior Therapy: Aquatic PT 2022, unable to tolerate land therapy.  Social History: lives alone in 2 story house with bedrooms upstairs. She tries to avoid going up and down as much as possible.   Occupation: retired  Prior Level of Function: Indpendent   Current Level of Function:  Independent with pain.  She stated anything that requires standing or walking greater than 10 minutes is difficulty for her to do.     Pain:  Current 10/10, worst 10/10, best 5/10   Location: Middle back , lower back wrapping around down the left leg.   Description: rubbing 2 bones together, numbness, grabbing, cracking  Aggravating Factors: Walking greater than 8-10 minutes, standing greater than 8 minutes, sitting greater than 8-10 minutes then gets up to stretch.  Easing Factors: When taking the medications    Patients goals: to get some exercises in a weightless position      Medical History:   Past Medical History:   Diagnosis Date    Arthralgia     Colon polyp     Degenerative disc disease at L5-S1 level     High cholesterol     Hypertension     Notalgia paresthetica     Nuclear sclerosis of both eyes 01/08/2020    Sciatica     Spinal stenosis     Tobacco use 10/21/2021       Surgical History:   Amanda Holt  has a past surgical history that includes Tubal ligation; Back surgery; Colonoscopy (N/A, 07/23/2020); Epidural steroid injection (Left, 09/09/2020); Epidural steroid injection (Left, 02/12/2021); Transforaminal epidural injection of steroid (Left, 03/14/2022); Transforaminal epidural injection of steroid (Left, 08/29/2022); Spine surgery (10/08/18); Colon surgery (2022); Injection of facet joint (Bilateral, 3/25/2024); and Transforaminal epidural injection of steroid (Left, 9/27/2024).    Medications:   Amanda has a current medication list which includes the following prescription(s): acetaminophen, amitriptyline, aspirin, b-12 compliance, diclofenac sodium, ergocalciferol, fluticasone propionate, hydralazine, hydroxyzine hcl, metoprolol succinate, mometasone, mupirocin, nifedipine, nystatin, olopatadine, omeprazole, oxycodone-acetaminophen, oxycodone-acetaminophen, [START ON 1/5/2025] oxycodone-acetaminophen, pravastatin, pregabalin, tizanidine, triamcinolone acetonide  0.1%-ciclopirox-magnesium hydroxide 400 mg/5 ml, and valsartan.    Allergies:   Review of patient's allergies indicates:   Allergen Reactions    Codeine       Pool contraindications, including but not limited to, incontinence, seizures, fever/GI issues were reviewed with the patient. Patient agrees that based on their knowledge and medical history, they are appropriate for Aquatic Therapy.     OBJECTIVE     TU seconds without use of AD. She ambulates with moderate antalgic gait, lateral trunk lean, reduced step length B.      5 Times Sit to Stand: 17 seconds with good hip hinge, increased lumbar lordosis, no use of hands for assist.      Gait: She ambulates with moderate antalgic gait, lateral trunk lean, reduced step length B.      Range of Motion:     Lumbar Range of Motion:    Limitations Pain   Flexion WFL   Alleviated symptoms    Extension Limited 50%   Mid lumbar pain    Left Side Bending Limited ~90% Pain mid lumbar   Right Side Bending Limited ~90% Pain mid lumbar       Lower Extremity Strength  Right LE   Left LE     Knee extension: 5/5 Knee extension: 4/5   Knee flexion: 5/5 Knee flexion: 4/5   Hip flexion: 4/5 Hip flexion: 4-/5   Hip abduction: 4-/5 Hip abduction: 4-/5   Ankle dorsiflexion: 5/5 Ankle dorsiflexion: 4/5   Ankle plantarflexion: 5/5 Ankle plantarflexion: 4/5      Palpation: (+) TTP noted L greater than R Piriformis, Lumbosacral Praspinals, ITB, Greater Trochanter.    Flexibility:   Hamstring R: No restriction       L  Moderate restriction     Piriformis R Mild restriction     L Moderate restriction    PATIENT EDUCATION AND HOME EXERCISES     Education provided:   - Diagnosis, prognosis, relevant anatomy, role of therapy.      Written Home Exercises Provided:  Exercises were reviewed and Amanda was able to demonstrate them prior to the end of the session.  Amanda demonstrated good  understanding of the education provided. See EMR under Patient Instructions for exercises provided during  therapy sessions.    ASSESSMENT     Amanda is a 74 y.o. female referred to outpatient Physical Therapy with a medical diagnosis of B Sacroiliitis, Piriformis Syndrome, Piriformis muscle pain. Patient presents with limited Lumbar AROM, decreased L LE MMT compared to the R, tenderness noted L greater than R Piriformis, Lumbosacral Praspinals, ITB, Greater Trochanter.  She has limited L hamstring and Piriformis flexibility. This has led to gait deviations and increased time to compete TUG and 5 times sit to stand. This has impacted her ability to perform ADLs and preferred activities at prior level of function.     Patient prognosis is Good.     Patient will benefit from skilled outpatient Physical Therapy to address the deficits stated above and in the chart below, provide patient /family education, and to maximize patientt's level of independence.     Plan of care discussed with patient: Yes    Patient's spiritual, cultural and educational needs considered and patient is agreeable to the plan of care and goals as stated below:     Anticipated Barriers for therapy: None    Medical Necessity is demonstrated by the following  History  Co-morbidities and personal factors that may impact the plan of care Co-morbidities:   Past Medical History:   Diagnosis Date    Arthralgia     Colon polyp     Degenerative disc disease at L5-S1 level     High cholesterol     Hypertension     Notalgia paresthetica     Nuclear sclerosis of both eyes 01/08/2020    Sciatica     Spinal stenosis     Tobacco use 10/21/2021       Personal Factors:   no deficits     low   Examination  Body Structures and Functions, activity limitations and participation restrictions that may impact the plan of care Body Regions:   back  lower extremities    Body Systems:    ROM  strength  gross coordinated movement  balance  gait  transfers  transitions  motor control    Participation Restrictions:   none    Activity limitations:   Learning and applying  knowledge  no deficits    General Tasks and Commands  no deficits    Communication  no deficits    Mobility  walking  moving around using equipment (WC)  using transportation (bus, train, plane, car)  driving (bike, car, motorcycle)    Self care  washing oneself (bathing, drying, washing hands)  dressing    Domestic Life  no deficits    Interactions/Relationships  no deficits    Life Areas  no deficits    Community and Social Life  no deficits         low   Clinical Presentation stable and uncomplicated low   Decision Making/ Complexity Score: low       Goals:  Short Term Goals: 6 weeks   1. Patient will be independent in HEP & progressions.  2. Patient will achieve TUG score of 15 sec to demonstrate improved mobility  3. The patient will achieve 5 sit to stand score of 15 seconds to demonstrate improved transfers and endurance.     Long Term Goals: 12 weeks   1. The patient will demonstrate independence with extensive HEP.  2. Patient will achieve 5 sit to stand score of 12 seconds to demonstrate improved transfers & endurance.  3. Patent is able to demonstrate MMT 4+/5 B LE  without pain reports during testing.      PLAN   Plan of care Certification: 12/11/2024 to 2/11/25.    Outpatient Physical Therapy 2 times weekly for 8 weeks to include the following interventions: manual therapy, aquatic therapy, patient education, therapeutic exercise, therapeutic activities.    Patient may be seen by PTA as part of rehabilitation team.    Cierra Tavera, PT      I CERTIFY THE NEED FOR THESE SERVICES FURNISHED UNDER THIS PLAN OF TREATMENT AND WHILE UNDER MY CARE   Physician's comments:     Physician's Signature: ___________________________________________________

## 2024-12-13 NOTE — PLAN OF CARE
[facility]   Physical Therapy Initial Evaluation     Date: 12/11/2024   Name: Amanda Arredondo [facility] Number: [id number]    Therapy Diagnosis:   Encounter Diagnoses   Name Primary?    Bilateral sacroiliitis     Piriformis syndrome of both sides     Lumbar radiculopathy     Piriformis muscle pain Yes     Physician: Marisa Cespedes NP    Physician Orders: Aquatic Therapy   Medical Diagnosis from Referral:   M46.1 (ICD-10-CM) - Bilateral sacroiliitis   G57.03 (ICD-10-CM) - Piriformis syndrome of both sides   M54.16 (ICD-10-CM) - Lumbar radiculopathy     Evaluation Date: 12/11/2024  Authorization Period Expiration: 10/11/25  Plan of Care Expiration: 2/11/15  Visit # / Visits authorized: 1/ 1     Precautions: Standard and Fall    Time In: 9:30 am   Time Out: 10:10 am   Total Appointment Time (timed & untimed codes): 40 minutes      SUBJECTIVE   Date of onset: chronic    History of current condition - Amanda reports: she has a history of L5 Lumbar Laminectomy in 2018.  Since that time she has continued with back pain worse on the left side.  She stated if she stands/walks for greater than 8-10 minutes her left leg goes numb.  She has seen a Neurosurgeon who stated she now has some issues at the L4 level.  She has has several injections/epidurals but continues with pain. She tried to get an EMG a few weeks ago but it was very painful and she was unable to tolerate the test so she will re-try with another MD 12/23/24.  She does not want another surgery so wanted to try Aquatic PT again since it worked in the past. She stated land PT increases her symptoms.     Falls: She took Codeine and hallucinated and fell.     Prior Therapy: Aquatic PT 2022, unable to tolerate land therapy.  Social History: lives alone in 2 story house with bedrooms upstairs. She tries to avoid going up and down as much as possible.   Occupation: retired  Prior Level of Function: Indpendent   Current Level of Function:  Independent with pain.  She stated anything that requires standing or walking greater than 10 minutes is difficulty for her to do.     Pain:  Current 10/10, worst 10/10, best 5/10   Location: Middle back , lower back wrapping around down the left leg.   Description: rubbing 2 bones together, numbness, grabbing, cracking  Aggravating Factors: Walking greater than 8-10 minutes, standing greater than 8 minutes, sitting greater than 8-10 minutes then gets up to stretch.  Easing Factors: When taking the medications    Patients goals: to get some exercises in a weightless position      Medical History:   Past Medical History:   Diagnosis Date    Arthralgia     Colon polyp     Degenerative disc disease at L5-S1 level     High cholesterol     Hypertension     Notalgia paresthetica     Nuclear sclerosis of both eyes 01/08/2020    Sciatica     Spinal stenosis     Tobacco use 10/21/2021       Surgical History:   Amanda Holt  has a past surgical history that includes Tubal ligation; Back surgery; Colonoscopy (N/A, 07/23/2020); Epidural steroid injection (Left, 09/09/2020); Epidural steroid injection (Left, 02/12/2021); Transforaminal epidural injection of steroid (Left, 03/14/2022); Transforaminal epidural injection of steroid (Left, 08/29/2022); Spine surgery (10/08/18); Colon surgery (2022); Injection of facet joint (Bilateral, 3/25/2024); and Transforaminal epidural injection of steroid (Left, 9/27/2024).    Medications:   Amanda has a current medication list which includes the following prescription(s): acetaminophen, amitriptyline, aspirin, b-12 compliance, diclofenac sodium, ergocalciferol, fluticasone propionate, hydralazine, hydroxyzine hcl, metoprolol succinate, mometasone, mupirocin, nifedipine, nystatin, olopatadine, omeprazole, oxycodone-acetaminophen, oxycodone-acetaminophen, [START ON 1/5/2025] oxycodone-acetaminophen, pravastatin, pregabalin, tizanidine, triamcinolone acetonide  0.1%-ciclopirox-magnesium hydroxide 400 mg/5 ml, and valsartan.    Allergies:   Review of patient's allergies indicates:   Allergen Reactions    Codeine       Pool contraindications, including but not limited to, incontinence, seizures, fever/GI issues were reviewed with the patient. Patient agrees that based on their knowledge and medical history, they are appropriate for Aquatic Therapy.     OBJECTIVE     TU seconds without use of AD. She ambulates with moderate antalgic gait, lateral trunk lean, reduced step length B.      5 Times Sit to Stand: 17 seconds with good hip hinge, increased lumbar lordosis, no use of hands for assist.      Gait: She ambulates with moderate antalgic gait, lateral trunk lean, reduced step length B.      Range of Motion:     Lumbar Range of Motion:    Limitations Pain   Flexion WFL   Alleviated symptoms    Extension Limited 50%   Mid lumbar pain    Left Side Bending Limited ~90% Pain mid lumbar   Right Side Bending Limited ~90% Pain mid lumbar       Lower Extremity Strength  Right LE   Left LE     Knee extension: 5/5 Knee extension: 4/5   Knee flexion: 5/5 Knee flexion: 4/5   Hip flexion: 4/5 Hip flexion: 4-/5   Hip abduction: 4-/5 Hip abduction: 4-/5   Ankle dorsiflexion: 5/5 Ankle dorsiflexion: 4/5   Ankle plantarflexion: 5/5 Ankle plantarflexion: 4/5      Palpation: (+) TTP noted L greater than R Piriformis, Lumbosacral Praspinals, ITB, Greater Trochanter.    Flexibility:   Hamstring R: No restriction       L  Moderate restriction     Piriformis R Mild restriction     L Moderate restriction    PATIENT EDUCATION AND HOME EXERCISES     Education provided:   - Diagnosis, prognosis, relevant anatomy, role of therapy.      Written Home Exercises Provided:  Exercises were reviewed and Amanda was able to demonstrate them prior to the end of the session.  Amanda demonstrated good  understanding of the education provided. See EMR under Patient Instructions for exercises provided during  therapy sessions.    ASSESSMENT     Amanda is a 74 y.o. female referred to outpatient Physical Therapy with a medical diagnosis of B Sacroiliitis, Piriformis Syndrome, Piriformis muscle pain. Patient presents with limited Lumbar AROM, decreased L LE MMT compared to the R, tenderness noted L greater than R Piriformis, Lumbosacral Praspinals, ITB, Greater Trochanter.  She has limited L hamstring and Piriformis flexibility. This has led to gait deviations and increased time to compete TUG and 5 times sit to stand. This has impacted her ability to perform ADLs and preferred activities at prior level of function.     Patient prognosis is Good.     Patient will benefit from skilled outpatient Physical Therapy to address the deficits stated above and in the chart below, provide patient /family education, and to maximize patientt's level of independence.     Plan of care discussed with patient: Yes    Patient's spiritual, cultural and educational needs considered and patient is agreeable to the plan of care and goals as stated below:     Anticipated Barriers for therapy: None    Medical Necessity is demonstrated by the following  History  Co-morbidities and personal factors that may impact the plan of care Co-morbidities:   Past Medical History:   Diagnosis Date    Arthralgia     Colon polyp     Degenerative disc disease at L5-S1 level     High cholesterol     Hypertension     Notalgia paresthetica     Nuclear sclerosis of both eyes 01/08/2020    Sciatica     Spinal stenosis     Tobacco use 10/21/2021       Personal Factors:   no deficits     low   Examination  Body Structures and Functions, activity limitations and participation restrictions that may impact the plan of care Body Regions:   back  lower extremities    Body Systems:    ROM  strength  gross coordinated movement  balance  gait  transfers  transitions  motor control    Participation Restrictions:   none    Activity limitations:   Learning and applying  knowledge  no deficits    General Tasks and Commands  no deficits    Communication  no deficits    Mobility  walking  moving around using equipment (WC)  using transportation (bus, train, plane, car)  driving (bike, car, motorcycle)    Self care  washing oneself (bathing, drying, washing hands)  dressing    Domestic Life  no deficits    Interactions/Relationships  no deficits    Life Areas  no deficits    Community and Social Life  no deficits         low   Clinical Presentation stable and uncomplicated low   Decision Making/ Complexity Score: low       Goals:  Short Term Goals: 6 weeks   1. Patient will be independent in HEP & progressions.  2. Patient will achieve TUG score of 15 sec to demonstrate improved mobility  3. The patient will achieve 5 sit to stand score of 15 seconds to demonstrate improved transfers and endurance.     Long Term Goals: 12 weeks   1. The patient will demonstrate independence with extensive HEP.  2. Patient will achieve 5 sit to stand score of 12 seconds to demonstrate improved transfers & endurance.  3. Patent is able to demonstrate MMT 4+/5 B LE  without pain reports during testing.      PLAN   Plan of care Certification: 12/11/2024 to 2/11/25.    Outpatient Physical Therapy 2 times weekly for 8 weeks to include the following interventions: manual therapy, aquatic therapy, patient education, therapeutic exercise, therapeutic activities.    Patient may be seen by PTA as part of rehabilitation team.    Cierra Tavera, PT      I CERTIFY THE NEED FOR THESE SERVICES FURNISHED UNDER THIS PLAN OF TREATMENT AND WHILE UNDER MY CARE   Physician's comments:     Physician's Signature: ___________________________________________________

## 2024-12-16 ENCOUNTER — CLINICAL SUPPORT (OUTPATIENT)
Dept: REHABILITATION | Facility: HOSPITAL | Age: 74
End: 2024-12-16
Payer: MEDICARE

## 2024-12-16 DIAGNOSIS — M54.40 CHRONIC LEFT-SIDED LOW BACK PAIN WITH SCIATICA, SCIATICA LATERALITY UNSPECIFIED: Primary | ICD-10-CM

## 2024-12-16 DIAGNOSIS — G89.29 CHRONIC LEFT-SIDED LOW BACK PAIN WITH SCIATICA, SCIATICA LATERALITY UNSPECIFIED: Primary | ICD-10-CM

## 2024-12-16 DIAGNOSIS — M79.18 PIRIFORMIS MUSCLE PAIN: ICD-10-CM

## 2024-12-16 PROCEDURE — 97113 AQUATIC THERAPY/EXERCISES: CPT | Mod: CQ

## 2024-12-16 NOTE — PROGRESS NOTES
"[facility]   Physical Therapy Treatment Note     Name: Amanda Arredondo [facility]  Clinic Number: [id number]    Therapy Diagnosis:   Encounter Diagnoses   Name Primary?    Chronic left-sided low back pain with sciatica, sciatica laterality unspecified Yes    Piriformis muscle pain      Physician: Marisa Cespedes NP    Visit Date: 12/16/2024    Physician Orders: Aquatic Therapy   Medical Diagnosis from Referral:   M46.1 (ICD-10-CM) - Bilateral sacroiliitis   G57.03 (ICD-10-CM) - Piriformis syndrome of both sides   M54.16 (ICD-10-CM) - Lumbar radiculopathy     Evaluation Date: 12/11/2024  Authorization Period Expiration: 10/11/25  Plan of Care Expiration: 2/11/15  Visit # / Visits authorized: 1/ + eval     Precautions: Standard and Fall    PTA Visit #: 1/5     Time In: 9:25 am  Time Out: 10:20 am  Total Billable Time: 55 minutes    SUBJECTIVE     Pt reports: she got relief from aquatic therapy a few years ago. Pain has been coming back over last few months.  States "10/10" when getting out of bed with difficulty moving due to pain.  Numbness radiates down Left leg.  States some relief when in pool.  "Maybe 5/10"    She  was partially  compliant with home exercise program.    Response to previous treatment: eval only  Functional change: initiated aquatic PT    Pain: 10/10  Location: bilateral back      OBJECTIVE     Objective Measures updated at progress report unless specified.     Treatment     Amanda received aquatic therapeutic exercises to develop strength, endurance, ROM, flexibility, posture, and core stabilization for 55 minutes including:    FUNCTIONAL MOBILITY TRAINING x 2 laps each at beginning and 1 lap each at end of session  Walk forward/backward/lateral    STRETCHES 2 x 30sec  Add next as appropriate      LE EX x 20  Squat  Heel raise with gluteal set  Hip abduction/adduction  Hip flex/ext    Sit to stand from pool stool  Long arc quad     SCIATIC NERVE GLIDE:  Seated on pool stool " "left 2x10    Walking marches x 2 laps      UE EX/CORE  x Add as tolerated  Shoulder flex/ext TA activation paddles CLOSED  Shoulder horizontal abd/add TA activation paddles CLOSED  Shoulder abd/add with TA activation and paddles CLOSED    Mini squat with push/pull red kickboard  Add as tolerated    ENDURANCE  LTR x 2'  Bicycle in // bars x 3'      Patient Education and Home Exercises     Home Exercises Provided and Patient Education Provided     Education provided:   Role of aquatic therapy  Hydration post therapy  Review of body mechanics and abdominal activation with activity  Log roll technique for supine<>sit    Written Home Exercises Provided: Patient instructed to cont prior HEP. Exercises were reviewed and Amanda was able to demonstrate them prior to the end of the session.  Amanda demonstrated good  understanding of the education provided. See EMR under Patient Instructions for exercises provided during therapy sessions    ASSESSMENT     Patient required moderate verbal cues and demonstration with instruction for proper technique & core stabilization. Patient demonstrated good understanding post instruction. Required several short rest breaks but able to complete exercises as noted with reports of "good now"  Rates pain after session as "7/10".    Amanda Is progressing well towards her goals.   Pt prognosis is Good.     Pt will continue to benefit from skilled outpatient physical therapy to address the deficits listed in the problem list box on initial evaluation, provide pt/family education and to maximize pt's level of independence in the home and community environment.     Pt's spiritual, cultural and educational needs considered and pt agreeable to plan of care and goals.     Anticipated barriers to physical therapy: None    Goals:  Short Term Goals: 6 weeks   1. Patient will be independent in HEP & progressions.  2. Patient will achieve TUG score of 15 sec to demonstrate improved mobility  3. The " patient will achieve 5 sit to stand score of 15 seconds to demonstrate improved transfers and endurance.     Long Term Goals: 12 weeks   1. The patient will demonstrate independence with extensive HEP.  2. Patient will achieve 5 sit to stand score of 12 seconds to demonstrate improved transfers & endurance.  3. Patent is able to demonstrate MMT 4+/5 B LE  without pain reports during testing.    Goals:     PLAN     Plan of care Certification: 12/11/2024 to 2/11/25.    Outpatient Physical Therapy 2 times weekly for 8 weeks to include the following interventions: manual therapy, aquatic therapy, patient education, therapeutic exercise, therapeutic activities.    Patient may be seen by PTA as part of rehabilitation team.    Kirsten Arrieta, PTA

## 2024-12-23 ENCOUNTER — PROCEDURE VISIT (OUTPATIENT)
Dept: NEUROLOGY | Facility: CLINIC | Age: 74
End: 2024-12-23
Payer: MEDICARE

## 2024-12-23 DIAGNOSIS — M54.16 LUMBAR RADICULOPATHY: ICD-10-CM

## 2024-12-23 NOTE — PROCEDURES
Test Date:  2024    Patient: amanda holt : 1950 Physician: Aleksandr Carmona D.O.   ID#: [id number] SEX: Female Ref. Phys: Marisa Cespedes NP     HPI: Amanda Holt is a 74 y.o.female who presents for NCS/EMG to evaluate for left lumbosacral radiculopathy.  Noted that study was attempted in 2024, but stopped due to intolerance due to pain.      PROCEDURE:  Prior to the procedure, the procedure was discussed in detail with the patient.  All questions were answered, and verbal consent was obtained.  For nerve conduction studies, a combination of surface electrodes, bar electrodes, and/or ring electrodes were used as needed.  For needle EMG, each site was cleaned and prepped in usual fashion with an alcohol pad.  A monopolar needle (28G) was used.  There was no significant bleeding, and bandages were applied as needed.  The procedure was tolerated without adverse effect.  The patient was instructed on post-procedure care including ice if needed for 10-15 minutes up to 4 times/day for any sore muscles.  I discussed with the patient that the data would be reviewed and a report sent to the referring provider, where any follow up questions regarding next steps should be directed.        NCV & EMG Findings:  All nerve conduction studies (as indicated in the following tables) were within normal limits.  Needle evaluation of the left Fibularis Longus muscle showed increased motor unit amplitude.  The left Gastrocnemius muscle showed increased insertional activity, slightly increased spontaneous activity, and increased motor unit amplitude.  The left Tibialis Posterior muscle showed increased insertional activity and moderately increased spontaneous activity.  All remaining muscles (as indicated in the following table) showed no evidence of electrical instability.      IMPRESSIONS:  There is evidence of a chronic left S1 (L5 also possible) radiculopathy with active/ongoing denervation in the  tibialis posterior and medial gastrocnemius muscles.        ___________________________  Aleksandr Carmona D.O.        NCS+  Motor Nerve Results      Latency Amplitude F-Lat Segment Distance CV Comment   Site (ms) Norm (mV) Norm (ms)  (cm) (m/s) Norm    Left Fibular (EDB)   Ankle 3.2  < 6.5 2.4  > 1.10         Bel Fib Head 10.2 - 1.34 -  Bel Fib Head-Ankle 38 54  > 39    Pop Fossa 11.8 - 2.5 -  Pop Fossa-Bel Fib Head 10 63  > 42    Right Fibular (EDB)   Ankle 3.3  < 6.5 3.3  > 1.10         Left Tibial (AHB)   Ankle 5.4  < 6.1 1.32  > 1.10         Right Tibial (AHB)   Ankle 3.4  < 6.1 2.4  > 1.10           Sensory Nerve Results      Start Lat Latency (Peak) Amplitude (P-P) Segment Distance Start CV Comment   Site (ms) Norm (ms) (µV) Norm  (cm) (m/s) Norm    Left Sural (Rec:Lat Mall)   Calf 1.55  < 3.6 2.2 7  > 4 Calf-Lat Mall 14 90  > 39    Left Superficial Fibular (Rec:Ankle)   14 cm 1.93 - 2.6 14  > 5 14 cm-Ankle 14 73  > 32    Right Superficial Fibular (Rec:Ankle)   14 cm 2.2 - 2.5 13  > 5 14 cm-Ankle 14 64  > 32      EMG+     Side Muscle Nerve Root Ins Act Fibs Psw Amp Dur Poly Recrt Int Pat Comment   Left Vastus Med Femoral L2-L4 Nml Nml Nml Nml Nml 0 Nml Nml    Left Tib Anterior Fibular,  Deep Fibula... L4-L5 Nml Nml Nml Nml Nml 0 Nml Nml    Left Fib Longus Fibular,  Superficial... L5-S1 Nml Nml Nml *Incr Nml 0 Nml Nml    Left Tib Posterior Tibial L5-S1 *Incr *2+ *2+ Nml Nml 0 Nml Nml    Left Gastroc Tibial S1-S2 *Incr *1+ *1+ *Incr Nml 0 Nml Nml            Waveforms:    Motor              Sensory

## 2024-12-27 ENCOUNTER — TELEPHONE (OUTPATIENT)
Dept: PAIN MEDICINE | Facility: CLINIC | Age: 74
End: 2024-12-27
Payer: MEDICARE

## 2024-12-30 ENCOUNTER — CLINICAL SUPPORT (OUTPATIENT)
Dept: REHABILITATION | Facility: HOSPITAL | Age: 74
End: 2024-12-30
Payer: MEDICARE

## 2024-12-30 DIAGNOSIS — M79.18 PIRIFORMIS MUSCLE PAIN: ICD-10-CM

## 2024-12-30 DIAGNOSIS — G89.29 CHRONIC LEFT-SIDED LOW BACK PAIN WITH SCIATICA, SCIATICA LATERALITY UNSPECIFIED: Primary | ICD-10-CM

## 2024-12-30 DIAGNOSIS — M54.40 CHRONIC LEFT-SIDED LOW BACK PAIN WITH SCIATICA, SCIATICA LATERALITY UNSPECIFIED: Primary | ICD-10-CM

## 2024-12-30 PROCEDURE — 97113 AQUATIC THERAPY/EXERCISES: CPT

## 2024-12-30 NOTE — PROGRESS NOTES
[facility]   Physical Therapy Treatment Note     Name: Amanda Arredondo [facility] Number: [id number]    Therapy Diagnosis:   Encounter Diagnoses   Name Primary?    Chronic left-sided low back pain with sciatica, sciatica laterality unspecified Yes    Piriformis muscle pain      Physician: Marisa Cespedes NP    Visit Date: 12/30/2024    Physician Orders: Aquatic Therapy   Medical Diagnosis from Referral:   M46.1 (ICD-10-CM) - Bilateral sacroiliitis   G57.03 (ICD-10-CM) - Piriformis syndrome of both sides   M54.16 (ICD-10-CM) - Lumbar radiculopathy     Evaluation Date: 12/11/2024  Authorization Period Expiration: 10/11/25  Plan of Care Expiration: 2/11/15  Visit # / Visits authorized: 2/4 + eval     Precautions: Standard and Fall    PTA Visit #: 0/5     Time In: 8:30 am   Time Out: 9:30 am   Total Billable Time: 55 minutes    SUBJECTIVE     Pt reports: her pain level a 10/10 today. She stated every morning she wakes up a 10/10 before she gets medicine in her system. She reports once that kicks in she gets some relief of her symptoms. She stated she had an nerve conduction test and will see the MD for a virtual appt Thursday to discuss the results and next step.     She  was partially  compliant with home exercise program.    Response to previous treatment: eval only    Functional change: initiated aquatic PT    Pain: 10/10  Location: bilateral back      OBJECTIVE     Objective Measures updated at progress report unless specified.     Treatment     Amanda received aquatic therapeutic exercises to develop strength, endurance, ROM, flexibility, posture, and core stabilization for 55 minutes including:    FUNCTIONAL MOBILITY TRAINING x 2 laps each at beginning and 1 lap each at end of session  Walk forward/backward/lateral    STRETCHES 2 x 30sec  Add next as appropriate    LE EX x 20  Squat  Heel raise with gluteal set  Hip abduction  Hip flex  Hip ext    Seated on pool stool  Sit to stand  from pool stool with cues to improve hip hinge  Long arc quad   Seated on pool stool left 2x10    Walking marches x 2 laps with 1 hand hold on bar for support    UE EX/CORE  x 20 reps  Shoulder flex/ext TA activation paddles CLOSED  Shoulder horizontal abd/add TA activation paddles CLOSED  Shoulder abd/add with TA activation and paddles CLOSED    ENDURANCE  LTR x 2'  Bicycle in // bars x 3'    Patient Education and Home Exercises     Home Exercises Provided and Patient Education Provided     Education provided:   Role of aquatic therapy  Hydration post therapy  Review of body mechanics and abdominal activation with activity  Log roll technique for supine<>sit    Written Home Exercises Provided: Patient instructed to cont prior HEP. Exercises were reviewed and Amanda was able to demonstrate them prior to the end of the session.  Amanda demonstrated good  understanding of the education provided. See EMR under Patient Instructions for exercises provided during therapy sessions    ASSESSMENT     The patient initially presented with elevated pain levels which improved significantly throughout the duration of the treatment. She reported pain levels a 7/10 upon completion of the session today.  She required some VC and demo to improve hip hinge with squats and sit to stand today.     Amanda Is progressing well towards her goals.   Pt prognosis is Good.     Pt will continue to benefit from skilled outpatient physical therapy to address the deficits listed in the problem list box on initial evaluation, provide pt/family education and to maximize pt's level of independence in the home and community environment.     Pt's spiritual, cultural and educational needs considered and pt agreeable to plan of care and goals.     Anticipated barriers to physical therapy: None    Goals:  Short Term Goals: 6 weeks   1. Patient will be independent in HEP & progressions.  2. Patient will achieve TUG score of 15 sec to demonstrate  improved mobility  3. The patient will achieve 5 sit to stand score of 15 seconds to demonstrate improved transfers and endurance.     Long Term Goals: 12 weeks   1. The patient will demonstrate independence with extensive HEP.  2. Patient will achieve 5 sit to stand score of 12 seconds to demonstrate improved transfers & endurance.  3. Patent is able to demonstrate MMT 4+/5 B LE  without pain reports during testing.    Goals:     PLAN     Plan of care Certification: 12/11/2024 to 2/11/25.    Outpatient Physical Therapy 2 times weekly for 8 weeks to include the following interventions: manual therapy, aquatic therapy, patient education, therapeutic exercise, therapeutic activities.    Patient may be seen by PTA as part of rehabilitation team.    Cierra Tavera, PT

## 2024-12-31 ENCOUNTER — TELEPHONE (OUTPATIENT)
Dept: PAIN MEDICINE | Facility: CLINIC | Age: 74
End: 2024-12-31
Payer: MEDICARE

## 2024-12-31 ENCOUNTER — TELEPHONE (OUTPATIENT)
Dept: NEUROSURGERY | Facility: CLINIC | Age: 74
End: 2024-12-31
Payer: MEDICARE

## 2024-12-31 NOTE — TELEPHONE ENCOUNTER
Returned call to pt. Pt stated that she had the EMG done recently and would like to have result back from Dr. Kurtz. Pt will see her pain management on Thursday for the result. Explained that Dr. Kurtz wanted to see her 4-6 wks after the injection, and he would go over the result with her on that day. Pt v/u

## 2024-12-31 NOTE — TELEPHONE ENCOUNTER
----- Message from Beulah sent at 12/31/2024 11:19 AM CST -----  Regarding: procedure  Type:  Patient Returning Call      Name of who is calling:pt        What is request in detail:pt is requesting a call back in regards to upcoming procedure on 01/10, patient would like to discuss the results of nerve conduction test prior to procedure.        Can clinic reply by [facility]:no        What number to call back if not in [facility]: [phone]

## 2024-12-31 NOTE — TELEPHONE ENCOUNTER
----- Message from Tatum sent at 2024 11:26 AM CST -----  Regarding: Schedule Virtual Appt  Contact: Pt @[phone]  Pt called in to schedule an appt; no available appts in Epic. Pt is asking for a call back soon to schedule. Thanks.                Patient's DX: pt would like virtual to discuss results         Patient requesting call back or [facility] [phone]

## 2025-01-02 ENCOUNTER — OFFICE VISIT (OUTPATIENT)
Dept: SPINE | Facility: CLINIC | Age: 75
End: 2025-01-02
Payer: MEDICARE

## 2025-01-02 DIAGNOSIS — I70.0 ATHEROSCLEROSIS OF AORTA: ICD-10-CM

## 2025-01-02 DIAGNOSIS — M54.16 LUMBAR RADICULOPATHY: Primary | ICD-10-CM

## 2025-01-02 DIAGNOSIS — G89.4 CHRONIC PAIN DISORDER: ICD-10-CM

## 2025-01-02 NOTE — PROGRESS NOTES
Chronic Pain - Follow Up  VISIT TYPE: Virtual visit with synchronous audio and video    The patient location is: At home  Total time spent with patient: 30mins    Each patient to whom he or she provides medical services by telemedicine is:  (1) informed of the relationship between the physician and patient and the respective role of any other health care provider with respect to management of the patient; and (2) notified that he or she may decline to receive medical services by telemedicine and may withdraw from such care at any time.           Interval History 2/2025:    Amanda Holt presents to the clinic for the evaluation of lower back pain that started several months ago.  Patient describes the pain as sharp achy and shooting with radiation down her left lower extremity.  Patient said the pain radiates all the way to her left feet. Patient said her entire left leg becomes numb and weak especially after walking for about 10 minutes.  Her pain score today is 8/10. The pain is rated with a score of  6/10 on the BEST day and a score of 10/10 on the WORST day.  Symptoms interfere with daily activity, sleeping, and work. The pain is exacerbated by Standing, Bending, Walking, Lifting, and Getting out of bed/chair.  The pain is mitigated by 7.5 Percocet . She reports spending 3 hours per day reclining. The patient reports 4 hours of uninterrupted sleep per night.    Patient denies night fever/night sweats, urinary incontinence, bowel incontinence, and significant weight loss.  Patient reports weakness and numbness in the left lower extremity.       Interval History 10/11/2024:  Amanda Holt returns to clinic for follow-up after Left L3/4 and L5/S1 TFESI on 9/27/2024. She reports no relief of back or leg pain. She continues with left leg numbness with walking more than 8-10 minutes. She states it is the entire left leg, and does not describe the numbness in a specific dermatome. She continues  Percocet and Tizanidine from her PCP with good relief. She denies any perceived side effects. She states she has not participated in PT or HEP in a couple of years. She states she did well in Aquatic Therapy in the past, as traditional Physical Therapy has been too painful to complete. She denies recent health changes. She denies recent falls or trauma. She denies new onset fever/night sweats, urinary incontinence, bowel incontinence, significant weight changes, significant motor weakness or changes, or loss of sensations. Her pain today is 8/10.       Of note, she reports her 27 year-old grandson was murdered. She has been dealing with this and it has caused her to not take care of herself as much as she had in the past. She would like to get back into caring for herself and her health.         HPI 9/17/2024:  Amanda Holt presents to the clinic for the evaluation of lower back pain that started several months ago.  Patient describes the pain as sharp achy and shooting with radiation down her left lower extremity.  Patient said the pain radiates all the way to her left feet. Patient said her entire left leg becomes numb and weak especially after walking for about 10 minutes.  Her pain score today is 8/10. The pain is rated with a score of  6/10 on the BEST day and a score of 10/10 on the WORST day.  Symptoms interfere with daily activity, sleeping, and work. The pain is exacerbated by Standing, Bending, Walking, Lifting, and Getting out of bed/chair.  The pain is mitigated by 7.5 Percocet . She reports spending 3 hours per day reclining. The patient reports 4 hours of uninterrupted sleep per night.     Patient denies night fever/night sweats, urinary incontinence, bowel incontinence, and significant weight loss.  Patient reports weakness and numbness in the left lower extremity.     Physical Therapy/Home Exercise: yes (aqua therapy)    Pain Disability Index Review:      3/14/2024     1:42 PM 4/13/2022     10:04 AM 3/28/2022    10:32 AM   Last 3 PDI Scores   Pain Disability Index (PDI) 63 30 26     Pain Medications:  Percocet 7.5  Tizanidine 4 mg     report:  Reviewed and consistent with medication use as prescribed.    Pain Procedures:   L4-5 laminectomy and fusion in 2018  09/09/2020-transforaminal WADE  02/12/2021-transforaminal WADE  3/14/2022 - L4-5 and L5-S1 lumbar transforaminal WADE  08/29/2022/ -  left L4-5 and L5-S1 lumbar transforaminal WADE  03/25/2024 - bilateral L3/4 facet joint injection -limited relief    Imaging:   MRI LUMBAR SPINE WITHOUT CONTRAST     FINDINGS:  Lumbar spine alignment is within normal limits. The vertebral body heights are well maintained, with no fracture.  No marrow signal abnormality suspicious for an infiltrative process.  Degenerative fatty marrow metaplasia endplate change at L4-5.     The conus is normal in appearance.  The adjacent soft tissue structures show no significant abnormalities.     Bilateral T2 hyperintense renal lesions are suggestive of cysts.     L1-L2: There is no focal disc herniation. No significant central canal or neural foraminal narrowing.     L2-L3: There is no focal disc herniation. No significant central canal or neural foraminal narrowing.     L3-L4: Circumferential disc bulge, spondylitic spurring, facet arthropathy and bilateral facet joint effusions.  Moderate central spinal stenosis and mild right foraminal stenosis.     L4-L5: Operative change of bilateral laminectomy and left-sided instrumented fusion.  Circumferential disc bulge and spondylitic spurring.  Mild bilateral neural foraminal narrowing.     L5-S1: Circumferential disc bulge and partial anterior bony ankylosis.  Mild facet arthropathy.  Flattening of the medial right exiting L5 nerve root suggests at least moderate and possibly severe foraminal stenosis.     Impression:     Postoperative and degenerative change as described, with central spinal stenosis at L3-4 and moderate-severe  right L5-S1 foraminal stenosis.    XR LUMBAR SPINE 5 VIEW WITH FLEX AND EXT     FINDINGS:  prior L4-5 posterior instrumented lumbar fusion. No evidence of hardware failure or loosening. There is prominent L3-4 facet arthropathy with slight anterolisthesis, similar to prior exam.  No subluxation with extension and flexion views. No fractures.     Impression:     No acute findings.    Past Medical History:   Diagnosis Date    Arthralgia     Colon polyp     Degenerative disc disease at L5-S1 level     High cholesterol     Hypertension     Notalgia paresthetica     Nuclear sclerosis of both eyes 01/08/2020    Sciatica     Spinal stenosis     Tobacco use 10/21/2021     Past Surgical History:   Procedure Laterality Date    BACK SURGERY      lumbar laminectomy    COLON SURGERY  2022    Tear repaired    COLONOSCOPY N/A 07/23/2020    Procedure: COLONOSCOPY;  Surgeon: Donal Moore MD;  Location: [facility] ENDO;  Service: Endoscopy;  Laterality: N/A;    EPIDURAL STEROID INJECTION Left 09/09/2020    Procedure: Injection, Steroid, Epidural Transforaminal;  Surgeon: Jun Leavitt Jr., MD;  Location: [facility] ENDO;  Service: Pain Management;  Laterality: Left;  Left L5 + S1 TF WADE  Arrive @ 1300; ASA last 9/1; No DM    EPIDURAL STEROID INJECTION Left 02/12/2021    Procedure: Injection, Steroid, Epidural Transforaminal;  Surgeon: Jun Leavitt Jr., MD;  Location: [facility] ENDO;  Service: Pain Management;  Laterality: Left;  Left L4 + L5 TF WADE  Arrive @ 1230; IV Sedation; ASA last 2/4; No DM    INJECTION OF FACET JOINT Bilateral 3/25/2024    Procedure: FACET JOINT INJECTION BILATERAL L3/4 *ASPIRIN CLEARANCE IN CHART*;  Surgeon: Darya Ayala MD;  Location: [facility] PAIN MGT;  Service: Pain Management;  Laterality: Bilateral;  [phone]    SPINE SURGERY  10/08/18    Bilateral Lumbar Laminectomy with Fusion and Screws    TRANSFORAMINAL EPIDURAL INJECTION OF STEROID Left 03/14/2022    Procedure: INJECTION, STEROID, EPIDURAL,  TRANSFORAMINAL APPROACH, L4-L5 & L5-S1;  Surgeon: Darya Ayala MD;  Location: [facility] PAIN MGT;  Service: Pain Management;  Laterality: Left;    TRANSFORAMINAL EPIDURAL INJECTION OF STEROID Left 2022    Procedure: LUMBAR TRANSFORAMINAL LEFT L4/5 AND L5/S1 CONTRAST;  Surgeon: Darya Ayala MD;  Location: [facility] PAIN MGT;  Service: Pain Management;  Laterality: Left;    TRANSFORAMINAL EPIDURAL INJECTION OF STEROID Left 2024    Procedure: LUMBAR TRANSFORAMINAL LEFT L3/4 AND L5/S1;  Surgeon: Jason Gomez MD;  Location: [facility] PAIN MGT;  Service: Pain Management;  Laterality: Left;  [phone]  2 WK F/U RONAK    TUBAL LIGATION       Social History     Socioeconomic History    Marital status:    Tobacco Use    Smoking status: Former     Current packs/day: 0.00     Average packs/day: 0.3 packs/day for 35.0 years (8.8 ttl pk-yrs)     Types: Cigarettes     Start date: 3/15/1987     Quit date: 3/15/2022     Years since quittin.8    Smokeless tobacco: Current    Tobacco comments:     Occasional spoker some times 1-2 cigarettes/day sometimes none for weeks   Substance and Sexual Activity    Alcohol use: Yes     Alcohol/week: 3.0 standard drinks of alcohol     Types: 3 Glasses of wine per week     Comment: Occasional wine    Drug use: Not Currently     Comment: Tramadol twice a day as needed    Sexual activity: Yes     Partners: Male     Birth control/protection: Post-menopausal, None     Comment: Tubal 30+ years ago   Social History Narrative    Lives alone    No partner    Was a  for a non-profit organization     Social Drivers of Health     Financial Resource Strain: Patient Declined (2024)    Overall Financial Resource Strain (CARDIA)     Difficulty of Paying Living Expenses: Patient declined   Food Insecurity: Patient Declined (2024)    Hunger Vital Sign     Worried About Running Out of Food in the Last Year: Patient declined     Ran Out of Food in the Last Year: Patient  declined   Transportation Needs: No Transportation Needs (2024)    PRAPARE - Transportation     Lack of Transportation (Medical): No     Lack of Transportation (Non-Medical): No   Physical Activity: Patient Declined (2024)    Exercise Vital Sign     Days of Exercise per Week: Patient declined     Minutes of Exercise per Session: Patient declined   Stress: Patient Declined (2024)    [employer] - Occupational Stress Questionnaire     Feeling of Stress : Patient declined   Housing Stability: Patient Declined (2024)    Housing Stability Vital Sign     Unable to Pay for Housing in the Last Year: Patient declined     Homeless in the Last Year: Patient declined     Family History   Problem Relation Name Age of Onset    Breast cancer Mother Marianna Benítez     Hypertension Mother Marianna Benítez             Hypotension Mother Marianna Benítez     Cancer Mother Marianna Benítez          due to breast cancer    Miscarriages / Stillbirths Mother Marianna Benítez         3 stillborn children    Cataracts Father Matthias Rahman.  Jeyson     Glaucoma Father Matthias Rahman.  Jeyson     Diabetes Father Matthias Rahman.  Jeyson             Arthritis Father Matthias Rahman.  Jeyson     Vision loss Father Matthias Rahman.  Jeyson         Glaucoma -     Glaucoma Sister Debby Benítez     Arthritis Sister Debby Benítez     Diabetes Sister Debby Benítez     Hypertension Sister Debby Benítez     Vision loss Sister Debby Benítez         Glasses    Drug abuse Brother Sky Benítez     Learning disabilities Brother Sky Benítez     No Known Problems Brother      No Known Problems Brother x3     No Known Problems Maternal Aunt      No Known Problems Maternal Uncle      No Known Problems Paternal Aunt      No Known Problems Paternal Uncle      No Known Problems Maternal Grandmother      No Known Problems Maternal Grandfather      No Known Problems Paternal Grandmother      No Known  Problems Paternal Grandfather      No Known Problems Son x2     No Known Problems Other      Arthritis Sister Susan Benítez     Depression Sister Susan Benítez     Diabetes Sister Susan Benítez     Hearing loss Sister Susan Benítez         Trouble hearing    Hypertension Sister Susan Benítez     Stroke Sister Susan Benítez         B[place]ne Hunt Disease, Coma for a month due to diabetes,Some body weakness    Vision loss Sister Susan Benítez         Lasix surgery    Arthritis Sister Urszula Green     COPD Sister Ruszula Green     Diabetes Sister Urszula Green     Hearing loss Sister Urszula Green     Heart disease Sister Urszula Green     Hypertension Sister Urszula Green     Arthritis Brother Murtaza Nowak     Hypertension Brother Murtaza Nowak     Early death Sister Christine Benítez         Still born    Early death Sister Catherine Benítez         Still born twin to Debby Benítez    Early death Brother Krishan Benítez stillborn     Hypertension Sister Kateryna Kraft     Hypertension Brother Robbie Benítez     Learning disabilities Son Vaibhav Holt     Learning disabilities Son Fidel [place]         Great grandson    Learning disabilities Daughter Carmen's Aries         Granddaughter    Mental illness Sister Shawna Benítez         Her son Matthias Benítez    Vision loss Sister Shawna Benítez         Glasses/contacts    Vision loss Sister Chelly Benítez         Glasses    Amblyopia Neg Hx      Blindness Neg Hx      Macular degeneration Neg Hx      Retinal detachment Neg Hx      Strabismus Neg Hx      Thyroid disease Neg Hx       Review of patient's allergies indicates:   Allergen Reactions    Codeine      Current Outpatient Medications   Medication Sig    acetaminophen (TYLENOL) 500 MG tablet Take 2 tablets (1,000 mg total) by mouth every 6 (six) hours as needed.    amitriptyline (ELAVIL) 50 MG tablet Take 1 tablet (50 mg total) by mouth every evening.    aspirin (ECOTRIN) 81 MG EC tablet     cyanocobalamin, vitamin B-12, (B-12 COMPLIANCE)  1,000 mcg/mL Kit Inject 1 mL as directed every 28 days.    diclofenac sodium (VOLTAREN ARTHRITIS PAIN) 1 % Gel Apply 2 g topically once daily.    ergocalciferol (ERGOCALCIFEROL) 50,000 unit Cap Take 1 capsule by mouth every 7 days.    fluticasone propionate (FLONASE) 50 mcg/actuation nasal spray 1 spray (50 mcg total) by Each Nostril route once daily.    hydrALAZINE (APRESOLINE) 100 MG tablet Take 1 tablet (100 mg total) by mouth every 12 (twelve) hours.    hydrOXYzine HCL (ATARAX) 25 MG tablet Take 1 tablet by mouth three times daily as needed    metoprolol succinate (TOPROL-XL) 25 MG 24 hr tablet Take 1 tablet by mouth once daily    mometasone (ELOCON) 0.1 % solution Apply once to twice daily prn itching    mupirocin (BACTROBAN) 2 % ointment Apply topically 3 (three) times daily.    NIFEdipine (PROCARDIA-XL) 60 MG (OSM) 24 hr tablet Take 1 tablet (60 mg total) by mouth once daily.    nystatin (MYCOSTATIN) powder APPLY  POWDER TOPICALLY TO AFFECTED AREA 4 TIMES DAILY    olopatadine (PATANOL) 0.1 % ophthalmic solution INSTILL 1 DROP INTO EACH EYE TWICE DAILY    omeprazole (PRILOSEC) 40 MG capsule Take 1 capsule by mouth.    oxyCODONE-acetaminophen (PERCOCET) 7.5-325 mg per tablet Take 1 tablet by mouth every 4 (four) hours as needed for Pain.    oxyCODONE-acetaminophen (PERCOCET) 7.5-325 mg per tablet Take 1 tablet by mouth every 4 (four) hours as needed for Pain.    [START ON 1/5/2025] oxyCODONE-acetaminophen (PERCOCET) 7.5-325 mg per tablet Take 1 tablet by mouth every 4 (four) hours as needed for Pain.    pravastatin (PRAVACHOL) 40 MG tablet Take 1 tablet by mouth once daily    pregabalin (LYRICA) 25 MG capsule Take 1 capsule (25 mg total) by mouth 2 (two) times daily.    tiZANidine (ZANAFLEX) 4 MG tablet TAKE 1 TABLET BY MOUTH EVERY 6 HOURS AS NEEDED    triamcinolone acetonide 0.1%-ciclopirox-magnesium hydroxide 400 mg/5 ml Apply to affected area twice a day after cool blow dry    valsartan (DIOVAN) 320 MG  tablet Take 1 tablet by mouth once daily     No current facility-administered medications for this visit.       REVIEW OF SYSTEMS:    GENERAL:  No weight loss, malaise or fevers.  HEENT:  Negative for frequent or significant headaches.  NECK:  Negative for lumps, goiter, pain and significant neck swelling.  RESPIRATORY:  Negative for cough, wheezing or shortness of breath.  CARDIOVASCULAR:  Negative for chest pain, leg swelling or palpitations.  GI:  Negative for abdominal discomfort, blood in stools or black stools or change in bowel habits.  MUSCULOSKELETAL:  See HPI.  SKIN:  Negative for lesions, rash, and itching.  PSYCH:  Negative for sleep disturbance, mood disorder and recent psychosocial stressors.  HEMATOLOGY/LYMPHOLOGY:  Negative for prolonged bleeding, bruising easily or swollen nodes.  NEURO:   No history of headaches, syncope, paralysis, seizures or tremors.  All other reviewed and negative other than HPI.    OBJECTIVE:    There were no vitals taken for this visit.    PHYSICAL EXAMINATION:    GENERAL: Well appearing, in no acute distress, alert and oriented x3.  PSYCH:  Mood and affect appropriate.  MENTAL STATUS: A x O x 3, good concentration, speech is fluent and goal directed        ASSESSMENT: 74 y.o. year old female with lower back pain, that began several months ago. The pain is described as sharp, achy, and shooting, with radiation down the left lower extremity.     1. Lumbar radiculopathy  Procedure Order to Pain Management      2. Chronic pain disorder          PLAN:     Recent EMG findings indicate evidence of a chronic left S1 radiculopathy, with L5 involvement also possible. Active/ongoing denervation was noted in the tibialis posterior and medial gastrocnemius muscles.  Discussed EMG results in detail with the patient.  Initially scheduled for piriformis and greater trochanteric bursa (GTB) injections; the plan has been adjusted to proceed with left L5/S1 and S1 epidural steroid injections  (WADE).  Follow-up in 2 weeks in clinic to evaluate the outcome after the procedure.    The above plan and management options were discussed at length with patient. Patient is in agreement with the above and verbalized understanding.     DO SNOW Terry spent a total of 30 minutes on the day of the visit.  This includes face to face time and non-face to face time preparing to see the patient by reviewing previous labs/imaging, obtaining and/or reviewing separately obtained history, documenting clinical information in the electronic or other health record, independently interpreting results and communicating results to the patient/family/caregiver.    Jason Gomez

## 2025-01-02 NOTE — H&P (VIEW-ONLY)
Chronic Pain - Follow Up  VISIT TYPE: Virtual visit with synchronous audio and video    The patient location is: At home  Total time spent with patient: 30mins    Each patient to whom he or she provides medical services by telemedicine is:  (1) informed of the relationship between the physician and patient and the respective role of any other health care provider with respect to management of the patient; and (2) notified that he or she may decline to receive medical services by telemedicine and may withdraw from such care at any time.           Interval History 2/2025:    Amanda Holt presents to the clinic for the evaluation of lower back pain that started several months ago.  Patient describes the pain as sharp achy and shooting with radiation down her left lower extremity.  Patient said the pain radiates all the way to her left feet. Patient said her entire left leg becomes numb and weak especially after walking for about 10 minutes.  Her pain score today is 8/10. The pain is rated with a score of  6/10 on the BEST day and a score of 10/10 on the WORST day.  Symptoms interfere with daily activity, sleeping, and work. The pain is exacerbated by Standing, Bending, Walking, Lifting, and Getting out of bed/chair.  The pain is mitigated by 7.5 Percocet . She reports spending 3 hours per day reclining. The patient reports 4 hours of uninterrupted sleep per night.    Patient denies night fever/night sweats, urinary incontinence, bowel incontinence, and significant weight loss.  Patient reports weakness and numbness in the left lower extremity.       Interval History 10/11/2024:  Amanda Holt returns to clinic for follow-up after Left L3/4 and L5/S1 TFESI on 9/27/2024. She reports no relief of back or leg pain. She continues with left leg numbness with walking more than 8-10 minutes. She states it is the entire left leg, and does not describe the numbness in a specific dermatome. She continues  Percocet and Tizanidine from her PCP with good relief. She denies any perceived side effects. She states she has not participated in PT or HEP in a couple of years. She states she did well in Aquatic Therapy in the past, as traditional Physical Therapy has been too painful to complete. She denies recent health changes. She denies recent falls or trauma. She denies new onset fever/night sweats, urinary incontinence, bowel incontinence, significant weight changes, significant motor weakness or changes, or loss of sensations. Her pain today is 8/10.       Of note, she reports her 27 year-old grandson was murdered. She has been dealing with this and it has caused her to not take care of herself as much as she had in the past. She would like to get back into caring for herself and her health.         HPI 9/17/2024:  Amanda Holt presents to the clinic for the evaluation of lower back pain that started several months ago.  Patient describes the pain as sharp achy and shooting with radiation down her left lower extremity.  Patient said the pain radiates all the way to her left feet. Patient said her entire left leg becomes numb and weak especially after walking for about 10 minutes.  Her pain score today is 8/10. The pain is rated with a score of  6/10 on the BEST day and a score of 10/10 on the WORST day.  Symptoms interfere with daily activity, sleeping, and work. The pain is exacerbated by Standing, Bending, Walking, Lifting, and Getting out of bed/chair.  The pain is mitigated by 7.5 Percocet . She reports spending 3 hours per day reclining. The patient reports 4 hours of uninterrupted sleep per night.     Patient denies night fever/night sweats, urinary incontinence, bowel incontinence, and significant weight loss.  Patient reports weakness and numbness in the left lower extremity.     Physical Therapy/Home Exercise: yes (aqua therapy)    Pain Disability Index Review:      3/14/2024     1:42 PM 4/13/2022     10:04 AM 3/28/2022    10:32 AM   Last 3 PDI Scores   Pain Disability Index (PDI) 63 30 26     Pain Medications:  Percocet 7.5  Tizanidine 4 mg     report:  Reviewed and consistent with medication use as prescribed.    Pain Procedures:   L4-5 laminectomy and fusion in 2018  09/09/2020-transforaminal WADE  02/12/2021-transforaminal WADE  3/14/2022 - L4-5 and L5-S1 lumbar transforaminal WADE  08/29/2022/ -  left L4-5 and L5-S1 lumbar transforaminal WADE  03/25/2024 - bilateral L3/4 facet joint injection -limited relief    Imaging:   MRI LUMBAR SPINE WITHOUT CONTRAST     FINDINGS:  Lumbar spine alignment is within normal limits. The vertebral body heights are well maintained, with no fracture.  No marrow signal abnormality suspicious for an infiltrative process.  Degenerative fatty marrow metaplasia endplate change at L4-5.     The conus is normal in appearance.  The adjacent soft tissue structures show no significant abnormalities.     Bilateral T2 hyperintense renal lesions are suggestive of cysts.     L1-L2: There is no focal disc herniation. No significant central canal or neural foraminal narrowing.     L2-L3: There is no focal disc herniation. No significant central canal or neural foraminal narrowing.     L3-L4: Circumferential disc bulge, spondylitic spurring, facet arthropathy and bilateral facet joint effusions.  Moderate central spinal stenosis and mild right foraminal stenosis.     L4-L5: Operative change of bilateral laminectomy and left-sided instrumented fusion.  Circumferential disc bulge and spondylitic spurring.  Mild bilateral neural foraminal narrowing.     L5-S1: Circumferential disc bulge and partial anterior bony ankylosis.  Mild facet arthropathy.  Flattening of the medial right exiting L5 nerve root suggests at least moderate and possibly severe foraminal stenosis.     Impression:     Postoperative and degenerative change as described, with central spinal stenosis at L3-4 and moderate-severe  right L5-S1 foraminal stenosis.    XR LUMBAR SPINE 5 VIEW WITH FLEX AND EXT     FINDINGS:  prior L4-5 posterior instrumented lumbar fusion. No evidence of hardware failure or loosening. There is prominent L3-4 facet arthropathy with slight anterolisthesis, similar to prior exam.  No subluxation with extension and flexion views. No fractures.     Impression:     No acute findings.    Past Medical History:   Diagnosis Date    Arthralgia     Colon polyp     Degenerative disc disease at L5-S1 level     High cholesterol     Hypertension     Notalgia paresthetica     Nuclear sclerosis of both eyes 01/08/2020    Sciatica     Spinal stenosis     Tobacco use 10/21/2021     Past Surgical History:   Procedure Laterality Date    BACK SURGERY      lumbar laminectomy    COLON SURGERY  2022    Tear repaired    COLONOSCOPY N/A 07/23/2020    Procedure: COLONOSCOPY;  Surgeon: Donal Moore MD;  Location: [facility] ENDO;  Service: Endoscopy;  Laterality: N/A;    EPIDURAL STEROID INJECTION Left 09/09/2020    Procedure: Injection, Steroid, Epidural Transforaminal;  Surgeon: Jun Leavitt Jr., MD;  Location: [facility] ENDO;  Service: Pain Management;  Laterality: Left;  Left L5 + S1 TF WADE  Arrive @ 1300; ASA last 9/1; No DM    EPIDURAL STEROID INJECTION Left 02/12/2021    Procedure: Injection, Steroid, Epidural Transforaminal;  Surgeon: Jun Leavitt Jr., MD;  Location: [facility] ENDO;  Service: Pain Management;  Laterality: Left;  Left L4 + L5 TF WADE  Arrive @ 1230; IV Sedation; ASA last 2/4; No DM    INJECTION OF FACET JOINT Bilateral 3/25/2024    Procedure: FACET JOINT INJECTION BILATERAL L3/4 *ASPIRIN CLEARANCE IN CHART*;  Surgeon: Darya Ayala MD;  Location: [facility] PAIN MGT;  Service: Pain Management;  Laterality: Bilateral;  [phone]    SPINE SURGERY  10/08/18    Bilateral Lumbar Laminectomy with Fusion and Screws    TRANSFORAMINAL EPIDURAL INJECTION OF STEROID Left 03/14/2022    Procedure: INJECTION, STEROID, EPIDURAL,  TRANSFORAMINAL APPROACH, L4-L5 & L5-S1;  Surgeon: Darya Ayala MD;  Location: [facility] PAIN MGT;  Service: Pain Management;  Laterality: Left;    TRANSFORAMINAL EPIDURAL INJECTION OF STEROID Left 2022    Procedure: LUMBAR TRANSFORAMINAL LEFT L4/5 AND L5/S1 CONTRAST;  Surgeon: Darya Ayala MD;  Location: [facility] PAIN MGT;  Service: Pain Management;  Laterality: Left;    TRANSFORAMINAL EPIDURAL INJECTION OF STEROID Left 2024    Procedure: LUMBAR TRANSFORAMINAL LEFT L3/4 AND L5/S1;  Surgeon: Jason Gomez MD;  Location: [facility] PAIN MGT;  Service: Pain Management;  Laterality: Left;  [phone]  2 WK F/U RONAK    TUBAL LIGATION       Social History     Socioeconomic History    Marital status:    Tobacco Use    Smoking status: Former     Current packs/day: 0.00     Average packs/day: 0.3 packs/day for 35.0 years (8.8 ttl pk-yrs)     Types: Cigarettes     Start date: 3/15/1987     Quit date: 3/15/2022     Years since quittin.8    Smokeless tobacco: Current    Tobacco comments:     Occasional spoker some times 1-2 cigarettes/day sometimes none for weeks   Substance and Sexual Activity    Alcohol use: Yes     Alcohol/week: 3.0 standard drinks of alcohol     Types: 3 Glasses of wine per week     Comment: Occasional wine    Drug use: Not Currently     Comment: Tramadol twice a day as needed    Sexual activity: Yes     Partners: Male     Birth control/protection: Post-menopausal, None     Comment: Tubal 30+ years ago   Social History Narrative    Lives alone    No partner    Was a  for a non-profit organization     Social Drivers of Health     Financial Resource Strain: Patient Declined (2024)    Overall Financial Resource Strain (CARDIA)     Difficulty of Paying Living Expenses: Patient declined   Food Insecurity: Patient Declined (2024)    Hunger Vital Sign     Worried About Running Out of Food in the Last Year: Patient declined     Ran Out of Food in the Last Year: Patient  declined   Transportation Needs: No Transportation Needs (2024)    PRAPARE - Transportation     Lack of Transportation (Medical): No     Lack of Transportation (Non-Medical): No   Physical Activity: Patient Declined (2024)    Exercise Vital Sign     Days of Exercise per Week: Patient declined     Minutes of Exercise per Session: Patient declined   Stress: Patient Declined (2024)    [employer] [place] [employer] - Occupational Stress Questionnaire     Feeling of Stress : Patient declined   Housing Stability: Patient Declined (2024)    Housing Stability Vital Sign     Unable to Pay for Housing in the Last Year: Patient declined     Homeless in the Last Year: Patient declined     Family History   Problem Relation Name Age of Onset    Breast cancer Mother Marianna Benítez     Hypertension Mother Marianna Benítez             Hypotension Mother Marianna Benítez     Cancer Mother Marianna Benítez          due to breast cancer    Miscarriages / Stillbirths Mother Marianna Benítez         3 stillborn children    Cataracts Father Matthias Rahman.  Jeyson     Glaucoma Father Matthias Rahman.  Jeyson     Diabetes Father Matthias Rahman.  Jeyson             Arthritis Father Matthias Rahman.  Jeyson     Vision loss Father Matthias Rahman.  Jeyson         Glaucoma -     Glaucoma Sister Debby Benítez     Arthritis Sister Debby Benítez     Diabetes Sister Debby Benítez     Hypertension Sister Debby Benítez     Vision loss Sister Debby Benítez         Glasses    Drug abuse Brother Sky Benítez     Learning disabilities Brother Sky Benítez     No Known Problems Brother      No Known Problems Brother x3     No Known Problems Maternal Aunt      No Known Problems Maternal Uncle      No Known Problems Paternal Aunt      No Known Problems Paternal Uncle      No Known Problems Maternal Grandmother      No Known Problems Maternal Grandfather      No Known Problems Paternal Grandmother      No Known  Problems Paternal Grandfather      No Known Problems Son x2     No Known Problems Other      Arthritis Sister Susan Benítez     Depression Sister Susan Benítez     Diabetes Sister Susan Benítez     Hearing loss Sister Susan Benítez         Trouble hearing    Hypertension Sister Susan Benítez     Stroke Sister Susan Benítez         [place] Hunt Disease, Coma for a month due to diabetes,Some body weakness    Vision loss Sister Susan Benítez         Lasix surgery    Arthritis Sister Urszula Green     COPD Sister Urszula Green     Diabetes Sister Urszula Green     Hearing loss Sister Urszula Green     Heart disease Sister Urszula Green     Hypertension Sister Urszula Green     Arthritis Brother Murtaza Nowak     Hypertension Brother Murtaza Nowak     Early death Sister Christine Benítez         Still born    Early death Sister Catherine Benítez         Still born twin to Debby Benítez    Early death Brother Krishan Benítez stillborn     Hypertension Sister Kateryna Kraft     Hypertension Brother Robbie Benítez     Learning disabilities Son Vaibhav Holt     Learning disabilities Son Fidel [place]         Great grandson    Learning disabilities Daughter Carmen's Aries         Granddaughter    Mental illness Sister Shawna Benítez         Her son Matthias Benítez    Vision loss Sister Shawna Benítez         Glasses/contacts    Vision loss Sister Chelly Benítez         Glasses    Amblyopia Neg Hx      Blindness Neg Hx      Macular degeneration Neg Hx      Retinal detachment Neg Hx      Strabismus Neg Hx      Thyroid disease Neg Hx       Review of patient's allergies indicates:   Allergen Reactions    Codeine      Current Outpatient Medications   Medication Sig    acetaminophen (TYLENOL) 500 MG tablet Take 2 tablets (1,000 mg total) by mouth every 6 (six) hours as needed.    amitriptyline (ELAVIL) 50 MG tablet Take 1 tablet (50 mg total) by mouth every evening.    aspirin (ECOTRIN) 81 MG EC tablet     cyanocobalamin, vitamin B-12, (B-12 COMPLIANCE)  1,000 mcg/mL Kit Inject 1 mL as directed every 28 days.    diclofenac sodium (VOLTAREN ARTHRITIS PAIN) 1 % Gel Apply 2 g topically once daily.    ergocalciferol (ERGOCALCIFEROL) 50,000 unit Cap Take 1 capsule by mouth every 7 days.    fluticasone propionate (FLONASE) 50 mcg/actuation nasal spray 1 spray (50 mcg total) by Each Nostril route once daily.    hydrALAZINE (APRESOLINE) 100 MG tablet Take 1 tablet (100 mg total) by mouth every 12 (twelve) hours.    hydrOXYzine HCL (ATARAX) 25 MG tablet Take 1 tablet by mouth three times daily as needed    metoprolol succinate (TOPROL-XL) 25 MG 24 hr tablet Take 1 tablet by mouth once daily    mometasone (ELOCON) 0.1 % solution Apply once to twice daily prn itching    mupirocin (BACTROBAN) 2 % ointment Apply topically 3 (three) times daily.    NIFEdipine (PROCARDIA-XL) 60 MG (OSM) 24 hr tablet Take 1 tablet (60 mg total) by mouth once daily.    nystatin (MYCOSTATIN) powder APPLY  POWDER TOPICALLY TO AFFECTED AREA 4 TIMES DAILY    olopatadine (PATANOL) 0.1 % ophthalmic solution INSTILL 1 DROP INTO EACH EYE TWICE DAILY    omeprazole (PRILOSEC) 40 MG capsule Take 1 capsule by mouth.    oxyCODONE-acetaminophen (PERCOCET) 7.5-325 mg per tablet Take 1 tablet by mouth every 4 (four) hours as needed for Pain.    oxyCODONE-acetaminophen (PERCOCET) 7.5-325 mg per tablet Take 1 tablet by mouth every 4 (four) hours as needed for Pain.    [START ON 1/5/2025] oxyCODONE-acetaminophen (PERCOCET) 7.5-325 mg per tablet Take 1 tablet by mouth every 4 (four) hours as needed for Pain.    pravastatin (PRAVACHOL) 40 MG tablet Take 1 tablet by mouth once daily    pregabalin (LYRICA) 25 MG capsule Take 1 capsule (25 mg total) by mouth 2 (two) times daily.    tiZANidine (ZANAFLEX) 4 MG tablet TAKE 1 TABLET BY MOUTH EVERY 6 HOURS AS NEEDED    triamcinolone acetonide 0.1%-ciclopirox-magnesium hydroxide 400 mg/5 ml Apply to affected area twice a day after cool blow dry    valsartan (DIOVAN) 320 MG  tablet Take 1 tablet by mouth once daily     No current facility-administered medications for this visit.       REVIEW OF SYSTEMS:    GENERAL:  No weight loss, malaise or fevers.  HEENT:  Negative for frequent or significant headaches.  NECK:  Negative for lumps, goiter, pain and significant neck swelling.  RESPIRATORY:  Negative for cough, wheezing or shortness of breath.  CARDIOVASCULAR:  Negative for chest pain, leg swelling or palpitations.  GI:  Negative for abdominal discomfort, blood in stools or black stools or change in bowel habits.  MUSCULOSKELETAL:  See HPI.  SKIN:  Negative for lesions, rash, and itching.  PSYCH:  Negative for sleep disturbance, mood disorder and recent psychosocial stressors.  HEMATOLOGY/LYMPHOLOGY:  Negative for prolonged bleeding, bruising easily or swollen nodes.  NEURO:   No history of headaches, syncope, paralysis, seizures or tremors.  All other reviewed and negative other than HPI.    OBJECTIVE:    There were no vitals taken for this visit.    PHYSICAL EXAMINATION:    GENERAL: Well appearing, in no acute distress, alert and oriented x3.  PSYCH:  Mood and affect appropriate.  MENTAL STATUS: A x O x 3, good concentration, speech is fluent and goal directed        ASSESSMENT: 74 y.o. year old female with lower back pain, that began several months ago. The pain is described as sharp, achy, and shooting, with radiation down the left lower extremity.     1. Lumbar radiculopathy  Procedure Order to Pain Management      2. Chronic pain disorder          PLAN:     Recent EMG findings indicate evidence of a chronic left S1 radiculopathy, with L5 involvement also possible. Active/ongoing denervation was noted in the tibialis posterior and medial gastrocnemius muscles.  Discussed EMG results in detail with the patient.  Initially scheduled for piriformis and greater trochanteric bursa (GTB) injections; the plan has been adjusted to proceed with left L5/S1 and S1 epidural steroid injections  (WADE).  Follow-up in 2 weeks in clinic to evaluate the outcome after the procedure.    The above plan and management options were discussed at length with patient. Patient is in agreement with the above and verbalized understanding.     DO SNOW Terry spent a total of 30 minutes on the day of the visit.  This includes face to face time and non-face to face time preparing to see the patient by reviewing previous labs/imaging, obtaining and/or reviewing separately obtained history, documenting clinical information in the electronic or other health record, independently interpreting results and communicating results to the patient/family/caregiver.    Jason Gomez

## 2025-01-03 ENCOUNTER — PATIENT MESSAGE (OUTPATIENT)
Dept: ADMINISTRATIVE | Facility: OTHER | Age: 75
End: 2025-01-03
Payer: MEDICARE

## 2025-01-03 RX ORDER — PRAVASTATIN SODIUM 40 MG/1
TABLET ORAL
Qty: 90 TABLET | Refills: 0 | Status: SHIPPED | OUTPATIENT
Start: 2025-01-03

## 2025-01-03 NOTE — TELEPHONE ENCOUNTER
No care due was identified.  [facility] Embedded Care Due Messages. Reference number: [id number].   1/02/2025 9:14:34 PM CST

## 2025-01-03 NOTE — TELEPHONE ENCOUNTER
Refill Routing Note   Medication(s) are not appropriate for processing by [facility] for the following reason(s):        Required labs outdated    [facility] action(s):  Defer             Appointments  past 12m or future 3m with PCP    Date Provider   Last Visit   11/6/2024 Simona Torres MD   Next Visit   2/6/2025 Simona Torres MD   ED visits in past 90 days: 0        Note composed:11:29 PM 01/02/2025

## 2025-01-06 DIAGNOSIS — M54.16 LUMBAR RADICULOPATHY: Primary | ICD-10-CM

## 2025-01-07 ENCOUNTER — TELEPHONE (OUTPATIENT)
Dept: ENDOSCOPY | Facility: HOSPITAL | Age: 75
End: 2025-01-07
Payer: MEDICARE

## 2025-01-07 NOTE — TELEPHONE ENCOUNTER
Contacted Pt to schedule a pre admit testing appointment (PAT). Pt does not want to schedule, Pt refused.  Referral canceled/completed.

## 2025-01-09 ENCOUNTER — TELEPHONE (OUTPATIENT)
Dept: PAIN MEDICINE | Facility: CLINIC | Age: 75
End: 2025-01-09
Payer: MEDICARE

## 2025-01-09 NOTE — TELEPHONE ENCOUNTER
----- Message from Dulce sent at 1/9/2025  4:01 PM CST -----  Type : Patient Call        Who Called :  Patient        Does the patient know what this is regarding?: Patient is scheduled to have a procedure on tomorrow 01/10; pt says she contacted 2 days ago and was told that she would be having the procedure on the wrong part of her body (Hip when it was supposed to be her back); pt advised staff that she had the wrong part; staff stated they would contact pt back to verify and never called back; please advise      Would the patient rather a call back or a response via [facility]? Call        Best Call Back Number: [phone]        Additional Information:

## 2025-01-09 NOTE — TELEPHONE ENCOUNTER
Staff contacted patient to further assist with her concern. Patient stated that she did speak with someone a few days ago stating that the procedure was scheduled wrong. Patient stated that the staff member she was speaking with said she will call her back once the information was corrected and patient never received a call. Patient will have to cancel the appointment due to not receiving a call and not having transportation.

## 2025-01-10 ENCOUNTER — PATIENT MESSAGE (OUTPATIENT)
Dept: PAIN MEDICINE | Facility: OTHER | Age: 75
End: 2025-01-10
Payer: MEDICARE

## 2025-01-14 ENCOUNTER — DOCUMENTATION ONLY (OUTPATIENT)
Dept: REHABILITATION | Facility: HOSPITAL | Age: 75
End: 2025-01-14
Payer: MEDICARE

## 2025-01-16 ENCOUNTER — CLINICAL SUPPORT (OUTPATIENT)
Dept: REHABILITATION | Facility: HOSPITAL | Age: 75
End: 2025-01-16
Payer: MEDICARE

## 2025-01-16 DIAGNOSIS — G89.29 CHRONIC LEFT-SIDED LOW BACK PAIN WITH SCIATICA, SCIATICA LATERALITY UNSPECIFIED: Primary | ICD-10-CM

## 2025-01-16 DIAGNOSIS — M54.40 CHRONIC LEFT-SIDED LOW BACK PAIN WITH SCIATICA, SCIATICA LATERALITY UNSPECIFIED: Primary | ICD-10-CM

## 2025-01-16 DIAGNOSIS — M79.18 PIRIFORMIS MUSCLE PAIN: ICD-10-CM

## 2025-01-16 PROCEDURE — 97113 AQUATIC THERAPY/EXERCISES: CPT | Mod: CQ

## 2025-01-16 NOTE — PROGRESS NOTES
[facility]   Physical Therapy Treatment Note     Name: Amanda Arredondo [facility] Number: [id number]    Therapy Diagnosis:   Encounter Diagnoses   Name Primary?    Chronic left-sided low back pain with sciatica, sciatica laterality unspecified Yes    Piriformis muscle pain      Physician: Marisa Cespedes NP    Visit Date: 1/16/2025    Physician Orders: Aquatic Therapy   Medical Diagnosis from Referral:   M46.1 (ICD-10-CM) - Bilateral sacroiliitis   G57.03 (ICD-10-CM) - Piriformis syndrome of both sides   M54.16 (ICD-10-CM) - Lumbar radiculopathy     Evaluation Date: 12/11/2024  Authorization Period Expiration: 10/11/25  Plan of Care Expiration: 2/11/15  Visit # / Visits authorized: 3/4 + eval     Precautions: Standard and Fall    PTA Visit #: 1/5     Time In: 9:35 am   Time Out: 10:40 am   Total Billable Time: 60 minutes    SUBJECTIVE     Pt reports: her pain level a 9/10 today. She stated the cold weather always makes her hurt more.  Further reports going for injection tomorrow.     She  was partially  compliant with home exercise program.    Response to previous treatment: eval only    Functional change: initiated aquatic PT    Pain: /10  Location: bilateral back      OBJECTIVE     Objective Measures updated at progress report unless specified.     Treatment     Amanda received aquatic therapeutic exercises to develop strength, endurance, ROM, flexibility, posture, and core stabilization for 60 minutes including:    FUNCTIONAL MOBILITY TRAINING x 2 laps each at beginning and 1 lap each at end of session  Walk forward/backward/lateral    STRETCHES 2 x 30sec  Add next as appropriate    LE EX x 20  Squat  Heel raise with gluteal set  Hip abduction  Hip flex  Hip ext    Seated on pool stool  Sit to stand from pool stool with cues to improve hip hinge  Long arc quad   Seated on pool stool left 2x10    Walking marches x 2 laps with 1 hand hold on bar for support    UE EX/CORE  x 20  reps  Shoulder flex/ext TA activation paddles CLOSED  Shoulder horizontal abd/add TA activation paddles CLOSED  Shoulder abd/add with TA activation and paddles CLOSED    ENDURANCE  LTR x 2'  Bicycle in // bars x 3'    Patient Education and Home Exercises     Home Exercises Provided and Patient Education Provided     Education provided:   Role of aquatic therapy  Hydration post therapy  Review of body mechanics and abdominal activation with activity  Log roll technique for supine<>sit    Written Home Exercises Provided: Patient instructed to cont prior HEP. Exercises were reviewed and Amanda was able to demonstrate them prior to the end of the session.  Amanda demonstrated good  understanding of the education provided. See EMR under Patient Instructions for exercises provided during therapy sessions    ASSESSMENT     The patient initially presented with elevated pain levels which improved significantly throughout the duration of the treatment.  She required some VC and demo to improve hip hinge with squats and sit to stand today.  Held progressions due to pain complaints - but able to complete entire program as noted. Amanda reported pain levels a 7/10 upon completion of the session today.    Amanda Is progressing well towards her goals.   Pt prognosis is Good.     Pt will continue to benefit from skilled outpatient physical therapy to address the deficits listed in the problem list box on initial evaluation, provide pt/family education and to maximize pt's level of independence in the home and community environment.     Pt's spiritual, cultural and educational needs considered and pt agreeable to plan of care and goals.     Anticipated barriers to physical therapy: None    Goals:  Short Term Goals: 6 weeks   1. Patient will be independent in HEP & progressions.  2. Patient will achieve TUG score of 15 sec to demonstrate improved mobility  3. The patient will achieve 5 sit to stand score of 15 seconds to  demonstrate improved transfers and endurance.     Long Term Goals: 12 weeks   1. The patient will demonstrate independence with extensive HEP.  2. Patient will achieve 5 sit to stand score of 12 seconds to demonstrate improved transfers & endurance.  3. Patent is able to demonstrate MMT 4+/5 B LE  without pain reports during testing.    Goals:     PLAN     Plan of care Certification: 12/11/2024 to 2/11/25.    Outpatient Physical Therapy 2 times weekly for 8 weeks to include the following interventions: manual therapy, aquatic therapy, patient education, therapeutic exercise, therapeutic activities.    Patient may be seen by PTA as part of rehabilitation team.    Kirsten Arrieta PTA

## 2025-01-17 ENCOUNTER — HOSPITAL ENCOUNTER (OUTPATIENT)
Facility: OTHER | Age: 75
Discharge: HOME | End: 2025-01-17
Attending: ANESTHESIOLOGY | Admitting: ANESTHESIOLOGY
Payer: MEDICARE

## 2025-01-17 VITALS
WEIGHT: 242 LBS | BODY MASS INDEX: 41.32 KG/M2 | DIASTOLIC BLOOD PRESSURE: 73 MMHG | HEIGHT: 64 IN | OXYGEN SATURATION: 99 % | HEART RATE: 81 BPM | RESPIRATION RATE: 18 BRPM | SYSTOLIC BLOOD PRESSURE: 129 MMHG

## 2025-01-17 DIAGNOSIS — M54.16 LUMBAR RADICULOPATHY: Primary | ICD-10-CM

## 2025-01-17 DIAGNOSIS — M54.16 LUMBAR RADICULOPATHY, CHRONIC: ICD-10-CM

## 2025-01-17 PROCEDURE — 25500020 PHARM REV CODE 255: Performed by: ANESTHESIOLOGY

## 2025-01-17 PROCEDURE — 64483 NJX AA&/STRD TFRM EPI L/S 1: CPT | Mod: LT,,, | Performed by: ANESTHESIOLOGY

## 2025-01-17 PROCEDURE — 64484 NJX AA&/STRD TFRM EPI L/S EA: CPT | Mod: LT | Performed by: ANESTHESIOLOGY

## 2025-01-17 PROCEDURE — 63600175 PHARM REV CODE 636 W HCPCS: Performed by: ANESTHESIOLOGY

## 2025-01-17 PROCEDURE — 64484 NJX AA&/STRD TFRM EPI L/S EA: CPT | Mod: LT,,, | Performed by: ANESTHESIOLOGY

## 2025-01-17 PROCEDURE — 99152 MOD SED SAME PHYS/QHP 5/>YRS: CPT | Performed by: ANESTHESIOLOGY

## 2025-01-17 PROCEDURE — 64483 NJX AA&/STRD TFRM EPI L/S 1: CPT | Mod: LT | Performed by: ANESTHESIOLOGY

## 2025-01-17 RX ORDER — LIDOCAINE HYDROCHLORIDE 10 MG/ML
INJECTION, SOLUTION EPIDURAL; INFILTRATION; INTRACAUDAL; PERINEURAL
Status: DISCONTINUED | OUTPATIENT
Start: 2025-01-17 | End: 2025-01-17 | Stop reason: HOSPADM

## 2025-01-17 RX ORDER — DEXAMETHASONE SODIUM PHOSPHATE 10 MG/ML
INJECTION INTRAMUSCULAR; INTRAVENOUS
Status: DISCONTINUED | OUTPATIENT
Start: 2025-01-17 | End: 2025-01-17 | Stop reason: HOSPADM

## 2025-01-17 RX ORDER — MIDAZOLAM HYDROCHLORIDE 1 MG/ML
INJECTION INTRAMUSCULAR; INTRAVENOUS
Status: DISCONTINUED | OUTPATIENT
Start: 2025-01-17 | End: 2025-01-17 | Stop reason: HOSPADM

## 2025-01-17 RX ORDER — SODIUM CHLORIDE 9 MG/ML
INJECTION, SOLUTION INTRAVENOUS CONTINUOUS
Status: DISCONTINUED | OUTPATIENT
Start: 2025-01-17 | End: 2025-01-17 | Stop reason: HOSPADM

## 2025-01-17 RX ORDER — FENTANYL CITRATE 50 UG/ML
INJECTION, SOLUTION INTRAMUSCULAR; INTRAVENOUS
Status: DISCONTINUED | OUTPATIENT
Start: 2025-01-17 | End: 2025-01-17 | Stop reason: HOSPADM

## 2025-01-17 RX ORDER — LIDOCAINE HYDROCHLORIDE 20 MG/ML
INJECTION, SOLUTION INFILTRATION; PERINEURAL
Status: DISCONTINUED | OUTPATIENT
Start: 2025-01-17 | End: 2025-01-17 | Stop reason: HOSPADM

## 2025-01-17 NOTE — DISCHARGE SUMMARY
Discharge Note  Short Stay      SUMMARY     Admit Date: 1/17/2025    Attending Physician: Jason Gomez MD    Discharge Physician: Jason Gomez MD      Discharge Date: 1/17/2025 2:23 PM    Procedure(s) (LRB):  LUMBAR TANSFORAMINAL LEFT L5/S1 AND S1 (Left)    Final Diagnosis: Lumbar radiculopathy [M54.16]    Disposition: Home or self care    Patient Instructions:   Current Discharge Medication List        CONTINUE these medications which have NOT CHANGED    Details   acetaminophen (TYLENOL) 500 MG tablet Take 2 tablets (1,000 mg total) by mouth every 6 (six) hours as needed.  Qty: 28 tablet, Refills: 0      amitriptyline (ELAVIL) 50 MG tablet Take 1 tablet (50 mg total) by mouth every evening.  Qty: 90 tablet, Refills: 1    Associated Diagnoses: Major depressive disorder, recurrent, mild      aspirin (ECOTRIN) 81 MG EC tablet       cyanocobalamin, vitamin B-12, (B-12 COMPLIANCE) 1,000 mcg/mL Kit Inject 1 mL as directed every 28 days.  Qty: 1 kit, Refills: 5    Associated Diagnoses: B12 deficiency      diclofenac sodium (VOLTAREN ARTHRITIS PAIN) 1 % Gel Apply 2 g topically once daily.  Qty: 100 g, Refills: 0      ergocalciferol (ERGOCALCIFEROL) 50,000 unit Cap Take 1 capsule by mouth every 7 days.      fluticasone propionate (FLONASE) 50 mcg/actuation nasal spray 1 spray (50 mcg total) by Each Nostril route once daily.  Qty: 48 g, Refills: 3    Associated Diagnoses: Nasal drainage      hydrALAZINE (APRESOLINE) 100 MG tablet Take 1 tablet (100 mg total) by mouth every 12 (twelve) hours.  Qty: 180 tablet, Refills: 3    Comments: .  Associated Diagnoses: Primary hypertension      hydrOXYzine HCL (ATARAX) 25 MG tablet Take 1 tablet by mouth three times daily as needed  Qty: 45 tablet, Refills: 0    Associated Diagnoses: Anxiety      metoprolol succinate (TOPROL-XL) 25 MG 24 hr tablet Take 1 tablet by mouth once daily  Qty: 90 tablet, Refills: 3    Comments: .  Associated Diagnoses: Primary hypertension       mometasone (ELOCON) 0.1 % solution Apply once to twice daily prn itching  Qty: 60 mL, Refills: 5    Associated Diagnoses: Dysesthesia of scalp      mupirocin (BACTROBAN) 2 % ointment Apply topically 3 (three) times daily.  Qty: 22 g, Refills: 0    Associated Diagnoses: Inflamed epidermoid cyst of skin      NIFEdipine (PROCARDIA-XL) 60 MG (OSM) 24 hr tablet Take 1 tablet (60 mg total) by mouth once daily.  Qty: 30 tablet, Refills: 2    Associated Diagnoses: Primary hypertension      nystatin (MYCOSTATIN) powder APPLY  POWDER TOPICALLY TO AFFECTED AREA 4 TIMES DAILY  Qty: 30 g, Refills: 5    Associated Diagnoses: Candidal intertrigo      olopatadine (PATANOL) 0.1 % ophthalmic solution INSTILL 1 DROP INTO EACH EYE TWICE DAILY  Qty: 5 mL, Refills: 0    Associated Diagnoses: Allergic conjunctivitis of both eyes      omeprazole (PRILOSEC) 40 MG capsule Take 1 capsule by mouth.      !! oxyCODONE-acetaminophen (PERCOCET) 7.5-325 mg per tablet Take 1 tablet by mouth every 4 (four) hours as needed for Pain.  Qty: 120 tablet, Refills: 0    Comments: Quantity prescribed more than 7 day supply? Yes, quantity medically necessary  Associated Diagnoses: Lumbar radiculopathy      !! oxyCODONE-acetaminophen (PERCOCET) 7.5-325 mg per tablet Take 1 tablet by mouth every 4 (four) hours as needed for Pain.  Qty: 120 tablet, Refills: 0    Comments: Quantity prescribed more than 7 day supply? Yes, quantity medically necessary  Associated Diagnoses: Lumbar radiculopathy      pravastatin (PRAVACHOL) 40 MG tablet Take 1 tablet by mouth once daily  Qty: 90 tablet, Refills: 0    Associated Diagnoses: Atherosclerosis of aorta      pregabalin (LYRICA) 25 MG capsule Take 1 capsule (25 mg total) by mouth 2 (two) times daily.  Qty: 60 capsule, Refills: 2    Associated Diagnoses: Spinal stenosis, unspecified spinal region; Left leg numbness      tiZANidine (ZANAFLEX) 4 MG tablet TAKE 1 TABLET BY MOUTH EVERY 6 HOURS AS NEEDED  Qty: 90 tablet,  Refills: 3    Associated Diagnoses: Lumbar radiculopathy      triamcinolone acetonide 0.1%-ciclopirox-magnesium hydroxide 400 mg/5 ml Apply to affected area twice a day after cool blow dry  Qty: 60 g, Refills: 5    Comments: Pharmacist: mix 30 grams of TAC 0.1% cream with 30 grams of Loprox cream in 120cc of Milk of Magnesia  Associated Diagnoses: Intertrigo      valsartan (DIOVAN) 320 MG tablet Take 1 tablet by mouth once daily  Qty: 90 tablet, Refills: 3    Comments: .  Associated Diagnoses: Hypertension, uncontrolled       !! - Potential duplicate medications found. Please discuss with provider.              Discharge Diagnosis: Lumbar radiculopathy [M54.16]  Condition on Discharge: Stable with no complications to procedure   Diet on Discharge: Same as before.  Activity: as per instruction sheet.  Discharge to: Home with a responsible adult.  Follow up: 2-4 weeks       Please call my office or pager at [phone] if experienced any weakness or loss of sensation, fever > 101.5, pain uncontrolled with oral medications, persistent nausea/vomiting/or diarrhea, redness or drainage from the incisions, or any other worrisome concerns. If physician on call was not reached or could not communicate with our office for any reason please go to the nearest emergency department     Chinmay Bennett M.D.  PGY-5  Interventional Pain Management Fellow  [facility]  Pager: [phone]

## 2025-01-17 NOTE — DISCHARGE INSTRUCTIONS

## 2025-01-17 NOTE — OP NOTE
Lumbar Transforaminal Epidural Steroid Injection under Fluoroscopic Guidance    The procedure, risks, benefits, and options were discussed with the patient. There are no contraindications to the procedure. The patent expressed understanding and agreed to the procedure. Informed written consent was obtained prior to the start of the procedure and can be found in the patient's chart.    PATIENT NAME: Amanda Holt   MRN: [id number]     DATE OF PROCEDURE: 01/17/2025    PROCEDURE:  Left  L5/S1 and S1 Lumbar Transforaminal Epidural Steroid Injection under Fluoroscopic Guidance    PRE-OP DIAGNOSIS: Lumbar radiculopathy [M54.16] Lumbar radiculopathy [M54.16]    POST-OP DIAGNOSIS: Same    PHYSICIAN: Jason Gomez MD    ASSISTANTS: Chinmay Bennett MD  [facility]       MEDICATIONS INJECTED: Preservative-free Decadron 10mg with 5cc of Lidocaine 1% MPF     LOCAL ANESTHETIC INJECTED: Xylocaine 2%     SEDATION: Versed 2mg and Fentanyl 100mcg                                                                                                                                                                                     Conscious sedation ordered by M.D. Patient re-evaluation prior to administration of conscious sedation. No changes noted in patient's status from initial evaluation. The patient's vital signs were monitored by RN and patient remained hemodynamically stable throughout the procedure.    Event Time In   Sedation Start 1423   Sedation End 1435       ESTIMATED BLOOD LOSS: None    COMPLICATIONS: None    TECHNIQUE: Time-out was performed to identify the patient and procedure to be performed. With the patient laying in a prone position, the surgical area was prepped and draped in the usual sterile fashion using ChloraPrep and a fenestrated drape.The levels were determined under fluoroscopy guidance. Skin anesthesia was achieved by injecting Lidocaine 2% over the injection sites. The transforaminal spaces were  then approached with a 22 gauge, 5 inch spinal quinke needle that was introduced under fluoroscopic guidance in the AP and Lateral views. Once the needle tip was in the area of the transforaminal space, and there was no blood, CSF or paraesthesias, contrast dye Omnipaque (300mg/mL) was injected to confirm placement and there was no vascular runoff. Fluoroscopic imaging in the AP and lateral views revealed a clear outline of the spinal nerve with proximal spread of agent through the neural foramen into the epidural space. 3 mL of the medication mixture listed above was injected slowly at each site. Displacement of the radio opaque contrast after injection of the medication confirmed that the medication went into the area of the transforaminal spaces. The needles were removed and bleeding was nil. A sterile dressing was applied. No specimens collected. The patient tolerated the procedure well.       The patient was monitored after the procedure in the recovery area. They were given post-procedure and discharge instructions to follow at home. The patient was discharged in a stable condition.    Chinmay Bennett MD    I reviewed and edited the fellow's note. I conducted my own interview and physical examination. I agree with the findings. I was present and supervising all critical portions of the procedure.    Jason Gomez MD

## 2025-01-28 ENCOUNTER — CLINICAL SUPPORT (OUTPATIENT)
Dept: REHABILITATION | Facility: HOSPITAL | Age: 75
End: 2025-01-28
Payer: MEDICARE

## 2025-01-28 DIAGNOSIS — G89.29 CHRONIC LEFT-SIDED LOW BACK PAIN WITH SCIATICA, SCIATICA LATERALITY UNSPECIFIED: Primary | ICD-10-CM

## 2025-01-28 DIAGNOSIS — M54.40 CHRONIC LEFT-SIDED LOW BACK PAIN WITH SCIATICA, SCIATICA LATERALITY UNSPECIFIED: Primary | ICD-10-CM

## 2025-01-28 DIAGNOSIS — M79.18 PIRIFORMIS MUSCLE PAIN: ICD-10-CM

## 2025-01-28 PROCEDURE — 97113 AQUATIC THERAPY/EXERCISES: CPT

## 2025-01-28 NOTE — PROGRESS NOTES
[facility]   Physical Therapy Treatment Note     Name: Amanda Arredondo [facility] Number: [id number]    Therapy Diagnosis:   Encounter Diagnoses   Name Primary?    Chronic left-sided low back pain with sciatica, sciatica laterality unspecified Yes    Piriformis muscle pain      Physician: Marisa Cespedes NP    Visit Date: 1/28/2025    Physician Orders: Aquatic Therapy   Medical Diagnosis from Referral:   M46.1 (ICD-10-CM) - Bilateral sacroiliitis   G57.03 (ICD-10-CM) - Piriformis syndrome of both sides   M54.16 (ICD-10-CM) - Lumbar radiculopathy     Evaluation Date: 12/11/2024  Authorization Period Expiration: 10/11/25  Plan of Care Expiration: 2/11/15  Visit # / Visits authorized: 2/8    5 visits total    Precautions: Standard and Fall    PTA Visit #: 0/5     Time In: 9:35 am   Time Out: 10:30 am   Total Billable Time: 30 minutes    SUBJECTIVE     Pt reports: she is dragging today.  She stated her back is very painful for her today. She did report that she had her injection and her L LE is less numb and she is able to walk further distance with less pain in her left leg.     She  was partially  compliant with home exercise program.    Response to previous treatment: soreness    Functional change: initiated aquatic PT    Pain: /10  Location: bilateral back      OBJECTIVE     Objective Measures updated at progress report unless specified.     Treatment     Amanda received aquatic therapeutic exercises to develop strength, endurance, ROM, flexibility, posture, and core stabilization for 60 minutes including:    FUNCTIONAL MOBILITY TRAINING x 2 laps each at beginning and 1 lap each at end of session  Walk forward/backward/lateral    STRETCHES 2 x 30sec  Add next as appropriate    LE EX x 20  Squat  Heel raise with gluteal set  Hip abduction  Hip flex  Hip ext    Seated on pool stool  Sit to stand from pool stool with cues to improve hip hinge  Long arc quad     Walking marches x 2 laps  with 1 hand hold on bar for support    UE EX/CORE  x 20 reps  Shoulder flex/ext TA activation paddles CLOSED  Shoulder horizontal abd/add TA activation paddles CLOSED  Shoulder abd/add with TA activation and paddles CLOSED    ENDURANCE  LTR x 2'  Bicycle in // bars x 3'    Patient Education and Home Exercises     Home Exercises Provided and Patient Education Provided     Education provided:   Role of aquatic therapy  Hydration post therapy  Review of body mechanics and abdominal activation with activity  Log roll technique for supine<>sit    Written Home Exercises Provided: Patient instructed to cont prior HEP. Exercises were reviewed and Amanda was able to demonstrate them prior to the end of the session.  Amanda demonstrated good  understanding of the education provided. See EMR under Patient Instructions for exercises provided during therapy sessions    ASSESSMENT     The patient was able to perform all exercises without modification required despite elevated pain levels today. She required moderate cues to engage core and for appropriate squat technique with hip hinge today.     Amanda Is progressing well towards her goals.   Pt prognosis is Good.     Pt will continue to benefit from skilled outpatient physical therapy to address the deficits listed in the problem list box on initial evaluation, provide pt/family education and to maximize pt's level of independence in the home and community environment.     Pt's spiritual, cultural and educational needs considered and pt agreeable to plan of care and goals.     Anticipated barriers to physical therapy: None    Goals:  Short Term Goals: 6 weeks   1. Patient will be independent in HEP & progressions.  2. Patient will achieve TUG score of 15 sec to demonstrate improved mobility  3. The patient will achieve 5 sit to stand score of 15 seconds to demonstrate improved transfers and endurance.     Long Term Goals: 12 weeks   1. The patient will demonstrate  independence with extensive HEP.  2. Patient will achieve 5 sit to stand score of 12 seconds to demonstrate improved transfers & endurance.  3. Patent is able to demonstrate MMT 4+/5 B LE  without pain reports during testing.    Goals:     PLAN     Plan of care Certification: 12/11/2024 to 2/11/25.    Outpatient Physical Therapy 2 times weekly for 8 weeks to include the following interventions: manual therapy, aquatic therapy, patient education, therapeutic exercise, therapeutic activities.    Patient may be seen by PTA as part of rehabilitation team.    Cierra Tavera, PT

## 2025-02-03 ENCOUNTER — OFFICE VISIT (OUTPATIENT)
Dept: PAIN MEDICINE | Facility: CLINIC | Age: 75
End: 2025-02-03
Payer: MEDICARE

## 2025-02-03 DIAGNOSIS — M70.62 GREATER TROCHANTERIC BURSITIS OF LEFT HIP: Primary | ICD-10-CM

## 2025-02-03 DIAGNOSIS — M79.18 MYOFASCIAL PAIN: ICD-10-CM

## 2025-02-03 DIAGNOSIS — M51.360 DEGENERATION OF INTERVERTEBRAL DISC OF LUMBAR REGION WITH DISCOGENIC BACK PAIN: ICD-10-CM

## 2025-02-03 DIAGNOSIS — M46.1 BILATERAL SACROILIITIS: ICD-10-CM

## 2025-02-03 DIAGNOSIS — G89.29 OTHER CHRONIC PAIN: ICD-10-CM

## 2025-02-03 DIAGNOSIS — M54.16 LUMBAR RADICULOPATHY: ICD-10-CM

## 2025-02-03 PROCEDURE — 4010F ACE/ARB THERAPY RXD/TAKEN: CPT | Mod: CPTII,95,,

## 2025-02-03 PROCEDURE — 1159F MED LIST DOCD IN RCRD: CPT | Mod: CPTII,95,,

## 2025-02-03 PROCEDURE — 1160F RVW MEDS BY RX/DR IN RCRD: CPT | Mod: CPTII,95,,

## 2025-02-03 NOTE — PROGRESS NOTES
Chronic Pain-Tele-Medicine-Established Note (Follow up visit)        The patient location is: Home  The chief complaint leading to consultation is: lower back pain  Visit type: Virtual visit with synchronous audio and video  Total time spent with patient: 11 min  Each patient to whom he or she provides medical services by telemedicine is:  (1) informed of the relationship between the physician and patient and the respective role of any other health care provider with respect to management of the patient; and (2) notified that he or she may decline to receive medical services by telemedicine and may withdraw from such care at any time.           Interval History 2/3/2025:  Amanda Holt returns for VIRTUAL follow-up after left L5/S1 and S1 TFESI on 1/17/2025. She reports 70%% relief. She continues aquatic therapy. She has noticed less numbness in the right leg. She continues with left-sided thigh pain with housework and going up the stairs. She continues Percocet 7.5/325 mg from her PCP with some benefit. She denies recent health changes. She denies recent falls or trauma. She denies new onset fever/night sweats, urinary incontinence, bowel incontinence, significant weight changes, significant motor weakness or changes, or loss of sensations. Her pain today is 10/10.      Interval History 1/2/2025:    Amanda Holt presents to the clinic for the evaluation of lower back pain that started several months ago.  Patient describes the pain as sharp achy and shooting with radiation down her left lower extremity.  Patient said the pain radiates all the way to her left feet. Patient said her entire left leg becomes numb and weak especially after walking for about 10 minutes.  Her pain score today is 8/10. The pain is rated with a score of  6/10 on the BEST day and a score of 10/10 on the WORST day.  Symptoms interfere with daily activity, sleeping, and work. The pain is exacerbated by Standing, Bending,  Walking, Lifting, and Getting out of bed/chair.  The pain is mitigated by 7.5 Percocet . She reports spending 3 hours per day reclining. The patient reports 4 hours of uninterrupted sleep per night.    Patient denies night fever/night sweats, urinary incontinence, bowel incontinence, and significant weight loss.  Patient reports weakness and numbness in the left lower extremity.       Interval History 10/11/2024:  Amanda Holt returns to clinic for follow-up after Left L3/4 and L5/S1 TFESI on 9/27/2024. She reports no relief of back or leg pain. She continues with left leg numbness with walking more than 8-10 minutes. She states it is the entire left leg, and does not describe the numbness in a specific dermatome. She continues Percocet and Tizanidine from her PCP with good relief. She denies any perceived side effects. She states she has not participated in PT or HEP in a couple of years. She states she did well in Aquatic Therapy in the past, as traditional Physical Therapy has been too painful to complete. She denies recent health changes. She denies recent falls or trauma. She denies new onset fever/night sweats, urinary incontinence, bowel incontinence, significant weight changes, significant motor weakness or changes, or loss of sensations. Her pain today is 8/10.       Of note, she reports her 27 year-old grandson was murdered. She has been dealing with this and it has caused her to not take care of herself as much as she had in the past. She would like to get back into caring for herself and her health.         HPI 9/17/2024:  Amanda Holt presents to the clinic for the evaluation of lower back pain that started several months ago.  Patient describes the pain as sharp achy and shooting with radiation down her left lower extremity.  Patient said the pain radiates all the way to her left feet. Patient said her entire left leg becomes numb and weak especially after walking for about 10  minutes.  Her pain score today is 8/10. The pain is rated with a score of  6/10 on the BEST day and a score of 10/10 on the WORST day.  Symptoms interfere with daily activity, sleeping, and work. The pain is exacerbated by Standing, Bending, Walking, Lifting, and Getting out of bed/chair.  The pain is mitigated by 7.5 Percocet . She reports spending 3 hours per day reclining. The patient reports 4 hours of uninterrupted sleep per night.     Patient denies night fever/night sweats, urinary incontinence, bowel incontinence, and significant weight loss.  Patient reports weakness and numbness in the left lower extremity.     Physical Therapy/Home Exercise: yes (aqua therapy)    Pain Disability Index Review:      3/14/2024     1:42 PM 4/13/2022    10:04 AM 3/28/2022    10:32 AM   Last 3 PDI Scores   Pain Disability Index (PDI) 63 30 26     Pain Medications:  Percocet 7.5  Tizanidine 4 mg     report:  Reviewed and consistent with medication use as prescribed.    Pain Procedures:   L4-5 laminectomy and fusion in 2018  09/09/2020-transforaminal WADE  02/12/2021-transforaminal WADE  3/14/2022 - L4-5 and L5-S1 lumbar transforaminal WADE  08/29/2022/ -  left L4-5 and L5-S1 lumbar transforaminal WADE  03/25/2024 - bilateral L3/4 facet joint injection -limited relief  1/17/2025 - Left L5/S1 and S1 TFESI - 70% relief    Imaging:   MRI LUMBAR SPINE WITHOUT CONTRAST     FINDINGS:  Lumbar spine alignment is within normal limits. The vertebral body heights are well maintained, with no fracture.  No marrow signal abnormality suspicious for an infiltrative process.  Degenerative fatty marrow metaplasia endplate change at L4-5.     The conus is normal in appearance.  The adjacent soft tissue structures show no significant abnormalities.     Bilateral T2 hyperintense renal lesions are suggestive of cysts.     L1-L2: There is no focal disc herniation. No significant central canal or neural foraminal narrowing.     L2-L3: There is no focal  disc herniation. No significant central canal or neural foraminal narrowing.     L3-L4: Circumferential disc bulge, spondylitic spurring, facet arthropathy and bilateral facet joint effusions.  Moderate central spinal stenosis and mild right foraminal stenosis.     L4-L5: Operative change of bilateral laminectomy and left-sided instrumented fusion.  Circumferential disc bulge and spondylitic spurring.  Mild bilateral neural foraminal narrowing.     L5-S1: Circumferential disc bulge and partial anterior bony ankylosis.  Mild facet arthropathy.  Flattening of the medial right exiting L5 nerve root suggests at least moderate and possibly severe foraminal stenosis.     Impression:     Postoperative and degenerative change as described, with central spinal stenosis at L3-4 and moderate-severe right L5-S1 foraminal stenosis.    XR LUMBAR SPINE 5 VIEW WITH FLEX AND EXT     FINDINGS:  prior L4-5 posterior instrumented lumbar fusion. No evidence of hardware failure or loosening. There is prominent L3-4 facet arthropathy with slight anterolisthesis, similar to prior exam.  No subluxation with extension and flexion views. No fractures.     Impression:     No acute findings.    Past Medical History:   Diagnosis Date    Arthralgia     Colon polyp     Degenerative disc disease at L5-S1 level     High cholesterol     Hypertension     Notalgia paresthetica     Nuclear sclerosis of both eyes 01/08/2020    Sciatica     Spinal stenosis     Tobacco use 10/21/2021     Past Surgical History:   Procedure Laterality Date    BACK SURGERY      lumbar laminectomy    COLON SURGERY  2022    Tear repaired    COLONOSCOPY N/A 07/23/2020    Procedure: COLONOSCOPY;  Surgeon: Donal Moore MD;  Location: [facility] ENDO;  Service: Endoscopy;  Laterality: N/A;    EPIDURAL STEROID INJECTION Left 09/09/2020    Procedure: Injection, Steroid, Epidural Transforaminal;  Surgeon: Jun eLavitt Jr., MD;  Location: [facility] ENDO;  Service: Pain Management;   Laterality: Left;  Left L5 + S1 TF WADE  Arrive @ 1300; ASA last 9/1; No DM    EPIDURAL STEROID INJECTION Left 02/12/2021    Procedure: Injection, Steroid, Epidural Transforaminal;  Surgeon: Jun Leavitt Jr., MD;  Location: [facility] ENDO;  Service: Pain Management;  Laterality: Left;  Left L4 + L5 TF WADE  Arrive @ 1230; IV Sedation; ASA last 2/4; No DM    INJECTION OF FACET JOINT Bilateral 3/25/2024    Procedure: FACET JOINT INJECTION BILATERAL L3/4 *ASPIRIN CLEARANCE IN CHART*;  Surgeon: Darya Ayala MD;  Location: [facility]IN MGT;  Service: Pain Management;  Laterality: Bilateral;  [phone]    SPINE SURGERY  10/08/18    Bilateral Lumbar Laminectomy with Fusion and Screws    TRANSFORAMINAL EPIDURAL INJECTION OF STEROID Left 03/14/2022    Procedure: INJECTION, STEROID, EPIDURAL, TRANSFORAMINAL APPROACH, L4-L5 & L5-S1;  Surgeon: Darya Ayala MD;  Location: [facility] PAIN MGT;  Service: Pain Management;  Laterality: Left;    TRANSFORAMINAL EPIDURAL INJECTION OF STEROID Left 08/29/2022    Procedure: LUMBAR TRANSFORAMINAL LEFT L4/5 AND L5/S1 CONTRAST;  Surgeon: Darya Ayala MD;  Location: [facility] PAIN MGT;  Service: Pain Management;  Laterality: Left;    TRANSFORAMINAL EPIDURAL INJECTION OF STEROID Left 9/27/2024    Procedure: LUMBAR TRANSFORAMINAL LEFT L3/4 AND L5/S1;  Surgeon: Jason Gomez MD;  Location: [facility];  Service: Pain Management;  Laterality: Left;  [phone]  2 WK F/U RONAK    TRANSFORAMINAL EPIDURAL INJECTION OF STEROID Left 1/17/2025    Procedure: LUMBAR TANSFORAMINAL LEFT L5/S1 AND S1;  Surgeon: Jason Gomez MD;  Location: [facility];  Service: Pain Management;  Laterality: Left;  2 WK F/U CHRIS  *SCHEDULE AFTER 1/24    TUBAL LIGATION       Social History     Socioeconomic History    Marital status:    Tobacco Use    Smoking status: Former     Current packs/day: 0.00     Average packs/day: 0.3 packs/day for 35.0 years (8.8 ttl pk-yrs)     Types: Cigarettes     Start date:  3/15/1987     Quit date: 3/15/2022     Years since quittin.8    Smokeless tobacco: Current    Tobacco comments:     Occasional spoker some times 1-2 cigarettes/day sometimes none for weeks   Substance and Sexual Activity    Alcohol use: Yes     Alcohol/week: 3.0 standard drinks of alcohol     Types: 3 Glasses of wine per week     Comment: Occasional wine    Drug use: Not Currently     Comment: Tramadol twice a day as needed    Sexual activity: Yes     Partners: Male     Birth control/protection: Post-menopausal, None     Comment: Tubal 30+ years ago   Social History Narrative    Lives alone    No partner    Was a  for a non-profit organization     Social Drivers of Health     Financial Resource Strain: Patient Declined (2024)    Overall Financial Resource Strain (CARDIA)     Difficulty of Paying Living Expenses: Patient declined   Food Insecurity: Patient Declined (2024)    Hunger Vital Sign     Worried About Running Out of Food in the Last Year: Patient declined     Ran Out of Food in the Last Year: Patient declined   Transportation Needs: No Transportation Needs (2024)    PRAPARE - Transportation     Lack of Transportation (Medical): No     Lack of Transportation (Non-Medical): No   Physical Activity: Patient Declined (2024)    Exercise Vital Sign     Days of Exercise per Week: Patient declined     Minutes of Exercise per Session: Patient declined   Stress: Patient Declined (2024)    [employer] [place] [employer] - Occupational Stress Questionnaire     Feeling of Stress : Patient declined   Housing Stability: Patient Declined (2024)    Housing Stability Vital Sign     Unable to Pay for Housing in the Last Year: Patient declined     Homeless in the Last Year: Patient declined     Family History   Problem Relation Name Age of Onset    Breast cancer Mother Marianna Benítez     Hypertension Mother Marianna Benítez             Hypotension Mother  Marianna Benítez     Cancer Mother Marianna Benítez          due to breast cancer    Miscarriages / Stillbirths Mother Marianna Benítze         3 stillborn children    Cataracts Father Matthias Rahman.  Jeyson     Glaucoma Father Matthias Rahman.  Jeyson     Diabetes Father Matthias Rahman.  Jeyson             Arthritis Father Matthias Rahman.  Jeyson     Vision loss Father Matthias Rahman.  Jeyson         Glaucoma -     Glaucoma Sister Debby Benítez     Arthritis Sister Debby Benítez     Diabetes Sister Debby Benítez     Hypertension Sister Debby Benítez     Vision loss Sister Debby Benítez         Glasses    Drug abuse Brother Sky Benítez     Learning disabilities Brother Sky Benítez     No Known Problems Brother      No Known Problems Brother x3     No Known Problems Maternal Aunt      No Known Problems Maternal Uncle      No Known Problems Paternal Aunt      No Known Problems Paternal Uncle      No Known Problems Maternal Grandmother      No Known Problems Maternal Grandfather      No Known Problems Paternal Grandmother      No Known Problems Paternal Grandfather      No Known Problems Son x2     No Known Problems Other      Arthritis Sister Susan Benítez     Depression Sister Susan Benítez     Diabetes Sister Susan Benítez     Hearing loss Sister Susan Benítez         Trouble hearing    Hypertension Sister Susan Benítez     Stroke Sister Susan Benítez         Browen Hunt Disease, Coma for a month due to diabetes,Some body weakness    Vision loss Sister Susan Benítez         Lasix surgery    Arthritis Sister Urszula Green     COPD Sister Urszula Green     Diabetes Sister Urszula Green     Hearing loss Sister Urszula Green     Heart disease Sister Urszula Green     Hypertension Sister Urszula Green     Arthritis Brother Murtaza Nowak     Hypertension Brother Murtaza Nowak     Early death Sister Christine Benítez         Still born    Early death Sister Catherine Benítez         Still born twin to Debby Benítez    Early death Brother  Krishan Benítez stillborn     Hypertension Sister Kateryna Kraft     Hypertension Brother Robbie Benítez     Learning disabilities Son Vaibhav Holt     Learning disabilities Son Fidel [place]         Great grandson    Learning disabilities Daughter Carmen's Aries         Granddaughter    Mental illness Sister Shawna Benítez         Her son Matthias Benítez    Vision loss Sister Shawna Benítez         Glasses/contacts    Vision loss Sister Chelly Benítez         Glasses    Amblyopia Neg Hx      Blindness Neg Hx      Macular degeneration Neg Hx      Retinal detachment Neg Hx      Strabismus Neg Hx      Thyroid disease Neg Hx       Review of patient's allergies indicates:   Allergen Reactions    Codeine      Current Outpatient Medications   Medication Sig    acetaminophen (TYLENOL) 500 MG tablet Take 2 tablets (1,000 mg total) by mouth every 6 (six) hours as needed.    amitriptyline (ELAVIL) 50 MG tablet Take 1 tablet (50 mg total) by mouth every evening.    aspirin (ECOTRIN) 81 MG EC tablet     cyanocobalamin, vitamin B-12, (B-12 COMPLIANCE) 1,000 mcg/mL Kit Inject 1 mL as directed every 28 days.    diclofenac sodium (VOLTAREN ARTHRITIS PAIN) 1 % Gel Apply 2 g topically once daily.    ergocalciferol (ERGOCALCIFEROL) 50,000 unit Cap Take 1 capsule by mouth every 7 days.    fluticasone propionate (FLONASE) 50 mcg/actuation nasal spray 1 spray (50 mcg total) by Each Nostril route once daily.    hydrALAZINE (APRESOLINE) 100 MG tablet Take 1 tablet (100 mg total) by mouth every 12 (twelve) hours.    hydrOXYzine HCL (ATARAX) 25 MG tablet Take 1 tablet by mouth three times daily as needed    metoprolol succinate (TOPROL-XL) 25 MG 24 hr tablet Take 1 tablet by mouth once daily    mometasone (ELOCON) 0.1 % solution Apply once to twice daily prn itching    mupirocin (BACTROBAN) 2 % ointment Apply topically 3 (three) times daily.    NIFEdipine (PROCARDIA-XL) 60 MG (OSM) 24 hr tablet Take 1 tablet (60 mg total) by mouth  once daily.    nystatin (MYCOSTATIN) powder APPLY  POWDER TOPICALLY TO AFFECTED AREA 4 TIMES DAILY    olopatadine (PATANOL) 0.1 % ophthalmic solution INSTILL 1 DROP INTO EACH EYE TWICE DAILY    omeprazole (PRILOSEC) 40 MG capsule Take 1 capsule by mouth.    oxyCODONE-acetaminophen (PERCOCET) 7.5-325 mg per tablet Take 1 tablet by mouth every 4 (four) hours as needed for Pain.    oxyCODONE-acetaminophen (PERCOCET) 7.5-325 mg per tablet Take 1 tablet by mouth every 4 (four) hours as needed for Pain.    pravastatin (PRAVACHOL) 40 MG tablet Take 1 tablet by mouth once daily    pregabalin (LYRICA) 25 MG capsule Take 1 capsule (25 mg total) by mouth 2 (two) times daily.    tiZANidine (ZANAFLEX) 4 MG tablet TAKE 1 TABLET BY MOUTH EVERY 6 HOURS AS NEEDED    triamcinolone acetonide 0.1%-ciclopirox-magnesium hydroxide 400 mg/5 ml Apply to affected area twice a day after cool blow dry    valsartan (DIOVAN) 320 MG tablet Take 1 tablet by mouth once daily     No current facility-administered medications for this visit.       REVIEW OF SYSTEMS:    GENERAL:  No weight loss, malaise or fevers.  HEENT:  Negative for frequent or significant headaches.  NECK:  Negative for lumps, goiter, pain and significant neck swelling.  RESPIRATORY:  Negative for cough, wheezing or shortness of breath.  CARDIOVASCULAR:  Negative for chest pain, leg swelling or palpitations.  GI:  Negative for abdominal discomfort, blood in stools or black stools or change in bowel habits.  MUSCULOSKELETAL:  See HPI.  SKIN:  Negative for lesions, rash, and itching.  PSYCH:  Negative for sleep disturbance, mood disorder and recent psychosocial stressors.  HEMATOLOGY/LYMPHOLOGY:  Negative for prolonged bleeding, bruising easily or swollen nodes.  NEURO:   No history of headaches, syncope, paralysis, seizures or tremors.  All other reviewed and negative other than HPI.    OBJECTIVE:    There were no vitals taken for this visit.    VIRTUAL PHYSICAL  EXAMINATION:    GENERAL APPEARANCE: Well appearing, in no acute distress.  PSYCH:  Mood and affect appropriate.  SKIN: Skin color, texture, turgor normal, no rashes or lesions to visible areas.  HEAD/FACE:  Normocephalic, atraumatic.  PULM: Bilateral chest rise. No apparent respiratory distress.          ASSESSMENT: 74 y.o. year old female with lower back pain, that began several months ago. The pain is described as sharp, achy, and shooting, with radiation down the left lower extremity.     1. Greater trochanteric bursitis of left hip  Ambulatory Referral/Consult to Physical Therapy/Occupational Therapy      2. Myofascial pain        3. Degeneration of intervertebral disc of lumbar region with discogenic back pain  Ambulatory Referral/Consult to Physical Therapy/Occupational Therapy      4. Bilateral sacroiliitis  Ambulatory Referral/Consult to Physical Therapy/Occupational Therapy      5. Lumbar radiculopathy        6. Other chronic pain            PLAN:     Recent EMG findings indicate evidence of a chronic left S1 radiculopathy, with L5 involvement also possible. Active/ongoing denervation was noted in the tibialis posterior and medial gastrocnemius muscles.  Discussed EMG results in detail with the patient.  She is s/p left L5/S1 and S1 epidural steroid injections (WADE) with 70% relief  Continue Aquatic Therapy now that she is feeling better from her WADE. She is now better able to fully participate with less pain.   New referral for Aquatic Therapy for continuation of therapy  RTC after completion of PT.      The above plan and management options were discussed at length with patient. Patient is in agreement with the above and verbalized understanding.     Marisa Cespedes NP  02/03/2025'

## 2025-02-04 DIAGNOSIS — I70.0 ATHEROSCLEROSIS OF AORTA: ICD-10-CM

## 2025-02-04 DIAGNOSIS — F41.9 ANXIETY: ICD-10-CM

## 2025-02-04 NOTE — TELEPHONE ENCOUNTER
Care Due:                  Date            Visit Type   Department     Provider  --------------------------------------------------------------------------------                                [facility]                              PRIMARY      MEDICINE/  Last Visit: 11-      CARE ([facility])   INTERNAL CHAPARRITA Torres                              [facility]                              PRIMARY      MEDICINE/  Next Visit: 02-      CARE ([facility])   MARIA G Min  Test          Frequency    Reason                     Performed    Due Date  --------------------------------------------------------------------------------    Lipid Panel.  12 months..  pravastatin..............  03- 03-    [facility] Embedded Care Due Messages. Reference number: [id number].   2/04/2025 1:53:31 PM CST

## 2025-02-04 NOTE — TELEPHONE ENCOUNTER
Refill Routing Note   Medication(s) are not appropriate for processing by [facility] for the following reason(s):        Required labs outdated    [facility] action(s):  Defer   Requires labs : Yes             Appointments  past 12m or future 3m with PCP    Date Provider   Last Visit   11/6/2024 Simona Torres MD   Next Visit   2/6/2025 Simona Torres MD   ED visits in past 90 days: 0        Note composed:1:54 PM 02/04/2025

## 2025-02-05 RX ORDER — HYDROXYZINE HYDROCHLORIDE 25 MG/1
TABLET, FILM COATED ORAL
Qty: 45 TABLET | Refills: 0 | Status: SHIPPED | OUTPATIENT
Start: 2025-02-05

## 2025-02-05 RX ORDER — PRAVASTATIN SODIUM 40 MG/1
TABLET ORAL
Qty: 90 TABLET | Refills: 1 | Status: SHIPPED | OUTPATIENT
Start: 2025-02-05

## 2025-02-05 NOTE — TELEPHONE ENCOUNTER
Refill Routing Note   Medication(s) are not appropriate for processing by [facility] for the following reason(s):        Outside of protocol    [facility] action(s):  Route             Appointments  past 12m or future 3m with PCP    Date Provider   Last Visit   11/6/2024 Simona Torres MD   Next Visit   2/4/2025 Simona Torres MD   ED visits in past 90 days: 0        Note composed:7:41 PM 02/04/2025

## 2025-02-06 ENCOUNTER — OFFICE VISIT (OUTPATIENT)
Dept: FAMILY MEDICINE | Facility: CLINIC | Age: 75
End: 2025-02-06
Payer: MEDICARE

## 2025-02-06 VITALS
OXYGEN SATURATION: 96 % | WEIGHT: 259.25 LBS | HEART RATE: 86 BPM | SYSTOLIC BLOOD PRESSURE: 126 MMHG | RESPIRATION RATE: 16 BRPM | DIASTOLIC BLOOD PRESSURE: 70 MMHG | TEMPERATURE: 98 F | HEIGHT: 64 IN | BODY MASS INDEX: 44.26 KG/M2

## 2025-02-06 DIAGNOSIS — N18.32 STAGE 3B CHRONIC KIDNEY DISEASE: ICD-10-CM

## 2025-02-06 DIAGNOSIS — E66.01 CLASS 3 SEVERE OBESITY DUE TO EXCESS CALORIES WITH SERIOUS COMORBIDITY AND BODY MASS INDEX (BMI) OF 40.0 TO 44.9 IN ADULT: ICD-10-CM

## 2025-02-06 DIAGNOSIS — M48.00 SPINAL STENOSIS, UNSPECIFIED SPINAL REGION: ICD-10-CM

## 2025-02-06 DIAGNOSIS — M54.16 LUMBAR RADICULOPATHY: Primary | ICD-10-CM

## 2025-02-06 DIAGNOSIS — E66.813 CLASS 3 SEVERE OBESITY DUE TO EXCESS CALORIES WITH SERIOUS COMORBIDITY AND BODY MASS INDEX (BMI) OF 40.0 TO 44.9 IN ADULT: ICD-10-CM

## 2025-02-06 DIAGNOSIS — R42 DIZZINESS: ICD-10-CM

## 2025-02-06 DIAGNOSIS — F33.0 MAJOR DEPRESSIVE DISORDER, RECURRENT, MILD: ICD-10-CM

## 2025-02-06 DIAGNOSIS — R73.9 ELEVATED RANDOM BLOOD GLUCOSE LEVEL: ICD-10-CM

## 2025-02-06 PROCEDURE — 3288F FALL RISK ASSESSMENT DOCD: CPT | Mod: CPTII,S$GLB,, | Performed by: FAMILY MEDICINE

## 2025-02-06 PROCEDURE — 99214 OFFICE O/P EST MOD 30 MIN: CPT | Mod: S$GLB,,, | Performed by: FAMILY MEDICINE

## 2025-02-06 PROCEDURE — 1159F MED LIST DOCD IN RCRD: CPT | Mod: CPTII,S$GLB,, | Performed by: FAMILY MEDICINE

## 2025-02-06 PROCEDURE — 1125F AMNT PAIN NOTED PAIN PRSNT: CPT | Mod: CPTII,S$GLB,, | Performed by: FAMILY MEDICINE

## 2025-02-06 PROCEDURE — 1101F PT FALLS ASSESS-DOCD LE1/YR: CPT | Mod: CPTII,S$GLB,, | Performed by: FAMILY MEDICINE

## 2025-02-06 PROCEDURE — 93005 ELECTROCARDIOGRAM TRACING: CPT | Mod: S$GLB,,, | Performed by: FAMILY MEDICINE

## 2025-02-06 PROCEDURE — 99999 PR PBB SHADOW E&M-EST. PATIENT-LVL V: CPT | Mod: PBBFAC,,, | Performed by: FAMILY MEDICINE

## 2025-02-06 PROCEDURE — 4010F ACE/ARB THERAPY RXD/TAKEN: CPT | Mod: CPTII,S$GLB,, | Performed by: FAMILY MEDICINE

## 2025-02-06 PROCEDURE — 3078F DIAST BP <80 MM HG: CPT | Mod: CPTII,S$GLB,, | Performed by: FAMILY MEDICINE

## 2025-02-06 PROCEDURE — 3008F BODY MASS INDEX DOCD: CPT | Mod: CPTII,S$GLB,, | Performed by: FAMILY MEDICINE

## 2025-02-06 PROCEDURE — 93010 ELECTROCARDIOGRAM REPORT: CPT | Mod: S$GLB,,, | Performed by: INTERNAL MEDICINE

## 2025-02-06 PROCEDURE — 3074F SYST BP LT 130 MM HG: CPT | Mod: CPTII,S$GLB,, | Performed by: FAMILY MEDICINE

## 2025-02-06 RX ORDER — PREGABALIN 75 MG/1
75 CAPSULE ORAL 2 TIMES DAILY
Qty: 60 CAPSULE | Refills: 2 | Status: SHIPPED | OUTPATIENT
Start: 2025-02-06 | End: 2025-08-07

## 2025-02-06 RX ORDER — OXYCODONE AND ACETAMINOPHEN 7.5; 325 MG/1; MG/1
1 TABLET ORAL EVERY 4 HOURS PRN
Qty: 120 TABLET | Refills: 0 | Status: SHIPPED | OUTPATIENT
Start: 2025-02-06

## 2025-02-06 RX ORDER — OXYCODONE AND ACETAMINOPHEN 7.5; 325 MG/1; MG/1
1 TABLET ORAL EVERY 4 HOURS PRN
Qty: 120 TABLET | Refills: 0 | Status: SHIPPED | OUTPATIENT
Start: 2025-04-07 | End: 2025-05-07

## 2025-02-06 RX ORDER — OXYCODONE AND ACETAMINOPHEN 7.5; 325 MG/1; MG/1
1 TABLET ORAL EVERY 4 HOURS PRN
Qty: 120 TABLET | Refills: 0 | Status: SHIPPED | OUTPATIENT
Start: 2025-03-08 | End: 2025-04-07

## 2025-02-06 NOTE — PROGRESS NOTES
Chief Complaint   Patient presents with    Follow-up       HPI  Amanda Holt is a 74 y.o. female with multiple medical diagnoses as listed in the medical history and problem list that presents for follow-up for chronic pain    Pmhx: CKD stage 3, chronic low back pain, ANTONIA    Reports the flu in November    Of note today she is feeling weak and slightly faint, she has some SOB, she did not eat anything this morning, she reports she took all of her medications except for her pain medicine because she is out of it; she didn't take the lyrica or the pain medicine; she has not had any water today, yesterday she had two to three 16oz bottles of water, no fever, chills, no coughing; she's been having a headache for last week, every morning, she is not using the cpap all of the time as she has trouble unhooking it to go to the bathroom    Vital signs are stable, oxygen level 97%    Chronic pain  She is pending WADE for the left L5/S1, previous injection did improve symptoms  Currently on percocet 7/325mg  She is undergoing aquatic therapy      ALLERGIES AND MEDICATIONS: updated and reviewed.  Review of patient's allergies indicates:   Allergen Reactions    Codeine      Medication List with Changes/Refills   New Medications    OXYCODONE-ACETAMINOPHEN (PERCOCET) 7.5-325 MG PER TABLET    Take 1 tablet by mouth every 4 (four) hours as needed for Pain.    OXYCODONE-ACETAMINOPHEN (PERCOCET) 7.5-325 MG PER TABLET    Take 1 tablet by mouth every 4 (four) hours as needed for Pain.    PREGABALIN (LYRICA) 75 MG CAPSULE    Take 1 capsule (75 mg total) by mouth 2 (two) times daily.   Current Medications    ACETAMINOPHEN (TYLENOL) 500 MG TABLET    Take 2 tablets (1,000 mg total) by mouth every 6 (six) hours as needed.    AMITRIPTYLINE (ELAVIL) 50 MG TABLET    Take 1 tablet (50 mg total) by mouth every evening.    ASPIRIN (ECOTRIN) 81 MG EC TABLET        CYANOCOBALAMIN, VITAMIN B-12, (B-12 COMPLIANCE) 1,000 MCG/ML KIT    Inject 1  mL as directed every 28 days.    DICLOFENAC SODIUM (VOLTAREN ARTHRITIS PAIN) 1 % GEL    Apply 2 g topically once daily.    ERGOCALCIFEROL (ERGOCALCIFEROL) 50,000 UNIT CAP    Take 1 capsule by mouth every 7 days.    FLUTICASONE PROPIONATE (FLONASE) 50 MCG/ACTUATION NASAL SPRAY    1 spray (50 mcg total) by Each Nostril route once daily.    HYDRALAZINE (APRESOLINE) 100 MG TABLET    Take 1 tablet (100 mg total) by mouth every 12 (twelve) hours.    HYDROXYZINE HCL (ATARAX) 25 MG TABLET    Take 1 tablet by mouth three times daily as needed    METOPROLOL SUCCINATE (TOPROL-XL) 25 MG 24 HR TABLET    Take 1 tablet by mouth once daily    MOMETASONE (ELOCON) 0.1 % SOLUTION    Apply once to twice daily prn itching    MUPIROCIN (BACTROBAN) 2 % OINTMENT    Apply topically 3 (three) times daily.    NIFEDIPINE (PROCARDIA-XL) 60 MG (OSM) 24 HR TABLET    Take 1 tablet (60 mg total) by mouth once daily.    NYSTATIN (MYCOSTATIN) POWDER    APPLY  POWDER TOPICALLY TO AFFECTED AREA 4 TIMES DAILY    OLOPATADINE (PATANOL) 0.1 % OPHTHALMIC SOLUTION    INSTILL 1 DROP INTO EACH EYE TWICE DAILY    OMEPRAZOLE (PRILOSEC) 40 MG CAPSULE    Take 1 capsule by mouth.    PRAVASTATIN (PRAVACHOL) 40 MG TABLET    Take 1 tablet by mouth once daily    TIZANIDINE (ZANAFLEX) 4 MG TABLET    TAKE 1 TABLET BY MOUTH EVERY 6 HOURS AS NEEDED    TRIAMCINOLONE ACETONIDE 0.1%-CICLOPIROX-MAGNESIUM HYDROXIDE 400 MG/5 ML    Apply to affected area twice a day after cool blow dry    VALSARTAN (DIOVAN) 320 MG TABLET    Take 1 tablet by mouth once daily   Changed and/or Refilled Medications    Modified Medication Previous Medication    OXYCODONE-ACETAMINOPHEN (PERCOCET) 7.5-325 MG PER TABLET oxyCODONE-acetaminophen (PERCOCET) 7.5-325 mg per tablet       Take 1 tablet by mouth every 4 (four) hours as needed for Pain.    Take 1 tablet by mouth every 4 (four) hours as needed for Pain.   Discontinued Medications    PREGABALIN (LYRICA) 25 MG CAPSULE    Take 1 capsule (25 mg total)  "by mouth 2 (two) times daily.       ROS  Review of Systems   Constitutional:  Negative for chills, diaphoresis, fatigue, fever and unexpected weight change.   HENT:  Negative for rhinorrhea, sinus pressure, sore throat and tinnitus.    Eyes:  Negative for photophobia and visual disturbance.   Respiratory:  Positive for shortness of breath. Negative for cough and wheezing.    Cardiovascular:  Negative for chest pain and palpitations.   Gastrointestinal:  Negative for abdominal pain, blood in stool, constipation, diarrhea, nausea and vomiting.   Genitourinary:  Negative for dysuria, flank pain, frequency and vaginal discharge.   Musculoskeletal:  Negative for arthralgias and joint swelling.   Skin:  Negative for rash.   Neurological:  Positive for dizziness and headaches. Negative for speech difficulty, weakness and light-headedness.   Psychiatric/Behavioral:  Negative for behavioral problems and dysphoric mood.        Physical Exam  Vitals:    02/06/25 1346   BP: 126/70   BP Location: Left arm   Patient Position: Sitting   Pulse: 86   Resp: 16   Temp: 98.3 °F (36.8 °C)   TempSrc: Oral   SpO2: 96%   Weight: 117.6 kg (259 lb 4.2 oz)   Height: 5' 4" (1.626 m)    Body mass index is 44.5 kg/m².  Weight: 117.6 kg (259 lb 4.2 oz)   Height: 5' 4" (162.6 cm)     Physical Exam  Vitals reviewed.   Constitutional:       General: She is not in acute distress.     Appearance: She is well-developed.   HENT:      Head: Normocephalic and atraumatic.   Cardiovascular:      Rate and Rhythm: Normal rate and regular rhythm.      Heart sounds: No murmur heard.     No friction rub. No gallop.   Pulmonary:      Effort: Pulmonary effort is normal. No respiratory distress.      Breath sounds: Normal breath sounds. No wheezing or rales.   Skin:     General: Skin is warm and dry.      Findings: No rash.   Neurological:      Mental Status: She is alert. Mental status is at baseline.   Psychiatric:         Behavior: Behavior normal.         " Thought Content: Thought content normal.           Health maintenance reviewed and addressed as ordered      ASSESSMENT/PLAN     1. Lumbar radiculopathy (Primary)  Reorder shower chair   [place]  database queried/reviewed  No signs of adverse behaviors, we discussed that I am not able to increase her dose as she has had hallucinations and this increases her fall risk  Continue f/u with pain mgmt but also neurosurgery  Will increase lyrica in hopes this will help with numbness  - BATH/SHOWER CHAIR FOR HOME USE  - oxyCODONE-acetaminophen (PERCOCET) 7.5-325 mg per tablet; Take 1 tablet by mouth every 4 (four) hours as needed for Pain.  Dispense: 120 tablet; Refill: 0  - oxyCODONE-acetaminophen (PERCOCET) 7.5-325 mg per tablet; Take 1 tablet by mouth every 4 (four) hours as needed for Pain.  Dispense: 120 tablet; Refill: 0  - oxyCODONE-acetaminophen (PERCOCET) 7.5-325 mg per tablet; Take 1 tablet by mouth every 4 (four) hours as needed for Pain.  Dispense: 120 tablet; Refill: 0    2. Class 3 severe obesity due to excess calories with serious comorbidity and body mass index (BMI) of 40.0 to 44.9 in adult  Encourage eating small frequent meals throughout the day  Activity in the pool with aquatic therapy    3. Stage 3b chronic kidney disease  Encourage hydration    4. Dizziness  Vitals signs stable, no signs of acute illness  Glucose 179, some improvement since eating four garcia crackers and drinking water  Patient states she feels safe enough to drive home, lives 8 minutes away  Recommend she eat a good meal when she gets home, call us back if symptoms don't improve  - POCT Glucose, Hand-Held Device  - EKG 12-lead  - Comprehensive Metabolic Panel; Future  - Hemoglobin A1C; Future  - CBC Auto Differential; Future    5. Spinal stenosis, unspecified spinal region  Reorder shower chair   [place]  database queried/reviewed  No signs of adverse behaviors, we discussed that I am not able to increase her dose as she has had  hallucinations and this increases her fall risk  Continue f/u with pain mgmt but also neurosurgery  Will increase lyrica in hopes this will help with numbness  - BATH/SHOWER CHAIR FOR HOME USE  - pregabalin (LYRICA) 75 MG capsule; Take 1 capsule (75 mg total) by mouth 2 (two) times daily.  Dispense: 60 capsule; Refill: 2    6. Major depressive disorder, recurrent, mild  Recommend therapy for depression due to pain and weight gain  Also needs coping skills to better manage dietary habits  - Ambulatory referral/consult to Behavioral Health; Future    7. Elevated random blood glucose level  Eval for DM  - Hemoglobin A1C; Future      Simona Torres MD  02/06/2025 1:54 PM        Follow up in about 3 months (around 5/6/2025).    Orders Placed This Encounter   Procedures    BATH/SHOWER CHAIR FOR HOME USE    Comprehensive Metabolic Panel    Hemoglobin A1C    CBC Auto Differential    Ambulatory referral/consult to Behavioral Health    POCT Glucose, Hand-Held Device    EKG 12-lead

## 2025-02-06 NOTE — PATIENT INSTRUCTIONS
Please visit for CPAP supplies and masks    Top-Rated CPAP Masks: Try Risk-Free for 30 Days  [email or web address]

## 2025-02-08 LAB
OHS QRS DURATION: 70 MS
OHS QTC CALCULATION: 452 MS

## 2025-02-10 ENCOUNTER — OFFICE VISIT (OUTPATIENT)
Dept: NEUROSURGERY | Facility: CLINIC | Age: 75
End: 2025-02-10
Payer: MEDICARE

## 2025-02-10 ENCOUNTER — TELEPHONE (OUTPATIENT)
Dept: NEUROSURGERY | Facility: CLINIC | Age: 75
End: 2025-02-10
Payer: MEDICARE

## 2025-02-10 DIAGNOSIS — M54.9 DORSALGIA, UNSPECIFIED: Primary | ICD-10-CM

## 2025-02-10 DIAGNOSIS — M54.16 LUMBAR RADICULOPATHY, CHRONIC: ICD-10-CM

## 2025-02-10 DIAGNOSIS — M51.369 DEGENERATION OF INTERVERTEBRAL DISC OF LUMBAR REGION, UNSPECIFIED WHETHER PAIN PRESENT: ICD-10-CM

## 2025-02-10 DIAGNOSIS — M48.02 SPINAL STENOSIS, CERVICAL REGION: ICD-10-CM

## 2025-02-10 DIAGNOSIS — M54.16 LUMBAR RADICULOPATHY, CHRONIC: Primary | ICD-10-CM

## 2025-02-10 PROCEDURE — 4010F ACE/ARB THERAPY RXD/TAKEN: CPT | Mod: CPTII,95,, | Performed by: NEUROLOGICAL SURGERY

## 2025-02-10 PROCEDURE — 98004 SYNCH AUDIO-VIDEO EST SF 10: CPT | Mod: 95,,, | Performed by: NEUROLOGICAL SURGERY

## 2025-02-10 NOTE — PROGRESS NOTES
Neurosurgery  Established Patient    Scribe Attestation  I, Radha Hendricks, attest that this documentation has been prepared under the direction and in the presence of Azra Kurtz MD.    Virtual visit Attestation   The patient location is: Home  The chief complaint leading to consultation is: updated imaging and studies for chronic lumbar radiculopathy   Visit type: audiovisual  Total time spent with patient: 15 min     Each patient to whom he or she provides medical services by telemedicine is:  (1) informed of the relationship between the physician and patient and the respective role of any other health care provider with respect to management of the patient; and (2) notified that he or she may decline to receive medical services by telemedicine and may withdraw from such care at any time.     SUBJECTIVE:     History of Present Illness 05/26/20  61-year-old female with a history hypertension, coronary artery disease, hypokalemia and back pain for several years.  She is status post previous L4-5 laminectomy and fusion in 2018.  She notes now she has back pain with pain going down the left lateral aspect of the leg.  She notes the pain is 10/10 in severity, and both her back and her legs.  She notes she has had this pain since April of 2019.  She had her previous L4-5 laminectomy and fusion in October of 2018.  She notes that she had remained pain free after having back pain bilateral leg pain going down the posterior aspect of both legs prior to surgery.  And had improved initially after surgery.  She notes the pain is primarily in her left leg goes on lateral aspect of her leg.  She notes that she is able to walk about 10 or 15 ft and the pain is much more pronounced.  She notes that her pain is also worsened when sitting.  She notes that lying on her right side somewhat improves the pain.  She has previously had 1% lidocaine cream which reported her with some relief.  She denies any jhony weakness in the  "bilateral lower extremities.  She denies any weakness in the upper extremities.  She denies any bowel or bladder incontinence.  She denies any chest pain, shortness of breath, nausea, vomiting, or emesis.  She denies any perirectal anesthesia.  She denies any genital anesthesia.  History was garnered over a audio visual virtual visit.     Interval history 08/19/24  Patient returns to clinic for f/u and imaging results. She reports being unable to complete previous EMG Nerve Conduction study due to intolerable pain. Pt was given a cortisone shot, but it caused swelling in her L leg. She continues to have left-sided back pain that is especially notable when walking. Prolonged walking (> 10 min) is associated with LLE numbness that begins in her thigh and travels down. Causes her to have to drag her leg and lean on something for support. This is the extent of her complaints at this time.  This was a virtual audio visual visit.     Interval history 02/10/25  Patient returns to clinic virtually with updated imaging and undergoing epidural nerve injections at the left L3-S1 to help relieve chronic lumbar radiculopathy symptoms. Today she states the injections provided little to no relief. She is still having lower back pain that she describes as "cramping." Pt also has occasional numbness and tingling in her LLE. Exacerbated with activity like walking. Currently undergoing aquatherapy which she feels is helping her. This is the extent of her concerns at this time.      Review of patient's allergies indicates:   Allergen Reactions    Codeine        Current Outpatient Medications   Medication Sig Dispense Refill    acetaminophen (TYLENOL) 500 MG tablet Take 2 tablets (1,000 mg total) by mouth every 6 (six) hours as needed. 28 tablet 0    amitriptyline (ELAVIL) 50 MG tablet Take 1 tablet (50 mg total) by mouth every evening. 90 tablet 1    aspirin (ECOTRIN) 81 MG EC tablet       cyanocobalamin, vitamin B-12, (B-12 " COMPLIANCE) 1,000 mcg/mL Kit Inject 1 mL as directed every 28 days. 1 kit 5    diclofenac sodium (VOLTAREN ARTHRITIS PAIN) 1 % Gel Apply 2 g topically once daily. 100 g 0    ergocalciferol (ERGOCALCIFEROL) 50,000 unit Cap Take 1 capsule by mouth every 7 days.      fluticasone propionate (FLONASE) 50 mcg/actuation nasal spray 1 spray (50 mcg total) by Each Nostril route once daily. 48 g 3    hydrALAZINE (APRESOLINE) 100 MG tablet Take 1 tablet (100 mg total) by mouth every 12 (twelve) hours. 180 tablet 3    hydrOXYzine HCL (ATARAX) 25 MG tablet Take 1 tablet by mouth three times daily as needed 45 tablet 0    metoprolol succinate (TOPROL-XL) 25 MG 24 hr tablet Take 1 tablet by mouth once daily 90 tablet 3    mometasone (ELOCON) 0.1 % solution Apply once to twice daily prn itching 60 mL 5    mupirocin (BACTROBAN) 2 % ointment Apply topically 3 (three) times daily. 22 g 0    NIFEdipine (PROCARDIA-XL) 60 MG (OSM) 24 hr tablet Take 1 tablet (60 mg total) by mouth once daily. 30 tablet 2    nystatin (MYCOSTATIN) powder APPLY  POWDER TOPICALLY TO AFFECTED AREA 4 TIMES DAILY 30 g 5    olopatadine (PATANOL) 0.1 % ophthalmic solution INSTILL 1 DROP INTO EACH EYE TWICE DAILY 5 mL 0    omeprazole (PRILOSEC) 40 MG capsule Take 1 capsule by mouth.      oxyCODONE-acetaminophen (PERCOCET) 7.5-325 mg per tablet Take 1 tablet by mouth every 4 (four) hours as needed for Pain. 120 tablet 0    [START ON 3/8/2025] oxyCODONE-acetaminophen (PERCOCET) 7.5-325 mg per tablet Take 1 tablet by mouth every 4 (four) hours as needed for Pain. 120 tablet 0    [START ON 4/7/2025] oxyCODONE-acetaminophen (PERCOCET) 7.5-325 mg per tablet Take 1 tablet by mouth every 4 (four) hours as needed for Pain. 120 tablet 0    pravastatin (PRAVACHOL) 40 MG tablet Take 1 tablet by mouth once daily 90 tablet 1    pregabalin (LYRICA) 75 MG capsule Take 1 capsule (75 mg total) by mouth 2 (two) times daily. 60 capsule 2    tiZANidine (ZANAFLEX) 4 MG tablet TAKE 1  TABLET BY MOUTH EVERY 6 HOURS AS NEEDED 90 tablet 3    triamcinolone acetonide 0.1%-ciclopirox-magnesium hydroxide 400 mg/5 ml Apply to affected area twice a day after cool blow dry 60 g 5    valsartan (DIOVAN) 320 MG tablet Take 1 tablet by mouth once daily 90 tablet 3     No current facility-administered medications for this visit.       Past Medical History:   Diagnosis Date    Arthralgia     Colon polyp     Degenerative disc disease at L5-S1 level     High cholesterol     Hypertension     Notalgia paresthetica     Nuclear sclerosis of both eyes 01/08/2020    Sciatica     Spinal stenosis     Tobacco use 10/21/2021     Past Surgical History:   Procedure Laterality Date    BACK SURGERY      lumbar laminectomy    COLON SURGERY  2022    Tear repaired    COLONOSCOPY N/A 07/23/2020    Procedure: COLONOSCOPY;  Surgeon: Donal Moore MD;  Location: [facility] ENDO;  Service: Endoscopy;  Laterality: N/A;    EPIDURAL STEROID INJECTION Left 09/09/2020    Procedure: Injection, Steroid, Epidural Transforaminal;  Surgeon: Jun Leavitt Jr., MD;  Location: [facility] ENDO;  Service: Pain Management;  Laterality: Left;  Left L5 + S1 TF WADE  Arrive @ 1300; ASA last 9/1; No DM    EPIDURAL STEROID INJECTION Left 02/12/2021    Procedure: Injection, Steroid, Epidural Transforaminal;  Surgeon: Jun Leavitt Jr., MD;  Location: [facility] ENDO;  Service: Pain Management;  Laterality: Left;  Left L4 + L5 TF WADE  Arrive @ 1230; IV Sedation; ASA last 2/4; No DM    INJECTION OF FACET JOINT Bilateral 3/25/2024    Procedure: FACET JOINT INJECTION BILATERAL L3/4 *ASPIRIN CLEARANCE IN CHART*;  Surgeon: Darya Ayala MD;  Location: [facility] PAIN MGT;  Service: Pain Management;  Laterality: Bilateral;  [phone]    SPINE SURGERY  10/08/18    Bilateral Lumbar Laminectomy with Fusion and Screws    TRANSFORAMINAL EPIDURAL INJECTION OF STEROID Left 03/14/2022    Procedure: INJECTION, STEROID, EPIDURAL, TRANSFORAMINAL APPROACH, L4-L5 & L5-S1;  Surgeon:  Darya Ayala MD;  Location: [facility] PAIN MGT;  Service: Pain Management;  Laterality: Left;    TRANSFORAMINAL EPIDURAL INJECTION OF STEROID Left 08/29/2022    Procedure: LUMBAR TRANSFORAMINAL LEFT L4/5 AND L5/S1 CONTRAST;  Surgeon: Darya Ayala MD;  Location: [facility] PAIN MGT;  Service: Pain Management;  Laterality: Left;    TRANSFORAMINAL EPIDURAL INJECTION OF STEROID Left 9/27/2024    Procedure: LUMBAR TRANSFORAMINAL LEFT L3/4 AND L5/S1;  Surgeon: Jason Gomez MD;  Location: [facility] PAIN MGT;  Service: Pain Management;  Laterality: Left;  [phone]  2 WK F/U RONAK    TRANSFORAMINAL EPIDURAL INJECTION OF STEROID Left 1/17/2025    Procedure: LUMBAR TANSFORAMINAL LEFT L5/S1 AND S1;  Surgeon: Jason Gomez MD;  Location: [facility] PAIN MGT;  Service: Pain Management;  Laterality: Left;  2 WK F/U CHRIS  *SCHEDULE AFTER 1/24    TUBAL LIGATION       Family History       Problem Relation (Age of Onset)    Arthritis Father, Sister, Sister, Sister, Brother    Breast cancer Mother    COPD Sister    Cancer Mother    Cataracts Father    Depression Sister    Diabetes Father, Sister, Sister, Sister    Drug abuse Brother    Early death Sister, Sister, Brother    Glaucoma Father, Sister    Hearing loss Sister, Sister    Heart disease Sister    Hypertension Mother, Sister, Sister, Sister, Brother, Sister, Brother    Hypotension Mother    Learning disabilities Brother, Son, Son, Daughter    Mental illness Sister    Miscarriages / Stillbirths Mother    No Known Problems Brother, Brother, Maternal Aunt, Maternal Uncle, Paternal Aunt, Paternal Uncle, Maternal Grandmother, Maternal Grandfather, Paternal Grandmother, Paternal Grandfather, Son, Other    Stroke Sister    Vision loss Father, Sister, Sister, Sister, Sister          Social History     Socioeconomic History    Marital status:    Tobacco Use    Smoking status: Former     Current packs/day: 0.00     Average packs/day: 0.3 packs/day for 35.0 years (8.8 ttl pk-yrs)     Types:  Cigarettes     Start date: 3/15/1987     Quit date: 3/15/2022     Years since quittin.9    Smokeless tobacco: Current    Tobacco comments:     Occasional spoker some times 1-2 cigarettes/day sometimes none for weeks   Substance and Sexual Activity    Alcohol use: Yes     Alcohol/week: 3.0 standard drinks of alcohol     Types: 3 Glasses of wine per week     Comment: Occasional wine    Drug use: Not Currently     Comment: Tramadol twice a day as needed    Sexual activity: Yes     Partners: Male     Birth control/protection: Post-menopausal, None     Comment: Tubal 30+ years ago   Social History Narrative    Lives alone    No partner    Was a  for a non-profit organization     Social Drivers of Health     Financial Resource Strain: Patient Declined (2024)    Overall Financial Resource Strain (CARDIA)     Difficulty of Paying Living Expenses: Patient declined   Food Insecurity: Patient Declined (2024)    Hunger Vital Sign     Worried About Running Out of Food in the Last Year: Patient declined     Ran Out of Food in the Last Year: Patient declined   Transportation Needs: No Transportation Needs (2024)    PRAPARE - Transportation     Lack of Transportation (Medical): No     Lack of Transportation (Non-Medical): No   Physical Activity: Patient Declined (2024)    Exercise Vital Sign     Days of Exercise per Week: Patient declined     Minutes of Exercise per Session: Patient declined   Stress: Patient Declined (2024)    [employer] - Occupational Stress Questionnaire     Feeling of Stress : Patient declined   Housing Stability: Patient Declined (2024)    Housing Stability Vital Sign     Unable to Pay for Housing in the Last Year: Patient declined     Homeless in the Last Year: Patient declined       Review of Systems   Musculoskeletal:  Positive for back pain.   Neurological:  Positive for numbness.   All other systems reviewed and are  negative.    OBJECTIVE:     Vital Signs     There is no height or weight on file to calculate BMI.    Neurosurgery Physical Exam  On virtual audio visual visit   Head is normocephalic atraumatic   Neck is grossly supple  No appreciable labored breathing   Admitting appears to be nontender   Patient is able to move extremities     On virtual audio visual visit the patient's speech is fluent, goal directed without any noted dysarthria or aphasia   Unable to evaluate pupils on virtual audio visual visit   Face is symmetric   Tongue is midline  Unable to evaluate palate   Hearing appears to be intact on virtual audio visual visit to voice   Patient able to move both upper and lower extremities without any gross motor asymmetry on virtual audio visual visit     Diagnostic Results:  I personally reviewed the patient's Diagnostic Imaging.     EMG W US and Nerve Conduction 12/23/24  There is evidence of a chronic left S1 (L5 also possible) radiculopathy with active/ongoing denervation in the tibialis posterior and medial gastrocnemius muscles.     XR Lumbar Spine Flex/Ex 08/22/24  Prior L4-5 posterior instrumented lumbar fusion. No evidence of hardware failure or loosening. There is prominent L3-4 facet arthropathy with slight anterolisthesis, similar to prior exam.  No subluxation with extension and flexion views. No fractures.    XR Scoliosis Complete 07/08/24  Two views: There is DJD and dish. There are left pedicle screws and a fixation tracey at L4-L5. There is mild anterolisthesis of L3 on L4. No fracture dislocation bone destruction seen. No hardware failure or complication seen. There is a mild dextroconvex curvature of the T-spine.     MRI Lumbar Spine WO Cont 03/05/24  Postoperative and degenerative change with central spinal stenosis at L3-4 and moderate-severe right L5-S1 foraminal stenosis.     ASSESSMENT/PLAN:   74-year-old female with hx of L4-5 pedicle screw placement unilaterally (2018) and chronic lumbar  "radiculopathy.     Patient presents with "cramping" lower back pain and occasional numbness/tingling in her LLE that is exacerbated with activity like walking.     Discussed results of EMG revealing evidence of a chronic left S1 (L5 also possible) radiculopathy with active/ongoing denervation in the tibialis posterior and medial gastrocnemius muscles.  Discussed imaging results of XR Lumbar Spine revealing prior L4-5 posterior instrumented lumbar fusion. No evidence of hardware failure or loosening. There is prominent L3-4 facet arthropathy with slight anterolisthesis, similar to prior exam.  No subluxation with extension and flexion views. No fractures.  Also reviewed previous XR Scoliosis revealing DJD and dish. There are left pedicle screws and a fixation tracey at L4-L5. There is mild anterolisthesis of L3 on L4. No fracture dislocation bone destruction seen. No hardware failure or complication seen. There is a mild dextroconvex curvature of the T-spine.  Also reviewed previous MRI Lumbar spine revealing postoperative and degenerative change with central spinal stenosis at L3-4 and moderate-severe right L5-S1 foraminal stenosis.    After discussion with the patient, I recommend hip epidural steroid injections and will consult with pain management. I have answered all of their questions and patient wish to proceed with this plan. We will schedule patient.      Thank you so very much for allowing me to participate in the care of this patient.  Please feel free to call any questions, comments, or concerns.     Azra Kurtz MD,MSc  Department of Neurosurgery   Department of Radiology  Department of Neurology  [facility]    [employer] [employer]   [employer] [facility] [employer]     Total time spent in counseling and discussion about further management options including relevant lab work, treatment,  prognosis, " medications and intended side effects was more than 60 minutes. More than 50 % of the time was spent in counseling and coordination of care.  I spent a total of 60 minutes on the day of the visit.This includes face to face time and non-face to face time preparing to see the patient (eg, review of tests), Obtaining and/or reviewing separately obtained history, Documenting clinical information in the electronic or other health record, Independently interpreting resultsand communicating results to the patient/family/caregiver, or Care coordination.     Hemant Attestation  I, Dr. Azra Kurtz personally performed the services described in this documentation. All medical record entries made by the scribe, Radha Hendricks, were at my direction and in my presence.  I have reviewed the chart and agree that the record reflects my personal performance and is accurate and complete.

## 2025-02-13 ENCOUNTER — CLINICAL SUPPORT (OUTPATIENT)
Dept: REHABILITATION | Facility: HOSPITAL | Age: 75
End: 2025-02-13
Payer: MEDICARE

## 2025-02-13 DIAGNOSIS — M70.62 GREATER TROCHANTERIC BURSITIS OF LEFT HIP: ICD-10-CM

## 2025-02-13 DIAGNOSIS — M79.18 PIRIFORMIS MUSCLE PAIN: ICD-10-CM

## 2025-02-13 DIAGNOSIS — M46.1 BILATERAL SACROILIITIS: ICD-10-CM

## 2025-02-13 DIAGNOSIS — G89.29 CHRONIC LEFT-SIDED LOW BACK PAIN WITH SCIATICA, SCIATICA LATERALITY UNSPECIFIED: Primary | ICD-10-CM

## 2025-02-13 DIAGNOSIS — M51.360 DEGENERATION OF INTERVERTEBRAL DISC OF LUMBAR REGION WITH DISCOGENIC BACK PAIN: ICD-10-CM

## 2025-02-13 DIAGNOSIS — M54.40 CHRONIC LEFT-SIDED LOW BACK PAIN WITH SCIATICA, SCIATICA LATERALITY UNSPECIFIED: Primary | ICD-10-CM

## 2025-02-13 PROCEDURE — 97113 AQUATIC THERAPY/EXERCISES: CPT | Mod: CQ

## 2025-02-13 PROCEDURE — 97164 PT RE-EVAL EST PLAN CARE: CPT

## 2025-02-13 NOTE — PROGRESS NOTES
[facility]   Physical Therapy Treatment Note     Name: Amanda Arredondo [facility] Number: [id number]    Therapy Diagnosis:   Encounter Diagnoses   Name Primary?    Greater trochanteric bursitis of left hip     Degeneration of intervertebral disc of lumbar region with discogenic back pain     Bilateral sacroiliitis     Chronic left-sided low back pain with sciatica, sciatica laterality unspecified Yes    Piriformis muscle pain      Physician: Marisa Cespedes NP    Visit Date: 2/13/2025    Physician Orders: Aquatic Therapy   Medical Diagnosis from Referral:   M46.1 (ICD-10-CM) - Bilateral sacroiliitis   G57.03 (ICD-10-CM) - Piriformis syndrome of both sides   M54.16 (ICD-10-CM) - Lumbar radiculopathy     Evaluation Date: 12/11/2024  Authorization Period Expiration: 10/11/25  Plan of Care Expiration: 2/11/15  Visit # / Visits authorized: 2/8    5 visits total    Precautions: Standard and Fall    PTA Visit #: 0/5     Time In: 9:35 am   Time Out: 10:30 am   Total Billable Time: 30 minutes    SUBJECTIVE     Pt reports: her back is cramping today. Rates as very severe. She did report that she ad a virtual visit with Dr. Avitia (Neurosurgery).  Stated he wants her to continue Aquatic therapy.    She  was partially  compliant with home exercise program.    Response to previous treatment: soreness    Functional change: initiated aquatic PT    Pain: 10/10  Location: bilateral back      OBJECTIVE     Objective Measures updated at progress report unless specified.     Treatment     Amanda received aquatic therapeutic exercises to develop strength, endurance, ROM, flexibility, posture, and core stabilization for 55 minutes including:    FUNCTIONAL MOBILITY TRAINING x 2 laps each at beginning and 1 lap each at end of session  Walk forward/backward/lateral    STRETCHES 2 x 30sec  Add next as appropriate    LE EX x 20  Squat  Heel raise with gluteal set  Hip abduction  Hip flex  Hip ext    Seated on  pool stool  Sit to stand from pool stool with cues to improve hip hinge  Long arc quad     Walking marches x 2 laps with 1 hand hold on bar for support    UE EX/CORE  x 20 reps  Shoulder flex/ext TA activation paddles CLOSED  Shoulder horizontal abd/add TA activation paddles CLOSED  Shoulder abd/add with TA activation and paddles CLOSED    ENDURANCE  LTR x 2'  Bicycle in // bars x 3'    Patient Education and Home Exercises     Home Exercises Provided and Patient Education Provided     Education provided:   Role of aquatic therapy  Hydration post therapy  Review of body mechanics and abdominal activation with activity  Log roll technique for supine<>sit    Written Home Exercises Provided: Patient instructed to cont prior HEP. Exercises were reviewed and Amanda was able to demonstrate them prior to the end of the session.  Amanda demonstrated good  understanding of the education provided. See EMR under Patient Instructions for exercises provided during therapy sessions    ASSESSMENT     The patient was able to perform all exercises without modification required despite elevated pain levels today.  Initiated session with trial of decompression with reports of some relief.  She still required moderate cues to engage core and for appropriate squat technique with hip hinge today. Despite increased pain complaints initially, reports relief with decompression and performance of therex.  See attached note of Cierra Tavera, PT for update.    Amanda Is progressing well towards her goals.   Pt prognosis is Good.     Pt will continue to benefit from skilled outpatient physical therapy to address the deficits listed in the problem list box on initial evaluation, provide pt/family education and to maximize pt's level of independence in the home and community environment.     Pt's spiritual, cultural and educational needs considered and pt agreeable to plan of care and goals.     Anticipated barriers to physical therapy:  None    Goals:  Short Term Goals: 6 weeks   1. Patient will be independent in HEP & progressions.  2. Patient will achieve TUG score of 15 sec to demonstrate improved mobility  3. The patient will achieve 5 sit to stand score of 15 seconds to demonstrate improved transfers and endurance.     Long Term Goals: 12 weeks   1. The patient will demonstrate independence with extensive HEP.  2. Patient will achieve 5 sit to stand score of 12 seconds to demonstrate improved transfers & endurance.  3. Patent is able to demonstrate MMT 4+/5 B LE  without pain reports during testing.    Goals:     PLAN     Plan of care Certification: 12/11/2024 to 2/11/25.    Outpatient Physical Therapy 2 times weekly for 8 weeks to include the following interventions: manual therapy, aquatic therapy, patient education, therapeutic exercise, therapeutic activities.    Patient may be seen by PTA as part of rehabilitation team.    Kirsten Arrieta PTA

## 2025-02-13 NOTE — PROGRESS NOTES
[facility]   Physical Therapy Updated  POC Note      Name: Amnada Arredondo [facility] Number: [id number]    Therapy Diagnosis:   Encounter Diagnoses   Name Primary?    Greater trochanteric bursitis of left hip     Degeneration of intervertebral disc of lumbar region with discogenic back pain     Bilateral sacroiliitis     Chronic left-sided low back pain with sciatica, sciatica laterality unspecified Yes    Piriformis muscle pain      Physician: Marisa Cespedes NP    Visit Date: 2/13/2025      Physician Orders: Aquatic Therapy   Medical Diagnosis from Referral:   M46.1 (ICD-10-CM) - Bilateral sacroiliitis   G57.03 (ICD-10-CM) - Piriformis syndrome of both sides   M54.16 (ICD-10-CM) - Lumbar radiculopathy     Evaluation Date: 12/11/2024  Authorization Period Expiration: 12/31/25  Plan of Care Expiration: 3/30/25  Visit # / Visits authorized: 3/8    6 visits total      Time In: 11:50 am   Time Out: 12:05 pm  Total Billable Time: 15 minutes      SUBJECTIVE      Update: The patient stated that she typically has pain in the morning due to not sleeping through the night or having medication.  She reports that she had an injection 3 weeks ago which did not help her symptoms. She was feeling slightly less numbness in her thigh initially but while standing outside for 10-15 minutes it got worse. She reports the symptoms are back to where they started. She had virtual appts with a few of her MDs and they want her have another MRI.     She stated she can do the exercises in the pool with less pain than she does outside of the pool so feels Aquatic PT has been beneficial. Functionally, she stated she is still unable to stand or walk for greater than 8-10 minutes due to the pain and numbness into her L LE.  She has trouble with the stairs at her home.  She does her HEP at home when she is able.      Pain:  Current 10/10, worst 10/10, best 8/10   Location: Middle back , lower back wrapping around down  the left leg.   Description: rubbing 2 bones together, numbness, grabbing, cracking  Aggravating Factors: Walking greater than 8-10 minutes, standing greater than 8 minutes, sitting greater than 8-10 minutes then gets up to stretch.  Easing Factors: When taking the medications      OBJECTIVE      Update 25    TU24 17 seconds without use of AD. She ambulates with moderate antalgic gait, lateral trunk lean, reduced step length B.   25 12 seconds without the use of AD.  She ambulates with mild antalgic gait and reduced step length B.      5 Times Sit to Stand:  25  17 seconds with good hip hinge, increased lumbar lordosis, no use of hands for assist.   25 15 seconds with good quality of hip hinge and no use of hands.      Gait: She ambulates with moderate antalgic gait, lateral trunk lean, reduced step length B.      Range of Motion:     Lumbar Range of Motion:    Limitations Pain   Flexion WFL   Alleviated symptoms    Extension Limited 50%   Mid lumbar pain    Left Side Bending Limited ~75% Pain mid lumbar   Right Side Bending Limited ~90% Pain mid lumbar       Lower Extremity Strength  Right LE   Left LE     Knee extension: 5/5 Knee extension: 4/5   Knee flexion: 5/5 Knee flexion: 4/5   Hip flexion: 4/5 Hip flexion: 4/5   Hip abduction: 4-/5 Hip abduction: 4-/5   Ankle dorsiflexion: 5/5 Ankle dorsiflexion: 4/5   Ankle plantarflexion: 5/5 Ankle plantarflexion: 4/5      Palpation: (+) TTP noted L greater than R Piriformis, Lumbosacral Praspinals, ITB, Greater Trochanter.    Flexibility:   Hamstring R: No restriction       L  Moderate restriction     Piriformis R Mild restriction     L Moderate restriction      TREATMENT      Performed with Kirsten Arrieta PTA    ASSESSMENT     Update:   The patient has only attended 6 Aquatic PT visit since her initial evaluation 24.  Subjectively she reports no change in her functional ability to stand or walk for a greater duration nor a change in  her pain levels.  Although she does feel she is able to move more in the water. Objectively she demonstrated improved lumbar AROM L side bending with other motions unchanged.  She has improved L hip flexion MMT with all other motions unchanged.  Functionally she demonstrated improved TUG and 5 times sit to stand scores indicating improved functional mobility.  She could benefit from additional skilled Aquatic PT with consistent attendance with treatment to truly assess benefit from Aquatic Therapy.       Previous Short Term Goals Status:       Goals:  Short Term Goals: 6 weeks   1. Patient will be independent in HEP & progressions.  2. Patient will achieve TUG score of 15 sec to demonstrate improved mobility--MET as of 2/13/25  3. The patient will achieve 5 sit to stand score of 15 seconds to demonstrate improved transfers and endurance. --MET as of 2/13/25    Long Term Goals: 12 weeks   1. The patient will demonstrate independence with extensive HEP.  2. Patient will achieve 5 sit to stand score of 12 seconds to demonstrate improved transfers & endurance.  3. Patent is able to demonstrate MMT 4+/5 B LE  without pain reports during testing.        Long Term Goal Status: continue per initial plan of care.    Reasons for Recertification of Therapy:   to improve function and reduce pain.       PLAN        Updated Certification Period: 2/13/24 to 3/30/24   Recommended Treatment Plan: 2 times per week for 6 weeks:  Aquatic Therapy    Cierra Tavera, PT

## 2025-02-19 ENCOUNTER — HOSPITAL ENCOUNTER (OUTPATIENT)
Dept: RADIOLOGY | Facility: HOSPITAL | Age: 75
Discharge: HOME | End: 2025-02-19
Attending: NEUROLOGICAL SURGERY
Payer: MEDICARE

## 2025-02-19 DIAGNOSIS — M54.16 LUMBAR RADICULOPATHY, CHRONIC: ICD-10-CM

## 2025-02-19 DIAGNOSIS — M54.9 DORSALGIA, UNSPECIFIED: ICD-10-CM

## 2025-02-19 PROCEDURE — 72131 CT LUMBAR SPINE W/O DYE: CPT | Mod: TC

## 2025-02-24 ENCOUNTER — CLINICAL SUPPORT (OUTPATIENT)
Dept: AUDIOLOGY | Facility: CLINIC | Age: 75
End: 2025-02-24
Payer: MEDICARE

## 2025-02-24 ENCOUNTER — OFFICE VISIT (OUTPATIENT)
Dept: OTOLARYNGOLOGY | Facility: CLINIC | Age: 75
End: 2025-02-24
Payer: MEDICARE

## 2025-02-24 ENCOUNTER — TELEPHONE (OUTPATIENT)
Dept: NEUROSURGERY | Facility: CLINIC | Age: 75
End: 2025-02-24
Payer: MEDICARE

## 2025-02-24 ENCOUNTER — TELEPHONE (OUTPATIENT)
Dept: OPHTHALMOLOGY | Facility: CLINIC | Age: 75
End: 2025-02-24
Payer: MEDICARE

## 2025-02-24 VITALS
DIASTOLIC BLOOD PRESSURE: 70 MMHG | WEIGHT: 259.25 LBS | SYSTOLIC BLOOD PRESSURE: 126 MMHG | HEIGHT: 64 IN | BODY MASS INDEX: 44.26 KG/M2

## 2025-02-24 DIAGNOSIS — J30.2 SEASONAL ALLERGIC RHINITIS, UNSPECIFIED TRIGGER: Primary | ICD-10-CM

## 2025-02-24 DIAGNOSIS — H90.3 SENSORINEURAL HEARING LOSS (SNHL) OF BOTH EARS: Primary | ICD-10-CM

## 2025-02-24 DIAGNOSIS — H90.3 SENSORINEURAL HEARING LOSS (SNHL), BILATERAL: ICD-10-CM

## 2025-02-24 PROCEDURE — 99214 OFFICE O/P EST MOD 30 MIN: CPT | Mod: S$GLB,,, | Performed by: NURSE PRACTITIONER

## 2025-02-24 PROCEDURE — 3078F DIAST BP <80 MM HG: CPT | Mod: CPTII,S$GLB,, | Performed by: NURSE PRACTITIONER

## 2025-02-24 PROCEDURE — 4010F ACE/ARB THERAPY RXD/TAKEN: CPT | Mod: CPTII,S$GLB,, | Performed by: NURSE PRACTITIONER

## 2025-02-24 PROCEDURE — 3288F FALL RISK ASSESSMENT DOCD: CPT | Mod: CPTII,S$GLB,, | Performed by: NURSE PRACTITIONER

## 2025-02-24 PROCEDURE — 92557 COMPREHENSIVE HEARING TEST: CPT | Mod: S$GLB,,,

## 2025-02-24 PROCEDURE — 1126F AMNT PAIN NOTED NONE PRSNT: CPT | Mod: CPTII,S$GLB,, | Performed by: NURSE PRACTITIONER

## 2025-02-24 PROCEDURE — 1101F PT FALLS ASSESS-DOCD LE1/YR: CPT | Mod: CPTII,S$GLB,, | Performed by: NURSE PRACTITIONER

## 2025-02-24 PROCEDURE — 3008F BODY MASS INDEX DOCD: CPT | Mod: CPTII,S$GLB,, | Performed by: NURSE PRACTITIONER

## 2025-02-24 PROCEDURE — 3074F SYST BP LT 130 MM HG: CPT | Mod: CPTII,S$GLB,, | Performed by: NURSE PRACTITIONER

## 2025-02-24 PROCEDURE — 92567 TYMPANOMETRY: CPT | Mod: S$GLB,,,

## 2025-02-24 RX ORDER — FLUTICASONE PROPIONATE 50 MCG
1 SPRAY, SUSPENSION (ML) NASAL 2 TIMES DAILY
Qty: 18.2 ML | Refills: 3 | Status: SHIPPED | OUTPATIENT
Start: 2025-02-24

## 2025-02-24 RX ORDER — AZELASTINE 1 MG/ML
1 SPRAY, METERED NASAL 2 TIMES DAILY
Qty: 30 ML | Refills: 3 | Status: SHIPPED | OUTPATIENT
Start: 2025-02-24

## 2025-02-24 NOTE — TELEPHONE ENCOUNTER
----- Message from Klaudia sent at 2/24/2025 10:03 AM CST -----  Regarding: Appears a broke vessel in eye  Contact: [phone]  Type:  Same Day Appointment RequestCaller is requesting a same day appointment.  Caller declined first available appointment listed below.  Name of Caller:Bela is the first available appointment?No appointments in EpicSymptoms:Corner of right eye broken vessel Best Call Back Number:[phone] Information:

## 2025-02-24 NOTE — TELEPHONE ENCOUNTER
"----- Message from Joy sent at 2/24/2025 10:27 AM CST -----  Regarding: Appointment  "Type:  Patient Call BackWho Called:PTWhat is the reqeust in detail:Requesting call back to schedule virtual appt to go over CT results. Please adviseCan the clinic reply by [facility]?Best Call Back Number: [phone] Information:No appts available for me to schedule  "

## 2025-02-24 NOTE — PROGRESS NOTES
OTOLARYNGOLOGY CLINIC NOTE  Date:  02/24/2025     Chief complaint:  Chief Complaint   Patient presents with    Hearing Loss     Left ear     History of Present Illness  Amanda Holt is a 74 y.o. female  presenting today for a new evaluation and treatment of hearing loss.  Pt reports her hearing has been declining over several years.  Pt denies any otalgia, otorrhea, fullness or pressure to her ears.  Pt reports also having nasal congestion, post nasal drip and rhinitis.      Past Medical History  Past Medical History:   Diagnosis Date    Arthralgia     Colon polyp     Degenerative disc disease at L5-S1 level     High cholesterol     Hypertension     Notalgia paresthetica     Nuclear sclerosis of both eyes 01/08/2020    Sciatica     Spinal stenosis     Tobacco use 10/21/2021      Past Surgical History  Past Surgical History:   Procedure Laterality Date    BACK SURGERY      lumbar laminectomy    COLON SURGERY  2022    Tear repaired    COLONOSCOPY N/A 07/23/2020    Procedure: COLONOSCOPY;  Surgeon: Donal Moore MD;  Location: [facility];  Service: Endoscopy;  Laterality: N/A;    EPIDURAL STEROID INJECTION Left 09/09/2020    Procedure: Injection, Steroid, Epidural Transforaminal;  Surgeon: Jun Leavitt Jr., MD;  Location: [facility] ENDO;  Service: Pain Management;  Laterality: Left;  Left L5 + S1 TF WADE  Arrive @ 1300; ASA last 9/1; No DM    EPIDURAL STEROID INJECTION Left 02/12/2021    Procedure: Injection, Steroid, Epidural Transforaminal;  Surgeon: Jun Leavitt Jr., MD;  Location: [facility] ENDO;  Service: Pain Management;  Laterality: Left;  Left L4 + L5 TF WADE  Arrive @ 1230; IV Sedation; ASA last 2/4; No DM    INJECTION OF FACET JOINT Bilateral 3/25/2024    Procedure: FACET JOINT INJECTION BILATERAL L3/4 *ASPIRIN CLEARANCE IN CHART*;  Surgeon: Darya Ayala MD;  Location: [facility] PAIN T;  Service: Pain Management;  Laterality: Bilateral;  [phone]    SPINE SURGERY  10/08/18    Bilateral  Lumbar Laminectomy with Fusion and Screws    TRANSFORAMINAL EPIDURAL INJECTION OF STEROID Left 03/14/2022    Procedure: INJECTION, STEROID, EPIDURAL, TRANSFORAMINAL APPROACH, L4-L5 & L5-S1;  Surgeon: Darya Ayala MD;  Location: [facility] PAIN MGT;  Service: Pain Management;  Laterality: Left;    TRANSFORAMINAL EPIDURAL INJECTION OF STEROID Left 08/29/2022    Procedure: LUMBAR TRANSFORAMINAL LEFT L4/5 AND L5/S1 CONTRAST;  Surgeon: Darya Ayala MD;  Location: [facility] PAIN MGT;  Service: Pain Management;  Laterality: Left;    TRANSFORAMINAL EPIDURAL INJECTION OF STEROID Left 9/27/2024    Procedure: LUMBAR TRANSFORAMINAL LEFT L3/4 AND L5/S1;  Surgeon: Jason Gomez MD;  Location: [facility] PAIN MGT;  Service: Pain Management;  Laterality: Left;  [phone]  2 WK F/U RONAK    TRANSFORAMINAL EPIDURAL INJECTION OF STEROID Left 1/17/2025    Procedure: LUMBAR TANSFORAMINAL LEFT L5/S1 AND S1;  Surgeon: Jason Gomez MD;  Location: [facility] PAIN MGT;  Service: Pain Management;  Laterality: Left;  2 WK F/U CHRIS  *SCHEDULE AFTER 1/24    TUBAL LIGATION        Medications  Medications Ordered Prior to Encounter[1]    Review of Systems  Review of Systems   Constitutional:  Positive for malaise/fatigue.   HENT:  Positive for hearing loss.    Eyes:  Positive for photophobia.   Respiratory:  Positive for shortness of breath and wheezing.    Cardiovascular: Negative.    Gastrointestinal:  Positive for constipation and heartburn.   Genitourinary: Negative.    Musculoskeletal:  Positive for back pain and neck pain.   Skin: Negative.    Neurological:  Positive for headaches.   Endo/Heme/Allergies:  Bruises/bleeds easily.      Social History   reports that she quit smoking about 2 years ago. Her smoking use included cigarettes. She started smoking about 38 years ago. She has a 8.8 pack-year smoking history. She uses smokeless tobacco. She reports current alcohol use of about 3.0 standard drinks of alcohol per week. She reports that she does not  currently use drugs.     Family History  Family History   Problem Relation Name Age of Onset    Breast cancer Mother Marianna Benítez     Hypertension Mother Marianna Benítez             Hypotension Mother Marianna Benítez     Cancer Mother Marianna Benítez          due to breast cancer    Miscarriages / Stillbirths Mother Marianna Benítez         3 stillborn children    Cataracts Father Matthias The.  Jeyson     Glaucoma Father Matthias The.  Jeyson     Diabetes Father Matthias RahmanArlette Benítez             Arthritis Father Matthias Rahman.  Jeyson     Vision loss Father Matthias Angelica  Jeyson         Glaucoma -     Glaucoma Sister Debby Benítez     Arthritis Sister Debby Benítez     Diabetes Sister Debby Benítez     Hypertension Sister Debbysheila Benítez     Vision loss Sister Debbysheila Benítez         Glasses    Drug abuse Brother Sky Jeyson     Learning disabilities Brother Sky Benítez     No Known Problems Brother      No Known Problems Brother x3     No Known Problems Maternal Aunt      No Known Problems Maternal Uncle      No Known Problems Paternal Aunt      No Known Problems Paternal Uncle      No Known Problems Maternal Grandmother      No Known Problems Maternal Grandfather      No Known Problems Paternal Grandmother      No Known Problems Paternal Grandfather      No Known Problems Son x2     No Known Problems Other      Arthritis Sister Susan Benítez     Depression Sister Susan Benítez     Diabetes Sister Susan Benítez     Hearing loss Sister Susan Benítez         Trouble hearing    Hypertension Sister Susan Benítez     Stroke Sister Susan Benítez         Browne Hunt Disease, Coma for a month due to diabetes,Some body weakness    Vision loss Sister Susan Benítez         Lasix surgery    Arthritis Sister Urszula Green     COPD Sister Urszula Green     Diabetes Sister Urszula Green     Hearing loss Sister Urszula Green     Heart disease Sister Urszula Green     Hypertension Sister Urszula Green     Arthritis  "Brother Murtaza Nowak     Hypertension Brother Murtaza Nowak     Early death Sister Christine Benítez         Still born    Early death Sister Catherine Benítez         Still born twin to Debby Benítez    Early death Brother Krishan Benítez stillborn     Hypertension Sister Kateryna Kraft     Hypertension Brother Robbie Benítez     Learning disabilities Son Vaibhav Holt     Learning disabilities Son Fidel [place]         Great grandson    Learning disabilities Daughter Carmen's Aries         Granddaughter    Mental illness Sister Shawna Benítez         Her son Matthias Benítez    Vision loss Sister Shawna Benítez         Glasses/contacts    Vision loss Sister Chelly Benítez         Glasses    Amblyopia Neg Hx      Blindness Neg Hx      Macular degeneration Neg Hx      Retinal detachment Neg Hx      Strabismus Neg Hx      Thyroid disease Neg Hx        Physical Exam   Vitals:    02/24/25 0932   BP: 126/70    Body mass index is 44.5 kg/m².  Weight: 117.6 kg (259 lb 4.2 oz)   Height: 5' 4" (162.6 cm)     GENERAL: no acute distress.  HEAD: normocephalic.   EYES: lids and lashes normal. No scleral icterus  EARS: external ear without lesion, normal pinna shape and position.  External auditory canal with normal cerumen, tympanic membrane fully visible, no perforation , no retraction. No middle ear effusion.   NOSE: external nose without significant bony abnormality  ORAL CAVITY/OROPHARYNX: tongue midline and mobile. Symmetric palate rise.    NECK: trachea midline.   LYMPH NODES:No cervical lymphadenopathy.  RESPIRATORY: no stridor, no stertor. Voice normal. Respirations nonlabored.  NEURO: alert, responds to questions appropriately.   Cranial nerve exam as indicated in above sections and additionally showed facial movement symmetric with good eye closure and symmetric smile.   PSYCH:mood appropriate    Imaging:  The patient does not have any pertinent and/or recent imaging of the head and neck.     Labs:  CBC  Recent Labs   Lab " 03/07/23  0917 01/05/24  1145 09/30/24  1252   WBC 4.86 5.93 7.31   Hemoglobin 12.7 11.5 L 11.3 L   Hematocrit 40.6 37.1 37.8   MCV 96 95 98   Platelets 264 300 279     BMP  Recent Labs   Lab 03/07/23  0917 08/02/24  1142 09/30/24  1252   Glucose 115 H 158 H 118 H   Sodium 144 138 141   Potassium 4.5 4.5 4.4   Chloride 112 H 110 110   CO2 25 17 L 22 L   BUN 18 45 H 24 H   Creatinine 1.3 3.0 H 1.5 H   Calcium 9.8 9.8 9.5         AUDIOLOGY RESULTS  Audiometric evaluation including audiogram, tympanometry, acoustic reflexes, and speech discrimination which was performed  was personally reviewed and interpreted.  Notable findings on the audiogram were mild to moderate sensorineural hearing loss (SNHL) bilaterally.  Tympanometry revealed Type A tympanogram on the left and Type A tympanogram on the right. Speech discrimination was 100% on the left, and 96% on the right. Report of the audiologist performing this audiometric testing was also reviewed.     Assessment  1. Seasonal allergic rhinitis, unspecified trigger  - fluticasone propionate (FLONASE) 50 mcg/actuation nasal spray; 1 spray (50 mcg total) by Each Nostril route 2 (two) times daily.  Dispense: 18.2 mL; Refill: 3  - azelastine (ASTELIN) 137 mcg (0.1 %) nasal spray; 1 spray (137 mcg total) by Nasal route 2 (two) times daily.  Dispense: 30 mL; Refill: 3    2. Sensorineural hearing loss (SNHL), bilateral     Plan:  Allergic rhinitis(AR):  discussed about pathophysiology of allergic disease and sinus disease. We discussed about treatment options for this including but not limited to medications and potential surgeries as well as when surgery is indicated.  - I recommend dual nasal spray therapy as combo of steroid and antihistamine nasal spray has been shown in evidence-based studies to be better than either alone.   -Discussed medication administration technique (point toward outer corner of eye and not towards nasal septum) and use nasal saline prior to  medication sprays.   -We discussed importance of saline use prior to medication sprays to help sprays work better and to clean the nose.   -Counseled on risk of bleeding, risk of increased intraocular pressure/cataract with long term use.   -Discussed how to increase and decrease nasal spray regimen based on symptoms and that sprays can be used on prn basis but work best when used daily and take about 2-3 weeks to get to max level. If patient using sprays on a prn basis and symptoms not controlled should go back to daily /bid use  -discussed as well as gave patient physical documentation with photos about the saline and other medication sprays (paperwork summarized discussion topics). F/u 2 mo and prn.  SNHL:  Audiogram reviewed and discussed with patient. Recheck hearing in 1 year or sooner if subjective change noted. Encouraged hearing protection while in noisy environment.  Additionally, amplification with hearing aids is generally the best option for hearing rehabilitation, except where the hearing loss is profound.  Discussed plan of care with patient in detail and all questions answered. Patient reported understanding of plan of care.        [1]   Current Outpatient Medications on File Prior to Visit   Medication Sig Dispense Refill    acetaminophen (TYLENOL) 500 MG tablet Take 2 tablets (1,000 mg total) by mouth every 6 (six) hours as needed. 28 tablet 0    amitriptyline (ELAVIL) 50 MG tablet Take 1 tablet (50 mg total) by mouth every evening. 90 tablet 1    aspirin (ECOTRIN) 81 MG EC tablet       cyanocobalamin, vitamin B-12, (B-12 COMPLIANCE) 1,000 mcg/mL Kit Inject 1 mL as directed every 28 days. 1 kit 5    diclofenac sodium (VOLTAREN ARTHRITIS PAIN) 1 % Gel Apply 2 g topically once daily. 100 g 0    ergocalciferol (ERGOCALCIFEROL) 50,000 unit Cap Take 1 capsule by mouth every 7 days.      fluticasone propionate (FLONASE) 50 mcg/actuation nasal spray 1 spray (50 mcg total) by Each Nostril route once daily.  48 g 3    hydrOXYzine HCL (ATARAX) 25 MG tablet Take 1 tablet by mouth three times daily as needed 45 tablet 0    metoprolol succinate (TOPROL-XL) 25 MG 24 hr tablet Take 1 tablet by mouth once daily 90 tablet 3    mometasone (ELOCON) 0.1 % solution Apply once to twice daily prn itching 60 mL 5    mupirocin (BACTROBAN) 2 % ointment Apply topically 3 (three) times daily. 22 g 0    nystatin (MYCOSTATIN) powder APPLY  POWDER TOPICALLY TO AFFECTED AREA 4 TIMES DAILY 30 g 5    olopatadine (PATANOL) 0.1 % ophthalmic solution INSTILL 1 DROP INTO EACH EYE TWICE DAILY 5 mL 0    omeprazole (PRILOSEC) 40 MG capsule Take 1 capsule by mouth.      oxyCODONE-acetaminophen (PERCOCET) 7.5-325 mg per tablet Take 1 tablet by mouth every 4 (four) hours as needed for Pain. 120 tablet 0    oxyCODONE-acetaminophen (PERCOCET) 7.5-325 mg per tablet Take 1 tablet by mouth every 4 (four) hours as needed for Pain. 120 tablet 0    [START ON 4/7/2025] oxyCODONE-acetaminophen (PERCOCET) 7.5-325 mg per tablet Take 1 tablet by mouth every 4 (four) hours as needed for Pain. 120 tablet 0    pravastatin (PRAVACHOL) 40 MG tablet Take 1 tablet by mouth once daily 90 tablet 1    pregabalin (LYRICA) 75 MG capsule Take 1 capsule (75 mg total) by mouth 2 (two) times daily. 60 capsule 2    tiZANidine (ZANAFLEX) 4 MG tablet TAKE 1 TABLET BY MOUTH EVERY 6 HOURS AS NEEDED 90 tablet 3    triamcinolone acetonide 0.1%-ciclopirox-magnesium hydroxide 400 mg/5 ml Apply to affected area twice a day after cool blow dry 60 g 5    valsartan (DIOVAN) 320 MG tablet Take 1 tablet by mouth once daily 90 tablet 3    hydrALAZINE (APRESOLINE) 100 MG tablet Take 1 tablet (100 mg total) by mouth every 12 (twelve) hours. 180 tablet 3    NIFEdipine (PROCARDIA-XL) 60 MG (OSM) 24 hr tablet Take 1 tablet (60 mg total) by mouth once daily. 30 tablet 2     No current facility-administered medications on file prior to visit.

## 2025-02-24 NOTE — PROGRESS NOTES
Please click on link to view Audiogram:  Document on 2/24/2025 9:18 AM by Joanie Blanco AU.D: Audiogram    Ms. Amanda Holt, a 74 y.o. female, was seen in the clinic today for an audiological evaluation.     Otoscopy clear bilaterally. Tympanometry testing revealed a Type A tympanogram for the right ear and a Type A tympanogram for the left ear.     Audiological testing revealed a mild to moderate sensorineural hearing loss (SNHL) bilaterally. A speech reception threshold was obtained at 40 dBHL for the right ear and at 40 dBHL for the left ear. Speech discrimination was 96% for the right ear and 100% for the left ear.      Recommendations:  1. Otologic evaluation  2. Annual audiological evaluation, sooner if medically necessary or if hearing changes  3. Hearing protection when in noise   4. Hearing aid consultation following medical clearance if Ms. Holt feels hearing loss negatively impacts quality of life

## 2025-03-05 ENCOUNTER — OFFICE VISIT (OUTPATIENT)
Dept: OPTOMETRY | Facility: CLINIC | Age: 75
End: 2025-03-05
Payer: MEDICARE

## 2025-03-05 DIAGNOSIS — Z46.0 ENCOUNTER FOR FITTING OR ADJUSTMENT OF SPECTACLES OR CONTACT LENSES: Primary | ICD-10-CM

## 2025-03-05 DIAGNOSIS — H52.7 REFRACTIVE ERROR: ICD-10-CM

## 2025-03-05 DIAGNOSIS — Z97.3 WEARS CONTACT LENSES: ICD-10-CM

## 2025-03-05 DIAGNOSIS — H25.13 NUCLEAR SCLEROSIS OF BOTH EYES: Primary | ICD-10-CM

## 2025-03-05 PROCEDURE — 92014 COMPRE OPH EXAM EST PT 1/>: CPT | Mod: S$GLB,,, | Performed by: OPTOMETRIST

## 2025-03-05 PROCEDURE — 99999 PR PBB SHADOW E&M-EST. PATIENT-LVL III: CPT | Mod: PBBFAC,,, | Performed by: OPTOMETRIST

## 2025-03-05 PROCEDURE — 4010F ACE/ARB THERAPY RXD/TAKEN: CPT | Mod: CPTII,S$GLB,, | Performed by: OPTOMETRIST

## 2025-03-05 PROCEDURE — 92015 DETERMINE REFRACTIVE STATE: CPT | Mod: S$GLB,,, | Performed by: OPTOMETRIST

## 2025-03-05 PROCEDURE — 3288F FALL RISK ASSESSMENT DOCD: CPT | Mod: CPTII,S$GLB,, | Performed by: OPTOMETRIST

## 2025-03-05 PROCEDURE — 99499 UNLISTED E&M SERVICE: CPT | Mod: ,,, | Performed by: OPTOMETRIST

## 2025-03-05 PROCEDURE — 92310 CONTACT LENS FITTING OU: CPT | Mod: CSM,,, | Performed by: OPTOMETRIST

## 2025-03-05 PROCEDURE — 1101F PT FALLS ASSESS-DOCD LE1/YR: CPT | Mod: CPTII,S$GLB,, | Performed by: OPTOMETRIST

## 2025-03-05 PROCEDURE — 1126F AMNT PAIN NOTED NONE PRSNT: CPT | Mod: CPTII,S$GLB,, | Performed by: OPTOMETRIST

## 2025-03-05 NOTE — PROGRESS NOTES
Subjective:       Patient ID: Amanda Holt is a 74 y.o. female      Chief Complaint   Patient presents with    Concerns About Ocular Health    Contact Lens Follow Up     History of Present Illness  Dls: 2/19/24 Dr. Schmitz     75 y/o female presents today for ocular health check.  Pt x 1 week ago double vision with both eyes open if closing one eye it cleared up last for about 5-8 min only happen once.   Pt states no changes in vision.  Pt wears scl and pal's.     + ou off/on tearing  + ou off/on itching  No burning  No pain  + ha's  + ou off/on floaters  No flashes    Eye meds  None     Pohx:   None    Fohx:   Glaucoma - father, sister   Cat- father, sister     Wearing acuvue ritesh toric, replacing monthly. does not sleep in lenses   Wearing dailies color, replacing daily. does not sleep in lenses         Assessment/Plan:     1. Nuclear sclerosis of both eyes (Primary)  Educated pt on presence of cataracts and effects on vision. No surgery at this time. Recheck in one year, sooner PRN.    2. Refractive error  Educated patient on refractive error and discussed lens options. Dispensed updated spectacle Rx. Educated about adaptation period to new specs.    Eyeglass Final Rx       Eyeglass Final Rx         Sphere Cylinder Axis Add    Right -6.00 +3.50 105 +2.50    Left -5.00 +3.00 085 +2.50      Expiration Date: 3/5/2026                      3. Wears contact lenses  Pt with high cyl. Doing well with ritesh toric. Updated trials ordered. Appt for follow up when trials. Arrive.     Follow up for CL follow up when trials arrive.

## 2025-03-09 ENCOUNTER — PATIENT MESSAGE (OUTPATIENT)
Dept: ADMINISTRATIVE | Facility: OTHER | Age: 75
End: 2025-03-09
Payer: MEDICARE

## 2025-03-11 ENCOUNTER — OFFICE VISIT (OUTPATIENT)
Dept: OPTOMETRY | Facility: CLINIC | Age: 75
End: 2025-03-11
Payer: MEDICARE

## 2025-03-11 DIAGNOSIS — Z46.0 FITTING AND ADJUSTMENT OF SPECTACLES AND CONTACT LENSES: Primary | ICD-10-CM

## 2025-03-11 PROCEDURE — 99499 UNLISTED E&M SERVICE: CPT | Mod: S$GLB,,, | Performed by: OPTOMETRIST

## 2025-03-11 NOTE — PROGRESS NOTES
Subjective:       Patient ID: Amanda Holt is a 74 y.o. female      Chief Complaint   Patient presents with    Contact Lens Follow Up     History of Present Illness  Dls: 3/5/25 Dr. Schmitz     73 y/o female presents today for clfu.  Inserted cls on arrival ou          Assessment/Plan:     1. Fitting and adjustment of spectacles and contact lenses (Primary)  Adjust in CLRx. Pt reports good VA. Distance only with readers for near. Suma trials dispensed to pt. Contact lens Rx released to pt. Daily wear only advised, replacement monthly with education to risks of extended wear, including blindness and increased risk of eye infection. Advised against sleeping, swimming, showering in lenses. Okay to order if happy with comfort and VA. Discussed lens care, compliance and solutions. RTC yearly contact lens health check.     Pt also wants Rx for colored lenses, aware Rx not available, does well with -3.00, wants plano option as well. Lenses are daily wear, daily replacement.      Contact Lens Final Rx       Final Contact Lens Rx         Brand Base Curve Diameter Sphere Cylinder Axis    Right Acuvue Suma for Astigmatism  8.6 14.5 -3.00 -2.25 010    Left Acuvue Suma for Astigmatism  8.6 14.5 -2.00 -2.25 180      Expiration Date: 3/11/2026    Replacement: Monthly    Solutions: OptiFree PureMoist    Wearing Schedule: Daily Wear              Final Contact Lens Rx #2         Brand Base Curve Diameter Sphere Cylinder Axis    Right Dailies Colors 8.6 13.8 -3.00 Sphere     Left Dailies Colors 8.6 13.8 -3.00 Sphere       Expiration Date: 3/11/2026    Replacement: Daily    Wearing Schedule: Daily Wear              Final Contact Lens Rx #3         Brand Base Curve Diameter Sphere Cylinder Axis    Right Dailies Colors 8.6 13.8 Ramey Sphere     Left Dailies Colors 8.6 13.8 Ramey Sphere       Expiration Date: 3/11/2026    Replacement: Daily    Wearing Schedule: Daily Wear

## 2025-03-16 ENCOUNTER — PATIENT MESSAGE (OUTPATIENT)
Dept: OPTOMETRY | Facility: CLINIC | Age: 75
End: 2025-03-16
Payer: MEDICARE

## 2025-03-18 NOTE — PROGRESS NOTES
Neurosurgery  Established Patient    Scribe Attestation  I, Radha Hendricks, attest that this documentation has been prepared under the direction and in the presence of Azra Kurtz MD.    Virtual visit Attestation   The patient location is: Home  The chief complaint leading to consultation is: --   Visit type: audiovisual  Total time spent with patient:   min     Each patient to whom he or she provides medical services by telemedicine is:  (1) informed of the relationship between the physician and patient and the respective role of any other health care provider with respect to management of the patient; and (2) notified that he or she may decline to receive medical services by telemedicine and may withdraw from such care at any time.     SUBJECTIVE:     History of Present Illness 05/26/20  61-year-old female with a history hypertension, coronary artery disease, hypokalemia and back pain for several years.  She is status post previous L4-5 laminectomy and fusion in 2018.  She notes now she has back pain with pain going down the left lateral aspect of the leg.  She notes the pain is 10/10 in severity, and both her back and her legs.  She notes she has had this pain since April of 2019.  She had her previous L4-5 laminectomy and fusion in October of 2018.  She notes that she had remained pain free after having back pain bilateral leg pain going down the posterior aspect of both legs prior to surgery.  And had improved initially after surgery.  She notes the pain is primarily in her left leg goes on lateral aspect of her leg.  She notes that she is able to walk about 10 or 15 ft and the pain is much more pronounced.  She notes that her pain is also worsened when sitting.  She notes that lying on her right side somewhat improves the pain.  She has previously had 1% lidocaine cream which reported her with some relief.  She denies any jhony weakness in the bilateral lower extremities.  She denies any weakness in the  "upper extremities.  She denies any bowel or bladder incontinence.  She denies any chest pain, shortness of breath, nausea, vomiting, or emesis.  She denies any perirectal anesthesia.  She denies any genital anesthesia.  History was garnered over a audio visual virtual visit.    Interval history 02/10/25  Patient returns to clinic virtually with updated imaging and undergoing epidural nerve injections at the left L3-S1 to help relieve chronic lumbar radiculopathy symptoms. Today she states the injections provided little to no relief. She is still having lower back pain that she describes as "cramping." Pt also has occasional numbness and tingling in her LLE. Exacerbated with activity like walking. Currently undergoing aquatherapy which she feels is helping her. This is the extent of her concerns at this time.    Interval Hx 03/19/25  Patient returns to clinic virtually with updated CT of the lumbar spine for evaluation of radiculopathy.    Review of patient's allergies indicates:   Allergen Reactions    Codeine        Current Medications[1]    Past Medical History:   Diagnosis Date    Arthralgia     Colon polyp     Degenerative disc disease at L5-S1 level     High cholesterol     Hypertension     Notalgia paresthetica     Nuclear sclerosis of both eyes 01/08/2020    Sciatica     Spinal stenosis     Tobacco use 10/21/2021     Past Surgical History:   Procedure Laterality Date    BACK SURGERY      lumbar laminectomy    COLON SURGERY  2022    Tear repaired    COLONOSCOPY N/A 07/23/2020    Procedure: COLONOSCOPY;  Surgeon: Donal Moore MD;  Location: [facility] ENDO;  Service: Endoscopy;  Laterality: N/A;    EPIDURAL STEROID INJECTION Left 09/09/2020    Procedure: Injection, Steroid, Epidural Transforaminal;  Surgeon: Jun Leavitt Jr., MD;  Location: [facility] ENDO;  Service: Pain Management;  Laterality: Left;  Left L5 + S1 TF WADE  Arrive @ 1300; ASA last 9/1; No DM    EPIDURAL STEROID INJECTION Left 02/12/2021    " Procedure: Injection, Steroid, Epidural Transforaminal;  Surgeon: Jun Leavitt Jr., MD;  Location: [facility] ENDO;  Service: Pain Management;  Laterality: Left;  Left L4 + L5 TF WADE  Arrive @ 1230; IV Sedation; ASA last 2/4; No DM    INJECTION OF FACET JOINT Bilateral 3/25/2024    Procedure: FACET JOINT INJECTION BILATERAL L3/4 *ASPIRIN CLEARANCE IN CHART*;  Surgeon: Darya Ayala MD;  Location: [facility] PAIN MGT;  Service: Pain Management;  Laterality: Bilateral;  [phone]    SPINE SURGERY  10/08/18    Bilateral Lumbar Laminectomy with Fusion and Screws    TRANSFORAMINAL EPIDURAL INJECTION OF STEROID Left 03/14/2022    Procedure: INJECTION, STEROID, EPIDURAL, TRANSFORAMINAL APPROACH, L4-L5 & L5-S1;  Surgeon: Darya Ayala MD;  Location: [facility] PAIN MGT;  Service: Pain Management;  Laterality: Left;    TRANSFORAMINAL EPIDURAL INJECTION OF STEROID Left 08/29/2022    Procedure: LUMBAR TRANSFORAMINAL LEFT L4/5 AND L5/S1 CONTRAST;  Surgeon: Darya Ayala MD;  Location: [facility] PAIN MGT;  Service: Pain Management;  Laterality: Left;    TRANSFORAMINAL EPIDURAL INJECTION OF STEROID Left 9/27/2024    Procedure: LUMBAR TRANSFORAMINAL LEFT L3/4 AND L5/S1;  Surgeon: Jason Gomez MD;  Location: [facility] PAIN MGT;  Service: Pain Management;  Laterality: Left;  [phone]  2 WK F/U RONAK    TRANSFORAMINAL EPIDURAL INJECTION OF STEROID Left 1/17/2025    Procedure: LUMBAR TANSFORAMINAL LEFT L5/S1 AND S1;  Surgeon: Jason Gomez MD;  Location: [facility] PAIN MGT;  Service: Pain Management;  Laterality: Left;  2 WK F/U CHRIS  *SCHEDULE AFTER 1/24    TUBAL LIGATION       Family History       Problem Relation (Age of Onset)    Arthritis Father, Sister, Sister, Sister, Brother    Breast cancer Mother    COPD Sister    Cancer Mother    Cataracts Father    Depression Sister    Diabetes Father, Sister, Sister, Sister    Drug abuse Brother    Early death Sister, Sister, Brother    Glaucoma Father, Sister    Hearing loss Sister, Sister    Heart  disease Sister    Hypertension Mother, Sister, Sister, Sister, Brother, Sister, Brother    Hypotension Mother    Learning disabilities Brother, Son, Son, Daughter    Mental illness Sister    Miscarriages / Stillbirths Mother    No Known Problems Brother, Brother, Maternal Aunt, Maternal Uncle, Paternal Aunt, Paternal Uncle, Maternal Grandmother, Maternal Grandfather, Paternal Grandmother, Paternal Grandfather, Son, Other    Stroke Sister    Vision loss Father, Sister, Sister, Sister, Sister          Social History     Socioeconomic History    Marital status:    Tobacco Use    Smoking status: Former     Current packs/day: 0.00     Average packs/day: 0.3 packs/day for 35.0 years (8.8 ttl pk-yrs)     Types: Cigarettes     Start date: 3/15/1987     Quit date: 3/15/2022     Years since quitting: 3.0    Smokeless tobacco: Current    Tobacco comments:     Occasional spoker some times 1-2 cigarettes/day sometimes none for weeks   Substance and Sexual Activity    Alcohol use: Yes     Alcohol/week: 3.0 standard drinks of alcohol     Types: 3 Glasses of wine per week     Comment: Occasional wine    Drug use: Not Currently     Comment: Tramadol twice a day as needed    Sexual activity: Yes     Partners: Male     Birth control/protection: Post-menopausal, None     Comment: Tubal 30+ years ago   Social History Narrative    Lives alone    No partner    Was a  for a non-profit organization     Social Drivers of Health     Financial Resource Strain: High Risk (2/17/2025)    Overall Financial Resource Strain (CARDIA)     Difficulty of Paying Living Expenses: Very hard   Food Insecurity: Food Insecurity Present (2/17/2025)    Hunger Vital Sign     Worried About Running Out of Food in the Last Year: Sometimes true     Ran Out of Food in the Last Year: Often true   Transportation Needs: No Transportation Needs (2/17/2025)    PRAPARE - Transportation     Lack of Transportation (Medical): No     Lack of  Transportation (Non-Medical): No   Physical Activity: Insufficiently Active (2/17/2025)    Exercise Vital Sign     Days of Exercise per Week: 2 days     Minutes of Exercise per Session: 60 min   Stress: Stress Concern Present (2/17/2025)    [employer] - Occupational Stress Questionnaire     Feeling of Stress : Very much   Housing Stability: Low Risk  (2/17/2025)    Housing Stability Vital Sign     Unable to Pay for Housing in the Last Year: No     Number of Times Moved in the Last Year: 0     Homeless in the Last Year: No   Recent Concern: Housing Stability - High Risk (1/1/2025)    Received from [facility] SDOH Screening     In the past year, have you been unable to get any of the following when you really needed them? choose all that apply.: Utilities (electric, gas, and water)       Review of Systems    OBJECTIVE:     Vital Signs     There is no height or weight on file to calculate BMI.    Neurosurgery Physical Exam  On virtual audio visual visit   Head is normocephalic atraumatic   Neck is grossly supple  No appreciable labored breathing   Admitting appears to be nontender   Patient is able to move extremities     On virtual audio visual visit the patient's speech is fluent, goal directed without any noted dysarthria or aphasia   Unable to evaluate pupils on virtual audio visual visit   Face is symmetric   Tongue is midline  Unable to evaluate palate   Hearing appears to be intact on virtual audio visual visit to voice   Patient able to move both upper and lower extremities without any gross motor asymmetry on virtual audio visual visit     Diagnostic Results:  I personally reviewed the patient's Diagnostic Imaging.     CT Lumbar Spine WO Cont 02/19/25  Stable postoperative change at L4-L5.  Lumbar spondylosis is most prominent L3-L4 noting moderate spinal canal stenosis and moderate to severe neural foraminal narrowing.    ASSESSMENT/PLAN:   74-year-old female with hx  of L4-5 pedicle screw placement unilaterally (2018) and chronic lumbar radiculopathy.     Patient presents with      Discussed imaging results of CT Lumbar Spine revealing stable postoperative change at L4-L5.  Lumbar spondylosis is most prominent L3-L4 noting moderate spinal canal stenosis and moderate to severe neural foraminal narrowing.    After discussion with the patient, I recommend   .     Thank you so very much for allowing me to participate in the care of this patient.  Please feel free to call any questions, comments, or concerns.     Azra Kurtz MD,MSc  Department of Neurosurgery   Department of Radiology  Department of Neurology  [facility]    [facility]   [facility] [employer] [facility]     Total time spent in counseling and discussion about further management options including relevant lab work, treatment,  prognosis, medications and intended side effects was more than 60 minutes. More than 50 % of the time was spent in counseling and coordination of care.  I spent a total of 60 minutes on the day of the visit.This includes face to face time and non-face to face time preparing to see the patient (eg, review of tests), Obtaining and/or reviewing separately obtained history, Documenting clinical information in the electronic or other health record, Independently interpreting resultsand communicating results to the patient/family/caregiver, or Care coordination.     Scribe Attestation  I, Dr. Azra Kurtz personally performed the services described in this documentation. All medical record entries made by the scribe, Radha Hendricks, were at my direction and in my presence.  I have reviewed the chart and agree that the record reflects my personal performance and is accurate and complete.           [1]   Current Outpatient Medications   Medication Sig Dispense Refill    acetaminophen  (TYLENOL) 500 MG tablet Take 2 tablets (1,000 mg total) by mouth every 6 (six) hours as needed. 28 tablet 0    amitriptyline (ELAVIL) 50 MG tablet Take 1 tablet (50 mg total) by mouth every evening. 90 tablet 1    aspirin (ECOTRIN) 81 MG EC tablet       azelastine (ASTELIN) 137 mcg (0.1 %) nasal spray 1 spray (137 mcg total) by Nasal route 2 (two) times daily. 30 mL 3    cyanocobalamin, vitamin B-12, (B-12 COMPLIANCE) 1,000 mcg/mL Kit Inject 1 mL as directed every 28 days. 1 kit 5    diclofenac sodium (VOLTAREN ARTHRITIS PAIN) 1 % Gel Apply 2 g topically once daily. 100 g 0    ergocalciferol (ERGOCALCIFEROL) 50,000 unit Cap Take 1 capsule by mouth every 7 days.      fluticasone propionate (FLONASE) 50 mcg/actuation nasal spray 1 spray (50 mcg total) by Each Nostril route once daily. 48 g 3    fluticasone propionate (FLONASE) 50 mcg/actuation nasal spray 1 spray (50 mcg total) by Each Nostril route 2 (two) times daily. 18.2 mL 3    hydrALAZINE (APRESOLINE) 100 MG tablet Take 1 tablet (100 mg total) by mouth every 12 (twelve) hours. 180 tablet 3    hydrOXYzine HCL (ATARAX) 25 MG tablet Take 1 tablet by mouth three times daily as needed 45 tablet 0    metoprolol succinate (TOPROL-XL) 25 MG 24 hr tablet Take 1 tablet by mouth once daily 90 tablet 3    mometasone (ELOCON) 0.1 % solution Apply once to twice daily prn itching 60 mL 5    mupirocin (BACTROBAN) 2 % ointment Apply topically 3 (three) times daily. 22 g 0    NIFEdipine (PROCARDIA-XL) 60 MG (OSM) 24 hr tablet Take 1 tablet (60 mg total) by mouth once daily. 30 tablet 2    nystatin (MYCOSTATIN) powder APPLY  POWDER TOPICALLY TO AFFECTED AREA 4 TIMES DAILY 30 g 5    olopatadine (PATANOL) 0.1 % ophthalmic solution INSTILL 1 DROP INTO EACH EYE TWICE DAILY 5 mL 0    omeprazole (PRILOSEC) 40 MG capsule Take 1 capsule by mouth.      oxyCODONE-acetaminophen (PERCOCET) 7.5-325 mg per tablet Take 1 tablet by mouth every 4 (four) hours as needed for Pain. 120 tablet 0     oxyCODONE-acetaminophen (PERCOCET) 7.5-325 mg per tablet Take 1 tablet by mouth every 4 (four) hours as needed for Pain. 120 tablet 0    [START ON 4/7/2025] oxyCODONE-acetaminophen (PERCOCET) 7.5-325 mg per tablet Take 1 tablet by mouth every 4 (four) hours as needed for Pain. 120 tablet 0    pravastatin (PRAVACHOL) 40 MG tablet Take 1 tablet by mouth once daily 90 tablet 1    pregabalin (LYRICA) 75 MG capsule Take 1 capsule (75 mg total) by mouth 2 (two) times daily. 60 capsule 2    tiZANidine (ZANAFLEX) 4 MG tablet TAKE 1 TABLET BY MOUTH EVERY 6 HOURS AS NEEDED 90 tablet 3    triamcinolone acetonide 0.1%-ciclopirox-magnesium hydroxide 400 mg/5 ml Apply to affected area twice a day after cool blow dry 60 g 5    valsartan (DIOVAN) 320 MG tablet Take 1 tablet by mouth once daily 90 tablet 3     No current facility-administered medications for this visit.

## 2025-03-19 ENCOUNTER — OFFICE VISIT (OUTPATIENT)
Dept: NEUROSURGERY | Facility: CLINIC | Age: 75
End: 2025-03-19
Payer: MEDICARE

## 2025-03-19 ENCOUNTER — TELEPHONE (OUTPATIENT)
Dept: NEUROSURGERY | Facility: CLINIC | Age: 75
End: 2025-03-19
Payer: MEDICARE

## 2025-03-19 DIAGNOSIS — M54.9 DORSALGIA, UNSPECIFIED: Primary | ICD-10-CM

## 2025-03-19 DIAGNOSIS — M54.16 LUMBAR RADICULOPATHY, CHRONIC: ICD-10-CM

## 2025-03-19 PROCEDURE — 98007 SYNCH AUDIO-VIDEO EST HI 40: CPT | Mod: 95,,, | Performed by: NEUROLOGICAL SURGERY

## 2025-03-19 PROCEDURE — 4010F ACE/ARB THERAPY RXD/TAKEN: CPT | Mod: CPTII,95,, | Performed by: NEUROLOGICAL SURGERY

## 2025-03-20 NOTE — TELEPHONE ENCOUNTER
Staff spoke with patient and informed her that per Marisa Cespedes NP it's too early for her to have a repeat epidural and told her to discuss it on her upcoming appointment with Dr. Gomez. Patient is in agreement to the above and verbalized understanding.

## 2025-04-01 NOTE — PROGRESS NOTES
Neurosurgery  History & Physical    SUBJECTIVE:     Chief Complaint: ***    History of Present Illness:  ***  History of Present Illness    Amanda presents for follow-up of ongoing back pain and left leg numbness, particularly concerned about limitations in mobility due to leg numbness when standing or walking.    Amanda reports ongoing lower back pain across the waist level, causing significant discomfort. She has severe cramping on the left side between the waist and slightly lower when standing for activities like cooking or washing dishes, requiring her to lean on cabinets for support.    The primary concern is left leg numbness, which occurs after standing or walking for 8-10 minutes, limited to the left thigh. This symptom significantly limits her mobility, preventing her from going places or participating in activities like the [employer]. She manages this by sitting down and performing leg kicks or massaging the thigh, which helps alleviate the numbness in about 5 minutes.    She recently had a foraminal injection, which did not provide the expected relief for leg numbness. She reports difficulty sleeping on her back or left side due to discomfort and primarily sleeps on her right side. She mentions increasing difficulty in navigating stairs to her upstairs bedroom, though on some days, when the medication is effective, stair climbing is more manageable. She uses hot showers as a method of pain relief in addition to her prescribed medications.    An EMG was previously performed, showing evidence of chronic L5 and possibly S1 radiculopathy. She had prior back surgery with two screws placed on the left side at L4-L5.    She denies weakness in the leg or pain going down the left leg.    Amanda is on Oxycodone for back pain, which helps alleviate her discomfort. She is also taking Pregabalin (Lyrica).    Amanda has a history of L5 and S1 radiculopathy.    Amanda underwent back surgery involving the placement  of two screws at L4-L5 on the left side for fusion. She also had a laminectomy performed at L4 and L5.    An EMG was performed, showing evidence of chronic L5 and S1 radiculopathy.    A recent CT revealed fusion present where two screws were placed on L4-L5 on the left side. There was no significant neuroforaminal stenosis, but some facet arthropathy at L4-L5 and L5-S1 on the left was noted. The scan showed little lateral recess stenosis, though not significant, and a visible laminectomy at L4 and L5. Mild anterolisthesis (about 2 millimeters) at L3-L4 was observed, along with a positive sagittal balance of about 16 cm. No significant coronal imbalance was found.    A previous MRI of the lumbar spine showed no significant nerve root compression, but mild central canal stenosis between L3 and L4. Hardware was visible in the imaging.    Scoliosis films confirmed a positive sagittal balance of about 16 centimeters.    Flexion-extension imaging was performed, with no significant motion noted.      ROS:  Musculoskeletal: +back pain, +sleep difficulty, +pain with movement, +difficulty standing up  Neurological: +numbness         Review of patient's allergies indicates:   Allergen Reactions    Codeine        Current Medications[1]    Past Medical History:   Diagnosis Date    Arthralgia     Colon polyp     Degenerative disc disease at L5-S1 level     High cholesterol     Hypertension     Notalgia paresthetica     Nuclear sclerosis of both eyes 01/08/2020    Sciatica     Spinal stenosis     Tobacco use 10/21/2021     Past Surgical History:   Procedure Laterality Date    BACK SURGERY      lumbar laminectomy    COLON SURGERY  2022    Tear repaired    COLONOSCOPY N/A 07/23/2020    Procedure: COLONOSCOPY;  Surgeon: Donal Moore MD;  Location: [facility];  Service: Endoscopy;  Laterality: N/A;    EPIDURAL STEROID INJECTION Left 09/09/2020    Procedure: Injection, Steroid, Epidural Transforaminal;  Surgeon: Jun DEL TORO  Dagmar Diego MD;  Location: [facility] END[facility];  Service: Pain Management;  Laterality: Left;  Left L5 + S1 TF WADE  Arrive @ 1300; ASA last 9/1; No DM    EPIDURAL STEROID INJECTION Left 02/12/2021    Procedure: Injection, Steroid, Epidural Transforaminal;  Surgeon: Jun Leavitt Jr., MD;  Location: [facility] ENDO;  Service: Pain Management;  Laterality: Left;  Left L4 + L5 TF WADE  Arrive @ 1230; IV Sedation; ASA last 2/4; No DM    INJECTION OF FACET JOINT Bilateral 3/25/2024    Procedure: FACET JOINT INJECTION BILATERAL L3/4 *ASPIRIN CLEARANCE IN CHART*;  Surgeon: Darya Ayala MD;  Location: [facility];  Service: Pain Management;  Laterality: Bilateral;  [phone]    SPINE SURGERY  10/08/18    Bilateral Lumbar Laminectomy with Fusion and Screws    TRANSFORAMINAL EPIDURAL INJECTION OF STEROID Left 03/14/2022    Procedure: INJECTION, STEROID, EPIDURAL, TRANSFORAMINAL APPROACH, L4-L5 & L5-S1;  Surgeon: Darya Ayala MD;  Location: [facility];  Service: Pain Management;  Laterality: Left;    TRANSFORAMINAL EPIDURAL INJECTION OF STEROID Left 08/29/2022    Procedure: LUMBAR TRANSFORAMINAL LEFT L4/5 AND L5/S1 CONTRAST;  Surgeon: Darya Ayala MD;  Location: [facility];  Service: Pain Management;  Laterality: Left;    TRANSFORAMINAL EPIDURAL INJECTION OF STEROID Left 9/27/2024    Procedure: LUMBAR TRANSFORAMINAL LEFT L3/4 AND L5/S1;  Surgeon: Jason Gomez MD;  Location: [facility];  Service: Pain Management;  Laterality: Left;  [phone]  2 WK F/U RONAK    TRANSFORAMINAL EPIDURAL INJECTION OF STEROID Left 1/17/2025    Procedure: LUMBAR TANSFORAMINAL LEFT L5/S1 AND S1;  Surgeon: Jason Gomez MD;  Location: [facility];  Service: Pain Management;  Laterality: Left;  2 WK F/U CHRIS  *SCHEDULE AFTER 1/24    TUBAL LIGATION       Family History       Problem Relation (Age of Onset)    Arthritis Father, Sister, Sister, Sister, Brother    Breast cancer Mother    COPD Sister    Cancer Mother    Cataracts Father     Depression Sister    Diabetes Father, Sister, Sister, Sister    Drug abuse Brother    Early death Sister, Sister, Brother    Glaucoma Father, Sister    Hearing loss Sister, Sister    Heart disease Sister    Hypertension Mother, Sister, Sister, Sister, Brother, Sister, Brother    Hypotension Mother    Learning disabilities Brother, Son, Son, Daughter    Mental illness Sister    Miscarriages / Stillbirths Mother    No Known Problems Brother, Brother, Maternal Aunt, Maternal Uncle, Paternal Aunt, Paternal Uncle, Maternal Grandmother, Maternal Grandfather, Paternal Grandmother, Paternal Grandfather, Son, Other    Stroke Sister    Vision loss Father, Sister, Sister, Sister, Sister          Social History     Socioeconomic History    Marital status:    Tobacco Use    Smoking status: Former     Current packs/day: 0.00     Average packs/day: 0.3 packs/day for 35.0 years (8.8 ttl pk-yrs)     Types: Cigarettes     Start date: 3/15/1987     Quit date: 3/15/2022     Years since quitting: 3.0    Smokeless tobacco: Current    Tobacco comments:     Occasional spoker some times 1-2 cigarettes/day sometimes none for weeks   Substance and Sexual Activity    Alcohol use: Yes     Alcohol/week: 3.0 standard drinks of alcohol     Types: 3 Glasses of wine per week     Comment: Occasional wine    Drug use: Not Currently     Comment: Tramadol twice a day as needed    Sexual activity: Yes     Partners: Male     Birth control/protection: Post-menopausal, None     Comment: Tubal 30+ years ago   Social History Narrative    Lives alone    No partner    Was a  for a non-profit organization     Social Drivers of Health     Financial Resource Strain: High Risk (2/17/2025)    Overall Financial Resource Strain (CARDIA)     Difficulty of Paying Living Expenses: Very hard   Food Insecurity: Food Insecurity Present (2/17/2025)    Hunger Vital Sign     Worried About Running Out of Food in the Last Year: Sometimes true      Ran Out of Food in the Last Year: Often true   Transportation Needs: No Transportation Needs (2/17/2025)    PRAPARE - Transportation     Lack of Transportation (Medical): No     Lack of Transportation (Non-Medical): No   Physical Activity: Insufficiently Active (2/17/2025)    Exercise Vital Sign     Days of Exercise per Week: 2 days     Minutes of Exercise per Session: 60 min   Stress: Stress Concern Present (2/17/2025)    [employer] - Occupational Stress Questionnaire     Feeling of Stress : Very much   Housing Stability: Low Risk  (2/17/2025)    Housing Stability Vital Sign     Unable to Pay for Housing in the Last Year: No     Number of Times Moved in the Last Year: 0     Homeless in the Last Year: No   Recent Concern: Housing Stability - High Risk (1/1/2025)    Received from [facility] Screening     In the past year, have you been unable to get any of the following when you really needed them? choose all that apply.: Utilities (electric, gas, and water)       Review of Systems    OBJECTIVE:     Vital Signs     There is no height or weight on file to calculate BMI.      Neurosurgery Physical Exam    Physical Exam    General: No acute distress.  Head: Nontraumatic. Normocephalic.  Eyes: Pupils equal. EOMI.  Neck: Supple. Normal ROM. No tenderness to palpation.  CVS: Normal rate and rhythm. Distal pulses present.  Pulm: Symmetric expansion. No respiratory distress.  GI: Abdomen nondistended. Nontender.  MSK: Moves all extremities without restriction. Atraumatic.  Skin: Dry. Intact.  Psych: Normal thought content. Normal cognition.  Neuro: Awake. Alert and oriented to self, place, and time.  Language: Speech is fluent and goal-directed without dysarthria or aphasia.           Diagnostic Results:  ***    ASSESSMENT/PLAN:     ***  Assessment & Plan    M54.17 Radiculopathy, lumbosacral region  M96.1 Postlaminectomy syndrome, not elsewhere classified  Z98.1 Arthrodesis  status  M43.16 Spondylolisthesis, lumbar region  M47.816 Spondylosis without myelopathy or radiculopathy, lumbar region  M48.07 Spinal stenosis, lumbosacral region  R20.2 Paresthesia of skin  Z79.891 Long term (current) use of opiate analgesic    LUMBOSACRAL RADICULOPATHY:  - Noted EMG evidence of chronic L5 and possibly S1 radiculopathy.  - Discussed less invasive options to avoid destabilizing spine, potentially focusing on L5 and S1.    POSTLAMINECTOMY SYNDROME:  - Analyzed CT for source of impingement; usual locations not apparent.  - Plan to review in more detail to identify potential impingement source.  - Considered reoperation risks: increased difficulty due to prior surgery, higher risk of spinal fluid leak, and potential for increased back pain post-surgery.  - Explained risks associated with reoperation, including increased difficulty due to scar tissue and higher risk of spinal fluid leak.  - Discussed potential for short-term pain relief but long-term worsening of back pain with nerve decompression surgery.    LUMBAR ARTHRODESIS STATUS:  - Reviewed CT Lumbar: fusion present at L4-L5 where screws were placed on left side.    SPINAL ANATOMY AND PAIN MANAGEMENT:  - Provided information on spinal anatomy, including facet joints and their potential for inflammation.  - Continued Oxycodone for pain management.  - Continued pregabalin.    FOLLOW-UP PLAN:  - Plan to review case at weekly spine conference for additional perspectives.  - Follow up after case review to discuss potential treatment options.             Note dictated with voice recognition software, please excuse any grammatical errors.  This note was generated with the assistance of ambient listening technology. Verbal consent was obtained by the patient and accompanying visitor(s) for the recording of patient appointment to facilitate this note. I attest to having reviewed and edited the generated note for accuracy, though some syntax or spelling  errors may persist. Please contact the author of this note for any clarification.           [1]   Current Outpatient Medications   Medication Sig Dispense Refill    acetaminophen (TYLENOL) 500 MG tablet Take 2 tablets (1,000 mg total) by mouth every 6 (six) hours as needed. 28 tablet 0    amitriptyline (ELAVIL) 50 MG tablet Take 1 tablet (50 mg total) by mouth every evening. 90 tablet 1    aspirin (ECOTRIN) 81 MG EC tablet       azelastine (ASTELIN) 137 mcg (0.1 %) nasal spray 1 spray (137 mcg total) by Nasal route 2 (two) times daily. 30 mL 3    cyanocobalamin, vitamin B-12, (B-12 COMPLIANCE) 1,000 mcg/mL Kit Inject 1 mL as directed every 28 days. 1 kit 5    diclofenac sodium (VOLTAREN ARTHRITIS PAIN) 1 % Gel Apply 2 g topically once daily. 100 g 0    ergocalciferol (ERGOCALCIFEROL) 50,000 unit Cap Take 1 capsule by mouth every 7 days.      fluticasone propionate (FLONASE) 50 mcg/actuation nasal spray 1 spray (50 mcg total) by Each Nostril route once daily. 48 g 3    fluticasone propionate (FLONASE) 50 mcg/actuation nasal spray 1 spray (50 mcg total) by Each Nostril route 2 (two) times daily. 18.2 mL 3    hydrALAZINE (APRESOLINE) 100 MG tablet Take 1 tablet (100 mg total) by mouth every 12 (twelve) hours. 180 tablet 3    hydrOXYzine HCL (ATARAX) 25 MG tablet Take 1 tablet by mouth three times daily as needed 45 tablet 0    metoprolol succinate (TOPROL-XL) 25 MG 24 hr tablet Take 1 tablet by mouth once daily 90 tablet 3    mometasone (ELOCON) 0.1 % solution Apply once to twice daily prn itching 60 mL 5    mupirocin (BACTROBAN) 2 % ointment Apply topically 3 (three) times daily. 22 g 0    NIFEdipine (PROCARDIA-XL) 60 MG (OSM) 24 hr tablet Take 1 tablet (60 mg total) by mouth once daily. 30 tablet 2    nystatin (MYCOSTATIN) powder APPLY  POWDER TOPICALLY TO AFFECTED AREA 4 TIMES DAILY 30 g 5    olopatadine (PATANOL) 0.1 % ophthalmic solution INSTILL 1 DROP INTO EACH EYE TWICE DAILY 5 mL 0    omeprazole (PRILOSEC) 40  MG capsule Take 1 capsule by mouth.      oxyCODONE-acetaminophen (PERCOCET) 7.5-325 mg per tablet Take 1 tablet by mouth every 4 (four) hours as needed for Pain. 120 tablet 0    oxyCODONE-acetaminophen (PERCOCET) 7.5-325 mg per tablet Take 1 tablet by mouth every 4 (four) hours as needed for Pain. 120 tablet 0    [START ON 4/7/2025] oxyCODONE-acetaminophen (PERCOCET) 7.5-325 mg per tablet Take 1 tablet by mouth every 4 (four) hours as needed for Pain. 120 tablet 0    pravastatin (PRAVACHOL) 40 MG tablet Take 1 tablet by mouth once daily 90 tablet 1    pregabalin (LYRICA) 75 MG capsule Take 1 capsule (75 mg total) by mouth 2 (two) times daily. 60 capsule 2    tiZANidine (ZANAFLEX) 4 MG tablet TAKE 1 TABLET BY MOUTH EVERY 6 HOURS AS NEEDED 90 tablet 3    triamcinolone acetonide 0.1%-ciclopirox-magnesium hydroxide 400 mg/5 ml Apply to affected area twice a day after cool blow dry 60 g 5    valsartan (DIOVAN) 320 MG tablet Take 1 tablet by mouth once daily 90 tablet 3     No current facility-administered medications for this visit.

## 2025-04-01 NOTE — PROGRESS NOTES
Neurosurgery  Established Patient    Scribe Attestation  I, Radha Hendricks, attest that this documentation has been prepared under the direction and in the presence of Azra Kurtz MD.    Virtual visit Attestation   The patient location is: Home  The chief complaint leading to consultation is: --   Visit type: audiovisual  Total time spent with patient:   min     Each patient to whom he or she provides medical services by telemedicine is:  (1) informed of the relationship between the physician and patient and the respective role of any other health care provider with respect to management of the patient; and (2) notified that he or she may decline to receive medical services by telemedicine and may withdraw from such care at any time.     SUBJECTIVE:     History of Present Illness 05/26/20  61-year-old female with a history hypertension, coronary artery disease, hypokalemia and back pain for several years.  She is status post previous L4-5 laminectomy and fusion in 2018.  She notes now she has back pain with pain going down the left lateral aspect of the leg.  She notes the pain is 10/10 in severity, and both her back and her legs.  She notes she has had this pain since April of 2019.  She had her previous L4-5 laminectomy and fusion in October of 2018.  She notes that she had remained pain free after having back pain bilateral leg pain going down the posterior aspect of both legs prior to surgery.  And had improved initially after surgery.  She notes the pain is primarily in her left leg goes on lateral aspect of her leg.  She notes that she is able to walk about 10 or 15 ft and the pain is much more pronounced.  She notes that her pain is also worsened when sitting.  She notes that lying on her right side somewhat improves the pain.  She has previously had 1% lidocaine cream which reported her with some relief.  She denies any jhony weakness in the bilateral lower extremities.  She denies any weakness in the  "upper extremities.  She denies any bowel or bladder incontinence.  She denies any chest pain, shortness of breath, nausea, vomiting, or emesis.  She denies any perirectal anesthesia.  She denies any genital anesthesia.  History was garnered over a audio visual virtual visit.    Interval history 02/10/25  Patient returns to clinic virtually with updated imaging and undergoing epidural nerve injections at the left L3-S1 to help relieve chronic lumbar radiculopathy symptoms. Today she states the injections provided little to no relief. She is still having lower back pain that she describes as "cramping." Pt also has occasional numbness and tingling in her LLE. Exacerbated with activity like walking. Currently undergoing aquatherapy which she feels is helping her. This is the extent of her concerns at this time.    Interval Hx 03/19/25  Patient returns to clinic virtually with updated CT of the lumbar spine for evaluation of radiculopathy. History of Present Illness    Amanda presents for follow-up of ongoing back pain and left leg numbness, particularly concerned about limitations in mobility due to leg numbness when standing or walking.     Amanda reports ongoing lower back pain across the waist level, causing significant discomfort. She has severe cramping on the left side between the waist and slightly lower when standing for activities like cooking or washing dishes, requiring her to lean on cabinets for support.     The primary concern is left leg numbness, which occurs after standing or walking for 8-10 minutes, limited to the left thigh. This symptom significantly limits her mobility, preventing her from going places or participating in activities like the [employer] ball. She manages this by sitting down and performing leg kicks or massaging the thigh, which helps alleviate the numbness in about 5 minutes.     She recently had a foraminal injection, which did not provide the expected relief for leg numbness. She " reports difficulty sleeping on her back or left side due to discomfort and primarily sleeps on her right side. She mentions increasing difficulty in navigating stairs to her upstairs bedroom, though on some days, when the medication is effective, stair climbing is more manageable. She uses hot showers as a method of pain relief in addition to her prescribed medications.     An EMG was previously performed, showing evidence of chronic L5 and possibly S1 radiculopathy. She had prior back surgery with two screws placed on the left side at L4-L5.     She denies weakness in the leg or pain going down the left leg.     Amanda is on Oxycodone for back pain, which helps alleviate her discomfort. She is also taking Pregabalin (Lyrica).     Amanda has a history of L5 and S1 radiculopathy.     Amanda underwent back surgery involving the placement of two screws at L4-L5 on the left side for fusion. She also had a laminectomy performed at L4 and L5.     An EMG was performed, showing evidence of chronic L5 and S1 radiculopathy.     A recent CT revealed fusion present where two screws were placed on L4-L5 on the left side. There was no significant neuroforaminal stenosis, but some facet arthropathy at L4-L5 and L5-S1 on the left was noted. The scan showed little lateral recess stenosis, though not significant, and a visible laminectomy at L4 and L5. Mild anterolisthesis (about 2 millimeters) at L3-L4 was observed, along with a positive sagittal balance of about 16 cm. No significant coronal imbalance was found.     A previous MRI of the lumbar spine showed no significant nerve root compression, but mild central canal stenosis between L3 and L4. Hardware was visible in the imaging.     Scoliosis films confirmed a positive sagittal balance of about 16 centimeters.     Flexion-extension imaging was performed, with no significant motion noted.        ROS:  Musculoskeletal: +back pain, +sleep difficulty, +pain with movement,  +difficulty standing up  Neurological: +numbness         Review of patient's allergies indicates:   Allergen Reactions    Codeine        Current Medications[1]    Past Medical History:   Diagnosis Date    Arthralgia     Colon polyp     Degenerative disc disease at L5-S1 level     High cholesterol     Hypertension     Notalgia paresthetica     Nuclear sclerosis of both eyes 01/08/2020    Sciatica     Spinal stenosis     Tobacco use 10/21/2021     Past Surgical History:   Procedure Laterality Date    BACK SURGERY      lumbar laminectomy    COLON SURGERY  2022    Tear repaired    COLONOSCOPY N/A 07/23/2020    Procedure: COLONOSCOPY;  Surgeon: Donal Moore MD;  Location: [facility] ENDO;  Service: Endoscopy;  Laterality: N/A;    EPIDURAL STEROID INJECTION Left 09/09/2020    Procedure: Injection, Steroid, Epidural Transforaminal;  Surgeon: Jun Leavitt Jr., MD;  Location: [facility] ENDO;  Service: Pain Management;  Laterality: Left;  Left L5 + S1 TF WADE  Arrive @ 1300; ASA last 9/1; No DM    EPIDURAL STEROID INJECTION Left 02/12/2021    Procedure: Injection, Steroid, Epidural Transforaminal;  Surgeon: Jun Leavitt Jr., MD;  Location: [facility] ENDO;  Service: Pain Management;  Laterality: Left;  Left L4 + L5 TF WADE  Arrive @ 1230; IV Sedation; ASA last 2/4; No DM    INJECTION OF FACET JOINT Bilateral 3/25/2024    Procedure: FACET JOINT INJECTION BILATERAL L3/4 *ASPIRIN CLEARANCE IN CHART*;  Surgeon: Darya Ayala MD;  Location: [facility] PAIN MGT;  Service: Pain Management;  Laterality: Bilateral;  [phone]    SPINE SURGERY  10/08/18    Bilateral Lumbar Laminectomy with Fusion and Screws    TRANSFORAMINAL EPIDURAL INJECTION OF STEROID Left 03/14/2022    Procedure: INJECTION, STEROID, EPIDURAL, TRANSFORAMINAL APPROACH, L4-L5 & L5-S1;  Surgeon: Darya Ayala MD;  Location: [facility] PAIN MGT;  Service: Pain Management;  Laterality: Left;    TRANSFORAMINAL EPIDURAL INJECTION OF STEROID Left 08/29/2022    Procedure: LUMBAR  TRANSFORAMINAL LEFT L4/5 AND L5/S1 CONTRAST;  Surgeon: Darya Ayala MD;  Location: [facility] PAIN MGT;  Service: Pain Management;  Laterality: Left;    TRANSFORAMINAL EPIDURAL INJECTION OF STEROID Left 9/27/2024    Procedure: LUMBAR TRANSFORAMINAL LEFT L3/4 AND L5/S1;  Surgeon: Jason Gomez MD;  Location: [facility] PAIN MGT;  Service: Pain Management;  Laterality: Left;  [phone]  2 WK F/U RONAK    TRANSFORAMINAL EPIDURAL INJECTION OF STEROID Left 1/17/2025    Procedure: LUMBAR TANSFORAMINAL LEFT L5/S1 AND S1;  Surgeon: Jason Gomez MD;  Location: [facility] PAIN MGT;  Service: Pain Management;  Laterality: Left;  2 WK F/U CHRIS  *SCHEDULE AFTER 1/24    TUBAL LIGATION       Family History       Problem Relation (Age of Onset)    Arthritis Father, Sister, Sister, Sister, Brother    Breast cancer Mother    COPD Sister    Cancer Mother    Cataracts Father    Depression Sister    Diabetes Father, Sister, Sister, Sister    Drug abuse Brother    Early death Sister, Sister, Brother    Glaucoma Father, Sister    Hearing loss Sister, Sister    Heart disease Sister    Hypertension Mother, Sister, Sister, Sister, Brother, Sister, Brother    Hypotension Mother    Learning disabilities Brother, Son, Son, Daughter    Mental illness Sister    Miscarriages / Stillbirths Mother    No Known Problems Brother, Brother, Maternal Aunt, Maternal Uncle, Paternal Aunt, Paternal Uncle, Maternal Grandmother, Maternal Grandfather, Paternal Grandmother, Paternal Grandfather, Son, Other    Stroke Sister    Vision loss Father, Sister, Sister, Sister, Sister          Social History     Socioeconomic History    Marital status:    Tobacco Use    Smoking status: Former     Current packs/day: 0.00     Average packs/day: 0.3 packs/day for 35.0 years (8.8 ttl pk-yrs)     Types: Cigarettes     Start date: 3/15/1987     Quit date: 3/15/2022     Years since quitting: 3.0    Smokeless tobacco: Current    Tobacco comments:     Occasional spoker some  times 1-2 cigarettes/day sometimes none for weeks   Substance and Sexual Activity    Alcohol use: Yes     Alcohol/week: 3.0 standard drinks of alcohol     Types: 3 Glasses of wine per week     Comment: Occasional wine    Drug use: Not Currently     Comment: Tramadol twice a day as needed    Sexual activity: Yes     Partners: Male     Birth control/protection: Post-menopausal, None     Comment: Tubal 30+ years ago   Social History Narrative    Lives alone    No partner    Was a  for a non-profit organization     Social Drivers of Health     Financial Resource Strain: High Risk (2/17/2025)    Overall Financial Resource Strain (CARDIA)     Difficulty of Paying Living Expenses: Very hard   Food Insecurity: Food Insecurity Present (2/17/2025)    Hunger Vital Sign     Worried About Running Out of Food in the Last Year: Sometimes true     Ran Out of Food in the Last Year: Often true   Transportation Needs: No Transportation Needs (2/17/2025)    PRAPARE - Transportation     Lack of Transportation (Medical): No     Lack of Transportation (Non-Medical): No   Physical Activity: Insufficiently Active (2/17/2025)    Exercise Vital Sign     Days of Exercise per Week: 2 days     Minutes of Exercise per Session: 60 min   Stress: Stress Concern Present (2/17/2025)    Citizen of [place] of Occupational [employer] - Occupational Stress Questionnaire     Feeling of Stress : Very much   Housing Stability: Low Risk  (2/17/2025)    Housing Stability Vital Sign     Unable to Pay for Housing in the Last Year: No     Number of Times Moved in the Last Year: 0     Homeless in the Last Year: No   Recent Concern: Housing Stability - High Risk (1/1/2025)    Received from [facility] SDOH Screening     In the past year, have you been unable to get any of the following when you really needed them? choose all that apply.: Utilities (electric, gas, and water)       Review of Systems    OBJECTIVE:     Vital Signs     There  is no height or weight on file to calculate BMI.    Neurosurgery Physical Exam  On virtual audio visual visit   Head is normocephalic atraumatic   Neck is grossly supple  No appreciable labored breathing   Admitting appears to be nontender   Patient is able to move extremities     On virtual audio visual visit the patient's speech is fluent, goal directed without any noted dysarthria or aphasia   Unable to evaluate pupils on virtual audio visual visit   Face is symmetric   Tongue is midline  Unable to evaluate palate   Hearing appears to be intact on virtual audio visual visit to voice   Patient able to move both upper and lower extremities without any gross motor asymmetry on virtual audio visual visit     Diagnostic Results:  I personally reviewed the patient's Diagnostic Imaging.     CT Lumbar Spine WO Cont 02/19/25  Stable postoperative change at L4-L5.  Lumbar spondylosis is most prominent L3-L4 noting moderate spinal canal stenosis and moderate to severe neural foraminal narrowing.    ASSESSMENT/PLAN:   74-year-old female with hx of L4-5 pedicle screw placement unilaterally (2018) and chronic lumbar radiculopathy.     Patient presents with      Discussed imaging results of CT Lumbar Spine revealing stable postoperative change at L4-L5.  Lumbar spondylosis is most prominent L3-L4 noting moderate spinal canal stenosis and moderate to severe neural foraminal narrowing.    After discussion with the patient, I recommend  .   Assessment & Plan    M54.17 Radiculopathy, lumbosacral region  M96.1 Postlaminectomy syndrome, not elsewhere classified  Z98.1 Arthrodesis status  M43.16 Spondylolisthesis, lumbar region  M47.816 Spondylosis without myelopathy or radiculopathy, lumbar region  M48.07 Spinal stenosis, lumbosacral region  R20.2 Paresthesia of skin  Z79.891 Long term (current) use of opiate analgesic     LUMBOSACRAL RADICULOPATHY:  - Noted EMG evidence of chronic L5 and possibly S1 radiculopathy.  - Discussed less  invasive options to avoid destabilizing spine, potentially focusing on L5 and S1.     POSTLAMINECTOMY SYNDROME:  - Analyzed CT for source of impingement; usual locations not apparent.  - Plan to review in more detail to identify potential impingement source.  - Considered reoperation risks: increased difficulty due to prior surgery, higher risk of spinal fluid leak, and potential for increased back pain post-surgery.  - Explained risks associated with reoperation, including increased difficulty due to scar tissue and higher risk of spinal fluid leak.  - Discussed potential for short-term pain relief but long-term worsening of back pain with nerve decompression surgery.     LUMBAR ARTHRODESIS STATUS:  - Reviewed CT Lumbar: fusion present at L4-L5 where screws were placed on left side.     SPINAL ANATOMY AND PAIN MANAGEMENT:  - Provided information on spinal anatomy, including facet joints and their potential for inflammation.  - Continued Oxycodone for pain management.  - Continued pregabalin.     FOLLOW-UP PLAN:  - Plan to review case at weekly spine conference for additional perspectives.  - Follow up after case review to discuss potential treatment options.         Thank you so very much for allowing me to participate in the care of this patient.  Please feel free to call any questions, comments, or concerns.     Azra Kurtz MD,MSc  Department of Neurosurgery   Department of Radiology  Department of Neurology  [facility]    [facility]   [employer] [facility]     Total time spent in counseling and discussion about further management options including relevant lab work, treatment,  prognosis, medications and intended side effects was more than 60 minutes. More than 50 % of the time was spent in counseling and coordination of care.  I spent a total of 60 minutes on the day of the visit.This  includes face to face time and non-face to face time preparing to see the patient (eg, review of tests), Obtaining and/or reviewing separately obtained history, Documenting clinical information in the electronic or other health record, Independently interpreting resultsand communicating results to the patient/family/caregiver, or Care coordination.     Scribe Andriyation  I, Dr. Azra Kurtz personally performed the services described in this documentation. All medical record entries made by the scribe, Radha Hendricks, were at my direction and in my presence.  I have reviewed the chart and agree that the record reflects my personal performance and is accurate and complete.           [1]   Current Outpatient Medications   Medication Sig Dispense Refill    acetaminophen (TYLENOL) 500 MG tablet Take 2 tablets (1,000 mg total) by mouth every 6 (six) hours as needed. 28 tablet 0    amitriptyline (ELAVIL) 50 MG tablet Take 1 tablet (50 mg total) by mouth every evening. 90 tablet 1    aspirin (ECOTRIN) 81 MG EC tablet       azelastine (ASTELIN) 137 mcg (0.1 %) nasal spray 1 spray (137 mcg total) by Nasal route 2 (two) times daily. 30 mL 3    cyanocobalamin, vitamin B-12, (B-12 COMPLIANCE) 1,000 mcg/mL Kit Inject 1 mL as directed every 28 days. 1 kit 5    diclofenac sodium (VOLTAREN ARTHRITIS PAIN) 1 % Gel Apply 2 g topically once daily. 100 g 0    ergocalciferol (ERGOCALCIFEROL) 50,000 unit Cap Take 1 capsule by mouth every 7 days.      fluticasone propionate (FLONASE) 50 mcg/actuation nasal spray 1 spray (50 mcg total) by Each Nostril route once daily. 48 g 3    fluticasone propionate (FLONASE) 50 mcg/actuation nasal spray 1 spray (50 mcg total) by Each Nostril route 2 (two) times daily. 18.2 mL 3    hydrALAZINE (APRESOLINE) 100 MG tablet Take 1 tablet (100 mg total) by mouth every 12 (twelve) hours. 180 tablet 3    hydrOXYzine HCL (ATARAX) 25 MG tablet Take 1 tablet by mouth three times daily as needed 45 tablet 0     metoprolol succinate (TOPROL-XL) 25 MG 24 hr tablet Take 1 tablet by mouth once daily 90 tablet 3    mometasone (ELOCON) 0.1 % solution Apply once to twice daily prn itching 60 mL 5    mupirocin (BACTROBAN) 2 % ointment Apply topically 3 (three) times daily. 22 g 0    NIFEdipine (PROCARDIA-XL) 60 MG (OSM) 24 hr tablet Take 1 tablet (60 mg total) by mouth once daily. 30 tablet 2    nystatin (MYCOSTATIN) powder APPLY  POWDER TOPICALLY TO AFFECTED AREA 4 TIMES DAILY 30 g 5    olopatadine (PATANOL) 0.1 % ophthalmic solution INSTILL 1 DROP INTO EACH EYE TWICE DAILY 5 mL 0    omeprazole (PRILOSEC) 40 MG capsule Take 1 capsule by mouth.      oxyCODONE-acetaminophen (PERCOCET) 7.5-325 mg per tablet Take 1 tablet by mouth every 4 (four) hours as needed for Pain. 120 tablet 0    oxyCODONE-acetaminophen (PERCOCET) 7.5-325 mg per tablet Take 1 tablet by mouth every 4 (four) hours as needed for Pain. 120 tablet 0    [START ON 4/7/2025] oxyCODONE-acetaminophen (PERCOCET) 7.5-325 mg per tablet Take 1 tablet by mouth every 4 (four) hours as needed for Pain. 120 tablet 0    pravastatin (PRAVACHOL) 40 MG tablet Take 1 tablet by mouth once daily 90 tablet 1    pregabalin (LYRICA) 75 MG capsule Take 1 capsule (75 mg total) by mouth 2 (two) times daily. 60 capsule 2    tiZANidine (ZANAFLEX) 4 MG tablet TAKE 1 TABLET BY MOUTH EVERY 6 HOURS AS NEEDED 90 tablet 3    triamcinolone acetonide 0.1%-ciclopirox-magnesium hydroxide 400 mg/5 ml Apply to affected area twice a day after cool blow dry 60 g 5    valsartan (DIOVAN) 320 MG tablet Take 1 tablet by mouth once daily 90 tablet 3     No current facility-administered medications for this visit.

## 2025-04-02 ENCOUNTER — PATIENT MESSAGE (OUTPATIENT)
Dept: PAIN MEDICINE | Facility: CLINIC | Age: 75
End: 2025-04-02
Payer: MEDICARE

## 2025-04-03 ENCOUNTER — TELEPHONE (OUTPATIENT)
Dept: PAIN MEDICINE | Facility: CLINIC | Age: 75
End: 2025-04-03
Payer: MEDICARE

## 2025-04-03 NOTE — TELEPHONE ENCOUNTER
----- Message from Darci sent at 4/2/2025  4:18 PM CDT -----  Type:  Patient Returning CallWho Called: Who Left Message for Patient: Does the patient know what this is regarding?: missed call Best Call Back Number:   [phone] Information:

## 2025-04-03 NOTE — TELEPHONE ENCOUNTER
Staff spoke with patient and got her rescheduled for her appointment on 4/8 due to Dr. Gomez not being in office. Staff rescheduled her on 4/24 virtually per patient. Patient states she's leaving the week of easter, but will sty until her appointment. Patient is in agreement to the above and verbalized understanding.

## 2025-04-21 ENCOUNTER — TELEPHONE (OUTPATIENT)
Dept: OTOLARYNGOLOGY | Facility: CLINIC | Age: 75
End: 2025-04-21
Payer: MEDICARE

## 2025-04-21 NOTE — TELEPHONE ENCOUNTER
Spoke to pt, states mild bronchitisw with some wheezing, informed to see pcp, pulmonary or could can go to urgent care, not us.  Pt verb understanding

## 2025-04-21 NOTE — TELEPHONE ENCOUNTER
----- Message from Pamela sent at 4/21/2025 11:57 AM CDT -----  .Type: Patient Call BackWho called:Self What is the request in detail: Stated she has Bronchitis and would like to know if she needs to come in Can the clinic reply by [facility]? No Would the patient rather a call back or a response via [facility]? Call Back Best call back number: .[phone] (home) Additional Information:

## 2025-04-23 DIAGNOSIS — H10.13 ALLERGIC CONJUNCTIVITIS OF BOTH EYES: ICD-10-CM

## 2025-04-23 DIAGNOSIS — F41.9 ANXIETY: ICD-10-CM

## 2025-04-24 ENCOUNTER — PATIENT MESSAGE (OUTPATIENT)
Dept: PSYCHIATRY | Facility: CLINIC | Age: 75
End: 2025-04-24
Payer: MEDICARE

## 2025-04-24 ENCOUNTER — TELEPHONE (OUTPATIENT)
Dept: PAIN MEDICINE | Facility: CLINIC | Age: 75
End: 2025-04-24
Payer: MEDICARE

## 2025-04-24 ENCOUNTER — OFFICE VISIT (OUTPATIENT)
Dept: PAIN MEDICINE | Facility: CLINIC | Age: 75
End: 2025-04-24
Payer: MEDICARE

## 2025-04-24 DIAGNOSIS — M96.1 POST LAMINECTOMY SYNDROME: ICD-10-CM

## 2025-04-24 DIAGNOSIS — M54.16 LUMBAR RADICULOPATHY: Primary | ICD-10-CM

## 2025-04-24 DIAGNOSIS — M51.362 DEGENERATION OF INTERVERTEBRAL DISC OF LUMBAR REGION WITH DISCOGENIC BACK PAIN AND LOWER EXTREMITY PAIN: ICD-10-CM

## 2025-04-24 PROCEDURE — 98006 SYNCH AUDIO-VIDEO EST MOD 30: CPT | Mod: 95,GC,, | Performed by: ANESTHESIOLOGY

## 2025-04-24 PROCEDURE — 1159F MED LIST DOCD IN RCRD: CPT | Mod: CPTII,95,, | Performed by: ANESTHESIOLOGY

## 2025-04-24 PROCEDURE — 4010F ACE/ARB THERAPY RXD/TAKEN: CPT | Mod: CPTII,95,, | Performed by: ANESTHESIOLOGY

## 2025-04-24 PROCEDURE — 1160F RVW MEDS BY RX/DR IN RCRD: CPT | Mod: CPTII,95,, | Performed by: ANESTHESIOLOGY

## 2025-04-24 RX ORDER — HYDROXYZINE HYDROCHLORIDE 25 MG/1
25 TABLET, FILM COATED ORAL
Qty: 45 TABLET | Refills: 0 | Status: SHIPPED | OUTPATIENT
Start: 2025-04-24

## 2025-04-24 RX ORDER — OLOPATADINE HYDROCHLORIDE 1 MG/ML
1 SOLUTION OPHTHALMIC 2 TIMES DAILY
Qty: 5 ML | Refills: 0 | Status: SHIPPED | OUTPATIENT
Start: 2025-04-24

## 2025-04-24 NOTE — H&P (VIEW-ONLY)
Chronic Pain-Tele-Medicine-Established Note (Follow up visit)        The patient location is: Home  The chief complaint leading to consultation is: lower back pain  Visit type: Virtual visit with synchronous audio and video  Total time spent with patient: 30 min  Each patient to whom he or she provides medical services by telemedicine is:  (1) informed of the relationship between the physician and patient and the respective role of any other health care provider with respect to management of the patient; and (2) notified that he or she may decline to receive medical services by telemedicine and may withdraw from such care at any time.       Interval History 4/24/2025:  Patient returns for virtual follow up of lower back pain and radiculopathy. She has a h/o L4/5 pedicle screw placement unilaterally in 2018. Patient reports minimal relief after her Left L5/S1 and S1 TFESI on 1/17/25. Patient states she had one week of good relief but once she started more activity her left leg became numb. Patient continues to have constant low back pain. The numbness is focused to her L anterior and posterior thigh, she endorses weakness. Patient met with Dr. Kurtz with [facility] who sent her for repeat lumbar CT, results below. Patient is not interested in a repeat surgery. Her pain today is 8/10. She has been unable to attend aqua therapy because of her pain.     IMAGING:  A CT of the L Spine revealed moderate spinal canal stenosis at L3-L4 and severe neural foraminal narrowing in the lower levels. An EMG showed S1 radiculopathy and L5 radiculopathy.    SURGICAL HISTORY:  Patient underwent a laminectomy and left-sided fusion at L4-L5 in 2018.    WORK STATUS:  Patient's quality of life is significantly reduced due to leg numbness. She has limited ability to stand or walk for extended periods. Her activities and mobility are restricted.      Interval History 2/3/2025:  Amanda Holt returns for VIRTUAL follow-up after left  L5/S1 and S1 TFESI on 1/17/2025. She reports 70%% relief. She continues aquatic therapy. She has noticed less numbness in the right leg. She continues with left-sided thigh pain with housework and going up the stairs. She continues Percocet 7.5/325 mg from her PCP with some benefit. She denies recent health changes. She denies recent falls or trauma. She denies new onset fever/night sweats, urinary incontinence, bowel incontinence, significant weight changes, significant motor weakness or changes, or loss of sensations. Her pain today is 10/10.      Interval History 1/2/2025:    Amanda Holt presents to the clinic for the evaluation of lower back pain that started several months ago.  Patient describes the pain as sharp achy and shooting with radiation down her left lower extremity.  Patient said the pain radiates all the way to her left feet. Patient said her entire left leg becomes numb and weak especially after walking for about 10 minutes.  Her pain score today is 8/10. The pain is rated with a score of  6/10 on the BEST day and a score of 10/10 on the WORST day.  Symptoms interfere with daily activity, sleeping, and work. The pain is exacerbated by Standing, Bending, Walking, Lifting, and Getting out of bed/chair.  The pain is mitigated by 7.5 Percocet . She reports spending 3 hours per day reclining. The patient reports 4 hours of uninterrupted sleep per night.    Patient denies night fever/night sweats, urinary incontinence, bowel incontinence, and significant weight loss.  Patient reports weakness and numbness in the left lower extremity.       Interval History 10/11/2024:  Amanda Holt returns to clinic for follow-up after Left L3/4 and L5/S1 TFESI on 9/27/2024. She reports no relief of back or leg pain. She continues with left leg numbness with walking more than 8-10 minutes. She states it is the entire left leg, and does not describe the numbness in a specific dermatome. She continues  Percocet and Tizanidine from her PCP with good relief. She denies any perceived side effects. She states she has not participated in PT or HEP in a couple of years. She states she did well in Aquatic Therapy in the past, as traditional Physical Therapy has been too painful to complete. She denies recent health changes. She denies recent falls or trauma. She denies new onset fever/night sweats, urinary incontinence, bowel incontinence, significant weight changes, significant motor weakness or changes, or loss of sensations. Her pain today is 8/10.       Of note, she reports her 27 year-old grandson was murdered. She has been dealing with this and it has caused her to not take care of herself as much as she had in the past. She would like to get back into caring for herself and her health.         HPI 9/17/2024:  Amanda Holt presents to the clinic for the evaluation of lower back pain that started several months ago.  Patient describes the pain as sharp achy and shooting with radiation down her left lower extremity.  Patient said the pain radiates all the way to her left feet. Patient said her entire left leg becomes numb and weak especially after walking for about 10 minutes.  Her pain score today is 8/10. The pain is rated with a score of  6/10 on the BEST day and a score of 10/10 on the WORST day.  Symptoms interfere with daily activity, sleeping, and work. The pain is exacerbated by Standing, Bending, Walking, Lifting, and Getting out of bed/chair.  The pain is mitigated by 7.5 Percocet . She reports spending 3 hours per day reclining. The patient reports 4 hours of uninterrupted sleep per night.     Patient denies night fever/night sweats, urinary incontinence, bowel incontinence, and significant weight loss.  Patient reports weakness and numbness in the left lower extremity.     Physical Therapy/Home Exercise: yes (aqua therapy)    Pain Disability Index Review:      3/14/2024     1:42 PM 4/13/2022     10:04 AM 3/28/2022    10:32 AM   Last 3 PDI Scores   Pain Disability Index (PDI) 63 30 26     Pain Medications:  Percocet 7.5  Tizanidine 4 mg     report:  Reviewed and consistent with medication use as prescribed.    Pain Procedures:   L4-5 laminectomy and fusion in 2018  09/09/2020-transforaminal WADE  02/12/2021-transforaminal WADE  3/14/2022 - L4-5 and L5-S1 lumbar transforaminal WADE  08/29/2022/ -  left L4-5 and L5-S1 lumbar transforaminal WADE  03/25/2024 - bilateral L3/4 facet joint injection -limited relief  1/17/2025 - Left L5/S1 and S1 TFESI     Imaging:   CT LUMBAR SPINE WITHOUT CONTRAST 2/19/25     CLINICAL HISTORY:  Low back pain, symptoms persist with > 6wks conservative treatment;  Dorsalgia, unspecified     TECHNIQUE:  Low-dose axial, sagittal and coronal reformations are obtained through the lumbar spine.  Contrast was not administered.     COMPARISON:  CT 07/08/2024.     FINDINGS:  Stable postop change again noted at L4-L5 with left-sided pedicular screws and fixation tracey and laminectomy.  Hardware remains intact without evidence of loosening.     Stable alignment of lumbar spine.  Vertebral body heights are well maintained.  No acute fracture no aggressive osseous lesion.  Degenerative disc disease/disc space narrowing most prominent L4-L5 and L5-S1 noting partial fusion anteriorly at L5-S1.     Lumbar spondylosis as follows:     T12-L1: Minimal circumferential disc bulge.  No spinal canal stenosis or neural foraminal narrowing.     L1-L2: Mild facet arthropathy.  No spinal canal stenosis or neural foraminal narrowing.     L2-L3: Circumferential disc bulge.  Facet arthropathy.  Mild ligamentum flavum hypertrophy.  Findings result in mild spinal canal stenosis and mild bilateral neural foraminal narrowing.     L3-L4: Circumferential disc bulge.  Facet arthropathy.  Findings result in moderate spinal canal stenosis, moderate left neural foraminal narrowing and severe right neural foramina  narrowing.     L4-L5: Operative level.  Posterior disc osteophyte complex.  Facet arthropathy.  Moderate left and mild right neural foraminal narrowing.  Mild spinal canal stenosis.     L5-S1: Mild circumferential disc bulge.  Facet arthropathy.  Mild bilateral neural foraminal narrowing.  No significant spinal canal stenosis.     Postoperative change within soft tissues overlying lower lumbar spine with midline simple fluid/edema.  Calcific aortic atherosclerosis.  Bilateral renal hypodensities, poorly characterized, possibly cysts.  Paraspinal musculature is unremarkable.     Impression:     Stable postoperative change at L4-L5.  Lumbar spondylosis is most prominent L3-L4 noting moderate spinal canal stenosis and moderate to severe neural foraminal narrowing.  Findings detailed level by level above      MRI LUMBAR SPINE WITHOUT CONTRAST     FINDINGS:  Lumbar spine alignment is within normal limits. The vertebral body heights are well maintained, with no fracture.  No marrow signal abnormality suspicious for an infiltrative process.  Degenerative fatty marrow metaplasia endplate change at L4-5.     The conus is normal in appearance.  The adjacent soft tissue structures show no significant abnormalities.     Bilateral T2 hyperintense renal lesions are suggestive of cysts.     L1-L2: There is no focal disc herniation. No significant central canal or neural foraminal narrowing.     L2-L3: There is no focal disc herniation. No significant central canal or neural foraminal narrowing.     L3-L4: Circumferential disc bulge, spondylitic spurring, facet arthropathy and bilateral facet joint effusions.  Moderate central spinal stenosis and mild right foraminal stenosis.     L4-L5: Operative change of bilateral laminectomy and left-sided instrumented fusion.  Circumferential disc bulge and spondylitic spurring.  Mild bilateral neural foraminal narrowing.     L5-S1: Circumferential disc bulge and partial anterior bony  ankylosis.  Mild facet arthropathy.  Flattening of the medial right exiting L5 nerve root suggests at least moderate and possibly severe foraminal stenosis.     Impression:     Postoperative and degenerative change as described, with central spinal stenosis at L3-4 and moderate-severe right L5-S1 foraminal stenosis.    XR LUMBAR SPINE 5 VIEW WITH FLEX AND EXT     FINDINGS:  prior L4-5 posterior instrumented lumbar fusion. No evidence of hardware failure or loosening. There is prominent L3-4 facet arthropathy with slight anterolisthesis, similar to prior exam.  No subluxation with extension and flexion views. No fractures.     Impression:     No acute findings.    Past Medical History:   Diagnosis Date    Arthralgia     Colon polyp     Degenerative disc disease at L5-S1 level     High cholesterol     Hypertension     Notalgia paresthetica     Nuclear sclerosis of both eyes 01/08/2020    Sciatica     Spinal stenosis     Tobacco use 10/21/2021     Past Surgical History:   Procedure Laterality Date    BACK SURGERY      lumbar laminectomy    COLON SURGERY  2022    Tear repaired    COLONOSCOPY N/A 07/23/2020    Procedure: COLONOSCOPY;  Surgeon: Donal Moore MD;  Location: [facility] ENDO;  Service: Endoscopy;  Laterality: N/A;    EPIDURAL STEROID INJECTION Left 09/09/2020    Procedure: Injection, Steroid, Epidural Transforaminal;  Surgeon: Jun Leavitt Jr., MD;  Location: [facility] ENDO;  Service: Pain Management;  Laterality: Left;  Left L5 + S1 TF WADE  Arrive @ 1300; ASA last 9/1; No DM    EPIDURAL STEROID INJECTION Left 02/12/2021    Procedure: Injection, Steroid, Epidural Transforaminal;  Surgeon: Jun Leavitt Jr., MD;  Location: [facility] ENDO;  Service: Pain Management;  Laterality: Left;  Left L4 + L5 TF WADE  Arrive @ 1230; IV Sedation; ASA last 2/4; No DM    INJECTION OF FACET JOINT Bilateral 3/25/2024    Procedure: FACET JOINT INJECTION BILATERAL L3/4 *ASPIRIN CLEARANCE IN CHART*;  Surgeon: Darya Ayala MD;   Location: [facility] PAIN [facility]GT;  Service: Pain Management;  Laterality: Bilateral;  [phone]    SPINE SURGERY  10/08/18    Bilateral Lumbar Laminectomy with Fusion and Screws    TRANSFORAMINAL EPIDURAL INJECTION OF STEROID Left 03/14/2022    Procedure: INJECTION, STEROID, EPIDURAL, TRANSFORAMINAL APPROACH, L4-L5 & L5-S1;  Surgeon: Darya Ayala MD;  Location: [facility];  Service: Pain Management;  Laterality: Left;    TRANSFORAMINAL EPIDURAL INJECTION OF STEROID Left 08/29/2022    Procedure: LUMBAR TRANSFORAMINAL LEFT L4/5 AND L5/S1 CONTRAST;  Surgeon: Darya Ayala MD;  Location: [facility];  Service: Pain Management;  Laterality: Left;    TRANSFORAMINAL EPIDURAL INJECTION OF STEROID Left 9/27/2024    Procedure: LUMBAR TRANSFORAMINAL LEFT L3/4 AND L5/S1;  Surgeon: Jason Gomez MD;  Location: [facility];  Service: Pain Management;  Laterality: Left;  [phone]  2 WK F/U RONAK    TRANSFORAMINAL EPIDURAL INJECTION OF STEROID Left 1/17/2025    Procedure: LUMBAR TANSFORAMINAL LEFT L5/S1 AND S1;  Surgeon: Jason Gomez MD;  Location: [facility];  Service: Pain Management;  Laterality: Left;  2 WK F/U CHRIS  *SCHEDULE AFTER 1/24    TUBAL LIGATION       Social History     Socioeconomic History    Marital status:    Tobacco Use    Smoking status: Former     Current packs/day: 0.00     Average packs/day: 0.3 packs/day for 35.0 years (8.8 ttl pk-yrs)     Types: Cigarettes     Start date: 3/15/1987     Quit date: 3/15/2022     Years since quitting: 3.1    Smokeless tobacco: Current    Tobacco comments:     Occasional spoker some times 1-2 cigarettes/day sometimes none for weeks   Substance and Sexual Activity    Alcohol use: Yes     Alcohol/week: 3.0 standard drinks of alcohol     Types: 3 Glasses of wine per week     Comment: Occasional wine    Drug use: Not Currently     Comment: Tramadol twice a day as needed    Sexual activity: Yes     Partners: Male     Birth control/protection: Post-menopausal,  None     Comment: Tubal 30+ years ago   Social History Narrative    Lives alone    No partner    Was a  for a non-profit organization     Social Drivers of Health     Financial Resource Strain: High Risk (2025)    Overall Financial Resource Strain (CARDIA)     Difficulty of Paying Living Expenses: Very hard   Food Insecurity: Food Insecurity Present (2025)    Hunger Vital Sign     Worried About Running Out of Food in the Last Year: Sometimes true     Ran Out of Food in the Last Year: Often true   Transportation Needs: No Transportation Needs (2025)    PRAPARE - Transportation     Lack of Transportation (Medical): No     Lack of Transportation (Non-Medical): No   Physical Activity: Insufficiently Active (2025)    Exercise Vital Sign     Days of Exercise per Week: 2 days     Minutes of Exercise per Session: 60 min   Stress: Stress Concern Present (2025)    [employer] [place] [employer] - Occupational Stress Questionnaire     Feeling of Stress : Very much   Housing Stability: Low Risk  (2025)    Housing Stability Vital Sign     Unable to Pay for Housing in the Last Year: No     Number of Times Moved in the Last Year: 0     Homeless in the Last Year: No   Recent Concern: Housing Stability - High Risk (2025)    Received from [facility] SDOH Screening     In the past year, have you been unable to get any of the following when you really needed them? choose all that apply.: Utilities (electric, gas, and water)     Family History   Problem Relation Name Age of Onset    Breast cancer Mother Marianna Benítez     Hypertension Mother Marianna Benítez             Hypotension Mother Marianna Benítez     Cancer Mother Marianna Benítez          due to breast cancer    Miscarriages / Stillbirths Mother Marianna Benítez         3 stillborn children    Cataracts Father Matthias Benítez     Glaucoma Father Matthias Benítez     Diabetes Father  Matthias Rahman.  Jeyson             Arthritis Father Matthias Benítez     Vision loss Father Matthias Benítez         Glaucoma -     Glaucoma Sister Debby Benítez     Arthritis Sister Debby Benítez     Diabetes Sister Debby Benítez     Hypertension Sister Debby Benítez     Vision loss Sister Debby Benítez         Glasses    Drug abuse Brother Sky Jeyson     Learning disabilities Brother Sky Jeyson     No Known Problems Brother      No Known Problems Brother x3     No Known Problems Maternal Aunt      No Known Problems Maternal Uncle      No Known Problems Paternal Aunt      No Known Problems Paternal Uncle      No Known Problems Maternal Grandmother      No Known Problems Maternal Grandfather      No Known Problems Paternal Grandmother      No Known Problems Paternal Grandfather      No Known Problems Son x2     No Known Problems Other      Arthritis Sister Suasn Benítez     Depression Sister Susan Benítez     Diabetes Sister Susan Benítez     Hearing loss Sister Susan Benítez         Trouble hearing    Hypertension Sister Susan Benítez     Stroke Sister Susan Benítez         [place] Disease, Coma for a month due to diabetes,Some body weakness    Vision loss Sister Susan Benítez         Lasix surgery    Arthritis Sister Urszula Green     COPD Sister Urszula Green     Diabetes Sister Urszula Green     Hearing loss Sister Urszula Green     Heart disease Sister Urszula Green     Hypertension Sister Urszula Green     Arthritis Brother Murtaza Nowak     Hypertension Brother Murtaza Nowak     Early death Sister Christine Benítez         Still born    Early death Sister Catherine Benítez         Still born twin to Debby Benítez    Early death Brother Krishan Benítez stillborn     Hypertension Sister Kateryna Kraft     Hypertension Brother Robbie Benítez     Learning disabilities Son Vaibhav Yanet     Learning disabilities Son Fidel [place]         Great grandson    Learning disabilities Daughter Carmenchase Chris          Granddaughter    Mental illness Sister Shawna Benítez         Her son Matthias Benítez    Vision loss Sister Shawna Benítez         Glasses/contacts    Vision loss Sister Chelly Benítez         Glasses    Amblyopia Neg Hx      Blindness Neg Hx      Macular degeneration Neg Hx      Retinal detachment Neg Hx      Strabismus Neg Hx      Thyroid disease Neg Hx       Review of patient's allergies indicates:   Allergen Reactions    Codeine      Current Outpatient Medications   Medication Sig    acetaminophen (TYLENOL) 500 MG tablet Take 2 tablets (1,000 mg total) by mouth every 6 (six) hours as needed.    amitriptyline (ELAVIL) 50 MG tablet Take 1 tablet (50 mg total) by mouth every evening.    aspirin (ECOTRIN) 81 MG EC tablet     azelastine (ASTELIN) 137 mcg (0.1 %) nasal spray 1 spray (137 mcg total) by Nasal route 2 (two) times daily.    cyanocobalamin, vitamin B-12, (B-12 COMPLIANCE) 1,000 mcg/mL Kit Inject 1 mL as directed every 28 days.    diclofenac sodium (VOLTAREN ARTHRITIS PAIN) 1 % Gel Apply 2 g topically once daily.    ergocalciferol (ERGOCALCIFEROL) 50,000 unit Cap Take 1 capsule by mouth every 7 days.    fluticasone propionate (FLONASE) 50 mcg/actuation nasal spray 1 spray (50 mcg total) by Each Nostril route once daily.    fluticasone propionate (FLONASE) 50 mcg/actuation nasal spray 1 spray (50 mcg total) by Each Nostril route 2 (two) times daily.    hydrALAZINE (APRESOLINE) 100 MG tablet Take 1 tablet (100 mg total) by mouth every 12 (twelve) hours.    hydrOXYzine HCL (ATARAX) 25 MG tablet Take 1 tablet by mouth three times daily as needed    metoprolol succinate (TOPROL-XL) 25 MG 24 hr tablet Take 1 tablet by mouth once daily    mometasone (ELOCON) 0.1 % solution Apply once to twice daily prn itching    mupirocin (BACTROBAN) 2 % ointment Apply topically 3 (three) times daily.    NIFEdipine (PROCARDIA-XL) 60 MG (OSM) 24 hr tablet Take 1 tablet (60 mg total) by mouth once daily.    nystatin  (MYCOSTATIN) powder APPLY  POWDER TOPICALLY TO AFFECTED AREA 4 TIMES DAILY    olopatadine (PATANOL) 0.1 % ophthalmic solution Place 1 drop into both eyes 2 (two) times daily.    omeprazole (PRILOSEC) 40 MG capsule Take 1 capsule by mouth.    oxyCODONE-acetaminophen (PERCOCET) 7.5-325 mg per tablet Take 1 tablet by mouth every 4 (four) hours as needed for Pain.    oxyCODONE-acetaminophen (PERCOCET) 7.5-325 mg per tablet Take 1 tablet by mouth every 4 (four) hours as needed for Pain.    pravastatin (PRAVACHOL) 40 MG tablet Take 1 tablet by mouth once daily    pregabalin (LYRICA) 75 MG capsule Take 1 capsule (75 mg total) by mouth 2 (two) times daily.    tiZANidine (ZANAFLEX) 4 MG tablet TAKE 1 TABLET BY MOUTH EVERY 6 HOURS AS NEEDED    triamcinolone acetonide 0.1%-ciclopirox-magnesium hydroxide 400 mg/5 ml Apply to affected area twice a day after cool blow dry    valsartan (DIOVAN) 320 MG tablet Take 1 tablet by mouth once daily     No current facility-administered medications for this visit.       REVIEW OF SYSTEMS:    GENERAL:  No weight loss, malaise or fevers.  HEENT:  Negative for frequent or significant headaches.  NECK:  Negative for lumps, goiter, pain and significant neck swelling.  RESPIRATORY:  Negative for cough, wheezing or shortness of breath.  CARDIOVASCULAR:  Negative for chest pain, leg swelling or palpitations.  GI:  Negative for abdominal discomfort, blood in stools or black stools or change in bowel habits.  MUSCULOSKELETAL:  See HPI.  SKIN:  Negative for lesions, rash, and itching.  PSYCH:  Negative for sleep disturbance, mood disorder and recent psychosocial stressors.  HEMATOLOGY/LYMPHOLOGY:  Negative for prolonged bleeding, bruising easily or swollen nodes.  NEURO:   No history of headaches, syncope, paralysis, seizures or tremors.  All other reviewed and negative other than HPI.    OBJECTIVE:    There were no vitals taken for this visit.    VIRTUAL PHYSICAL EXAMINATION:    GENERAL APPEARANCE:  "Well appearing, in no acute distress.  PSYCH:  Mood and affect appropriate.  SKIN: Skin color, texture, turgor normal, no rashes or lesions to visible areas.  HEAD/FACE:  Normocephalic, atraumatic.  PULM: Bilateral chest rise. No apparent respiratory distress.          ASSESSMENT: 74 y.o. year old female with lower back pain, that began several months ago. The pain is described as sharp, achy, and shooting, with radiation down the left lower extremity.     1. Lumbar radiculopathy  Procedure Request Order for Pain Management      2. Degeneration of intervertebral disc of lumbar region with discogenic back pain and lower extremity pain        3. Post laminectomy syndrome  Ambulatory referral/consult to Psychology          PLAN SUMMARY:  - Discussed acupuncture as a potential treatment option for temporary pain relief  - Explained caudal epidural injection as a different approach from previous transforaminal injection  - Considered spinal cord stimulation for discogenic back pain and lower extremity pain  - Stem cell therapy not recommended due to chronic nature of injury  - Proceed with caudal epidural injection for discogenic back pain and lower extremity pain  - Follow-up after caudal epidural injection with [facility]    LUMBAR RADICULOPATHY:  - Discussed caudal epidural injection as a treatment option, explaining it would be a different approach from the previous transforaminal injection.  - Discussed spinal cord stimulation as a potential treatment option.  - Explained it as a "pacemaker for pain" placed in the epidural space.  - Described it as a 3-step process involving psych evaluation, trial period, and potential permanent implantation.  - Refer to psych for eval for SCS     DEGENERATION OF INTERVERTEBRAL DISC OF LUMBAR REGION WITH DISCOGENIC BACK PAIN AND LOWER EXTREMITY PAIN:  - Discussed caudal epidural injection as a treatment option for discogenic back pain and lower extremity pain.  - Discussed spinal cord " stimulation as a potential treatment option for discogenic back pain and lower extremity pain.  - Discussed acupuncture as a potential treatment option for temporary relief, particularly for muscle pain.    LIFESTYLE CHANGES:  - Discussed stem cell therapy.  - Not recommended for this patient's condition due to the chronic nature of the injury.         The above plan and management options were discussed at length with patient. Patient is in agreement with the above and verbalized understanding.     Chinmay Bennett MD  04/24/2025'    I spent a total of 30 minutes on the day of the visit.  This includes face to face time and non-face to face time preparing to see the patient by reviewing previous labs/imaging, obtaining and/or reviewing separately obtained history, documenting clinical information in the electronic or other health record, independently interpreting results and communicating results to the patient/family/caregiver. This note was generated with the assistance of ambient listening technology. Verbal consent was obtained by the patient and accompanying visitor(s) for the recording of patient appointment to facilitate this note. I attest to having reviewed and edited the generated note for accuracy, though some syntax or spelling errors may persist. Please contact the author of this note for any clarification.       Jason Gomez

## 2025-04-24 NOTE — TELEPHONE ENCOUNTER
----- Message from Summer sent at 4/24/2025 11:05 AM CDT -----  Regarding: APPT  Type:  Patient Returning Call  Name of who is calling:PT  What is request in detail:Pt is requesting a call back in regards to appt, pt is trying to get online for her virtual appt but states nobody came on to joined. Please assist.   Can clinic reply by [facility]:NO  What number to call back if not in [facility]: [phone]

## 2025-04-24 NOTE — TELEPHONE ENCOUNTER
Refill Routing Note   Medication(s) are not appropriate for processing by [facility] for the following reason(s):        Outside of protocol    [facility] action(s):  Route               Appointments  past 12m or future 3m with PCP    Date Provider   Last Visit   2/6/2025 Simona Torres MD   Next Visit   5/9/2025 Simona Torres MD   ED visits in past 90 days: 0        Note composed:7:15 AM 04/24/2025

## 2025-04-24 NOTE — PROGRESS NOTES
Chronic Pain-Tele-Medicine-Established Note (Follow up visit)        The patient location is: Home  The chief complaint leading to consultation is: lower back pain  Visit type: Virtual visit with synchronous audio and video  Total time spent with patient: 30 min  Each patient to whom he or she provides medical services by telemedicine is:  (1) informed of the relationship between the physician and patient and the respective role of any other health care provider with respect to management of the patient; and (2) notified that he or she may decline to receive medical services by telemedicine and may withdraw from such care at any time.       Interval History 4/24/2025:  Patient returns for virtual follow up of lower back pain and radiculopathy. She has a h/o L4/5 pedicle screw placement unilaterally in 2018. Patient reports minimal relief after her Left L5/S1 and S1 TFESI on 1/17/25. Patient states she had one week of good relief but once she started more activity her left leg became numb. Patient continues to have constant low back pain. The numbness is focused to her L anterior and posterior thigh, she endorses weakness. Patient met with Dr. Kurtz with [facility] who sent her for repeat lumbar CT, results below. Patient is not interested in a repeat surgery. Her pain today is 8/10. She has been unable to attend aqua therapy because of her pain.     IMAGING:  A CT of the L Spine revealed moderate spinal canal stenosis at L3-L4 and severe neural foraminal narrowing in the lower levels. An EMG showed S1 radiculopathy and L5 radiculopathy.    SURGICAL HISTORY:  Patient underwent a laminectomy and left-sided fusion at L4-L5 in 2018.    WORK STATUS:  Patient's quality of life is significantly reduced due to leg numbness. She has limited ability to stand or walk for extended periods. Her activities and mobility are restricted.      Interval History 2/3/2025:  Amanda Holt returns for VIRTUAL follow-up after left  L5/S1 and S1 TFESI on 1/17/2025. She reports 70%% relief. She continues aquatic therapy. She has noticed less numbness in the right leg. She continues with left-sided thigh pain with housework and going up the stairs. She continues Percocet 7.5/325 mg from her PCP with some benefit. She denies recent health changes. She denies recent falls or trauma. She denies new onset fever/night sweats, urinary incontinence, bowel incontinence, significant weight changes, significant motor weakness or changes, or loss of sensations. Her pain today is 10/10.      Interval History 1/2/2025:    Amanda Holt presents to the clinic for the evaluation of lower back pain that started several months ago.  Patient describes the pain as sharp achy and shooting with radiation down her left lower extremity.  Patient said the pain radiates all the way to her left feet. Patient said her entire left leg becomes numb and weak especially after walking for about 10 minutes.  Her pain score today is 8/10. The pain is rated with a score of  6/10 on the BEST day and a score of 10/10 on the WORST day.  Symptoms interfere with daily activity, sleeping, and work. The pain is exacerbated by Standing, Bending, Walking, Lifting, and Getting out of bed/chair.  The pain is mitigated by 7.5 Percocet . She reports spending 3 hours per day reclining. The patient reports 4 hours of uninterrupted sleep per night.    Patient denies night fever/night sweats, urinary incontinence, bowel incontinence, and significant weight loss.  Patient reports weakness and numbness in the left lower extremity.       Interval History 10/11/2024:  Amanda Holt returns to clinic for follow-up after Left L3/4 and L5/S1 TFESI on 9/27/2024. She reports no relief of back or leg pain. She continues with left leg numbness with walking more than 8-10 minutes. She states it is the entire left leg, and does not describe the numbness in a specific dermatome. She continues  Percocet and Tizanidine from her PCP with good relief. She denies any perceived side effects. She states she has not participated in PT or HEP in a couple of years. She states she did well in Aquatic Therapy in the past, as traditional Physical Therapy has been too painful to complete. She denies recent health changes. She denies recent falls or trauma. She denies new onset fever/night sweats, urinary incontinence, bowel incontinence, significant weight changes, significant motor weakness or changes, or loss of sensations. Her pain today is 8/10.       Of note, she reports her 27 year-old grandson was murdered. She has been dealing with this and it has caused her to not take care of herself as much as she had in the past. She would like to get back into caring for herself and her health.         HPI 9/17/2024:  Amanda Holt presents to the clinic for the evaluation of lower back pain that started several months ago.  Patient describes the pain as sharp achy and shooting with radiation down her left lower extremity.  Patient said the pain radiates all the way to her left feet. Patient said her entire left leg becomes numb and weak especially after walking for about 10 minutes.  Her pain score today is 8/10. The pain is rated with a score of  6/10 on the BEST day and a score of 10/10 on the WORST day.  Symptoms interfere with daily activity, sleeping, and work. The pain is exacerbated by Standing, Bending, Walking, Lifting, and Getting out of bed/chair.  The pain is mitigated by 7.5 Percocet . She reports spending 3 hours per day reclining. The patient reports 4 hours of uninterrupted sleep per night.     Patient denies night fever/night sweats, urinary incontinence, bowel incontinence, and significant weight loss.  Patient reports weakness and numbness in the left lower extremity.     Physical Therapy/Home Exercise: yes (aqua therapy)    Pain Disability Index Review:      3/14/2024     1:42 PM 4/13/2022     10:04 AM 3/28/2022    10:32 AM   Last 3 PDI Scores   Pain Disability Index (PDI) 63 30 26     Pain Medications:  Percocet 7.5  Tizanidine 4 mg     report:  Reviewed and consistent with medication use as prescribed.    Pain Procedures:   L4-5 laminectomy and fusion in 2018  09/09/2020-transforaminal WADE  02/12/2021-transforaminal WADE  3/14/2022 - L4-5 and L5-S1 lumbar transforaminal WADE  08/29/2022/ -  left L4-5 and L5-S1 lumbar transforaminal WADE  03/25/2024 - bilateral L3/4 facet joint injection -limited relief  1/17/2025 - Left L5/S1 and S1 TFESI     Imaging:   CT LUMBAR SPINE WITHOUT CONTRAST 2/19/25     CLINICAL HISTORY:  Low back pain, symptoms persist with > 6wks conservative treatment;  Dorsalgia, unspecified     TECHNIQUE:  Low-dose axial, sagittal and coronal reformations are obtained through the lumbar spine.  Contrast was not administered.     COMPARISON:  CT 07/08/2024.     FINDINGS:  Stable postop change again noted at L4-L5 with left-sided pedicular screws and fixation tracey and laminectomy.  Hardware remains intact without evidence of loosening.     Stable alignment of lumbar spine.  Vertebral body heights are well maintained.  No acute fracture no aggressive osseous lesion.  Degenerative disc disease/disc space narrowing most prominent L4-L5 and L5-S1 noting partial fusion anteriorly at L5-S1.     Lumbar spondylosis as follows:     T12-L1: Minimal circumferential disc bulge.  No spinal canal stenosis or neural foraminal narrowing.     L1-L2: Mild facet arthropathy.  No spinal canal stenosis or neural foraminal narrowing.     L2-L3: Circumferential disc bulge.  Facet arthropathy.  Mild ligamentum flavum hypertrophy.  Findings result in mild spinal canal stenosis and mild bilateral neural foraminal narrowing.     L3-L4: Circumferential disc bulge.  Facet arthropathy.  Findings result in moderate spinal canal stenosis, moderate left neural foraminal narrowing and severe right neural foramina  narrowing.     L4-L5: Operative level.  Posterior disc osteophyte complex.  Facet arthropathy.  Moderate left and mild right neural foraminal narrowing.  Mild spinal canal stenosis.     L5-S1: Mild circumferential disc bulge.  Facet arthropathy.  Mild bilateral neural foraminal narrowing.  No significant spinal canal stenosis.     Postoperative change within soft tissues overlying lower lumbar spine with midline simple fluid/edema.  Calcific aortic atherosclerosis.  Bilateral renal hypodensities, poorly characterized, possibly cysts.  Paraspinal musculature is unremarkable.     Impression:     Stable postoperative change at L4-L5.  Lumbar spondylosis is most prominent L3-L4 noting moderate spinal canal stenosis and moderate to severe neural foraminal narrowing.  Findings detailed level by level above      MRI LUMBAR SPINE WITHOUT CONTRAST     FINDINGS:  Lumbar spine alignment is within normal limits. The vertebral body heights are well maintained, with no fracture.  No marrow signal abnormality suspicious for an infiltrative process.  Degenerative fatty marrow metaplasia endplate change at L4-5.     The conus is normal in appearance.  The adjacent soft tissue structures show no significant abnormalities.     Bilateral T2 hyperintense renal lesions are suggestive of cysts.     L1-L2: There is no focal disc herniation. No significant central canal or neural foraminal narrowing.     L2-L3: There is no focal disc herniation. No significant central canal or neural foraminal narrowing.     L3-L4: Circumferential disc bulge, spondylitic spurring, facet arthropathy and bilateral facet joint effusions.  Moderate central spinal stenosis and mild right foraminal stenosis.     L4-L5: Operative change of bilateral laminectomy and left-sided instrumented fusion.  Circumferential disc bulge and spondylitic spurring.  Mild bilateral neural foraminal narrowing.     L5-S1: Circumferential disc bulge and partial anterior bony  ankylosis.  Mild facet arthropathy.  Flattening of the medial right exiting L5 nerve root suggests at least moderate and possibly severe foraminal stenosis.     Impression:     Postoperative and degenerative change as described, with central spinal stenosis at L3-4 and moderate-severe right L5-S1 foraminal stenosis.    XR LUMBAR SPINE 5 VIEW WITH FLEX AND EXT     FINDINGS:  prior L4-5 posterior instrumented lumbar fusion. No evidence of hardware failure or loosening. There is prominent L3-4 facet arthropathy with slight anterolisthesis, similar to prior exam.  No subluxation with extension and flexion views. No fractures.     Impression:     No acute findings.    Past Medical History:   Diagnosis Date    Arthralgia     Colon polyp     Degenerative disc disease at L5-S1 level     High cholesterol     Hypertension     Notalgia paresthetica     Nuclear sclerosis of both eyes 01/08/2020    Sciatica     Spinal stenosis     Tobacco use 10/21/2021     Past Surgical History:   Procedure Laterality Date    BACK SURGERY      lumbar laminectomy    COLON SURGERY  2022    Tear repaired    COLONOSCOPY N/A 07/23/2020    Procedure: COLONOSCOPY;  Surgeon: Donal Moore MD;  Location: [facility];  Service: Endoscopy;  Laterality: N/A;    EPIDURAL STEROID INJECTION Left 09/09/2020    Procedure: Injection, Steroid, Epidural Transforaminal;  Surgeon: Jun Leavitt Jr., MD;  Location: [facility] ENDO;  Service: Pain Management;  Laterality: Left;  Left L5 + S1 TF WADE  Arrive @ 1300; ASA last 9/1; No DM    EPIDURAL STEROID INJECTION Left 02/12/2021    Procedure: Injection, Steroid, Epidural Transforaminal;  Surgeon: Jun Leavitt Jr., MD;  Location: [facility] ENDO;  Service: Pain Management;  Laterality: Left;  Left L4 + L5 TF WADE  Arrive @ 1230; IV Sedation; ASA last 2/4; No DM    INJECTION OF FACET JOINT Bilateral 3/25/2024    Procedure: FACET JOINT INJECTION BILATERAL L3/4 *ASPIRIN CLEARANCE IN CHART*;  Surgeon: Darya Ayala MD;   Location: [facility];  Service: Pain Management;  Laterality: Bilateral;  [phone]    SPINE SURGERY  10/08/18    Bilateral Lumbar Laminectomy with Fusion and Screws    TRANSFORAMINAL EPIDURAL INJECTION OF STEROID Left 03/14/2022    Procedure: INJECTION, STEROID, EPIDURAL, TRANSFORAMINAL APPROACH, L4-L5 & L5-S1;  Surgeon: Darya Ayala MD;  Location: [facility];  Service: Pain Management;  Laterality: Left;    TRANSFORAMINAL EPIDURAL INJECTION OF STEROID Left 08/29/2022    Procedure: LUMBAR TRANSFORAMINAL LEFT L4/5 AND L5/S1 CONTRAST;  Surgeon: Darya Ayala MD;  Location: [facility];  Service: Pain Management;  Laterality: Left;    TRANSFORAMINAL EPIDURAL INJECTION OF STEROID Left 9/27/2024    Procedure: LUMBAR TRANSFORAMINAL LEFT L3/4 AND L5/S1;  Surgeon: Jason Gomez MD;  Location: [facility];  Service: Pain Management;  Laterality: Left;  [phone]  2 WK F/U RONAK    TRANSFORAMINAL EPIDURAL INJECTION OF STEROID Left 1/17/2025    Procedure: LUMBAR TANSFORAMINAL LEFT L5/S1 AND S1;  Surgeon: Jason Gomez MD;  Location: [facility];  Service: Pain Management;  Laterality: Left;  2 WK F/U CHRIS  *SCHEDULE AFTER 1/24    TUBAL LIGATION       Social History     Socioeconomic History    Marital status:    Tobacco Use    Smoking status: Former     Current packs/day: 0.00     Average packs/day: 0.3 packs/day for 35.0 years (8.8 ttl pk-yrs)     Types: Cigarettes     Start date: 3/15/1987     Quit date: 3/15/2022     Years since quitting: 3.1    Smokeless tobacco: Current    Tobacco comments:     Occasional spoker some times 1-2 cigarettes/day sometimes none for weeks   Substance and Sexual Activity    Alcohol use: Yes     Alcohol/week: 3.0 standard drinks of alcohol     Types: 3 Glasses of wine per week     Comment: Occasional wine    Drug use: Not Currently     Comment: Tramadol twice a day as needed    Sexual activity: Yes     Partners: Male     Birth control/protection: Post-menopausal,  None     Comment: Tubal 30+ years ago   Social History Narrative    Lives alone    No partner    Was a  for a non-profit organization     Social Drivers of Health     Financial Resource Strain: High Risk (2025)    Overall Financial Resource Strain (CARDIA)     Difficulty of Paying Living Expenses: Very hard   Food Insecurity: Food Insecurity Present (2025)    Hunger Vital Sign     Worried About Running Out of Food in the Last Year: Sometimes true     Ran Out of Food in the Last Year: Often true   Transportation Needs: No Transportation Needs (2025)    PRAPARE - Transportation     Lack of Transportation (Medical): No     Lack of Transportation (Non-Medical): No   Physical Activity: Insufficiently Active (2025)    Exercise Vital Sign     Days of Exercise per Week: 2 days     Minutes of Exercise per Session: 60 min   Stress: Stress Concern Present (2025)    [employer] - Occupational Stress Questionnaire     Feeling of Stress : Very much   Housing Stability: Low Risk  (2025)    Housing Stability Vital Sign     Unable to Pay for Housing in the Last Year: No     Number of Times Moved in the Last Year: 0     Homeless in the Last Year: No   Recent Concern: Housing Stability - High Risk (2025)    Received from [facility] SDOH Screening     In the past year, have you been unable to get any of the following when you really needed them? choose all that apply.: Utilities (electric, gas, and water)     Family History   Problem Relation Name Age of Onset    Breast cancer Mother Marianna Benítez     Hypertension Mother Marianna Benítez             Hypotension Mother Marianna Benítez     Cancer Mother Marianna Benítez          due to breast cancer    Miscarriages / Stillbirths Mother Marianna Benítez         3 stillborn children    Cataracts Father Matthias Benítez     Glaucoma Father Matthias Benítez     Diabetes Father  Matthias Rahman.  Jeyson             Arthritis Father Matthias Benítez     Vision loss Father Matthias Benítez         Glaucoma -     Glaucoma Sister Debby Benítez     Arthritis Sister Debby Benítez     Diabetes Sister Debby Benítez     Hypertension Sister Debby Benítez     Vision loss Sister Debby Benítez         Glasses    Drug abuse Brother Ksy Jeyson     Learning disabilities Brother Sky Jeyson     No Known Problems Brother      No Known Problems Brother x3     No Known Problems Maternal Aunt      No Known Problems Maternal Uncle      No Known Problems Paternal Aunt      No Known Problems Paternal Uncle      No Known Problems Maternal Grandmother      No Known Problems Maternal Grandfather      No Known Problems Paternal Grandmother      No Known Problems Paternal Grandfather      No Known Problems Son x2     No Known Problems Other      Arthritis Sister Susan Benítez     Depression Sister Susan Benítez     Diabetes Sister Susan Benítez     Hearing loss Sister Susan Benítez         Trouble hearing    Hypertension Sister Susan Benítez     Stroke Sister Susan Benítez         [place] Disease, Coma for a month due to diabetes,Some body weakness    Vision loss Sister Susan Benítez         Lasix surgery    Arthritis Sister Urszula Green     COPD Sister Urszula Green     Diabetes Sister Urszula Green     Hearing loss Sister Urszula Green     Heart disease Sister Urszula Green     Hypertension Sister Urszula Green     Arthritis Brother Murtaza Nowak     Hypertension Brother Murtaza Nowak     Early death Sister Christine Benítez         Still born    Early death Sister Catherine Benítez         Still born twin to Debby Benítez    Early death Brother Krishan Benítez stillborn     Hypertension Sister Kateryna Kraft     Hypertension Brother Robbie Benítez     Learning disabilities Son Vaibhav Yanet     Learning disabilities Son Fidel [place]         Great grandson    Learning disabilities Daughter Carmenchase Chris          Granddaughter    Mental illness Sister Shawna Benítez         Her son Matthias Benítez    Vision loss Sister Shawna Benítez         Glasses/contacts    Vision loss Sister Chelly Benítez         Glasses    Amblyopia Neg Hx      Blindness Neg Hx      Macular degeneration Neg Hx      Retinal detachment Neg Hx      Strabismus Neg Hx      Thyroid disease Neg Hx       Review of patient's allergies indicates:   Allergen Reactions    Codeine      Current Outpatient Medications   Medication Sig    acetaminophen (TYLENOL) 500 MG tablet Take 2 tablets (1,000 mg total) by mouth every 6 (six) hours as needed.    amitriptyline (ELAVIL) 50 MG tablet Take 1 tablet (50 mg total) by mouth every evening.    aspirin (ECOTRIN) 81 MG EC tablet     azelastine (ASTELIN) 137 mcg (0.1 %) nasal spray 1 spray (137 mcg total) by Nasal route 2 (two) times daily.    cyanocobalamin, vitamin B-12, (B-12 COMPLIANCE) 1,000 mcg/mL Kit Inject 1 mL as directed every 28 days.    diclofenac sodium (VOLTAREN ARTHRITIS PAIN) 1 % Gel Apply 2 g topically once daily.    ergocalciferol (ERGOCALCIFEROL) 50,000 unit Cap Take 1 capsule by mouth every 7 days.    fluticasone propionate (FLONASE) 50 mcg/actuation nasal spray 1 spray (50 mcg total) by Each Nostril route once daily.    fluticasone propionate (FLONASE) 50 mcg/actuation nasal spray 1 spray (50 mcg total) by Each Nostril route 2 (two) times daily.    hydrALAZINE (APRESOLINE) 100 MG tablet Take 1 tablet (100 mg total) by mouth every 12 (twelve) hours.    hydrOXYzine HCL (ATARAX) 25 MG tablet Take 1 tablet by mouth three times daily as needed    metoprolol succinate (TOPROL-XL) 25 MG 24 hr tablet Take 1 tablet by mouth once daily    mometasone (ELOCON) 0.1 % solution Apply once to twice daily prn itching    mupirocin (BACTROBAN) 2 % ointment Apply topically 3 (three) times daily.    NIFEdipine (PROCARDIA-XL) 60 MG (OSM) 24 hr tablet Take 1 tablet (60 mg total) by mouth once daily.    nystatin  (MYCOSTATIN) powder APPLY  POWDER TOPICALLY TO AFFECTED AREA 4 TIMES DAILY    olopatadine (PATANOL) 0.1 % ophthalmic solution Place 1 drop into both eyes 2 (two) times daily.    omeprazole (PRILOSEC) 40 MG capsule Take 1 capsule by mouth.    oxyCODONE-acetaminophen (PERCOCET) 7.5-325 mg per tablet Take 1 tablet by mouth every 4 (four) hours as needed for Pain.    oxyCODONE-acetaminophen (PERCOCET) 7.5-325 mg per tablet Take 1 tablet by mouth every 4 (four) hours as needed for Pain.    pravastatin (PRAVACHOL) 40 MG tablet Take 1 tablet by mouth once daily    pregabalin (LYRICA) 75 MG capsule Take 1 capsule (75 mg total) by mouth 2 (two) times daily.    tiZANidine (ZANAFLEX) 4 MG tablet TAKE 1 TABLET BY MOUTH EVERY 6 HOURS AS NEEDED    triamcinolone acetonide 0.1%-ciclopirox-magnesium hydroxide 400 mg/5 ml Apply to affected area twice a day after cool blow dry    valsartan (DIOVAN) 320 MG tablet Take 1 tablet by mouth once daily     No current facility-administered medications for this visit.       REVIEW OF SYSTEMS:    GENERAL:  No weight loss, malaise or fevers.  HEENT:  Negative for frequent or significant headaches.  NECK:  Negative for lumps, goiter, pain and significant neck swelling.  RESPIRATORY:  Negative for cough, wheezing or shortness of breath.  CARDIOVASCULAR:  Negative for chest pain, leg swelling or palpitations.  GI:  Negative for abdominal discomfort, blood in stools or black stools or change in bowel habits.  MUSCULOSKELETAL:  See HPI.  SKIN:  Negative for lesions, rash, and itching.  PSYCH:  Negative for sleep disturbance, mood disorder and recent psychosocial stressors.  HEMATOLOGY/LYMPHOLOGY:  Negative for prolonged bleeding, bruising easily or swollen nodes.  NEURO:   No history of headaches, syncope, paralysis, seizures or tremors.  All other reviewed and negative other than HPI.    OBJECTIVE:    There were no vitals taken for this visit.    VIRTUAL PHYSICAL EXAMINATION:    GENERAL APPEARANCE:  "Well appearing, in no acute distress.  PSYCH:  Mood and affect appropriate.  SKIN: Skin color, texture, turgor normal, no rashes or lesions to visible areas.  HEAD/FACE:  Normocephalic, atraumatic.  PULM: Bilateral chest rise. No apparent respiratory distress.          ASSESSMENT: 74 y.o. year old female with lower back pain, that began several months ago. The pain is described as sharp, achy, and shooting, with radiation down the left lower extremity.     1. Lumbar radiculopathy  Procedure Request Order for Pain Management      2. Degeneration of intervertebral disc of lumbar region with discogenic back pain and lower extremity pain        3. Post laminectomy syndrome  Ambulatory referral/consult to Psychology          PLAN SUMMARY:  - Discussed acupuncture as a potential treatment option for temporary pain relief  - Explained caudal epidural injection as a different approach from previous transforaminal injection  - Considered spinal cord stimulation for discogenic back pain and lower extremity pain  - Stem cell therapy not recommended due to chronic nature of injury  - Proceed with caudal epidural injection for discogenic back pain and lower extremity pain  - Follow-up after caudal epidural injection with [facility]ON[facility]    LUMBAR RADICULOPATHY:  - Discussed caudal epidural injection as a treatment option, explaining it would be a different approach from the previous transforaminal injection.  - Discussed spinal cord stimulation as a potential treatment option.  - Explained it as a "pacemaker for pain" placed in the epidural space.  - Described it as a 3-step process involving psych evaluation, trial period, and potential permanent implantation.  - Refer to psych for eval for SCS     DEGENERATION OF INTERVERTEBRAL DISC OF LUMBAR REGION WITH DISCOGENIC BACK PAIN AND LOWER EXTREMITY PAIN:  - Discussed caudal epidural injection as a treatment option for discogenic back pain and lower extremity pain.  - Discussed spinal cord " stimulation as a potential treatment option for discogenic back pain and lower extremity pain.  - Discussed acupuncture as a potential treatment option for temporary relief, particularly for muscle pain.    LIFESTYLE CHANGES:  - Discussed stem cell therapy.  - Not recommended for this patient's condition due to the chronic nature of the injury.         The above plan and management options were discussed at length with patient. Patient is in agreement with the above and verbalized understanding.     Chinmay Bennett MD  04/24/2025'    I spent a total of 30 minutes on the day of the visit.  This includes face to face time and non-face to face time preparing to see the patient by reviewing previous labs/imaging, obtaining and/or reviewing separately obtained history, documenting clinical information in the electronic or other health record, independently interpreting results and communicating results to the patient/family/caregiver. This note was generated with the assistance of ambient listening technology. Verbal consent was obtained by the patient and accompanying visitor(s) for the recording of patient appointment to facilitate this note. I attest to having reviewed and edited the generated note for accuracy, though some syntax or spelling errors may persist. Please contact the author of this note for any clarification.       Jason Gomez

## 2025-04-28 ENCOUNTER — PATIENT MESSAGE (OUTPATIENT)
Dept: PAIN MEDICINE | Facility: CLINIC | Age: 75
End: 2025-04-28
Payer: MEDICARE

## 2025-04-29 ENCOUNTER — RESULTS FOLLOW-UP (OUTPATIENT)
Dept: FAMILY MEDICINE | Facility: CLINIC | Age: 75
End: 2025-04-29

## 2025-04-29 ENCOUNTER — LAB VISIT (OUTPATIENT)
Dept: LAB | Facility: HOSPITAL | Age: 75
End: 2025-04-29
Attending: FAMILY MEDICINE
Payer: MEDICARE

## 2025-04-29 DIAGNOSIS — R73.9 ELEVATED RANDOM BLOOD GLUCOSE LEVEL: ICD-10-CM

## 2025-04-29 DIAGNOSIS — R42 DIZZINESS: ICD-10-CM

## 2025-04-29 LAB
ABSOLUTE EOSINOPHIL (OHS): 0.2 K/UL
ABSOLUTE MONOCYTE (OHS): 0.66 K/UL (ref 0.3–1)
ABSOLUTE NEUTROPHIL COUNT (OHS): 3.62 K/UL (ref 1.8–7.7)
ALBUMIN SERPL BCP-MCNC: 3.1 G/DL (ref 3.5–5.2)
ALP SERPL-CCNC: 117 UNIT/L (ref 40–150)
ALT SERPL W/O P-5'-P-CCNC: 10 UNIT/L (ref 10–44)
ANION GAP (OHS): 11 MMOL/L (ref 8–16)
AST SERPL-CCNC: 13 UNIT/L (ref 11–45)
BASOPHILS # BLD AUTO: 0.01 K/UL
BASOPHILS NFR BLD AUTO: 0.2 %
BILIRUB SERPL-MCNC: 0.3 MG/DL (ref 0.1–1)
BUN SERPL-MCNC: 17 MG/DL (ref 8–23)
CALCIUM SERPL-MCNC: 9 MG/DL (ref 8.7–10.5)
CHLORIDE SERPL-SCNC: 109 MMOL/L (ref 95–110)
CO2 SERPL-SCNC: 23 MMOL/L (ref 23–29)
CREAT SERPL-MCNC: 1.3 MG/DL (ref 0.5–1.4)
ERYTHROCYTE [DISTWIDTH] IN BLOOD BY AUTOMATED COUNT: 13.3 % (ref 11.5–14.5)
GFR SERPLBLD CREATININE-BSD FMLA CKD-EPI: 44 ML/MIN/1.73/M2
GLUCOSE SERPL-MCNC: 110 MG/DL (ref 70–110)
HCT VFR BLD AUTO: 36.4 % (ref 37–48.5)
HGB BLD-MCNC: 10.6 GM/DL (ref 12–16)
IMM GRANULOCYTES # BLD AUTO: 0.03 K/UL (ref 0–0.04)
IMM GRANULOCYTES NFR BLD AUTO: 0.5 % (ref 0–0.5)
LYMPHOCYTES # BLD AUTO: 1.85 K/UL (ref 1–4.8)
MCH RBC QN AUTO: 27.5 PG (ref 27–31)
MCHC RBC AUTO-ENTMCNC: 29.1 G/DL (ref 32–36)
MCV RBC AUTO: 95 FL (ref 82–98)
NUCLEATED RBC (/100WBC) (OHS): 0 /100 WBC
PLATELET # BLD AUTO: 256 K/UL (ref 150–450)
PMV BLD AUTO: 9.9 FL (ref 9.2–12.9)
POTASSIUM SERPL-SCNC: 4 MMOL/L (ref 3.5–5.1)
PROT SERPL-MCNC: 7.9 GM/DL (ref 6–8.4)
RBC # BLD AUTO: 3.85 M/UL (ref 4–5.4)
RELATIVE EOSINOPHIL (OHS): 3.1 %
RELATIVE LYMPHOCYTE (OHS): 29 % (ref 18–48)
RELATIVE MONOCYTE (OHS): 10.4 % (ref 4–15)
RELATIVE NEUTROPHIL (OHS): 56.8 % (ref 38–73)
SODIUM SERPL-SCNC: 143 MMOL/L (ref 136–145)
WBC # BLD AUTO: 6.37 K/UL (ref 3.9–12.7)

## 2025-04-29 PROCEDURE — 84460 ALANINE AMINO (ALT) (SGPT): CPT

## 2025-04-29 PROCEDURE — 83036 HEMOGLOBIN GLYCOSYLATED A1C: CPT

## 2025-04-29 PROCEDURE — 36415 COLL VENOUS BLD VENIPUNCTURE: CPT | Mod: PN

## 2025-04-29 PROCEDURE — 85025 COMPLETE CBC W/AUTO DIFF WBC: CPT

## 2025-04-29 PROCEDURE — 84075 ASSAY ALKALINE PHOSPHATASE: CPT

## 2025-04-30 LAB
EAG (OHS): 120 MG/DL (ref 68–131)
HBA1C MFR BLD: 5.8 % (ref 4–5.6)

## 2025-05-02 ENCOUNTER — TELEPHONE (OUTPATIENT)
Dept: PAIN MEDICINE | Facility: CLINIC | Age: 75
End: 2025-05-02
Payer: MEDICARE

## 2025-05-02 ENCOUNTER — PATIENT MESSAGE (OUTPATIENT)
Dept: PAIN MEDICINE | Facility: CLINIC | Age: 75
End: 2025-05-02
Payer: MEDICARE

## 2025-05-02 NOTE — TELEPHONE ENCOUNTER
----- Message from Adilia sent at 5/2/2025 12:47 PM CDT -----  Regarding: proceduere date  Name of Who is Calling: Amanda What is the request in detail: Patient is requesting a call back to see what other dates they have for procedure.  Can the clinic reply by [facility]: Yes What Number to Call Back if not in [facility]:  [phone]

## 2025-05-09 ENCOUNTER — OFFICE VISIT (OUTPATIENT)
Dept: FAMILY MEDICINE | Facility: CLINIC | Age: 75
End: 2025-05-09
Payer: MEDICARE

## 2025-05-09 ENCOUNTER — LAB VISIT (OUTPATIENT)
Dept: LAB | Facility: HOSPITAL | Age: 75
End: 2025-05-09
Attending: FAMILY MEDICINE
Payer: MEDICARE

## 2025-05-09 VITALS
TEMPERATURE: 98 F | RESPIRATION RATE: 17 BRPM | BODY MASS INDEX: 47.98 KG/M2 | DIASTOLIC BLOOD PRESSURE: 76 MMHG | WEIGHT: 281.06 LBS | HEIGHT: 64 IN | HEART RATE: 73 BPM | OXYGEN SATURATION: 97 % | SYSTOLIC BLOOD PRESSURE: 124 MMHG

## 2025-05-09 DIAGNOSIS — R73.03 PREDIABETES: ICD-10-CM

## 2025-05-09 DIAGNOSIS — I10 PRIMARY HYPERTENSION: Primary | ICD-10-CM

## 2025-05-09 DIAGNOSIS — I10 PRIMARY HYPERTENSION: ICD-10-CM

## 2025-05-09 DIAGNOSIS — G47.33 OSA (OBSTRUCTIVE SLEEP APNEA): ICD-10-CM

## 2025-05-09 DIAGNOSIS — R05.8 POST-VIRAL COUGH SYNDROME: ICD-10-CM

## 2025-05-09 DIAGNOSIS — D64.9 ANEMIA, UNSPECIFIED TYPE: ICD-10-CM

## 2025-05-09 DIAGNOSIS — N18.31 CHRONIC KIDNEY DISEASE (CKD) STAGE G3A/A1, MODERATELY DECREASED GLOMERULAR FILTRATION RATE (GFR) BETWEEN 45-59 ML/MIN/1.73 SQUARE METER AND ALBUMINURIA CREATININE RATIO LESS THAN 30 MG/G: ICD-10-CM

## 2025-05-09 DIAGNOSIS — M79.89 LEG SWELLING: ICD-10-CM

## 2025-05-09 DIAGNOSIS — N18.32 STAGE 3B CHRONIC KIDNEY DISEASE: ICD-10-CM

## 2025-05-09 DIAGNOSIS — M54.16 LUMBAR RADICULOPATHY: ICD-10-CM

## 2025-05-09 PROCEDURE — 82043 UR ALBUMIN QUANTITATIVE: CPT

## 2025-05-09 PROCEDURE — 99999 PR PBB SHADOW E&M-EST. PATIENT-LVL V: CPT | Mod: PBBFAC,,, | Performed by: FAMILY MEDICINE

## 2025-05-09 RX ORDER — OXYCODONE AND ACETAMINOPHEN 7.5; 325 MG/1; MG/1
1 TABLET ORAL EVERY 4 HOURS PRN
Qty: 120 TABLET | Refills: 0 | Status: SHIPPED | OUTPATIENT
Start: 2025-06-08 | End: 2025-07-08

## 2025-05-09 RX ORDER — OXYCODONE AND ACETAMINOPHEN 7.5; 325 MG/1; MG/1
1 TABLET ORAL EVERY 4 HOURS PRN
Qty: 120 TABLET | Refills: 0 | Status: SHIPPED | OUTPATIENT
Start: 2025-07-08 | End: 2025-08-07

## 2025-05-09 RX ORDER — OXYCODONE AND ACETAMINOPHEN 7.5; 325 MG/1; MG/1
1 TABLET ORAL EVERY 4 HOURS PRN
Qty: 120 TABLET | Refills: 0 | Status: SHIPPED | OUTPATIENT
Start: 2025-05-09 | End: 2025-06-08

## 2025-05-09 RX ORDER — ALBUTEROL SULFATE 90 UG/1
2 INHALANT RESPIRATORY (INHALATION) EVERY 6 HOURS PRN
Qty: 18 G | Refills: 0 | Status: SHIPPED | OUTPATIENT
Start: 2025-05-09 | End: 2026-05-09

## 2025-05-09 NOTE — PROGRESS NOTES
Chief Complaint   Patient presents with    Follow-up     Side effects from colonoscopy, questions about fluid medication        HPI  Amanda Holt is a 74 y.o. female with multiple medical diagnoses as listed in the medical history and problem list that presents for follow-up for chronic conditions     Pmhx: CKD stage 3, chronic low back pain, ANTONIA    Chronic pain- scheduled for epidural injection 5/23 for pain and will f/u afterwards  Currently taking oxycodone 7.5-325mg; has not been to aqua therapy due to hemorrhoids; she does take four pain pills daily and at times six pain pills, she is considering spinal cord stimulator, also takes lyrica for pain    Colonoscopy done in February and she had vomiting after she took the colonoscopy prep, she has been speaking with her GI doctor as she is having gas buildup and pain with sitting, She had recent f/u with Dr. Saldana and may be following up for banding; she is not having nausea/vomiting, she still has diarrhea with 3 loose stools in a hour; she is using preparation h and tucks pads    Recent labs show low protein, improved kidney function and elevated blood sugar with prediabetes 5.8, she is encouraged to improve her kidney function by taking more protein    She has some swelling in her legs worse this past few weeks, her renal doctor is concerned that she may need to stop aldactone; we don't have aldactone in her medication list at this time and its okay to stop it    Had some wheezing after a respiratory infection that occurred four weeks ago, no hx of asthma but did respond to albuterol inhaler however it ran out, no fever or chills but yesterday woke up with wheezing      ALLERGIES AND MEDICATIONS: updated and reviewed.  Review of patient's allergies indicates:   Allergen Reactions    Codeine      Medication List with Changes/Refills   New Medications    ALBUTEROL (PROVENTIL/VENTOLIN HFA) 90 MCG/ACTUATION INHALER    Inhale 2 puffs into the lungs every 6  (six) hours as needed for Wheezing.    OXYCODONE-ACETAMINOPHEN (PERCOCET) 7.5-325 MG PER TABLET    Take 1 tablet by mouth every 4 (four) hours as needed for Pain.    OXYCODONE-ACETAMINOPHEN (PERCOCET) 7.5-325 MG PER TABLET    Take 1 tablet by mouth every 4 (four) hours as needed for Pain.   Current Medications    ACETAMINOPHEN (TYLENOL) 500 MG TABLET    Take 2 tablets (1,000 mg total) by mouth every 6 (six) hours as needed.    AMITRIPTYLINE (ELAVIL) 50 MG TABLET    Take 1 tablet (50 mg total) by mouth every evening.    ASPIRIN (ECOTRIN) 81 MG EC TABLET        AZELASTINE (ASTELIN) 137 MCG (0.1 %) NASAL SPRAY    1 spray (137 mcg total) by Nasal route 2 (two) times daily.    CYANOCOBALAMIN, VITAMIN B-12, (B-12 COMPLIANCE) 1,000 MCG/ML KIT    Inject 1 mL as directed every 28 days.    DICLOFENAC SODIUM (VOLTAREN ARTHRITIS PAIN) 1 % GEL    Apply 2 g topically once daily.    ERGOCALCIFEROL (ERGOCALCIFEROL) 50,000 UNIT CAP    Take 1 capsule by mouth every 7 days.    FLUTICASONE PROPIONATE (FLONASE) 50 MCG/ACTUATION NASAL SPRAY    1 spray (50 mcg total) by Each Nostril route once daily.    FLUTICASONE PROPIONATE (FLONASE) 50 MCG/ACTUATION NASAL SPRAY    1 spray (50 mcg total) by Each Nostril route 2 (two) times daily.    HYDRALAZINE (APRESOLINE) 100 MG TABLET    Take 1 tablet (100 mg total) by mouth every 12 (twelve) hours.    HYDROXYZINE HCL (ATARAX) 25 MG TABLET    Take 1 tablet by mouth three times daily as needed    METOPROLOL SUCCINATE (TOPROL-XL) 25 MG 24 HR TABLET    Take 1 tablet by mouth once daily    MOMETASONE (ELOCON) 0.1 % SOLUTION    Apply once to twice daily prn itching    MUPIROCIN (BACTROBAN) 2 % OINTMENT    Apply topically 3 (three) times daily.    NIFEDIPINE (PROCARDIA-XL) 60 MG (OSM) 24 HR TABLET    Take 1 tablet (60 mg total) by mouth once daily.    NYSTATIN (MYCOSTATIN) POWDER    APPLY  POWDER TOPICALLY TO AFFECTED AREA 4 TIMES DAILY    OLOPATADINE (PATANOL) 0.1 % OPHTHALMIC SOLUTION    Place 1 drop  into both eyes 2 (two) times daily.    OMEPRAZOLE (PRILOSEC) 40 MG CAPSULE    Take 1 capsule by mouth.    OXYCODONE-ACETAMINOPHEN (PERCOCET) 7.5-325 MG PER TABLET    Take 1 tablet by mouth every 4 (four) hours as needed for Pain.    PRAVASTATIN (PRAVACHOL) 40 MG TABLET    Take 1 tablet by mouth once daily    PREGABALIN (LYRICA) 75 MG CAPSULE    Take 1 capsule (75 mg total) by mouth 2 (two) times daily.    TIZANIDINE (ZANAFLEX) 4 MG TABLET    TAKE 1 TABLET BY MOUTH EVERY 6 HOURS AS NEEDED    TRIAMCINOLONE ACETONIDE 0.1%-CICLOPIROX-MAGNESIUM HYDROXIDE 400 MG/5 ML    Apply to affected area twice a day after cool blow dry    VALSARTAN (DIOVAN) 320 MG TABLET    Take 1 tablet by mouth once daily   Changed and/or Refilled Medications    Modified Medication Previous Medication    OXYCODONE-ACETAMINOPHEN (PERCOCET) 7.5-325 MG PER TABLET oxyCODONE-acetaminophen (PERCOCET) 7.5-325 mg per tablet       Take 1 tablet by mouth every 4 (four) hours as needed for Pain.    Take 1 tablet by mouth every 4 (four) hours as needed for Pain.       ROS  Review of Systems   Constitutional:  Negative for chills, diaphoresis, fatigue, fever and unexpected weight change.   HENT:  Negative for rhinorrhea, sinus pressure, sore throat and tinnitus.    Eyes:  Negative for photophobia and visual disturbance.   Respiratory:  Negative for cough, shortness of breath and wheezing.    Cardiovascular:  Positive for leg swelling. Negative for chest pain and palpitations.   Gastrointestinal:  Positive for diarrhea. Negative for abdominal pain, blood in stool, constipation, nausea and vomiting.   Genitourinary:  Negative for dysuria, flank pain, frequency and vaginal discharge.   Musculoskeletal:  Positive for arthralgias and back pain. Negative for joint swelling.   Skin:  Negative for rash.   Neurological:  Negative for speech difficulty, weakness, light-headedness and headaches.   Psychiatric/Behavioral:  Negative for behavioral problems and dysphoric  "mood. The patient is nervous/anxious.        Physical Exam  Vitals:    05/09/25 0923   BP: 124/76   BP Location: Right arm   Patient Position: Sitting   Pulse: 73   Resp: 17   Temp: 98 °F (36.7 °C)   TempSrc: Oral   SpO2: 97%   Weight: 127.5 kg (281 lb 1.4 oz)   Height: 5' 4" (1.626 m)    Body mass index is 48.25 kg/m².  Weight: 127.5 kg (281 lb 1.4 oz)   Height: 5' 4" (162.6 cm)     Physical Exam  Vitals reviewed.   Constitutional:       General: She is not in acute distress.     Appearance: She is well-developed.   HENT:      Head: Normocephalic and atraumatic.   Cardiovascular:      Rate and Rhythm: Normal rate and regular rhythm.      Heart sounds: No murmur heard.     No friction rub. No gallop.   Pulmonary:      Effort: Pulmonary effort is normal. No respiratory distress.      Breath sounds: Normal breath sounds. No wheezing or rales.   Skin:     General: Skin is warm and dry.      Findings: No rash.   Neurological:      Mental Status: She is alert. Mental status is at baseline.   Psychiatric:         Behavior: Behavior normal.         Thought Content: Thought content normal.           Health maintenance reviewed and addressed as ordered      ASSESSMENT/PLAN     1. Primary hypertension (Primary)  On digital medicine  Needs more frequent monitoring  Will f/u in 1 month as hydralazine may be causing swelling  - CBC Auto Differential; Future  - Comprehensive Metabolic Panel; Future    2. Prediabetes  Continue to monitor with routine labs  - Hemoglobin A1C; Future    3. Anemia, unspecified type  Will check iron  - Iron and TIBC; Future  - Ferritin; Future    4. Stage 3b chronic kidney disease  Has f/u with nephrology  Recommend she stop spironolactone as it has been discontinued since last year  Low albumin, encourage increase in dietary protein    5. Lumbar radiculopathy   [place] queried/reviewed  No signs of aberrant behavior  Recommend she take tylenol with pain medication   Pending WADE and considering " spinal cord stimulator  - oxyCODONE-acetaminophen (PERCOCET) 7.5-325 mg per tablet; Take 1 tablet by mouth every 4 (four) hours as needed for Pain.  Dispense: 120 tablet; Refill: 0  - oxyCODONE-acetaminophen (PERCOCET) 7.5-325 mg per tablet; Take 1 tablet by mouth every 4 (four) hours as needed for Pain.  Dispense: 120 tablet; Refill: 0  - oxyCODONE-acetaminophen (PERCOCET) 7.5-325 mg per tablet; Take 1 tablet by mouth every 4 (four) hours as needed for Pain.  Dispense: 120 tablet; Refill: 0    6. ANTONIA (obstructive sleep apnea)  Recommend compliance with CPAP    7. Leg swelling  Will f/u in 1 month   Mild edema non pitting non tender  Hold fluid pills due to dehydration  ? Hydralazine side effect    8. Post-viral cough syndrome  For prn use  If no improvement or wheezing continues will consult pulm  - albuterol (PROVENTIL/VENTOLIN HFA) 90 mcg/actuation inhaler; Inhale 2 puffs into the lungs every 6 (six) hours as needed for Wheezing.  Dispense: 18 g; Refill: 0      Simona Torres MD  05/09/2025 9:32 AM        Follow up in about 3 months (around 8/9/2025) for management of chronic conditions.    Orders Placed This Encounter   Procedures    CBC Auto Differential    Comprehensive Metabolic Panel    Hemoglobin A1C    Iron and TIBC    Ferritin

## 2025-05-10 LAB
ALBUMIN/CREAT UR: NORMAL
CREAT UR-MCNC: 44 MG/DL (ref 15–325)
MICROALBUMIN UR-MCNC: <5 UG/ML (ref ?–5000)

## 2025-05-12 ENCOUNTER — RESULTS FOLLOW-UP (OUTPATIENT)
Dept: FAMILY MEDICINE | Facility: CLINIC | Age: 75
End: 2025-05-12

## 2025-05-23 ENCOUNTER — HOSPITAL ENCOUNTER (OUTPATIENT)
Facility: OTHER | Age: 75
Discharge: HOME | End: 2025-05-23
Attending: ANESTHESIOLOGY | Admitting: ANESTHESIOLOGY
Payer: MEDICARE

## 2025-05-23 VITALS
RESPIRATION RATE: 18 BRPM | DIASTOLIC BLOOD PRESSURE: 104 MMHG | TEMPERATURE: 99 F | HEIGHT: 64 IN | OXYGEN SATURATION: 100 % | HEART RATE: 87 BPM | WEIGHT: 270 LBS | SYSTOLIC BLOOD PRESSURE: 140 MMHG | BODY MASS INDEX: 46.1 KG/M2

## 2025-05-23 DIAGNOSIS — G89.29 CHRONIC PAIN: ICD-10-CM

## 2025-05-23 DIAGNOSIS — M54.16 LUMBAR RADICULOPATHY: Primary | ICD-10-CM

## 2025-05-23 PROCEDURE — 62323 NJX INTERLAMINAR LMBR/SAC: CPT | Mod: ,,, | Performed by: ANESTHESIOLOGY

## 2025-05-23 PROCEDURE — 63600175 PHARM REV CODE 636 W HCPCS: Performed by: ANESTHESIOLOGY

## 2025-05-23 PROCEDURE — 62323 NJX INTERLAMINAR LMBR/SAC: CPT | Performed by: ANESTHESIOLOGY

## 2025-05-23 PROCEDURE — 25500020 PHARM REV CODE 255: Performed by: ANESTHESIOLOGY

## 2025-05-23 RX ORDER — MIDAZOLAM HYDROCHLORIDE 1 MG/ML
INJECTION INTRAMUSCULAR; INTRAVENOUS
Status: DISCONTINUED | OUTPATIENT
Start: 2025-05-23 | End: 2025-05-23 | Stop reason: HOSPADM

## 2025-05-23 RX ORDER — DEXAMETHASONE SODIUM PHOSPHATE 10 MG/ML
INJECTION, SOLUTION INTRA-ARTICULAR; INTRALESIONAL; INTRAMUSCULAR; INTRAVENOUS; SOFT TISSUE
Status: DISCONTINUED | OUTPATIENT
Start: 2025-05-23 | End: 2025-05-23 | Stop reason: HOSPADM

## 2025-05-23 RX ORDER — LIDOCAINE HYDROCHLORIDE 20 MG/ML
INJECTION, SOLUTION INFILTRATION; PERINEURAL
Status: DISCONTINUED | OUTPATIENT
Start: 2025-05-23 | End: 2025-05-23 | Stop reason: HOSPADM

## 2025-05-23 RX ORDER — FENTANYL CITRATE 50 UG/ML
INJECTION, SOLUTION INTRAMUSCULAR; INTRAVENOUS
Status: DISCONTINUED | OUTPATIENT
Start: 2025-05-23 | End: 2025-05-23 | Stop reason: HOSPADM

## 2025-05-23 RX ORDER — SODIUM CHLORIDE 9 MG/ML
INJECTION, SOLUTION INTRAVENOUS CONTINUOUS
Status: DISCONTINUED | OUTPATIENT
Start: 2025-05-23 | End: 2025-05-23 | Stop reason: HOSPADM

## 2025-05-23 RX ORDER — LIDOCAINE HYDROCHLORIDE 10 MG/ML
INJECTION, SOLUTION EPIDURAL; INFILTRATION; INTRACAUDAL; PERINEURAL
Status: DISCONTINUED | OUTPATIENT
Start: 2025-05-23 | End: 2025-05-23 | Stop reason: HOSPADM

## 2025-05-23 NOTE — DISCHARGE INSTRUCTIONS

## 2025-05-23 NOTE — OP NOTE
Caudal Epidural Steroid Injection with Catheter under Fluoroscopic Guidance    The procedure, risks, benefits, and options were discussed with the patient. There are no contraindications to the procedure. The patent expressed understanding and agreed to the procedure. Informed written consent was obtained prior to the start of the procedure and can be found in the patient's chart.    PATIENT NAME: Amanda Holt   MRN: [id number]     DATE OF PROCEDURE: 05/23/2025    PROCEDURE: Caudal Epidural Steroid Injection under Fluoroscopic Guidance    PRE-OP DIAGNOSIS: Lumbar radiculopathy [M54.16] Lumbar radiculopathy [M54.16]    POST-OP DIAGNOSIS: Same    PHYSICIAN: Jason Gomez MD    ASSISTANTS: Chinmay Bennett MD  [facility] Fellow       MEDICATIONS INJECTED: Preservative-free Decadron 10mg with 4cc of Lidocaine 1% MPF     LOCAL ANESTHETIC INJECTED: Xylocaine 2%     SEDATION: Versed 2mg and Fentanyl 100mcg                                                                                                                                                                                     Conscious sedation ordered by M.D. Patient re-evaluation prior to administration of conscious sedation. No changes noted in patient's status from initial evaluation. The patient's vital signs were monitored by RN and patient remained hemodynamically stable throughout the procedure.    Event Time In   Sedation Start 1027   Sedation End 1036       ESTIMATED BLOOD LOSS: None    COMPLICATIONS: None    TECHNIQUE: Time-out was performed to identify the patient and procedure to be performed. With the patient laying in a prone position, the surgical area was prepped and draped in the usual sterile fashion using ChloraPrep and a fenestrated drape. The injection site was determined under fluoroscopy guidance. Skin anesthesia was achieved by injecting Lidocaine 2% over the injection site. The sacrum and sacral cornua were then approached with a 16  gauge, 3.5 inch epidural needle that was introduced and advanced into the sacral hiatus under fluoroscopic guidance with AP, lateral and/or contralateral oblique imaging. After the needle passed through the sacrococcygeal ligament, the needle angle was lowered and the needle was advanced 1 cm. After negative aspiration of blood or CSF, and no evidence of paraesthesias, the catheter was threaded through the needle into the epidural space using live fluoroscopy, contrast dye Omnipaque (300mg/mL) was injected to confirm placement and there was no vascular runoff. Fluoroscopic imaging in the AP and lateral views revealed a clear outline of the spinal nerve with proximal spread of agent through the caudal epidural space. Then 5 mL of the medication mixture listed above was injected slowly. Displacement of the radio opaque contrast after injection of the medication confirmed that the medication went into the area of the transforaminal spaces. The needles were removed and bleeding was nil. A sterile dressing was applied. No specimens collected. The patient tolerated the procedure well.     The patient was monitored after the procedure in the recovery area. They were given post-procedure and discharge instructions to follow at home. The patient was discharged in a stable condition.    Chinmay Bennett MD     I reviewed and edited the fellow's note. I conducted my own interview and physical examination. I agree with the findings. I was present and supervising all critical portions of the procedure.    Jason Gomez MD

## 2025-05-23 NOTE — DISCHARGE SUMMARY
Discharge Note  Short Stay      SUMMARY     Admit Date: 5/23/2025    Attending Physician: Jason Gomez MD    Discharge Physician: Jason Gomez MD      Discharge Date: 5/23/2025 10:21 AM    Procedure(s) (LRB):  CAUDAL WADE *ASPIRIN OTC* HOLD FOR 5 DAYS (N/A)    Final Diagnosis: Lumbar radiculopathy [M54.16]    Disposition: Home or self care    Patient Instructions:   Current Discharge Medication List        CONTINUE these medications which have NOT CHANGED    Details   aspirin (ECOTRIN) 81 MG EC tablet       acetaminophen (TYLENOL) 500 MG tablet Take 2 tablets (1,000 mg total) by mouth every 6 (six) hours as needed.  Qty: 28 tablet, Refills: 0      albuterol (PROVENTIL/VENTOLIN HFA) 90 mcg/actuation inhaler Inhale 2 puffs into the lungs every 6 (six) hours as needed for Wheezing.  Qty: 18 g, Refills: 0    Associated Diagnoses: Post-viral cough syndrome      amitriptyline (ELAVIL) 50 MG tablet Take 1 tablet (50 mg total) by mouth every evening.  Qty: 90 tablet, Refills: 1    Associated Diagnoses: Major depressive disorder, recurrent, mild      azelastine (ASTELIN) 137 mcg (0.1 %) nasal spray 1 spray (137 mcg total) by Nasal route 2 (two) times daily.  Qty: 30 mL, Refills: 3    Associated Diagnoses: Seasonal allergic rhinitis, unspecified trigger      cyanocobalamin, vitamin B-12, (B-12 COMPLIANCE) 1,000 mcg/mL Kit Inject 1 mL as directed every 28 days.  Qty: 1 kit, Refills: 5    Associated Diagnoses: B12 deficiency      diclofenac sodium (VOLTAREN ARTHRITIS PAIN) 1 % Gel Apply 2 g topically once daily.  Qty: 100 g, Refills: 0      ergocalciferol (ERGOCALCIFEROL) 50,000 unit Cap Take 1 capsule by mouth every 7 days.      !! fluticasone propionate (FLONASE) 50 mcg/actuation nasal spray 1 spray (50 mcg total) by Each Nostril route once daily.  Qty: 48 g, Refills: 3    Associated Diagnoses: Nasal drainage      !! fluticasone propionate (FLONASE) 50 mcg/actuation nasal spray 1 spray (50 mcg total) by Each Nostril  route 2 (two) times daily.  Qty: 18.2 mL, Refills: 3    Associated Diagnoses: Seasonal allergic rhinitis, unspecified trigger      hydrALAZINE (APRESOLINE) 100 MG tablet Take 1 tablet (100 mg total) by mouth every 12 (twelve) hours.  Qty: 180 tablet, Refills: 3    Comments: .  Associated Diagnoses: Primary hypertension      hydrOXYzine HCL (ATARAX) 25 MG tablet Take 1 tablet by mouth three times daily as needed  Qty: 45 tablet, Refills: 0    Associated Diagnoses: Anxiety      metoprolol succinate (TOPROL-XL) 25 MG 24 hr tablet Take 1 tablet by mouth once daily  Qty: 90 tablet, Refills: 3    Comments: .  Associated Diagnoses: Primary hypertension      mometasone (ELOCON) 0.1 % solution Apply once to twice daily prn itching  Qty: 60 mL, Refills: 5    Associated Diagnoses: Dysesthesia of scalp      mupirocin (BACTROBAN) 2 % ointment Apply topically 3 (three) times daily.  Qty: 22 g, Refills: 0    Associated Diagnoses: Inflamed epidermoid cyst of skin      NIFEdipine (PROCARDIA-XL) 60 MG (OSM) 24 hr tablet Take 1 tablet (60 mg total) by mouth once daily.  Qty: 30 tablet, Refills: 2    Associated Diagnoses: Primary hypertension      nystatin (MYCOSTATIN) powder APPLY  POWDER TOPICALLY TO AFFECTED AREA 4 TIMES DAILY  Qty: 30 g, Refills: 5    Associated Diagnoses: Candidal intertrigo      olopatadine (PATANOL) 0.1 % ophthalmic solution Place 1 drop into both eyes 2 (two) times daily.  Qty: 5 mL, Refills: 0    Associated Diagnoses: Allergic conjunctivitis of both eyes      omeprazole (PRILOSEC) 40 MG capsule Take 1 capsule by mouth.      !! oxyCODONE-acetaminophen (PERCOCET) 7.5-325 mg per tablet Take 1 tablet by mouth every 4 (four) hours as needed for Pain.  Qty: 120 tablet, Refills: 0    Comments: Quantity prescribed more than 7 day supply? Yes, quantity medically necessary  Associated Diagnoses: Lumbar radiculopathy      !! oxyCODONE-acetaminophen (PERCOCET) 7.5-325 mg per tablet Take 1 tablet by mouth every 4 (four)  hours as needed for Pain.  Qty: 120 tablet, Refills: 0    Comments: Quantity prescribed more than 7 day supply? Yes, quantity medically necessary  Associated Diagnoses: Lumbar radiculopathy      !! oxyCODONE-acetaminophen (PERCOCET) 7.5-325 mg per tablet Take 1 tablet by mouth every 4 (four) hours as needed for Pain.  Qty: 120 tablet, Refills: 0    Comments: Quantity prescribed more than 7 day supply? Yes, quantity medically necessary  Associated Diagnoses: Lumbar radiculopathy      !! oxyCODONE-acetaminophen (PERCOCET) 7.5-325 mg per tablet Take 1 tablet by mouth every 4 (four) hours as needed for Pain.  Qty: 120 tablet, Refills: 0    Comments: Quantity prescribed more than 7 day supply? Yes, quantity medically necessary  Associated Diagnoses: Lumbar radiculopathy      pravastatin (PRAVACHOL) 40 MG tablet Take 1 tablet by mouth once daily  Qty: 90 tablet, Refills: 1    Associated Diagnoses: Atherosclerosis of aorta      pregabalin (LYRICA) 75 MG capsule Take 1 capsule (75 mg total) by mouth 2 (two) times daily.  Qty: 60 capsule, Refills: 2    Associated Diagnoses: Spinal stenosis, unspecified spinal region      tiZANidine (ZANAFLEX) 4 MG tablet TAKE 1 TABLET BY MOUTH EVERY 6 HOURS AS NEEDED  Qty: 90 tablet, Refills: 3    Associated Diagnoses: Lumbar radiculopathy      triamcinolone acetonide 0.1%-ciclopirox-magnesium hydroxide 400 mg/5 ml Apply to affected area twice a day after cool blow dry  Qty: 60 g, Refills: 5    Comments: Pharmacist: mix 30 grams of TAC 0.1% cream with 30 grams of Loprox cream in 120cc of Milk of Magnesia  Associated Diagnoses: Intertrigo      valsartan (DIOVAN) 320 MG tablet Take 1 tablet by mouth once daily  Qty: 90 tablet, Refills: 3    Comments: .  Associated Diagnoses: Hypertension, uncontrolled       !! - Potential duplicate medications found. Please discuss with provider.              Discharge Diagnosis: Lumbar radiculopathy [M54.16]  Condition on Discharge: Stable with no  complications to procedure   Diet on Discharge: Same as before.  Activity: as per instruction sheet.  Discharge to: Home with a responsible adult.  Follow up: 2-4 weeks       Please call my office or pager at [phone] if experienced any weakness or loss of sensation, fever > 101.5, pain uncontrolled with oral medications, persistent nausea/vomiting/or diarrhea, redness or drainage from the incisions, or any other worrisome concerns. If physician on call was not reached or could not communicate with our office for any reason please go to the nearest emergency department     Chinmay Bennett M.D.  PGY-5  Interventional Pain Management Fellow  [facility]  Pager: [phone]

## 2025-06-02 ENCOUNTER — TELEPHONE (OUTPATIENT)
Dept: PAIN MEDICINE | Facility: CLINIC | Age: 75
End: 2025-06-02
Payer: MEDICARE

## 2025-06-09 ENCOUNTER — OFFICE VISIT (OUTPATIENT)
Dept: FAMILY MEDICINE | Facility: CLINIC | Age: 75
End: 2025-06-09
Payer: MEDICARE

## 2025-06-09 ENCOUNTER — OFFICE VISIT (OUTPATIENT)
Dept: PAIN MEDICINE | Facility: CLINIC | Age: 75
End: 2025-06-09
Payer: MEDICARE

## 2025-06-09 VITALS
SYSTOLIC BLOOD PRESSURE: 136 MMHG | WEIGHT: 281.06 LBS | HEIGHT: 64 IN | RESPIRATION RATE: 17 BRPM | BODY MASS INDEX: 47.98 KG/M2 | DIASTOLIC BLOOD PRESSURE: 92 MMHG | OXYGEN SATURATION: 98 % | HEART RATE: 84 BPM | TEMPERATURE: 98 F

## 2025-06-09 DIAGNOSIS — F41.9 ANXIETY: ICD-10-CM

## 2025-06-09 DIAGNOSIS — Z12.31 ENCOUNTER FOR SCREENING MAMMOGRAM FOR BREAST CANCER: ICD-10-CM

## 2025-06-09 DIAGNOSIS — M54.16 LUMBAR RADICULOPATHY: Primary | ICD-10-CM

## 2025-06-09 DIAGNOSIS — F33.0 MAJOR DEPRESSIVE DISORDER, RECURRENT, MILD: ICD-10-CM

## 2025-06-09 DIAGNOSIS — I10 PRIMARY HYPERTENSION: Primary | ICD-10-CM

## 2025-06-09 DIAGNOSIS — M79.18 MYOFASCIAL PAIN: ICD-10-CM

## 2025-06-09 DIAGNOSIS — M96.1 POST LAMINECTOMY SYNDROME: ICD-10-CM

## 2025-06-09 DIAGNOSIS — M51.362 DEGENERATION OF INTERVERTEBRAL DISC OF LUMBAR REGION WITH DISCOGENIC BACK PAIN AND LOWER EXTREMITY PAIN: ICD-10-CM

## 2025-06-09 DIAGNOSIS — M48.00 SPINAL STENOSIS, UNSPECIFIED SPINAL REGION: ICD-10-CM

## 2025-06-09 DIAGNOSIS — M51.360 DEGENERATION OF INTERVERTEBRAL DISC OF LUMBAR REGION WITH DISCOGENIC BACK PAIN: ICD-10-CM

## 2025-06-09 DIAGNOSIS — G89.29 OTHER CHRONIC PAIN: ICD-10-CM

## 2025-06-09 DIAGNOSIS — M70.62 GREATER TROCHANTERIC BURSITIS OF LEFT HIP: ICD-10-CM

## 2025-06-09 DIAGNOSIS — G57.03 PIRIFORMIS SYNDROME OF BOTH SIDES: ICD-10-CM

## 2025-06-09 PROCEDURE — 99999 PR PBB SHADOW E&M-EST. PATIENT-LVL V: CPT | Mod: PBBFAC,,, | Performed by: FAMILY MEDICINE

## 2025-06-09 PROCEDURE — 3008F BODY MASS INDEX DOCD: CPT | Mod: CPTII,S$GLB,, | Performed by: FAMILY MEDICINE

## 2025-06-09 PROCEDURE — 99214 OFFICE O/P EST MOD 30 MIN: CPT | Mod: S$GLB,,, | Performed by: FAMILY MEDICINE

## 2025-06-09 PROCEDURE — 1160F RVW MEDS BY RX/DR IN RCRD: CPT | Mod: CPTII,95,,

## 2025-06-09 PROCEDURE — 1159F MED LIST DOCD IN RCRD: CPT | Mod: CPTII,S$GLB,, | Performed by: FAMILY MEDICINE

## 2025-06-09 PROCEDURE — 3080F DIAST BP >= 90 MM HG: CPT | Mod: CPTII,S$GLB,, | Performed by: FAMILY MEDICINE

## 2025-06-09 PROCEDURE — 3075F SYST BP GE 130 - 139MM HG: CPT | Mod: CPTII,S$GLB,, | Performed by: FAMILY MEDICINE

## 2025-06-09 PROCEDURE — 3061F NEG MICROALBUMINURIA REV: CPT | Mod: CPTII,S$GLB,, | Performed by: FAMILY MEDICINE

## 2025-06-09 PROCEDURE — 3066F NEPHROPATHY DOC TX: CPT | Mod: CPTII,S$GLB,, | Performed by: FAMILY MEDICINE

## 2025-06-09 PROCEDURE — 98006 SYNCH AUDIO-VIDEO EST MOD 30: CPT | Mod: 95,,,

## 2025-06-09 PROCEDURE — 3061F NEG MICROALBUMINURIA REV: CPT | Mod: CPTII,95,,

## 2025-06-09 PROCEDURE — 4010F ACE/ARB THERAPY RXD/TAKEN: CPT | Mod: CPTII,95,,

## 2025-06-09 PROCEDURE — 4010F ACE/ARB THERAPY RXD/TAKEN: CPT | Mod: CPTII,S$GLB,, | Performed by: FAMILY MEDICINE

## 2025-06-09 PROCEDURE — 1101F PT FALLS ASSESS-DOCD LE1/YR: CPT | Mod: CPTII,S$GLB,, | Performed by: FAMILY MEDICINE

## 2025-06-09 PROCEDURE — 3288F FALL RISK ASSESSMENT DOCD: CPT | Mod: CPTII,S$GLB,, | Performed by: FAMILY MEDICINE

## 2025-06-09 PROCEDURE — 3044F HG A1C LEVEL LT 7.0%: CPT | Mod: CPTII,95,,

## 2025-06-09 PROCEDURE — 3044F HG A1C LEVEL LT 7.0%: CPT | Mod: CPTII,S$GLB,, | Performed by: FAMILY MEDICINE

## 2025-06-09 PROCEDURE — 1125F AMNT PAIN NOTED PAIN PRSNT: CPT | Mod: CPTII,S$GLB,, | Performed by: FAMILY MEDICINE

## 2025-06-09 PROCEDURE — 3066F NEPHROPATHY DOC TX: CPT | Mod: CPTII,95,,

## 2025-06-09 PROCEDURE — 1159F MED LIST DOCD IN RCRD: CPT | Mod: CPTII,95,,

## 2025-06-09 RX ORDER — HYDRALAZINE HYDROCHLORIDE 100 MG/1
100 TABLET, FILM COATED ORAL EVERY 12 HOURS
Qty: 180 TABLET | Refills: 3 | Status: SHIPPED | OUTPATIENT
Start: 2025-06-09 | End: 2026-06-09

## 2025-06-09 RX ORDER — AMITRIPTYLINE HYDROCHLORIDE 50 MG/1
50 TABLET, FILM COATED ORAL NIGHTLY
Qty: 90 TABLET | Refills: 1 | Status: SHIPPED | OUTPATIENT
Start: 2025-06-09 | End: 2026-06-09

## 2025-06-09 RX ORDER — HYDROXYZINE HYDROCHLORIDE 25 MG/1
25 TABLET, FILM COATED ORAL 3 TIMES DAILY PRN
Qty: 45 TABLET | Refills: 0 | Status: SHIPPED | OUTPATIENT
Start: 2025-06-09

## 2025-06-09 RX ORDER — PREGABALIN 75 MG/1
75 CAPSULE ORAL 2 TIMES DAILY
Qty: 60 CAPSULE | Refills: 2 | Status: SHIPPED | OUTPATIENT
Start: 2025-06-09 | End: 2025-12-08

## 2025-06-09 RX ORDER — NIFEDIPINE 60 MG/1
60 TABLET, EXTENDED RELEASE ORAL DAILY
Qty: 90 TABLET | Refills: 1 | Status: SHIPPED | OUTPATIENT
Start: 2025-06-09 | End: 2025-09-07

## 2025-06-09 RX ORDER — METOPROLOL SUCCINATE 25 MG/1
25 TABLET, EXTENDED RELEASE ORAL DAILY
Qty: 90 TABLET | Refills: 1 | Status: SHIPPED | OUTPATIENT
Start: 2025-06-09 | End: 2025-09-07

## 2025-06-09 NOTE — PROGRESS NOTES
Interventional Pain Management - Established Visit  Virtual     The patient location is: [place]  The chief complaint leading to consultation is:back pain     Visit type: audiovisual     Face to Face time with patient: 11  30 minutes of total time spent on the encounter, which includes face to face time and non-face to face time preparing to see the patient (eg, review of tests), Obtaining and/or reviewing separately obtained history, Documenting clinical information in the electronic or other health record, Independently interpreting results (not separately reported) and communicating results to the patient/family/caregiver, or Care coordination (not separately reported).            Each patient to whom he or she provides medical services by telemedicine is:  (1) informed of the relationship between the physician and patient and the respective role of any other health care provider with respect to management of the patient; and (2) notified that he or she may decline to receive medical services by telemedicine and may withdraw from such care at any time.     Notes:         Interval History 6/9/2025 (VIRTUAL):  Amanda Holt returns for virtual follow-up after Caudal WADE on 5/23/2025. She reports some relief that makes her pain more tolerable. She states her pain has been on both side over the last couple nights and thinks this is muscular. She reports some relief, however, she has not been active since the procedure. She continues with soreness to the tailbone. She is going to the grocery store today and she will see how her pain is. She continues with Oxycodone, Elavil, Pregabalin from her PCP with some benefit. She has started using Voltaren gel with benefit. She denies any perceived side effects. She has been dealing with the loss of a couple family members over the last couple weeks and she has been dealing and focusing on that. She has not been able to research and think about SCS implantation  recently. She denies recent health changes. She denies recent falls or trauma. She denies new onset fever/night sweats, urinary incontinence, bowel incontinence, significant weight changes, significant motor weakness or changes, or loss of sensations. Her pain today is 8/10.      Interval History 4/24/2025:  Patient returns for virtual follow up of lower back pain and radiculopathy. She has a h/o L4/5 pedicle screw placement unilaterally in 2018. Patient reports minimal relief after her Left L5/S1 and S1 TFESI on 1/17/25. Patient states she had one week of good relief but once she started more activity her left leg became numb. Patient continues to have constant low back pain. The numbness is focused to her L anterior and posterior thigh, she endorses weakness. Patient met with Dr. Kurtz with [facility] who sent her for repeat lumbar CT, results below. Patient is not interested in a repeat surgery. Her pain today is 8/10. She has been unable to attend aqua therapy because of her pain.     IMAGING:  A CT of the L Spine revealed moderate spinal canal stenosis at L3-L4 and severe neural foraminal narrowing in the lower levels. An EMG showed S1 radiculopathy and L5 radiculopathy.    SURGICAL HISTORY:  Patient underwent a laminectomy and left-sided fusion at L4-L5 in 2018.    WORK STATUS:  Patient's quality of life is significantly reduced due to leg numbness. She has limited ability to stand or walk for extended periods. Her activities and mobility are restricted.      Interval History 2/3/2025:  Amanda Holt returns for VIRTUAL follow-up after left L5/S1 and S1 TFESI on 1/17/2025. She reports 70%% relief. She continues aquatic therapy. She has noticed less numbness in the right leg. She continues with left-sided thigh pain with housework and going up the stairs. She continues Percocet 7.5/325 mg from her PCP with some benefit. She denies recent health changes. She denies recent falls or trauma. She denies new  onset fever/night sweats, urinary incontinence, bowel incontinence, significant weight changes, significant motor weakness or changes, or loss of sensations. Her pain today is 10/10.      Interval History 1/2/2025:    Amanda Holt presents to the clinic for the evaluation of lower back pain that started several months ago.  Patient describes the pain as sharp achy and shooting with radiation down her left lower extremity.  Patient said the pain radiates all the way to her left feet. Patient said her entire left leg becomes numb and weak especially after walking for about 10 minutes.  Her pain score today is 8/10. The pain is rated with a score of  6/10 on the BEST day and a score of 10/10 on the WORST day.  Symptoms interfere with daily activity, sleeping, and work. The pain is exacerbated by Standing, Bending, Walking, Lifting, and Getting out of bed/chair.  The pain is mitigated by 7.5 Percocet . She reports spending 3 hours per day reclining. The patient reports 4 hours of uninterrupted sleep per night.    Patient denies night fever/night sweats, urinary incontinence, bowel incontinence, and significant weight loss.  Patient reports weakness and numbness in the left lower extremity.       Interval History 10/11/2024:  Amanda Holt returns to clinic for follow-up after Left L3/4 and L5/S1 TFESI on 9/27/2024. She reports no relief of back or leg pain. She continues with left leg numbness with walking more than 8-10 minutes. She states it is the entire left leg, and does not describe the numbness in a specific dermatome. She continues Percocet and Tizanidine from her PCP with good relief. She denies any perceived side effects. She states she has not participated in PT or HEP in a couple of years. She states she did well in Aquatic Therapy in the past, as traditional Physical Therapy has been too painful to complete. She denies recent health changes. She denies recent falls or trauma. She denies  new onset fever/night sweats, urinary incontinence, bowel incontinence, significant weight changes, significant motor weakness or changes, or loss of sensations. Her pain today is 8/10.       Of note, she reports her 27 year-old grandson was murdered. She has been dealing with this and it has caused her to not take care of herself as much as she had in the past. She would like to get back into caring for herself and her health.         HPI 9/17/2024:  Amanda Holt presents to the clinic for the evaluation of lower back pain that started several months ago.  Patient describes the pain as sharp achy and shooting with radiation down her left lower extremity.  Patient said the pain radiates all the way to her left feet. Patient said her entire left leg becomes numb and weak especially after walking for about 10 minutes.  Her pain score today is 8/10. The pain is rated with a score of  6/10 on the BEST day and a score of 10/10 on the WORST day.  Symptoms interfere with daily activity, sleeping, and work. The pain is exacerbated by Standing, Bending, Walking, Lifting, and Getting out of bed/chair.  The pain is mitigated by 7.5 Percocet . She reports spending 3 hours per day reclining. The patient reports 4 hours of uninterrupted sleep per night.     Patient denies night fever/night sweats, urinary incontinence, bowel incontinence, and significant weight loss.  Patient reports weakness and numbness in the left lower extremity.     Physical Therapy/Home Exercise: yes (aqua therapy)    Pain Disability Index Review:      3/14/2024     1:42 PM 4/13/2022    10:04 AM 3/28/2022    10:32 AM   Last 3 PDI Scores   Pain Disability Index (PDI) 63 30 26     Pain Medications:  Percocet 7.5  Tizanidine 4 mg     report:  Reviewed and consistent with medication use as prescribed.    Pain Procedures:   L4-5 laminectomy and fusion in 2018  09/09/2020-transforaminal WADE  02/12/2021-transforaminal WADE  3/14/2022 - L4-5 and L5-S1  lumbar transforaminal WADE  08/29/2022/ -  left L4-5 and L5-S1 lumbar transforaminal WADE  03/25/2024 - bilateral L3/4 facet joint injection -limited relief  1/17/2025 - Left L5/S1 and S1 TFESI   5/23/2025 - Caudal WADE    Imaging:   CT LUMBAR SPINE WITHOUT CONTRAST 2/19/25     CLINICAL HISTORY:  Low back pain, symptoms persist with > 6wks conservative treatment;  Dorsalgia, unspecified     TECHNIQUE:  Low-dose axial, sagittal and coronal reformations are obtained through the lumbar spine.  Contrast was not administered.     COMPARISON:  CT 07/08/2024.     FINDINGS:  Stable postop change again noted at L4-L5 with left-sided pedicular screws and fixation tracey and laminectomy.  Hardware remains intact without evidence of loosening.     Stable alignment of lumbar spine.  Vertebral body heights are well maintained.  No acute fracture no aggressive osseous lesion.  Degenerative disc disease/disc space narrowing most prominent L4-L5 and L5-S1 noting partial fusion anteriorly at L5-S1.     Lumbar spondylosis as follows:     T12-L1: Minimal circumferential disc bulge.  No spinal canal stenosis or neural foraminal narrowing.     L1-L2: Mild facet arthropathy.  No spinal canal stenosis or neural foraminal narrowing.     L2-L3: Circumferential disc bulge.  Facet arthropathy.  Mild ligamentum flavum hypertrophy.  Findings result in mild spinal canal stenosis and mild bilateral neural foraminal narrowing.     L3-L4: Circumferential disc bulge.  Facet arthropathy.  Findings result in moderate spinal canal stenosis, moderate left neural foraminal narrowing and severe right neural foramina narrowing.     L4-L5: Operative level.  Posterior disc osteophyte complex.  Facet arthropathy.  Moderate left and mild right neural foraminal narrowing.  Mild spinal canal stenosis.     L5-S1: Mild circumferential disc bulge.  Facet arthropathy.  Mild bilateral neural foraminal narrowing.  No significant spinal canal stenosis.     Postoperative  change within soft tissues overlying lower lumbar spine with midline simple fluid/edema.  Calcific aortic atherosclerosis.  Bilateral renal hypodensities, poorly characterized, possibly cysts.  Paraspinal musculature is unremarkable.     Impression:     Stable postoperative change at L4-L5.  Lumbar spondylosis is most prominent L3-L4 noting moderate spinal canal stenosis and moderate to severe neural foraminal narrowing.  Findings detailed level by level above      MRI LUMBAR SPINE WITHOUT CONTRAST     FINDINGS:  Lumbar spine alignment is within normal limits. The vertebral body heights are well maintained, with no fracture.  No marrow signal abnormality suspicious for an infiltrative process.  Degenerative fatty marrow metaplasia endplate change at L4-5.     The conus is normal in appearance.  The adjacent soft tissue structures show no significant abnormalities.     Bilateral T2 hyperintense renal lesions are suggestive of cysts.     L1-L2: There is no focal disc herniation. No significant central canal or neural foraminal narrowing.     L2-L3: There is no focal disc herniation. No significant central canal or neural foraminal narrowing.     L3-L4: Circumferential disc bulge, spondylitic spurring, facet arthropathy and bilateral facet joint effusions.  Moderate central spinal stenosis and mild right foraminal stenosis.     L4-L5: Operative change of bilateral laminectomy and left-sided instrumented fusion.  Circumferential disc bulge and spondylitic spurring.  Mild bilateral neural foraminal narrowing.     L5-S1: Circumferential disc bulge and partial anterior bony ankylosis.  Mild facet arthropathy.  Flattening of the medial right exiting L5 nerve root suggests at least moderate and possibly severe foraminal stenosis.     Impression:     Postoperative and degenerative change as described, with central spinal stenosis at L3-4 and moderate-severe right L5-S1 foraminal stenosis.    XR LUMBAR SPINE 5 VIEW WITH FLEX  AND EXT     FINDINGS:  prior L4-5 posterior instrumented lumbar fusion. No evidence of hardware failure or loosening. There is prominent L3-4 facet arthropathy with slight anterolisthesis, similar to prior exam.  No subluxation with extension and flexion views. No fractures.     Impression:     No acute findings.    Past Medical History:   Diagnosis Date    Arthralgia     Colon polyp     Degenerative disc disease at L5-S1 level     High cholesterol     Hypertension     Notalgia paresthetica     Nuclear sclerosis of both eyes 01/08/2020    Sciatica     Spinal stenosis     Tobacco use 10/21/2021     Past Surgical History:   Procedure Laterality Date    BACK SURGERY      lumbar laminectomy    COLON SURGERY  2022    Tear repaired    COLONOSCOPY N/A 07/23/2020    Procedure: COLONOSCOPY;  Surgeon: Donal Moore MD;  Location: [facility] ENDO;  Service: Endoscopy;  Laterality: N/A;    EPIDURAL STEROID INJECTION Left 09/09/2020    Procedure: Injection, Steroid, Epidural Transforaminal;  Surgeon: Jun Leavitt Jr., MD;  Location: [facility] ENDO;  Service: Pain Management;  Laterality: Left;  Left L5 + S1 TF WADE  Arrive @ 1300; ASA last 9/1; No DM    EPIDURAL STEROID INJECTION Left 02/12/2021    Procedure: Injection, Steroid, Epidural Transforaminal;  Surgeon: Jun Leavitt Jr., MD;  Location: [facility] ENDO;  Service: Pain Management;  Laterality: Left;  Left L4 + L5 TF WADE  Arrive @ 1230; IV Sedation; ASA last 2/4; No DM    INJECTION OF FACET JOINT Bilateral 3/25/2024    Procedure: FACET JOINT INJECTION BILATERAL L3/4 *ASPIRIN CLEARANCE IN CHART*;  Surgeon: Darya Ayala MD;  Location: [facility] PAIN MGT;  Service: Pain Management;  Laterality: Bilateral;  [phone]    INJECTION, SPINE, LUMBOSACRAL, TRANSFORAMINAL APPROACH N/A 5/23/2025    Procedure: CAUDAL WADE *ASPIRIN OTC* HOLD FOR 5 DAYS;  Surgeon: Jason Gomez MD;  Location: [facility] PAIN MGT;  Service: Pain Management;  Laterality: N/A;  2 WK F/U RONAK    SPINE SURGERY   10/08/18    Bilateral Lumbar Laminectomy with Fusion and Screws    TRANSFORAMINAL EPIDURAL INJECTION OF STEROID Left 03/14/2022    Procedure: INJECTION, STEROID, EPIDURAL, TRANSFORAMINAL APPROACH, L4-L5 & L5-S1;  Surgeon: Darya Ayala MD;  Location: [facility] PAIN MGT;  Service: Pain Management;  Laterality: Left;    TRANSFORAMINAL EPIDURAL INJECTION OF STEROID Left 08/29/2022    Procedure: LUMBAR TRANSFORAMINAL LEFT L4/5 AND L5/S1 CONTRAST;  Surgeon: Darya Ayala MD;  Location: [facility] PAIN MGT;  Service: Pain Management;  Laterality: Left;    TRANSFORAMINAL EPIDURAL INJECTION OF STEROID Left 9/27/2024    Procedure: LUMBAR TRANSFORAMINAL LEFT L3/4 AND L5/S1;  Surgeon: Jason Gomez MD;  Location: [facility] PAIN MGT;  Service: Pain Management;  Laterality: Left;  [phone]  2 WK F/U RONAK    TRANSFORAMINAL EPIDURAL INJECTION OF STEROID Left 1/17/2025    Procedure: LUMBAR TANSFORAMINAL LEFT L5/S1 AND S1;  Surgeon: Jason Gomez MD;  Location: [facility] PAIN MGT;  Service: Pain Management;  Laterality: Left;  2 WK F/U CHRIS  *SCHEDULE AFTER 1/24    TUBAL LIGATION       Social History     Socioeconomic History    Marital status:    Tobacco Use    Smoking status: Former     Current packs/day: 0.00     Average packs/day: 0.3 packs/day for 35.0 years (8.8 ttl pk-yrs)     Types: Cigarettes     Start date: 3/15/1987     Quit date: 3/15/2022     Years since quitting: 3.2    Smokeless tobacco: Current    Tobacco comments:     Occasional spoker some times 1-2 cigarettes/day sometimes none for weeks   Substance and Sexual Activity    Alcohol use: Yes     Alcohol/week: 3.0 standard drinks of alcohol     Types: 3 Glasses of wine per week     Comment: Occasional wine    Drug use: Not Currently     Comment: Tramadol twice a day as needed    Sexual activity: Yes     Partners: Male     Birth control/protection: Post-menopausal, None     Comment: Tubal 30+ years ago   Social History Narrative    Lives alone    No partner    Was a   for a non-profit organization     Social Drivers of Health     Financial Resource Strain: High Risk (2025)    Overall Financial Resource Strain (CARDIA)     Difficulty of Paying Living Expenses: Very hard   Food Insecurity: Food Insecurity Present (2025)    Hunger Vital Sign     Worried About Running Out of Food in the Last Year: Sometimes true     Ran Out of Food in the Last Year: Often true   Transportation Needs: No Transportation Needs (2025)    PRAPARE - Transportation     Lack of Transportation (Medical): No     Lack of Transportation (Non-Medical): No   Physical Activity: Insufficiently Active (2025)    Exercise Vital Sign     Days of Exercise per Week: 2 days     Minutes of Exercise per Session: 60 min   Stress: Stress Concern Present (2025)    [employer] [place] [employer] - Occupational Stress Questionnaire     Feeling of Stress : Very much   Housing Stability: Low Risk  (2025)    Housing Stability Vital Sign     Unable to Pay for Housing in the Last Year: No     Number of Times Moved in the Last Year: 0     Homeless in the Last Year: No   Recent Concern: Housing Stability - High Risk (2025)    Received from [facility] SDOH Screening     In the past year, have you been unable to get any of the following when you really needed them? choose all that apply.: Utilities (electric, gas, and water)     Family History   Problem Relation Name Age of Onset    Breast cancer Mother Marianna Benítez     Hypertension Mother Marianna Benítez             Hypotension Mother Marianna Benítez     Cancer Mother Marianna Benítez          due to breast cancer    Miscarriages / Stillbirths Mother Marianna Benítez         3 stillborn children    Cataracts Father Matthias Benítez     Glaucoma Father Matthias Benítez     Diabetes Father Matthias Benítez             Arthritis Father Matthias Benítez     Vision loss Father  Matthias Rahman.  Jeyson         Glaucoma -     Glaucoma Sister Debby Benítez     Arthritis Sister Debby Benítez     Diabetes Sister Debby Benítez     Hypertension Sister Debby Benítez     Vision loss Sister Debby Benítez         Glasses    Drug abuse Brother Sky Jeyson     Learning disabilities Brother Sky Jeyson     No Known Problems Brother      No Known Problems Brother x3     No Known Problems Maternal Aunt      No Known Problems Maternal Uncle      No Known Problems Paternal Aunt      No Known Problems Paternal Uncle      No Known Problems Maternal Grandmother      No Known Problems Maternal Grandfather      No Known Problems Paternal Grandmother      No Known Problems Paternal Grandfather      No Known Problems Son x2     No Known Problems Other      Arthritis Sister Susan Benítez     Depression Sister Susan Benítez     Diabetes Sister Susan Beníetz     Hearing loss Sister Susan Benítez         Trouble hearing    Hypertension Sister Susan Benítez     Stroke Sister Susan Benítez         [place] Disease, Coma for a month due to diabetes,Some body weakness    Vision loss Sister Susan Benítez         Lasix surgery    Arthritis Sister Urszula Green     COPD Sister Urszula Green     Diabetes Sister Urszula Green     Hearing loss Sister Urszula Green     Heart disease Sister Urszula Green     Hypertension Sister Urszula Green     Arthritis Brother Murtazasherrell Nowak     Hypertension Brother Murtaza Nowak     Early death Sister Christine Benítez         Still born    Early death Sister Catherine Benítez         Still born twin to Debyb Benítez    Early death Brother Krishan Benítez stillborn     Hypertension Sister Kateryna Kraft     Hypertension Brother Robbie Benítez     Learning disabilities Son Vaibhav Holt     Learning disabilities Son Fidel [place]         Great grandson    Learning disabilities Daughter Dairyal's Aries         Granddaughter    Mental illness Sister Shawna Benítez         Her son Matthias Benítez    Vision  loss Sister Shawna Benítez         Glasses/contacts    Vision loss Sister Chelly Benítez         Glasses    Amblyopia Neg Hx      Blindness Neg Hx      Macular degeneration Neg Hx      Retinal detachment Neg Hx      Strabismus Neg Hx      Thyroid disease Neg Hx       Review of patient's allergies indicates:   Allergen Reactions    Codeine      Current Outpatient Medications   Medication Sig    acetaminophen (TYLENOL) 500 MG tablet Take 2 tablets (1,000 mg total) by mouth every 6 (six) hours as needed.    albuterol (PROVENTIL/VENTOLIN HFA) 90 mcg/actuation inhaler Inhale 2 puffs into the lungs every 6 (six) hours as needed for Wheezing.    amitriptyline (ELAVIL) 50 MG tablet Take 1 tablet (50 mg total) by mouth every evening.    aspirin (ECOTRIN) 81 MG EC tablet     azelastine (ASTELIN) 137 mcg (0.1 %) nasal spray 1 spray (137 mcg total) by Nasal route 2 (two) times daily.    cyanocobalamin, vitamin B-12, (B-12 COMPLIANCE) 1,000 mcg/mL Kit Inject 1 mL as directed every 28 days.    diclofenac sodium (VOLTAREN ARTHRITIS PAIN) 1 % Gel Apply 2 g topically once daily.    ergocalciferol (ERGOCALCIFEROL) 50,000 unit Cap Take 1 capsule by mouth every 7 days.    fluticasone propionate (FLONASE) 50 mcg/actuation nasal spray 1 spray (50 mcg total) by Each Nostril route once daily.    fluticasone propionate (FLONASE) 50 mcg/actuation nasal spray 1 spray (50 mcg total) by Each Nostril route 2 (two) times daily.    hydrALAZINE (APRESOLINE) 100 MG tablet Take 1 tablet (100 mg total) by mouth every 12 (twelve) hours.    hydrOXYzine HCL (ATARAX) 25 MG tablet Take 1 tablet (25 mg total) by mouth 3 (three) times daily as needed for Itching.    metoprolol succinate (TOPROL-XL) 25 MG 24 hr tablet Take 1 tablet (25 mg total) by mouth once daily.    mometasone (ELOCON) 0.1 % solution Apply once to twice daily prn itching    mupirocin (BACTROBAN) 2 % ointment Apply topically 3 (three) times daily.    NIFEdipine (PROCARDIA-XL) 60 MG (OSM)  24 hr tablet Take 1 tablet (60 mg total) by mouth once daily.    nystatin (MYCOSTATIN) powder APPLY  POWDER TOPICALLY TO AFFECTED AREA 4 TIMES DAILY    olopatadine (PATANOL) 0.1 % ophthalmic solution Place 1 drop into both eyes 2 (two) times daily.    omeprazole (PRILOSEC) 40 MG capsule Take 1 capsule by mouth.    oxyCODONE-acetaminophen (PERCOCET) 7.5-325 mg per tablet Take 1 tablet by mouth every 4 (four) hours as needed for Pain.    oxyCODONE-acetaminophen (PERCOCET) 7.5-325 mg per tablet Take 1 tablet by mouth every 4 (four) hours as needed for Pain.    [START ON 7/8/2025] oxyCODONE-acetaminophen (PERCOCET) 7.5-325 mg per tablet Take 1 tablet by mouth every 4 (four) hours as needed for Pain.    pravastatin (PRAVACHOL) 40 MG tablet Take 1 tablet by mouth once daily    pregabalin (LYRICA) 75 MG capsule Take 1 capsule (75 mg total) by mouth 2 (two) times daily.    tiZANidine (ZANAFLEX) 4 MG tablet TAKE 1 TABLET BY MOUTH EVERY 6 HOURS AS NEEDED    triamcinolone acetonide 0.1%-ciclopirox-magnesium hydroxide 400 mg/5 ml Apply to affected area twice a day after cool blow dry    valsartan (DIOVAN) 320 MG tablet Take 1 tablet by mouth once daily     No current facility-administered medications for this visit.       REVIEW OF SYSTEMS:    GENERAL:  No weight loss, malaise or fevers.  HEENT:  Negative for frequent or significant headaches.  NECK:  Negative for lumps, goiter, pain and significant neck swelling.  RESPIRATORY:  Negative for cough, wheezing or shortness of breath.  CARDIOVASCULAR:  Negative for chest pain, leg swelling or palpitations.  GI:  Negative for abdominal discomfort, blood in stools or black stools or change in bowel habits.  MUSCULOSKELETAL:  See HPI.  SKIN:  Negative for lesions, rash, and itching.  PSYCH:  Negative for sleep disturbance, mood disorder and recent psychosocial stressors.  HEMATOLOGY/LYMPHOLOGY:  Negative for prolonged bleeding, bruising easily or swollen nodes.  NEURO:   No history of  "headaches, syncope, paralysis, seizures or tremors.  All other reviewed and negative other than HPI.    OBJECTIVE:    There were no vitals taken for this visit.    VIRTUAL PHYSICAL EXAMINATION:    GENERAL APPEARANCE: Well appearing, in no acute distress.  PSYCH:  Mood and affect appropriate.  SKIN: Skin color, texture, turgor normal, no rashes or lesions to visible areas.  HEAD/FACE:  Normocephalic, atraumatic.  PULM: Bilateral chest rise. No apparent respiratory distress.        ASSESSMENT: 74 y.o. year old female with lower back pain, that began several months ago. The pain is described as sharp, achy, and shooting, with radiation down the left lower extremity.     1. Lumbar radiculopathy        2. Degeneration of intervertebral disc of lumbar region with discogenic back pain and lower extremity pain        3. Other chronic pain        4. Post laminectomy syndrome        5. Myofascial pain        6. Degeneration of intervertebral disc of lumbar region with discogenic back pain        7. Greater trochanteric bursitis of left hip        8. Piriformis syndrome of both sides          PLAN SUMMARY:   Previous imaging was reviewed and discussed with the patient today.   She is s/p Caudal WADE with some benefit  She will continue to think about and discuss SCS with her son.  Discussed spinal cord stimulation as a potential treatment option.  Explained it as a "pacemaker for pain" placed in the epidural space.  Described it as a 3-step process involving psych evaluation, trial period, and potential permanent implantation.  Referral to Psych previously placed; this has not been scheduled. We will discuss again at her next follow-up.  She would like to discuss SCS with her son and also research more.  RTC 3 months or sooner. We can repeat WADE or discuss SCS again.        The above plan and management options were discussed at length with patient. Patient is in agreement with the above and verbalized understanding.     Marisa" LEVI Cespedes  06/09/2025'      I spent a total of 30 minutes on the day of the visit.  This includes face to face time and non-face to face time preparing to see the patient by reviewing previous labs/imaging, obtaining and/or reviewing separately obtained history, documenting clinical information in the electronic or other health record, independently interpreting results and communicating results to the patient/family/caregiver.

## 2025-06-09 NOTE — PROGRESS NOTES
Chief Complaint   Patient presents with    Follow-up       HPI  Amanda Holt is a 74 y.o. female with multiple medical diagnoses as listed in the medical history and problem list that presents for follow-up for blood pressure     Pmhx: CKD stage 3, chronic low back pain, ANTONIA, s/p caudal WADE 5/23/25, depression    She has recently had pain management procedure and is gradually increasing her activity; at her last visit we discussed her leg swelling and her fluid pills were held, she did have swelling one week ago but it has improved since she decreased her intake of salty foods;     She has had elevated blood pressure as she has not been sleeping well due to recent loss of six family members in the past month. Has not taken her elavil consistently       ALLERGIES AND MEDICATIONS: updated and reviewed.  Review of patient's allergies indicates:   Allergen Reactions    Codeine      Medication List with Changes/Refills   Current Medications    ACETAMINOPHEN (TYLENOL) 500 MG TABLET    Take 2 tablets (1,000 mg total) by mouth every 6 (six) hours as needed.    ALBUTEROL (PROVENTIL/VENTOLIN HFA) 90 MCG/ACTUATION INHALER    Inhale 2 puffs into the lungs every 6 (six) hours as needed for Wheezing.    ASPIRIN (ECOTRIN) 81 MG EC TABLET        AZELASTINE (ASTELIN) 137 MCG (0.1 %) NASAL SPRAY    1 spray (137 mcg total) by Nasal route 2 (two) times daily.    CYANOCOBALAMIN, VITAMIN B-12, (B-12 COMPLIANCE) 1,000 MCG/ML KIT    Inject 1 mL as directed every 28 days.    DICLOFENAC SODIUM (VOLTAREN ARTHRITIS PAIN) 1 % GEL    Apply 2 g topically once daily.    ERGOCALCIFEROL (ERGOCALCIFEROL) 50,000 UNIT CAP    Take 1 capsule by mouth every 7 days.    FLUTICASONE PROPIONATE (FLONASE) 50 MCG/ACTUATION NASAL SPRAY    1 spray (50 mcg total) by Each Nostril route once daily.    FLUTICASONE PROPIONATE (FLONASE) 50 MCG/ACTUATION NASAL SPRAY    1 spray (50 mcg total) by Each Nostril route 2 (two) times daily.    MOMETASONE (ELOCON) 0.1  % SOLUTION    Apply once to twice daily prn itching    MUPIROCIN (BACTROBAN) 2 % OINTMENT    Apply topically 3 (three) times daily.    NYSTATIN (MYCOSTATIN) POWDER    APPLY  POWDER TOPICALLY TO AFFECTED AREA 4 TIMES DAILY    OLOPATADINE (PATANOL) 0.1 % OPHTHALMIC SOLUTION    Place 1 drop into both eyes 2 (two) times daily.    OMEPRAZOLE (PRILOSEC) 40 MG CAPSULE    Take 1 capsule by mouth.    OXYCODONE-ACETAMINOPHEN (PERCOCET) 7.5-325 MG PER TABLET    Take 1 tablet by mouth every 4 (four) hours as needed for Pain.    OXYCODONE-ACETAMINOPHEN (PERCOCET) 7.5-325 MG PER TABLET    Take 1 tablet by mouth every 4 (four) hours as needed for Pain.    OXYCODONE-ACETAMINOPHEN (PERCOCET) 7.5-325 MG PER TABLET    Take 1 tablet by mouth every 4 (four) hours as needed for Pain.    PRAVASTATIN (PRAVACHOL) 40 MG TABLET    Take 1 tablet by mouth once daily    TIZANIDINE (ZANAFLEX) 4 MG TABLET    TAKE 1 TABLET BY MOUTH EVERY 6 HOURS AS NEEDED    TRIAMCINOLONE ACETONIDE 0.1%-CICLOPIROX-MAGNESIUM HYDROXIDE 400 MG/5 ML    Apply to affected area twice a day after cool blow dry    VALSARTAN (DIOVAN) 320 MG TABLET    Take 1 tablet by mouth once daily   Changed and/or Refilled Medications    Modified Medication Previous Medication    AMITRIPTYLINE (ELAVIL) 50 MG TABLET amitriptyline (ELAVIL) 50 MG tablet       Take 1 tablet (50 mg total) by mouth every evening.    Take 1 tablet (50 mg total) by mouth every evening.    HYDRALAZINE (APRESOLINE) 100 MG TABLET hydrALAZINE (APRESOLINE) 100 MG tablet       Take 1 tablet (100 mg total) by mouth every 12 (twelve) hours.    Take 1 tablet (100 mg total) by mouth every 12 (twelve) hours.    HYDROXYZINE HCL (ATARAX) 25 MG TABLET hydrOXYzine HCL (ATARAX) 25 MG tablet       Take 1 tablet (25 mg total) by mouth 3 (three) times daily as needed for Itching.    Take 1 tablet by mouth three times daily as needed    METOPROLOL SUCCINATE (TOPROL-XL) 25 MG 24 HR TABLET metoprolol succinate (TOPROL-XL) 25 MG 24 hr  "tablet       Take 1 tablet (25 mg total) by mouth once daily.    Take 1 tablet by mouth once daily    NIFEDIPINE (PROCARDIA-XL) 60 MG (OSM) 24 HR TABLET NIFEdipine (PROCARDIA-XL) 60 MG (OSM) 24 hr tablet       Take 1 tablet (60 mg total) by mouth once daily.    Take 1 tablet (60 mg total) by mouth once daily.    PREGABALIN (LYRICA) 75 MG CAPSULE pregabalin (LYRICA) 75 MG capsule       Take 1 capsule (75 mg total) by mouth 2 (two) times daily.    Take 1 capsule (75 mg total) by mouth 2 (two) times daily.       ROS  Review of Systems   Constitutional:  Negative for chills, diaphoresis, fatigue, fever and unexpected weight change.   HENT:  Negative for rhinorrhea, sinus pressure, sore throat and tinnitus.    Eyes:  Negative for photophobia and visual disturbance.   Respiratory:  Negative for cough, shortness of breath and wheezing.    Cardiovascular:  Negative for chest pain and palpitations.   Gastrointestinal:  Negative for abdominal pain, blood in stool, constipation, diarrhea, nausea and vomiting.   Genitourinary:  Negative for dysuria, flank pain, frequency and vaginal discharge.   Musculoskeletal:  Positive for arthralgias and back pain. Negative for joint swelling.   Skin:  Negative for rash.   Neurological:  Negative for speech difficulty, weakness, light-headedness and headaches.   Psychiatric/Behavioral:  Positive for dysphoric mood. Negative for behavioral problems.        Physical Exam  Vitals:    06/09/25 0901 06/09/25 0946   BP: (!) 146/98 (!) 136/92   BP Location: Left arm Left arm   Patient Position: Sitting Sitting   Pulse: 84    Resp: 17    Temp: 98.2 °F (36.8 °C)    TempSrc: Oral    SpO2: 98%    Weight: 127.5 kg (281 lb 1.4 oz)    Height: 5' 4" (1.626 m)     Body mass index is 48.25 kg/m².  Weight: 127.5 kg (281 lb 1.4 oz)   Height: 5' 4" (162.6 cm)     Physical Exam  Vitals and nursing note reviewed.   Constitutional:       Appearance: She is well-developed.   Skin:     General: Skin is warm and " dry.      Findings: No erythema or rash.   Neurological:      Mental Status: She is alert. Mental status is at baseline.   Psychiatric:         Behavior: Behavior normal.           Health maintenance reviewed and addressed as ordered  Schedule mammogram    ASSESSMENT/PLAN     1. Primary hypertension (Primary)  Aim for improving sleep quality and pain control  F/u in 6 weeks continue BP monitoring with digital medicine  May need to increase nifedipine  - hydrALAZINE (APRESOLINE) 100 MG tablet; Take 1 tablet (100 mg total) by mouth every 12 (twelve) hours.  Dispense: 180 tablet; Refill: 3  - NIFEdipine (PROCARDIA-XL) 60 MG (OSM) 24 hr tablet; Take 1 tablet (60 mg total) by mouth once daily.  Dispense: 90 tablet; Refill: 1  - metoprolol succinate (TOPROL-XL) 25 MG 24 hr tablet; Take 1 tablet (25 mg total) by mouth once daily.  Dispense: 90 tablet; Refill: 1    2. Spinal stenosis, unspecified spinal region  Continue current regimen  - pregabalin (LYRICA) 75 MG capsule; Take 1 capsule (75 mg total) by mouth 2 (two) times daily.  Dispense: 60 capsule; Refill: 2    3. Major depressive disorder, recurrent, mild  Recommend taking elavil daily  - amitriptyline (ELAVIL) 50 MG tablet; Take 1 tablet (50 mg total) by mouth every evening.  Dispense: 90 tablet; Refill: 1    4. Anxiety  For prn use also for itching due to opioids  - hydrOXYzine HCL (ATARAX) 25 MG tablet; Take 1 tablet (25 mg total) by mouth 3 (three) times daily as needed for Itching.  Dispense: 45 tablet; Refill: 0    5. Encounter for screening mammogram for breast cancer  as ordered     - Mammo Digital Screening Bilat w/ Ramesh (XPD); Future      Simona Torres MD  06/09/2025 9:23 AM        Follow up in about 2 months (around 8/9/2025).    Orders Placed This Encounter   Procedures    Mammo Digital Screening Bilat w/ Ramesh (XPD)

## 2025-06-24 ENCOUNTER — HOSPITAL ENCOUNTER (OUTPATIENT)
Dept: RADIOLOGY | Facility: HOSPITAL | Age: 75
Discharge: HOME | End: 2025-06-24
Attending: FAMILY MEDICINE
Payer: MEDICARE

## 2025-06-24 DIAGNOSIS — Z12.31 ENCOUNTER FOR SCREENING MAMMOGRAM FOR BREAST CANCER: ICD-10-CM

## 2025-06-24 PROCEDURE — 77067 SCR MAMMO BI INCL CAD: CPT | Mod: 26,,, | Performed by: RADIOLOGY

## 2025-06-24 PROCEDURE — 77063 BREAST TOMOSYNTHESIS BI: CPT | Mod: 26,,, | Performed by: RADIOLOGY

## 2025-06-24 PROCEDURE — 77067 SCR MAMMO BI INCL CAD: CPT | Mod: TC

## 2025-07-01 DIAGNOSIS — M54.16 LUMBAR RADICULOPATHY: ICD-10-CM

## 2025-07-01 DIAGNOSIS — B37.2 CANDIDAL INTERTRIGO: ICD-10-CM

## 2025-07-01 DIAGNOSIS — L72.3 INFLAMED EPIDERMOID CYST OF SKIN: ICD-10-CM

## 2025-07-01 NOTE — TELEPHONE ENCOUNTER
No care due was identified.  [facility] Embedded Care Due Messages. Reference number: [id number].   7/01/2025 3:33:35 AM CDT

## 2025-07-01 NOTE — TELEPHONE ENCOUNTER
Refill Routing Note   Medication(s) are not appropriate for processing by [facility] for the following reason(s):        Outside of protocol    [facility] action(s):  Route               Appointments  past 12m or future 3m with PCP    Date Provider   Last Visit   6/9/2025 Simona Torres MD   Next Visit   8/13/2025 Simona Torres MD   ED visits in past 90 days: 0        Note composed:9:17 AM 07/01/2025

## 2025-07-01 NOTE — TELEPHONE ENCOUNTER
No care due was identified.  [facility] Embedded Care Due Messages. Reference number: [id number].   7/01/2025 3:45:27 PM CDT

## 2025-07-02 ENCOUNTER — TELEPHONE (OUTPATIENT)
Dept: FAMILY MEDICINE | Facility: CLINIC | Age: 75
End: 2025-07-02
Payer: MEDICARE

## 2025-07-02 DIAGNOSIS — R20.8 DYSESTHESIA OF SCALP: ICD-10-CM

## 2025-07-02 RX ORDER — NYSTATIN 100000 [USP'U]/G
POWDER TOPICAL
Qty: 30 G | Refills: 5 | Status: SHIPPED | OUTPATIENT
Start: 2025-07-02

## 2025-07-02 RX ORDER — TIZANIDINE 4 MG/1
4 TABLET ORAL EVERY 6 HOURS PRN
Qty: 90 TABLET | Refills: 1 | Status: SHIPPED | OUTPATIENT
Start: 2025-07-02

## 2025-07-02 RX ORDER — NYSTATIN 100000 [USP'U]/G
POWDER TOPICAL
Qty: 30 G | Refills: 1 | Status: SHIPPED | OUTPATIENT
Start: 2025-07-02

## 2025-07-02 RX ORDER — MUPIROCIN 20 MG/G
OINTMENT TOPICAL 3 TIMES DAILY
Qty: 22 G | Refills: 0 | Status: SHIPPED | OUTPATIENT
Start: 2025-07-02

## 2025-07-02 RX ORDER — TIZANIDINE 4 MG/1
TABLET ORAL
Qty: 90 TABLET | Refills: 3 | Status: SHIPPED | OUTPATIENT
Start: 2025-07-02

## 2025-07-02 NOTE — TELEPHONE ENCOUNTER
Copied from CRM #[id number]. Topic: Medications - Medication Refill  >> Jul 2, 2025  3:25 PM Melia wrote:  Type: RX Refill Request    Who Called: SELF     Have you contacted your pharmacy:YES    Refill or New Rx:    RX Name and Strength:tiZANidine (ZANAFLEX) 4 MG tablet  nystatin (MYCOSTATIN) powdermometasone (ELOCON) 0.1 % solution    oxyCODONE-acetaminophen (PERCOCET) 7.5-325 mg per tablet                   How is the patient currently taking it? (ex. 1XDay):    Is this a 30 day or 90 day RX:    Preferred Pharmacy with phone number:[facility] [address] [facility] [address] [facility] [phone]    Local or Mail Order:local    Ordering Provider:dr bennett    Would the patient rather a call back or a response via [facility]? callback    Best Call Back Number:.[phone]      Additional Information: pt says pharmacy told her she had no refills but she says she do have refill on theses medication

## 2025-07-02 NOTE — TELEPHONE ENCOUNTER
Contact Amanda per RX  refills and instructed to call pharmacy. Also, explained that it is too for p/u for Percocets. However, Percocets should be ready for p/u @ [facility] pharmacy on 07/08/2025. Patient verbalized understanding.

## 2025-07-21 ENCOUNTER — PATIENT MESSAGE (OUTPATIENT)
Dept: FAMILY MEDICINE | Facility: CLINIC | Age: 75
End: 2025-07-21
Payer: MEDICARE

## 2025-08-03 DIAGNOSIS — B37.2 CANDIDAL INTERTRIGO: ICD-10-CM

## 2025-08-03 DIAGNOSIS — F41.9 ANXIETY: ICD-10-CM

## 2025-08-04 DIAGNOSIS — H10.13 ALLERGIC CONJUNCTIVITIS OF BOTH EYES: ICD-10-CM

## 2025-08-04 RX ORDER — HYDROXYZINE HYDROCHLORIDE 25 MG/1
25 TABLET, FILM COATED ORAL
Qty: 45 TABLET | Refills: 1 | Status: SHIPPED | OUTPATIENT
Start: 2025-08-04

## 2025-08-04 RX ORDER — NYSTATIN 100000 [USP'U]/G
POWDER TOPICAL
Qty: 30 G | Refills: 3 | Status: SHIPPED | OUTPATIENT
Start: 2025-08-04

## 2025-08-05 NOTE — TELEPHONE ENCOUNTER
Refill Routing Note   Medication(s) are not appropriate for processing by [facility] for the following reason(s):        Outside of protocol    [facility] action(s):  Route             Appointments  past 12m or future 3m with PCP    Date Provider   Last Visit   6/9/2025 Simona Torres MD   Next Visit   8/13/2025 Simona Torres MD   ED visits in past 90 days: 0        Note composed:7:45 AM 08/05/2025

## 2025-08-06 DIAGNOSIS — R05.8 POST-VIRAL COUGH SYNDROME: ICD-10-CM

## 2025-08-06 RX ORDER — OLOPATADINE HYDROCHLORIDE 1 MG/ML
1 SOLUTION OPHTHALMIC 2 TIMES DAILY
Qty: 5 ML | Refills: 0 | Status: SHIPPED | OUTPATIENT
Start: 2025-08-06

## 2025-08-06 RX ORDER — ALBUTEROL SULFATE 90 UG/1
2 INHALANT RESPIRATORY (INHALATION) EVERY 6 HOURS PRN
Qty: 18 G | Refills: 0 | Status: SHIPPED | OUTPATIENT
Start: 2025-08-06

## 2025-08-06 NOTE — TELEPHONE ENCOUNTER
Refill Routing Note   Medication(s) are not appropriate for processing by [facility] for the following reason(s):        New or recently adjusted medication    [facility] action(s):  Defer   Requires labs : Yes             Appointments  past 12m or future 3m with PCP    Date Provider   Last Visit   6/9/2025 Simona Torres MD   Next Visit   8/13/2025 Simona Torres MD   ED visits in past 90 days: 0        Note composed:3:50 PM 08/06/2025

## 2025-08-06 NOTE — TELEPHONE ENCOUNTER
Care Due:                  Date            Visit Type   Department     Provider  --------------------------------------------------------------------------------                                [facility]                              PRIMARY      MEDICINE /  Last Visit: 06-      CARE ([facility])   MARIA G Torres                              [facility]                              PRIMARY      MEDICINE /  Next Visit: 08-      CARE ([facility])   MARIA G Min  Test          Frequency    Reason                     Performed    Due Date  --------------------------------------------------------------------------------    Lipid Panel.  12 months..  pravastatin..............  03- 03-    [facility] Embedded Care Due Messages. Reference number: [id number].   8/06/2025 11:43:56 AM CDT

## 2025-08-07 ENCOUNTER — PATIENT MESSAGE (OUTPATIENT)
Dept: FAMILY MEDICINE | Facility: CLINIC | Age: 75
End: 2025-08-07
Payer: MEDICARE

## 2025-08-07 DIAGNOSIS — M54.16 LUMBAR RADICULOPATHY: ICD-10-CM

## 2025-08-07 RX ORDER — OXYCODONE AND ACETAMINOPHEN 7.5; 325 MG/1; MG/1
1 TABLET ORAL EVERY 4 HOURS PRN
Qty: 120 TABLET | Refills: 0 | Status: SHIPPED | OUTPATIENT
Start: 2025-08-07 | End: 2025-09-06

## 2025-08-11 ENCOUNTER — LAB VISIT (OUTPATIENT)
Dept: LAB | Facility: HOSPITAL | Age: 75
End: 2025-08-11
Attending: FAMILY MEDICINE
Payer: MEDICARE

## 2025-08-11 DIAGNOSIS — N17.9 AKI (ACUTE KIDNEY INJURY): ICD-10-CM

## 2025-08-11 LAB
ALBUMIN SERPL BCP-MCNC: 3.2 G/DL (ref 3.5–5.2)
ALP SERPL-CCNC: 111 UNIT/L (ref 40–150)
ALT SERPL W/O P-5'-P-CCNC: 12 UNIT/L (ref 10–44)
ANION GAP (OHS): 10 MMOL/L (ref 8–16)
AST SERPL-CCNC: 20 UNIT/L (ref 11–45)
BILIRUB SERPL-MCNC: 0.3 MG/DL (ref 0.1–1)
BUN SERPL-MCNC: 22 MG/DL (ref 8–23)
CALCIUM SERPL-MCNC: 9.3 MG/DL (ref 8.7–10.5)
CHLORIDE SERPL-SCNC: 110 MMOL/L (ref 95–110)
CO2 SERPL-SCNC: 23 MMOL/L (ref 23–29)
CREAT SERPL-MCNC: 1.5 MG/DL (ref 0.5–1.4)
GFR SERPLBLD CREATININE-BSD FMLA CKD-EPI: 37 ML/MIN/1.73/M2
GLUCOSE SERPL-MCNC: 142 MG/DL (ref 70–110)
POTASSIUM SERPL-SCNC: 4 MMOL/L (ref 3.5–5.1)
PROT SERPL-MCNC: 7.2 GM/DL (ref 6–8.4)
SODIUM SERPL-SCNC: 143 MMOL/L (ref 136–145)

## 2025-08-11 PROCEDURE — 36415 COLL VENOUS BLD VENIPUNCTURE: CPT | Mod: PN

## 2025-08-11 PROCEDURE — 82040 ASSAY OF SERUM ALBUMIN: CPT

## 2025-08-13 ENCOUNTER — OFFICE VISIT (OUTPATIENT)
Dept: FAMILY MEDICINE | Facility: CLINIC | Age: 75
End: 2025-08-13
Payer: MEDICARE

## 2025-08-13 VITALS
DIASTOLIC BLOOD PRESSURE: 62 MMHG | TEMPERATURE: 99 F | WEIGHT: 292.31 LBS | SYSTOLIC BLOOD PRESSURE: 108 MMHG | HEART RATE: 87 BPM | BODY MASS INDEX: 49.9 KG/M2 | OXYGEN SATURATION: 98 % | HEIGHT: 64 IN

## 2025-08-13 DIAGNOSIS — M54.16 LUMBAR RADICULOPATHY: Primary | ICD-10-CM

## 2025-08-13 DIAGNOSIS — G47.33 OSA (OBSTRUCTIVE SLEEP APNEA): ICD-10-CM

## 2025-08-13 DIAGNOSIS — N18.32 STAGE 3B CHRONIC KIDNEY DISEASE: ICD-10-CM

## 2025-08-13 DIAGNOSIS — I10 PRIMARY HYPERTENSION: ICD-10-CM

## 2025-08-13 DIAGNOSIS — E66.813 CLASS 3 SEVERE OBESITY DUE TO EXCESS CALORIES WITH SERIOUS COMORBIDITY IN ADULT: ICD-10-CM

## 2025-08-13 PROCEDURE — 3078F DIAST BP <80 MM HG: CPT | Mod: CPTII,S$GLB,, | Performed by: FAMILY MEDICINE

## 2025-08-13 PROCEDURE — 1101F PT FALLS ASSESS-DOCD LE1/YR: CPT | Mod: CPTII,S$GLB,, | Performed by: FAMILY MEDICINE

## 2025-08-13 PROCEDURE — 3074F SYST BP LT 130 MM HG: CPT | Mod: CPTII,S$GLB,, | Performed by: FAMILY MEDICINE

## 2025-08-13 PROCEDURE — 3061F NEG MICROALBUMINURIA REV: CPT | Mod: CPTII,S$GLB,, | Performed by: FAMILY MEDICINE

## 2025-08-13 PROCEDURE — 99214 OFFICE O/P EST MOD 30 MIN: CPT | Mod: S$GLB,,, | Performed by: FAMILY MEDICINE

## 2025-08-13 PROCEDURE — 4010F ACE/ARB THERAPY RXD/TAKEN: CPT | Mod: CPTII,S$GLB,, | Performed by: FAMILY MEDICINE

## 2025-08-13 PROCEDURE — 3008F BODY MASS INDEX DOCD: CPT | Mod: CPTII,S$GLB,, | Performed by: FAMILY MEDICINE

## 2025-08-13 PROCEDURE — 1159F MED LIST DOCD IN RCRD: CPT | Mod: CPTII,S$GLB,, | Performed by: FAMILY MEDICINE

## 2025-08-13 PROCEDURE — 3288F FALL RISK ASSESSMENT DOCD: CPT | Mod: CPTII,S$GLB,, | Performed by: FAMILY MEDICINE

## 2025-08-13 PROCEDURE — 3066F NEPHROPATHY DOC TX: CPT | Mod: CPTII,S$GLB,, | Performed by: FAMILY MEDICINE

## 2025-08-13 PROCEDURE — 1125F AMNT PAIN NOTED PAIN PRSNT: CPT | Mod: CPTII,S$GLB,, | Performed by: FAMILY MEDICINE

## 2025-08-13 PROCEDURE — 3044F HG A1C LEVEL LT 7.0%: CPT | Mod: CPTII,S$GLB,, | Performed by: FAMILY MEDICINE

## 2025-08-13 PROCEDURE — 99999 PR PBB SHADOW E&M-EST. PATIENT-LVL V: CPT | Mod: PBBFAC,,, | Performed by: FAMILY MEDICINE

## 2025-08-13 RX ORDER — OXYCODONE AND ACETAMINOPHEN 7.5; 325 MG/1; MG/1
1 TABLET ORAL EVERY 4 HOURS PRN
Qty: 120 TABLET | Refills: 0 | Status: SHIPPED | OUTPATIENT
Start: 2025-09-06 | End: 2025-10-06

## 2025-08-13 RX ORDER — OXYCODONE AND ACETAMINOPHEN 7.5; 325 MG/1; MG/1
1 TABLET ORAL EVERY 4 HOURS PRN
Qty: 120 TABLET | Refills: 0 | Status: SHIPPED | OUTPATIENT
Start: 2025-10-06 | End: 2025-11-05

## 2025-08-15 ENCOUNTER — OFFICE VISIT (OUTPATIENT)
Dept: CARDIOLOGY | Facility: CLINIC | Age: 75
End: 2025-08-15
Payer: MEDICARE

## 2025-08-15 VITALS
WEIGHT: 285.06 LBS | HEIGHT: 64 IN | DIASTOLIC BLOOD PRESSURE: 83 MMHG | RESPIRATION RATE: 19 BRPM | BODY MASS INDEX: 48.67 KG/M2 | SYSTOLIC BLOOD PRESSURE: 128 MMHG

## 2025-08-15 DIAGNOSIS — E66.01 MORBID OBESITY, UNSPECIFIED OBESITY TYPE: ICD-10-CM

## 2025-08-15 DIAGNOSIS — I70.0 ATHEROSCLEROSIS OF AORTA: ICD-10-CM

## 2025-08-15 DIAGNOSIS — R06.09 DOE (DYSPNEA ON EXERTION): Primary | ICD-10-CM

## 2025-08-15 DIAGNOSIS — I10 PRIMARY HYPERTENSION: ICD-10-CM

## 2025-08-15 DIAGNOSIS — I25.10 CORONARY ARTERY CALCIFICATION SEEN ON CAT SCAN: ICD-10-CM

## 2025-08-15 DIAGNOSIS — E78.2 MIXED HYPERLIPIDEMIA: ICD-10-CM

## 2025-08-15 DIAGNOSIS — I25.118 CORONARY ARTERY DISEASE INVOLVING NATIVE CORONARY ARTERY OF NATIVE HEART WITH OTHER FORM OF ANGINA PECTORIS: ICD-10-CM

## 2025-08-15 DIAGNOSIS — I70.1 LEFT RENAL ARTERY STENOSIS: ICD-10-CM

## 2025-08-15 DIAGNOSIS — N18.32 STAGE 3B CHRONIC KIDNEY DISEASE: ICD-10-CM

## 2025-08-15 LAB
OHS QRS DURATION: 66 MS
OHS QTC CALCULATION: 452 MS

## 2025-08-15 PROCEDURE — 99999 PR PBB SHADOW E&M-EST. PATIENT-LVL V: CPT | Mod: PBBFAC,,, | Performed by: INTERNAL MEDICINE

## 2025-08-18 ENCOUNTER — PATIENT MESSAGE (OUTPATIENT)
Dept: FAMILY MEDICINE | Facility: CLINIC | Age: 75
End: 2025-08-18
Payer: MEDICARE

## 2025-08-18 DIAGNOSIS — M54.16 LUMBAR RADICULOPATHY, CHRONIC: Primary | ICD-10-CM

## 2025-08-21 ENCOUNTER — PATIENT OUTREACH (OUTPATIENT)
Dept: ADMINISTRATIVE | Facility: HOSPITAL | Age: 75
End: 2025-08-21
Payer: MEDICARE

## 2025-08-26 ENCOUNTER — HOSPITAL ENCOUNTER (OUTPATIENT)
Dept: CARDIOLOGY | Facility: HOSPITAL | Age: 75
Discharge: HOME | End: 2025-08-26
Attending: INTERNAL MEDICINE
Payer: MEDICARE

## 2025-08-26 ENCOUNTER — TELEPHONE (OUTPATIENT)
Dept: FAMILY MEDICINE | Facility: CLINIC | Age: 75
End: 2025-08-26
Payer: MEDICARE

## 2025-08-26 ENCOUNTER — HOSPITAL ENCOUNTER (OUTPATIENT)
Dept: RADIOLOGY | Facility: HOSPITAL | Age: 75
Discharge: HOME | End: 2025-08-26
Attending: INTERNAL MEDICINE
Payer: MEDICARE

## 2025-08-26 DIAGNOSIS — I25.10 CORONARY ARTERY CALCIFICATION SEEN ON CAT SCAN: ICD-10-CM

## 2025-08-26 DIAGNOSIS — R06.09 DOE (DYSPNEA ON EXERTION): ICD-10-CM

## 2025-08-26 DIAGNOSIS — I25.118 CORONARY ARTERY DISEASE INVOLVING NATIVE CORONARY ARTERY OF NATIVE HEART WITH OTHER FORM OF ANGINA PECTORIS: ICD-10-CM

## 2025-08-26 LAB
ASCENDING AORTA: 3.1 CM
AV INDEX (PROSTH): 0.92
AV MEAN GRADIENT: 7 MMHG
AV PEAK GRADIENT: 10 MMHG
AV VALVE AREA BY VELOCITY RATIO: 3.3 CM²
AV VALVE AREA: 3.5 CM²
AV VELOCITY RATIO: 0.88
CV ECHO LV RWT: 0.88 CM
DOP CALC AO PEAK VEL: 1.6 M/S
DOP CALC AO VTI: 25.9 CM
DOP CALC LVOT AREA: 3.8 CM2
DOP CALC LVOT DIAMETER: 2.2 CM
DOP CALC LVOT PEAK VEL: 1.4 M/S
DOP CALCLVOT PEAK VEL VTI: 23.7 CM
E WAVE DECELERATION TIME: 221 MSEC
E/A RATIO: 1.1
E/E' RATIO: 8 M/S
ECHO LV POSTERIOR WALL: 1.5 CM (ref 0.6–1.1)
FRACTIONAL SHORTENING: 41.2 % (ref 28–44)
INTERVENTRICULAR SEPTUM: 1.4 CM (ref 0.6–1.1)
IVC DIAMETER: 0.91 CM
LA MAJOR: 4.4 CM
LA MINOR: 4.6 CM
LA WIDTH: 3.2 CM
LEFT ATRIUM SIZE: 3.3 CM
LEFT ATRIUM VOLUME: 40 CM3
LEFT INTERNAL DIMENSION IN SYSTOLE: 2 CM (ref 2.1–4)
LEFT VENTRICLE DIASTOLIC VOLUME: 46 ML
LEFT VENTRICLE SYSTOLIC VOLUME: 12 ML
LEFT VENTRICULAR INTERNAL DIMENSION IN DIASTOLE: 3.4 CM (ref 3.5–6)
LEFT VENTRICULAR MASS: 175.9 G
LV LATERAL E/E' RATIO: 8.3 M/S
LV SEPTAL E/E' RATIO: 8.3 M/S
LVED V (TEICH): 45.68 ML
LVES V (TEICH): 12.46 ML
LVOT MG: 5.06 MMHG
LVOT MV: 1.05 CM/S
MV PEAK A VEL: 0.6 M/S
MV PEAK E VEL: 0.66 M/S
MV STENOSIS PRESSURE HALF TIME: 64.17 MS
MV VALVE AREA P 1/2 METHOD: 3.43 CM2
OHS CV RV/LV RATIO: 0.85 CM
PISA TR MAX VEL: 1.5 M/S
PV PEAK GRADIENT: 3 MMHG
PV PEAK VELOCITY: 0.89 M/S
RA MAJOR: 3.64 CM
RA PRESSURE ESTIMATED: 3 MMHG
RA WIDTH: 3.3 CM
RIGHT VENTRICLE DIASTOLIC BASEL DIMENSION: 2.9 CM
RIGHT VENTRICULAR END-DIASTOLIC DIMENSION: 2.88 CM
RV TB RVSP: 5 MMHG
RV TISSUE DOPPLER FREE WALL SYSTOLIC VELOCITY 1 (APICAL 4 CHAMBER VIEW): 14.65 CM/S
SINUS: 3.4 CM
STJ: 3.3 CM
TDI LATERAL: 0.08 M/S
TDI SEPTAL: 0.08 M/S
TDI: 0.08 M/S
TR MAX PG: 9 MMHG
TRICUSPID ANNULAR PLANE SYSTOLIC EXCURSION: 2.1 CM
TV REST PULMONARY ARTERY PRESSURE: 12 MMHG

## 2025-08-26 PROCEDURE — 93306 TTE W/DOPPLER COMPLETE: CPT | Mod: 26,,, | Performed by: INTERNAL MEDICINE

## 2025-08-26 PROCEDURE — 93306 TTE W/DOPPLER COMPLETE: CPT

## 2025-08-26 PROCEDURE — 63600175 PHARM REV CODE 636 W HCPCS: Performed by: INTERNAL MEDICINE

## 2025-08-26 RX ORDER — REGADENOSON 0.08 MG/ML
0.4 INJECTION, SOLUTION INTRAVENOUS ONCE
Status: COMPLETED | OUTPATIENT
Start: 2025-08-26 | End: 2025-08-26

## 2025-08-26 RX ADMIN — REGADENOSON 0.4 MG: 0.08 INJECTION, SOLUTION INTRAVENOUS at 09:08

## 2025-08-29 ENCOUNTER — TELEPHONE (OUTPATIENT)
Dept: FAMILY MEDICINE | Facility: CLINIC | Age: 75
End: 2025-08-29
Payer: MEDICARE

## 2025-08-29 DIAGNOSIS — I70.0 ATHEROSCLEROSIS OF AORTA: ICD-10-CM

## 2025-09-02 RX ORDER — PRAVASTATIN SODIUM 40 MG/1
40 TABLET ORAL
Qty: 90 TABLET | Refills: 1 | Status: SHIPPED | OUTPATIENT
Start: 2025-09-02

## (undated) DEVICE — DRESSING LEUKOPLAST FLEX 1X3IN